# Patient Record
Sex: FEMALE | Race: WHITE | Employment: UNEMPLOYED | URBAN - METROPOLITAN AREA
[De-identification: names, ages, dates, MRNs, and addresses within clinical notes are randomized per-mention and may not be internally consistent; named-entity substitution may affect disease eponyms.]

---

## 2017-07-18 RX ORDER — OMEPRAZOLE 40 MG/1
CAPSULE, DELAYED RELEASE ORAL
Qty: 180 CAP | Refills: 0 | Status: ON HOLD | OUTPATIENT
Start: 2017-07-18 | End: 2017-12-20

## 2017-09-26 ENCOUNTER — HOSPITAL ENCOUNTER (INPATIENT)
Age: 67
LOS: 3 days | Discharge: HOME | DRG: 378 | End: 2017-09-29
Attending: EMERGENCY MEDICINE | Admitting: FAMILY MEDICINE
Payer: MEDICARE

## 2017-09-26 ENCOUNTER — APPOINTMENT (OUTPATIENT)
Dept: GENERAL RADIOLOGY | Age: 67
DRG: 378 | End: 2017-09-26
Attending: EMERGENCY MEDICINE
Payer: MEDICARE

## 2017-09-26 DIAGNOSIS — K92.2 GASTROINTESTINAL HEMORRHAGE, UNSPECIFIED GASTROINTESTINAL HEMORRHAGE TYPE: ICD-10-CM

## 2017-09-26 DIAGNOSIS — R07.9 ACUTE CHEST PAIN: Primary | ICD-10-CM

## 2017-09-26 LAB
ALBUMIN SERPL-MCNC: 3 G/DL (ref 3.5–5)
ALBUMIN/GLOB SERPL: 0.8 {RATIO} (ref 1.1–2.2)
ALP SERPL-CCNC: 49 U/L (ref 45–117)
ALT SERPL-CCNC: 10 U/L (ref 12–78)
ANION GAP SERPL CALC-SCNC: 4 MMOL/L (ref 5–15)
AST SERPL-CCNC: 10 U/L (ref 15–37)
ATRIAL RATE: 76 BPM
ATRIAL RATE: 78 BPM
ATRIAL RATE: 80 BPM
BILIRUB SERPL-MCNC: 0.1 MG/DL (ref 0.2–1)
BUN SERPL-MCNC: 31 MG/DL (ref 6–20)
BUN/CREAT SERPL: 20 (ref 12–20)
CALCIUM SERPL-MCNC: 8.6 MG/DL (ref 8.5–10.1)
CALCULATED P AXIS, ECG09: 67 DEGREES
CALCULATED P AXIS, ECG09: 68 DEGREES
CALCULATED R AXIS, ECG10: -11 DEGREES
CALCULATED R AXIS, ECG10: 38 DEGREES
CALCULATED R AXIS, ECG10: 41 DEGREES
CALCULATED T AXIS, ECG11: 151 DEGREES
CALCULATED T AXIS, ECG11: 88 DEGREES
CALCULATED T AXIS, ECG11: 90 DEGREES
CHLORIDE SERPL-SCNC: 113 MMOL/L (ref 97–108)
CK SERPL-CCNC: 82 U/L (ref 26–192)
CO2 SERPL-SCNC: 24 MMOL/L (ref 21–32)
CREAT SERPL-MCNC: 1.53 MG/DL (ref 0.55–1.02)
DIAGNOSIS, 93000: NORMAL
EST. AVERAGE GLUCOSE BLD GHB EST-MCNC: 120 MG/DL
GLOBULIN SER CALC-MCNC: 3.8 G/DL (ref 2–4)
GLUCOSE BLD STRIP.AUTO-MCNC: 106 MG/DL (ref 65–100)
GLUCOSE BLD STRIP.AUTO-MCNC: 202 MG/DL (ref 65–100)
GLUCOSE BLD STRIP.AUTO-MCNC: 224 MG/DL (ref 65–100)
GLUCOSE SERPL-MCNC: 83 MG/DL (ref 65–100)
HBA1C MFR BLD: 5.8 % (ref 4.2–6.3)
HCT VFR BLD AUTO: 18.3 % (ref 35–47)
HCT VFR BLD AUTO: 21.9 % (ref 35–47)
HEMOCCULT STL QL: POSITIVE
HGB BLD-MCNC: 5.2 G/DL (ref 11.5–16)
HGB BLD-MCNC: 6.2 G/DL (ref 11.5–16)
NRBC # BLD: 0.14 K/UL (ref 0–0.01)
NRBC BLD-RTO: 2.2 PER 100 WBC
P-R INTERVAL, ECG05: 136 MS
P-R INTERVAL, ECG05: 168 MS
P-R INTERVAL, ECG05: 174 MS
POTASSIUM SERPL-SCNC: 4.4 MMOL/L (ref 3.5–5.1)
PROT SERPL-MCNC: 6.8 G/DL (ref 6.4–8.2)
Q-T INTERVAL, ECG07: 398 MS
Q-T INTERVAL, ECG07: 402 MS
Q-T INTERVAL, ECG07: 406 MS
QRS DURATION, ECG06: 78 MS
QTC CALCULATION (BEZET), ECG08: 452 MS
QTC CALCULATION (BEZET), ECG08: 453 MS
QTC CALCULATION (BEZET), ECG08: 468 MS
SERVICE CMNT-IMP: ABNORMAL
SODIUM SERPL-SCNC: 141 MMOL/L (ref 136–145)
TROPONIN I SERPL-MCNC: <0.04 NG/ML
TSH SERPL DL<=0.05 MIU/L-ACNC: 0.86 UIU/ML (ref 0.36–3.74)
VENTRICULAR RATE, ECG03: 76 BPM
VENTRICULAR RATE, ECG03: 78 BPM
VENTRICULAR RATE, ECG03: 80 BPM

## 2017-09-26 PROCEDURE — 82962 GLUCOSE BLOOD TEST: CPT

## 2017-09-26 PROCEDURE — 87040 BLOOD CULTURE FOR BACTERIA: CPT | Performed by: FAMILY MEDICINE

## 2017-09-26 PROCEDURE — P9016 RBC LEUKOCYTES REDUCED: HCPCS | Performed by: EMERGENCY MEDICINE

## 2017-09-26 PROCEDURE — 74011250636 HC RX REV CODE- 250/636: Performed by: FAMILY MEDICINE

## 2017-09-26 PROCEDURE — 85018 HEMOGLOBIN: CPT | Performed by: EMERGENCY MEDICINE

## 2017-09-26 PROCEDURE — 84443 ASSAY THYROID STIM HORMONE: CPT | Performed by: FAMILY MEDICINE

## 2017-09-26 PROCEDURE — 94762 N-INVAS EAR/PLS OXIMTRY CONT: CPT

## 2017-09-26 PROCEDURE — 93005 ELECTROCARDIOGRAM TRACING: CPT

## 2017-09-26 PROCEDURE — 74011000250 HC RX REV CODE- 250: Performed by: FAMILY MEDICINE

## 2017-09-26 PROCEDURE — 71010 XR CHEST PORT: CPT

## 2017-09-26 PROCEDURE — 82550 ASSAY OF CK (CPK): CPT | Performed by: EMERGENCY MEDICINE

## 2017-09-26 PROCEDURE — 99284 EMERGENCY DEPT VISIT MOD MDM: CPT

## 2017-09-26 PROCEDURE — 74011250637 HC RX REV CODE- 250/637: Performed by: EMERGENCY MEDICINE

## 2017-09-26 PROCEDURE — 80053 COMPREHEN METABOLIC PANEL: CPT | Performed by: EMERGENCY MEDICINE

## 2017-09-26 PROCEDURE — 85025 COMPLETE CBC W/AUTO DIFF WBC: CPT | Performed by: EMERGENCY MEDICINE

## 2017-09-26 PROCEDURE — C9113 INJ PANTOPRAZOLE SODIUM, VIA: HCPCS | Performed by: FAMILY MEDICINE

## 2017-09-26 PROCEDURE — 84484 ASSAY OF TROPONIN QUANT: CPT | Performed by: EMERGENCY MEDICINE

## 2017-09-26 PROCEDURE — 82272 OCCULT BLD FECES 1-3 TESTS: CPT | Performed by: EMERGENCY MEDICINE

## 2017-09-26 PROCEDURE — 93306 TTE W/DOPPLER COMPLETE: CPT

## 2017-09-26 PROCEDURE — 74011250637 HC RX REV CODE- 250/637: Performed by: FAMILY MEDICINE

## 2017-09-26 PROCEDURE — 83036 HEMOGLOBIN GLYCOSYLATED A1C: CPT | Performed by: FAMILY MEDICINE

## 2017-09-26 PROCEDURE — 86923 COMPATIBILITY TEST ELECTRIC: CPT | Performed by: EMERGENCY MEDICINE

## 2017-09-26 PROCEDURE — 74011000250 HC RX REV CODE- 250: Performed by: NURSE PRACTITIONER

## 2017-09-26 PROCEDURE — 36430 TRANSFUSION BLD/BLD COMPNT: CPT

## 2017-09-26 PROCEDURE — 30233N1 TRANSFUSION OF NONAUTOLOGOUS RED BLOOD CELLS INTO PERIPHERAL VEIN, PERCUTANEOUS APPROACH: ICD-10-PCS | Performed by: INTERNAL MEDICINE

## 2017-09-26 PROCEDURE — 77030013169 SET IV BLD ICUM -A

## 2017-09-26 PROCEDURE — 65660000000 HC RM CCU STEPDOWN

## 2017-09-26 PROCEDURE — 86900 BLOOD TYPING SEROLOGIC ABO: CPT | Performed by: EMERGENCY MEDICINE

## 2017-09-26 PROCEDURE — 36415 COLL VENOUS BLD VENIPUNCTURE: CPT | Performed by: EMERGENCY MEDICINE

## 2017-09-26 RX ORDER — SODIUM CHLORIDE 9 MG/ML
250 INJECTION, SOLUTION INTRAVENOUS AS NEEDED
Status: DISCONTINUED | OUTPATIENT
Start: 2017-09-26 | End: 2017-09-29 | Stop reason: HOSPADM

## 2017-09-26 RX ORDER — DORZOLAMIDE HYDROCHLORIDE AND TIMOLOL MALEATE 20; 5 MG/ML; MG/ML
2 SOLUTION/ DROPS OPHTHALMIC 2 TIMES DAILY
Status: DISCONTINUED | OUTPATIENT
Start: 2017-09-26 | End: 2017-09-29 | Stop reason: HOSPADM

## 2017-09-26 RX ORDER — SODIUM CHLORIDE 9 MG/ML
75 INJECTION, SOLUTION INTRAVENOUS CONTINUOUS
Status: DISCONTINUED | OUTPATIENT
Start: 2017-09-26 | End: 2017-09-29 | Stop reason: HOSPADM

## 2017-09-26 RX ORDER — MAGNESIUM SULFATE 100 %
4 CRYSTALS MISCELLANEOUS AS NEEDED
Status: DISCONTINUED | OUTPATIENT
Start: 2017-09-26 | End: 2017-09-29 | Stop reason: HOSPADM

## 2017-09-26 RX ORDER — LEVOFLOXACIN 5 MG/ML
750 INJECTION, SOLUTION INTRAVENOUS
Status: DISCONTINUED | OUTPATIENT
Start: 2017-09-26 | End: 2017-09-29 | Stop reason: HOSPADM

## 2017-09-26 RX ORDER — LANOLIN ALCOHOL/MO/W.PET/CERES
325 CREAM (GRAM) TOPICAL 2 TIMES DAILY
Status: DISCONTINUED | OUTPATIENT
Start: 2017-09-26 | End: 2017-09-29 | Stop reason: HOSPADM

## 2017-09-26 RX ORDER — GABAPENTIN 800 MG/1
800 TABLET ORAL 3 TIMES DAILY
COMMUNITY
End: 2021-02-09

## 2017-09-26 RX ORDER — IPRATROPIUM BROMIDE AND ALBUTEROL SULFATE 2.5; .5 MG/3ML; MG/3ML
3 SOLUTION RESPIRATORY (INHALATION)
Status: DISCONTINUED | OUTPATIENT
Start: 2017-09-27 | End: 2017-09-27

## 2017-09-26 RX ORDER — CITALOPRAM 20 MG/1
20 TABLET, FILM COATED ORAL DAILY
Status: ON HOLD | COMMUNITY
End: 2017-12-20

## 2017-09-26 RX ORDER — CITALOPRAM 20 MG/1
20 TABLET, FILM COATED ORAL DAILY
Status: DISCONTINUED | OUTPATIENT
Start: 2017-09-27 | End: 2017-09-29 | Stop reason: HOSPADM

## 2017-09-26 RX ORDER — SODIUM CHLORIDE 0.9 % (FLUSH) 0.9 %
5-10 SYRINGE (ML) INJECTION AS NEEDED
Status: DISCONTINUED | OUTPATIENT
Start: 2017-09-26 | End: 2017-09-29 | Stop reason: HOSPADM

## 2017-09-26 RX ORDER — LEVOTHYROXINE SODIUM 125 UG/1
125 TABLET ORAL
Status: DISCONTINUED | OUTPATIENT
Start: 2017-09-27 | End: 2017-09-29 | Stop reason: HOSPADM

## 2017-09-26 RX ORDER — SODIUM CHLORIDE 0.9 % (FLUSH) 0.9 %
5-10 SYRINGE (ML) INJECTION EVERY 8 HOURS
Status: DISCONTINUED | OUTPATIENT
Start: 2017-09-26 | End: 2017-09-29 | Stop reason: HOSPADM

## 2017-09-26 RX ORDER — INSULIN LISPRO 100 [IU]/ML
INJECTION, SOLUTION INTRAVENOUS; SUBCUTANEOUS EVERY 6 HOURS
Status: DISCONTINUED | OUTPATIENT
Start: 2017-09-26 | End: 2017-09-29 | Stop reason: HOSPADM

## 2017-09-26 RX ORDER — PRAVASTATIN SODIUM 40 MG/1
40 TABLET ORAL
Status: DISCONTINUED | OUTPATIENT
Start: 2017-09-26 | End: 2017-09-29 | Stop reason: HOSPADM

## 2017-09-26 RX ORDER — POLYETHYLENE GLYCOL 3350, SODIUM SULFATE ANHYDROUS, SODIUM BICARBONATE, SODIUM CHLORIDE, POTASSIUM CHLORIDE 236; 22.74; 6.74; 5.86; 2.97 G/4L; G/4L; G/4L; G/4L; G/4L
1000 POWDER, FOR SOLUTION ORAL ONCE
Status: COMPLETED | OUTPATIENT
Start: 2017-09-26 | End: 2017-09-26

## 2017-09-26 RX ORDER — DEXTROSE 50 % IN WATER (D50W) INTRAVENOUS SYRINGE
12.5-25 AS NEEDED
Status: DISCONTINUED | OUTPATIENT
Start: 2017-09-26 | End: 2017-09-29 | Stop reason: HOSPADM

## 2017-09-26 RX ORDER — NITROGLYCERIN 0.4 MG/1
0.4 TABLET SUBLINGUAL
Status: DISCONTINUED | OUTPATIENT
Start: 2017-09-26 | End: 2017-09-29 | Stop reason: HOSPADM

## 2017-09-26 RX ORDER — GABAPENTIN 400 MG/1
800 CAPSULE ORAL 2 TIMES DAILY
Status: DISCONTINUED | OUTPATIENT
Start: 2017-09-26 | End: 2017-09-29 | Stop reason: HOSPADM

## 2017-09-26 RX ORDER — ASPIRIN 325 MG
325 TABLET ORAL
Status: COMPLETED | OUTPATIENT
Start: 2017-09-26 | End: 2017-09-26

## 2017-09-26 RX ADMIN — DORZOLAMIDE HYDROCHLORIDE AND TIMOLOL MALEATE 2 DROP: 20; 5 SOLUTION/ DROPS OPHTHALMIC at 23:17

## 2017-09-26 RX ADMIN — PRAVASTATIN SODIUM 40 MG: 40 TABLET ORAL at 22:18

## 2017-09-26 RX ADMIN — LEVOFLOXACIN 750 MG: 5 INJECTION, SOLUTION INTRAVENOUS at 16:55

## 2017-09-26 RX ADMIN — Medication 10 ML: at 22:01

## 2017-09-26 RX ADMIN — SODIUM CHLORIDE 75 ML/HR: 900 INJECTION, SOLUTION INTRAVENOUS at 18:07

## 2017-09-26 RX ADMIN — GABAPENTIN 800 MG: 400 CAPSULE ORAL at 22:02

## 2017-09-26 RX ADMIN — ASPIRIN 325 MG: 325 TABLET ORAL at 13:21

## 2017-09-26 RX ADMIN — FERROUS SULFATE TAB 325 MG (65 MG ELEMENTAL FE) 325 MG: 325 (65 FE) TAB at 22:18

## 2017-09-26 RX ADMIN — SODIUM CHLORIDE 40 MG: 9 INJECTION INTRAMUSCULAR; INTRAVENOUS; SUBCUTANEOUS at 22:18

## 2017-09-26 RX ADMIN — Medication 10 ML: at 22:03

## 2017-09-26 RX ADMIN — POLYETHYLENE GLYCOL-3350 AND ELECTROLYTES 1000 ML: 236; 6.74; 5.86; 2.97; 22.74 POWDER, FOR SOLUTION ORAL at 18:25

## 2017-09-26 NOTE — PROGRESS NOTES
Admission Medication Reconciliation:    Information obtained from: Patient     Significant PMH/Disease States:   Past Medical History:   Diagnosis Date    CAD (coronary artery disease)     Chest pain 7/2015    [facility]     Diabetic neuropathy [facility]     Diabetic retinopathy ([facility]     GI bleed 7/2015    4 bleeds with 9 clips placed       Chief Complaint for this Admission:    Chief Complaint   Patient presents with    Chest Pain       Allergies:  Augmentin [amoxicillin-pot clavulanate] and Nsaids (non-steroidal anti-inflammatory drug)    Prior to Admission Medications:   Prior to Admission Medications   Prescriptions Last Dose Informant Patient Reported? Taking?    insulin pump (PATIENT SUPPLIED) misc   Yes Yes   Sig: by SubCUTAneous route as needed. Pt unsure of pump settings. cholecalciferol, vitamin D3, (VITAMIN D3) 2,000 unit tab 9/26/2017 at Unknown time  No Yes   Sig: Take 1 Tab by mouth daily. citalopram (CELEXA) 20 mg tablet 9/26/2017 at Unknown time  Yes Yes   Sig: Take 20 mg by mouth daily. dorzolamide-timolol, PF, (COSOPT, PF,) 2-0.5 % dpet 9/25/2017 at Unknown time  Yes Yes   Sig: Administer 2 Drops to both eyes two (2) times a day. Indications: Ocular Hypertension   ferrous sulfate 325 mg (65 mg iron) tablet 9/26/2017 at am  Yes Yes   Sig: Take 325 mg by mouth two (2) times a day.   gabapentin (NEURONTIN) 800 mg tablet 9/26/2017 at am  Yes Yes   Sig: Take 800 mg by mouth two (2) times a day. insulin lispro (HUMALOG) 100 unit/mL injection   No Yes   Sig: Per insulin pump max dose of 100 units per day. Code E10.319   levothyroxine (SYNTHROID) 125 mcg tablet 9/26/2017 at Unknown time  No Yes   Sig: Take 1 Tab by mouth Daily (before breakfast). lisinopril (PRINIVIL, ZESTRIL) 10 mg tablet 9/26/2017 at Unknown time  No Yes   Sig: Take 1 Tab by mouth daily.    omeprazole (PRILOSEC) 40 mg capsule 9/25/2017 at Unknown time  No Yes   Sig: TAKE 1 CAPSULE BY MOUTH TWICE DAILY   pravastatin (PRAVACHOL) 40 mg tablet 9/25/2017 at Unknown time  No Yes   Sig: Take 1 Tab by mouth nightly. senna (SENNA) 8.6 mg tablet 9/25/2017 at Unknown time  Yes Yes   Sig: Take 1 Tab by mouth nightly. umeclidinium-vilanterol (ANORO ELLIPTA) 62.5-25 mcg/actuation dsdv 9/25/2017 at 1200  Yes Yes   Sig: Take 1 Puff by inhalation daily. verapamil ER (CALAN-SR) 120 mg tablet 9/25/2017 at Unknown time  No Yes   Sig: Take 1 Tab by mouth two (2) times a day. For BP      Facility-Administered Medications: None         Comments/Recommendations:     Spoke with patient regarding allergies and PTA medications. 1) Reviewed and confirmed allergies. 2) Updated PTA medication list. Added listing for patient's own insulin pump (pt unsure of pump settings). Modified Cosopt (specified both eyes) and gabapentin (changed 600 mg BID to 800 mg BID). Removed diabetic testing supplies and duplicate levothyroxine. Last dose information listed in table above.       Andrew Dominguez, PharmD

## 2017-09-26 NOTE — PROGRESS NOTES
Day #1 of Levaquin  Indication:  CAP  Current regimen:  Levaquin 750mg q 24 hours  Abx regimen: Levaquin monotherapy  Recent Labs      17   1255   WBC  5.9   CREA  1.53*   BUN  31*     Est CrCl: 40.2 ml/min  Temp (24hrs), Av.3 °F (36.8 °C), Min:98.3 °F (36.8 °C), Max:98.3 °F (36.8 °C)    Cultures: none    Plan: Change to 750mg q 48 h  due to renal function <50

## 2017-09-26 NOTE — H&P
[address] [place] [facility] 12, [address]   HISTORY AND PHYSICAL       Name:  Kenny Bowles   MR#:  [id number]   :  1950   Account #:  [de-identified]        Date of Adm:  2017       CHIEF COMPLAINT: Chest pain. HISTORY OF PRESENT ILLNESS: The patient is a 54-year-old   female with past medical history of coronary artery disease status post   CABG x3, 2 years back, history of gastrointestinal bleed, status post   clipping, history of diabetes mellitus type 1, currently on insulin pump,    history of hypothyroidism, hypertension, hyperlipidemia, who presents   to the hospital with the above mentioned symptoms. The patient   reports that her symptoms started 2 days back. She started   experiencing some intermittent chest pain, which was somewhat   substernal and she felt that it was \"squeezing. \" She also had some   nitroglycerin pills  associated with the same. Today, the chest pain   came back. The patient denies any exertional component to the chest   pain or any shortness of breath associated with the same. Regardless,   she called EMS and she was brought to the hospital. On arrival to the   ER by EMS, when the patient was coming to the hospital, it improved   after 200 mL of IV fluid that was given by EMS. The patient also had   some  significant nausea associated with the same. The patient came   to the hospital and was found to have a hemoglobin of around 6. She had a stool occult blood test done and was found to be positive for   the same and was requested to be admitted under the hospitalist   service. The patient reports that she has observed that her stools have been   darker than usual in the past few days, but she has not had any   vomiting, hematemesis or abdominal pain associated with the same. The patient denies any exertional component to the chest pain. Denies   any radiation to the chest pain, or any other concerns or problem with   the pain.  The patient reports that she had similar symptoms a few   years back and has had \"clips\" put in to stop the bleeding in her   stomach. The patient reports that she is not on aspirin or Plavix or any   NSAIDs because of history of GI bleeding. The patient denies any   other complaints or problems. Reports she lives in [place] and her   cardiologist is over there and does not remember when her last stress   test or cardiac catheterization was. The patient denies any other   complaints or problems. Denies any headache, blurry vision, sore   throat, trouble swallowing, trouble with speech, any shortness of   breath, cough, fever, chills, abdominal pain, constipation, diarrhea,   urinary symptoms, focal or generalized neurological weakness, recent   travel, sick contacts, falls, injuries or any other concerns or problems. PAST MEDICAL HISTORY: See above. HOME MEDICATIONS   Currently, the patient is on:   1. Lisinopril 10 mg daily. 2. Pravastatin 40 mg nightly. 3. Cholecalciferol. 4. Ferrous sulfate 325 mg, b.i.d.    5. Cosopt 2 drops both eyes. 6. Celexa 20 mg daily. 7. Gabapentin 800 mg b.i.d.   8. Insulin pump. 9. Omeprazole 40 mg daily. 10. Verapamil 1 tablet b.i.d. 11. L-thyroxine 125 mg daily. SOCIAL HISTORY: The patient is a former smoker. Used to smoke half   a pack per day and quit in 2015. Denies alcohol use, denies IV drug   abuse. Lives at home. FAMILY HISTORY: Mother had history of COPD, diabetes. Father had   history of COPD, diabetes and pancreatic cancer. ALLERGIES: AUGMENTIN AND NSAIDS. REVIEW OF SYSTEMS: All systems were removed and were found to   be essentially negative except for symptoms mentioned above. PHYSICAL EXAMINATION   VITAL SIGNS: Temperature 98.3, pulse 83, respiratory rate 19, blood   pressure 148/43, pulse oximetry 93% on room air.    GENERAL: Alert and oriented x3, awake, no acute distress, resting in   bed, pleasant female, appears to be stated age.   HEENT: Pupils equal and reactive to light. Dry mucous membranes. Tympanic membranes clear. NECK: Supple. CHEST: Clear to auscultation bilaterally. CORONARY: S1, S2 were heard. ABDOMEN: Soft, nontender, nondistended. Bowel sounds are   physiological.   EXTREMITIES: No clubbing, no cyanosis, no edema. NEUROPSYCHIATRIC: Pleasant mood and affect. Cranial nerves 2   through 12 grossly intact. Sensory grossly within normal limits. DTR   Strength 5/5. SKIN: Warm. LABORATORY: White count 5.9, hemoglobin 5.8, repeat was 6.2,   platelets 920. Sodium 141, potassium 4.4, chloride 113, bicarbonate   24, glucose 83, BUN 31, creatinine 1.53, calcium 8.6. ALT 10, AST 10,   alkaline phosphatase 49. CK 82, troponin less than 0.04. Stool occult   blood test positive. Repeat hemoglobin is 6.2 and hematocrit is 21.9. X-ray chest shows a patch of right infrahilar atelectasis/ air space   disease. EKG shows normal sinus rhythm with nonspecific ST changes. ASSESSMENT AND PLAN:   1. Chest pain, rule out acute coronary syndrome. The patient currently   is having GI bleed, thus cannot be put on antiplatelets. We will cycle   troponins. We will get an echocardiogram. Cardiology has evaluated   the patient and their recommendations have been incorporated. The   patient may need further intervention including a stress test once  GI   bleeding stabilizes. We will monitor the patient on a telemetry bed and   further intervention will be per hospital course. We will provide   nitroglycerin for pain and we will reassess as needed. 3. Gastrointestinal bleeding. The patient will be admitted to a telemetry   bed. We will transfuse 2 units PRBC. Keep the patient n.p.o. Start the   patient on Protonix b.i.d. GI consult has been requested. Closely   monitor hemoglobin and hematocrit and further intervention will be per   hospital course. We will reassess as needed. Continue to monitor. No   antiplatelets or NSAIDS. Further intervention will be per hospital   course. 4. Acute anemia of blood loss secondary to a GI bleed. We will   transfuse 2 units PRBC and monitor. Further intervention will be per   hospital course. The patient on Protonix, please see above. 5. Questionable pneumonia. The patient does have mild hypoxia. Could have underlying pneumonia. Thus we will start the patient on   empirical antibiotics, and obtain blood cultures. Although the patient is   afebrile, at this point of time and does not have leukocytosis, we will   start the patient on gentle IV hydration and continue to closely monitor. Further intervention will be per hospital course. antibiotics in the   morning. 6. History of anxiety. We will continue home insulin pump for basal   coverage. We will put the patient on sliding scale NovoLog insulin, diet   control and continue to monitor. 7. Hypothyroidism. Continue home medications. 8. Hypotension. We will hold antihypertensives at this point in time. We   will provide transfusion and monitor. We will provide gentle IV   hydration. The patient on telemetry. Further intervention will be per   hospital course. 10. Gastrointestinal bleed. 9. Gastrointestinal and deep venous thrombosis prophylaxis. The   patient is on SCDs.          Perla Painting MD MM / [email or web address].[email or web address]   D:  09/26/2017   15:25   T:  09/26/2017   16:13   Job #:  [id number]

## 2017-09-26 NOTE — CONSULTS
[address]  [address] [facility] 140 [facility], [address]  [phone]                     GI CONSULTATION NOTE  Vin Gamboa AGACNP-BC  Work Cell: [phone]      NAME:  Crista Christianson   :   [birth year]   MRN:   [id number]       Referring Provider: Dr. Eliseo Jane Date: 2017     Chief Complaint: Chest pain    History of Present Illness:  Patient is a 79 y.o. who is seen in consultation at the request of Dr. Beck Salgado for GI bleed. Ms. Kojo Castanon has a PMH as detailed below including recurrent GI bleed. She presented to the hospital with chest pain and indigestion. Onset of symptom was a couple of days ago. She has had some nausea without vomiting and alternating loose stools with constipation. She states stools have been black the past two days that she is aware of. She denies any abdominal pain or BRBPR. She is from [place] and had recent EGD with her GI provider there for GERD and upper abdominal pain. Per her reports, prior clips seen with some mild redness and small hiatal hernia. She has colonoscopy scheduled with him in 2017. She denies any ASA, NSAID or anticoagulant use. She was seen for similar symptoms back in 2015 at which time she had EGD/colonoscopy. She was noted to have gastric and small bowel AVMs which were treated with APC and clipping. Colonoscopy was unremarkable. She had repeat EGD 2015 at which time AVMs were again treated with clips and capsule was placed. Hgb 6.2 in ER and stools were found to be heme positive.        PMH:  Past Medical History:   Diagnosis Date    CAD (coronary artery disease)     Chest pain 2015    [facility]     Diabetic neuropathy [facility]     Diabetic retinopathy ([facility]     GI bleed 2015    4 bleeds with 9 clips placed       PSH:  Past Surgical History:   Procedure Laterality Date    CABG, ARTERY-VEIN, THREE  2015    HX ENDOSCOPY  2015    HX GI      HX HEART CATHETERIZATION  2015    HX ORTHOPAEDIC      back and right shoulder       Allergies: Allergies   Allergen Reactions    Augmentin [Amoxicillin-Pot Clavulanate] Diarrhea    Nsaids (Non-Steroidal Anti-Inflammatory Drug) Other (comments)     bleeding       Home Medications:  Prior to Admission Medications   Prescriptions Last Dose Informant Patient Reported? Taking?    insulin pump (PATIENT SUPPLIED) misc   Yes Yes   Sig: by SubCUTAneous route as needed. Pt unsure of pump settings. cholecalciferol, vitamin D3, (VITAMIN D3) 2,000 unit tab 9/26/2017 at Unknown time  No Yes   Sig: Take 1 Tab by mouth daily. citalopram (CELEXA) 20 mg tablet 9/26/2017 at Unknown time  Yes Yes   Sig: Take 20 mg by mouth daily. dorzolamide-timolol, PF, (COSOPT, PF,) 2-0.5 % dpet 9/25/2017 at Unknown time  Yes Yes   Sig: Administer 2 Drops to both eyes two (2) times a day. Indications: Ocular Hypertension   ferrous sulfate 325 mg (65 mg iron) tablet 9/26/2017 at am  Yes Yes   Sig: Take 325 mg by mouth two (2) times a day.   gabapentin (NEURONTIN) 800 mg tablet 9/26/2017 at am  Yes Yes   Sig: Take 800 mg by mouth two (2) times a day. insulin lispro (HUMALOG) 100 unit/mL injection   No Yes   Sig: Per insulin pump max dose of 100 units per day. Code E10.319   levothyroxine (SYNTHROID) 125 mcg tablet 9/26/2017 at Unknown time  No Yes   Sig: Take 1 Tab by mouth Daily (before breakfast). lisinopril (PRINIVIL, ZESTRIL) 10 mg tablet 9/26/2017 at Unknown time  No Yes   Sig: Take 1 Tab by mouth daily. omeprazole (PRILOSEC) 40 mg capsule 9/25/2017 at Unknown time  No Yes   Sig: TAKE 1 CAPSULE BY MOUTH TWICE DAILY   pravastatin (PRAVACHOL) 40 mg tablet 9/25/2017 at Unknown time  No Yes   Sig: Take 1 Tab by mouth nightly. senna (SENNA) 8.6 mg tablet 9/25/2017 at Unknown time  Yes Yes   Sig: Take 1 Tab by mouth nightly. umeclidinium-vilanterol (ANORO ELLIPTA) 62.5-25 mcg/actuation dsdv 9/25/2017 at 1200  Yes Yes   Sig: Take 1 Puff by inhalation daily.    verapamil ER (CALAN-SR) 120 mg tablet 9/25/2017 at Unknown time  No Yes   Sig: Take 1 Tab by mouth two (2) times a day. For BP      Facility-Administered Medications: None       Hospital Medications:  Current Facility-Administered Medications   Medication Dose Route Frequency    0.9% sodium chloride infusion 250 mL  250 mL IntraVENous PRN    levoFLOXacin (LEVAQUIN) 750 mg in D5W IVPB  750 mg IntraVENous Q48H     Current Outpatient Prescriptions   Medication Sig    citalopram (CELEXA) 20 mg tablet Take 20 mg by mouth daily.  gabapentin (NEURONTIN) 800 mg tablet Take 800 mg by mouth two (2) times a day.   insulin pump (PATIENT SUPPLIED) misc by SubCUTAneous route as needed. Pt unsure of pump settings.  omeprazole (PRILOSEC) 40 mg capsule TAKE 1 CAPSULE BY MOUTH TWICE DAILY    verapamil ER (CALAN-SR) 120 mg tablet Take 1 Tab by mouth two (2) times a day. For BP    levothyroxine (SYNTHROID) 125 mcg tablet Take 1 Tab by mouth Daily (before breakfast).  lisinopril (PRINIVIL, ZESTRIL) 10 mg tablet Take 1 Tab by mouth daily.  pravastatin (PRAVACHOL) 40 mg tablet Take 1 Tab by mouth nightly.  cholecalciferol, vitamin D3, (VITAMIN D3) 2,000 unit tab Take 1 Tab by mouth daily.  insulin lispro (HUMALOG) 100 unit/mL injection Per insulin pump max dose of 100 units per day. Code E10.319    ferrous sulfate 325 mg (65 mg iron) tablet Take 325 mg by mouth two (2) times a day.  umeclidinium-vilanterol (ANORO ELLIPTA) 62.5-25 mcg/actuation dsdv Take 1 Puff by inhalation daily.  dorzolamide-timolol, PF, (COSOPT, PF,) 2-0.5 % dpet Administer 2 Drops to both eyes two (2) times a day. Indications: Ocular Hypertension    senna (SENNA) 8.6 mg tablet Take 1 Tab by mouth nightly.        Social History:  Social History   Substance Use Topics    Smoking status: Former Smoker     Packs/day: 0.50     Quit date: 7/5/2015    Smokeless tobacco: Former User      Comment: using Nicoderm patch    Alcohol use No       Family History:  Family History   Problem Relation Age of Onset   [facility] [address] COPD Mother     Diabetes Mother     COPD Father     Diabetes Father     Cancer Father      pancreatic    Diabetes Brother     Heart Disease Brother     Diabetes Brother     Alcohol abuse Brother     Diabetes Sister     Cancer Sister      Adan Brightly    Bleeding Prob Sister      Factor V Leiden       Review of Systems:    Constitutional: negative fever, negative chills, negative weight loss  Eyes:   negative visual changes  ENT:   negative sore throat, tongue or lip swelling  Respiratory:  negative cough, negative dyspnea  Cards:  negative for chest pain, palpitations, lower extremity edema  GI:   See HPI  :  negative for frequency, dysuria  Integument:  negative for rash and pruritus  Heme:  negative for easy bruising and gum/nose bleeding  Musculoskel: negative for myalgias, back pain and muscle weakness  Neuro: negative for headaches, dizziness, vertigo  Psych:  negative for feelings of anxiety, depression      Objective:   Patient Vitals for the past 8 hrs:   BP Temp Pulse Resp SpO2 Height Weight   09/26/17 1500 (!) 120/33 - 78 12 98 % - -   09/26/17 1407 111/43 - 83 19 93 % - -   09/26/17 1345 135/85 - 80 17 96 % - -   09/26/17 1250 159/52 98.3 °F (36.8 °C) - 16 96 % 5' 5\" (1.651 m) 93 kg (205 lb 0.4 oz)               PHYSICAL EXAM:  General: WD, Obese. Alert, pale, cooperative, no acute distress.    HEENT: NC, Atraumatic. PERRLA, EOMI. Anicteric sclerae. Lungs:  CTA Bilaterally. No Wheezing/Rhonchi/Rales. Heart:  Regular rate and rhythm, No murmur, No Rubs, No Gallops  Abdomen: Soft, non-distended, non-tender.  +Bowel sounds, no HSM  Extremities: No c/c/e  Neurologic:  Alert and oriented X 3. No acute neurological distress. Psych:   Good insight. Not anxious nor agitated.     Data Review     Recent Labs      09/26/17   1348  09/26/17   1255   WBC   --   5.9   HGB  6.2*  5.8*   HCT  21.9*  20.2*   PLT   --   222     Recent Labs 09/26/17   1255   NA  141   K  4.4   CL  113*   CO2  24   BUN  31*   CREA  1.53*   GLU  83   CA  8.6     Recent Labs      09/26/17   1255   SGOT  10*   AP  49   TP  6.8   ALB  3.0*   GLOB  3.8     No results for input(s): INR, PTP, APTT in the last 72 hours. No lab exists for component: INREXT     Imaging studies reviewed      Assessment:   1. Acute on chronic anemia - with melena and heme positive stool. Suspect recurrent bleeding likely related to small bowel AVMs; given that recent EGD completed a couple of weeks ago was reportedly unremarkable. 2. CAD - s/p CABG  3. Type 1 DM  4.  Hypertension    Patient Active Problem List   Diagnosis Code    CAD (coronary artery disease) I25.10    Diabetes mellitus ([facility] E11.9    Essential hypertension I10    HLD (hyperlipidemia) E78.5    Obesity, Class I, BMI 30.0-34.9 (see actual BMI) E66.9    Low back pain at multiple sites M54.5    Hypothyroidism due to Hashimoto's thyroiditis E03.8, E06.3    GI bleed K92.2              Plan:   -Clear liquids as tolerated  -IV PPI BID  -Golytely 1L this evening  -NPO after midnight  -Plan for M2A capsule study tomorrow  -Monitor H&H and follow clinical course for any overt signs of bleeding  -Transfuse as necessary   -Discussed with Dr. Satya Wilkerson  -Will follow along with you  -Thank you kindly for allowing us to participate in the care of this patient

## 2017-09-26 NOTE — CONSULTS
Consult    Patient: Prabhu Hoffman MRN: [id number]  SSN: [id number]    YOB: 1950  Age: 79 y.o. Sex: female       Subjective:      Date of  Admission: 9/26/2017     Admission type: Emergency     Reason for consult: chest pain    Prabhu Hoffman is a 79 y.o. female admitted for chest pain  Ms Albino Burrows was a former patient of Dr Niraj Stanton. She has been having some palpitations lately, and then started having chest pain this morning that was quite severe. Started around 6 or 7am. No relief with antacids or SL NTG so EMS was called. She had pain for several hours. Now resolved. She had some associated nausea along with the pain. Initial EKG showed NSR with slight lateral ST depression, resolved on a subsequent tracing. Had an NSTEMI in July 2015 and was found to be anemic at that time. She was seen by GI and Dr Ashley Lopez did an EGD noting AVMs in the stomach and duodenum. These were clipped. She then underwent a cardiac cath that showed significant 3 vessel CAD. She was referred to cardiac surgery and underwent 3V CABG on 7/13/15 by Dr Jazmin Reed. She has been living in [place] lately but is in [place] visiting a relative. She apparently had some anemia again back in [place] and recently underwent another EGD there and was told it was ok there. Her Hgb is critically low on arrival here.     Primary Care Provider: Rhianna Díaz MD  Past Medical History:   Diagnosis Date    CAD (coronary artery disease)     Chest pain 7/2015    Diabetes ([address]     Diabetic neuropathy ([address]     Diabetic retinopathy ([address]     GI bleed 7/2015    4 bleeds with 9 clips placed      Past Surgical History:   Procedure Laterality Date    CABG, ARTERY-VEIN, THREE  7/13/2015    HX ENDOSCOPY  7/2015    HX GI      HX HEART CATHETERIZATION  7/2015    HX ORTHOPAEDIC      back and right shoulder     Family History   Problem Relation Age of Onset    COPD Mother     Diabetes Mother     COPD Father     Diabetes Father     Cancer Father      pancreatic    Diabetes Brother     Heart Disease Brother     Diabetes Brother     Alcohol abuse Brother     Diabetes Sister     Cancer Sister      Courtney     Bleeding Prob Sister      Factor V Leiden      Social History   Substance Use Topics    Smoking status: Former Smoker     Packs/day: 0.50     Quit date: 7/5/2015    Smokeless tobacco: Former User      Comment: using Nicoderm patch    Alcohol use No      No current facility-administered medications for this encounter. Current Outpatient Prescriptions   Medication Sig    omeprazole (PRILOSEC) 40 mg capsule TAKE 1 CAPSULE BY MOUTH TWICE DAILY    CONTOUR NEXT STRIPS strip USE TO TEST BLOOD SUGAR FOUR TIMES DAILY    citalopram (CELEXA) 20 mg tablet TAKE 1 TABLET BY MOUTH EVERY DAY    levothyroxine (SYNTHROID) 125 mcg tablet Take 1 Tab by mouth Daily (before breakfast). Note dose change to 125mcg. Take one pill daily.  verapamil ER (CALAN-SR) 120 mg tablet Take 1 Tab by mouth two (2) times a day. For BP    levothyroxine (SYNTHROID) 125 mcg tablet Take 1 Tab by mouth Daily (before breakfast).  lisinopril (PRINIVIL, ZESTRIL) 10 mg tablet Take 1 Tab by mouth daily.  pravastatin (PRAVACHOL) 40 mg tablet Take 1 Tab by mouth nightly.  cholecalciferol, vitamin D3, (VITAMIN D3) 2,000 unit tab Take 1 Tab by mouth daily.  gabapentin (NEURONTIN) 300 mg capsule Take 2 Caps by mouth two (2) times a day.  insulin lispro (HUMALOG) 100 unit/mL injection Per insulin pump max dose of 100 units per day. Code E10.319    ferrous sulfate 325 mg (65 mg iron) tablet Take  by mouth two (2) times a day.  umeclidinium-vilanterol (ANORO ELLIPTA) 62.5-25 mcg/actuation dsdv Take 1 Puff by inhalation daily.  dorzolamide-timolol, PF, (COSOPT, PF,) 2-0.5 % dpet Apply 2 Drops to eye two (2) times a day. Indications: OCULAR HYPERTENSION    senna (SENNA) 8.6 mg tablet Take 1 Tab by mouth nightly.         Allergies   Allergen Reactions    Augmentin [Amoxicillin-Pot Clavulanate] Diarrhea    Nsaids (Non-Steroidal Anti-Inflammatory Drug) Other (comments)     bleeding        Review of Systems:  A comprehensive review of systems was negative except for that written in the History of Present Illness. Subjective:     Visit Vitals    /52 (BP 1 Location: Left arm, BP Patient Position: At rest)    Temp 98.3 °F (36.8 °C)    Resp 16    Ht 5' 5\" (1.651 m)    Wt 93 kg (205 lb 0.4 oz)    SpO2 96%    BMI 34.12 kg/m2        Physical Exam:  Visit Vitals    /52 (BP 1 Location: Left arm, BP Patient Position: At rest)    Temp 98.3 °F (36.8 °C)    Resp 16    Ht 5' 5\" (1.651 m)    Wt 93 kg (205 lb 0.4 oz)    SpO2 96%    BMI 34.12 kg/m2     General Appearance:  Well developed, well nourished,alert and oriented x 3, and individual in no acute distress. Ears/Nose/Mouth/Throat:   Hearing grossly normal.         Neck: Supple. Chest:   Lungs clear to auscultation bilaterally. Cardiovascular:  Regular rate and rhythm, S1, S2 normal, no murmur. Abdomen:   Soft, non-tender, bowel sounds are active. Extremities: No edema bilaterally. Skin: Warm and dry. Cardiographics:  Telemetry: normal sinus rhythm  ECG: normal sinus rhythm, slight lateral ST depression initially, resolved on subsequent tracing    Echocardiogram:   July 2015  SUMMARY:  Left ventricle: Systolic function was normal. Ejection fraction was  estimated in the range of 55 % to 60 %. There were no regional wall motion  abnormalities. Doppler parameters were consistent with abnormal left  ventricular relaxation (grade 1 diastolic dysfunction).     Data Reviewed:   BMP:   Lab Results   Component Value Date/Time     09/26/2017 12:55 PM    K 4.4 09/26/2017 12:55 PM     (H) 09/26/2017 12:55 PM    CO2 24 09/26/2017 12:55 PM    AGAP 4 (L) 09/26/2017 12:55 PM    GLU 83 09/26/2017 12:55 PM    BUN 31 (H) 09/26/2017 12:55 PM    CREA 1.53 (H) 09/26/2017 12:55 PM    GFRAA 41 (L) 09/26/2017 12:55 PM    GFRNA 34 (L) 09/26/2017 12:55 PM     CBC:   Lab Results   Component Value Date/Time    WBC 5.9 09/26/2017 12:55 PM    HGB 5.8 (LL) 09/26/2017 12:55 PM    HCT 20.2 (L) 09/26/2017 12:55 PM     09/26/2017 12:55 PM     All Cardiac Markers in the last 24 hours: No results found for: CPK, CK, CKMMB, CKMB, RCK3, CKMBT, CKNDX, CKND1, BARTOLOME, TROPT, TROIQ, MALINI, TROPT, TNIPOC, BNP, BNPP     Assessment:      1) Chest pain  Possible component of ischemia. Initial EKG showed some borderline ST depression  Likely largely due to her anemia though    2) Severe anemia  Normocytic  History of GI bleeding  Apparently normal EGD recently    3) CAD s/p CABG    4) Old MI (NSTEMI July 2015)     5) Diabetes with retinopathy    6) Acute vs chronic renal failure - currently Cr up a bit from prior value in 2015. Uncertain timing. Plan:     Admit to hospitalist  Anemia work-up   May need to get GI involved again  Transfuse  Trend troponins  Once Hgb more stable might be worth risk stratification with stress testing ? Tomorrow vs next day  NPO after midnight would seem like a good idea.     Signed By: Shweta Houser MD     September 26, 2017

## 2017-09-26 NOTE — ED PROVIDER NOTES
HPI Comments: This patient presents with intermittent chest pain for the past 2 days. She had very severe pain that started at rest within the past hour or 2. She took one nitroglycerin pills sublingually. It helped a little. EMS was called. He found her with a blood pressure of 80/40. Blood pressure improved while on the way to the hospital. They gave only about 200 cc of IV fluids. She had severe nausea at the time but denied any shortness of breath or sweating. She states that she had a heart attack about 2 years ago and was treated at this hospital. At that time she had jaw pain only. She has never had this chest pain before. Sometimes it is worse with exertion, and sometimes it occurs at rest. Today's pain just prior to coming occurred at rest. No abdominal pain. No recent cough or URI symptoms. No excellent. She has diabetes as well as a recent diagnosis of gastroparesis. Had recent UGI bleed with endo in [place]. Clips placed. Had f/u endo 2 weeks ago. No more bleeding. Looked \"better. \"    No current pain on my evaluation at 1:05 pm    Patient is a 79 y.o. female presenting with chest pain.    Chest Pain (Angina)           Past Medical History:   Diagnosis Date    CAD (coronary artery disease)     Chest pain 7/2015    Diabetes ([address]     Diabetic neuropathy ([address]     Diabetic retinopathy ([address]     GI bleed 7/2015    4 bleeds with 9 clips placed       Past Surgical History:   Procedure Laterality Date    CABG, ARTERY-VEIN, THREE  7/13/2015    HX ENDOSCOPY  7/2015    HX GI      HX HEART CATHETERIZATION  7/2015    HX ORTHOPAEDIC      back and right shoulder         Family History:   Problem Relation Age of Onset    COPD Mother     Diabetes Mother     COPD Father     Diabetes Father     Cancer Father      pancreatic    Diabetes Brother     Heart Disease Brother     Diabetes Brother     Alcohol abuse Brother     Diabetes Sister     Cancer Sister      Fernando Comp    Bleeding Prob Sister Factor V Leiden       Social History     Social History    Marital status: SINGLE     Spouse name: N/A    Number of children: N/A    Years of education: N/A     Occupational History    Not on file. Social History Main Topics    Smoking status: Former Smoker     Packs/day: 0.50     Quit date: 7/5/2015    Smokeless tobacco: Former User      Comment: using Nicoderm patch    Alcohol use No    Drug use: No    Sexual activity: No     Other Topics Concern    Not on file     Social History Narrative         ALLERGIES: Augmentin [amoxicillin-pot clavulanate] and Nsaids (non-steroidal anti-inflammatory drug)    Review of Systems   Cardiovascular: Positive for chest pain. All other systems reviewed and are negative. Vitals:    09/26/17 1250   BP: 159/52   Resp: 16   Temp: 98.3 °F (36.8 °C)   SpO2: 96%   Weight: 93 kg (205 lb 0.4 oz)   Height: 5' 5\" (1.651 m)            Physical Exam   Genitourinary:   Genitourinary Comments: Maroon/dark stool. Constitutional: Pt is awake and alert. Pt appears well-developed and well-nourished. NAD. HENT:   Head: Normocephalic and atraumatic. Nose: Nose normal.   Mouth/Throat: Oropharynx is clear and moist. No oropharyngeal exudate. Eyes: Conjunctivae and extraocular motions are normal. Pupils are equal, round, and reactive to light. Right eye exhibits no discharge. Left eye exhibits no discharge. No scleral icterus. Neck: No tracheal deviation present. Supple neck. Cardiovascular: Normal rate, regular rhythm, normal heart sounds and intact distal pulses. Exam reveals no gallop and no friction rub. No murmur heard. Pulmonary/Chest: Effort normal and breath sounds normal.  Pt  has no wheezes. Pt  has no rales. Abdominal: Soft. Pt  exhibits no distension and no mass. No tenderness. Pt  has no rebound and no guarding. Musculoskeletal:  Pt  exhibits no edema and no tenderness.    Ext: Normal ROM in all four extremities; not tender to palpation; distal pulses are normal, no edema. Neurological:  Pt is alert. nonfocal neuro exam.  Skin: Skin is warm and dry. Pt  is not diaphoretic. Psychiatric:  Pt  has a normal mood and affect. Behavior is normal.             MDM  Number of Diagnoses or Management Options  Critical Care  Total time providing critical care: 30-74 minutes        30 minutes      ED Course       Procedures         ED EKG interpretation:  Rhythm: normal sinus rhythm; and regular . Rate (approx.): 80; Axis: normal; P wave: normal; QRS interval: normal ; ST/T wave: non-specific changes; This EKG was interpreted by Miryam Prajapati DO,ED Provider. ED EKG interpretation: # 2 - R side  Rhythm: normal sinus rhythm; and regular . Rate (approx.): 80; Axis: normal; P wave: normal; QRS interval: normal ; ST/T wave: non-specific changes; no rV4 elevation. This EKG was interpreted by Miryam Prajapati DO,ED Provider. ED EKG interpretation: # 3  Rhythm: normal sinus rhythm; and regular . Rate (approx.): 76; Axis: normal; P wave: normal; QRS interval: normal ; ST/T wave: ischemic changes laterally; This EKG was interpreted by Miryam Prajapati DO,ED Provider. Not a lot of difference of today's EKGs compared to an EKG in our system from 2-25-16 that was scanned in from [facility] office. Consented for blood transfusion  Transfusion to be ordered by Dr Sue De Luna who I spoke to  - he will also admit the pt. Repeat hct pending on admit. HD stable in the ED.

## 2017-09-26 NOTE — ED TRIAGE NOTES
Triage: Pt arrives via EMS from home for midsternal CP 8/10. BP 80/40 upon arrival, NS bolus started in route, 125 SYS. ASA not given d/t \"bleeding problem\".  +Nausea, 4mg Zofran given denies sweating   Hx: hiatal hernia  Pt took own SL Nitro 0.4

## 2017-09-26 NOTE — IP AVS SNAPSHOT
[address] [place] [address] 
[phone] Patient: Avinash Christiansen MRN: [id number] OGK:[id number] Current Discharge Medication List  
  
CONTINUE these medications which have CHANGED Dose & Instructions Dispensing Information Comments Morning Noon Evening Bedtime  
 citalopram 20 mg tablet Commonly known as:  Arelis Virgil What changed:  Another medication with the same name was removed. Continue taking this medication, and follow the directions you see here. Your last dose was: Your next dose is:    
   
   
 Dose:  20 mg Take 20 mg by mouth daily. Refills:  0  
     
   
   
   
  
 gabapentin 800 mg tablet Commonly known as:  NEURONTIN What changed:  Another medication with the same name was removed. Continue taking this medication, and follow the directions you see here. Your last dose was: Your next dose is:    
   
   
 Dose:  800 mg Take 800 mg by mouth two (2) times a day. Refills:  0  
     
   
   
   
  
 levothyroxine 125 mcg tablet Commonly known as:  SYNTHROID What changed:  Another medication with the same name was removed. Continue taking this medication, and follow the directions you see here. Your last dose was: Your next dose is:    
   
   
 Dose:  125 mcg Take 1 Tab by mouth Daily (before breakfast). Quantity:  90 Tab Refills:  3 CONTINUE these medications which have NOT CHANGED Dose & Instructions Dispensing Information Comments Morning Noon Evening Bedtime  
  insulin pump Misc Commonly known as:  PATIENT SUPPLIED Your last dose was: Your next dose is:    
   
   
 by SubCUTAneous route as needed. Pt unsure of pump settings. Refills:  0  
     
   
   
   
  
 ANORO ELLIPTA 62.5-25 mcg/actuation inhaler Generic drug:  umeclidinium-vilanterol Your last dose was: Your next dose is: Dose:  1 Puff Take 1 Puff by inhalation daily. Refills:  0  
     
   
   
   
  
 cholecalciferol (vitamin D3) 2,000 unit Tab Commonly known as:  VITAMIN D3 Your last dose was: Your next dose is:    
   
   
 Dose:  2000 Units Take 1 Tab by mouth daily. Quantity:  90 Tab Refills:  3 COSOPT (PF) 2-0.5 % ophthalmic solution Generic drug:  dorzolamide-timolol (PF) Your last dose was: Your next dose is:    
   
   
 Dose:  2 Drop Administer 2 Drops to both eyes two (2) times a day. Indications: Ocular Hypertension Refills:  0  
     
   
   
   
  
 ferrous sulfate 325 mg (65 mg iron) tablet Your last dose was: Your next dose is:    
   
   
 Dose:  325 mg Take 325 mg by mouth two (2) times a day. Refills:  0  
     
   
   
   
  
 insulin lispro 100 unit/mL injection Commonly known as:  HUMALOG Your last dose was: Your next dose is:    
   
   
 Per insulin pump max dose of 100 units per day. Code E10.319 Quantity:  1 Vial  
Refills:  3  
     
   
   
   
  
 lisinopril 10 mg tablet Commonly known as:  Toy Del Cid Your last dose was: Your next dose is:    
   
   
 Dose:  10 mg Take 1 Tab by mouth daily. Quantity:  90 Tab Refills:  3  
     
   
   
   
  
 metoprolol tartrate 25 mg tablet Commonly known as:  LOPRESSOR Your last dose was: Your next dose is: Take  by mouth two (2) times a day. Refills:  0  
     
   
   
   
  
 omeprazole 40 mg capsule Commonly known as:  PRILOSEC Your last dose was: Your next dose is: TAKE 1 CAPSULE BY MOUTH TWICE DAILY Quantity:  180 Cap Refills:  0  
     
   
   
   
  
 pravastatin 40 mg tablet Commonly known as:  PRAVACHOL Your last dose was: Your next dose is:    
   
   
 Dose:  40 mg Take 1 Tab by mouth nightly. Quantity:  90 Tab Refills:  3 Senna 8.6 mg tablet Generic drug:  senna Your last dose was: Your next dose is:    
   
   
 Dose:  1 Tab Take 1 Tab by mouth nightly. Refills:  0  
     
   
   
   
  
 verapamil  mg tablet Commonly known as:  CALAN-SR Your last dose was: Your next dose is:    
   
   
 Dose:  120 mg Take 1 Tab by mouth two (2) times a day. For BP Quantity:  180 Tab Refills:  3

## 2017-09-26 NOTE — IP AVS SNAPSHOT
[address] [place] [address] Box [facility] 
[phone] Patient: Shanda Riojas MRN: [id number] GOV:[id number] You are allergic to the following Allergen Reactions Augmentin (Amoxicillin-Pot Clavulanate) Diarrhea Nsaids (Non-Steroidal Anti-Inflammatory Drug) Other (comments) bleeding Recent Documentation Height Weight Breastfeeding? BMI OB Status Smoking Status 1.651 m 91.7 kg No 33.64 kg/m2 Postmenopausal Former Smoker Unresulted Labs Order Current Status SAMPLE TO BLOOD BANK In process SAMPLE TO BLOOD BANK In process CULTURE, BLOOD, PAIRED Preliminary result Emergency Contacts Name Discharge Info Relation Home Work Mobile Desi Casper DISCHARGE CAREGIVER [3] Other Relative [6] [phone] Katherin Anderson DISCHARGE CAREGIVER [3] Friend [5] [phone] About your hospitalization You were admitted on:  September 26, 2017 You last received care in the:  [place] [id number] You were discharged on:  September 29, 2017 Unit phone number:  [phone] Why you were hospitalized Your primary diagnosis was:  Erosive Gastritis With Hemorrhage Your diagnoses also included:  Avm (Arteriovenous Malformation) Of Duodenum, Acquired With Hemorrhage Providers Seen During Your Hospitalizations Provider Role Specialty Primary office phone Alexx Pereyra DO Attending Provider Emergency Medicine [phone] Joselito Choi MD Attending Provider Hospitalist [phone] Omega Sierra MD Attending Provider Internal Medicine [phone] Your Primary Care Physician (PCP) Primary Care Physician Office Phone Office Fax Yefri Dose [phone] Follow-up Information Follow up With Details Comments Contact Info Alpa Lopes MD In 1 week  [address] [facility] [address] [facility] 
[phone] Bea Colón MD In 1 week callfor pathology/biopsy results [address] [place] [address] [address] [place] [address] Avenue 
[phone] Current Discharge Medication List  
  
CONTINUE these medications which have CHANGED Dose & Instructions Dispensing Information Comments Morning Noon Evening Bedtime  
 citalopram 20 mg tablet Commonly known as:  Rob Gola What changed:  Another medication with the same name was removed. Continue taking this medication, and follow the directions you see here. Your last dose was: Your next dose is:    
   
   
 Dose:  20 mg Take 20 mg by mouth daily. Refills:  0  
     
   
   
   
  
 gabapentin 800 mg tablet Commonly known as:  NEURONTIN What changed:  Another medication with the same name was removed. Continue taking this medication, and follow the directions you see here. Your last dose was: Your next dose is:    
   
   
 Dose:  800 mg Take 800 mg by mouth two (2) times a day. Refills:  0  
     
   
   
   
  
 levothyroxine 125 mcg tablet Commonly known as:  SYNTHROID What changed:  Another medication with the same name was removed. Continue taking this medication, and follow the directions you see here. Your last dose was: Your next dose is:    
   
   
 Dose:  125 mcg Take 1 Tab by mouth Daily (before breakfast). Quantity:  90 Tab Refills:  3 CONTINUE these medications which have NOT CHANGED Dose & Instructions Dispensing Information Comments Morning Noon Evening Bedtime  
  insulin pump Misc Commonly known as:  PATIENT SUPPLIED Your last dose was: Your next dose is:    
   
   
 by SubCUTAneous route as needed. Pt unsure of pump settings. Refills:  0  
     
   
   
   
  
 ANORO ELLIPTA 62.5-25 mcg/actuation inhaler Generic drug:  umeclidinium-vilanterol Your last dose was: Your next dose is:    
   
   
 Dose:  1 Puff Take 1 Puff by inhalation daily. Refills:  0  
     
   
   
   
  
 cholecalciferol (vitamin D3) 2,000 unit Tab Commonly known as:  VITAMIN D3 Your last dose was: Your next dose is:    
   
   
 Dose:  2000 Units Take 1 Tab by mouth daily. Quantity:  90 Tab Refills:  3 COSOPT (PF) 2-0.5 % ophthalmic solution Generic drug:  dorzolamide-timolol (PF) Your last dose was: Your next dose is:    
   
   
 Dose:  2 Drop Administer 2 Drops to both eyes two (2) times a day. Indications: Ocular Hypertension Refills:  0  
     
   
   
   
  
 ferrous sulfate 325 mg (65 mg iron) tablet Your last dose was: Your next dose is:    
   
   
 Dose:  325 mg Take 325 mg by mouth two (2) times a day. Refills:  0  
     
   
   
   
  
 insulin lispro 100 unit/mL injection Commonly known as:  HUMALOG Your last dose was: Your next dose is:    
   
   
 Per insulin pump max dose of 100 units per day. Code E10.319 Quantity:  1 Vial  
Refills:  3  
     
   
   
   
  
 lisinopril 10 mg tablet Commonly known as:  Ebony Horowitz Your last dose was: Your next dose is:    
   
   
 Dose:  10 mg Take 1 Tab by mouth daily. Quantity:  90 Tab Refills:  3  
     
   
   
   
  
 metoprolol tartrate 25 mg tablet Commonly known as:  LOPRESSOR Your last dose was: Your next dose is: Take  by mouth two (2) times a day. Refills:  0  
     
   
   
   
  
 omeprazole 40 mg capsule Commonly known as:  PRILOSEC Your last dose was: Your next dose is: TAKE 1 CAPSULE BY MOUTH TWICE DAILY Quantity:  180 Cap Refills:  0  
     
   
   
   
  
 pravastatin 40 mg tablet Commonly known as:  PRAVACHOL Your last dose was: Your next dose is:    
   
   
 Dose:  40 mg Take 1 Tab by mouth nightly. Quantity:  90 Tab Refills:  3 Senna 8.6 mg tablet Generic drug:  senna Your last dose was: Your next dose is:    
   
   
 Dose:  1 Tab Take 1 Tab by mouth nightly. Refills:  0  
     
   
   
   
  
 verapamil  mg tablet Commonly known as:  CALAN-SR Your last dose was: Your next dose is:    
   
   
 Dose:  120 mg Take 1 Tab by mouth two (2) times a day. For BP Quantity:  180 Tab Refills:  3 Discharge Instructions Discharge Instructions PATIENT ID: Irma Wlalace MRN: [id number] YOB: 1950 DATE OF ADMISSION: 9/26/2017 12:40 PM   
DATE OF DISCHARGE: 9/29/2017 PRIMARY CARE PROVIDER: Rosaline Laurent MD  
 
ATTENDING PHYSICIAN: Charissa Madison MD 
DISCHARGING PROVIDER: Charissa Madison MD   
To contact this individual call [phone] and ask the  to page. If unavailable ask to be transferred the Adult Hospitalist Department. DISCHARGE DIAGNOSES PRIMARY: 
1. lower gastrointestinal bleed 2. duodenal arteriovenous malformations 3. erosive gastritis 4. acute blood loss anemia 5. atypical chest pain, resolved 6. morbid obesity (BMI 33.64) SECONDARY: 
1. coronary artery disease 2. hypothyroidism 3. 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 anxiety disorder CONSULTATIONS: IP CONSULT TO CARDIOLOGY 
IP CONSULT TO GASTROENTEROLOGY PROCEDURES/SURGERIES: Procedure(s): ESOPHAGOGASTRODUODENOSCOPY (EGD) ENDOSCOPIC ARGON PLASMA COAGULATION 
RESOLUTION CLIP 
ESOPHAGOGASTRODUODENAL (EGD) BIOPSY PENDING TEST RESULTS:  
At the time of discharge the following test results are still pending: pathology. biopsy form EGD FOLLOW UP APPOINTMENTS:  
Follow-up Information Follow up With Details Comments Contact Info Rosaline Laurent MD   [address] [place] [address] [address] [facility]  
[phone] ADDITIONAL CARE RECOMMENDATIONS:  None DIET: Cardiac Diet and Diabetic Diet Oral Nutritional Supplements: No Oral Supplement prescribed none Pt advised to be mindful that certain foods, can worsen gastritis: mints, chocolate, acidic fruits/vegetables, coffee NO smoking ACTIVITY: Activity as tolerated WOUND CARE: none EQUIPMENT needed: none DISCHARGE MEDICATIONS: 
 See Medication Reconciliation Form · It is important that you take the medication exactly as they are prescribed. · Keep your medication in the bottles provided by the pharmacist and keep a list of the medication names, dosages, and times to be taken in your wallet. · Do not take other medications without consulting your doctor. NOTIFY YOUR PHYSICIAN FOR ANY OF THE FOLLOWING:  
Fever over 101 degrees for 24 hours. Chest pain, shortness of breath, fever, chills, nausea, vomiting, diarrhea, change in mentation, falling, weakness, bleeding. Severe pain or pain not relieved by medications. Or, any other signs or symptoms that you may have questions about. DISPOSITION: 
  Home With: 
 OT  PT  [facility]  RN  
  
 SNF/Inpatient Rehab/LTAC Independent/assisted living Hospice Other: CDMP Checked:  
Yes x PROBLEM LIST Updated: 
Yes x Signed: Stephanie Crawford IV, MD 
9/29/2017 
11:29 AM 
 
Discharge Instructions Attachments/References GASTRITIS (ENGLISH) AVM (ARTERIOVENOUS MALFORMATION REPAIR): POST-OP (ENGLISH) Discharge Orders None ACO Transitions of Care Introducing [facility] [address] offers a voluntary care coordination program to provide high quality service and care to [facility] fee-for-service beneficiaries. Ashish Unger was designed to help you enhance your health and well-being through the following services: ? Transitions of Care  support for individuals who are transitioning from one care setting to another (example: Hospital to home). ? Chronic and Complex Care Coordination  support for individuals and caregivers of those with serious or chronic illnesses or with more than one chronic (ongoing) condition and those who take a number of different medications. If you meet specific medical criteria, a [facility] may call you directly to coordinate your care with your primary care physician and your other care providers. For questions about the [facility] programs, please, contact your physicians office. For general questions or additional information about Accountable Care Organizations: 
Please visit www.medicare.gov/acos. html or call 1-800-MEDICARE (6-945.968.3687) TTY users should call 0-330.449.4793. Introducing hospitals & HEALTH SERVICES! Latha Esposito introduces [facility] [employer] patient portal. Now you can access parts of your medical record, email your doctor's office, and request medication refills online. 1. In your internet browser, go to [email or web address] 2. Click on the First Time User? Click Here link in the Sign In box. You will see the New Member Sign Up page. 3. Enter your [facility] Access Code exactly as it appears below. You will not need to use this code after youve completed the sign-up process. If you do not sign up before the expiration date, you must request a new code. · [facility] Access Code: [email or web address] Expires: 12/28/2017  1:00 PM 
 
4. Enter the last four digits of your Social Security Number (xxxx) and Date of Birth (mm/dd/yyyy) as indicated and click Submit. You will be taken to the next sign-up page. 5. Create a [employer] ID. This will be your [facility] login ID and cannot be changed, so think of one that is secure and easy to remember. 6. Create a [facility] password. You can change your password at any time. 7. Enter your Password Reset Question and Answer.  This can be used at a later time if you forget your password. 8. Enter your e-mail address. You will receive e-mail notification when new information is available in [address] 9. Click Sign Up. You can now view and download portions of your medical record. 10. Click the Download Summary menu link to download a portable copy of your medical information. If you have questions, please visit the Frequently Asked Questions section of the [employer] website. Remember, [employer] is NOT to be used for urgent needs. For medical emergencies, dial 911. Now available from your iPhone and Android! General Information Please provide this summary of care documentation to your next provider. Patient Signature:  ____________________________________________________________ Date:  ____________________________________________________________  
  
Serene Arnold Provider Signature:  ____________________________________________________________ Date:  ____________________________________________________________ More Information Gastritis: Care Instructions Your Care Instructions Gastritis is a sore and upset stomach. It happens when something irritates the stomach lining. Many things can cause it. These include an infection such as the flu or something you ate or drank. Medicines or a sore on the lining of the stomach (ulcer) also can cause it. Your belly may bloat and ache. You may belch, vomit, and feel sick to your stomach. You should be able to relieve the problem by taking medicine. And it may help to change your diet. If gastritis lasts, your doctor may prescribe medicine. Follow-up care is a key part of your treatment and safety. Be sure to make and go to all appointments, and call your doctor if you are having problems. It's also a good idea to know your test results and keep a list of the medicines you take. How can you care for yourself at home? · If your doctor prescribed antibiotics, take them as directed. Do not stop taking them just because you feel better. You need to take the full course of antibiotics. · Be safe with medicines. If your doctor prescribed medicine to decrease stomach acid, take it as directed. Call your doctor if you think you are having a problem with your medicine. · Do not take any other medicine, including over-the-counter pain relievers, without talking to your doctor first. 
· If your doctor recommends over-the-counter medicine to reduce stomach acid, such as Pepcid AC, Prilosec, Tagamet HB, or Zantac 75, follow the directions on the label. · Drink plenty of fluids (enough so that your urine is light yellow or clear like water) to prevent dehydration. Choose water and other caffeine-free clear liquids. If you have kidney, heart, or liver disease and have to limit fluids, talk with your doctor before you increase the amount of fluids you drink. · Limit how much alcohol you drink. · Avoid coffee, tea, cola drinks, chocolate, and other foods with caffeine. They increase stomach acid. When should you call for help? Call 911 anytime you think you may need emergency care. For example, call if: 
· You vomit blood or what looks like coffee grounds. · You pass maroon or very bloody stools. Call your doctor now or seek immediate medical care if: 
· You start breathing fast and have not produced urine in the last 8 hours. · You cannot keep fluids down. Watch closely for changes in your health, and be sure to contact your doctor if: 
· You do not get better as expected. Where can you learn more? Go to http://katelynn-james.info/. Enter [id number] in the search box to learn more about \"Gastritis: Care Instructions. \" Current as of: August 9, 2016 Content Version: 11.3 © 8033-4343 [employer].  Care instructions adapted under license by [employer] (which disclaims liability or warranty for this information). If you have questions about a medical condition or this instruction, always ask your healthcare professional. [address] any warranty or liability for your use of this information. Arteriovenous Malformation Repair: What to Expect at Home Your Recovery An arteriovenous malformation repair is surgery to remove a tangled bunch of blood vessels, called an arteriovenous malformation (AVM). The doctor removed the AVM through a cut (incision) in your scalp and the bone surrounding your brain (the skull). The incision in your scalp may be sore for about a week after surgery. You may also have numbness near the incision, or swelling and bruising around your eyes. The incision may itch as it starts to heal. Medicine and ice packs can help with headaches, pain, swelling, and itching. You may feel more tired than usual for several weeks. You may be able to do many of your usual activities after 4 to 6 weeks. But you will probably need 2 to 6 months to fully recover. You may need therapy to help you with speech, walking, and other activities that were affected by the AVM and surgery. Most of your healing will take place in the first year after surgery, but you can continue to improve after that. This care sheet gives you a general idea about how long it will take for you to recover. But each person recovers at a different pace. Follow the steps below to feel better as quickly as possible. How can you care for yourself at home? Activity · Rest when you feel tired. You may feel sleepy more often than you did before the surgery. Plan to take a nap every day. Getting enough sleep will help you recover. · When you sit up after lying down, bring your head up slowly. This can prevent headaches or dizziness. · Try to walk each day. Start by walking a little more than you did the day before. Bit by bit, increase the amount you walk.  Walking boosts blood flow and helps prevent pneumonia and constipation. · Avoid strenuous activities, such as bicycle riding, jogging, weight lifting, or aerobic exercise, for 2 to 4 weeks or until your doctor says it is okay. · For 3 weeks, avoid lifting anything that would make you strain. This may include a child, heavy grocery bags and milk containers, a heavy briefcase or backpack, cat litter or dog food bags, or a vacuum . · Do not play any rough or contact sports until your doctor says it is okay. · You can wash your hair 2 to 3 days after your surgery. But do not soak your head or swim for 2 to 3 weeks. · You will probably need to take at least 6 weeks off from work. It depends on the type of work you do and how you feel. Diet · You can eat your normal diet. If your stomach is upset, try bland, low-fat foods like plain rice, broiled chicken, toast, and yogurt. · Follow your doctor's instructions about drinking fluids. · You may notice that your bowel movements are not regular right after your surgery. This is common. Try to avoid constipation and straining with bowel movements. You may want to take a fiber supplement every day. If you have not had a bowel movement after a couple of days, ask your doctor about taking a mild laxative. Medicines · Your doctor will tell you if and when you can restart your medicines. He or she will also give you instructions about taking any new medicines. · If you take blood thinners, such as warfarin (Coumadin), clopidogrel (Plavix), or aspirin, be sure to talk to your doctor. He or she will tell you if and when to start taking those medicines again. Make sure that you understand exactly what your doctor wants you to do. · You may get medicines to prevent seizures and brain swelling. Take them exactly as directed by your doctor. · Be safe with medicines. Take pain medicines exactly as directed. ¨ If the doctor gave you a prescription medicine for pain, take it as prescribed. ¨ If you are not taking a prescription pain medicine, ask your doctor if you can take an over-the-counter medicine. · If you think your pain medicine is making you sick to your stomach: 
¨ Take your medicine after meals (unless your doctor has told you not to). ¨ Ask your doctor for a different pain medicine. · If your doctor prescribed antibiotics, take them as directed. Do not stop taking them just because you feel better. You need to take the full course of antibiotics. Incision care · If you have strips of tape on the incision, leave the tape on for a week or until it falls off. · Wash the area daily with warm, soapy water, and pat it dry. Don't use hydrogen peroxide or alcohol, which can slow healing. You may cover the area with a gauze bandage. Change the bandage every day. · Keep the area clean and dry. Ice and elevation · For the first 1 to 2 days, you can use ice to reduce pain, swelling, and itching. Put ice or a cold pack on your head for 10 to 20 minutes at a time. Put a thin cloth between the ice and your skin. · Try not to lie flat when you rest or sleep. You can use a wedge pillow, or put a rolled towel or foam padding under your pillow. Rehabilitation · Your doctor may recommend that you work with a speech therapist or occupational therapist if the surgery affected your speech or your ability to do your daily activities. Other instructions · Do not use an enema or laxative unless your doctor says it is okay. Follow-up care is a key part of your treatment and safety. Be sure to make and go to all appointments, and call your doctor if you are having problems. It's also a good idea to know your test results and keep a list of the medicines you take. When should you call for help? Call 911 anytime you think you may need emergency care. For example, call if: 
· You passed out (lost consciousness). · You have sudden chest pain and shortness of breath, or you cough up blood. · You have severe trouble breathing. · You have symptoms of a stroke. These may include: 
¨ Sudden numbness, tingling, weakness, or loss of movement in your face, arm, or leg, especially on only one side of your body. ¨ Sudden vision changes. ¨ Sudden trouble speaking. ¨ Sudden confusion or trouble understanding simple statements. ¨ Sudden problems with walking or balance. ¨ A sudden, severe headache that is different from past headaches. · Your body is jerking or shaking. · You feel very sleepy. Call your doctor now or seek immediate medical care if: 
· You fall and hit your head. · You have a fever with a stiff neck or a severe headache. · You have any sudden vision changes. · Your have new or worse headaches. · You have trouble thinking clearly. · You are sick to your stomach or cannot keep fluids down. · You have pain that does not get better after you take pain medicine. · You have a fever over 100°F. 
· You have loose stitches, or your incision comes open. · Bright red blood has soaked through the bandage over your incision. · You have signs of infection, such as: 
¨ Increased pain, swelling, warmth, or redness. ¨ Red streaks leading from the incision. ¨ Pus draining from the incision. ¨ Swollen lymph nodes in your neck, armpits, or groin. ¨ A fever. · Your incision leaks fluid, or fluid builds up under your scalp near the incision. Watch closely for changes in your health, and be sure to contact your doctor if you have any problems. Where can you learn more? Go to http://katelynn-james.info/. Enter X989 in the search box to learn more about \"Arteriovenous Malformation Repair: What to Expect at Home. \" Current as of: March 20, 2017 Content Version: 11.3 © 8325-4352 [employer].  Care instructions adapted under license by [employer] (which disclaims liability or warranty for this information). If you have questions about a medical condition or this instruction, always ask your healthcare professional. [place] [address] any warranty or liability for your use of this information.

## 2017-09-27 LAB
ABO + RH BLD: NORMAL
ALBUMIN SERPL-MCNC: 2.8 G/DL (ref 3.5–5)
ALBUMIN/GLOB SERPL: 0.7 {RATIO} (ref 1.1–2.2)
ALP SERPL-CCNC: 46 U/L (ref 45–117)
ALT SERPL-CCNC: 10 U/L (ref 12–78)
ANION GAP SERPL CALC-SCNC: 7 MMOL/L (ref 5–15)
AST SERPL-CCNC: 11 U/L (ref 15–37)
ATRIAL RATE: 75 BPM
BASOPHILS # BLD: 0.2 K/UL (ref 0–0.1)
BASOPHILS NFR BLD: 3 % (ref 0–1)
BILIRUB SERPL-MCNC: 0.3 MG/DL (ref 0.2–1)
BLD PROD TYP BPU: NORMAL
BLOOD GROUP ANTIBODIES SERPL: NORMAL
BPU ID: NORMAL
BUN SERPL-MCNC: 27 MG/DL (ref 6–20)
BUN/CREAT SERPL: 20 (ref 12–20)
CALCIUM SERPL-MCNC: 8.4 MG/DL (ref 8.5–10.1)
CALCULATED P AXIS, ECG09: 68 DEGREES
CALCULATED R AXIS, ECG10: 26 DEGREES
CALCULATED T AXIS, ECG11: 93 DEGREES
CHLORIDE SERPL-SCNC: 112 MMOL/L (ref 97–108)
CHOLEST SERPL-MCNC: 124 MG/DL
CO2 SERPL-SCNC: 24 MMOL/L (ref 21–32)
CREAT SERPL-MCNC: 1.35 MG/DL (ref 0.55–1.02)
CROSSMATCH RESULT,%XM: NORMAL
DIAGNOSIS, 93000: NORMAL
DIFFERENTIAL METHOD BLD: ABNORMAL
EOSINOPHIL # BLD: 0.3 K/UL (ref 0–0.4)
EOSINOPHIL NFR BLD: 5 % (ref 0–7)
ERYTHROCYTE [DISTWIDTH] IN BLOOD BY AUTOMATED COUNT: 15.4 % (ref 11.5–14.5)
GLOBULIN SER CALC-MCNC: 3.8 G/DL (ref 2–4)
GLUCOSE BLD STRIP.AUTO-MCNC: 137 MG/DL (ref 65–100)
GLUCOSE BLD STRIP.AUTO-MCNC: 85 MG/DL (ref 65–100)
GLUCOSE BLD STRIP.AUTO-MCNC: 89 MG/DL (ref 65–100)
GLUCOSE SERPL-MCNC: 77 MG/DL (ref 65–100)
HCT VFR BLD AUTO: 20.2 % (ref 35–47)
HDLC SERPL-MCNC: 41 MG/DL
HDLC SERPL: 3 {RATIO} (ref 0–5)
HGB BLD-MCNC: 5.8 G/DL (ref 11.5–16)
HGB BLD-MCNC: 7.1 G/DL (ref 11.5–16)
HGB BLD-MCNC: 7.5 G/DL (ref 11.5–16)
LDLC SERPL CALC-MCNC: 47.2 MG/DL (ref 0–100)
LIPID PROFILE,FLP: ABNORMAL
LYMPHOCYTES # BLD: 1.1 K/UL (ref 0.8–3.5)
LYMPHOCYTES NFR BLD: 18 % (ref 12–49)
MCH RBC QN AUTO: 28.6 PG (ref 26–34)
MCHC RBC AUTO-ENTMCNC: 28.7 G/DL (ref 30–36.5)
MCV RBC AUTO: 99.5 FL (ref 80–99)
METAMYELOCYTES NFR BLD MANUAL: 1 %
MONOCYTES # BLD: 0.4 K/UL (ref 0–1)
MONOCYTES NFR BLD: 6 % (ref 5–13)
MYELOCYTES NFR BLD MANUAL: 1 %
NEUTS BAND NFR BLD MANUAL: 1 % (ref 0–6)
NEUTS SEG # BLD: 3.9 K/UL (ref 1.8–8)
NEUTS SEG NFR BLD: 65 % (ref 32–75)
NRBC BLD-RTO: 1 PER 100 WBC
P-R INTERVAL, ECG05: 162 MS
PATH REV BLD -IMP: ABNORMAL
PLATELET # BLD AUTO: 222 K/UL (ref 150–400)
POTASSIUM SERPL-SCNC: 4.6 MMOL/L (ref 3.5–5.1)
PROT SERPL-MCNC: 6.6 G/DL (ref 6.4–8.2)
Q-T INTERVAL, ECG07: 412 MS
QRS DURATION, ECG06: 80 MS
QTC CALCULATION (BEZET), ECG08: 460 MS
RBC # BLD AUTO: 2.03 M/UL (ref 3.8–5.2)
RBC MORPH BLD: ABNORMAL
SERVICE CMNT-IMP: ABNORMAL
SERVICE CMNT-IMP: NORMAL
SERVICE CMNT-IMP: NORMAL
SODIUM SERPL-SCNC: 143 MMOL/L (ref 136–145)
SPECIMEN EXP DATE BLD: NORMAL
STATUS OF UNIT,%ST: NORMAL
TRIGL SERPL-MCNC: 179 MG/DL (ref ?–150)
TROPONIN I SERPL-MCNC: <0.04 NG/ML
UNIT DIVISION, %UDIV: 0
VENTRICULAR RATE, ECG03: 75 BPM
VLDLC SERPL CALC-MCNC: 35.8 MG/DL
WBC # BLD AUTO: 5.9 K/UL (ref 3.6–11)

## 2017-09-27 PROCEDURE — 65660000000 HC RM CCU STEPDOWN

## 2017-09-27 PROCEDURE — 80053 COMPREHEN METABOLIC PANEL: CPT | Performed by: FAMILY MEDICINE

## 2017-09-27 PROCEDURE — 94640 AIRWAY INHALATION TREATMENT: CPT

## 2017-09-27 PROCEDURE — 82962 GLUCOSE BLOOD TEST: CPT

## 2017-09-27 PROCEDURE — 74011250636 HC RX REV CODE- 250/636: Performed by: FAMILY MEDICINE

## 2017-09-27 PROCEDURE — 36415 COLL VENOUS BLD VENIPUNCTURE: CPT | Performed by: FAMILY MEDICINE

## 2017-09-27 PROCEDURE — C9113 INJ PANTOPRAZOLE SODIUM, VIA: HCPCS | Performed by: FAMILY MEDICINE

## 2017-09-27 PROCEDURE — 74011250637 HC RX REV CODE- 250/637: Performed by: FAMILY MEDICINE

## 2017-09-27 PROCEDURE — 76040000019: Performed by: SPECIALIST

## 2017-09-27 PROCEDURE — 84484 ASSAY OF TROPONIN QUANT: CPT | Performed by: FAMILY MEDICINE

## 2017-09-27 PROCEDURE — 80061 LIPID PANEL: CPT | Performed by: FAMILY MEDICINE

## 2017-09-27 PROCEDURE — 74011250636 HC RX REV CODE- 250/636: Performed by: SPECIALIST

## 2017-09-27 PROCEDURE — 77030018766 HC KT ENDOSC IMAG GVIM -F: Performed by: SPECIALIST

## 2017-09-27 PROCEDURE — 74011000250 HC RX REV CODE- 250: Performed by: FAMILY MEDICINE

## 2017-09-27 PROCEDURE — 85018 HEMOGLOBIN: CPT | Performed by: FAMILY MEDICINE

## 2017-09-27 PROCEDURE — 77030029684 HC NEB SM VOL KT MONA -A

## 2017-09-27 RX ORDER — METOCLOPRAMIDE HYDROCHLORIDE 5 MG/ML
10 INJECTION INTRAMUSCULAR; INTRAVENOUS ONCE
Status: DISCONTINUED | OUTPATIENT
Start: 2017-09-27 | End: 2017-09-27

## 2017-09-27 RX ORDER — METOCLOPRAMIDE HYDROCHLORIDE 5 MG/ML
10 INJECTION INTRAMUSCULAR; INTRAVENOUS ONCE
Status: COMPLETED | OUTPATIENT
Start: 2017-09-27 | End: 2017-09-27

## 2017-09-27 RX ORDER — IPRATROPIUM BROMIDE AND ALBUTEROL SULFATE 2.5; .5 MG/3ML; MG/3ML
3 SOLUTION RESPIRATORY (INHALATION)
Status: DISCONTINUED | OUTPATIENT
Start: 2017-09-27 | End: 2017-09-29 | Stop reason: HOSPADM

## 2017-09-27 RX ADMIN — FERROUS SULFATE TAB 325 MG (65 MG ELEMENTAL FE) 325 MG: 325 (65 FE) TAB at 17:50

## 2017-09-27 RX ADMIN — IPRATROPIUM BROMIDE AND ALBUTEROL SULFATE 3 ML: .5; 3 SOLUTION RESPIRATORY (INHALATION) at 02:40

## 2017-09-27 RX ADMIN — DORZOLAMIDE HYDROCHLORIDE AND TIMOLOL MALEATE 2 DROP: 20; 5 SOLUTION/ DROPS OPHTHALMIC at 20:46

## 2017-09-27 RX ADMIN — Medication 10 ML: at 06:37

## 2017-09-27 RX ADMIN — Medication 10 ML: at 21:49

## 2017-09-27 RX ADMIN — GABAPENTIN 800 MG: 400 CAPSULE ORAL at 09:48

## 2017-09-27 RX ADMIN — METOCLOPRAMIDE 10 MG: 5 INJECTION, SOLUTION INTRAMUSCULAR; INTRAVENOUS at 10:14

## 2017-09-27 RX ADMIN — FERROUS SULFATE TAB 325 MG (65 MG ELEMENTAL FE) 325 MG: 325 (65 FE) TAB at 09:48

## 2017-09-27 RX ADMIN — DORZOLAMIDE HYDROCHLORIDE AND TIMOLOL MALEATE 2 DROP: 20; 5 SOLUTION/ DROPS OPHTHALMIC at 09:46

## 2017-09-27 RX ADMIN — LEVOTHYROXINE SODIUM 125 MCG: 125 TABLET ORAL at 06:45

## 2017-09-27 RX ADMIN — SODIUM CHLORIDE 40 MG: 9 INJECTION INTRAMUSCULAR; INTRAVENOUS; SUBCUTANEOUS at 09:47

## 2017-09-27 RX ADMIN — GABAPENTIN 800 MG: 400 CAPSULE ORAL at 17:49

## 2017-09-27 RX ADMIN — SODIUM CHLORIDE 75 ML/HR: 900 INJECTION, SOLUTION INTRAVENOUS at 06:48

## 2017-09-27 RX ADMIN — SODIUM CHLORIDE 40 MG: 9 INJECTION INTRAMUSCULAR; INTRAVENOUS; SUBCUTANEOUS at 21:48

## 2017-09-27 RX ADMIN — PRAVASTATIN SODIUM 40 MG: 40 TABLET ORAL at 21:48

## 2017-09-27 NOTE — PROGRESS NOTES
[facility] AEROSOL PROTOCAL - ASSESS    Patient: Ame Tyson, 9/27/2017  8:41 AM    Breath Sounds:  RUL: clear  RML: clear  RLL: clear  BRIAN: clear  LLL: clear    Breathing Pattern:  unlabored    Respiratory Rate: 12    Peak Flow: (for asthmatics)  Pre-bronchodilator:  n/a,  Post-bronchodilator:   n/a    FVC/FEV:      Incentive Spirometry or Accapella:  Volume reached:  ,  Number of attempts:      Cough:  none    Suctioned:      Sputum:     Heart Rate:  Pre:  n/a, Post:  n/a    Repiratory Rate:  Pre:  n/a, Post:  N/a    Oxygen:  21     Oxygen changed:      SPO2:  With oxygen:  n/a, Without oxygen:  98      Nebulizer Therapy:    Current:   duoneb Q6    Changed:  Yes, duoned Q6 prn    Smoking history:     Problem List:    Patient Active Problem List   Diagnosis Code    CAD (coronary artery disease) I25.10    Diabetes mellitus ([facility] [address] E11.9    Essential hypertension I10    HLD (hyperlipidemia) E78.5    Obesity, Class I, BMI 30.0-34.9 (see actual BMI) E66.9    Low back pain at multiple sites M54.5    Hypothyroidism due to Hashimoto's thyroiditis E03.8, E06.3    GI bleed K92.2       Respiratory Therapist: Clark Chavez, RT

## 2017-09-27 NOTE — ROUTINE PROCESS
TRANSFER - OUT REPORT:    Verbal report given to [facility] (name) on Aiden Thomas  being transferred to [facility] (unit) for routine progression of care       Report consisted of patients Situation, Background, Assessment and   Recommendations(SBAR). Information from the following report(s) SBAR and ED Summary was reviewed with the receiving nurse. Lines:   Peripheral IV 07/05/15 Right; Lower Arm (Active)       Peripheral IV 11/11/15 Right Forearm (Active)       Peripheral IV 09/26/17 Right Antecubital (Active)   Site Assessment Clean, dry, & intact 9/26/2017  1:09 PM   Phlebitis Assessment 0 9/26/2017  1:09 PM   Infiltration Assessment 0 9/26/2017  1:09 PM   Dressing Status Clean, dry, & intact 9/26/2017  1:09 PM       Peripheral IV 09/26/17 Left Antecubital (Active)   Site Assessment Clean, dry, & intact 9/26/2017  1:10 PM   Phlebitis Assessment 0 9/26/2017  1:10 PM   Infiltration Assessment 0 9/26/2017  1:10 PM   Dressing Status Clean, dry, & intact 9/26/2017  1:10 PM       Subcutaneous Insulin Infusion Device 07/15/15 (Active)        Opportunity for questions and clarification was provided.       Patient transported with:   Monitor  Registered Nurse

## 2017-09-27 NOTE — PROGRESS NOTES
Problem: Upper and Lower GI Bleed: Day 1  Goal: Activity/Safety  Outcome: Progressing Towards Goal  Patient up to chair with meals and to bedside commode  Goal: Nutrition/Diet  Outcome: Progressing Towards Goal  Patient eating now

## 2017-09-27 NOTE — PROGRESS NOTES
Bedside and Verbal shift change report given to Talat Lucas RN by Margo Vaughan RN. Report included the following information SBAR, Kardex, MAR, Recent Results and Cardiac Rhythm NSR.

## 2017-09-27 NOTE — DIABETES MGMT
DTC Pump and Consult Note    Recommendations/ Comments: BG's 85 - 106 mg/dL. Pt is NPO. Concerned about her risk of hypoglycemia. If appropriate, please consider  Set temp basal rate to give her less insulin at 70% for 2.5 hours. I met with the patient and her roommate, Yohannes Lomax. The patient was sleepy but easily aroused and conversational.   The patient is legally blind so her roommate helps her with her pump and is very familiar with it. They use the temp basal rate when she is more active.  :  Consult received for:   [x]          Insulin pump patient       Chart reviewed and initial evaluation complete on [facility] 163. Patient is a 79 y.o. female with known DM on a [employer] pump. Insulin Pump Settings    Date of last site change 9/25/2017 at 5 PM. Her set is due to be changed tomorrow. They brought all of her supplies and her roommate will change the set for her. Basal Rates      12AM 1.7 units/hr    Total basal insulin - 40 units/day    Insulin Carb Ratio  12AM  4    Sensitivity factor 12 AM 40, target 130 - 140, Active insulin 3 hours    BG monitoring at home 4x/day. If no to any of the following the pump should be d/c per hospital policy:  · Patient able to program doctors ordered settings,   yes [x]            No   []                · Patient able to provide pump materials  yes [x]              No   []            · Able to change infusion set and fill a new syringe at least for 3 days ( 2 days if pregnant. yes [x]              No   []            · Patient will change setting if redness, swelling, blood backing up, pump alerts, \"No Delivery\" alarm, or two or more glucose reading         in a row greater than 250mg/dl    yes [x]              No   []           · Patient able to repeat basal rates [x]              No   []            · Patient able to bolus corrections and meals based on physician orders.  yes [x]         No   []              · Assessed and instructed patient on the following: . · interpretation of lab results - A1c 5.8%. They states that her A1c was 13% 10 years ago and since hen they have made a concerted effort to keep it under excellent control. · blood sugar goals,   · hypoglycemia prevention and treatment - she states that she does not have much hypogylcemia  · illness management - effects of illness revewied  · SMBG skills - she has a wokring meter and supplies. SHe tried the sensor but it was frustrating. Gave them information about the Dex Com Sesor  · nutrition  - they carb count  · referred to Diabetes Educator - informed them of availability of outpatient sessions    Encouraged the following:   Use a lower tmep basal rate if unable to eat or eating less    Provided patient with the following: [x]          Survival skills education materials               []          Insulin education materials               []          CHO counting education materials               [x]          Outpatient DTC contact number               []          Glucometer               []          Insulin start kit- vial/syringe               []          Insulin start kit- pen      A1c:   Lab Results   Component Value Date/Time    Hemoglobin A1c 5.8 09/26/2017 12:55 PM    Hemoglobin A1c, External 6.2 06/30/2015       Recent Glucose Results:   Lab Results   Component Value Date/Time    GLU 77 09/27/2017 04:12 AM    GLUCPOC 89 09/27/2017 10:51 AM    GLUCPOC 85 09/27/2017 06:36 AM    GLUCPOC 106 (H) 09/26/2017 11:36 PM        Lab Results   Component Value Date/Time    Creatinine 1.35 09/27/2017 04:12 AM     Estimated Creatinine Clearance: 45.6 mL/min (based on Cr of 1.35). Active Orders   Diet    DIET NPO        PO intake: No data found. Current hospital DM medication: insulin pump    Will continue to follow as needed. Thank you.   Enedina Mercado RN, CDE

## 2017-09-27 NOTE — PROGRESS NOTES
CM reviewed chart. Patient was admitted from the ER wit a complaint of chest pain and a hemoglobin of around 6.0. Also, patient is currently on insulin pump. Patient has a past medical history of coronary artery disease status post CABG x 3, 2 years back, history of gastrointestinal bleed, status post clipping, history of diabetes mellitus type 1, currently on insulin pump,  history of hypothyroidism, hypertension, hyperlipidemia. CM met with patient, explained role and discussed discharge planning. Patient lives with her friend in [place], she lives in [place] for 3 months then goes back to [place] for another 3 months. She goes back and forth throughout the year. She is independent with her ADL's and IADL's and uses a cane for safe ambulation. She has not seen Dr. Juliocesar Echeverria, PCP for about 18 months, she will need an appointment for a follow up visit. Her sister will provide transportation home and she did not voice any discharge barriers. She hopes to return home upon discharge. CM will follow for any discharge needs that may arise. Bennett Perez MSA, RN, CRM. Care Management Interventions  PCP Verified by CM: Yes (Dr. Jennifer Lr)  Mode of Transport at Discharge: Other (see comment) (Private car)  Transition of Care Consult (CM Consult): Discharge Planning  MyChart Signup: No  Discharge Durable Medical Equipment: No  Health Maintenance Reviewed: Yes  Physical Therapy Consult: No  Occupational Therapy Consult: No  Speech Therapy Consult: No  Current Support Network:  Other (Lives with her friend.)  Confirm Follow Up Transport: Family  Plan discussed with Pt/Family/Caregiver: Yes  Discharge Location  Discharge Placement: Home with family assistance

## 2017-09-27 NOTE — PROGRESS NOTES
Primary Nurse Savannah Saavedra and Shalom Muse RN performed a dual skin assessment on this patient Impairment noted-  Patient has old surgical scars to mid chest, posterior neck, and lower back. Sacrum red and slow to sydni. Dry patch to right buttock. Skin otherwise intact. Call bell within reach. Will continue to monitor.

## 2017-09-27 NOTE — PROGRESS NOTES
Problem: Falls - Risk of  Goal: *Absence of Falls  Document Rachael Fall Risk and appropriate interventions in the flowsheet.    Outcome: Progressing Towards Goal  Fall Risk Interventions:  Mobility Interventions: Patient to call before getting OOB patient use call bell appropriately; up with one person assist           Medication Interventions: Bed/chair exit alarm, Patient to call before getting OOB     Elimination Interventions: Call light in reach, Patient to call for help with toileting needs     History of Falls Interventions: Door open when patient unattended           Problem: Discharge Planning  Goal: *Discharge to safe environment  Outcome: Progressing Towards Goal  Plans to be discharge tomorrow is probable    Problem: Pressure Injury - Risk of  Goal: *Prevention of pressure ulcer  Outcome: Progressing Towards Goal  No signs of skin breakdown from pressure

## 2017-09-27 NOTE — PROGRESS NOTES
Problem: Falls - Risk of  Goal: *Absence of Falls  Document Rachael Fall Risk and appropriate interventions in the flowsheet.    Outcome: Progressing Towards Goal  Fall Risk Interventions:  Mobility Interventions: Bed/chair exit alarm, Patient to call before getting OOB           Medication Interventions: Bed/chair exit alarm, Patient to call before getting OOB     Elimination Interventions: Call light in reach, Patient to call for help with toileting needs     History of Falls Interventions: Bed/chair exit alarm, Door open when patient unattended, Evaluate medications/consider consulting pharmacy

## 2017-09-27 NOTE — PROGRESS NOTES
Hospitalist Progress Note  Luzma Greene MD  Office: [phone]        Date of Service:  2017  NAME:  Lili Maier  :    MRN:  [id number]      Admission Summary:    26-year-old female with past medical history of coronary artery disease status post   CABG x3, 2 years back, history of gastrointestinal bleed, status post   clipping, history of diabetes mellitus type 1, currently on insulin pump,    history of hypothyroidism, hypertension, hyperlipidemia, who presents   to the hospital with the above mentioned symptoms. Interval history / Subjective:     Very sleepy, unable to stay awake, patient is able to answer and follow up instruction. Assessment & Plan:     Gi bleed. Hx of GI bleeding Sec To AVM  Anemia Sec to blood loss. CAD: No chest pain or sob  S/p CABG  Hx of GI bleeding. No further BM notice, will continue monitoring the patient clinically. Code status: full  DVT prophylaxis: SCD's    Care Plan discussed with: Patient/Family  Disposition: TBD     Hospital Problems  Date Reviewed: 2017          Codes Class Noted POA    * (Principal)GI bleed ICD-10-CM: K92.2  ICD-9-CM: 578.9  2017 Unknown                Review of Systems:   A comprehensive review of systems was negative except for that written in the HPI. Vital Signs:    Last 24hrs VS reviewed since prior progress note.  Most recent are:  Visit Vitals    /61 (BP 1 Location: Right arm, BP Patient Position: At rest)    Pulse 71    Temp 98.6 °F (37 °C)    Resp 18    Ht 5' 5\" (1.651 m)    Wt 93.1 kg (205 lb 4 oz)    SpO2 97%    Breastfeeding No    BMI 34.16 kg/m2         Intake/Output Summary (Last 24 hours) at 17 1403  Last data filed at 17 1123   Gross per 24 hour   Intake           1980.8 ml   Output                0 ml   Net           1980.8 ml        Physical Examination:             Constitutional:  No acute distress, cooperative, pleasant    ENT:  Oral mucous moist, oropharynx benign. Neck supple,    Resp:  CTA bilaterally. No wheezing/rhonchi/rales. No accessory muscle use   CV:  Regular rhythm, normal rate, no murmurs, gallops, rubs    GI:  Soft, non distended, non tender. normoactive bowel sounds, no hepatosplenomegaly     Musculoskeletal:  No edema, warm, 2+ pulses throughout    Neurologic:  Moves all extremities. AAOx3, CN II-XII reviewed            Data Review:    Review and/or order of clinical lab test      Labs:     Recent Labs      09/27/17 0412 09/26/17 1914 09/26/17   1348  09/26/17   1255   WBC   --    --    --   5.9   HGB  7.5*  5.2*  6.2*  5.8*   HCT   --   18.3*  21.9*  20.2*   PLT   --    --    --   222     Recent Labs      09/27/17 0412 09/26/17   1255   NA  143  141   K  4.6  4.4   CL  112*  113*   CO2  24  24   BUN  27*  31*   CREA  1.35*  1.53*   GLU  77  83   CA  8.4*  8.6     Recent Labs      09/27/17 0412 09/26/17   1255   SGOT  11*  10*   ALT  10*  10*   AP  46  49   TBILI  0.3  0.1*   TP  6.6  6.8   ALB  2.8*  3.0*   GLOB  3.8  3.8     No results for input(s): INR, PTP, APTT in the last 72 hours. No lab exists for component: INREXT   No results for input(s): FE, TIBC, PSAT, FERR in the last 72 hours. No results found for: FOL, RBCF   No results for input(s): PH, PCO2, PO2 in the last 72 hours.   Recent Labs      09/27/17 0412 09/26/17   1946  09/26/17 1914   TROIQ  <0.04  <0.04  <0.04     Lab Results   Component Value Date/Time    Cholesterol, total 124 09/27/2017 04:12 AM    HDL Cholesterol 41 09/27/2017 04:12 AM    LDL, calculated 47.2 09/27/2017 04:12 AM    Triglyceride 179 09/27/2017 04:12 AM    CHOL/HDL Ratio 3.0 09/27/2017 04:12 AM     Lab Results   Component Value Date/Time    Glucose (POC) 89 09/27/2017 10:51 AM    Glucose (POC) 85 09/27/2017 06:36 AM    Glucose (POC) 106 09/26/2017 11:36 PM    Glucose (POC) 202 09/26/2017 05:59 PM    Glucose (POC) 224 09/26/2017 04:14 PM     Lab Results   Component Value Date/Time    Color Yellow 02/04/2016 03:09 PM    Appearance Cloudy 02/04/2016 03:09 PM    Specific gravity <1.005 07/09/2015 02:44 PM    pH (UA) 6.5 02/04/2016 03:09 PM    Protein NEGATIVE  07/09/2015 02:44 PM    Glucose NEGATIVE  07/09/2015 02:44 PM    Ketone Negative 02/04/2016 03:09 PM    Bilirubin Negative 02/04/2016 03:09 PM    Urobilinogen 0.2 07/09/2015 02:44 PM    Nitrites Negative 02/04/2016 03:09 PM    Leukocyte Esterase 1+ 02/04/2016 03:09 PM    Epithelial cells FEW 07/09/2015 02:44 PM    Bacteria Few 02/04/2016 03:09 PM    WBC 6-10 02/04/2016 03:09 PM    RBC 0-2 02/04/2016 03:09 PM         Medications Reviewed:     Current Facility-Administered Medications   Medication Dose Route Frequency    albuterol-ipratropium (DUO-NEB) 2.5 MG-0.5 MG/3 ML  3 mL Nebulization Q6H PRN    0.9% sodium chloride infusion 250 mL  250 mL IntraVENous PRN     insulin pump (PATIENT SUPPLIED)   SubCUTAneous Per Protocol    citalopram (CELEXA) tablet 20 mg  20 mg Oral DAILY    dorzolamide-timolol (COSOPT) 22.3-6.8 mg/mL ophthalmic solution 2 Drop  2 Drop Both Eyes BID    ferrous sulfate tablet 325 mg  325 mg Oral BID    gabapentin (NEURONTIN) capsule 800 mg  800 mg Oral BID    levothyroxine (SYNTHROID) tablet 125 mcg  125 mcg Oral ACB    pravastatin (PRAVACHOL) tablet 40 mg  40 mg Oral QHS    0.9% sodium chloride infusion 250 mL  250 mL IntraVENous PRN    sodium chloride (NS) flush 5-10 mL  5-10 mL IntraVENous Q8H    sodium chloride (NS) flush 5-10 mL  5-10 mL IntraVENous PRN    glucose chewable tablet 16 g  4 Tab Oral PRN    dextrose (D50W) injection syrg 12.5-25 g  12.5-25 g IntraVENous PRN    glucagon (GLUCAGEN) injection 1 mg  1 mg IntraMUSCular PRN    sodium chloride (NS) flush 5-10 mL  5-10 mL IntraVENous Q8H    sodium chloride (NS) flush 5-10 mL  5-10 mL IntraVENous PRN    0.9% sodium chloride infusion  75 mL/hr IntraVENous CONTINUOUS    nitroglycerin (NITROSTAT) tablet 0.4 mg  0.4 mg SubLINGual Q5MIN PRN    insulin lispro (HUMALOG) injection   SubCUTAneous Q6H    pantoprazole (PROTONIX) 40 mg in sodium chloride 0.9 % 10 mL injection  40 mg IntraVENous Q12H    levoFLOXacin (LEVAQUIN) 750 mg in D5W IVPB  750 mg IntraVENous Q48H     ______________________________________________________________________  EXPECTED LENGTH OF STAY: 4d 16h  ACTUAL LENGTH OF STAY:          1                 Anjali Butler MD

## 2017-09-27 NOTE — PROGRESS NOTES
Cardiology Progress Note      9/27/2017 2:23 PM    Admit Date: 9/26/2017    Admit Diagnosis: GI bleed  GI Bleed      Subjective:     Divya Hwang has returned for capsule endoscopy.   Still sleepy but says she feels fine  No further chest pain      Current Facility-Administered Medications   Medication Dose Route Frequency    albuterol-ipratropium (DUO-NEB) 2.5 MG-0.5 MG/3 ML  3 mL Nebulization Q6H PRN    0.9% sodium chloride infusion 250 mL  250 mL IntraVENous PRN     insulin pump (PATIENT SUPPLIED)   SubCUTAneous Per Protocol    citalopram (CELEXA) tablet 20 mg  20 mg Oral DAILY    dorzolamide-timolol (COSOPT) 22.3-6.8 mg/mL ophthalmic solution 2 Drop  2 Drop Both Eyes BID    ferrous sulfate tablet 325 mg  325 mg Oral BID    gabapentin (NEURONTIN) capsule 800 mg  800 mg Oral BID    levothyroxine (SYNTHROID) tablet 125 mcg  125 mcg Oral ACB    pravastatin (PRAVACHOL) tablet 40 mg  40 mg Oral QHS    0.9% sodium chloride infusion 250 mL  250 mL IntraVENous PRN    sodium chloride (NS) flush 5-10 mL  5-10 mL IntraVENous Q8H    sodium chloride (NS) flush 5-10 mL  5-10 mL IntraVENous PRN    glucose chewable tablet 16 g  4 Tab Oral PRN    dextrose (D50W) injection syrg 12.5-25 g  12.5-25 g IntraVENous PRN    glucagon (GLUCAGEN) injection 1 mg  1 mg IntraMUSCular PRN    sodium chloride (NS) flush 5-10 mL  5-10 mL IntraVENous Q8H    sodium chloride (NS) flush 5-10 mL  5-10 mL IntraVENous PRN    0.9% sodium chloride infusion  75 mL/hr IntraVENous CONTINUOUS    nitroglycerin (NITROSTAT) tablet 0.4 mg  0.4 mg SubLINGual Q5MIN PRN    insulin lispro (HUMALOG) injection   SubCUTAneous Q6H    pantoprazole (PROTONIX) 40 mg in sodium chloride 0.9 % 10 mL injection  40 mg IntraVENous Q12H    levoFLOXacin (LEVAQUIN) 750 mg in D5W IVPB  750 mg IntraVENous Q48H         Objective:      Physical Exam:  Visit Vitals    /61 (BP 1 Location: Right arm, BP Patient Position: At rest)    Pulse 71    Temp 98.6 °F (37 °C)    Resp 18    Ht 5' 5\" (1.651 m)    Wt 93.1 kg (205 lb 4 oz)    SpO2 97%    Breastfeeding No    BMI 34.16 kg/m2     General Appearance:  Well developed, well nourished,alert and oriented x 3, and individual in no acute distress. Ears/Nose/Mouth/Throat:   Hearing grossly normal.         Neck: Supple. Chest:   Lungs clear to auscultation bilaterally. Cardiovascular:  Regular rate and rhythm, S1, S2 normal, no murmur. Abdomen:   Soft, non-tender, bowel sounds are active. Extremities: No edema bilaterally. Skin: Warm and dry.                  Data Review:   Labs:  Recent Results (from the past 24 hour(s))   GLUCOSE, POC    Collection Time: 09/26/17  4:14 PM   Result Value Ref Range    Glucose (POC) 224 (H) 65 - 100 mg/dL    Performed by Veronika Whitehead    CULTURE, BLOOD, PAIRED    Collection Time: 09/26/17  4:27 PM   Result Value Ref Range    Special Requests: NO SPECIAL REQUESTS      Culture result: NO GROWTH AFTER 17 HOURS     GLUCOSE, POC    Collection Time: 09/26/17  5:59 PM   Result Value Ref Range    Glucose (POC) 202 (H) 65 - 100 mg/dL    Performed by Sp Little    EKG, 12 LEAD, INITIAL    Collection Time: 09/26/17  7:09 PM   Result Value Ref Range    Ventricular Rate 75 BPM    Atrial Rate 75 BPM    P-R Interval 162 ms    QRS Duration 80 ms    Q-T Interval 412 ms    QTC Calculation (Bezet) 460 ms    Calculated P Axis 68 degrees    Calculated R Axis 26 degrees    Calculated T Axis 93 degrees    Diagnosis       Normal sinus rhythm  When compared with ECG of 26-SEP-2017 13:09,  No significant change was found  Confirmed by Duy Eubanks M.D., [facility] ([id number]) on 9/27/2017 10:52:40 AM     TROPONIN I    Collection Time: 09/26/17  7:14 PM   Result Value Ref Range    Troponin-I, Qt. <0.04 <0.05 ng/mL   HGB & HCT    Collection Time: 09/26/17  7:14 PM   Result Value Ref Range    HGB 5.2 (LL) 11.5 - 16.0 g/dL    HCT 18.3 (L) 35.0 - 47.0 %   TROPONIN I    Collection Time: 09/26/17  7:46 PM   Result Value Ref Range    Troponin-I, Qt. <0.04 <0.05 ng/mL   GLUCOSE, POC    Collection Time: 09/26/17 11:36 PM   Result Value Ref Range    Glucose (POC) 106 (H) 65 - 100 mg/dL    Performed by Pari Hewitt    TROPONIN I    Collection Time: 09/27/17  4:12 AM   Result Value Ref Range    Troponin-I, Qt. <0.04 <0.05 ng/mL   LIPID PANEL    Collection Time: 09/27/17  4:12 AM   Result Value Ref Range    LIPID PROFILE          Cholesterol, total 124 <200 MG/DL    Triglyceride 179 (H) <150 MG/DL    HDL Cholesterol 41 MG/DL    LDL, calculated 47.2 0 - 100 MG/DL    VLDL, calculated 35.8 MG/DL    CHOL/HDL Ratio 3.0 0 - 5.0     METABOLIC PANEL, COMPREHENSIVE    Collection Time: 09/27/17  4:12 AM   Result Value Ref Range    Sodium 143 136 - 145 mmol/L    Potassium 4.6 3.5 - 5.1 mmol/L    Chloride 112 (H) 97 - 108 mmol/L    CO2 24 21 - 32 mmol/L    Anion gap 7 5 - 15 mmol/L    Glucose 77 65 - 100 mg/dL    BUN 27 (H) 6 - 20 MG/DL    Creatinine 1.35 (H) 0.55 - 1.02 MG/DL    BUN/Creatinine ratio 20 12 - 20      GFR est AA 47 (L) >60 ml/min/1.73m2    GFR est non-AA 39 (L) >60 ml/min/1.73m2    Calcium 8.4 (L) 8.5 - 10.1 MG/DL    Bilirubin, total 0.3 0.2 - 1.0 MG/DL    ALT (SGPT) 10 (L) 12 - 78 U/L    AST (SGOT) 11 (L) 15 - 37 U/L    Alk. phosphatase 46 45 - 117 U/L    Protein, total 6.6 6.4 - 8.2 g/dL    Albumin 2.8 (L) 3.5 - 5.0 g/dL    Globulin 3.8 2.0 - 4.0 g/dL    A-G Ratio 0.7 (L) 1.1 - 2.2     HEMOGLOBIN    Collection Time: 09/27/17  4:12 AM   Result Value Ref Range    HGB 7.5 (L) 11.5 - 16.0 g/dL   GLUCOSE, POC    Collection Time: 09/27/17  6:36 AM   Result Value Ref Range    Glucose (POC) 85 65 - 100 mg/dL    Performed by [address], POC    Collection Time: 09/27/17 10:51 AM   Result Value Ref Range    Glucose (POC) 89 65 - 100 mg/dL    Performed by Byron Mode        Assessment:     1) Chest pain  Possible component of ischemia.    Initial EKG showed some borderline ST depression  Rule out for AMI by troponin  Likely was due to the anemia     2) Severe anemia  Normocytic  History of GI bleeding, gastric and small bowel AVMs     3) CAD s/p CABG     4) Old MI (NSTEMI July 2015)      5) Diabetes with retinopathy     6) CKD stage III    Plan:     Awaiting results of GI work-up  Continue supportive cares  May get stress test sometime as outpatient once she is more stable    Will sign off for now  Call with any questions

## 2017-09-27 NOTE — PROGRESS NOTES
TRANSFER - IN REPORT:    Verbal report received from [address] [place] [address],[address], RN on Petra Thayer  being received from [facility] for routine progression of care      Report consisted of patients Situation, Background, Assessment and   Recommendations(SBAR). Information from the following report(s) SBAR, Kardex and Recent Results was reviewed with the receiving nurse. Opportunity for questions and clarification was provided. Assessment completed upon patients arrival to unit and care assumed.

## 2017-09-27 NOTE — PROGRESS NOTES
Bedside and Verbal shift change report given to Mya (oncoming nurse) by Prudencio Greenberg (offgoing nurse). Report included the following information SBAR, ED Summary, Procedure Summary, Intake/Output, MAR, Accordion, Recent Results, Med Rec Status and Cardiac Rhythm normal sinus.

## 2017-09-28 LAB
GLUCOSE BLD STRIP.AUTO-MCNC: 113 MG/DL (ref 65–100)
GLUCOSE BLD STRIP.AUTO-MCNC: 114 MG/DL (ref 65–100)
GLUCOSE BLD STRIP.AUTO-MCNC: 181 MG/DL (ref 65–100)
GLUCOSE BLD STRIP.AUTO-MCNC: 200 MG/DL (ref 65–100)
HCT VFR BLD AUTO: 25.9 % (ref 35–47)
HGB BLD-MCNC: 7.3 G/DL (ref 11.5–16)
HGB BLD-MCNC: 7.9 G/DL (ref 11.5–16)
SERVICE CMNT-IMP: ABNORMAL

## 2017-09-28 PROCEDURE — 74011250636 HC RX REV CODE- 250/636: Performed by: INTERNAL MEDICINE

## 2017-09-28 PROCEDURE — 36415 COLL VENOUS BLD VENIPUNCTURE: CPT | Performed by: FAMILY MEDICINE

## 2017-09-28 PROCEDURE — 74011250637 HC RX REV CODE- 250/637: Performed by: FAMILY MEDICINE

## 2017-09-28 PROCEDURE — C9113 INJ PANTOPRAZOLE SODIUM, VIA: HCPCS | Performed by: FAMILY MEDICINE

## 2017-09-28 PROCEDURE — 74011000250 HC RX REV CODE- 250: Performed by: FAMILY MEDICINE

## 2017-09-28 PROCEDURE — 65660000000 HC RM CCU STEPDOWN

## 2017-09-28 PROCEDURE — 74011250637 HC RX REV CODE- 250/637: Performed by: INTERNAL MEDICINE

## 2017-09-28 PROCEDURE — 82962 GLUCOSE BLOOD TEST: CPT

## 2017-09-28 PROCEDURE — 85018 HEMOGLOBIN: CPT | Performed by: FAMILY MEDICINE

## 2017-09-28 PROCEDURE — 85014 HEMATOCRIT: CPT | Performed by: FAMILY MEDICINE

## 2017-09-28 PROCEDURE — 0DJ07ZZ INSPECTION OF UPPER INTESTINAL TRACT, VIA NATURAL OR ARTIFICIAL OPENING: ICD-10-PCS | Performed by: SPECIALIST

## 2017-09-28 PROCEDURE — 74011250636 HC RX REV CODE- 250/636: Performed by: FAMILY MEDICINE

## 2017-09-28 RX ORDER — CLONIDINE HYDROCHLORIDE 0.1 MG/1
0.1 TABLET ORAL
Status: COMPLETED | OUTPATIENT
Start: 2017-09-28 | End: 2017-09-28

## 2017-09-28 RX ORDER — ONDANSETRON 2 MG/ML
4 INJECTION INTRAMUSCULAR; INTRAVENOUS
Status: DISCONTINUED | OUTPATIENT
Start: 2017-09-28 | End: 2017-09-29 | Stop reason: HOSPADM

## 2017-09-28 RX ORDER — LISINOPRIL 10 MG/1
10 TABLET ORAL DAILY
Status: DISCONTINUED | OUTPATIENT
Start: 2017-09-28 | End: 2017-09-29 | Stop reason: HOSPADM

## 2017-09-28 RX ORDER — METOPROLOL TARTRATE 25 MG/1
TABLET, FILM COATED ORAL 2 TIMES DAILY
Status: ON HOLD | COMMUNITY
End: 2017-12-20

## 2017-09-28 RX ORDER — ACETAMINOPHEN 325 MG/1
650 TABLET ORAL
Status: DISCONTINUED | OUTPATIENT
Start: 2017-09-28 | End: 2017-09-29 | Stop reason: HOSPADM

## 2017-09-28 RX ORDER — VERAPAMIL HYDROCHLORIDE 80 MG/1
40 TABLET ORAL 3 TIMES DAILY
Status: DISCONTINUED | OUTPATIENT
Start: 2017-09-28 | End: 2017-09-28

## 2017-09-28 RX ORDER — VERAPAMIL HYDROCHLORIDE 80 MG/1
40 TABLET ORAL 3 TIMES DAILY
Status: DISCONTINUED | OUTPATIENT
Start: 2017-09-29 | End: 2017-09-29 | Stop reason: HOSPADM

## 2017-09-28 RX ADMIN — FERROUS SULFATE TAB 325 MG (65 MG ELEMENTAL FE) 325 MG: 325 (65 FE) TAB at 09:00

## 2017-09-28 RX ADMIN — Medication 10 ML: at 20:17

## 2017-09-28 RX ADMIN — CLONIDINE HYDROCHLORIDE 0.1 MG: 0.1 TABLET ORAL at 19:41

## 2017-09-28 RX ADMIN — Medication 10 ML: at 06:47

## 2017-09-28 RX ADMIN — GABAPENTIN 800 MG: 400 CAPSULE ORAL at 17:46

## 2017-09-28 RX ADMIN — CITALOPRAM HYDROBROMIDE 20 MG: 20 TABLET ORAL at 09:00

## 2017-09-28 RX ADMIN — SODIUM CHLORIDE 40 MG: 9 INJECTION INTRAMUSCULAR; INTRAVENOUS; SUBCUTANEOUS at 09:00

## 2017-09-28 RX ADMIN — LEVOFLOXACIN 750 MG: 5 INJECTION, SOLUTION INTRAVENOUS at 16:05

## 2017-09-28 RX ADMIN — ACETAMINOPHEN 650 MG: 325 TABLET, FILM COATED ORAL at 17:44

## 2017-09-28 RX ADMIN — GABAPENTIN 800 MG: 400 CAPSULE ORAL at 09:00

## 2017-09-28 RX ADMIN — DORZOLAMIDE HYDROCHLORIDE AND TIMOLOL MALEATE 2 DROP: 20; 5 SOLUTION/ DROPS OPHTHALMIC at 18:58

## 2017-09-28 RX ADMIN — ONDANSETRON 4 MG: 2 INJECTION INTRAMUSCULAR; INTRAVENOUS at 17:45

## 2017-09-28 RX ADMIN — Medication 10 ML: at 13:10

## 2017-09-28 RX ADMIN — DORZOLAMIDE HYDROCHLORIDE AND TIMOLOL MALEATE 2 DROP: 20; 5 SOLUTION/ DROPS OPHTHALMIC at 08:55

## 2017-09-28 RX ADMIN — LISINOPRIL 10 MG: 10 TABLET ORAL at 17:44

## 2017-09-28 RX ADMIN — FERROUS SULFATE TAB 325 MG (65 MG ELEMENTAL FE) 325 MG: 325 (65 FE) TAB at 17:46

## 2017-09-28 RX ADMIN — LEVOTHYROXINE SODIUM 125 MCG: 125 TABLET ORAL at 07:08

## 2017-09-28 NOTE — PROGRESS NOTES
Bedside and Verbal shift change report given to Milad Zee RN by Ivonne Olszewski, RN. Report included the following information SBAR, ED Summary, MAR, Recent Results and Cardiac Rhythm NSR.

## 2017-09-28 NOTE — OP NOTES
[facility] [place] [facility], [address]   OP NOTE       Name:  Matt Cantrell   MR#:  [id number]   :  1950   Account #:  [de-identified]    Surgery Date:  2017   Date of Adm:  2017       PREOPERATIVE DIAGNOSIS:       POSTOPERATIVE DIAGNOSES:   1. Chronic esophagitis, mild, without associated bleeding. 2. Some patchy chronic gastritis. 3. 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 4. No evidence of blood anywhere in the upper gastrointestinal tract,   except for the very distal terminal ileum where small amounts of thin,   old blood were seen mixed with melenic stool. A similar type of old   blood was also seen in the right colon. No source for this bleeding was   demonstrated and it must be remembered that she received 1 liter of   GoLYTELY prep which potentially could complicate the actual location   of the site of bleeding, moving blood more distally. 5. Questionable nonbleeding arteriovenous malformation of the   proximal duodenum. PROCEDURES PERFORMED:  Radial capsule enteroscopy. ESTIMATED BLOOD LOSS: None. SPECIMENS REMOVED: None. ANESTHESIA:  None. INDICATION: Patient with recurrent obscure presumed small bowel GI   bleeding who has had several upper endoscopies and colonoscopies   along with M2A capsule study. She has been found to have AVMs of   the stomach and small bowel. Recently, she was having more   episodes of indigestion and underwent an EGD her new place of   residence in [place]. She was told there was a residual clip from   previous therapy of an AVM which was associated with some   erythema, but no blood was reported. No other definite lesions were   reported by the patient about that recent procedure. The patient is   scheduled in November for a colonoscopy by the same GI physician in   [place].  The patient presented with a hemoglobin less than 6   grams, chest pain that has now resolved. She has had 3 units of blood. Hemoglobin is still slightly greater than 7 grams. The patient has been   having melena. We proceeded with an urgent capsule endoscopy. She   does have some suspected gastroparesis, so she was given Reglan   with the procedure. DESCRIPTION OF PROCEDURE: The patient swallowed the Given radio-   capsule in the usual fashion with prompt clearance through the   esophagus. In the distal esophagus, there was the appearance of   some thick, whitish membranes, not thrush, which in my experience   represented some chronic esophagitis. There was some mild patchy   gastritis manifested by minimal erythema and granularity. Gastric   emptying time was slightly prolonged at 1 hour and 40 minutes. Small   bowel clearance time was slightly prolonged at 5 hours 24 minutes. In the proximal jejunum, there was one small serpiginous type vessel   which was thought possibly representative of an AVM. There was no   blood associated with this. It was seen on one frame at thumbnail   01:41:21. The rest of the duodenum was entirely normal, as was the   jejunum and most of the ileum. An excellent view was obtained. No   blood was noted anywhere throughout this portion of the stomach or   small bowel. In the very distal TI, there was some semi-formed material, greenish-  black in coloration, as well as some thin reddish fluid that was thought   to represent a small amount of blood. No definite site of origin for   this blood could be seen. The capsule was visualized entering the   cecum. The cecum was obscured by semiformed stool, greenish-black   in color and there were areas of old red blood seen. Again, no site was   definitely demonstrated. RECOMMENDATION: So long as the anemia stays stable, would   recommend proceeding with the colonoscopy as scheduled with her GI   physician in [place].  Would recommend definite evaluation of the   ileum at the time of colonoscopy, as well as scrupulous evaluation of   the cecum of the colon, looking for small lesions, looking particularly   for AVMs. If bleeding recurs, colonoscopy is negative, would   recommend antegrade enteroscopy. If there is overt bleeding such   as definite melena or hematochezia, once again recommend urgent   M2A capsule study with Reglan use. JULES Osborne MD      [facility] / Juliet Crow   D:  09/28/2017   12:54   T:  09/28/2017   13:46   Job #:  [id number]

## 2017-09-28 NOTE — PROGRESS NOTES
[address]  [address]  [address] [place] [address]   [phone]                GI PROGRESS NOTE  [facility]  Work Cell: [phone]      NAME:   Crista Christianson   :    1950   MRN:    [id number]     Assessment/Plan   1. Acute on chronic anemia - with melena and heme positive stool. Capsule study showing questionable nonbleeding AVM of the proximal duodenum, mild chronic esophagitis and only small amount of blood was found in very distal terminal ileum and right colon. No definite source identified.  - Continue current regimen  - Recommend colonoscopy as outpatient with exam of the ileum and scrupulous exam of the cecum/ascending colon  - Repeat urgent M2A for overt GI bleeding and deep enteroscopy if bleeding persists  - Monitor H&H and follow clinical course for any evidence of bleeding  - Transfuse as needed  2. CAD - s/p CABG  3. Type 1 DM  4. Hypertension     Patient Active Problem List   Diagnosis Code    CAD (coronary artery disease) I25.10    Diabetes mellitus ([facility] [address] E11.9    Essential hypertension I10    HLD (hyperlipidemia) E78.5    Obesity, Class I, BMI 30.0-34.9 (see actual BMI) E66.9    Low back pain at multiple sites M54.5    Hypothyroidism due to Hashimoto's thyroiditis E03.8, E06.3    GI bleed K92.2       Subjective:     Feeling well. Denies any pain. Has had multiple non-bloody stools. Tracey Womackos to be able to go home.        Review of Systems    Constitutional: negative fever, negative chills, negative weight loss  Eyes:   negative visual changes  ENT:   negative sore throat, tongue or lip swelling  Respiratory:  negative cough, negative dyspnea  Cards:  negative for chest pain, palpitations, lower extremity edema  GI:   See HPI  :  negative for frequency, dysuria  Integument:  negative for rash and pruritus  Heme:  negative for easy bruising and gum/nose bleeding  Musculoskel: negative for myalgias, back pain and muscle weakness  Neuro: negative for headaches, dizziness, vertigo  Psych:  negative for feelings of anxiety, depression     Objective:     VITALS:   Last 24hrs VS reviewed since prior hospitalist progress note. Most recent are:  Visit Vitals    /47 (BP 1 Location: Left arm, BP Patient Position: At rest)    Pulse 78    Temp 98 °F (36.7 °C)    Resp 16    Ht 5' 5\" (1.651 m)    Wt 91.7 kg (202 lb 2.6 oz)    SpO2 96%    Breastfeeding No    BMI 33.64 kg/m2       Intake/Output Summary (Last 24 hours) at 09/28/17 1324  Last data filed at 09/28/17 0703   Gross per 24 hour   Intake             1615 ml   Output             1350 ml   Net              265 ml        PHYSICAL EXAM:  General   well developed, alert, pleasant, in no acute distress  EENT  Normocephalic, Atraumatic, PERRLA, EOMI, sclera clear  Respiratory   Clear To Auscultation bilaterally - no wheezes, rales, rhonchi, or crackles  Cardiology  Regular Rate and Rythmn  - no murmurs, rubs or gallops  Abdominal  Soft, non-tender, non-distended, positive bowel sounds, no hepatosplenomegaly, no palpable mass  Extremities  No clubbing, cyanosis, or edema. Pulses intact. Neurological  No focal neurological deficits noted  Psychological  Oriented x 3. Normal affect.        Lab Data   Recent Results (from the past 12 hour(s))   HEMOGLOBIN    Collection Time: 09/28/17  4:15 AM   Result Value Ref Range    HGB 7.3 (L) 11.5 - 16.0 g/dL   GLUCOSE, POC    Collection Time: 09/28/17  6:46 AM   Result Value Ref Range    Glucose (POC) 113 (H) 65 - 100 mg/dL    Performed by Doris Lyman    GLUCOSE, POC    Collection Time: 09/28/17 11:39 AM   Result Value Ref Range    Glucose (POC) 200 (H) 65 - 100 mg/dL    Performed by Yousif Elizabeth        Medications: Reviewed    PMH/ reviewed - no change compared to H&P  Mid-Level Provider: Amish Nichols NP   Date/Time:  9/28/2017

## 2017-09-28 NOTE — PROGRESS NOTES
13: 46 ENDO phoned advising Dr. Yulisa Barr wants to look in the patient's stomach tomorrow morning requesting patient is NPO at mid-night.   Maranda Jordan

## 2017-09-28 NOTE — ROUTINE PROCESS
TRANSFER - OUT REPORT:    Verbal report given to Serene (Charge nurse)(name) on Niesha Hooper  being transferred to [facility](unit) for routine progression of care       Report consisted of patients Situation, Background, Assessment and   Recommendations(SBAR). Information from the following report(s) SBAR, ED Summary, Procedure Summary, Intake/Output, Recent Results, Med Rec Status and Cardiac Rhythm NSR was reviewed with the receiving nurse. Lines:   Peripheral IV 09/28/17 Right Antecubital (Active)       Subcutaneous Insulin Infusion Device 07/15/15 (Active)        Opportunity for questions and clarification was provided.       Patient transported with:   Registered Nurse

## 2017-09-28 NOTE — DIABETES MGMT
DTC Progress Note    Recommendations/ Comments: Angie Wang states her insulin pump infusion set was changed successfully this morning. Her appetite is very low. BG's 85 - 137; however at midnight last night  mg/dL and pre-lunch today is 200 mg/dl    Will continue to observe BG response and po intake  No changes at this time    [facility] Patient is a 79 y.o. female with known DM on [employer]tr[employer] insulin pump. A1c:   Lab Results   Component Value Date/Time    Hemoglobin A1c 5.8 09/26/2017 12:55 PM    Hemoglobin A1c 6.3 05/12/2016 03:39 PM    Hemoglobin A1c, External 6.2 06/30/2015       Recent Glucose Results:   Lab Results   Component Value Date/Time    GLUCPOC 200 (H) 09/28/2017 11:39 AM    GLUCPOC 113 (H) 09/28/2017 06:46 AM    GLUCPOC 181 (H) 09/28/2017 12:03 AM        Lab Results   Component Value Date/Time    Creatinine 1.35 09/27/2017 04:12 AM     Estimated Creatinine Clearance: 45.3 mL/min (based on Cr of 1.35). Active Orders   Diet    DIET DIABETIC WITH OPTIONS Consistent Carb 1200kcal; Regular        PO intake: No data found. Current hospital DM medication: Insulin pump    Will continue to follow as needed.     Thank you  Arash Landis RN, CDE

## 2017-09-28 NOTE — PROGRESS NOTES
Problem: Falls - Risk of  Goal: *Absence of Falls  Document Rachael Fall Risk and appropriate interventions in the flowsheet.    Outcome: Progressing Towards Goal  Fall Risk Interventions:  Mobility Interventions: Patient to call before getting OOB           Medication Interventions: Evaluate medications/consider consulting pharmacy, Patient to call before getting OOB     Elimination Interventions: Patient to call for help with toileting needs     History of Falls Interventions: Door open when patient unattended, Investigate reason for fall

## 2017-09-28 NOTE — PROGRESS NOTES
Physical Therapy Screening:  Services are not indicated at this time. [facility] based on results obtained during the nursing admission assessment. The patients chart was reviewed and the patient is not appropriate for a skilled therapy evaluation at this time. Please consult physical therapy if any therapy needs arise. Thank you.     Colton Gloria, PT

## 2017-09-28 NOTE — PROGRESS NOTES
M2A study completed. Questionable nonbleeding AVM of the proximal duodenum, mild chronic esophagitis, only small amount of blood was found in very distal terminal ileum and right colon. No definite source identified. Recommend colonoscopy as outpatient with exam of the ileum and scrupulous exam of the cecum/ascendinding colon. Repeat urgent M2A for overt GI bleeding and deep enteroscopy if bleeding persists.

## 2017-09-28 NOTE — PROGRESS NOTES
Patient blood pressure elevated 180/57; MD aware and will continue to monitor; seeing if lisinopril will work soon. Lisonopril improving BP at 174/64.     Clonadine pill also to be given

## 2017-09-28 NOTE — PROGRESS NOTES
Hospitalist Progress Note  Augustina Huerta MD  Answering service: [phone] OR [phone] from in house phone        Date of Service:  2017  NAME:  Avinash Christiansen  :    MRN:  [id number]      Admission Summary:   Pt admitted for chest pain found to have a LGIB with ABLA. Interval history / Subjective:   Pt reports generalized achy abdominal pain with no alleviating/aggrevating factors associated with N c/o vomiting. Hematochezia still present but improved. She denies any chest pain, SOB, F/C. Assessment & Plan:     89SM WF with acute on chronic anemia due to blood loss secondary to a LGIB    PROBLEM LIST:  1. lower gastrointestinal bleed  2. acute blood loss anemia  3. atypical chest pain  4. morbid obesity (BMI 33.64)  5. coronary artery disease  6. hypothyroidism  7. 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 anxiety disorder    PLAN: Continue GI's recommendations. Video/capsule endoscopy done today. Pt will be NPO at midnight for an EGD in the AM. Continue pantoprazole and levofloxacin. Will check CBC in AM. Will transfuse if Hb <7. Continue levothyroxine for her hypothyroidism. Continue pravastatin for her CAD. Will resume PO antihypertensive medications as her BP has been increasing. Will place lisinopril to resume today and verapamil to restart in AM. Continue her insulin pump for her DM and gabapentin for her neuropathy. Pt has been counseled on the importance of dietary and lifestyle modification given her BMI and comorbidities.      Code status: FULL CODE  DVT prophylaxis: SCD hose    Care Plan discussed with: Patient/Family  Disposition: Home w/Family and TBD     Hospital Problems  Date Reviewed: 2017          Codes Class Noted POA    * (Principal)GI bleed ICD-10-CM: K92.2  ICD-9-CM: 578.9  2017 Unknown                Review of Systems:   A comprehensive review of systems was negative except for that written in the HPI. Vital Signs:    Last 24hrs VS reviewed since prior progress note. Most recent are:  Visit Vitals    /47 (BP 1 Location: Left arm, BP Patient Position: At rest)    Pulse 78    Temp 98 °F (36.7 °C)    Resp 16    Ht 5' 5\" (1.651 m)    Wt 91.7 kg (202 lb 2.6 oz)    SpO2 96%    Breastfeeding No    BMI 33.64 kg/m2         Intake/Output Summary (Last 24 hours) at 09/28/17 1533  Last data filed at 09/28/17 0703   Gross per 24 hour   Intake          1331.25 ml   Output             1350 ml   Net           -18.75 ml        Physical Examination:             Constitutional:  No acute distress, cooperative, pleasant    ENT:  Oral mucous moist, oropharynx benign. Neck supple,    Resp:  CTA bilaterally. No wheezing/rhonchi/rales. No accessory muscle use   CV:  Regular rhythm, normal rate, no murmurs, gallops, rubs    GI:  Obese, soft, non distended, minimal generalized tenderness, normoactive bowel sounds, no hepatosplenomegaly     Musculoskeletal:  No edema, warm, 2+ pulses throughout    Neurologic:  Moves all extremities. AAOx3, CN II-XII reviewed     Eyes:  EOMI. Anicteric sclerae, PERRL. Data Review:    Review and/or order of clinical lab test      Labs:     Recent Labs      09/28/17   0415  09/27/17   1515   09/26/17   1914  09/26/17   1348  09/26/17   1255   WBC   --    --    --    --    --   5.9   HGB  7.3*  7.1*   < >  5.2*  6.2*  5.8*   HCT   --    --    --   18.3*  21.9*  20.2*   PLT   --    --    --    --    --   222    < > = values in this interval not displayed.      Recent Labs      09/27/17   0412  09/26/17   1255   NA  143  141   K  4.6  4.4   CL  112*  113*   CO2  24  24   BUN  27*  31*   CREA  1.35*  1.53*   GLU  77  83   CA  8.4*  8.6     Recent Labs      09/27/17   0412  09/26/17   1255   SGOT  11*  10*   ALT  10*  10*   AP  46  49   TBILI  0.3  0.1*   TP  6.6  6.8   ALB  2.8*  3.0*   GLOB  3.8  3.8     No results for input(s): INR, PTP, APTT in the last 72 hours.    No lab exists for component: INREXT   No results for input(s): FE, TIBC, PSAT, FERR in the last 72 hours. No results found for: FOL, RBCF   No results for input(s): PH, PCO2, PO2 in the last 72 hours.   Recent Labs      09/27/17   0412  09/26/17   1946  09/26/17   1914   TROIQ  <0.04  <0.04  <0.04     Lab Results   Component Value Date/Time    Cholesterol, total 124 09/27/2017 04:12 AM    HDL Cholesterol 41 09/27/2017 04:12 AM    LDL, calculated 47.2 09/27/2017 04:12 AM    Triglyceride 179 09/27/2017 04:12 AM    CHOL/HDL Ratio 3.0 09/27/2017 04:12 AM     Lab Results   Component Value Date/Time    Glucose (POC) 200 09/28/2017 11:39 AM    Glucose (POC) 113 09/28/2017 06:46 AM    Glucose (POC) 181 09/28/2017 12:03 AM    Glucose (POC) 137 09/27/2017 04:40 PM    Glucose (POC) 89 09/27/2017 10:51 AM     Lab Results   Component Value Date/Time    Color Yellow 02/04/2016 03:09 PM    Appearance Cloudy 02/04/2016 03:09 PM    Specific gravity <1.005 07/09/2015 02:44 PM    pH (UA) 6.5 02/04/2016 03:09 PM    Protein NEGATIVE  07/09/2015 02:44 PM    Glucose NEGATIVE  07/09/2015 02:44 PM    Ketone Negative 02/04/2016 03:09 PM    Bilirubin Negative 02/04/2016 03:09 PM    Urobilinogen 0.2 07/09/2015 02:44 PM    Nitrites Negative 02/04/2016 03:09 PM    Leukocyte Esterase 1+ 02/04/2016 03:09 PM    Epithelial cells FEW 07/09/2015 02:44 PM    Bacteria Few 02/04/2016 03:09 PM    WBC 6-10 02/04/2016 03:09 PM    RBC 0-2 02/04/2016 03:09 PM         Medications Reviewed:     Current Facility-Administered Medications   Medication Dose Route Frequency    albuterol-ipratropium (DUO-NEB) 2.5 MG-0.5 MG/3 ML  3 mL Nebulization Q6H PRN    0.9% sodium chloride infusion 250 mL  250 mL IntraVENous PRN     insulin pump (PATIENT SUPPLIED)   SubCUTAneous Per Protocol    citalopram (CELEXA) tablet 20 mg  20 mg Oral DAILY    dorzolamide-timolol (COSOPT) 22.3-6.8 mg/mL ophthalmic solution 2 Drop  2 Drop Both Eyes BID    ferrous sulfate tablet 325 mg  325 mg Oral BID    gabapentin (NEURONTIN) capsule 800 mg  800 mg Oral BID    levothyroxine (SYNTHROID) tablet 125 mcg  125 mcg Oral ACB    pravastatin (PRAVACHOL) tablet 40 mg  40 mg Oral QHS    0.9% sodium chloride infusion 250 mL  250 mL IntraVENous PRN    sodium chloride (NS) flush 5-10 mL  5-10 mL IntraVENous Q8H    sodium chloride (NS) flush 5-10 mL  5-10 mL IntraVENous PRN    glucose chewable tablet 16 g  4 Tab Oral PRN    dextrose (D50W) injection syrg 12.5-25 g  12.5-25 g IntraVENous PRN    glucagon (GLUCAGEN) injection 1 mg  1 mg IntraMUSCular PRN    sodium chloride (NS) flush 5-10 mL  5-10 mL IntraVENous Q8H    sodium chloride (NS) flush 5-10 mL  5-10 mL IntraVENous PRN    0.9% sodium chloride infusion  75 mL/hr IntraVENous CONTINUOUS    nitroglycerin (NITROSTAT) tablet 0.4 mg  0.4 mg SubLINGual Q5MIN PRN    insulin lispro (HUMALOG) injection   SubCUTAneous Q6H    pantoprazole (PROTONIX) 40 mg in sodium chloride 0.9 % 10 mL injection  40 mg IntraVENous Q12H    levoFLOXacin (LEVAQUIN) 750 mg in D5W IVPB  750 mg IntraVENous Q48H     ______________________________________________________________________  EXPECTED LENGTH OF STAY: 4d 16h  ACTUAL LENGTH OF STAY:          2                 Anthony Alberts IV, MD

## 2017-09-29 ENCOUNTER — ANESTHESIA (OUTPATIENT)
Dept: ENDOSCOPY | Age: 67
DRG: 378 | End: 2017-09-29
Payer: MEDICARE

## 2017-09-29 ENCOUNTER — ANESTHESIA EVENT (OUTPATIENT)
Dept: ENDOSCOPY | Age: 67
DRG: 378 | End: 2017-09-29
Payer: MEDICARE

## 2017-09-29 VITALS
OXYGEN SATURATION: 96 % | HEIGHT: 65 IN | TEMPERATURE: 97.7 F | RESPIRATION RATE: 18 BRPM | BODY MASS INDEX: 33.68 KG/M2 | SYSTOLIC BLOOD PRESSURE: 158 MMHG | WEIGHT: 202.16 LBS | DIASTOLIC BLOOD PRESSURE: 70 MMHG | HEART RATE: 83 BPM

## 2017-09-29 PROBLEM — K31.811 AVM (ARTERIOVENOUS MALFORMATION) OF DUODENUM, ACQUIRED WITH HEMORRHAGE: Status: ACTIVE | Noted: 2017-09-29

## 2017-09-29 PROBLEM — K29.61 EROSIVE GASTRITIS WITH HEMORRHAGE: Status: ACTIVE | Noted: 2017-09-26

## 2017-09-29 LAB
BASOPHILS # BLD: 0.1 K/UL (ref 0–0.1)
BASOPHILS NFR BLD: 1 % (ref 0–1)
DIFFERENTIAL METHOD BLD: ABNORMAL
EOSINOPHIL # BLD: 0.2 K/UL (ref 0–0.4)
EOSINOPHIL NFR BLD: 3 % (ref 0–7)
ERYTHROCYTE [DISTWIDTH] IN BLOOD BY AUTOMATED COUNT: 17.9 % (ref 11.5–14.5)
GLUCOSE BLD STRIP.AUTO-MCNC: 182 MG/DL (ref 65–100)
GLUCOSE BLD STRIP.AUTO-MCNC: 89 MG/DL (ref 65–100)
HCT VFR BLD AUTO: 24.8 % (ref 35–47)
HGB BLD-MCNC: 7.7 G/DL (ref 11.5–16)
LYMPHOCYTES # BLD: 0.5 K/UL (ref 0.8–3.5)
LYMPHOCYTES NFR BLD: 10 % (ref 12–49)
MCH RBC QN AUTO: 29.3 PG (ref 26–34)
MCHC RBC AUTO-ENTMCNC: 31 G/DL (ref 30–36.5)
MCV RBC AUTO: 94.3 FL (ref 80–99)
METAMYELOCYTES NFR BLD MANUAL: 1 %
MONOCYTES # BLD: 0.2 K/UL (ref 0–1)
MONOCYTES NFR BLD: 3 % (ref 5–13)
NEUTS SEG # BLD: 4.2 K/UL (ref 1.8–8)
NEUTS SEG NFR BLD: 82 % (ref 32–75)
NRBC # BLD: 0.14 K/UL (ref 0–0.01)
NRBC BLD-RTO: 2.7 PER 100 WBC
PLATELET # BLD AUTO: 169 K/UL (ref 150–400)
PLATELET COMMENTS,PCOM: ABNORMAL
RBC # BLD AUTO: 2.63 M/UL (ref 3.8–5.2)
RBC MORPH BLD: ABNORMAL
SERVICE CMNT-IMP: ABNORMAL
SERVICE CMNT-IMP: NORMAL
WBC # BLD AUTO: 5.1 K/UL (ref 3.6–11)
WBC NRBC COR # BLD: ABNORMAL 10*3/UL

## 2017-09-29 PROCEDURE — 85025 COMPLETE CBC W/AUTO DIFF WBC: CPT | Performed by: INTERNAL MEDICINE

## 2017-09-29 PROCEDURE — 74011250636 HC RX REV CODE- 250/636: Performed by: FAMILY MEDICINE

## 2017-09-29 PROCEDURE — 74011000250 HC RX REV CODE- 250: Performed by: FAMILY MEDICINE

## 2017-09-29 PROCEDURE — 74011250637 HC RX REV CODE- 250/637: Performed by: FAMILY MEDICINE

## 2017-09-29 PROCEDURE — 88305 TISSUE EXAM BY PATHOLOGIST: CPT | Performed by: FAMILY MEDICINE

## 2017-09-29 PROCEDURE — 76060000031 HC ANESTHESIA FIRST 0.5 HR: Performed by: SPECIALIST

## 2017-09-29 PROCEDURE — 77030011640 HC PAD GRND REM COVD -A: Performed by: SPECIALIST

## 2017-09-29 PROCEDURE — 88312 SPECIAL STAINS GROUP 1: CPT | Performed by: FAMILY MEDICINE

## 2017-09-29 PROCEDURE — 77030009426 HC FCPS BIOP ENDOSC BSC -B: Performed by: SPECIALIST

## 2017-09-29 PROCEDURE — 74011250637 HC RX REV CODE- 250/637: Performed by: SPECIALIST

## 2017-09-29 PROCEDURE — 74011250637 HC RX REV CODE- 250/637: Performed by: INTERNAL MEDICINE

## 2017-09-29 PROCEDURE — 74011000258 HC RX REV CODE- 258

## 2017-09-29 PROCEDURE — 0DB58ZX EXCISION OF ESOPHAGUS, VIA NATURAL OR ARTIFICIAL OPENING ENDOSCOPIC, DIAGNOSTIC: ICD-10-PCS | Performed by: SPECIALIST

## 2017-09-29 PROCEDURE — 88342 IMHCHEM/IMCYTCHM 1ST ANTB: CPT | Performed by: FAMILY MEDICINE

## 2017-09-29 PROCEDURE — 76040000019: Performed by: SPECIALIST

## 2017-09-29 PROCEDURE — 74011000250 HC RX REV CODE- 250

## 2017-09-29 PROCEDURE — 36415 COLL VENOUS BLD VENIPUNCTURE: CPT | Performed by: INTERNAL MEDICINE

## 2017-09-29 PROCEDURE — 0W3P8ZZ CONTROL BLEEDING IN GASTROINTESTINAL TRACT, VIA NATURAL OR ARTIFICIAL OPENING ENDOSCOPIC: ICD-10-PCS | Performed by: SPECIALIST

## 2017-09-29 PROCEDURE — C9113 INJ PANTOPRAZOLE SODIUM, VIA: HCPCS | Performed by: FAMILY MEDICINE

## 2017-09-29 PROCEDURE — 74011250636 HC RX REV CODE- 250/636: Performed by: SPECIALIST

## 2017-09-29 PROCEDURE — 82962 GLUCOSE BLOOD TEST: CPT

## 2017-09-29 PROCEDURE — 77030035621 HC PRB COAG APC 360DEG ERBE -C: Performed by: SPECIALIST

## 2017-09-29 PROCEDURE — 77030036656: Performed by: SPECIALIST

## 2017-09-29 PROCEDURE — 77030010937 HC CLP LIG BSC -I: Performed by: SPECIALIST

## 2017-09-29 PROCEDURE — 74011250636 HC RX REV CODE- 250/636

## 2017-09-29 RX ORDER — EPINEPHRINE 0.1 MG/ML
1 INJECTION INTRACARDIAC; INTRAVENOUS
Status: DISCONTINUED | OUTPATIENT
Start: 2017-09-29 | End: 2017-09-29 | Stop reason: HOSPADM

## 2017-09-29 RX ORDER — LIDOCAINE HYDROCHLORIDE 20 MG/ML
INJECTION, SOLUTION EPIDURAL; INFILTRATION; INTRACAUDAL; PERINEURAL AS NEEDED
Status: DISCONTINUED | OUTPATIENT
Start: 2017-09-29 | End: 2017-09-29 | Stop reason: HOSPADM

## 2017-09-29 RX ORDER — MIDAZOLAM HYDROCHLORIDE 1 MG/ML
.25-1 INJECTION, SOLUTION INTRAMUSCULAR; INTRAVENOUS
Status: DISCONTINUED | OUTPATIENT
Start: 2017-09-29 | End: 2017-09-29 | Stop reason: HOSPADM

## 2017-09-29 RX ORDER — SODIUM CHLORIDE 0.9 % (FLUSH) 0.9 %
5-10 SYRINGE (ML) INJECTION EVERY 8 HOURS
Status: ACTIVE | OUTPATIENT
Start: 2017-09-29 | End: 2017-09-29

## 2017-09-29 RX ORDER — SODIUM CHLORIDE 0.9 % (FLUSH) 0.9 %
5-10 SYRINGE (ML) INJECTION AS NEEDED
Status: ACTIVE | OUTPATIENT
Start: 2017-09-29 | End: 2017-09-29

## 2017-09-29 RX ORDER — PROPOFOL 10 MG/ML
INJECTION, EMULSION INTRAVENOUS AS NEEDED
Status: DISCONTINUED | OUTPATIENT
Start: 2017-09-29 | End: 2017-09-29 | Stop reason: HOSPADM

## 2017-09-29 RX ORDER — FLUMAZENIL 0.1 MG/ML
0.2 INJECTION INTRAVENOUS
Status: DISCONTINUED | OUTPATIENT
Start: 2017-09-29 | End: 2017-09-29 | Stop reason: HOSPADM

## 2017-09-29 RX ORDER — DEXTROMETHORPHAN/PSEUDOEPHED 2.5-7.5/.8
1.2 DROPS ORAL
Status: DISCONTINUED | OUTPATIENT
Start: 2017-09-29 | End: 2017-09-29 | Stop reason: HOSPADM

## 2017-09-29 RX ORDER — LORAZEPAM 2 MG/ML
2 INJECTION INTRAMUSCULAR AS NEEDED
Status: DISCONTINUED | OUTPATIENT
Start: 2017-09-29 | End: 2017-09-29 | Stop reason: HOSPADM

## 2017-09-29 RX ORDER — SODIUM CHLORIDE 9 MG/ML
50 INJECTION, SOLUTION INTRAVENOUS CONTINUOUS
Status: DISPENSED | OUTPATIENT
Start: 2017-09-29 | End: 2017-09-29

## 2017-09-29 RX ORDER — ATROPINE SULFATE 0.1 MG/ML
0.5 INJECTION INTRAVENOUS
Status: DISCONTINUED | OUTPATIENT
Start: 2017-09-29 | End: 2017-09-29 | Stop reason: HOSPADM

## 2017-09-29 RX ORDER — NALOXONE HYDROCHLORIDE 0.4 MG/ML
0.4 INJECTION, SOLUTION INTRAMUSCULAR; INTRAVENOUS; SUBCUTANEOUS
Status: DISCONTINUED | OUTPATIENT
Start: 2017-09-29 | End: 2017-09-29 | Stop reason: HOSPADM

## 2017-09-29 RX ORDER — FENTANYL CITRATE 50 UG/ML
200 INJECTION, SOLUTION INTRAMUSCULAR; INTRAVENOUS
Status: DISCONTINUED | OUTPATIENT
Start: 2017-09-29 | End: 2017-09-29 | Stop reason: HOSPADM

## 2017-09-29 RX ORDER — SODIUM CHLORIDE 9 MG/ML
INJECTION, SOLUTION INTRAVENOUS
Status: DISCONTINUED | OUTPATIENT
Start: 2017-09-29 | End: 2017-09-29 | Stop reason: HOSPADM

## 2017-09-29 RX ADMIN — CITALOPRAM HYDROBROMIDE 20 MG: 20 TABLET ORAL at 11:13

## 2017-09-29 RX ADMIN — PROPOFOL 50 MG: 10 INJECTION, EMULSION INTRAVENOUS at 07:43

## 2017-09-29 RX ADMIN — PROPOFOL 50 MG: 10 INJECTION, EMULSION INTRAVENOUS at 07:38

## 2017-09-29 RX ADMIN — PROPOFOL 25 MG: 10 INJECTION, EMULSION INTRAVENOUS at 07:55

## 2017-09-29 RX ADMIN — SODIUM CHLORIDE: 9 INJECTION, SOLUTION INTRAVENOUS at 07:32

## 2017-09-29 RX ADMIN — LIDOCAINE HYDROCHLORIDE 40 MG: 20 INJECTION, SOLUTION EPIDURAL; INFILTRATION; INTRACAUDAL; PERINEURAL at 07:35

## 2017-09-29 RX ADMIN — Medication 10 ML: at 06:49

## 2017-09-29 RX ADMIN — LEVOTHYROXINE SODIUM 125 MCG: 125 TABLET ORAL at 11:13

## 2017-09-29 RX ADMIN — SODIUM CHLORIDE 75 ML/HR: 900 INJECTION, SOLUTION INTRAVENOUS at 01:20

## 2017-09-29 RX ADMIN — PROPOFOL 25 MG: 10 INJECTION, EMULSION INTRAVENOUS at 07:51

## 2017-09-29 RX ADMIN — SODIUM CHLORIDE 40 MG: 9 INJECTION INTRAMUSCULAR; INTRAVENOUS; SUBCUTANEOUS at 00:46

## 2017-09-29 RX ADMIN — PROPOFOL 50 MG: 10 INJECTION, EMULSION INTRAVENOUS at 07:48

## 2017-09-29 RX ADMIN — GABAPENTIN 800 MG: 400 CAPSULE ORAL at 11:13

## 2017-09-29 RX ADMIN — PROPOFOL 100 MG: 10 INJECTION, EMULSION INTRAVENOUS at 07:35

## 2017-09-29 RX ADMIN — SODIUM CHLORIDE 40 MG: 9 INJECTION INTRAMUSCULAR; INTRAVENOUS; SUBCUTANEOUS at 11:13

## 2017-09-29 RX ADMIN — VERAPAMIL HYDROCHLORIDE 40 MG: 80 TABLET, FILM COATED ORAL at 11:12

## 2017-09-29 RX ADMIN — LISINOPRIL 10 MG: 10 TABLET ORAL at 11:13

## 2017-09-29 RX ADMIN — PRAVASTATIN SODIUM 40 MG: 40 TABLET ORAL at 00:47

## 2017-09-29 NOTE — PERIOP NOTES
TRANSFER - OUT REPORT:    Verbal report given to Whitney(name) on Petra Thayer  being transferred to [facility](unit) for routine post - op       Report consisted of patients Situation, Background, Assessment and   Recommendations(SBAR). Information from the following report(s) Procedure Summary was reviewed with the receiving nurse. Lines:   Peripheral IV 09/28/17 Right Antecubital (Active)   Site Assessment Clean, dry, & intact 9/29/2017  1:03 AM   Phlebitis Assessment 0 9/29/2017  1:03 AM   Infiltration Assessment 0 9/29/2017  1:03 AM   Dressing Status Clean, dry, & intact 9/29/2017  1:03 AM   Dressing Type Transparent;Tape 9/29/2017  1:03 AM   Hub Color/Line Status Blue; Infusing 9/29/2017  1:03 AM   Action Taken Open ports on tubing capped 9/29/2017  1:03 AM   Alcohol Cap Used Yes 9/29/2017  1:03 AM       Subcutaneous Insulin Infusion Device 07/15/15 (Active)        Opportunity for questions and clarification was provided.

## 2017-09-29 NOTE — DISCHARGE SUMMARY
[address] [address], [address] [place] [address] SUMMARY       Name:  Rodolfo Hoffman   MR#:  [id number]   :  1950   Account #:  [de-identified]        Date of Adm:  2017            DISCHARGE DIAGNOSES   PRIMARY:  1. Lower gastrointestinal bleed. 2. Duodenal arteriovenous malformations. 3. Erosive gastritis. 4. Acute blood loss anemia. 5. Atypical chest pain (resolved). 6. Morbid obesity, BMI 33.64. SECONDARY:  1. Coronary artery disease. 2. Hypothyroidism. 3. 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 4. Anxiety disorder. HISTORY OF PRESENT ILLNESS: This is a 71-year-old white female   who presents to the emergency department with chest pain that began   2 days prior to presentation that started intermittent, retrosternal and   squeezing. She had taken a nitroglycerin; however, the chest pain   returned. She denied any exertional component to the chest pain or   shortness of breath. She called EMS which brought her to the   emergency department. Chest pain improved with a small fluid bolus   that was given by EMS en route. In the emergency department, she   was found to have a hemoglobin of 6 and her stool occult blood test   was found to be positive. Upon further talking to the patient, she   noticed that her stools have been darker, but has noted no vomiting,   hematemesis or abdominal pain. The patient had similar   symptoms some years prior, in which she had to have \"clips\" to stop   the bleeding in her stomach. She does not take aspirin or Plavix or any   other NSAIDs due to her history of GI bleeding. HOSPITAL COURSE: The patient was admitted to rule out acute   coronary syndrome; however, discovered that she was actually having   an acute GI bleed and, therefore, she was not put on any antiplatelet   medications. Cardiology was consulted and suggested her having   anemia workup with a GI consult and to transfuse.  It was felt that once   her hemoglobin was more stable that risk stratification regarding her   chest pain with a stress test may be more feasible. The patient has a   2D echocardiogram (TTE) in which she was found to have an LVEF of   70% to 75% with no obvious wall motion abnormalities in the left   ventricle, most of the valves were not well visualized. She did have a   mild mitral valve regurgitation. The myocardium was normal. GI was   consulted and believed that given her acute on chronic anemia with   melena and heme-positive stools, that most likely bleed etiology was   from an AVM in the small bowel. Recommendation was for her to be   prepped, and the patient had a capsule endoscopy with   recommendation for colonoscopy as outpatient. It was also   recommended that the patient undergo an EGD which was done on   the day of discharge and the patient was found to have duodenal   AVMs and erosive gastritis. GI feels that the erosive gastritis is a likely   pill induced given the localization of what was seen on EGD. Recommendation was for the patient to continue her PPI and iron   supplementation, to avoid NSAIDs and antiplatelets at this time,   and for her to consume a minimum of 8 ounces of liquids when taking   all of her medications. The patient only required a transfusion once of 2   units of packed RBCs. Hemoglobin on admission was 5.2, and after   transfusion was 5.8 to 7.7 on the day of discharge. The patient will   need to followup with her PCP in [place], [place] and to   have repeat CBC. GI recommends the patient to call to get followup   results for her biopsy/pathology that was taken during the EGD. The   patient will be resumed on her home medications. She has been   counseled on importance of dietary and lifestyle modifications given   her BMI and comorbidities. Her glucose remained relatively controlled   during the admission. DISCHARGE MEDICATIONS   1. Insulin pump.    2. Cholecalciferol 2000 units p.o. daily. 3. Citalopram 20 mg p.o. daily. 4. Cosopt 2/0.5% 2 drops OU b.i.d.   5. Ferrous sulfate 325 mg p.o. b.i.d.   6. Gabapentin 800 mg p.o. b.i.d.   7. Levothyroxine 125 mcg p.o. daily. 8. Lisinopril 10 mg p.o. daily. 9. Metoprolol 25 mg p.o. b.i.d.   10. Omeprazole 40 mg p.o. b.i.d.   11. Pravastatin 40 mg p.o. at bedtime. 12. Senna 8.6 mg p.o. at bedtime. 13. Anoro Ellipta 1 puff every day. 14. Verapamil 120 mg p.o. b.i.d. PHYSICAL EXAMINATION   GENERAL: White female sitting at bedside in no acute   cardiopulmonary distress. VITAL SIGNS: Temperature 98.2 degrees Fahrenheit, pulse 72,   respirations 18, blood pressure 160/75, O2 saturations 98% on room   air. HENT: Normocephalic, atraumatic. Oral mucosa pink, moist.   Eyes: Pupils equal, round, react to light. Anicteric sclerae. Conjunctivae pink,   moist.   CVS: Heart is regular rate and rhythm, S1, S2 audible. No murmurs,   gallops, or rubs. No edema. Radial and dorsalis pedis pulses   2+ bilaterally. LUNGS: Clear to auscultation bilaterally. No wheezes, rales or rhonchi. ABDOMEN: Obese, bowel sounds present, soft, nontender,   nondistended. No hepatosplenomegaly. NEUROLOGIC: Alert and oriented to person, place, and time. Cranial   nerves 2-12 grossly intact. LABORATORY DATA: CBC with differential: WBC 5.1, hemoglobin   7.7, hematocrit 24.8, platelets 090, MCV 34.5, neutrophils 18%,   lymphocytes 10%, monocytes 3%, eosinophils 3%, basophils 1%   metamyelocytes 1%. CONDITION: Stable. ACTIVITY: As tolerated. DIET: Cardiac/diabetic. The patient advised that certain foods can   affect or worsen her gastritis which include, but not limited to coffee,   mints, acidic fruits and vegetables. The patient has also been advised   that smoking cigarette can as well. FOLLOWUP: The patient will followup with her PCP in [facility],   Alejandra Laguerre MD, in 1-2 weeks.  The patient will also call Dr. Citlali Flower office for results regarding the biopsy done during the   EGD given her gastritis and  arteriovenous malformations. Total time for discharge 45 minutes.         Ad Heller IV, MD OB / Raphael Romeo   D:  09/29/2017   12:02   T:  09/29/2017   13:08   Job #:  [id number]

## 2017-09-29 NOTE — PERIOP NOTES
2 Resolution clips used prox duodenum, exp dates 07/04/2020, 08/20/2020. Lokesh Cole Endoscope was pre-cleaned at bedside immediately following procedure by MUSA Pabon.

## 2017-09-29 NOTE — PROGRESS NOTES
BS reported to pt nurse. Pt reading after Glucagon was 182.  Pt gave herself 4 units of in Humalog via pum and this was reported to pt nurse

## 2017-09-29 NOTE — PROCEDURES
EGD Procedure Note    Indications:  Chest pain, Gastrointestinal hemorrhage, unspecified, Iron deficiency anemia, AVM and esophagitis seen on M2A study   Referring Physician: Cady Barriga MD   Anesthesia/Sedation:MAC  Endoscopist:  Dr. Trinidad Black  Assistant:  Endoscopy Technician-1: Trevor Pabon  Endoscopy RN-1: Clarisse Connolly RN    Preoperative diagnosis: Anemia    Postoperative diagnosis: Duodenal AVM's, focal erosive esophagitis - suspect the latter due pill esophagitis       Procedure in Detail:  Informed consent was obtained for the procedure, including sedation. Risks of perforation, hemorrhage, adverse drug reaction, and aspiration were discussed. The patient was placed in the left lateral decubitus position. Based on the pre-procedure assessment, including review of the patient's medical history, medications, allergies, and review of systems, she had been deemed to be an appropriate candidate for moderate sedation; she was therefore sedated with the medications listed above. The patient was monitored continuously with ECG tracing, pulse oximetry, blood pressure monitoring, and direct observations. An Olympus video endoscope was inserted into the mouth and advanced under direct vision to into the esophagus, then stomach and duodenum. A careful inspection was made as the gastroscope was withdrawn, including a retroflexed view of the proximal stomach; findings and interventions are described below.       Findings:   Esophagus:focal erosive esophagitis with erythema and superficial ulceration/exudate (31-34 cm from incisors) Bx - suspect the latter due pill esophagitis   Stomach: previously placed clip in the fundus for AVM  Duodenum/jejunum: prominent AVM 2-3 rd part Rx'd with APC and clipped x 2, 2 other smaller and more proximal AVMs Rx'd with APC    Therapies:  See above    Specimens:See above           EBL: None    Complications:   None; patient tolerated the procedure well. Recommendations:  -Acid suppression with a proton pump inhibitor. , -Await pathology. , -No NSAIDS, -Take 8 oz of liquids with all medications, may be discharged from GI standpoint, continue Lissette Carballo MD

## 2017-09-29 NOTE — ANESTHESIA PREPROCEDURE EVALUATION
Anesthetic History   No history of anesthetic complications            Review of Systems / Medical History  Patient summary reviewed, nursing notes reviewed and pertinent labs reviewed    Pulmonary        Sleep apnea: CPAP        Comments: 2L O2 at night and CPAP   Neuro/Psych   Within defined limits           Cardiovascular    Hypertension          CAD    Exercise tolerance: >4 METS     GI/Hepatic/Renal               Comments: anemia Endo/Other    Diabetes: using insulin  Hypothyroidism  Obesity     Other Findings            Physical Exam    Airway  Mallampati: II  TM Distance: 4 - 6 cm  Neck ROM: normal range of motion   Mouth opening: Normal     Cardiovascular    Rhythm: regular           Dental    Dentition: Full lower dentures and Full upper dentures     Pulmonary  Breath sounds clear to auscultation               Abdominal  Abdominal exam normal       Other Findings            Anesthetic Plan    ASA: 3  Anesthesia type: MAC          Induction: Intravenous  Anesthetic plan and risks discussed with: Patient

## 2017-09-29 NOTE — ANESTHESIA POSTPROCEDURE EVALUATION
Post-Anesthesia Evaluation and Assessment    Patient: Hollie Blanco MRN: [id number]  SSN: [id number]    YOB: 1950  Age: 79 y.o. Sex: female       Cardiovascular Function/Vital Signs  Visit Vitals    /75    Pulse 72    Temp 36.8 °C (98.2 °F)    Resp 18    Ht 5' 5\" (1.651 m)    Wt 91.7 kg (202 lb 2.6 oz)    SpO2 98%    Breastfeeding No    BMI 33.64 kg/m2       Patient is status post MAC anesthesia for Procedure(s):  ESOPHAGOGASTRODUODENOSCOPY (EGD)  ENDOSCOPIC ARGON PLASMA COAGULATION  RESOLUTION CLIP  ESOPHAGOGASTRODUODENAL (EGD) BIOPSY. Nausea/Vomiting: None    Postoperative hydration reviewed and adequate. Pain:  Pain Scale 1: Numeric (0 - 10) (09/29/17 0840)  Pain Intensity 1: 0 (09/29/17 0840)   Managed    Neurological Status: At baseline    Mental Status and Level of Consciousness: Arousable    Pulmonary Status:   O2 Device: Room air (09/29/17 0840)   Adequate oxygenation and airway patent    Complications related to anesthesia: None    Post-anesthesia assessment completed.  No concerns    Signed By: Roman Sanchez MD     September 29, 2017

## 2017-09-29 NOTE — PERIOP NOTES
TRANSFER - IN REPORT:    Verbal report received from Fani(name) on Miguelina Archer  being received from [address] [place] [address]) for ordered procedure      Report consisted of patients Situation, Background, Assessment and   Recommendations(SBAR). Information from the following report(s) SBAR was reviewed with the receiving nurse. Opportunity for questions and clarification was provided. Assessment completed upon patients arrival to unit and care assumed.          [facility]

## 2017-09-29 NOTE — PROGRESS NOTES
Note recorded on 9/29/17:    Spiritual Care Partner Volunteer visited patient in [facility] on 9/28/17. Documented by:  Rev. Violet Reddy M.Div, [facility]

## 2017-09-29 NOTE — PROGRESS NOTES
Bedside shift change report given to Fernando Wiley (oncoming nurse) by Nemo Fischer (offgoing nurse). Report included the following information SBAR and Kardex.

## 2017-09-29 NOTE — PERIOP NOTES
Patient has been evaluated by anesthesia pre-procedure. Patient alert and oriented. Vital signs will not be charted by the Endoscopy nurse. All vitals, airway, and loc are monitored by anesthesia staff throughout procedure. With pt's assistance, her insulin pump was placed on 'suspend' mode for the procedure. Pt removed her dentures pre procedure.

## 2017-09-29 NOTE — ROUTINE PROCESS
Lili Maier  1950  [id number]    Situation:  Verbal report received from: Sarah Scanlon RN  Procedure: Procedure(s):  ESOPHAGOGASTRODUODENOSCOPY (EGD)  ENDOSCOPIC ARGON PLASMA COAGULATION  RESOLUTION CLIP  ESOPHAGOGASTRODUODENAL (EGD) BIOPSY    Background:    Preoperative diagnosis: Anemia  Postoperative diagnosis: Duodenal AVM's, erosive esophagitis    :  Dr. Sanchez Sainz  Assistant(s): Endoscopy Technician-1: Tasia Pabon  Endoscopy RN-1: Roxanne Gonzalez RN    Specimens:   ID Type Source Tests Collected by Time Destination   1 : esophageal bx Preservative   Juliette Hagan MD 9/29/2017 9508 Pathology     H. Pylori  no    Assessment:  Intra-procedure medications         Anesthesia gave intra-procedure sedation and medications, see anesthesia flow sheet yes    Intravenous fluids: NS@ KVO     Vital signs stable     Abdominal assessment: round and soft     Recommendation:  Return to floor  Family or Friend No family  Permission to share finding with family or friend yes

## 2017-09-29 NOTE — DISCHARGE INSTRUCTIONS
Discharge Instructions       PATIENT ID: Niesha Hooper  MRN: [id number]   YOB: 1950    DATE OF ADMISSION: 9/26/2017 12:40 PM    DATE OF DISCHARGE: 9/29/2017    PRIMARY CARE PROVIDER: Billy Hein MD     ATTENDING PHYSICIAN: Chelly Crowder MD  DISCHARGING PROVIDER: Chelly Crowder MD    To contact this individual call [phone] and ask the  to page. If unavailable ask to be transferred the Adult Hospitalist Department. DISCHARGE DIAGNOSES   PRIMARY:  1. lower gastrointestinal bleed  2. duodenal arteriovenous malformations  3. erosive gastritis  4. acute blood loss anemia  5. atypical chest pain, resolved  6. morbid obesity (BMI 33.64)    SECONDARY:  1. coronary artery disease  2. hypothyroidism  3. diabetes mellitus type 1 with neuropathy and retinopathy  4. anxiety disorder      CONSULTATIONS: IP CONSULT TO CARDIOLOGY  IP CONSULT TO GASTROENTEROLOGY    PROCEDURES/SURGERIES: Procedure(s):  ESOPHAGOGASTRODUODENOSCOPY (EGD)  ENDOSCOPIC ARGON PLASMA COAGULATION  RESOLUTION CLIP  ESOPHAGOGASTRODUODENAL (EGD) BIOPSY    PENDING TEST RESULTS:   At the time of discharge the following test results are still pending: pathology. biopsy form EGD    FOLLOW UP APPOINTMENTS:   Follow-up Information     Follow up With Details Comments [address] [place] [address] MD Nam   [address] [place] [address]   [facility]  [address] [place] [address]  [phone]             ADDITIONAL CARE RECOMMENDATIONS:  None    DIET: Cardiac Diet and Diabetic Diet  Oral Nutritional Supplements: No Oral Supplement prescribed none     Pt advised to be mindful that certain foods, can worsen gastritis: mints, chocolate, acidic fruits/vegetables, coffee    NO smoking    ACTIVITY: Activity as tolerated    WOUND CARE: none    EQUIPMENT needed: none      DISCHARGE MEDICATIONS:   See Medication Reconciliation Form    · It is important that you take the medication exactly as they are prescribed.    · Keep your medication in the bottles provided by the pharmacist and keep a list of the medication names, dosages, and times to be taken in your wallet. · Do not take other medications without consulting your doctor. NOTIFY YOUR PHYSICIAN FOR ANY OF THE FOLLOWING:   Fever over 101 degrees for 24 hours. Chest pain, shortness of breath, fever, chills, nausea, vomiting, diarrhea, change in mentation, falling, weakness, bleeding. Severe pain or pain not relieved by medications. Or, any other signs or symptoms that you may have questions about. DISPOSITION:    Home With:   OT  PT  HH  RN       SNF/Inpatient Rehab/LTAC    Independent/assisted living    Hospice    Other:     CDMP Checked:   Yes x     PROBLEM LIST Updated:  Yes x       Signed:    Blake Burks IV, MD  9/29/2017  11:29 AM

## 2017-10-01 LAB
BACTERIA SPEC CULT: NORMAL
SERVICE CMNT-IMP: NORMAL

## 2017-10-02 ENCOUNTER — PATIENT OUTREACH (OUTPATIENT)
Dept: INTERNAL MEDICINE CLINIC | Age: 67
End: 2017-10-02

## 2017-10-02 NOTE — PROGRESS NOTES
Transition Of Care Note    NNTOCIP     Date/Time:  10/2/2017 2:03 PM    Patient listed on discharge (MSSP) report on 10/2/17. Patient discharged from [facility] for GIB. RRAT score: 14     Medical History:     Past Medical History:   Diagnosis Date    CAD (coronary artery disease)     Chest pain 7/2015    [facility]     Diabetic neuropathy [facility]     Diabetic retinopathy ([facility]     GI bleed 7/2015    4 bleeds with 9 clips placed     Hospital course:  Prior to admission pt experienced a few days of chest pain unrelieved by nitroglycerin. Found to have Hgb 6 and positive stool occult blood test.  Cardiology was consulted and recommended anemia workup with a GI consult and to transfuse. The patient has a 2D echocardiogram (TTE) in which she was found to have an LVEF of   70% to 75% with no obvious wall motion abnormalities in the left ventricle, most of the valves were not well visualized. She did have a mild mitral valve regurgitation. The myocardium was normal. GI was consulted and believed that given her acute on chronic anemia with melena and heme-positive stools, that most likely bleed etiology was from an AVM in the small bowel. Recommendation for a capsule endoscopy with OP colonoscopy. Pt underwent an EGD on the day of discharge which revealed duodenal AVMs and erosive gastritis. GI feels that the erosive gastritis is likely pill induced given the localization of what was seen on EGD. Recommendation was for the patient to continue her PPI and iron supplementation, to avoid NSAIDs and antiplatelets at this time, and for her to consume a minimum of 8 ounces of liquids when taking all of her medications. The patient only required a transfusion once of 2 units of packed RBCs. Hemoglobin on admission was 5.2, and after transfusion was 5.8 to 7.7 on the day of discharge. The patient will need to followup with her PCP in [place], [place] and to have repeat CBC.    Patient to call for results for her biopsy/pathology that was taken during the EGD. The   patient will be resumed on her home medications. She has been counseled on importance of dietary and lifestyle modifications given her BMI and comorbidities. Her glucose remained relatively controlled during the admission    Discharge note states patient to f/u with PCP in [address]    Nurse Navigator(NN) unsuccessful in contacting the patient by telephone to perform post hospital discharge assessment. Diet:   Patient reports: unknown    Activity:    Patient reports: unknown    Medication:   Performed medication reconciliation with patient, and patient verbalizes understanding of administration of home medications. There were no barriers to obtaining medications identified at this time. Support system:  unknown    Discharge Instructions :  Reviewed discharge instructions with patient. Patient verbalizes understanding of discharge instructions and follow-up care. Red Flags:  New/rut blood in stool, n/v/d, hemopytsis, pallor, palpitations, shortness of breath, chest pain, dizziness    Labs Reviewed:  No results for input(s): WBC, HGB, HCT, PLT, HGBEXT, HCTEXT, PLTEXT in the last 72 hours. No results for input(s): NA, K, CL, CO2, GLU, BUN, CREA, CA, MG, PHOS, ALB, TBIL, SGOT, ALT, INR in the last 72 hours. No lab exists for component: INREXT  No components found for: GLPOC  No results for input(s): PH, PCO2, PO2, HCO3, FIO2 in the last 72 hours. No results for input(s): INR in the last 72 hours. No lab exists for component: INREXT    Imaging results reviewed:      Advance Care Planning:   Patient was offered the opportunity to discuss advance care planning:  no     Does patient have an Advance Directive:  unknown   If no, did you provide information on Caring Connections?  no     PCP/Specialist follow up: Patient scheduled to follow up with Linda Evangelista MD on TBD.   Reviewed red flags with patient, and patient verbalizes understanding. Patient given an opportunity to ask questions. No other clinical/social/functional needs noted. The patient agrees to contact the PCP office for questions related to their healthcare. The patient expressed thanks, offered no additional questions and ended the call. Plan:  CM notes indicate patient alternates living in [place] [address] every three months. Last OV with PCP (Dr. Ira Soria) February 2016. Unclear as to where patient will be residing post hospitalization. If still in [place], pt will need f/u appt with Dr. Ira Soria. Called and LVM with my contact information and instructions to return my call.

## 2017-10-04 ENCOUNTER — PATIENT OUTREACH (OUTPATIENT)
Dept: INTERNAL MEDICINE CLINIC | Age: 67
End: 2017-10-04

## 2017-10-04 NOTE — PROGRESS NOTES
Second attempt to contact patient to complete transitions of care episode. Hospitalization dx: GIB  Notes indicate patient splits time between [place] and [facility]. She was to f/u with PCP in [place].    LVM with my contact information and instructions to return my call.

## 2017-12-19 ENCOUNTER — HOSPITAL ENCOUNTER (INPATIENT)
Age: 67
LOS: 3 days | Discharge: HOME | DRG: 345 | End: 2017-12-22
Attending: EMERGENCY MEDICINE | Admitting: FAMILY MEDICINE
Payer: MEDICARE

## 2017-12-19 DIAGNOSIS — S32.011A CLOSED STABLE BURST FRACTURE OF FIRST LUMBAR VERTEBRA, INITIAL ENCOUNTER (HCC): Primary | ICD-10-CM

## 2017-12-19 PROBLEM — K92.2 GI BLEED: Status: ACTIVE | Noted: 2017-12-19

## 2017-12-19 PROBLEM — A41.9 SEPSIS (HCC): Status: ACTIVE | Noted: 2017-12-19

## 2017-12-19 LAB
ALBUMIN SERPL-MCNC: 2.5 G/DL (ref 3.5–5)
ALBUMIN/GLOB SERPL: 0.7 {RATIO} (ref 1.1–2.2)
ALP SERPL-CCNC: 61 U/L (ref 45–117)
ALT SERPL-CCNC: 14 U/L (ref 12–78)
ANION GAP SERPL CALC-SCNC: 8 MMOL/L (ref 5–15)
APTT PPP: 22.1 SEC (ref 22.1–32.5)
AST SERPL-CCNC: 14 U/L (ref 15–37)
BASOPHILS # BLD: 0 K/UL (ref 0–0.1)
BASOPHILS NFR BLD: 0 % (ref 0–1)
BILIRUB SERPL-MCNC: 0.2 MG/DL (ref 0.2–1)
BUN SERPL-MCNC: 37 MG/DL (ref 6–20)
BUN/CREAT SERPL: 26 (ref 12–20)
CALCIUM SERPL-MCNC: 8.3 MG/DL (ref 8.5–10.1)
CHLORIDE SERPL-SCNC: 112 MMOL/L (ref 97–108)
CO2 SERPL-SCNC: 22 MMOL/L (ref 21–32)
CREAT SERPL-MCNC: 1.42 MG/DL (ref 0.55–1.02)
DIFFERENTIAL METHOD BLD: ABNORMAL
EOSINOPHIL # BLD: 0.2 K/UL (ref 0–0.4)
EOSINOPHIL NFR BLD: 3 % (ref 0–7)
ERYTHROCYTE [DISTWIDTH] IN BLOOD BY AUTOMATED COUNT: 21.8 % (ref 11.5–14.5)
EST. AVERAGE GLUCOSE BLD GHB EST-MCNC: ABNORMAL MG/DL
GLOBULIN SER CALC-MCNC: 3.4 G/DL (ref 2–4)
GLUCOSE BLD STRIP.AUTO-MCNC: 112 MG/DL (ref 65–100)
GLUCOSE BLD STRIP.AUTO-MCNC: 112 MG/DL (ref 65–100)
GLUCOSE BLD STRIP.AUTO-MCNC: 129 MG/DL (ref 65–100)
GLUCOSE BLD STRIP.AUTO-MCNC: 66 MG/DL (ref 65–100)
GLUCOSE SERPL-MCNC: 139 MG/DL (ref 65–100)
HBA1C MFR BLD: <3.5 % (ref 4.2–6.3)
HCT VFR BLD AUTO: 21.3 % (ref 35–47)
HGB BLD-MCNC: 6.4 G/DL (ref 11.5–16)
INR PPP: 1 (ref 0.9–1.1)
LYMPHOCYTES # BLD: 0.9 K/UL (ref 0.8–3.5)
LYMPHOCYTES NFR BLD: 17 % (ref 12–49)
MCH RBC QN AUTO: 32 PG (ref 26–34)
MCHC RBC AUTO-ENTMCNC: 30 G/DL (ref 30–36.5)
MCV RBC AUTO: 106.5 FL (ref 80–99)
MONOCYTES # BLD: 0.3 K/UL (ref 0–1)
MONOCYTES NFR BLD: 5 % (ref 5–13)
MYELOCYTES NFR BLD MANUAL: 2 %
NEUTS SEG # BLD: 3.7 K/UL (ref 1.8–8)
NEUTS SEG NFR BLD: 73 % (ref 32–75)
NRBC # BLD: 0.05 K/UL (ref 0–0.01)
NRBC BLD-RTO: 0.9 PER 100 WBC
PLATELET # BLD AUTO: 218 K/UL (ref 150–400)
POTASSIUM SERPL-SCNC: 4.4 MMOL/L (ref 3.5–5.1)
PROT SERPL-MCNC: 5.9 G/DL (ref 6.4–8.2)
PROTHROMBIN TIME: 10.5 SEC (ref 9–11.1)
RBC # BLD AUTO: 2 M/UL (ref 3.8–5.2)
RBC MORPH BLD: ABNORMAL
RBC MORPH BLD: ABNORMAL
SERVICE CMNT-IMP: ABNORMAL
SERVICE CMNT-IMP: NORMAL
SODIUM SERPL-SCNC: 142 MMOL/L (ref 136–145)
THERAPEUTIC RANGE,PTTT: NORMAL SECS (ref 58–77)
TROPONIN I SERPL-MCNC: <0.04 NG/ML
VIT B12 SERPL-MCNC: 1038 PG/ML (ref 211–911)
WBC # BLD AUTO: 5.1 K/UL (ref 3.6–11)
WBC NRBC COR # BLD: ABNORMAL 10*3/UL

## 2017-12-19 PROCEDURE — 74011250636 HC RX REV CODE- 250/636: Performed by: EMERGENCY MEDICINE

## 2017-12-19 PROCEDURE — 74011250636 HC RX REV CODE- 250/636: Performed by: FAMILY MEDICINE

## 2017-12-19 PROCEDURE — 99285 EMERGENCY DEPT VISIT HI MDM: CPT

## 2017-12-19 PROCEDURE — C9113 INJ PANTOPRAZOLE SODIUM, VIA: HCPCS | Performed by: FAMILY MEDICINE

## 2017-12-19 PROCEDURE — 84484 ASSAY OF TROPONIN QUANT: CPT | Performed by: EMERGENCY MEDICINE

## 2017-12-19 PROCEDURE — 80053 COMPREHEN METABOLIC PANEL: CPT | Performed by: EMERGENCY MEDICINE

## 2017-12-19 PROCEDURE — 30233N1 TRANSFUSION OF NONAUTOLOGOUS RED BLOOD CELLS INTO PERIPHERAL VEIN, PERCUTANEOUS APPROACH: ICD-10-PCS | Performed by: HOSPITALIST

## 2017-12-19 PROCEDURE — 36430 TRANSFUSION BLD/BLD COMPNT: CPT

## 2017-12-19 PROCEDURE — 74011000250 HC RX REV CODE- 250: Performed by: FAMILY MEDICINE

## 2017-12-19 PROCEDURE — 36415 COLL VENOUS BLD VENIPUNCTURE: CPT | Performed by: EMERGENCY MEDICINE

## 2017-12-19 PROCEDURE — 85025 COMPLETE CBC W/AUTO DIFF WBC: CPT | Performed by: EMERGENCY MEDICINE

## 2017-12-19 PROCEDURE — 82962 GLUCOSE BLOOD TEST: CPT

## 2017-12-19 PROCEDURE — 77030013169 SET IV BLD ICUM -A

## 2017-12-19 PROCEDURE — P9016 RBC LEUKOCYTES REDUCED: HCPCS | Performed by: EMERGENCY MEDICINE

## 2017-12-19 PROCEDURE — 83036 HEMOGLOBIN GLYCOSYLATED A1C: CPT | Performed by: FAMILY MEDICINE

## 2017-12-19 PROCEDURE — 82607 VITAMIN B-12: CPT | Performed by: FAMILY MEDICINE

## 2017-12-19 PROCEDURE — 65270000029 HC RM PRIVATE

## 2017-12-19 PROCEDURE — 85610 PROTHROMBIN TIME: CPT | Performed by: EMERGENCY MEDICINE

## 2017-12-19 PROCEDURE — 74011250637 HC RX REV CODE- 250/637: Performed by: FAMILY MEDICINE

## 2017-12-19 PROCEDURE — 85730 THROMBOPLASTIN TIME PARTIAL: CPT | Performed by: EMERGENCY MEDICINE

## 2017-12-19 PROCEDURE — 86900 BLOOD TYPING SEROLOGIC ABO: CPT | Performed by: EMERGENCY MEDICINE

## 2017-12-19 PROCEDURE — 74011000250 HC RX REV CODE- 250: Performed by: EMERGENCY MEDICINE

## 2017-12-19 PROCEDURE — 86923 COMPATIBILITY TEST ELECTRIC: CPT | Performed by: EMERGENCY MEDICINE

## 2017-12-19 PROCEDURE — 77010033678 HC OXYGEN DAILY

## 2017-12-19 RX ORDER — DEXTROSE 50 % IN WATER (D50W) INTRAVENOUS SYRINGE
12.5-25 AS NEEDED
Status: DISCONTINUED | OUTPATIENT
Start: 2017-12-19 | End: 2017-12-22 | Stop reason: HOSPADM

## 2017-12-19 RX ORDER — LEVOTHYROXINE SODIUM 125 UG/1
125 TABLET ORAL
Status: DISCONTINUED | OUTPATIENT
Start: 2017-12-20 | End: 2017-12-22 | Stop reason: HOSPADM

## 2017-12-19 RX ORDER — LISINOPRIL 10 MG/1
10 TABLET ORAL DAILY
Status: DISCONTINUED | OUTPATIENT
Start: 2017-12-20 | End: 2017-12-20

## 2017-12-19 RX ORDER — SODIUM CHLORIDE 0.9 % (FLUSH) 0.9 %
5-10 SYRINGE (ML) INJECTION EVERY 8 HOURS
Status: DISCONTINUED | OUTPATIENT
Start: 2017-12-19 | End: 2017-12-22 | Stop reason: HOSPADM

## 2017-12-19 RX ORDER — SODIUM CHLORIDE 0.9 % (FLUSH) 0.9 %
5-10 SYRINGE (ML) INJECTION AS NEEDED
Status: DISCONTINUED | OUTPATIENT
Start: 2017-12-19 | End: 2017-12-22 | Stop reason: HOSPADM

## 2017-12-19 RX ORDER — CITALOPRAM 20 MG/1
20 TABLET, FILM COATED ORAL DAILY
Status: DISCONTINUED | OUTPATIENT
Start: 2017-12-20 | End: 2017-12-20

## 2017-12-19 RX ORDER — DORZOLAMIDE HYDROCHLORIDE AND TIMOLOL MALEATE 20; 5 MG/ML; MG/ML
2 SOLUTION/ DROPS OPHTHALMIC EVERY 12 HOURS
Status: DISCONTINUED | OUTPATIENT
Start: 2017-12-19 | End: 2017-12-20

## 2017-12-19 RX ORDER — SODIUM CHLORIDE 9 MG/ML
250 INJECTION, SOLUTION INTRAVENOUS AS NEEDED
Status: DISCONTINUED | OUTPATIENT
Start: 2017-12-19 | End: 2017-12-22 | Stop reason: HOSPADM

## 2017-12-19 RX ORDER — INSULIN LISPRO 100 [IU]/ML
INJECTION, SOLUTION INTRAVENOUS; SUBCUTANEOUS EVERY 6 HOURS
Status: DISCONTINUED | OUTPATIENT
Start: 2017-12-19 | End: 2017-12-20

## 2017-12-19 RX ORDER — SODIUM CHLORIDE 9 MG/ML
75 INJECTION, SOLUTION INTRAVENOUS CONTINUOUS
Status: DISCONTINUED | OUTPATIENT
Start: 2017-12-19 | End: 2017-12-22 | Stop reason: HOSPADM

## 2017-12-19 RX ORDER — MAGNESIUM SULFATE 100 %
4 CRYSTALS MISCELLANEOUS AS NEEDED
Status: DISCONTINUED | OUTPATIENT
Start: 2017-12-19 | End: 2017-12-20 | Stop reason: SDUPTHER

## 2017-12-19 RX ORDER — GABAPENTIN 600 MG/1
300 TABLET ORAL 2 TIMES DAILY
Status: DISCONTINUED | OUTPATIENT
Start: 2017-12-19 | End: 2017-12-22 | Stop reason: HOSPADM

## 2017-12-19 RX ORDER — METOPROLOL TARTRATE 25 MG/1
25 TABLET, FILM COATED ORAL 2 TIMES DAILY
Status: DISCONTINUED | OUTPATIENT
Start: 2017-12-19 | End: 2017-12-20

## 2017-12-19 RX ORDER — MAGNESIUM SULFATE 100 %
4 CRYSTALS MISCELLANEOUS AS NEEDED
Status: DISCONTINUED | OUTPATIENT
Start: 2017-12-19 | End: 2017-12-22 | Stop reason: HOSPADM

## 2017-12-19 RX ORDER — DEXTROSE 50 % IN WATER (D50W) INTRAVENOUS SYRINGE
12.5-25 AS NEEDED
Status: DISCONTINUED | OUTPATIENT
Start: 2017-12-19 | End: 2017-12-20 | Stop reason: SDUPTHER

## 2017-12-19 RX ORDER — SODIUM CHLORIDE 9 MG/ML
50 INJECTION, SOLUTION INTRAVENOUS CONTINUOUS
Status: DISCONTINUED | OUTPATIENT
Start: 2017-12-19 | End: 2017-12-22 | Stop reason: HOSPADM

## 2017-12-19 RX ORDER — PRAVASTATIN SODIUM 40 MG/1
40 TABLET ORAL
Status: DISCONTINUED | OUTPATIENT
Start: 2017-12-19 | End: 2017-12-22 | Stop reason: HOSPADM

## 2017-12-19 RX ORDER — LANOLIN ALCOHOL/MO/W.PET/CERES
325 CREAM (GRAM) TOPICAL 2 TIMES DAILY
Status: DISCONTINUED | OUTPATIENT
Start: 2017-12-19 | End: 2017-12-22 | Stop reason: HOSPADM

## 2017-12-19 RX ORDER — VERAPAMIL HYDROCHLORIDE 120 MG/1
120 TABLET, FILM COATED, EXTENDED RELEASE ORAL 2 TIMES DAILY
Status: CANCELLED | OUTPATIENT
Start: 2017-12-19

## 2017-12-19 RX ADMIN — PRAVASTATIN SODIUM 40 MG: 40 TABLET ORAL at 23:24

## 2017-12-19 RX ADMIN — SODIUM CHLORIDE 500 ML: 900 INJECTION, SOLUTION INTRAVENOUS at 18:05

## 2017-12-19 RX ADMIN — Medication 10 ML: at 22:00

## 2017-12-19 RX ADMIN — DEXTROSE MONOHYDRATE 250 ML: 10 INJECTION, SOLUTION INTRAVENOUS at 18:13

## 2017-12-19 RX ADMIN — METOPROLOL TARTRATE 25 MG: 25 TABLET ORAL at 23:23

## 2017-12-19 RX ADMIN — FERROUS SULFATE TAB 325 MG (65 MG ELEMENTAL FE) 325 MG: 325 (65 FE) TAB at 23:23

## 2017-12-19 RX ADMIN — GABAPENTIN 300 MG: 600 TABLET, FILM COATED ORAL at 23:23

## 2017-12-19 RX ADMIN — SODIUM CHLORIDE 40 MG: 9 INJECTION INTRAMUSCULAR; INTRAVENOUS; SUBCUTANEOUS at 23:23

## 2017-12-19 NOTE — ED PROVIDER NOTES
HPI Comments: 79 y.o. female with past medical history significant for DM, GI bleed, CAD, diabetic retinopathy, diabetic neuropathy, and chest pain who presents from GI appt with chief complaint of rectal bleeding. Pt has had black stools for the past 2 months. She had 3 clips put in and then moved to [place] where she had 10 more clips placed in her small bowel. She had a HGB of 7.0 last week and was given 1 unit of blood at that time. She then saw GI this morning who sent her to the ED for possible admisison. Pt c/o upper abd pain at this time. Pt is on iron supplements. She reports hx of bypass. Pt denies CP, nausea, or vomiting. There are no other acute medical concerns at this time. Social hx: former tobacco smoker (quit 2 years ago, 0.5 packs/day); (-) EtOH use  PCP: Rosaline Laurent MD  GI: Bernmiguel Cough. Pedrito Enamorado MD    Note written by Winnie Gomez, as dictated by Hayden Paniagua MD 5:09 PM    The history is provided by the patient. No  was used.         Past Medical History:   Diagnosis Date    CAD (coronary artery disease)     Chest pain 7/2015    Diabetes ([address]     [facility]     Diabetic retinopathy ([facility]     GI bleed 7/2015    4 bleeds with 9 clips placed       Past Surgical History:   Procedure Laterality Date    CABG, ARTERY-VEIN, THREE  7/13/2015    HX ENDOSCOPY  7/2015    HX GI      HX HEART CATHETERIZATION  7/2015    HX ORTHOPAEDIC      back and right shoulder         Family History:   Problem Relation Age of Onset    COPD Mother     Diabetes Mother    [facility] COPD Father     Diabetes Father     Cancer Father      pancreatic    Diabetes Brother     Heart Disease Brother     Diabetes Brother     Alcohol abuse Brother     Diabetes Sister     Cancer Sister      Taj Yuen    Bleeding Prob Sister      Factor V Leiden       Social History     Social History    Marital status: SINGLE     Spouse name: N/A    Number of children: N/A    Years of education: N/A     Occupational History    Not on file. Social History Main Topics    Smoking status: Former Smoker     Packs/day: 0.50     Quit date: 7/5/2015    Smokeless tobacco: Former User      Comment: using Nicoderm patch    Alcohol use No    Drug use: No    Sexual activity: No     Other Topics Concern    Not on file     Social History Narrative         ALLERGIES: Augmentin [amoxicillin-pot clavulanate] and Nsaids (non-steroidal anti-inflammatory drug)    Review of Systems   Constitutional: Negative for chills and fever. Respiratory: Negative for shortness of breath. Cardiovascular: Negative for chest pain. Gastrointestinal: Positive for abdominal pain and blood in stool. Negative for nausea and vomiting. Skin: Negative for rash. All other systems reviewed and are negative. Vitals:    12/19/17 1444 12/19/17 1447 12/19/17 1702   BP: 124/68 (!) 87/56 122/43   Pulse: 96  88   Resp: 16  20   Temp: 98.1 °F (36.7 °C)     SpO2: 98%  97%   Weight: 93.4 kg (206 lb)     Height: 5' 4.5\" (1.638 m)              Physical Exam   Constitutional: She is oriented to person, place, and time. She appears ill. No distress. Chronically ill appearing. Somewhat debilitated. Obese. HENT:   Head: Normocephalic and atraumatic. Eyes: Conjunctivae are normal. No scleral icterus. Neck: Neck supple. No tracheal deviation present. Cardiovascular: Normal rate, regular rhythm, normal heart sounds and intact distal pulses. Exam reveals no gallop and no friction rub. No murmur heard. Pulmonary/Chest: Effort normal and breath sounds normal. She has no wheezes. She has no rales. Sternotomy scar. Abdominal: Soft. She exhibits no distension. There is no tenderness. There is no rebound and no guarding. Abd nontender. Musculoskeletal: She exhibits no edema. No peripheral edema. Neurological: She is alert and oriented to person, place, and time. Skin: Skin is warm and dry.  No rash noted. There is pallor. Very pale. Psychiatric: She has a normal mood and affect. Nursing note and vitals reviewed. Note written by Winnie Wilhelm, as dictated by Margo Mead MD 5:09 PM    [facility]  ED Course       Procedures    CONSULT NOTE:  5:32 PM Margo Mead MD spoke with Dr. Kamron Weiss, Consult for GI. Discussed available diagnostic tests and clinical findings. He is in agreement with care plans as outlined. Dr. Kamron Weiss recommends admitting the pt to hospitalist service. He will transfuse the pt due to a probable AVM bleed.

## 2017-12-19 NOTE — IP AVS SNAPSHOT
[address] [address] 
[phone] Patient: Candie Telles MRN: [id number] FSM:[id number]80 My Medications TAKE these medications as instructed Instructions Each Dose to Equal  
 Morning Noon Evening Bedtime  
  insulin pump Misc Commonly known as:  PATIENT SUPPLIED Your last dose was: Your next dose is:    
   
   
 by SubCUTAneous route as needed. 1.7 units/hr  
     
   
   
   
  
 cholecalciferol (vitamin D3) 2,000 unit Tab Commonly known as:  VITAMIN D3 Your last dose was: Your next dose is: Take 1 Tab by mouth daily. 2000 Units COSOPT (PF) 2-0.5 % ophthalmic solution Generic drug:  dorzolamide-timolol (PF) Your last dose was: Your next dose is:    
   
   
 Administer 1 Drop to both eyes two (2) times a day. Indications: Ocular Hypertension 1 Drop  
    
   
   
   
  
 escitalopram oxalate 20 mg tablet Commonly known as:  Mar Bailey Your last dose was: Your next dose is: Take 20 mg by mouth daily. 20 mg  
    
   
   
   
  
 ferrous sulfate 325 mg (65 mg iron) tablet Your last dose was: Your next dose is: Take 325 mg by mouth two (2) times a day. 325 mg  
    
   
   
   
  
 gabapentin 800 mg tablet Commonly known as:  NEURONTIN Your last dose was: Your next dose is: Take 800 mg by mouth three (3) times daily. 800 mg  
    
   
   
   
  
 insulin aspart 100 unit/mL injection Commonly known as:  Sury Lovell Your last dose was: Your next dose is:    
   
   
 by SubCUTAneous route continuous. For use with insulin pump Pump settings 1.7 units/hr  
     
   
   
   
  
 levothyroxine 125 mcg tablet Commonly known as:  SYNTHROID Your last dose was: Your next dose is: Take 1 Tab by mouth Daily (before breakfast). 125 mcg  
    
   
   
   
  
 lisinopril 10 mg tablet Commonly known as:  Ilia Mancuso Your last dose was: Your next dose is: Take 1 Tab by mouth daily. 10 mg  
    
   
   
   
  
 octreotide 50 mcg/mL injection Commonly known as:  SANDOSTATIN Your last dose was: Your next dose is:    
   
   
 1 mL by IntraVENous route two (2) times a day. 50 mcg  
    
   
   
   
  
 omeprazole 40 mg capsule Commonly known as:  PRILOSEC Your last dose was: Your next dose is: Take 1 Cap by mouth daily. 40 mg  
    
   
   
   
  
 ondansetron hcl 4 mg tablet Commonly known as:  ZOFRAN (AS HYDROCHLORIDE) Your last dose was: Your next dose is: Take 1 Tab by mouth every eight (8) hours as needed for Nausea. 4 mg  
    
   
   
   
  
 pravastatin 40 mg tablet Commonly known as:  PRAVACHOL Your last dose was: Your next dose is: Take 1 Tab by mouth nightly. 40 mg PROTONIX 40 mg tablet Generic drug:  pantoprazole Your last dose was: Your next dose is: Take 40 mg by mouth daily. 40 mg Senna 8.6 mg tablet Generic drug:  senna Your last dose was: Your next dose is: Take 1 Tab by mouth nightly as needed. 1 Tab  
    
   
   
   
  
 verapamil  mg tablet Commonly known as:  CALAN-SR Your last dose was: Your next dose is: Take 1 Tab by mouth two (2) times a day. For BP  
 120 mg Where to Get Your Medications Information on where to get these meds will be given to you by the nurse or doctor. ! Ask your nurse or doctor about these medications  
  octreotide 50 mcg/mL injection  
 omeprazole 40 mg capsule  
 ondansetron hcl 4 mg tablet

## 2017-12-19 NOTE — ED NOTES
Pt. Made NPO by GUILLERMO OCHOA, pt with insulin pump and checked her blood sugar and reports it to be 66. RN checked blood sugar and confirmed reading of 66. GUILLERMO OCHOA informed. Also note BP to be low. GUILLERMO OCHOA informed.

## 2017-12-19 NOTE — ED TRIAGE NOTES
Patient arrives c/o black stools x 2 months. Patient last hgb 7.0 last week. Patient was given 1 unit of blood then. Dr. Mague Taylor is patients GI dr. Patient c/o upper abdominal pain.  Denies n/v.

## 2017-12-19 NOTE — Clinical Note
Status[de-identified] Inpatient [101] Type of Bed: Intermediate Care [34] Inpatient Hospitalization Certified Necessary for the Following Reasons: 9. Other (further clarification in H&P documentation) Admitting [facility]: [facility] [[id number]] Admitting Physician: Marty Banegas [[id number]] Attending Physician: Alem Cam Estimated Length of Stay: 3-4 Midnights Discharge Plan[de-identified] Home with Office Follow-up

## 2017-12-20 ENCOUNTER — ANESTHESIA EVENT (OUTPATIENT)
Dept: ENDOSCOPY | Age: 67
DRG: 345 | End: 2017-12-20
Payer: MEDICARE

## 2017-12-20 ENCOUNTER — ANESTHESIA (OUTPATIENT)
Dept: ENDOSCOPY | Age: 67
DRG: 345 | End: 2017-12-20
Payer: MEDICARE

## 2017-12-20 LAB
ANION GAP SERPL CALC-SCNC: 7 MMOL/L (ref 5–15)
BASOPHILS # BLD: 0.1 K/UL (ref 0–0.1)
BASOPHILS NFR BLD: 1 % (ref 0–1)
BUN SERPL-MCNC: 35 MG/DL (ref 6–20)
BUN/CREAT SERPL: 27 (ref 12–20)
CALCIUM SERPL-MCNC: 8.2 MG/DL (ref 8.5–10.1)
CHLORIDE SERPL-SCNC: 115 MMOL/L (ref 97–108)
CO2 SERPL-SCNC: 21 MMOL/L (ref 21–32)
CREAT SERPL-MCNC: 1.31 MG/DL (ref 0.55–1.02)
DIFFERENTIAL METHOD BLD: ABNORMAL
EOSINOPHIL # BLD: 0.2 K/UL (ref 0–0.4)
EOSINOPHIL NFR BLD: 4 % (ref 0–7)
ERYTHROCYTE [DISTWIDTH] IN BLOOD BY AUTOMATED COUNT: 22.1 % (ref 11.5–14.5)
GLUCOSE BLD STRIP.AUTO-MCNC: 115 MG/DL (ref 65–100)
GLUCOSE BLD STRIP.AUTO-MCNC: 115 MG/DL (ref 65–100)
GLUCOSE BLD STRIP.AUTO-MCNC: 130 MG/DL (ref 65–100)
GLUCOSE BLD STRIP.AUTO-MCNC: 141 MG/DL (ref 65–100)
GLUCOSE BLD STRIP.AUTO-MCNC: 174 MG/DL (ref 65–100)
GLUCOSE BLD STRIP.AUTO-MCNC: 54 MG/DL (ref 65–100)
GLUCOSE BLD STRIP.AUTO-MCNC: 61 MG/DL (ref 65–100)
GLUCOSE BLD STRIP.AUTO-MCNC: 67 MG/DL (ref 65–100)
GLUCOSE BLD STRIP.AUTO-MCNC: 87 MG/DL (ref 65–100)
GLUCOSE SERPL-MCNC: 44 MG/DL (ref 65–100)
HCT VFR BLD AUTO: 27 % (ref 35–47)
HGB BLD-MCNC: 8.3 G/DL (ref 11.5–16)
LYMPHOCYTES # BLD: 0.9 K/UL (ref 0.8–3.5)
LYMPHOCYTES NFR BLD: 16 % (ref 12–49)
MCH RBC QN AUTO: 30.9 PG (ref 26–34)
MCHC RBC AUTO-ENTMCNC: 30.7 G/DL (ref 30–36.5)
MCV RBC AUTO: 100.4 FL (ref 80–99)
MONOCYTES # BLD: 0.5 K/UL (ref 0–1)
MONOCYTES NFR BLD: 9 % (ref 5–13)
NEUTS SEG # BLD: 3.8 K/UL (ref 1.8–8)
NEUTS SEG NFR BLD: 70 % (ref 32–75)
NRBC # BLD: 0.05 K/UL (ref 0–0.01)
NRBC BLD-RTO: 1 PER 100 WBC
PLATELET # BLD AUTO: 204 K/UL (ref 150–400)
POTASSIUM SERPL-SCNC: 4.3 MMOL/L (ref 3.5–5.1)
RBC # BLD AUTO: 2.69 M/UL (ref 3.8–5.2)
RBC MORPH BLD: ABNORMAL
RBC MORPH BLD: ABNORMAL
SERVICE CMNT-IMP: ABNORMAL
SERVICE CMNT-IMP: NORMAL
SERVICE CMNT-IMP: NORMAL
SODIUM SERPL-SCNC: 143 MMOL/L (ref 136–145)
WBC # BLD AUTO: 5.5 K/UL (ref 3.6–11)
WBC NRBC COR # BLD: ABNORMAL 10*3/UL

## 2017-12-20 PROCEDURE — 76060000031 HC ANESTHESIA FIRST 0.5 HR: Performed by: SPECIALIST

## 2017-12-20 PROCEDURE — 74011636637 HC RX REV CODE- 636/637: Performed by: HOSPITALIST

## 2017-12-20 PROCEDURE — 85025 COMPLETE CBC W/AUTO DIFF WBC: CPT | Performed by: FAMILY MEDICINE

## 2017-12-20 PROCEDURE — 82962 GLUCOSE BLOOD TEST: CPT

## 2017-12-20 PROCEDURE — 74011250636 HC RX REV CODE- 250/636: Performed by: SPECIALIST

## 2017-12-20 PROCEDURE — 74011250637 HC RX REV CODE- 250/637: Performed by: FAMILY MEDICINE

## 2017-12-20 PROCEDURE — 74011250636 HC RX REV CODE- 250/636: Performed by: FAMILY MEDICINE

## 2017-12-20 PROCEDURE — 36415 COLL VENOUS BLD VENIPUNCTURE: CPT | Performed by: FAMILY MEDICINE

## 2017-12-20 PROCEDURE — 80048 BASIC METABOLIC PNL TOTAL CA: CPT | Performed by: FAMILY MEDICINE

## 2017-12-20 PROCEDURE — 65270000032 HC RM SEMIPRIVATE

## 2017-12-20 PROCEDURE — 74011250637 HC RX REV CODE- 250/637: Performed by: INTERNAL MEDICINE

## 2017-12-20 PROCEDURE — 77010033678 HC OXYGEN DAILY

## 2017-12-20 PROCEDURE — 0DJ08ZZ INSPECTION OF UPPER INTESTINAL TRACT, VIA NATURAL OR ARTIFICIAL OPENING ENDOSCOPIC: ICD-10-PCS | Performed by: SPECIALIST

## 2017-12-20 PROCEDURE — 76040000019: Performed by: SPECIALIST

## 2017-12-20 PROCEDURE — 74011000250 HC RX REV CODE- 250: Performed by: FAMILY MEDICINE

## 2017-12-20 PROCEDURE — C9113 INJ PANTOPRAZOLE SODIUM, VIA: HCPCS | Performed by: FAMILY MEDICINE

## 2017-12-20 RX ORDER — ATROPINE SULFATE 0.1 MG/ML
0.5 INJECTION INTRAVENOUS
Status: DISCONTINUED | OUTPATIENT
Start: 2017-12-20 | End: 2017-12-20 | Stop reason: HOSPADM

## 2017-12-20 RX ORDER — SODIUM CHLORIDE 0.9 % (FLUSH) 0.9 %
5-10 SYRINGE (ML) INJECTION EVERY 8 HOURS
Status: COMPLETED | OUTPATIENT
Start: 2017-12-20 | End: 2017-12-20

## 2017-12-20 RX ORDER — INSULIN LISPRO 100 [IU]/ML
INJECTION, SOLUTION INTRAVENOUS; SUBCUTANEOUS
Status: DISCONTINUED | OUTPATIENT
Start: 2017-12-20 | End: 2017-12-22 | Stop reason: HOSPADM

## 2017-12-20 RX ORDER — DEXTROMETHORPHAN/PSEUDOEPHED 2.5-7.5/.8
1.2 DROPS ORAL
Status: DISCONTINUED | OUTPATIENT
Start: 2017-12-20 | End: 2017-12-20 | Stop reason: HOSPADM

## 2017-12-20 RX ORDER — LORAZEPAM 2 MG/ML
2 INJECTION INTRAMUSCULAR AS NEEDED
Status: DISCONTINUED | OUTPATIENT
Start: 2017-12-20 | End: 2017-12-20 | Stop reason: HOSPADM

## 2017-12-20 RX ORDER — SODIUM CHLORIDE 0.9 % (FLUSH) 0.9 %
5-10 SYRINGE (ML) INJECTION AS NEEDED
Status: ACTIVE | OUTPATIENT
Start: 2017-12-20 | End: 2017-12-20

## 2017-12-20 RX ORDER — EPINEPHRINE 0.1 MG/ML
1 INJECTION INTRACARDIAC; INTRAVENOUS
Status: DISCONTINUED | OUTPATIENT
Start: 2017-12-20 | End: 2017-12-20 | Stop reason: HOSPADM

## 2017-12-20 RX ORDER — PANTOPRAZOLE SODIUM 40 MG/1
40 TABLET, DELAYED RELEASE ORAL DAILY
Status: ON HOLD | COMMUNITY
End: 2022-04-01

## 2017-12-20 RX ORDER — ESCITALOPRAM OXALATE 20 MG/1
20 TABLET ORAL DAILY
Status: ON HOLD | COMMUNITY
End: 2022-04-01

## 2017-12-20 RX ORDER — INSULIN ASPART 100 [IU]/ML
INJECTION, SOLUTION INTRAVENOUS; SUBCUTANEOUS CONTINUOUS
COMMUNITY
End: 2018-06-01 | Stop reason: SDUPTHER

## 2017-12-20 RX ORDER — MIDAZOLAM HYDROCHLORIDE 1 MG/ML
.25-1 INJECTION, SOLUTION INTRAMUSCULAR; INTRAVENOUS
Status: DISCONTINUED | OUTPATIENT
Start: 2017-12-20 | End: 2017-12-20 | Stop reason: HOSPADM

## 2017-12-20 RX ORDER — DEXTROSE 50 % IN WATER (D50W) INTRAVENOUS SYRINGE
12.5-25 AS NEEDED
Status: DISCONTINUED | OUTPATIENT
Start: 2017-12-20 | End: 2017-12-20 | Stop reason: SDUPTHER

## 2017-12-20 RX ORDER — INSULIN GLARGINE 100 [IU]/ML
20 INJECTION, SOLUTION SUBCUTANEOUS ONCE
Status: COMPLETED | OUTPATIENT
Start: 2017-12-20 | End: 2017-12-20

## 2017-12-20 RX ORDER — MAGNESIUM SULFATE 100 %
4 CRYSTALS MISCELLANEOUS AS NEEDED
Status: DISCONTINUED | OUTPATIENT
Start: 2017-12-20 | End: 2017-12-20 | Stop reason: SDUPTHER

## 2017-12-20 RX ORDER — FLUMAZENIL 0.1 MG/ML
0.2 INJECTION INTRAVENOUS
Status: DISCONTINUED | OUTPATIENT
Start: 2017-12-20 | End: 2017-12-20 | Stop reason: HOSPADM

## 2017-12-20 RX ORDER — DORZOLAMIDE HYDROCHLORIDE AND TIMOLOL MALEATE 20; 5 MG/ML; MG/ML
1 SOLUTION/ DROPS OPHTHALMIC EVERY 12 HOURS
Status: DISCONTINUED | OUTPATIENT
Start: 2017-12-20 | End: 2017-12-22 | Stop reason: HOSPADM

## 2017-12-20 RX ORDER — SODIUM CHLORIDE 9 MG/ML
50 INJECTION, SOLUTION INTRAVENOUS CONTINUOUS
Status: DISPENSED | OUTPATIENT
Start: 2017-12-20 | End: 2017-12-20

## 2017-12-20 RX ORDER — NALOXONE HYDROCHLORIDE 0.4 MG/ML
0.4 INJECTION, SOLUTION INTRAMUSCULAR; INTRAVENOUS; SUBCUTANEOUS
Status: DISCONTINUED | OUTPATIENT
Start: 2017-12-20 | End: 2017-12-20 | Stop reason: HOSPADM

## 2017-12-20 RX ORDER — ESCITALOPRAM OXALATE 10 MG/1
20 TABLET ORAL DAILY
Status: DISCONTINUED | OUTPATIENT
Start: 2017-12-21 | End: 2017-12-22 | Stop reason: HOSPADM

## 2017-12-20 RX ORDER — FENTANYL CITRATE 50 UG/ML
200 INJECTION, SOLUTION INTRAMUSCULAR; INTRAVENOUS
Status: DISCONTINUED | OUTPATIENT
Start: 2017-12-20 | End: 2017-12-20 | Stop reason: HOSPADM

## 2017-12-20 RX ADMIN — CITALOPRAM HYDROBROMIDE 20 MG: 20 TABLET ORAL at 08:43

## 2017-12-20 RX ADMIN — DEXTROSE MONOHYDRATE 25 G: 500 INJECTION PARENTERAL at 05:08

## 2017-12-20 RX ADMIN — Medication 10 ML: at 23:11

## 2017-12-20 RX ADMIN — Medication 10 ML: at 14:02

## 2017-12-20 RX ADMIN — MIDAZOLAM HYDROCHLORIDE 1 MG: 1 INJECTION, SOLUTION INTRAMUSCULAR; INTRAVENOUS at 17:15

## 2017-12-20 RX ADMIN — SODIUM CHLORIDE 40 MG: 9 INJECTION INTRAMUSCULAR; INTRAVENOUS; SUBCUTANEOUS at 23:10

## 2017-12-20 RX ADMIN — MIDAZOLAM HYDROCHLORIDE 1 MG: 1 INJECTION, SOLUTION INTRAMUSCULAR; INTRAVENOUS at 17:17

## 2017-12-20 RX ADMIN — MIDAZOLAM HYDROCHLORIDE 0.5 MG: 1 INJECTION, SOLUTION INTRAMUSCULAR; INTRAVENOUS at 17:18

## 2017-12-20 RX ADMIN — FERROUS SULFATE TAB 325 MG (65 MG ELEMENTAL FE) 325 MG: 325 (65 FE) TAB at 08:43

## 2017-12-20 RX ADMIN — Medication 10 ML: at 05:09

## 2017-12-20 RX ADMIN — Medication 16 G: at 11:32

## 2017-12-20 RX ADMIN — SODIUM CHLORIDE 50 ML/HR: 900 INJECTION, SOLUTION INTRAVENOUS at 01:21

## 2017-12-20 RX ADMIN — FERROUS SULFATE TAB 325 MG (65 MG ELEMENTAL FE) 325 MG: 325 (65 FE) TAB at 18:47

## 2017-12-20 RX ADMIN — INSULIN GLARGINE 20 UNITS: 100 INJECTION, SOLUTION SUBCUTANEOUS at 17:33

## 2017-12-20 RX ADMIN — DORZOLAMIDE HYDROCHLORIDE AND TIMOLOL MALEATE 2 DROP: 20; 5 SOLUTION/ DROPS OPHTHALMIC at 08:49

## 2017-12-20 RX ADMIN — SODIUM CHLORIDE 40 MG: 9 INJECTION INTRAMUSCULAR; INTRAVENOUS; SUBCUTANEOUS at 08:42

## 2017-12-20 RX ADMIN — GABAPENTIN 300 MG: 600 TABLET, FILM COATED ORAL at 18:47

## 2017-12-20 RX ADMIN — PRAVASTATIN SODIUM 40 MG: 40 TABLET ORAL at 23:10

## 2017-12-20 RX ADMIN — FENTANYL CITRATE 50 MCG: 50 INJECTION, SOLUTION INTRAMUSCULAR; INTRAVENOUS at 17:15

## 2017-12-20 RX ADMIN — SODIUM CHLORIDE 50 ML/HR: 900 INJECTION, SOLUTION INTRAVENOUS at 17:15

## 2017-12-20 RX ADMIN — LEVOTHYROXINE SODIUM 125 MCG: 125 TABLET ORAL at 08:44

## 2017-12-20 RX ADMIN — Medication 10 ML: at 18:47

## 2017-12-20 RX ADMIN — GABAPENTIN 300 MG: 600 TABLET, FILM COATED ORAL at 08:43

## 2017-12-20 NOTE — H&P
[address]  ACUTE CARE HISTORY AND PHYSICAL    Name:Vinay THOMAS  MR#: [id number]  : 1950  ACCOUNT #: [de-identified]   DATE OF SERVICE: 2017    CHIEF COMPLAINT:  GI bleeding. 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 Patient reports that she has had black stools for the last 2 months. She was recently admitted to the hospital at [address] and was discharged on 2017. Patient reports at that point in time, she received \"3 clips in her small bowel secondary to AVMs\" The patient reports then she moved to [place] and received \"10 clips in the small bowel because of the bleeding. \" The patient reports that still she has been having some dark stools. She had a hemoglobin of 7.7 last week and was given 1 unit of blood at that point in time. She went to follow up with GI this morning and was sent to the ER for further management and evaluation. The patient reports that she had some \"sensitiveness\" in the abdomen, but denies any pain in there. The patient denies any other complaints or problems. Does report that she feels fatigued and tired. Denies any chest pain. Does report that she has some nausea associated with these symptoms, but denies any hematemesis or vomiting. The patient does report that she takes iron on a regular basis.   Patient denies any other concerns or problems including but not limited to headache, blurry vision, sore throat, trouble swallowing, trouble with speech, any chest pain, lightheadedness, dizziness, shortness of breath, cough, fever, chills, urinary symptoms, focal or generalized neurological weakness, constipation, diarrhea, urinary symptoms, focal neurologic weakness, recent travels, sick contacts, falls, injuries or any other concerns or problems. PAST MEDICAL HISTORY:  See above. HOME MEDICATIONS:  Currently, the patient is on metoprolol 25 mg b.i.d., Celexa 20 mg daily, gabapentin 800 mg b.i.d., insulin pump, omeprazole 40 mg b.i.d., verapamil 1 tablet b.i.d., levothyroxine 125 mcg daily, lisinopril 10 mg daily, pravastatin 1 tablet q. nightly, cholecalciferol, insulin basal through the pump and Cosopt eye drops. SOCIAL HISTORY:  Former smoker, used to smoke half a pack per day, quit in 2015. Occasional alcohol use. No IV drug abuse. Lives at home. ALLERGIES:  AUGMENTIN AND NSAIDs. REVIEW OF SYMPTOMS:  All systems were reviewed and are found to be essentially negative except for the symptoms mentioned above. FAMILY HISTORY:  Mother has history of COPD. Mother has history of diabetes. Father has history of COPD, diabetes and pancreatic cancer. Brother has history of diabetes. PHYSICAL EXAMINATION:  VITAL SIGNS:  Temperature 98, pulse 91, respiratory rate 23, blood pressure 137/54, pulse ox 95% on room air. GENERAL:  Alert x3, awake, no acute distress, resting in bed, pleasant female, appears to be stated age. HEENT:  Pupils equal, reactive to light. Dry mucous membranes. Tympanic membranes clear. Sclerae appears to be pale. NECK:  Supple. CHEST:  Clear to auscultation bilaterally. HEART:  S1, S2 heard. ABDOMEN:  Soft, nontender, nondistended. Bowel sounds are physiologic. EXTREMITIES:  No clubbing, no cyanosis, no edema. PSYCHIATRIC:  Pleasant mood and affect. Cranial nerves II-XII intact. Sensory grossly within normal limits. DTR 2+. Strength 5/5. SKIN:  Warm. LABORATORY DATA:  White count 5.1, hemoglobin 6.4, hematocrit 21.3, platelets 251. INR 1.0, PT 10.5.   Sodium 142, potassium 4.4, chloride 112, bicarbonate 22, anion gap 8, glucose 139, BUN 37, creatinine 1.42, calcium 8.3. Bilirubin total 0.2, ALT 14, AST 14, alkaline phosphatase 61. Troponin less than 0.04.    ASSESSMENT AND PLAN:  1. 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 The patient will be admitted on a telemetry bed. ER has ordered 2 units to be transfused that are now being transfused. We will provide Protonix b.i.d. as patient has history of some erosive gastritis also. We will keep n.p.o.  GI has been consulted and their recommendations will be incorporated. Monitor H and H every 8 hours and further intervention per hospital course. Continue to closely monitor. Reassess as needed. 2.  Acute blood loss anemia, most likely secondary to the above. We will transfuse 2 units PRBC. H and H every 8 hours. Patient appears to be somewhat macrocytic. We will also check a B12 level. Further intervention per hospital course. Reassess as needed and continue to monitor. 3.  Hypertension. The patient was hypotensive in the ER, currently is normotensive. We will continue home medications with holding parameters. We will continue to closely monitor. Reassess as needed. Further intervention per hospital course. 4.  Chronic kidney disease. We will renally dose all medications, continue to monitor and gentle IV hydration. Further intervention per hospital course. Reassess as needed. Repeat creatinine in the morning. 5.  GI and DVT prophylaxis. Patient will be on SCDs.       Brandon Meeks MD       MM / Declan Oreilly  D: 12/19/2017 20:02     T: 12/19/2017 21:05  JOB #: [id number]

## 2017-12-20 NOTE — DIABETES MGMT
DTC Pump and Consult Note    Recommendations/ Comments: Keith Barrios and spoke with patient. She is on a Medtronic pump. She states she is legally blind and has a friend Medardo Mullen) that helps her with her insulin pump. Pt did have a low blood sugar of 54 mg/dl this morning. She is currently NPO. Talked to Dr. Ana Lilia Issa and pt's nurse and we decided to take the patient off of her insulin pump and order correction scale Humalog and give patient 20 units of Lantus. I went back into the patients room and talked to the patient about removing the pump and giving Lantus. She agreed to this. She asked that I call her friend Elijah Du (who helps her manage her pump)and let her know of the plan. Called pt's friend Elijah Du and told her pt's insulin pump was being removed and that pt was being given Lantus and would need a temp basal rate set in the pump so no basal insulin would be delivered until Lantus would be out of patients system. She verbalized understanding. Went to double check orders on patient for Lantus and correction and noted that Lantus was not ordered. Called and spoke with Zorita Crigler, NP and they are not giving Lantus at this time as pt's BG is 61 mg/dl. Explained that pt is a Type 1 and requires basal insulin since her insulin pump has been removed. She states they will monitor the patient closely and will ordered what is necessary. If appropriate, please consider ordering Lantus 20 units. Pt is Type 1 and requires basal insulin. Consult received for:  [x]          Assessment of home management     [x]          Insulin pump patient         Chart reviewed and initial evaluation complete on [facility]. Patient is a 79 y.o. female with known diabetes on an insulin pump at home. Insulin Pump Settings    Pt on a Medtronic Insulin pump. Date of last site change 12-19-17.     Basal Rates     Insulin Carb Ratios  12a to 12a = 1.7 units/hr   12a to 12a = 1 unit per 4g        Insulin Sensitivity Factors  1 unit per 20 mg/dl     Target BG  100 - 130mg/dl      Provided patient with the following:               [x]          Outpatient DTC contact number       A1c:   Lab Results   Component Value Date/Time    Hemoglobin A1c <3.5 12/19/2017 03:02 PM    Hemoglobin A1c, External 6.2 06/30/2015       Recent Glucose Results:   Lab Results   Component Value Date/Time    GLU 44 (LL) 12/20/2017 04:55 AM     (H) 12/19/2017 03:02 PM    GLUCPOC 61 (L) 12/20/2017 11:06 AM    GLUCPOC 130 (H) 12/20/2017 05:53 AM    GLUCPOC 54 (L) 12/20/2017 05:00 AM        Lab Results   Component Value Date/Time    Creatinine 1.31 12/20/2017 04:55 AM     Estimated Creatinine Clearance: 46.8 mL/min (based on Cr of 1.31). Active Orders   Diet    DIET NPO        PO intake: No data found. Current hospital DM medication: correction scale humalog    Will continue to follow as needed. Thank you.   Lamont Montoya, RD, CDE

## 2017-12-20 NOTE — PROGRESS NOTES
TRANSFER - IN REPORT:    Verbal report received from Liberty(name) on Delonte Kenyon  being received from [facility]) for routine progression of care      Report consisted of patients Situation, Background, Assessment and   Recommendations(SBAR). Information from the following report(s) SBAR was reviewed with the receiving nurse. Opportunity for questions and clarification was provided. Assessment completed upon patients arrival to unit and care assumed.

## 2017-12-20 NOTE — ROUTINE PROCESS
TRANSFER - OUT REPORT:    Verbal report given to Yasmani RN(name) on Natalie Herbert  being transferred to [facility](unit) for routine progression of care       Report consisted of patients Situation, Background, Assessment and   Recommendations(SBAR). Information from the following report(s) SBAR was reviewed with the receiving nurse. Lines:   Peripheral IV 12/19/17 Left Antecubital (Active)   Site Assessment Clean, dry, & intact 12/19/2017  3:05 PM   Phlebitis Assessment 0 12/19/2017  3:05 PM   Infiltration Assessment 0 12/19/2017  3:05 PM   Dressing Status Clean, dry, & intact 12/19/2017  3:05 PM   Dressing Type Tape;Transparent 12/19/2017  3:05 PM   Hub Color/Line Status Pink;Flushed;Patent 12/19/2017  3:05 PM   Action Taken Blood drawn 12/19/2017  3:05 PM       Peripheral IV 12/19/17 Right Antecubital (Active)        Opportunity for questions and clarification was provided.       Patient transported with:   Monitor and RN

## 2017-12-20 NOTE — PROCEDURES
[address]    Endoscopic Gastroduodenoscopy Procedure Note    Alfonso Carey  1950  [id number]    Procedure: Endoscopic Gastroduodenoscopy     Indication:  Iron deficiency anemia, Melena/hematochezia, Hx of UGI AVMs and recurrent bleeding     Preoperative diagnosis: Abdominal pain    Postoperative diagnosis: Normal Endoscopy    : Mario Kamara MD    Assistant:  Endoscopy Technician-1: Jeyson Rivas IV  Endoscopy RN-1: Kayla Landa RN    Referring Provider:  James Betts MD      Anesthesia/Sedation: Versed 2.5 mg and Fentanyl 50 mcg      Procedure Details     After infomed consent was obtained for the procedure, with all risks and benefits of procedure explained the patient was taken to the endoscopy suite and placed in the left lateral decubitus position. Following sequential administration of sedation as per above, the endoscope was inserted into the mouth and advanced under direct vision to esophagus. A careful inspection was made as the gastroscope was withdrawn, including a retroflexed view of the proximal stomach; findings and interventions are described below. Findings:no blood of any kind in the upper GI tract   Esophagus:normal  Stomach: mild patchy chronic gastritis, previously placed clip noted in fundus along with 2 additional clips in the second part of duodenum. These were deployed for Rx of AVMs. No AVMs   Duodenum/jejunum: 2 additional clips in the second part of duodenum no AVMs    Therapies:  none    Specimens: none           EBL: None    Complications:   None; patient tolerated the procedure well. Recommendations:  -Acid suppression with a proton pump inhibitor. , -No NSAIDS, -solid for dinner tonight then clear liquids and antegrade enteroscopy tomorrow    Mario Kamara MD

## 2017-12-20 NOTE — PROGRESS NOTES
Primary Nurse Zeenat Mauricio RN and Wilda RN performed a dual skin assessment on this patient No impairment noted  Rehan score is 20

## 2017-12-20 NOTE — PROGRESS NOTES
Hospitalist Progress Note  Burgess Laurel NP  Answering service: [phone] from in house phone  Cell: [phone]      Date of Service:  2017  NAME:  Shanda Riojas  :    MRN:  [id number]      Admission Summary:   79year old with significant GI history with admission in 2017 with work up including capsule study and EGD which revealed erosive esophagitis at that time. Colonoscopy in  was unremarkable. She had enteroscopy with 10+ clips placed for small bowel AVMs. Her physician referred her here due to inadequate equipment to perform needed procedures. She was found to be anemic on admission and required blood transfusions. Other PMH includes CAD, Chest Pain, Diabetes and complications  Interval history / Subjective:     GI is planning EGD today  Ms. Jyotsna Casas really wants to go home today or tomorrow. She is living in [place] with a friend     Assessment & Plan:     Recurrent GI Bleed  GI consult  PPI  IVF    Acute Blood Loss Anemia  Blood Transfusion  Ferrous Sulfate    DM  Patient is legally blind, her roommate helps with her pump management  SSI  Holding insulin pump as patient is blind and cannot manage here  Holding Lantus due to NPO    CAD  ASA and BB held, restart when appropriate    Essential Hypertension;  Hypotension  Holding meds  IVF    HLD  Continue home meds    Obesity   when appropriate    Hypothyroidism  Continue home meds    Code status: Full  DVT prophylaxis: SCDs    Care Plan discussed with: Patient/Family, Nurse and   Disposition: TBD     Hospital Problems  Date Reviewed: 2017          Codes Class Noted POA    * (Principal)GI bleed ICD-10-CM: K92.2  ICD-9-CM: 578.9  2017 Unknown                Review of Systems:   Constitutional: positive for fatigue, malaise and weight loss  Eyes: negative  Respiratory: positive for dyspnea on exertion  Cardiovascular: positive for fatigue  Gastrointestinal: positive for dyspepsia, melena and abdominal pain  Integument/breast: positive for negative  Hematologic/lymphatic: positive for bleeding  Musculoskeletal:negative  Neurological: positive for weakness  Behavioral/Psych: negative       Vital Signs:    Last 24hrs VS reviewed since prior progress note. Most recent are:  Visit Vitals    BP (!) 119/36 (BP 1 Location: Left arm, BP Patient Position: At rest)    Pulse 76    Temp 99 °F (37.2 °C)    Resp 16    Ht 5' 4.5\" (1.638 m)    Wt 93.9 kg (207 lb)    SpO2 97%    BMI 34.98 kg/m2         Intake/Output Summary (Last 24 hours) at 12/20/17 1044  Last data filed at 12/20/17 0123   Gross per 24 hour   Intake             1125 ml   Output                0 ml   Net             1125 ml        Physical Examination:             Constitutional:  No acute distress, cooperative, pleasant    ENT:  Oral mucous moist, oropharynx benign. Neck supple, legally blind   Resp:  CTA bilaterally. No wheezing/rhonchi/rales. No accessory muscle use   CV:  Regular rhythm, normal rate, no murmurs, gallops, rubs    GI:  Obese, Soft, non distended, non tender. normoactive bowel sounds, no hepatosplenomegaly     Musculoskeletal:  No edema, warm, 2+ pulses throughout    Neurologic:  Moves all extremities. AAOx3,      Psych:  Good insight, Not anxious nor agitated.   Skin:  Good turgor, no rashes or ulcers  Hematologic/Lymphatic/Immunlogic:  No jaundice nor lymph node swelling       Data Review:    Review and/or order of clinical lab test  Review and/or order of tests in the radiology section of CPT      Labs:     Recent Labs      12/20/17   0455  12/19/17   1502   WBC  5.5  5.1   HGB  8.3*  6.4*   HCT  27.0*  21.3*   PLT  204  218     Recent Labs      12/20/17   0455  12/19/17   1502   NA  143  142   K  4.3  4.4   CL  115*  112*   CO2  21  22   BUN  35*  37*   CREA  1.31*  1.42*   GLU  44*  139*   CA  8.2*  8.3*     Recent Labs      12/19/17   1502   SGOT  14*   ALT 14   AP  61   TBILI  0.2   TP  5.9*   ALB  2.5*   GLOB  3.4     Recent Labs      12/19/17   1502   INR  1.0   PTP  10.5   APTT  22.1      No results for input(s): FE, TIBC, PSAT, FERR in the last 72 hours. No results found for: FOL, RBCF   No results for input(s): PH, PCO2, PO2 in the last 72 hours.   Recent Labs      12/19/17   1502   TROIQ  <0.04     Lab Results   Component Value Date/Time    Cholesterol, total 124 09/27/2017 04:12 AM    HDL Cholesterol 41 09/27/2017 04:12 AM    LDL, calculated 47.2 09/27/2017 04:12 AM    Triglyceride 179 09/27/2017 04:12 AM    CHOL/HDL Ratio 3.0 09/27/2017 04:12 AM     Lab Results   Component Value Date/Time    Glucose (POC) 130 12/20/2017 05:53 AM    Glucose (POC) 54 12/20/2017 05:00 AM    Glucose (POC) 112 12/19/2017 11:29 PM    Glucose (POC) 129 12/19/2017 07:53 PM    Glucose (POC) 112 12/19/2017 06:48 PM     Lab Results   Component Value Date/Time    Color Yellow 02/04/2016 03:09 PM    Appearance Cloudy 02/04/2016 03:09 PM    Specific gravity <1.005 07/09/2015 02:44 PM    pH (UA) 6.5 02/04/2016 03:09 PM    Protein NEGATIVE  07/09/2015 02:44 PM    Glucose NEGATIVE  07/09/2015 02:44 PM    Ketone Negative 02/04/2016 03:09 PM    Bilirubin Negative 02/04/2016 03:09 PM    Urobilinogen 0.2 07/09/2015 02:44 PM    Nitrites Negative 02/04/2016 03:09 PM    Leukocyte Esterase 1+ 02/04/2016 03:09 PM    Epithelial cells FEW 07/09/2015 02:44 PM    Bacteria Few 02/04/2016 03:09 PM    WBC 6-10 02/04/2016 03:09 PM    RBC 0-2 02/04/2016 03:09 PM         Medications Reviewed:     Current Facility-Administered Medications   Medication Dose Route Frequency     insulin pump (PATIENT SUPPLIED)   SubCUTAneous PRN    0.9% sodium chloride infusion 250 mL  250 mL IntraVENous PRN    0.9% sodium chloride infusion  75 mL/hr IntraVENous CONTINUOUS    sodium chloride (NS) flush 5-10 mL  5-10 mL IntraVENous Q8H    sodium chloride (NS) flush 5-10 mL  5-10 mL IntraVENous PRN    0.9% sodium chloride infusion  50 mL/hr IntraVENous CONTINUOUS    pantoprazole (PROTONIX) 40 mg in sodium chloride 0.9 % 10 mL injection  40 mg IntraVENous Q12H    dextrose (D50W) injection syrg 12.5-25 g  12.5-25 g IntraVENous PRN    citalopram (CELEXA) tablet 20 mg  20 mg Oral DAILY    dorzolamide-timolol (COSOPT) 22.3-6.8 mg/mL ophthalmic solution 2 Drop  2 Drop Both Eyes Q12H    ferrous sulfate tablet 325 mg  325 mg Oral BID    gabapentin (NEURONTIN) tablet 300 mg  300 mg Oral BID    levothyroxine (SYNTHROID) tablet 125 mcg  125 mcg Oral ACB    pravastatin (PRAVACHOL) tablet 40 mg  40 mg Oral QHS    glucose chewable tablet 16 g  4 Tab Oral PRN    glucagon (GLUCAGEN) injection 1 mg  1 mg IntraMUSCular PRN     ______________________________________________________________________  EXPECTED LENGTH OF STAY: 2d 4h  ACTUAL LENGTH OF STAY:          1                 [facility], [facility]     ADDENDUM: I saw and evaluated the patient independently and I agree with the note by Ms. Lynda Carcamo NP. Ms. Fany Fontaine is awaiting her EGD. No further evidence of bleeding. Abdomen was soft, NT, ND, with +BS on exam. Plan for EGD to evaluate source of bleeding, monitor HH.     Wende Dakin, MD

## 2017-12-20 NOTE — PROGRESS NOTES
Alfonso Crease  1950  [id number]          Situation:  Verbal report received from: naz Melendez  Preoperative diagnosis: Abdominal pain  Patient stated .     Background:  Procedure: Procedure(s):  ESOPHAGOGASTRODUODENOSCOPY (EGD)  Physician performing procedure; Dr. Ozzie Hammans    Patient diabetic yes  Patient taking blood thinners no   Patient has a defibrillator: no   Patient needs antibiotics: no    Assessment:  Vital signs stable yes  Alert and oriented yes  Abdominal assessment: round and soft       Recommendation:  Endoscopic Procedure  Family or Friend -no  Permission to share finding with Family or Friend yes

## 2017-12-20 NOTE — ROUTINE PROCESS
Abida Bedolla  1950  [id number]    Situation:  Verbal report received from: EDWINA Arias  Procedure: Procedure(s):  ESOPHAGOGASTRODUODENOSCOPY (EGD)    Background:    Preoperative diagnosis: Abdominal pain  Postoperative diagnosis: Normal Endoscopy    :  Dr. Spicer Job  Assistant(s): Endoscopy Technician-1: Jesus Manuel Pelaez IV  Endoscopy RN-1: Arsen Mera RN    Specimens: * No specimens in log *  H. Pylori  no    Assessment:  Intra-procedure medications   Versed 2.5  mg  Fentanyl   50  mcg      Anesthesia gave intra-procedure sedation and medications, see anesthesia flow sheet yes    Intravenous fluids:    200  NS @ KVO     Vital signs stable yes    Abdominal assessment: round and soft yes    Recommendation:  Discharge patient per MD order NO.   Return to floor YES  Family or Friend  none  Permission to share finding with family or friend n/a

## 2017-12-20 NOTE — ANESTHESIA PREPROCEDURE EVALUATION
Anesthetic History   No history of anesthetic complications            Review of Systems / Medical History  Patient summary reviewed, nursing notes reviewed and pertinent labs reviewed    Pulmonary        Sleep apnea: CPAP        Comments: 2L O2 at night and CPAP   Neuro/Psych   Within defined limits           Cardiovascular    Hypertension          CAD    Exercise tolerance: >4 METS     GI/Hepatic/Renal         Renal disease: CRI      Comments: anemia Endo/Other    Diabetes: using insulin  Hypothyroidism  Obesity and anemia     Other Findings            Physical Exam    Airway  Mallampati: II  TM Distance: > 6 cm  Neck ROM: normal range of motion   Mouth opening: Normal     Cardiovascular  Regular rate and rhythm,  S1 and S2 normal,  no murmur, click, rub, or gallop             Dental  No notable dental hx       Pulmonary  Breath sounds clear to auscultation               Abdominal  GI exam deferred       Other Findings            Anesthetic Plan    ASA: 3  Anesthesia type: MAC          Induction: Intravenous  Anesthetic plan and risks discussed with: Patient

## 2017-12-20 NOTE — ED NOTES
IV site intact and patent with D10 infusing in left IV and blood in the right IV. Pt without complaint, no obvious s/s blood transfusion reaction.

## 2017-12-20 NOTE — PROGRESS NOTES
Problem: Falls - Risk of  Goal: *Absence of Falls  Document Rachael Fall Risk and appropriate interventions in the flowsheet. Outcome: Progressing Towards Goal  Fall Risk Interventions:  Mobility Interventions: Bed/chair exit alarm, Patient to call before getting OOB     Medication Interventions: Evaluate medications/consider consulting pharmacy, Patient to call before getting OOB        Pt has not fallen. 1230  TRANSFER - OUT REPORT:    Verbal report given to [facility], RN (name) on Latoya Whitehead  being transferred to  (unit) for routine progression of care       Report consisted of patients Situation, Background, Assessment and   Recommendations(SBAR). Information from the following report(s) SBAR, ED Summary, Procedure Summary, Intake/Output, MAR, Recent Results and Cardiac Rhythm nsr was reviewed with the receiving nurse. Lines:   Peripheral IV 12/19/17 Left Antecubital (Active)   Site Assessment Clean, dry, & intact 12/20/2017 12:30 AM   Phlebitis Assessment 0 12/20/2017 12:30 AM   Infiltration Assessment 0 12/20/2017 12:30 AM   Dressing Status Clean, dry, & intact 12/20/2017 12:30 AM   Dressing Type Transparent;Tape 12/20/2017 12:30 AM   Hub Color/Line Status Capped 12/20/2017 12:30 AM   Action Taken Open ports on tubing capped 12/20/2017 12:30 AM   Alcohol Cap Used Yes 12/20/2017 12:30 AM       Peripheral IV 12/19/17 Right Antecubital (Active)   Site Assessment Clean, dry, & intact 12/20/2017 12:30 AM   Phlebitis Assessment 0 12/20/2017 12:30 AM   Infiltration Assessment 0 12/20/2017 12:30 AM   Dressing Status Clean, dry, & intact 12/20/2017 12:30 AM   Dressing Type Transparent;Tape 12/20/2017 12:30 AM   Hub Color/Line Status Infusing 12/20/2017 12:30 AM   Action Taken Open ports on tubing capped 12/20/2017 12:30 AM   Alcohol Cap Used Yes 12/20/2017 12:30 AM        Opportunity for questions and clarification was provided.       Patient transported with:   Patients medications from home Hourly rounds performed.

## 2017-12-20 NOTE — PROGRESS NOTES
Transfer Medication Reconciliation:    Information obtained from: Patient; rx query    Significant PMH/Disease States:   Past Medical History:   Diagnosis Date    CAD (coronary artery disease)     Chest pain 7/2015    Diabetes ([facility] [address]     Diabetic neuropathy ([facility]     Diabetic retinopathy ([facility]     GI bleed 7/2015    4 bleeds with 9 clips placed           Allergies:  Augmentin [amoxicillin-pot clavulanate] and Nsaids (non-steroidal anti-inflammatory drug)    Prior to Admission Medications:   Prior to Admission Medications   Prescriptions Last Dose Informant Patient Reported? Taking?    insulin pump (PATIENT SUPPLIED) misc   Yes Yes   Sig: by SubCUTAneous route as needed. 1.7 units/hr   cholecalciferol, vitamin D3, (VITAMIN D3) 2,000 unit tab   No Yes   Sig: Take 1 Tab by mouth daily. dorzolamide-timolol, PF, (COSOPT, PF,) 2-0.5 % dpet   Yes Yes   Sig: Administer 1 Drop to both eyes two (2) times a day. Indications: Ocular Hypertension   escitalopram oxalate (LEXAPRO) 20 mg tablet   Yes Yes   Sig: Take 20 mg by mouth daily. ferrous sulfate 325 mg (65 mg iron) tablet   Yes Yes   Sig: Take 325 mg by mouth two (2) times a day.   gabapentin (NEURONTIN) 800 mg tablet   Yes Yes   Sig: Take 800 mg by mouth three (3) times daily. insulin aspart (NOVOLOG) 100 unit/mL injection   Yes Yes   Sig: by SubCUTAneous route continuous. For use with insulin pump  Pump settings 1.7 units/hr   levothyroxine (SYNTHROID) 125 mcg tablet   No Yes   Sig: Take 1 Tab by mouth Daily (before breakfast). lisinopril (PRINIVIL, ZESTRIL) 10 mg tablet   No Yes   Sig: Take 1 Tab by mouth daily. pantoprazole (PROTONIX) 40 mg tablet   Yes Yes   Sig: Take 40 mg by mouth daily. pravastatin (PRAVACHOL) 40 mg tablet   No Yes   Sig: Take 1 Tab by mouth nightly. senna (SENNA) 8.6 mg tablet   Yes Yes   Sig: Take 1 Tab by mouth nightly as needed. verapamil ER (CALAN-SR) 120 mg tablet   No Yes   Sig: Take 1 Tab by mouth two (2) times a day. For BP      Facility-Administered Medications: None         Comments/Recommendations: Removed metoprolol, anoro ellipta, prilosec, celexa. Added protonix and lexapro. Changed dose of gabapentin and cosopt eye drops. Patient on home insulin pump- she uses novolog insulin. Her pump settings are 1.7 units/hr.

## 2017-12-20 NOTE — PROGRESS NOTES
Primary Nurse Heriberto Redmond and EDWINA Mccrary performed a dual skin assessment on this patient Impairment noted- see wound doc flow sheet  Rehan score is in flow sheet

## 2017-12-20 NOTE — CONSULTS
[address]  217 [facility] 140 [facility], [address]  [phone]                     GI CONSULTATION NOTE  Lashell Cruz, AGACNP-BC  Work Cell: [phone]      NAME:  Abida Bedolla   :   [birth year]   MRN:   [id number]       Referring Provider: Dr. Tierra Fischer Date: 2017     Chief Complaint: Melena    History of Present Illness:  Patient is a 79 y.o. who is seen in consultation at the request of Dr. Nadeem Segura for GI bleed. Ms. Mich Loyola has a PMH as detailed below including recurrent GI bleed. She presented to the hospital with melena. Onset of this was two weeks ago. She reports stools are dark at baseline as she is on iron supplements. Stool consistency and frequency changed. She reports having 4-5 dark stools daily with associated upper abdominal pain and mild nausea. No vomiting. Nausea and abdominal pain have since resolved. She was recently hospitalized in [place] for similar issues at which time she had enteroscopy with 10+ clips placed for SB AVMs. She was referred here as physician did not have long enough scope. Hgb 6.3 upon admission and now 8.3 s/p transfusion. She has been seen multiple times for similar symptoms. She was most recently seen by us 2017 at which time she had capsule study and subsequent EGD that demonstrated erosive esophagitis suspected to be due to pill esophagitis and SB AVMs treated with APC and clipping. Colonoscopy in  was unremarkable. PMH:  Past Medical History:   Diagnosis Date    CAD (coronary artery disease)     Chest pain 2015    Diabetes ([facility] [address] [facility]     Diabetic neuropathy ([facility] [address] [facility]     Diabetic retinopathy ([facility] [address]     GI bleed 2015    4 bleeds with 9 clips placed       PSH:  Past Surgical History:   Procedure Laterality Date    CABG, ARTERY-VEIN, THREE  2015    HX ENDOSCOPY  2015    HX GI      HX HEART CATHETERIZATION  2015    HX ORTHOPAEDIC      back and right shoulder       Allergies:   Allergies   Allergen Reactions    Augmentin [Amoxicillin-Pot Clavulanate] Diarrhea    Nsaids (Non-Steroidal Anti-Inflammatory Drug) Other (comments)     bleeding       Home Medications:  Prior to Admission Medications   Prescriptions Last Dose Informant Patient Reported? Taking?    insulin pump (PATIENT SUPPLIED) misc   Yes No   Sig: by SubCUTAneous route as needed. Pt unsure of pump settings. cholecalciferol, vitamin D3, (VITAMIN D3) 2,000 unit tab   No No   Sig: Take 1 Tab by mouth daily. citalopram (CELEXA) 20 mg tablet   Yes No   Sig: Take 20 mg by mouth daily. dorzolamide-timolol, PF, (COSOPT, PF,) 2-0.5 % dpet   Yes No   Sig: Administer 2 Drops to both eyes two (2) times a day. Indications: Ocular Hypertension   ferrous sulfate 325 mg (65 mg iron) tablet   Yes No   Sig: Take 325 mg by mouth two (2) times a day.   gabapentin (NEURONTIN) 800 mg tablet   Yes No   Sig: Take 800 mg by mouth two (2) times a day. insulin lispro (HUMALOG) 100 unit/mL injection   No No   Sig: Per insulin pump max dose of 100 units per day. Code E10.319   levothyroxine (SYNTHROID) 125 mcg tablet   No No   Sig: Take 1 Tab by mouth Daily (before breakfast). lisinopril (PRINIVIL, ZESTRIL) 10 mg tablet   No No   Sig: Take 1 Tab by mouth daily. metoprolol tartrate (LOPRESSOR) 25 mg tablet   Yes No   Sig: Take  by mouth two (2) times a day. omeprazole (PRILOSEC) 40 mg capsule   No No   Sig: TAKE 1 CAPSULE BY MOUTH TWICE DAILY   pravastatin (PRAVACHOL) 40 mg tablet   No No   Sig: Take 1 Tab by mouth nightly. senna (SENNA) 8.6 mg tablet   Yes No   Sig: Take 1 Tab by mouth nightly. umeclidinium-vilanterol (ANORO ELLIPTA) 62.5-25 mcg/actuation dsdv   Yes No   Sig: Take 1 Puff by inhalation daily. verapamil ER (CALAN-SR) 120 mg tablet   No No   Sig: Take 1 Tab by mouth two (2) times a day.  For BP      Facility-Administered Medications: None       Hospital Medications:  Current Facility-Administered Medications   Medication Dose Route Frequency  0.9% sodium chloride infusion 250 mL  250 mL IntraVENous PRN    0.9% sodium chloride infusion  75 mL/hr IntraVENous CONTINUOUS    sodium chloride (NS) flush 5-10 mL  5-10 mL IntraVENous Q8H    sodium chloride (NS) flush 5-10 mL  5-10 mL IntraVENous PRN    0.9% sodium chloride infusion  50 mL/hr IntraVENous CONTINUOUS    pantoprazole (PROTONIX) 40 mg in sodium chloride 0.9 % 10 mL injection  40 mg IntraVENous Q12H    glucose chewable tablet 16 g  4 Tab Oral PRN    dextrose (D50W) injection syrg 12.5-25 g  12.5-25 g IntraVENous PRN    glucagon (GLUCAGEN) injection 1 mg  1 mg IntraMUSCular PRN    insulin lispro (HUMALOG) injection   SubCUTAneous Q6H    . PHARMACY TO SUBSTITUTE PER PROTOCOL    Per Protocol    citalopram (CELEXA) tablet 20 mg  20 mg Oral DAILY    dorzolamide-timolol (COSOPT) 22.3-6.8 mg/mL ophthalmic solution 2 Drop  2 Drop Both Eyes Q12H    ferrous sulfate tablet 325 mg  325 mg Oral BID    gabapentin (NEURONTIN) tablet 300 mg  300 mg Oral BID    levothyroxine (SYNTHROID) tablet 125 mcg  125 mcg Oral ACB    lisinopril (PRINIVIL, ZESTRIL) tablet 10 mg  10 mg Oral DAILY    metoprolol tartrate (LOPRESSOR) tablet 25 mg  25 mg Oral BID    pravastatin (PRAVACHOL) tablet 40 mg  40 mg Oral QHS    glucose chewable tablet 16 g  4 Tab Oral PRN    dextrose (D50W) injection syrg 12.5-25 g  12.5-25 g IntraVENous PRN    glucagon (GLUCAGEN) injection 1 mg  1 mg IntraMUSCular PRN    insulin lispro (HUMALOG) injection   SubCUTAneous Q6H       Social History:  Social History   Substance Use Topics    Smoking status: Former Smoker     Packs/day: 0.50     Quit date: 7/5/2015    Smokeless tobacco: Former User      Comment: using Nicoderm patch    Alcohol use No       Family History:  Family History   Problem Relation Age of Onset    COPD Mother     Diabetes Mother     COPD Father     Diabetes Father     Cancer Father      pancreatic    Diabetes Brother     Heart Disease Brother     Diabetes Brother     Alcohol abuse Brother     Diabetes Sister     Cancer Sister      Matt Fuad    Bleeding Prob Sister      Factor V Leiden       Review of Systems:    Constitutional: negative fever, negative chills, negative weight loss  Eyes:   negative visual changes  ENT:   negative sore throat, tongue or lip swelling  Respiratory:  negative cough, negative dyspnea  Cards:  negative for chest pain, palpitations, lower extremity edema  GI:   See HPI  :  negative for frequency, dysuria  Integument:  negative for rash and pruritus  Heme:  negative for easy bruising and gum/nose bleeding  Musculoskel: negative for myalgias, back pain and muscle weakness  Neuro: negative for headaches, dizziness, vertigo  Psych:  negative for feelings of anxiety, depression      Objective:   Patient Vitals for the past 8 hrs:   BP Temp Pulse Resp SpO2 Weight   12/20/17 0739 (!) 119/36 99 °F (37.2 °C) 76 16 97 % -   12/20/17 0437 (!) 117/37 99.4 °F (37.4 °C) 76 16 100 % 93.9 kg (207 lb)        12/18 1901 - 12/20 0700  In: 1125 [I.V.:250]  Out: -       PHYSICAL EXAM:  General: WD, Obese. Alert, cooperative, no acute distress.    HEENT: NC, Atraumatic. PERRLA, EOMI. Anicteric sclerae. Lungs:  CTA Bilaterally. No Wheezing/Rhonchi/Rales. Heart:  Regular rate and rhythm, No murmur, No Rubs, No Gallops  Abdomen: Soft, non-distended, non-tender.  +Bowel sounds, no HSM  Extremities: No c/c/e  Neurologic:  Alert and oriented X 3. No acute neurological distress. Psych:   Good insight. Not anxious nor agitated.     Data Review     Recent Labs      12/20/17   0455  12/19/17   1502   WBC  5.5  5.1   HGB  8.3*  6.4*   HCT  27.0*  21.3*   PLT  204  218     Recent Labs      12/20/17   0455  12/19/17   1502   NA  143  142   K  4.3  4.4   CL  115*  112*   CO2  21  22   BUN  35*  37*   CREA  1.31*  1.42*   GLU  44*  139*   CA  8.2*  8.3*     Recent Labs      12/19/17   1502   SGOT  14*   AP  61   TP  5.9*   ALB  2.5*   GLOB  3.4     Recent Labs 12/19/17   1502   INR  1.0   PTP  10.5   APTT  22.1        Imaging studies reviewed      Assessment:   1. Recurrent GI bleed - with melena, suspect recurrent bleeding likely related to SB AVMs which were recently treated a couple of weeks ago  2. Severe anemia - related to the above  3. CAD - s/p CABG  4.  Type 1 DM    Patient Active Problem List   Diagnosis Code    CAD (coronary artery disease) I25.10    Diabetes mellitus ([facility] [address] E11.9    Essential hypertension I10    HLD (hyperlipidemia) E78.5    Obesity, Class I, BMI 30.0-34.9 (see actual BMI) E66.9    Low back pain at multiple sites M54.5    Hypothyroidism due to Hashimoto's thyroiditis E03.8, E06.3    Erosive gastritis with hemorrhage K29.61    AVM (arteriovenous malformation) of duodenum, 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    Sepsis (HCC) A41.9    GI bleed K92.2              Plan:   -Keep NPO  -Continue PPI BID  -Plan for EGD this afternoon  -Consider repeating enteroscopy pending results of the above   -Monitor H&H   -Transfuse as necessary   -Discussed with Dr. Yolie Kirk  -Will follow along with you  -Thank you kindly for allowing us to participate in the care of this patient

## 2017-12-21 ENCOUNTER — ANESTHESIA (OUTPATIENT)
Dept: ENDOSCOPY | Age: 67
DRG: 345 | End: 2017-12-21
Payer: MEDICARE

## 2017-12-21 ENCOUNTER — ANESTHESIA EVENT (OUTPATIENT)
Dept: ENDOSCOPY | Age: 67
DRG: 345 | End: 2017-12-21
Payer: MEDICARE

## 2017-12-21 LAB
ABO + RH BLD: NORMAL
ANION GAP SERPL CALC-SCNC: 6 MMOL/L (ref 5–15)
BLD PROD TYP BPU: NORMAL
BLOOD GROUP ANTIBODIES SERPL: NORMAL
BPU ID: NORMAL
BUN SERPL-MCNC: 23 MG/DL (ref 6–20)
BUN/CREAT SERPL: 21 (ref 12–20)
CALCIUM SERPL-MCNC: 8.3 MG/DL (ref 8.5–10.1)
CHLORIDE SERPL-SCNC: 112 MMOL/L (ref 97–108)
CO2 SERPL-SCNC: 24 MMOL/L (ref 21–32)
CREAT SERPL-MCNC: 1.08 MG/DL (ref 0.55–1.02)
CROSSMATCH RESULT,%XM: NORMAL
ERYTHROCYTE [DISTWIDTH] IN BLOOD BY AUTOMATED COUNT: 21.3 % (ref 11.5–14.5)
EST. AVERAGE GLUCOSE BLD GHB EST-MCNC: ABNORMAL MG/DL
GLUCOSE BLD STRIP.AUTO-MCNC: 129 MG/DL (ref 65–100)
GLUCOSE BLD STRIP.AUTO-MCNC: 181 MG/DL (ref 65–100)
GLUCOSE BLD STRIP.AUTO-MCNC: 188 MG/DL (ref 65–100)
GLUCOSE BLD STRIP.AUTO-MCNC: 241 MG/DL (ref 65–100)
GLUCOSE SERPL-MCNC: 169 MG/DL (ref 65–100)
HBA1C MFR BLD: <3.5 % (ref 4.2–6.3)
HCT VFR BLD AUTO: 26.9 % (ref 35–47)
HGB BLD-MCNC: 8.3 G/DL (ref 11.5–16)
MCH RBC QN AUTO: 31.1 PG (ref 26–34)
MCHC RBC AUTO-ENTMCNC: 30.9 G/DL (ref 30–36.5)
MCV RBC AUTO: 100.7 FL (ref 80–99)
PLATELET # BLD AUTO: 205 K/UL (ref 150–400)
POTASSIUM SERPL-SCNC: 4.6 MMOL/L (ref 3.5–5.1)
RBC # BLD AUTO: 2.67 M/UL (ref 3.8–5.2)
SERVICE CMNT-IMP: ABNORMAL
SODIUM SERPL-SCNC: 142 MMOL/L (ref 136–145)
SPECIMEN EXP DATE BLD: NORMAL
STATUS OF UNIT,%ST: NORMAL
UNIT DIVISION, %UDIV: 0
WBC # BLD AUTO: 4.3 K/UL (ref 3.6–11)

## 2017-12-21 PROCEDURE — 74011000250 HC RX REV CODE- 250: Performed by: FAMILY MEDICINE

## 2017-12-21 PROCEDURE — 74011000250 HC RX REV CODE- 250

## 2017-12-21 PROCEDURE — 77010033678 HC OXYGEN DAILY

## 2017-12-21 PROCEDURE — 74011250636 HC RX REV CODE- 250/636

## 2017-12-21 PROCEDURE — 77030036658 HC PRB COAG AR STR FIRE GENI -C: Performed by: SPECIALIST

## 2017-12-21 PROCEDURE — 82962 GLUCOSE BLOOD TEST: CPT

## 2017-12-21 PROCEDURE — 76060000033 HC ANESTHESIA 1 TO 1.5 HR: Performed by: SPECIALIST

## 2017-12-21 PROCEDURE — 77030011640 HC PAD GRND REM COVD -A: Performed by: SPECIALIST

## 2017-12-21 PROCEDURE — 74011000250 HC RX REV CODE- 250: Performed by: NURSE PRACTITIONER

## 2017-12-21 PROCEDURE — 83036 HEMOGLOBIN GLYCOSYLATED A1C: CPT | Performed by: HOSPITALIST

## 2017-12-21 PROCEDURE — 0D5A8ZZ DESTRUCTION OF JEJUNUM, VIA NATURAL OR ARTIFICIAL OPENING ENDOSCOPIC: ICD-10-PCS | Performed by: SPECIALIST

## 2017-12-21 PROCEDURE — 80048 BASIC METABOLIC PNL TOTAL CA: CPT | Performed by: NURSE PRACTITIONER

## 2017-12-21 PROCEDURE — 74011250637 HC RX REV CODE- 250/637: Performed by: NURSE PRACTITIONER

## 2017-12-21 PROCEDURE — 77030036656: Performed by: SPECIALIST

## 2017-12-21 PROCEDURE — 65270000032 HC RM SEMIPRIVATE

## 2017-12-21 PROCEDURE — 76040000008: Performed by: SPECIALIST

## 2017-12-21 PROCEDURE — 74011250637 HC RX REV CODE- 250/637: Performed by: FAMILY MEDICINE

## 2017-12-21 PROCEDURE — 77030022711 HC TU ENTRPRO BLN DISP OLSI -C: Performed by: SPECIALIST

## 2017-12-21 PROCEDURE — 36415 COLL VENOUS BLD VENIPUNCTURE: CPT | Performed by: HOSPITALIST

## 2017-12-21 PROCEDURE — 85027 COMPLETE CBC AUTOMATED: CPT | Performed by: NURSE PRACTITIONER

## 2017-12-21 PROCEDURE — 77030037006 HC FCPS BIOP ENDOSC DISP OCOA -A: Performed by: SPECIALIST

## 2017-12-21 PROCEDURE — 74011636637 HC RX REV CODE- 636/637: Performed by: NURSE PRACTITIONER

## 2017-12-21 PROCEDURE — 77030026438 HC STYL ET INTUB CARD -A: Performed by: ANESTHESIOLOGY

## 2017-12-21 PROCEDURE — C9113 INJ PANTOPRAZOLE SODIUM, VIA: HCPCS | Performed by: FAMILY MEDICINE

## 2017-12-21 PROCEDURE — 74011250636 HC RX REV CODE- 250/636: Performed by: FAMILY MEDICINE

## 2017-12-21 PROCEDURE — 77030008684 HC TU ET CUF COVD -B: Performed by: ANESTHESIOLOGY

## 2017-12-21 RX ORDER — FENTANYL CITRATE 50 UG/ML
INJECTION, SOLUTION INTRAMUSCULAR; INTRAVENOUS AS NEEDED
Status: DISCONTINUED | OUTPATIENT
Start: 2017-12-21 | End: 2017-12-21 | Stop reason: HOSPADM

## 2017-12-21 RX ORDER — ROCURONIUM BROMIDE 10 MG/ML
INJECTION, SOLUTION INTRAVENOUS AS NEEDED
Status: DISCONTINUED | OUTPATIENT
Start: 2017-12-21 | End: 2017-12-21 | Stop reason: HOSPADM

## 2017-12-21 RX ORDER — ATROPINE SULFATE 0.1 MG/ML
0.5 INJECTION INTRAVENOUS
Status: DISCONTINUED | OUTPATIENT
Start: 2017-12-21 | End: 2017-12-21 | Stop reason: HOSPADM

## 2017-12-21 RX ORDER — NALOXONE HYDROCHLORIDE 0.4 MG/ML
0.4 INJECTION, SOLUTION INTRAMUSCULAR; INTRAVENOUS; SUBCUTANEOUS
Status: DISCONTINUED | OUTPATIENT
Start: 2017-12-21 | End: 2017-12-21 | Stop reason: HOSPADM

## 2017-12-21 RX ORDER — PROPOFOL 10 MG/ML
INJECTION, EMULSION INTRAVENOUS AS NEEDED
Status: DISCONTINUED | OUTPATIENT
Start: 2017-12-21 | End: 2017-12-21 | Stop reason: HOSPADM

## 2017-12-21 RX ORDER — FLUMAZENIL 0.1 MG/ML
0.2 INJECTION INTRAVENOUS
Status: DISCONTINUED | OUTPATIENT
Start: 2017-12-21 | End: 2017-12-21 | Stop reason: HOSPADM

## 2017-12-21 RX ORDER — LORAZEPAM 2 MG/ML
2 INJECTION INTRAMUSCULAR AS NEEDED
Status: DISCONTINUED | OUTPATIENT
Start: 2017-12-21 | End: 2017-12-21 | Stop reason: HOSPADM

## 2017-12-21 RX ORDER — SODIUM CHLORIDE 9 MG/ML
INJECTION, SOLUTION INTRAVENOUS
Status: DISCONTINUED | OUTPATIENT
Start: 2017-12-21 | End: 2017-12-21 | Stop reason: HOSPADM

## 2017-12-21 RX ORDER — INSULIN GLARGINE 100 [IU]/ML
8 INJECTION, SOLUTION SUBCUTANEOUS ONCE
Status: COMPLETED | OUTPATIENT
Start: 2017-12-21 | End: 2017-12-21

## 2017-12-21 RX ORDER — GLYCOPYRROLATE 0.2 MG/ML
INJECTION INTRAMUSCULAR; INTRAVENOUS AS NEEDED
Status: DISCONTINUED | OUTPATIENT
Start: 2017-12-21 | End: 2017-12-21 | Stop reason: HOSPADM

## 2017-12-21 RX ORDER — SUCCINYLCHOLINE CHLORIDE 20 MG/ML
INJECTION INTRAMUSCULAR; INTRAVENOUS AS NEEDED
Status: DISCONTINUED | OUTPATIENT
Start: 2017-12-21 | End: 2017-12-21 | Stop reason: HOSPADM

## 2017-12-21 RX ORDER — MIDAZOLAM HYDROCHLORIDE 1 MG/ML
.25-1 INJECTION, SOLUTION INTRAMUSCULAR; INTRAVENOUS
Status: DISCONTINUED | OUTPATIENT
Start: 2017-12-21 | End: 2017-12-21 | Stop reason: HOSPADM

## 2017-12-21 RX ORDER — OMEPRAZOLE 40 MG/1
40 CAPSULE, DELAYED RELEASE ORAL DAILY
Qty: 30 CAP | Refills: 1 | OUTPATIENT
Start: 2017-12-21 | End: 2018-06-01

## 2017-12-21 RX ORDER — SODIUM CHLORIDE 0.9 % (FLUSH) 0.9 %
5-10 SYRINGE (ML) INJECTION AS NEEDED
Status: ACTIVE | OUTPATIENT
Start: 2017-12-21 | End: 2017-12-21

## 2017-12-21 RX ORDER — FENTANYL CITRATE 50 UG/ML
INJECTION, SOLUTION INTRAMUSCULAR; INTRAVENOUS
Status: COMPLETED
Start: 2017-12-21 | End: 2017-12-21

## 2017-12-21 RX ORDER — NEOSTIGMINE METHYLSULFATE 1 MG/ML
INJECTION INTRAVENOUS AS NEEDED
Status: DISCONTINUED | OUTPATIENT
Start: 2017-12-21 | End: 2017-12-21 | Stop reason: HOSPADM

## 2017-12-21 RX ORDER — SODIUM CHLORIDE 0.9 % (FLUSH) 0.9 %
5-10 SYRINGE (ML) INJECTION EVERY 8 HOURS
Status: ACTIVE | OUTPATIENT
Start: 2017-12-21 | End: 2017-12-21

## 2017-12-21 RX ORDER — PHENYLEPHRINE HCL IN 0.9% NACL 0.4MG/10ML
SYRINGE (ML) INTRAVENOUS AS NEEDED
Status: DISCONTINUED | OUTPATIENT
Start: 2017-12-21 | End: 2017-12-21 | Stop reason: HOSPADM

## 2017-12-21 RX ORDER — DEXTROMETHORPHAN/PSEUDOEPHED 2.5-7.5/.8
1.2 DROPS ORAL
Status: DISCONTINUED | OUTPATIENT
Start: 2017-12-21 | End: 2017-12-21 | Stop reason: HOSPADM

## 2017-12-21 RX ORDER — SODIUM CHLORIDE 9 MG/ML
50 INJECTION, SOLUTION INTRAVENOUS CONTINUOUS
Status: DISPENSED | OUTPATIENT
Start: 2017-12-21 | End: 2017-12-21

## 2017-12-21 RX ORDER — FENTANYL CITRATE 50 UG/ML
200 INJECTION, SOLUTION INTRAMUSCULAR; INTRAVENOUS
Status: DISCONTINUED | OUTPATIENT
Start: 2017-12-21 | End: 2017-12-21 | Stop reason: HOSPADM

## 2017-12-21 RX ORDER — INSULIN GLARGINE 100 [IU]/ML
22 INJECTION, SOLUTION SUBCUTANEOUS
Status: DISCONTINUED | OUTPATIENT
Start: 2017-12-21 | End: 2017-12-22 | Stop reason: HOSPADM

## 2017-12-21 RX ORDER — EPINEPHRINE 0.1 MG/ML
1 INJECTION INTRACARDIAC; INTRAVENOUS
Status: DISCONTINUED | OUTPATIENT
Start: 2017-12-21 | End: 2017-12-21 | Stop reason: HOSPADM

## 2017-12-21 RX ADMIN — Medication 80 MCG: at 17:28

## 2017-12-21 RX ADMIN — Medication 80 MCG: at 17:31

## 2017-12-21 RX ADMIN — FENTANYL CITRATE 50 MCG: 50 INJECTION, SOLUTION INTRAMUSCULAR; INTRAVENOUS at 17:40

## 2017-12-21 RX ADMIN — GABAPENTIN 300 MG: 600 TABLET, FILM COATED ORAL at 10:18

## 2017-12-21 RX ADMIN — ESCITALOPRAM OXALATE 20 MG: 10 TABLET ORAL at 10:18

## 2017-12-21 RX ADMIN — Medication 80 MCG: at 17:50

## 2017-12-21 RX ADMIN — SODIUM CHLORIDE 40 MG: 9 INJECTION INTRAMUSCULAR; INTRAVENOUS; SUBCUTANEOUS at 22:05

## 2017-12-21 RX ADMIN — PROPOFOL 150 MG: 10 INJECTION, EMULSION INTRAVENOUS at 17:25

## 2017-12-21 RX ADMIN — Medication 80 MCG: at 18:10

## 2017-12-21 RX ADMIN — FERROUS SULFATE TAB 325 MG (65 MG ELEMENTAL FE) 325 MG: 325 (65 FE) TAB at 10:18

## 2017-12-21 RX ADMIN — Medication 80 MCG: at 17:34

## 2017-12-21 RX ADMIN — GABAPENTIN 300 MG: 600 TABLET, FILM COATED ORAL at 21:13

## 2017-12-21 RX ADMIN — SODIUM CHLORIDE 40 MG: 9 INJECTION INTRAMUSCULAR; INTRAVENOUS; SUBCUTANEOUS at 10:17

## 2017-12-21 RX ADMIN — SUCCINYLCHOLINE CHLORIDE 25 MG: 20 INJECTION INTRAMUSCULAR; INTRAVENOUS at 17:30

## 2017-12-21 RX ADMIN — INSULIN GLARGINE 8 UNITS: 100 INJECTION, SOLUTION SUBCUTANEOUS at 12:57

## 2017-12-21 RX ADMIN — Medication 80 MCG: at 17:55

## 2017-12-21 RX ADMIN — Medication 120 MCG: at 18:15

## 2017-12-21 RX ADMIN — SUCCINYLCHOLINE CHLORIDE 140 MG: 20 INJECTION INTRAMUSCULAR; INTRAVENOUS at 17:25

## 2017-12-21 RX ADMIN — FERROUS SULFATE TAB 325 MG (65 MG ELEMENTAL FE) 325 MG: 325 (65 FE) TAB at 21:13

## 2017-12-21 RX ADMIN — INSULIN LISPRO 3 UNITS: 100 INJECTION, SOLUTION INTRAVENOUS; SUBCUTANEOUS at 07:07

## 2017-12-21 RX ADMIN — GLYCOPYRROLATE 0.4 MG: 0.2 INJECTION INTRAMUSCULAR; INTRAVENOUS at 18:31

## 2017-12-21 RX ADMIN — Medication 10 ML: at 22:05

## 2017-12-21 RX ADMIN — SODIUM CHLORIDE: 9 INJECTION, SOLUTION INTRAVENOUS at 17:00

## 2017-12-21 RX ADMIN — Medication 80 MCG: at 18:00

## 2017-12-21 RX ADMIN — DORZOLAMIDE HYDROCHLORIDE AND TIMOLOL MALEATE 1 DROP: 20; 5 SOLUTION/ DROPS OPHTHALMIC at 10:19

## 2017-12-21 RX ADMIN — LEVOTHYROXINE SODIUM 125 MCG: 125 TABLET ORAL at 06:48

## 2017-12-21 RX ADMIN — Medication 5 ML: at 14:00

## 2017-12-21 RX ADMIN — NEOSTIGMINE METHYLSULFATE 3 MG: 1 INJECTION INTRAVENOUS at 18:31

## 2017-12-21 RX ADMIN — ROCURONIUM BROMIDE 5 MG: 10 INJECTION, SOLUTION INTRAVENOUS at 17:25

## 2017-12-21 RX ADMIN — Medication 120 MCG: at 18:04

## 2017-12-21 RX ADMIN — Medication 10 ML: at 06:46

## 2017-12-21 RX ADMIN — SODIUM CHLORIDE 50 ML/HR: 900 INJECTION, SOLUTION INTRAVENOUS at 04:47

## 2017-12-21 RX ADMIN — PRAVASTATIN SODIUM 40 MG: 40 TABLET ORAL at 22:05

## 2017-12-21 RX ADMIN — DORZOLAMIDE HYDROCHLORIDE AND TIMOLOL MALEATE 1 DROP: 20; 5 SOLUTION/ DROPS OPHTHALMIC at 21:12

## 2017-12-21 RX ADMIN — SODIUM CHLORIDE: 9 INJECTION, SOLUTION INTRAVENOUS at 17:55

## 2017-12-21 NOTE — PROGRESS NOTES
Lili Maier  1950  [id number]    Situation:  Verbal report received from:Magali  Procedure: Procedure(s):  ENTEROSCOPY  ENDOSCOPIC ARGON PLASMA COAGULATION    Background:    Preoperative diagnosis: Anemia  Postoperative diagnosis: Chronic GI Bleeding  2 AVMs - cauterized    :  Dr. Kristel Chavez  Assistant(s): Endoscopy Technician-1: Alina Pabon  Endoscopy RN-1: Belkis Hutchinson RN    Specimens: * No specimens in log *  H. Pylori  no    Assessment:  Intra-procedure medications   Anesthesia gave intra-procedure sedation and medications, see anesthesia flow sheet yes    Intravenous fluids: NS@ KVO  Infused 300cc NS line capped    Vital signs stable     Abdominal assessment: round and soft voided 300cc estuardo urine    Recommendation:    Return to floor

## 2017-12-21 NOTE — PROGRESS NOTES
Patient lives in [place] and is anxious to return there. She has a scope scheduled for this afternoon. She has an insulin pump that her friend Chepe Barrios [phone] helps her manage. She anticipates discharge back to home and with no CM needs at this time. CM will follow until medically cleared and assist with disposition should the need arise. Care Management Interventions  PCP Verified by CM:  Yes (Dr Mortimer Capes)  Palliative Care Criteria Met (RRAT>21 & CHF Dx)?: No  Mode of Transport at Discharge:  (Friend's car)  Transition of Care Consult (CM Consult): Discharge Planning  Current Support Network:  (Lives with friend in [place])  Confirm Follow Up Transport: Friends  Plan discussed with Pt/Family/Caregiver: Yes  Discharge Location  Discharge Placement:  (Home [place])     Jd Angelo RN CRM

## 2017-12-21 NOTE — DIABETES MGMT
DTC Progress Note    Recommendations/ Comments: Chart reviewed secondary to BG >200 this am.  Patient is NPO and scheduled for testing this morning. Noted she had a one time dose of Lantus 20 units yesterday at 1700 hrs. If patient is not discharged today, please add standing dose of Lantus 30 units if NPO or 40 units if diet is ordered. She will also need lispro for meal coverage. PTA she gave 1 unit for 4 gms of carb. Home Basal is 40 units delivered by pump over 24 hours. Patient with diagnosis of type 1 diabetes since age 16. Chart reviewed on Patricia Grider. Patient is a 79 y.o. female with history Type 1 Diabetes since age 16 on insulin pump at home. A1c:   Lab Results   Component Value Date/Time    Hemoglobin A1c <3.5 12/21/2017 05:05 AM    Hemoglobin A1c <3.5 12/19/2017 03:02 PM    Hemoglobin A1c, External 6.2 06/30/2015       Recent Glucose Results: Lab Results   Component Value Date/Time    GLUCPOC 241 (H) 12/21/2017 06:26 AM    GLUCPOC 174 (H) 12/20/2017 09:11 PM    GLUCPOC 141 (H) 12/20/2017 04:06 PM        Lab Results   Component Value Date/Time    Creatinine 1.31 12/20/2017 04:55 AM     Estimated Creatinine Clearance: 46.8 mL/min (based on Cr of 1.31). Active Orders   Diet    DIET NPO        PO intake: Patient Vitals for the past 72 hrs:   % Diet Eaten   12/20/17 1800 100 %   12/20/17 1143 0 %   12/20/17 0845 0 %       Current hospital DM medication: Lispro Correctional insulin with normal sensitivity    Will continue to follow as needed.     Thank you  Ismael Covarrubias RD,CDE   [address]

## 2017-12-21 NOTE — ANESTHESIA POSTPROCEDURE EVALUATION
Post-Anesthesia Evaluation and Assessment    Patient: Cordelia Headley MRN: [id number]  SSN: [id number]    YOB: 1950  Age: 79 y.o. Sex: female       Cardiovascular Function/Vital Signs  Visit Vitals    /66    Pulse 89    Temp 36.9 °C (98.4 °F)    Resp 11    Ht 5' 4.5\" (1.638 m)    Wt 93.9 kg (207 lb)    SpO2 94%    BMI 34.98 kg/m2       Patient is status post general anesthesia for Procedure(s):  ENTEROSCOPY  ENDOSCOPIC ARGON PLASMA COAGULATION. Nausea/Vomiting: None    Postoperative hydration reviewed and adequate. Pain:  Pain Scale 1: Numeric (0 - 10) (12/21/17 1848)  Pain Intensity 1: 0 (12/21/17 1848)   Managed    Neurological Status:   Neuro  Neurologic State: Alert (12/21/17 0800)  Orientation Level: Oriented X4 (12/21/17 0800)  Cognition: Appropriate decision making; Appropriate for age attention/concentration; Appropriate safety awareness (12/21/17 0800)  Speech: Clear (12/21/17 0800)  LLE Motor Response: Purposeful (12/21/17 0800)  RLE Motor Response: Purposeful (12/21/17 0800)   At baseline    Mental Status and Level of Consciousness: Arousable    Pulmonary Status:   O2 Device: Room air (12/21/17 1848)   Adequate oxygenation and airway patent    Complications related to anesthesia: None    Post-anesthesia assessment completed.  No concerns    Signed By: Massiel Azul MD     December 21, 2017

## 2017-12-21 NOTE — DIABETES MGMT
DTC Progress Note    Recommendations/ Comments: Pt's nurse called and they want to put pt's insulin pump back on but are concerned about the 8 units of Lantus given and pt having hypoglycemia. Spoke to pt's nurse and pt's friend Zoraida Solis (who assists patient in managing her pump) and they will put the pump in a temporary basal rate of 50% until the Lantus is out of her system. Chart reviewed on Irma Wallace. Patient is a 79 y.o. female on a [employer] insulin pump. A1c:   Lab Results   Component Value Date/Time    Hemoglobin A1c <3.5 12/21/2017 05:05 AM    Hemoglobin A1c <3.5 12/19/2017 03:02 PM    Hemoglobin A1c, External 6.2 06/30/2015       Recent Glucose Results: Lab Results   Component Value Date/Time     (H) 12/21/2017 11:27 AM    GLUCPOC 181 (H) 12/21/2017 12:55 PM    GLUCPOC 241 (H) 12/21/2017 06:26 AM    GLUCPOC 174 (H) 12/20/2017 09:11 PM        Lab Results   Component Value Date/Time    Creatinine 1.08 12/21/2017 11:27 AM     Estimated Creatinine Clearance: 56.7 mL/min (based on Cr of 1.08). Active Orders   Diet    DIET NPO        PO intake: Patient Vitals for the past 72 hrs:   % Diet Eaten   12/20/17 1800 100 %   12/20/17 1143 0 %   12/20/17 0845 0 %         Will continue to follow as needed.     Thank you  Kevin Mcdaniel RD, CDE

## 2017-12-21 NOTE — DISCHARGE SUMMARY
Discharge Summary       PATIENT ID: Tia Bedolla  MRN: [id number]   YOB: 1950    DATE OF ADMISSION: 12/19/2017  4:42 PM    DATE OF DISCHARGE: 12/22/2017  PRIMARY CARE PROVIDER: Tamra Lopez MD       DISCHARGING PHYSICIAN: Layo Barriga NP    To contact this individual call [phone] and ask the  to page. If unavailable ask to be transferred the Adult Hospitalist Department. CONSULTATIONS: IP CONSULT TO HOSPITALIST  IP CONSULT TO HOSPITALIST  IP CONSULT TO GASTROENTEROLOGY    PROCEDURES/SURGERIES: Procedure(s):  ENTEROSCOPY  ENDOSCOPIC ARGON PLASMA COAGULATION    ADMITTING DIAGNOSES & HOSPITAL COURSE:   79year old with significant GI history with admission in 9/2017 with work up including capsule study and EGD which revealed erosive esophagitis at that time. Colonoscopy in 2015 was unremarkable. She had enteroscopy with 10+ clips placed for small bowel AVMs. Her physician referred her here due to inadequate equipment to perform needed procedures. She was found to be anemic on admission and required blood transfusions. Other PMH includes CAD, Chest Pain, Diabetes and complications        DISCHARGE DIAGNOSES / PLAN:      Recurrent GI Bleed  GI performed EGD on 12/20 which revealed patchy gastritis and previously clipped AVMS  She is s/p antegrade enteroscopy today  PPI  Tolerating diet   Prescribed 7 days of octreotide 50 mcg subq BID per DR. Easton note, will need to follow up with GI for possible monthly injections of Sandostatin should she tolerate this.  First dose given prior to discharge from hospital, called in script to her pharmacy in [place]     Acute Blood Loss Anemia  Blood Transfusion on admission  Ferrous Sulfate  CBC shows stable hgb 8.0     DM  Patient is legally blind, her roommate helps with her pump management  SSI  Insulin pump restarted at 50%, patient manages well with significant other  Lantus 8 units this AM and 22 units this PM and SSI   DTC following     CAD  ASA and BB held, restart when appropriate     Essential Hypertension; Hypotension  Holding meds  IVF     HLD  Continue home meds     Obesity   when appropriate     Hypothyroidism  Continue home meds     Code status: Full       PENDING TEST RESULTS:   At the time of discharge the following test results are still pending: na    FOLLOW UP APPOINTMENTS:    Follow-up Information     Follow up With Details Comments Contact Info    Brianda Rodriguez NP Schedule an appointment as soon as possible for a visit on 12/29/2017 For discharge follow up at 1:00PM  [address] [place]   [address]      [place] MD  fu tests results [address] [facility]  [address] [facility]           ADDITIONAL CARE RECOMMENDATIONS: Continue Iron and Omeprazole. Follow up with GI and PCP    1. Start taking Octreotide 50 mcg subcutaneously every 12 hours as prescribed X 7 days. If you tolerate this drug well, Dr. Ora Hinojosa recommends taking the medication at the following doses:   Sandostatin LAR 10 mg IM monthly need D x of Chronic GI Hemorrhage  - you should check with your GI doctor for a prescription for this medication . - some SIDE effects you may experience is dyspepsia ( GERD)  , swelling of extremities ( these side effects are rare)     2. Continue all other medications as you were previously taking. You can restart your insulin pump at 100% after noon time today. 3. Please call to schedule followup with your GI doctor within 7 days after discharge. DIET: avoid spicy foods, alcohol, Ibuprofen products    ACTIVITY: resume    WOUND CARE: na    EQUIPMENT needed: na            DISCHARGE MEDICATIONS:  Current Discharge Medication List      START taking these medications    Details   octreotide (SANDOSTATIN) 50 mcg/mL injection 1 mL by IntraVENous route two (2) times a day.   Qty: 14 mL, Refills: 0      ondansetron hcl (ZOFRAN, AS HYDROCHLORIDE,) 4 mg tablet Take 1 Tab by mouth every eight (8) hours as needed for Nausea. Qty: 20 Tab, Refills: 0      omeprazole (PRILOSEC) 40 mg capsule Take 1 Cap by mouth daily. Qty: 30 Cap, Refills: 1         CONTINUE these medications which have NOT CHANGED    Details   escitalopram oxalate (LEXAPRO) 20 mg tablet Take 20 mg by mouth daily. pantoprazole (PROTONIX) 40 mg tablet Take 40 mg by mouth daily. insulin aspart (NOVOLOG) 100 unit/mL injection by SubCUTAneous route continuous. For use with insulin pump  Pump settings 1.7 units/hr      gabapentin (NEURONTIN) 800 mg tablet Take 800 mg by mouth three (3) times daily. insulin pump (PATIENT SUPPLIED) misc by SubCUTAneous route as needed. 1.7 units/hr      verapamil ER (CALAN-SR) 120 mg tablet Take 1 Tab by mouth two (2) times a day. For BP  Qty: 180 Tab, Refills: 3      levothyroxine (SYNTHROID) 125 mcg tablet Take 1 Tab by mouth Daily (before breakfast). Qty: 90 Tab, Refills: 3      lisinopril (PRINIVIL, ZESTRIL) 10 mg tablet Take 1 Tab by mouth daily. Qty: 90 Tab, Refills: 3      pravastatin (PRAVACHOL) 40 mg tablet Take 1 Tab by mouth nightly. Qty: 90 Tab, Refills: 3      cholecalciferol, vitamin D3, (VITAMIN D3) 2,000 unit tab Take 1 Tab by mouth daily. Qty: 90 Tab, Refills: 3      ferrous sulfate 325 mg (65 mg iron) tablet Take 325 mg by mouth two (2) times a day. dorzolamide-timolol, PF, (COSOPT, PF,) 2-0.5 % dpet Administer 1 Drop to both eyes two (2) times a day. Indications: Ocular Hypertension      senna (SENNA) 8.6 mg tablet Take 1 Tab by mouth nightly as needed. NOTIFY YOUR PHYSICIAN FOR ANY OF THE FOLLOWING:   Fever over 101 degrees for 24 hours. Chest pain, shortness of breath, fever, chills, nausea, vomiting, diarrhea, change in mentation, falling, weakness, bleeding. Severe pain or pain not relieved by medications. Or, any other signs or symptoms that you may have questions about.     DISPOSITION:    Home With:   OT  PT HH  RN       Long term SNF/Inpatient Rehab   x Independent/assisted living    Hospice    Other:       PATIENT CONDITION AT DISCHARGE:     Functional status    Poor     Deconditioned    x Independent      Cognition   x  Lucid     Forgetful     Dementia      Catheters/lines (plus indication)    [place]son     PICC     PEG    x None      Code status    x Full code     DNR      PHYSICAL EXAMINATION AT DISCHARGE:    Constitutional:  No acute distress, cooperative, pleasant    ENT:  Oral mucous moist, oropharynx benign. Neck supple, legally blind   Resp:  CTA bilaterally. No wheezing/rhonchi/rales. No accessory muscle use   CV:  Regular rhythm, normal rate, no murmurs, gallops, rubs    GI:  Obese, Soft, non distended, non tender. normoactive bowel sounds, no hepatosplenomegaly     Musculoskeletal:  No edema, warm, 2+ pulses throughout    Neurologic:  Moves all extremities. AAOx3,                                              Psych:  Good insight, Not anxious nor agitated.   Skin:  Good turgor, no rashes or ulcers  Hematologic/Lymphatic/Immunlogic:  No jaundice nor lymph node swelling               CHRONIC MEDICAL DIAGNOSES:  Problem List as of 12/22/2017  Date Reviewed: 12/22/2017          Codes Class Noted - Resolved    Sepsis ([facility] [address] ICD-10-CM: A41.9  ICD-9-CM: 038.9, 995.91  12/19/2017 - Present        * (Principal)GI bleed ICD-10-CM: K92.2  ICD-9-CM: 578.9  12/19/2017 - Present        AVM (arteriovenous malformation) of duodenum, acquired with hemorrhage ICD-10-CM: K31.811  ICD-9-CM: 537.83  9/29/2017 - Present        Erosive gastritis with hemorrhage ICD-10-CM: K29.61  ICD-9-CM: 535.41  9/26/2017 - Present        Hypothyroidism due to Hashimoto's thyroiditis ICD-10-CM: E03.8, E06.3  ICD-9-CM: 244.8, 245.2  5/12/2016 - Present        Obesity, Class I, BMI 30.0-34.9 (see actual BMI) ICD-10-CM: E66.9  ICD-9-CM: 278.00  2/4/2016 - Present        Low back pain at multiple sites ICD-10-CM: M54.5  ICD-9-CM: 724.2  2/4/2016 - Present        Diabetes mellitus ([facility] [address] ICD-10-CM: E11.9  ICD-9-CM: 250.00  10/14/2015 - Present        Essential hypertension ICD-10-CM: I10  ICD-9-CM: 401.9  10/14/2015 - Present        HLD (hyperlipidemia) ICD-10-CM: E78.5  ICD-9-CM: 272.4  10/14/2015 - Present        CAD (coronary artery disease) ICD-10-CM: I25.10  ICD-9-CM: 414.00  7/9/2015 - Present        RESOLVED: S/P CABG x 3 ICD-10-CM: Z95.1  ICD-9-CM: V45.81  7/13/2015 - 2/4/2016    Overview Signed 7/13/2015 11:49 AM by Shoaib Wiseman PA-C     CABG X 3 LIMA to LAD, RSVG to OM, RSVG to RCA             RESOLVED: Anemia associated with acute blood loss ICD-10-CM: D62  ICD-9-CM: 285.1  7/10/2015 - 2/4/2016        RESOLVED: Tobacco abuse (Chronic) ICD-10-CM: Z72.0  ICD-9-CM: 305.1  7/9/2015 - 2/4/2016        RESOLVED: Chest pain ICD-10-CM: R07.9  ICD-9-CM: 786.50  7/5/2015 - 2/4/2016              Greater than 30 minutes were spent with the patient on counseling and coordination of care    Signed:   Froilan Nj NP  12/22/2017  5:23 PM    ADDENDUM: I saw and evaluated the patient independently and I agree with the note by Ms. González NP. Ms. Alvaro Han is anxious to get on the road. No further bleeding. Abdomen is S, NT, D, with +bs on exam. Received octreotide. Will see MD in [place] and get it rewritten.      Jadon Eaton MD

## 2017-12-21 NOTE — ANESTHESIA PREPROCEDURE EVALUATION
Anesthetic History   No history of anesthetic complications            Review of Systems / Medical History  Patient summary reviewed, nursing notes reviewed and pertinent labs reviewed    Pulmonary        Sleep apnea: CPAP           Neuro/Psych   Within defined limits           Cardiovascular    Hypertension          CAD and CABG      Comments: EF 70%   GI/Hepatic/Renal                Endo/Other    Diabetes: type 1  Hypothyroidism  Morbid obesity     Other Findings            Physical Exam    Airway  Mallampati: II  TM Distance: > 6 cm    Mouth opening: Normal     Cardiovascular    Rhythm: regular  Rate: normal         Dental    Dentition: Edentulous     Pulmonary  Breath sounds clear to auscultation               Abdominal         Other Findings            Anesthetic Plan    ASA: 3  Anesthesia type: general          Induction: Intravenous  Anesthetic plan and risks discussed with: Patient

## 2017-12-21 NOTE — PROGRESS NOTES
Bedside shift change report given to Patricio Garcia (oncoming nurse) by Jennelle Cabot (offgoing nurse). Report included the following information SBAR.

## 2017-12-21 NOTE — PROGRESS NOTES
Hospitalist Progress Note  Rashida Barragan NP  Answering service: [phone] from in house phone  Cell: [phone]      Date of Service:  2017  NAME:  Jose Ferro  :    MRN:  [id number]      Admission Summary:   79year old with significant GI history with admission in 2017 with work up including capsule study and EGD which revealed erosive esophagitis at that time. Colonoscopy in  was unremarkable. She had enteroscopy with 10+ clips placed for small bowel AVMs. Her physician referred her here due to inadequate equipment to perform needed procedures. She was found to be anemic on admission and required blood transfusions. Other PMH includes CAD, Chest Pain, Diabetes and complications  Interval history / Subjective:     GI is planning antegrade study today   Ms. Romulo Loco is anxious to get home. She is scheduled for late afternoon for her study  She may go home from hospitalist perspective if GI agrees. Discharge meds and instructions complete  Assessment & Plan:     Recurrent GI Bleed  GI performed EGD on  which revealed patchy gastritis and previously clipped AVMS  She is scheduled for antegrade enteroscopy today  PPI  IVF  Clears    Acute Blood Loss Anemia  Blood Transfusion on admission  Ferrous Sulfate  CBC ordered for today    DM  Patient is legally blind, her roommate helps with her pump management  SSI  Holding insulin pump as patient is blind and cannot manage here  Lantus 20 units last evening  Lantus 8 units this AM and 22 units this PM and SSI   DTC following    CAD  ASA and BB held, restart when appropriate    Essential Hypertension;  Hypotension  Holding meds  IVF    HLD  Continue home meds    Obesity   when appropriate    Hypothyroidism  Continue home meds    Code status: Full  DVT prophylaxis: SCDs    Care Plan discussed with: Patient/Family, Nurse and   Disposition: TBD Hospital Problems  Date Reviewed: 12/19/2017          Codes Class Noted POA    * (Principal)GI bleed ICD-10-CM: K92.2  ICD-9-CM: 578.9  12/19/2017 Unknown                Review of Systems:   Constitutional: positive for fatigue, malaise and weight loss  Eyes: negative  Respiratory: positive for dyspnea on exertion  Cardiovascular: positive for fatigue  Gastrointestinal: positive for dyspepsia, melena and abdominal pain  Integument/breast: positive for negative  Hematologic/lymphatic: positive for bleeding  Musculoskeletal:negative  Neurological: positive for weakness  Behavioral/Psych: negative       Vital Signs:    Last 24hrs VS reviewed since prior progress note. Most recent are:  Visit Vitals    /74    Pulse 85    Temp 97.8 °F (36.6 °C)    Resp 18    Ht 5' 4.5\" (1.638 m)    Wt 93.9 kg (207 lb)    SpO2 98%    BMI 34.98 kg/m2         Intake/Output Summary (Last 24 hours) at 12/21/17 1202  Last data filed at 12/21/17 0030   Gross per 24 hour   Intake          1254.17 ml   Output              500 ml   Net           754.17 ml        Physical Examination:             Constitutional:  No acute distress, cooperative, pleasant    ENT:  Oral mucous moist, oropharynx benign. Neck supple, legally blind   Resp:  CTA bilaterally. No wheezing/rhonchi/rales. No accessory muscle use   CV:  Regular rhythm, normal rate, no murmurs, gallops, rubs    GI:  Obese, Soft, non distended, non tender. normoactive bowel sounds, no hepatosplenomegaly     Musculoskeletal:  No edema, warm, 2+ pulses throughout    Neurologic:  Moves all extremities. AAOx3,      Psych:  Good insight, Not anxious nor agitated.   Skin:  Good turgor, no rashes or ulcers  Hematologic/Lymphatic/Immunlogic:  No jaundice nor lymph node swelling       Data Review:    Review and/or order of clinical lab test  Review and/or order of tests in the radiology section of CPT      Labs:     Recent Labs      12/20/17   0455  12/19/17   1502   WBC  5.5  5.1   HGB 8. 3*  6.4*   HCT  27.0*  21.3*   PLT  204  218     Recent Labs      12/20/17   0455  12/19/17   1502   NA  143  142   K  4.3  4.4   CL  115*  112*   CO2  21  22   BUN  35*  37*   CREA  1.31*  1.42*   GLU  44*  139*   CA  8.2*  8.3*     Recent Labs      12/19/17   1502   SGOT  14*   ALT  14   AP  61   TBILI  0.2   TP  5.9*   ALB  2.5*   GLOB  3.4     Recent Labs      12/19/17   1502   INR  1.0   PTP  10.5   APTT  22.1      No results for input(s): FE, TIBC, PSAT, FERR in the last 72 hours. No results found for: FOL, RBCF   No results for input(s): PH, PCO2, PO2 in the last 72 hours.   Recent Labs      12/19/17   1502   TROIQ  <0.04     Lab Results   Component Value Date/Time    Cholesterol, total 124 09/27/2017 04:12 AM    HDL Cholesterol 41 09/27/2017 04:12 AM    LDL, calculated 47.2 09/27/2017 04:12 AM    Triglyceride 179 09/27/2017 04:12 AM    CHOL/HDL Ratio 3.0 09/27/2017 04:12 AM     Lab Results   Component Value Date/Time    Glucose (POC) 241 12/21/2017 06:26 AM    Glucose (POC) 174 12/20/2017 09:11 PM    Glucose (POC) 141 12/20/2017 04:06 PM    Glucose (POC) 115 12/20/2017 02:34 PM    Glucose (POC) 115 12/20/2017 01:32 PM     Lab Results   Component Value Date/Time    Color Yellow 02/04/2016 03:09 PM    Appearance Cloudy 02/04/2016 03:09 PM    Specific gravity <1.005 07/09/2015 02:44 PM    pH (UA) 6.5 02/04/2016 03:09 PM    Protein NEGATIVE  07/09/2015 02:44 PM    Glucose NEGATIVE  07/09/2015 02:44 PM    Ketone Negative 02/04/2016 03:09 PM    Bilirubin Negative 02/04/2016 03:09 PM    Urobilinogen 0.2 07/09/2015 02:44 PM    Nitrites Negative 02/04/2016 03:09 PM    Leukocyte Esterase 1+ 02/04/2016 03:09 PM    Epithelial cells FEW 07/09/2015 02:44 PM    Bacteria Few 02/04/2016 03:09 PM    WBC 6-10 02/04/2016 03:09 PM    RBC 0-2 02/04/2016 03:09 PM         Medications Reviewed:     Current Facility-Administered Medications   Medication Dose Route Frequency    insulin glargine (LANTUS) injection 8 Units  8 Units SubCUTAneous ONCE    insulin lispro (HUMALOG) injection   SubCUTAneous AC&HS    dorzolamide-timolol (COSOPT) 22.3-6.8 mg/mL ophthalmic solution 1 Drop  1 Drop Both Eyes Q12H    escitalopram oxalate (LEXAPRO) tablet 20 mg  20 mg Oral DAILY    0.9% sodium chloride infusion 250 mL  250 mL IntraVENous PRN    0.9% sodium chloride infusion  75 mL/hr IntraVENous CONTINUOUS    sodium chloride (NS) flush 5-10 mL  5-10 mL IntraVENous Q8H    sodium chloride (NS) flush 5-10 mL  5-10 mL IntraVENous PRN    0.9% sodium chloride infusion  50 mL/hr IntraVENous CONTINUOUS    pantoprazole (PROTONIX) 40 mg in sodium chloride 0.9 % 10 mL injection  40 mg IntraVENous Q12H    dextrose (D50W) injection syrg 12.5-25 g  12.5-25 g IntraVENous PRN    ferrous sulfate tablet 325 mg  325 mg Oral BID    gabapentin (NEURONTIN) tablet 300 mg  300 mg Oral BID    levothyroxine (SYNTHROID) tablet 125 mcg  125 mcg Oral ACB    pravastatin (PRAVACHOL) tablet 40 mg  40 mg Oral QHS    glucose chewable tablet 16 g  4 Tab Oral PRN    glucagon (GLUCAGEN) injection 1 mg  1 mg IntraMUSCular PRN     ______________________________________________________________________  EXPECTED LENGTH OF STAY: 3d 2h  ACTUAL LENGTH OF STAY:          2                 Patrick Perez NP

## 2017-12-21 NOTE — PROCEDURES
[facility], [address]       SINGLE BALLOON ENTEROSCOPY PROCEDURE NOTE      Patricia   1950  [id number]      Procedure: Single Balloon Enteroscopy and control of bleeding with APC cautery of AVMs    Indication:  Iron deficiency anemia    : Dr. Kika Garduno MD    Referring Provider: Dany Herrera MD      Anesthesia/Sedation: General anesthesia . Procedure Details    After infomed consent was obtained for the procedure, with all risks and  benefits of procedure explained the patient was taken to the endoscopy  suite and placed in the left lateral decubitus position. Following  sequential administration of sedation as per above, a standard EGD was performed. Findings are described below. Next, the enteroscope was  inserted into the mouth and advanced under direct vision to mid-jejunum  A careful inspection was completed, findings and interventions  are described below. Findings:    Esophagus:normal     Stomach: no blood clip in fundus previously placed     Duodenum: no blood, several clips in 2-3 rd parts previously placed     Jejunum: no blood, several clips in most proximal jejunum and 4th part of duodenum clip previously placed, 2 small red spots possible AVMs cauterized with APC probe    ileum: not intubated      EBL: none      Therapies: See above      Specimens: See above    Complications: None; patient tolerated the procedure well. Impression:   1. Chronic GI hemorrhage    Recommendations:  1. Patient anxious to leave town for home in [place], recommend follow up there  2. Sandostatin has shown efficacy in cases; suggest octreotide subq x50 BID for a week and if tolerated Sandostatin LAR 10 mg IM monthly need D x of Chronic GI Hemorrhage  3.  If bleeding overt suggest Small Bowel Pillcam study      Kika Garduno MD    12/21/2017 6:34 PM

## 2017-12-22 VITALS
BODY MASS INDEX: 34.49 KG/M2 | OXYGEN SATURATION: 96 % | TEMPERATURE: 99 F | HEIGHT: 65 IN | RESPIRATION RATE: 18 BRPM | DIASTOLIC BLOOD PRESSURE: 61 MMHG | HEART RATE: 80 BPM | SYSTOLIC BLOOD PRESSURE: 126 MMHG | WEIGHT: 207 LBS

## 2017-12-22 LAB
BASOPHILS # BLD: 0 K/UL (ref 0–0.1)
BASOPHILS NFR BLD: 0 % (ref 0–1)
DIFFERENTIAL METHOD BLD: ABNORMAL
EOSINOPHIL # BLD: 0 K/UL (ref 0–0.4)
EOSINOPHIL NFR BLD: 1 % (ref 0–7)
ERYTHROCYTE [DISTWIDTH] IN BLOOD BY AUTOMATED COUNT: 20.7 % (ref 11.5–14.5)
GLUCOSE BLD STRIP.AUTO-MCNC: 126 MG/DL (ref 65–100)
HCT VFR BLD AUTO: 26.1 % (ref 35–47)
HGB BLD-MCNC: 8 G/DL (ref 11.5–16)
LYMPHOCYTES # BLD: 1.1 K/UL (ref 0.8–3.5)
LYMPHOCYTES NFR BLD: 23 % (ref 12–49)
MCH RBC QN AUTO: 31 PG (ref 26–34)
MCHC RBC AUTO-ENTMCNC: 30.7 G/DL (ref 30–36.5)
MCV RBC AUTO: 101.2 FL (ref 80–99)
MONOCYTES # BLD: 0.2 K/UL (ref 0–1)
MONOCYTES NFR BLD: 4 % (ref 5–13)
NEUTS SEG # BLD: 3.4 K/UL (ref 1.8–8)
NEUTS SEG NFR BLD: 72 % (ref 32–75)
PLATELET # BLD AUTO: 195 K/UL (ref 150–400)
RBC # BLD AUTO: 2.58 M/UL (ref 3.8–5.2)
RBC MORPH BLD: ABNORMAL
RBC MORPH BLD: ABNORMAL
SERVICE CMNT-IMP: ABNORMAL
WBC # BLD AUTO: 4.7 K/UL (ref 3.6–11)

## 2017-12-22 PROCEDURE — 74011250637 HC RX REV CODE- 250/637: Performed by: FAMILY MEDICINE

## 2017-12-22 PROCEDURE — 74011250636 HC RX REV CODE- 250/636: Performed by: FAMILY MEDICINE

## 2017-12-22 PROCEDURE — 85025 COMPLETE CBC W/AUTO DIFF WBC: CPT | Performed by: NURSE PRACTITIONER

## 2017-12-22 PROCEDURE — 74011000250 HC RX REV CODE- 250: Performed by: FAMILY MEDICINE

## 2017-12-22 PROCEDURE — 77010033678 HC OXYGEN DAILY

## 2017-12-22 PROCEDURE — C9113 INJ PANTOPRAZOLE SODIUM, VIA: HCPCS | Performed by: FAMILY MEDICINE

## 2017-12-22 PROCEDURE — 74011250636 HC RX REV CODE- 250/636: Performed by: NURSE PRACTITIONER

## 2017-12-22 PROCEDURE — 36415 COLL VENOUS BLD VENIPUNCTURE: CPT | Performed by: NURSE PRACTITIONER

## 2017-12-22 PROCEDURE — 74011250637 HC RX REV CODE- 250/637: Performed by: NURSE PRACTITIONER

## 2017-12-22 PROCEDURE — 82962 GLUCOSE BLOOD TEST: CPT

## 2017-12-22 RX ORDER — OCTREOTIDE ACETATE 50 UG/ML
50 INJECTION, SOLUTION INTRAVENOUS; SUBCUTANEOUS ONCE
Status: COMPLETED | OUTPATIENT
Start: 2017-12-22 | End: 2017-12-22

## 2017-12-22 RX ORDER — OCTREOTIDE ACETATE 50 UG/ML
50 INJECTION, SOLUTION INTRAVENOUS; SUBCUTANEOUS 2 TIMES DAILY
Qty: 14 ML | Refills: 0 | Status: SHIPPED | OUTPATIENT
Start: 2017-12-22 | End: 2018-06-01

## 2017-12-22 RX ORDER — ONDANSETRON 4 MG/1
4 TABLET, FILM COATED ORAL
Qty: 20 TAB | Refills: 0 | Status: SHIPPED | OUTPATIENT
Start: 2017-12-22 | End: 2018-12-12

## 2017-12-22 RX ADMIN — SODIUM CHLORIDE 75 ML/HR: 900 INJECTION, SOLUTION INTRAVENOUS at 03:25

## 2017-12-22 RX ADMIN — FERROUS SULFATE TAB 325 MG (65 MG ELEMENTAL FE) 325 MG: 325 (65 FE) TAB at 09:39

## 2017-12-22 RX ADMIN — GABAPENTIN 300 MG: 600 TABLET, FILM COATED ORAL at 09:38

## 2017-12-22 RX ADMIN — Medication 10 ML: at 07:32

## 2017-12-22 RX ADMIN — OCTREOTIDE ACETATE 50 MCG: 50 INJECTION, SOLUTION INTRAVENOUS; SUBCUTANEOUS at 10:19

## 2017-12-22 RX ADMIN — LEVOTHYROXINE SODIUM 125 MCG: 125 TABLET ORAL at 07:29

## 2017-12-22 RX ADMIN — SODIUM CHLORIDE 40 MG: 9 INJECTION INTRAMUSCULAR; INTRAVENOUS; SUBCUTANEOUS at 09:39

## 2017-12-22 RX ADMIN — ESCITALOPRAM OXALATE 20 MG: 10 TABLET ORAL at 09:38

## 2017-12-22 NOTE — PROGRESS NOTES
Note patient will discharge home today. [address] Follow Up Appointment has been scheduled. Follow-up With  Details  Why  Contact Info   Darron Lantigua  Go on 12/29/2017  For discharge follow up at 1:00PM   [address]   [address] [place] [address] Drive  [phone]     No other discharge needs at this time.    PILO Bacon

## 2017-12-22 NOTE — DISCHARGE INSTRUCTIONS
Discharge Instructions       PATIENT ID: Myriam Carrion  MRN: [id number]   YOB: 1950    DATE OF ADMISSION: 12/19/2017  4:42 PM    DATE OF DISCHARGE: 12/21/2017    PRIMARY CARE PROVIDER: Kaya Perez MD       DISCHARGING PHYSICIAN: Raiza De Luna NP    To contact this individual call [phone] and ask the  to page. If unavailable ask to be transferred the Adult Hospitalist Department. DISCHARGE DIAGNOSES ***    CONSULTATIONS: IP CONSULT TO HOSPITALIST  IP CONSULT TO HOSPITALIST  IP CONSULT TO GASTROENTEROLOGY    PROCEDURES/SURGERIES: Procedure(s):  ENTEROSCOPY    PENDING TEST RESULTS:   At the time of discharge the following test results are still pending: none    FOLLOW UP APPOINTMENTS:   Follow-up Information     Follow up With Details Comments Contact Info    Kaya Perez MD Schedule an appointment as soon as possible for a visit in 1 week hospital fu [facility] [address]      Arabella Snell MD  fu tests results [address] [address]  [phone]             ADDITIONAL CARE RECOMMENDATIONS: Continue Iron and Omeprazole. Follow up with GI and PCP    1. Start taking Octreotide 50 mcg subcutaneously every 12 hours as prescribed X 7 days. If you tolerate this drug well, Dr. Maryam Rivas recommends taking the medication at the following doses:   Sandostatin LAR 10 mg IM monthly need D x of Chronic GI Hemorrhage  - you should check with your GI doctor for a prescription for this medication . - some SIDE effects you may experience is dyspepsia ( GERD)  , swelling of extremities ( these side effects are rare)     2. Continue all other medications as you were previously taking. You can restart your insulin pump at 100% after noon time today. 3. Please call to schedule followup with your GI doctor within 7 days after discharge.      DIET: avoid spicy foods, alcohol, Ibuprofen products    ACTIVITY: resume    WOUND CARE: na    EQUIPMENT needed: na      DISCHARGE MEDICATIONS:   See Medication Reconciliation Form    · It is important that you take the medication exactly as they are prescribed. · Keep your medication in the bottles provided by the pharmacist and keep a list of the medication names, dosages, and times to be taken in your wallet. · Do not take other medications without consulting your doctor. NOTIFY YOUR PHYSICIAN FOR ANY OF THE FOLLOWING:   Fever over 101 degrees for 24 hours. Chest pain, shortness of breath, fever, chills, nausea, vomiting, diarrhea, change in mentation, falling, weakness, bleeding. Severe pain or pain not relieved by medications. Or, any other signs or symptoms that you may have questions about.       DISPOSITION:    Home With:   OT  PT  HH  RN       SNF/Inpatient Rehab/LTAC   x Independent/assisted living    Hospice    Other:     CDMP Checked:   Yes x       Signed:   Patrick Perez NP  12/21/2017  5:18 PM

## 2017-12-22 NOTE — PROGRESS NOTES
.Bedside shift change report given to Keegan Decker RN  (oncoming nurse) by Smita Nice RN  (offgoing nurse). Report included the following information SBAR and MAR.

## 2017-12-22 NOTE — PROGRESS NOTES
Bedside shift change report given to [address] [place] [address] (oncoming nurse) by Elisa Rosenthal RN (offgoing nurse). Report included the following information SBAR, Kardex, ED Summary, Intake/Output, MAR, Accordion and Recent Results.

## 2018-01-05 ENCOUNTER — PATIENT OUTREACH (OUTPATIENT)
Dept: INTERNAL MEDICINE CLINIC | Age: 68
End: 2018-01-05

## 2018-01-05 NOTE — PROGRESS NOTES
Admission to [facility] 12/19/17 - 12/22/17  Hospital course:  Recurrent GI Bleed  GI performed EGD on 12/20 which revealed patchy gastritis and previously clipped AVMS  She is s/p antegrade enteroscopy today  PPI  Prescribed 7 days of octreotide 50 mcg subq BID per DR. Easton note, to f/u with GI for possible monthly injections of Sandostatin should she tolerate this. First dose prior to d/c, called in script to her pharmacy in [place].   Acute Blood Loss Anemia  Blood Transfusion on admission  Ferrous Sulfate  CBC shows stable hgb 8.0  DM  Patient is legally blind, her roommate helps with her pump management  SSI  Insulin pump restarted at 50%, patient manages well with significant other  Lantus 8 units this AM and 22 units this PM and SSI   DTC following  CAD  ASA and BB held, restart when appropriate  Essential Hypertension; Hypotension  HLD  Obesity  Hypothyroidism  Code status: Full    Patient discharged with a new patient appt with Aracely Sheth NP for 12/29/17. Patient was a no show. This writer has had difficulty contacting this patient in the past. Last hospitalization October 2017. Phone calls and letter went unanswered by patient. Notes indicate this patient lives in [place] as well as [facility]. Today, left discreet phone message with this writer's contact information and instructions to return call. Need to determine if patient plans to use this practice for primary care or does she have another PCP and has she followed up with that PCP as well as Dr. Markos Sherman (GI).

## 2018-01-23 ENCOUNTER — PATIENT OUTREACH (OUTPATIENT)
Dept: INTERNAL MEDICINE CLINIC | Age: 68
End: 2018-01-23

## 2018-01-23 NOTE — PROGRESS NOTES
Nurse Navigator noting no ED or inpatient hospitalizations within the last 30 days. No notification of any medical needs and/or barriers to current treatment plan. Patient did not attend follow up appointment with PCP as directed. Noncompliant with scheduling follow up appt with PCP. Nurse Navigator will be available as medical needs arise. The current hospital episode will be closed at this time. Patient never returned phone call and was no show for f/u appt. Note: [place]

## 2018-06-01 ENCOUNTER — DOCUMENTATION ONLY (OUTPATIENT)
Dept: NEUROLOGY | Age: 68
End: 2018-06-01

## 2018-06-01 ENCOUNTER — OFFICE VISIT (OUTPATIENT)
Dept: ENDOCRINOLOGY | Age: 68
End: 2018-06-01

## 2018-06-01 VITALS
SYSTOLIC BLOOD PRESSURE: 117 MMHG | DIASTOLIC BLOOD PRESSURE: 45 MMHG | BODY MASS INDEX: 33.76 KG/M2 | HEART RATE: 70 BPM | WEIGHT: 202.6 LBS | HEIGHT: 65 IN

## 2018-06-01 DIAGNOSIS — I10 ESSENTIAL HYPERTENSION: ICD-10-CM

## 2018-06-01 DIAGNOSIS — E03.8 HYPOTHYROIDISM DUE TO HASHIMOTO'S THYROIDITIS: ICD-10-CM

## 2018-06-01 DIAGNOSIS — E10.311 TYPE 1 DIABETES MELLITUS WITH RETINOPATHY AND MACULAR EDEMA, UNSPECIFIED LATERALITY, UNSPECIFIED RETINOPATHY SEVERITY (HCC): Primary | ICD-10-CM

## 2018-06-01 DIAGNOSIS — E06.3 HYPOTHYROIDISM DUE TO HASHIMOTO'S THYROIDITIS: ICD-10-CM

## 2018-06-01 DIAGNOSIS — E78.2 MIXED HYPERLIPIDEMIA: ICD-10-CM

## 2018-06-01 LAB — HBA1C MFR BLD HPLC: NORMAL %

## 2018-06-01 RX ORDER — INSULIN ASPART 100 [IU]/ML
INJECTION, SOLUTION INTRAVENOUS; SUBCUTANEOUS
Qty: 10 ML | Refills: 0 | Status: SHIPPED | COMMUNITY
Start: 2018-06-01 | End: 2018-12-12

## 2018-06-01 RX ORDER — HYDROCODONE BITARTRATE AND ACETAMINOPHEN 5; 325 MG/1; MG/1
TABLET ORAL
COMMUNITY
End: 2018-09-05

## 2018-06-01 RX ORDER — INSULIN ASPART 100 [IU]/ML
INJECTION, SOLUTION INTRAVENOUS; SUBCUTANEOUS
Qty: 40 ML | Refills: 3 | Status: SHIPPED | OUTPATIENT
Start: 2018-06-01 | End: 2018-08-23 | Stop reason: SDUPTHER

## 2018-06-01 NOTE — PATIENT INSTRUCTIONS
I increased your basal rate from 1.5 units per hour to 1.8 units per hour (20% increase)    Basal  1.8 units per hour  Carb Ratio  1:4  Correction Factor  1:25  Target  140  Active Insulin Time  3

## 2018-06-01 NOTE — MR AVS SNAPSHOT
[address] [facility] [address] [address] [phone] Patient: Yaa Cummings MRN: [id number] [facility]:6/62/9240 Visit Information Date & Time Provider Department Dept. Phone Encounter #  
 6/1/2018 12:10 PM Seb Patterson, [address] [facility] [place] [facility]ology [id number] [phone] Follow-up Instructions Return in about 3 months (around 9/1/2018). Your Appointments 6/4/2018  1:00 PM  
New Patient with Dallas Loya MD  
[facility] Appt Note: n/p, referred by: Dr. Hodge [address] [address]  
[phone]  
  
   
 [address] [place] [id number] Upcoming Health Maintenance Date Due DTaP/Tdap/Td series (1 - Tdap) 4/19/1971 BREAST CANCER SCRN MAMMOGRAM 11/25/2011 Bone Densitometry (Dexa) Screening 4/19/2015 MICROALBUMIN Q1 4/15/2016 EYE EXAM RETINAL OR DILATED Q1 12/17/2016 FOOT EXAM Q1 5/12/2017 GLAUCOMA SCREENING Q2Y 12/17/2017 MEDICARE YEARLY EXAM 3/14/2018 HEMOGLOBIN A1C Q6M 6/21/2018 Influenza Age 5 to Adult 8/1/2018 FOBT Q 1 YEAR AGE 50-75 9/26/2018 LIPID PANEL Q1 9/27/2018 Pneumococcal 65+ Low/Medium Risk (2 of 2 - PPSV23) 9/25/2020 Allergies as of 6/1/2018  Review Complete On: 6/1/2018 By: Seb Patterson MD  
  
 Severity Noted Reaction Type Reactions Augmentin [Amoxicillin-pot Clavulanate]  03/12/2012    Diarrhea Nsaids (Non-steroidal Anti-inflammatory Drug)  08/21/2015    Other (comments) bleeding Current Immunizations  Reviewed on 12/20/2017 Name Date Influenza Vaccine 9/25/2015 Pneumococcal Vaccine (Unspecified Type) 9/25/2015 Not reviewed this visit You Were Diagnosed With   
  
 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 
ICD-9-CM: 401.9 Mixed hyperlipidemia     ICD-10-CM: E78.2 ICD-9-CM: 272.2 Vitals BP Pulse Height(growth percentile) Weight(growth percentile) BMI OB Status 117/45 70 5' 4.5\" (1.638 m) 202 lb 9.6 oz (91.9 kg) 34.24 kg/m2 Postmenopausal  
 Smoking Status Former Smoker BMI and BSA Data Body Mass Index Body Surface Area  
 34.24 kg/m 2 2.05 m 2 Preferred Pharmacy Pharmacy Name Phone [facility] [address] [phone] Your Updated Medication List  
  
   
This list is accurate as of 6/1/18  1:01 PM.  Always use your most recent med list.  
  
  
  
  
  insulin pump Misc Commonly known as:  PATIENT SUPPLIED  
by SubCUTAneous route as needed. 1.7 units/hr  
  
 cholecalciferol (vitamin D3) 2,000 unit Tab Commonly known as:  VITAMIN D3 Take 1 Tab by mouth daily. COSOPT (PF) 2-0.5 % ophthalmic solution Generic drug:  dorzolamide-timolol (PF) Administer 1 Drop to both eyes two (2) times a day. Indications: Ocular Hypertension  
  
 escitalopram oxalate 20 mg tablet Commonly known as:  Johnny Goes Take 20 mg by mouth daily. ferrous sulfate 325 mg (65 mg iron) tablet Take 325 mg by mouth two (2) times a day.  
  
 gabapentin 800 mg tablet Commonly known as:  NEURONTIN Take 800 mg by mouth three (3) times daily. HYDROcodone-acetaminophen 5-325 mg per tablet Commonly known as:  Iris Iron Take 1 tablet every 24 hours by oral route as needed for 30 days. insulin aspart U-100 100 unit/mL injection Commonly known as:  Rena Presley by SubCUTAneous route continuous. For use with insulin pump Pump settings 1.7 units/hr  
  
 levothyroxine 125 mcg tablet Commonly known as:  SYNTHROID Take 1 Tab by mouth Daily (before breakfast). lisinopril 10 mg tablet Commonly known as:  [place] Yael Take 1 Tab by mouth daily. ondansetron hcl 4 mg tablet Commonly known as:  Surekha Kurk Take 1 Tab by mouth every eight (8) hours as needed for Nausea. pravastatin 40 mg tablet Commonly known as:  PRAVACHOL Take 1 Tab by mouth nightly. PROTONIX 40 mg tablet Generic drug:  pantoprazole Take 40 mg by mouth daily. Senna 8.6 mg tablet Generic drug:  senna Take 1 Tab by mouth nightly as needed. tiZANidine & Irritant Cntr 2 4 mg Kit  
tizanidine 4 mg tablet We Performed the Following AMB POC HEMOGLOBIN A1C [32846 CPT(R)] HM DIABETES FOOT EXAM [HM7 Custom] MICROALBUMIN, UR, RAND W/ MICROALB/CREAT RATIO [id number] CPT(R)] Follow-up Instructions Return in about 3 months (around 9/1/2018). To-Do List   
 09/03/2018 Lab:  HEMOGLOBIN A1C WITH EAG   
  
 09/03/2018 Lab:  LIPID PANEL   
  
 09/03/2018 Lab:  METABOLIC PANEL, COMPREHENSIVE   
  
 09/03/2018 Lab:  T4, FREE   
  
 09/03/2018 Lab:  TSH 3RD GENERATION Patient Instructions I increased your basal rate from 1.5 units per hour to 1.8 units per hour (20% increase) Basal 
1.8 units per hour Carb Ratio 1:4 
Correction Factor 1:25 Target 900 [address]  Active Insulin Time 
3 Introducing [facility]! [facility] introduces [employer] patient portal. Now you can access parts of your medical record, email your doctor's office, and request medication refills online. 1. In your internet browser, go to [email or web address] 2. Click on the First Time User? Click Here link in the Sign In box. You will see the New Member Sign Up page. 3. Enter your Identified Access Code exactly as it appears below. You will not need to use this code after youve completed the sign-up process. If you do not sign up before the expiration date, you must request a new code. · Identified Access Code: [email or web address] Expires: 8/30/2018 12:35 PM 
 
4. Enter the last four digits of your Social Security Number (xxxx) and Date of Birth (mm/dd/yyyy) as indicated and click Submit. You will be taken to the next sign-up page. 5. Create a Identified ID. This will be your Identified login ID and cannot be changed, so think of one that is secure and easy to remember. 6. Create a Identified password. You can change your password at any time. 7. Enter your Password Reset Question and Answer. This can be used at a later time if you forget your password. 8. Enter your e-mail address. You will receive e-mail notification when new information is available in [address] 9. Click Sign Up. You can now view and download portions of your medical record. 10. Click the Download Summary menu link to download a portable copy of your medical information. If you have questions, please visit the Frequently Asked Questions section of the Identified website. Remember, Identified is NOT to be used for urgent needs. For medical emergencies, dial 911. Now available from your iPhone and Android! Please provide this summary of care documentation to your next provider. If you have any questions after today's visit, please call [phone].

## 2018-06-01 NOTE — PROGRESS NOTES
Chief Complaint   Patient presents with    Diabetes     pcp and pharmacy. Can't remember name of eye doctor in [place]. Last eye exam 6 mos ago. Records since last visit reviewed  History of Present Illness: Cecile Haq is a 76 y.o. female here for follow up of diabetes and hypothyroidism. Was diagnosed with diabetes at the age of 16. Pt was last seen in May 2016. At that time her A1C was 6.5%. She subsequently moved out of [place] and is now returning to [place] and needs to re-establish care with our office. \"I moved to [place] for two years and I was sick the whole time. I was in an out of the hospital with a GI bleed\". In December 2017 she was admitted for GI bleed and during that stay her insulin pump settings were changed. She came to [place] to see Dr. Radha García who helped with the GI bleed and she is continuing to follow with him. Her A1C today was 9.2%. \"My blood sugars have been in the 250-380's, I have just managed it the best I can. \"   She notes that when she started having the GI bleeding her BGs \"went out of control. \"  She notes she had a BG of 76 last week. She thinks she took too much insulin. Basal  1.5 units per hour  Carb Ratio  1:4  Correction Factor  1:25  Target  140  Active Insulin Time  3    She changes her infusion site every 3 days. Pt notes she is eating 4 small meals per day. She does eat breakfast around 8:30AM every morning. Today she had a grilled cheese sandwich and diet soda. She took 10 units of insulin. She will have a snack of BP crackers around 1130  She did not have lunch yesterday. She has dinner around 4PM, last night she had Broiled pork chop and kale and diet soda. Pt has hx of CAD, s/p CABG she was followed by Dr. Jimmie Beckwith of Cardiology. She is not currently seeing a cardiologist \"my heart is doing good\". She has hx of Retinopathy and was followed by Dr. Cyndi Ramos at [facility]. She has an appointment in the near future.   She has hx of COPD and was followed by Dr. Live Villarreal of Pulmonology. \"I don't need a lung doctor anymore, I quit smoking and my lungs are clear\". She has gastroparesis, and erosive gastritis and was followed by Dr. Syed Eid of GI. Pt has hx of nephropathy. Last eye exam was January 2016, no retinopathy. At our visit in May 2016 I increased her LT4 to 125mcg daily. A significant amount of the history was obtained from her partner who accompanied her today. Current Outpatient Prescriptions   Medication Sig    insulin aspart U-100 (NOVOLOG) 100 unit/mL injection For use with insulin pump. 130 units per day max    glucose blood VI test strips (CONTOUR NEXT TEST STRIPS) strip Check your blood sugars 4 times per day. Dx: U44.198    insulin aspart U-100 (NOVOLOG U-100 INSULIN ASPART) 100 unit/mL injection ad    ondansetron hcl (ZOFRAN, AS HYDROCHLORIDE,) 4 mg tablet Take 1 Tab by mouth every eight (8) hours as needed for Nausea.  escitalopram oxalate (LEXAPRO) 20 mg tablet Take 20 mg by mouth daily.  pantoprazole (PROTONIX) 40 mg tablet Take 40 mg by mouth daily.  gabapentin (NEURONTIN) 800 mg tablet Take 800 mg by mouth three (3) times daily.   insulin pump (PATIENT SUPPLIED) misc by SubCUTAneous route as needed. 1.7 units/hr    levothyroxine (SYNTHROID) 125 mcg tablet Take 1 Tab by mouth Daily (before breakfast).  lisinopril (PRINIVIL, ZESTRIL) 10 mg tablet Take 1 Tab by mouth daily.  pravastatin (PRAVACHOL) 40 mg tablet Take 1 Tab by mouth nightly.  cholecalciferol, vitamin D3, (VITAMIN D3) 2,000 unit tab Take 1 Tab by mouth daily.  ferrous sulfate 325 mg (65 mg iron) tablet Take 325 mg by mouth two (2) times a day.  dorzolamide-timolol, PF, (COSOPT, PF,) 2-0.5 % dpet Administer 1 Drop to both eyes two (2) times a day. Indications: Ocular Hypertension    senna (SENNA) 8.6 mg tablet Take 1 Tab by mouth nightly as needed.     HYDROcodone-acetaminophen (NORCO) 5-325 mg per tablet Take 1 tablet every 24 hours by oral route as needed for 30 days.  tiZANidine & Irritant Cntr 2 4 mg kit tizanidine 4 mg tablet     No current facility-administered medications for this visit. Allergies   Allergen Reactions    Augmentin [Amoxicillin-Pot Clavulanate] Diarrhea    Nsaids (Non-Steroidal Anti-Inflammatory Drug) Other (comments)     bleeding     Review of Systems:  - Eyes: no blurry vision or double vision  - Cardiovascular: no chest pain  - Respiratory: no shortness of breath  - Musculoskeletal: no myalgias  - Neurological: no numbness/tingling in extremities    Physical Examination:  Blood pressure 117/45, pulse 70, height 5' 4.5\" (1.638 m), weight 202 lb 9.6 oz (91.9 kg). - General: pleasant, no distress, good eye contact   - Neck: no carotid bruits  - Cardiovascular: regular, normal rate, nl s1 and s2, no m/r/g, 2+ DP pulses   - Respiratory: clear bilaterally  - Integumentary: no edema, no foot ulcers, sensation to monofilament and vibration intact bilaterally  - Psychiatric: normal mood and affect    Data Reviewed:   - Her A1C today was 9.2%    Assessment/Plan:   1) DM > Her A1C today was 9.2%. Her BGs have been elevated consistently. Will increase her basal rate by 20%    Basal  1.5 units per hour > 1.8 units per hour  Carb Ratio  1:4  Correction Factor  1:25  Target  140  Active Insulin Time  3    Pt to check her BGs 4 times per day and mail her BG logs to me in 2 weeks. 2) HTN > BP at goal on her current regimen. Pt on lisinopril 10mg for renal protection. 3) HLD > Pt's lipid panel at goal on 2/4/16 and she was tolerating her pravastatin 40mg daily, which she is still taking, with no problems. 4) Hypothyroidism > Her TSH was 0.86 in September 2017 on LT4 125mcg daily. Pt voices understanding and agreement with the plan.   Pain noted and pt was recommended to call her PCP for further evaluation and treatment, as needed    We spent 40 minutes of face to face time together and > 50% of the time was spent in counseling on the above issues. Follow-up Disposition:  Return in about 3 months (around 9/1/2018).

## 2018-06-04 ENCOUNTER — OFFICE VISIT (OUTPATIENT)
Dept: NEUROLOGY | Age: 68
End: 2018-06-04

## 2018-06-04 VITALS
BODY MASS INDEX: 34.66 KG/M2 | OXYGEN SATURATION: 95 % | WEIGHT: 208 LBS | DIASTOLIC BLOOD PRESSURE: 88 MMHG | HEART RATE: 80 BPM | RESPIRATION RATE: 18 BRPM | SYSTOLIC BLOOD PRESSURE: 150 MMHG | HEIGHT: 65 IN

## 2018-06-04 DIAGNOSIS — M79.18 LEFT BUTTOCK PAIN: ICD-10-CM

## 2018-06-04 DIAGNOSIS — M79.605 MUSCULOSKELETAL LEG PAIN, LEFT: Primary | ICD-10-CM

## 2018-06-04 NOTE — PROGRESS NOTES
Ms. Michael Hendrix is here as a new patient for evaluation of neuropathy. She reports bilateral leg pain at a level of 10.

## 2018-06-04 NOTE — MR AVS SNAPSHOT
[place] [address] [address] [place] [address] 
[phone] Patient: Fito Medrano MRN: [id number] M:[birth year] Visit Information Date & Time Provider Department Dept. Phone Encounter #  
 6/4/2018  1:00 PM Cassidy Torres MD [facility] at [address] [id number] Follow-up Instructions Return in about 5 months (around 11/4/2018). Your Appointments 9/5/2018  2:30 PM  
Follow Up with MD Flori Gutierrez [facility] Appt Note: 3 month f/u  Diabetes [phone] [id number] [address] Upcoming Health Maintenance Date Due DTaP/Tdap/Td series (1 - Tdap) 4/19/1971 BREAST CANCER SCRN MAMMOGRAM 11/25/2011 Bone Densitometry (Dexa) Screening 4/19/2015 MICROALBUMIN Q1 4/15/2016 EYE EXAM RETINAL OR DILATED Q1 12/17/2016 GLAUCOMA SCREENING Q2Y 12/17/2017 MEDICARE YEARLY EXAM 3/14/2018 Influenza Age 5 to Adult 8/1/2018 FOBT Q 1 YEAR AGE 50-75 9/26/2018 LIPID PANEL Q1 9/27/2018 HEMOGLOBIN A1C Q6M 12/1/2018 FOOT EXAM Q1 6/1/2019 Pneumococcal 65+ Low/Medium Risk (2 of 2 - PPSV23) 9/25/2020 Allergies as of 6/4/2018  Review Complete On: 6/4/2018 By: Cassidy Torres MD  
  
 Severity Noted Reaction Type Reactions Nsaids (Non-steroidal Anti-inflammatory Drug) High 08/21/2015   Intolerance Other (comments) bleeding Augmentin [Amoxicillin-pot Clavulanate]  03/12/2012    Diarrhea Current Immunizations  Reviewed on 12/20/2017 Name Date Influenza Vaccine 9/25/2015 Pneumococcal Vaccine (Unspecified Type) 9/25/2015 Not reviewed this visit You Were Diagnosed With   
  
 Codes Comments Musculoskeletal leg pain, left    -  Primary ICD-10-CM: M79.605 ICD-9-CM: 729.5 Left buttock pain     ICD-10-CM: M79.1 ICD-9-CM: 729.1 Vitals BP Pulse Resp Height(growth percentile) Weight(growth percentile) SpO2  
 150/88 80 18 5' 4.5\" (1.638 m) 208 lb (94.3 kg) 95% BMI OB Status Smoking Status 35.15 kg/m2 Postmenopausal Former Smoker Vitals History BMI and BSA Data Body Mass Index Body Surface Area  
 35.15 kg/m 2 2.07 m 2 Preferred Pharmacy Pharmacy Name Phone [facility] [address] [place] [address], [address] [place] [address] [phone] Your Updated Medication List  
  
   
This list is accurate as of 6/4/18  1:57 PM.  Always use your most recent med list.  
  
  
  
  
  insulin pump Misc Commonly known as:  PATIENT SUPPLIED  
by SubCUTAneous route as needed. 1.7 units/hr  
  
 cholecalciferol (vitamin D3) 2,000 unit Tab Commonly known as:  VITAMIN D3 Take 1 Tab by mouth daily. COSOPT (PF) 2-0.5 % ophthalmic solution Generic drug:  dorzolamide-timolol (PF) Administer 1 Drop to both eyes two (2) times a day. Indications: Ocular Hypertension  
  
 escitalopram oxalate 20 mg tablet Commonly known as:  Raissa Polio Take 20 mg by mouth daily. ferrous sulfate 325 mg (65 mg iron) tablet Take 325 mg by mouth two (2) times a day.  
  
 gabapentin 800 mg tablet Commonly known as:  NEURONTIN Take 800 mg by mouth three (3) times daily. glucose blood VI test strips strip Commonly known as:  CONTOUR NEXT TEST STRIPS Check your blood sugars 4 times per day. Dx: F08.428 HYDROcodone-acetaminophen 5-325 mg per tablet Commonly known as:  Juan Antonio Lynch Take 1 tablet every 24 hours by oral route as needed for 30 days. * insulin aspart U-100 100 unit/mL injection Commonly known as:  Kalin De La Cruz For use with insulin pump. 130 units per day max * insulin aspart U-100 100 unit/mL injection Commonly known as:  NovoLOG U-100 Insulin aspart  
ad  
  
 levothyroxine 125 mcg tablet Commonly known as:  SYNTHROID Take 1 Tab by mouth Daily (before breakfast). lisinopril 10 mg tablet Commonly known as:  Andrae Christine Take 1 Tab by mouth daily. ondansetron hcl 4 mg tablet Commonly known as:  Fredrich Cone Take 1 Tab by mouth every eight (8) hours as needed for Nausea. pravastatin 40 mg tablet Commonly known as:  PRAVACHOL Take 1 Tab by mouth nightly. PROTONIX 40 mg tablet Generic drug:  pantoprazole Take 40 mg by mouth daily. Senna 8.6 mg tablet Generic drug:  senna Take 1 Tab by mouth nightly as needed. tiZANidine & Irritant Cntr 2 4 mg Kit  
tizanidine 4 mg tablet * Notice: This list has 2 medication(s) that are the same as other medications prescribed for you. Read the directions carefully, and ask your doctor or other care provider to review them with you. We Performed the Following REFERRAL TO PHYSICAL THERAPY [IQI23 Custom] Comments:  
 Please evaluate and treat patient for left buttock pain radiating to upper left leg. Suspect musculoskeletal pain with contribution from scoliosis, abnormal body mechanics from knee issues, and prior lumbar spine fusion. [facility]) and [employer] Follow-up Instructions Return in about 5 months (around 11/4/2018). Referral Information Referral ID Referred By Referred To  
  
 [id number] Cesar Polanco Not Available Visits Status Start Date End Date 1 New Request 6/4/18 6/4/19 If your referral has a status of pending review or denied, additional information will be sent to support the outcome of this decision. Introducing \[facility]\"" & HEALTH SERVICES! Chelsea Rao introduces REMOTV patient portal. Now you can access parts of your medical record, email your doctor's office, and request medication refills online. 1. In your internet browser, go to [email or web address]. [email or web address] 2. Click on the First Time User? Click Here link in the Sign In box. You will see the New Member Sign Up page. 3. Enter your Likeeds Access Code exactly as it appears below. You will not need to use this code after youve completed the sign-up process. If you do not sign up before the expiration date, you must request a new code. · Likeeds Access Code: [email or web address] Expires: 8/30/2018 12:35 PM 
 
4. Enter the last four digits of your Social Security Number (xxxx) and Date of Birth (mm/dd/yyyy) as indicated and click Submit. You will be taken to the next sign-up page. 5. Create a Likeeds ID. This will be your Likeeds login ID and cannot be changed, so think of one that is secure and easy to remember. 6. Create a Likeeds password. You can change your password at any time. 7. Enter your Password Reset Question and Answer. This can be used at a later time if you forget your password. 8. Enter your e-mail address. You will receive e-mail notification when new information is available in [address] 9. Click Sign Up. You can now view and download portions of your medical record. 10. Click the Download Summary menu link to download a portable copy of your medical information. If you have questions, please visit the Frequently Asked Questions section of the Likeeds website. Remember, Likeeds is NOT to be used for urgent needs. For medical emergencies, dial 911. Now available from your iPhone and Android! Please provide this summary of care documentation to your next provider. If you have any questions after today's visit, please call [phone].

## 2018-06-04 NOTE — PROGRESS NOTES
Neurology Consult Note      HISTORY PROVIDED BY: patient and significant other    Chief Complaint:   Chief Complaint   Patient presents with    Neuropathy      Subjective:    Claudia Mora is a 76 y.o. right handed female who presents in consultation for left buttock and leg pain. Pt reports diabetic neuropathy and lumbar spine surgery, she is having severe pain in her left buttock to just above left knee. Sxs started about a year ago. Has to sit leaning on her right buttocks and is starting to develop pain there. Numbness from her neuropathy is limited to her toes. Her diabetes has been poorly controlled lately, working with an endocrinologist. She takes gabapentin 800mg tid. She has severe difficulty walking due knee issues and has an upcoming appt with orthopedist for bilateral knee pain and arthritis. She has just moved from [place] and is just establishing care with physicians here. Currently using a walker, has a cane that she typically uses, but fell yesterday due to knee giving out.       Past Medical History:   Diagnosis Date    Anxiety     CAD (coronary artery disease)     s/p CABG x 3v    Chest pain 7/2015    CTS (carpal tunnel syndrome)     S/p bilateral release    Diabetes ([address]     Diabetic gastroparesis ([address]     Diabetic neuropathy ([address]     Diabetic retinopathy ([address]     GI bleed 7/2015    4 bleeds with 9 clips placed    Hypercholesteremia     Hypothyroid     JOHN on CPAP     Osteoporosis     Vitamin D deficiency       Past Surgical History:   Procedure Laterality Date    CABG, ARTERY-VEIN, THREE  7/13/2015    HX CERVICAL FUSION      HX ENDOSCOPY  7/2015    HX GI      HX HEART CATHETERIZATION  7/2015    HX LUMBAR FUSION      HX SHOULDER ARTHROSCOPY Right     HX TONSIL AND ADENOIDECTOMY  1968      Social History     Social History    Marital status: SINGLE     Spouse name: N/A    Number of children: N/A    Years of education: N/A     Occupational History    [employer] Social History Main Topics    Smoking status: Former Smoker     Packs/day: 0.50     Quit date: 7/5/2015    Smokeless tobacco: Former User      Comment: using Nicoderm patch    Alcohol use No    Drug use: No    Sexual activity: No     Other Topics Concern    Not on file     Social History Narrative    Lives in [facility] with partner, moved from [place] to be closer to sister and \"doctors in [place] almost killed me\"     Family History   Problem Relation Age of Onset    COPD Mother     Diabetes Mother     COPD Father     Diabetes Father     Cancer Father      pancreatic    Diabetes Brother     Heart Disease Brother     Diabetes Brother     Alcohol abuse Brother     Diabetes Sister     Cancer Sister      Rowlett Whitaker    Bleeding Prob Sister      Factor V Leiden         Objective:   Review of Systems   Constitutional: Negative. HENT: Negative. Eyes: Negative. Respiratory: Negative. Snoring   Cardiovascular: Negative. Gastrointestinal: Positive for abdominal pain and constipation. Genitourinary: Negative. Musculoskeletal: Positive for falls, joint pain and myalgias. Skin: Negative. Neurological: Positive for dizziness and sensory change. Endo/Heme/Allergies: Negative. Psychiatric/Behavioral: Negative. Allergies   Allergen Reactions    Nsaids (Non-Steroidal Anti-Inflammatory Drug) Other (comments)     bleeding    Augmentin [Amoxicillin-Pot Clavulanate] Diarrhea        Meds:  Outpatient Medications Prior to Visit   Medication Sig Dispense Refill    glucose blood VI test strips (CONTOUR NEXT TEST STRIPS) strip Check your blood sugars 4 times per day. Dx: E10.311 400 Strip 3    insulin aspart U-100 (NOVOLOG U-100 INSULIN ASPART) 100 unit/mL injection ad 10 mL 0    escitalopram oxalate (LEXAPRO) 20 mg tablet Take 20 mg by mouth daily.  pantoprazole (PROTONIX) 40 mg tablet Take 40 mg by mouth daily.       gabapentin (NEURONTIN) 800 mg tablet Take 800 mg by mouth three (3) times daily.  levothyroxine (SYNTHROID) 125 mcg tablet Take 1 Tab by mouth Daily (before breakfast). 90 Tab 3    lisinopril (PRINIVIL, ZESTRIL) 10 mg tablet Take 1 Tab by mouth daily. (Patient taking differently: Take 10 mg by mouth two (2) times a day.) 90 Tab 3    pravastatin (PRAVACHOL) 40 mg tablet Take 1 Tab by mouth nightly. 90 Tab 3    cholecalciferol, vitamin D3, (VITAMIN D3) 2,000 unit tab Take 1 Tab by mouth daily. 90 Tab 3    ferrous sulfate 325 mg (65 mg iron) tablet Take 325 mg by mouth two (2) times a day.  HYDROcodone-acetaminophen (NORCO) 5-325 mg per tablet Take 1 tablet every 24 hours by oral route as needed for 30 days.  tiZANidine & Irritant Cntr 2 4 mg kit tizanidine 4 mg tablet      insulin aspart U-100 (NOVOLOG) 100 unit/mL injection For use with insulin pump. 130 units per day max 40 mL 3    ondansetron hcl (ZOFRAN, AS HYDROCHLORIDE,) 4 mg tablet Take 1 Tab by mouth every eight (8) hours as needed for Nausea. 20 Tab 0     insulin pump (PATIENT SUPPLIED) misc by SubCUTAneous route as needed. 1.7 units/hr      dorzolamide-timolol, PF, (COSOPT, PF,) 2-0.5 % dpet Administer 1 Drop to both eyes two (2) times a day. Indications: Ocular Hypertension      senna (SENNA) 8.6 mg tablet Take 1 Tab by mouth nightly as needed. No facility-administered medications prior to visit. Imaging:  MRI Results (most recent):  No results found for this or any previous visit. CT Results (most recent):    Results from Hospital Encounter encounter on 03/12/12   CT SPINE CERVICAL WITHOUT CONTRAST   Narrative **Final Report**      ICD Codes / Adm. Diagnosis: 484192   / Leg Pain    Examination:  CT C SPINE WO CON  - [id number] - Mar 12 2012  4:32PM  Accession No:  [id number]  Reason:  fall with neck pain      REPORT:  CLINICAL HISTORY: Fall with neck pain    INDICATION:  Fall with neck pain      COMPARISON: None.     TECHNIQUE: Axial noncontrast imaging of the cervical spine was performed   without contrast.  Sagittal and coronal reconstructions were obtained. FINDINGS:    There is chronic anterolisthesis of C5 on C6 approximately 4 mm. Extensive   degenerative changes at the atlanto-dental interval without evidence of   widening of the interval. The prevertebral soft tissues are unremarkable. Craniocervical junction is intact. Extensive multilevel facet and   uncovertebral degenerative change. There is no evidence of acute fracture or   subluxation. There is mild levoscoliosis of the cervical spine. Extensive   disc degenerative changes C2-C3 through C5-C6. IMPRESSION:  No evidence of acute fracture or dislocation. Extensive chronic facet, disc and uncovertebral degenerative change with   mild levoscoliosis of the cervical spine and chronic 4 mm anterolisthesis of   C5 on C6. Signing/Reading Doctor: Manisha Cooper ([id number])    Approved: Manisha Cooper ([id number])  03/12/2012                                       Reviewed records in [employer] and [employer] tab today    Lab Review   Results for orders placed or performed in visit on 06/01/18   AMB POC HEMOGLOBIN A1C   Result Value Ref Range    Hemoglobin A1c (POC)  %        Exam:  Visit Vitals    /88    Pulse 80    Resp 18    Ht 5' 4.5\" (1.638 m)    Wt 94.3 kg (208 lb)    SpO2 95%    BMI 35.15 kg/m2     General:  Alert, cooperative, no distress. Head:  Normocephalic, without obvious abnormality, atraumatic. Respiratory:  Heart:   Non labored breathing  Regular rate and rhythm, no murmurs   Neck:   2+ carotids, no bruits   Extremities: Warm, no cyanosis or edema. Pain on palpation over lateral buttocks, posterior hip. Pulses: 2+ radial pulses. Neurologic:  MS: Alert and oriented x 4, speech intact. Language intact, able to name, repeat, and follow all commands. Attention and fund of knowledge appropriate. Recent and remote memory intact.   Cranial Nerves:  II: visual fields No vision on right, only lower VF on left, unable to see fingers in upper VF    II: pupils Round, reactive to light on left, irregular pupil on right   II: optic disc    III,VII: ptosis none   III,IV,VI: extraocular muscles  EOMI, no nystagmus or diplopia   V: facial light touch sensation  normal   VII: facial muscle function   symmetric   VIII: hearing intact   IX: soft palate elevation  normal   XI: trapezius strength  5/5   XI: sternocleidomastoid strength    XII: tongue  Midline     Motor: normal bulk and tone, no tremor              Strength: 5/5 throughout, no PD  Sensory: intact to LT, PP increased in feet  Coordination: FTN and HTS intact, BRITTANI intact  Gait: limited gait with walker  Reflexes: 2+ symmetric in UE, 1+ knees, absent ankles, toes downgoing           Assessment/Plan   Pt is a 76 y.o. right handed female with h/o diabetic neuropathy, cervical and lumbar stenosis, and scoliosis with one year h/o left buttock pain that radiates to upper leg just above the knee. Numbness is limited to bilateral toes from PN. Has trouble walking due to OA of knees. Exam with pain on palpation over left buttock, no weakness, and findings c/w PN. Suspect musculoskeletal pain due to abnormal body mechanics related to knee issues, scoliosis, prior spine surgeries. Low suspicion for lumbar radiculopathy, but cannot fully exclude this possibility. History and exam are not c/w a diabetic amyotrophy. OA of left hip is also a consideration. Recommend PT. F/u in 5 months to reassess, if no improvement with PT and hopefully with treatment of knees issues, then consider MRI of L-spine. Pt instructed to call in the interim if needed. ICD-10-CM ICD-9-CM    1. Musculoskeletal leg pain, left M79.605 729.5 REFERRAL TO PHYSICAL THERAPY   2. Left buttock pain M79.1 729.1 REFERRAL TO PHYSICAL THERAPY       Signed:   Corazon Cho MD  6/4/2018

## 2018-06-05 ENCOUNTER — TELEPHONE (OUTPATIENT)
Dept: ENDOCRINOLOGY | Age: 68
End: 2018-06-05

## 2018-06-05 NOTE — TELEPHONE ENCOUNTER
----- Message from [employer] sent at 6/5/2018  2:05 PM EDT -----  Regarding: Dr. Rachelle Ramos  Pt request for a call back from the practice in regards to questions that she has about her urine sample.  Best contact number is [phone]

## 2018-06-07 LAB
ALBUMIN/CREAT UR: 3019.6 MG/G CREAT (ref 0–30)
CREAT UR-MCNC: 33.6 MG/DL
MICROALBUMIN UR-MCNC: 1014.6 UG/ML

## 2018-06-08 NOTE — PROGRESS NOTES
Spoke with pt regarding her microalbuminuria. Pt notes she had a steroid injection in her knee yesterday and her BG today was 400's. I instructed her to increase her basal rate from 1.8 units per hour to 2.2 units per hour while her blood sugars were high and to reduce back to the 1.8 units per hour when her sugars improve. Pt to send me BG logs in 2 weeks.

## 2018-06-27 ENCOUNTER — TELEPHONE (OUTPATIENT)
Dept: ENDOCRINOLOGY | Age: 68
End: 2018-06-27

## 2018-06-27 ENCOUNTER — DOCUMENTATION ONLY (OUTPATIENT)
Dept: NEUROLOGY | Age: 68
End: 2018-06-27

## 2018-06-27 NOTE — PROGRESS NOTES
Received notice from [facility] that they have attempted to contact pt on three occasions to make a PT appointment and have been unable to reach her. They have left two messages and reached a full VM box on one occasion.

## 2018-06-27 NOTE — TELEPHONE ENCOUNTER
Received BG logs from pt. Since she increased her basal rate of her insulin pump to 2.2 units per hour her blood sugars have been improving. Pt to continue the 2.2 units per hour and send me some sugar readings in 2 weeks.

## 2018-07-02 RX ORDER — INSULIN ASPART 100 [IU]/ML
INJECTION, SOLUTION INTRAVENOUS; SUBCUTANEOUS
Qty: 10 ML | Refills: 0 | Status: SHIPPED | COMMUNITY
Start: 2018-07-02 | End: 2018-10-08 | Stop reason: SDUPTHER

## 2018-08-01 ENCOUNTER — TELEPHONE (OUTPATIENT)
Dept: ENDOCRINOLOGY | Age: 68
End: 2018-08-01

## 2018-08-01 NOTE — TELEPHONE ENCOUNTER
A target of 100 is a little on the low side. For example, if her glucose is 115 she will receive additional insulin to lower glucose to 100. I think it is reasonable to change target from 140 to 120 if she wishes. So, I would recommend she change target to 120. Glucose trends would help us determine what insulin changes would help to lower glucoses. She may need to have pump downloaded or see Dr Danya Vaughn sooner    If glucoses increase overnight.  Basal could be increased  If glucoses are going up after meals, then carb ratio could be adjusted  If high glucoses are not coming down 4 hours after a corrective dose then the insulin sensitivity factor, or correction factor, should be lowered (slightly) to provide more insulin for highs

## 2018-08-01 NOTE — TELEPHONE ENCOUNTER
Advised patient and her roommate, Cuauhtemoc Sifuentes, of the recommendations from [address]. The instructions were read back for confirmation of understanding. Patient will fax her blood sugars here on Monday, when Miguel Ángel Carrera returns, and wait for 's response. If she needs to come in for a pump adjustment, she can come most anytime.

## 2018-08-07 ENCOUNTER — TELEPHONE (OUTPATIENT)
Dept: ENDOCRINOLOGY | Age: 68
End: 2018-08-07

## 2018-08-07 NOTE — TELEPHONE ENCOUNTER
Patient is calling to let Dr. Amy Gomez know that her blood sugars are much better. She would also like to know if he still would like for her to fax them. She stated that she has been very busy recently and has not had a chance to write them down. Patient can be reached at [phone].

## 2018-08-08 NOTE — TELEPHONE ENCOUNTER
Spoke with patient. She states that she will fax her blood sugar readings her for review. The readings are much better.

## 2018-08-23 ENCOUNTER — TELEPHONE (OUTPATIENT)
Dept: ENDOCRINOLOGY | Age: 68
End: 2018-08-23

## 2018-08-23 RX ORDER — INSULIN ASPART 100 [IU]/ML
INJECTION, SOLUTION INTRAVENOUS; SUBCUTANEOUS
Qty: 20 ML | Refills: 0 | Status: SHIPPED | COMMUNITY
Start: 2018-08-23 | End: 2019-05-14 | Stop reason: SDUPTHER

## 2018-08-23 NOTE — TELEPHONE ENCOUNTER
Patient states is in the \"donut gap\" and needs 2 vials of Novolog to last until she gets some money. OK to give 2 vials of Novolog?

## 2018-09-05 ENCOUNTER — OFFICE VISIT (OUTPATIENT)
Dept: ENDOCRINOLOGY | Age: 68
End: 2018-09-05

## 2018-09-05 VITALS
DIASTOLIC BLOOD PRESSURE: 63 MMHG | WEIGHT: 200.8 LBS | SYSTOLIC BLOOD PRESSURE: 133 MMHG | BODY MASS INDEX: 33.45 KG/M2 | HEIGHT: 65 IN | HEART RATE: 77 BPM

## 2018-09-05 DIAGNOSIS — E10.311 TYPE 1 DIABETES MELLITUS WITH RETINOPATHY AND MACULAR EDEMA, UNSPECIFIED LATERALITY, UNSPECIFIED RETINOPATHY SEVERITY (HCC): Primary | ICD-10-CM

## 2018-09-05 DIAGNOSIS — I10 ESSENTIAL HYPERTENSION: ICD-10-CM

## 2018-09-05 DIAGNOSIS — E78.2 MIXED HYPERLIPIDEMIA: ICD-10-CM

## 2018-09-05 DIAGNOSIS — E03.8 HYPOTHYROIDISM DUE TO HASHIMOTO'S THYROIDITIS: ICD-10-CM

## 2018-09-05 DIAGNOSIS — E06.3 HYPOTHYROIDISM DUE TO HASHIMOTO'S THYROIDITIS: ICD-10-CM

## 2018-09-05 LAB — HBA1C MFR BLD HPLC: 6.7 %

## 2018-09-05 NOTE — PROGRESS NOTES
Chief Complaint Patient presents with  Diabetes  
  pcp and pharmacy verified. Release signed for eye exam  
Records since last visit reviewed History of Present Illness: Zeinab Gonzalez is a 76 y.o. female here for follow up of diabetes and hypothyroidism. Was diagnosed with diabetes at the age of 16. At our last visit in June 2018 her BGs had been elevated in the 200-300's range because of steroid injections she had been receiving. I made some adjustments to help correct and compensate for her steroids. Pt notes she has been struggling with pain in her knees (OA) and she is hoping she will get TKR. Her A1C today was 6.7%. Basal 
2.5 units per hour Carb Ratio 1:4 
Correction Factor 1:25 Target [address] Active Insulin Time 3 She changes her infusion site every 3 days. Pt notes she is eating 3 meals per day. She does eat breakfast around 8:30AM every morning. Today she had a bowl of cereal and milk. She will have a snack of BP crackers around 1130AM 
Yesterday she she had a taco and sliced tomatoes for lunch yesterday and diet soda. She has dinner around 4PM, last night she had corn, tomatoes, cheese and apple sauce and diet soda. Pt has hx of CAD, s/p CABG she was followed by Dr. Claudette Sotelo of Cardiology, but he retired. She is not currently seeing a cardiologist \"my heart is doing good\". She has hx of Retinopathy she is followed by a new Opthalmologist. \"He is giving me shots in my eyes to dry them up\". She has hx of COPD and was followed by Dr. Joan Fields of Pulmonology. \"I don't need a lung doctor anymore, I quit smoking and my lungs are clear\". She has gastroparesis, and erosive gastritis and was followed by Dr. Shayy Pardo of GI. Pt has hx of nephropathy. She is still taking her LT4 125mcg daily. Current Outpatient Prescriptions Medication Sig  
 insulin aspart U-100 (NOVOLOG) 100 unit/mL injection For use with insulin pump. 130 units per day max  insulin aspart U-100 (NOVOLOG U-100 INSULIN ASPART) 100 unit/mL injection For use with pump  tiZANidine & Irritant Cntr 2 4 mg kit tizanidine 4 mg tablet  insulin aspart U-100 (NOVOLOG U-100 INSULIN ASPART) 100 unit/mL injection ad  
 ondansetron hcl (ZOFRAN, AS HYDROCHLORIDE,) 4 mg tablet Take 1 Tab by mouth every eight (8) hours as needed for Nausea.  escitalopram oxalate (LEXAPRO) 20 mg tablet Take 20 mg by mouth daily.  pantoprazole (PROTONIX) 40 mg tablet Take 40 mg by mouth daily.  gabapentin (NEURONTIN) 800 mg tablet Take 800 mg by mouth three (3) times daily.   insulin pump (PATIENT SUPPLIED) misc by SubCUTAneous route as needed. 1.7 units/hr  levothyroxine (SYNTHROID) 125 mcg tablet Take 1 Tab by mouth Daily (before breakfast).  lisinopril (PRINIVIL, ZESTRIL) 10 mg tablet Take 1 Tab by mouth daily. (Patient taking differently: Take 10 mg by mouth two (2) times a day.)  pravastatin (PRAVACHOL) 40 mg tablet Take 1 Tab by mouth nightly.  cholecalciferol, vitamin D3, (VITAMIN D3) 2,000 unit tab Take 1 Tab by mouth daily.  ferrous sulfate 325 mg (65 mg iron) tablet Take 325 mg by mouth two (2) times a day.  dorzolamide-timolol, PF, (COSOPT, PF,) 2-0.5 % dpet Administer 1 Drop to both eyes two (2) times a day. Indications: Ocular Hypertension  senna (SENNA) 8.6 mg tablet Take 1 Tab by mouth nightly as needed.  glucose blood VI test strips (CONTOUR NEXT TEST STRIPS) strip Check your blood sugars 4 times per day. Dx: F47.544 No current facility-administered medications for this visit. Allergies Allergen Reactions  Nsaids (Non-Steroidal Anti-Inflammatory Drug) Other (comments) bleeding  Augmentin [Amoxicillin-Pot Clavulanate] Diarrhea Review of Systems: - Eyes: no blurry vision or double vision - Cardiovascular: no chest pain - Respiratory: no shortness of breath - Musculoskeletal: no myalgias - Neurological: no numbness/tingling in extremities Physical Examination: 
Blood pressure 133/63, pulse 77, height 5' 4.5\" (1.638 m), weight 200 lb 12.8 oz (91.1 kg). - General: pleasant, no distress, good eye contact  
- Neck: no carotid bruits - Cardiovascular: regular, normal rate, nl s1 and s2, no m/r/g, 2+ DP pulses - Respiratory: clear bilaterally - Integumentary: no edema, no foot ulcers, sensation to monofilament and vibration intact bilaterally - Psychiatric: normal mood and affect Diabetic foot exam:  
 
Left Foot: 
 Visual Exam: callous - present Pulse DP: 2+ (normal) Filament test: reduced sensation Vibratory sensation: normal 
   
Right Foot: 
 Visual Exam: callous - present Pulse DP: 2+ (normal) Filament test: reduced sensation Vibratory sensation: normal 
 
 
 
Data Reviewed: - Her A1C today was 6.7% Assessment/Plan:  
1) DM > Her A1C today was 6.7%. Pt encouraged to keep up the excellent work. Will not change her pump settings. With her hx of neuropathy and calluses she would benefit from DM shoes and inserts Pt to check her BGs 4 times per day and mail her BG logs to me in 2 weeks. Pt given information about the [employer] [place] will send in a prescription for the kit and sensors. 2) HTN > BP at goal on her current regimen. Pt on lisinopril 10mg for renal protection. 3) HLD > Pt's lipid panel at goal in September 2017 and she was tolerating her pravastatin 40mg daily, which she is still taking, with no problems. Will check lipid panel and CMP today. 4) Hypothyroidism > Pt is clinically euthyroid on LT4 125mcg daily, will check her TFTs and adjust her dose as needed. Pt voices understanding and agreement with the plan. Pain noted and pt was recommended to call her PCP for further evaluation and treatment, as needed Follow-up Disposition: 
Return in about 3 months (around 12/5/2018).

## 2018-09-06 LAB
ALBUMIN SERPL-MCNC: 4.1 G/DL (ref 3.6–4.8)
ALBUMIN/GLOB SERPL: 1.3 {RATIO} (ref 1.2–2.2)
ALP SERPL-CCNC: 67 IU/L (ref 39–117)
ALT SERPL-CCNC: 13 IU/L (ref 0–32)
AST SERPL-CCNC: 17 IU/L (ref 0–40)
BILIRUB SERPL-MCNC: <0.2 MG/DL (ref 0–1.2)
BUN SERPL-MCNC: 29 MG/DL (ref 8–27)
BUN/CREAT SERPL: 24 (ref 12–28)
CALCIUM SERPL-MCNC: 9.9 MG/DL (ref 8.7–10.3)
CHLORIDE SERPL-SCNC: 105 MMOL/L (ref 96–106)
CHOLEST SERPL-MCNC: 176 MG/DL (ref 100–199)
CO2 SERPL-SCNC: 20 MMOL/L (ref 20–29)
CREAT SERPL-MCNC: 1.23 MG/DL (ref 0.57–1)
GLOBULIN SER CALC-MCNC: 3.2 G/DL (ref 1.5–4.5)
GLUCOSE SERPL-MCNC: 206 MG/DL (ref 65–99)
HDLC SERPL-MCNC: 45 MG/DL
INTERPRETATION, 910389: NORMAL
INTERPRETATION: NORMAL
LDLC SERPL CALC-MCNC: 79 MG/DL (ref 0–99)
PDF IMAGE, 910387: NORMAL
POTASSIUM SERPL-SCNC: 6 MMOL/L (ref 3.5–5.2)
PROT SERPL-MCNC: 7.3 G/DL (ref 6–8.5)
SODIUM SERPL-SCNC: 138 MMOL/L (ref 134–144)
T4 FREE SERPL-MCNC: 1.42 NG/DL (ref 0.82–1.77)
TRIGL SERPL-MCNC: 262 MG/DL (ref 0–149)
TSH SERPL DL<=0.005 MIU/L-ACNC: 0.27 UIU/ML (ref 0.45–4.5)
VLDLC SERPL CALC-MCNC: 52 MG/DL (ref 5–40)

## 2018-09-06 RX ORDER — INSULIN ASPART 100 [IU]/ML
INJECTION, SOLUTION INTRAVENOUS; SUBCUTANEOUS
Qty: 20 ML | Refills: 0 | Status: SHIPPED | COMMUNITY
Start: 2018-09-06 | End: 2018-12-12

## 2018-09-06 NOTE — PROGRESS NOTES
Spoke with pt regarding her labs. Pt to continue the LT4 125mcg daily and the Pravastatin 40mg daily. She looked dehydrated based on her high potassium and Cr. Pt instructed to drink plenty of water and stay hydrated. Pt voices understanding and agreement with the plan.

## 2018-09-24 ENCOUNTER — TELEPHONE (OUTPATIENT)
Dept: ENDOCRINOLOGY | Age: 68
End: 2018-09-24

## 2018-09-28 NOTE — TELEPHONE ENCOUNTER
Addendum: 9/28/2018, 9:14 AM  Spoke with Maria Luisa Smith by phone. The mailed insurance card and income documentation has not been received. Requested that patient either remail the information or fax it. Patient states that she has the fax number.  (the information is needed for PAP for Novolog). Patient states that [facility] does not cover the [employer]. She states that she will continue to check her blood sugars by finger sticks.     Natalie Hernandez LPN

## 2018-10-07 ENCOUNTER — PATIENT MESSAGE (OUTPATIENT)
Dept: ENDOCRINOLOGY | Age: 68
End: 2018-10-07

## 2018-10-08 RX ORDER — INSULIN ASPART 100 [IU]/ML
INJECTION, SOLUTION INTRAVENOUS; SUBCUTANEOUS
Qty: 20 ML | Refills: 0 | Status: SHIPPED | COMMUNITY
Start: 2018-10-08 | End: 2019-05-13 | Stop reason: SDUPTHER

## 2018-10-08 NOTE — TELEPHONE ENCOUNTER
From: Adan Hurt  To: Amado Vivar MD  Sent: 10/7/2018 4:08 PM EDT  Subject: Prescription Question      Attn to [employer]:  I'm sending this to you on Sunday, 10/07/2018. I have 140 units of insulin in my pump and don't have the money to pay for any more. I called [facility] [employer] and they said they couldn't read my proof of income, so I faxed the form to them and am supposed to call them back on Monday. The problem is I will run out of insulin before any decision is made. Can you help me one more time?   Adan Hurt

## 2018-10-15 ENCOUNTER — TELEPHONE (OUTPATIENT)
Dept: ENDOCRINOLOGY | Age: 68
End: 2018-10-15

## 2018-10-15 NOTE — TELEPHONE ENCOUNTER
Patient called to ask if her Novolog from [facility] was delivered to our office? She can be reached at:  [phone].

## 2018-10-16 ENCOUNTER — TELEPHONE (OUTPATIENT)
Dept: ENDOCRINOLOGY | Age: 68
End: 2018-10-16

## 2018-10-16 NOTE — TELEPHONE ENCOUNTER
Patient states that she was approved for PAP. Advised will call her when the shipment arrives here. Patient expressed understanding.

## 2018-12-12 ENCOUNTER — OFFICE VISIT (OUTPATIENT)
Dept: ENDOCRINOLOGY | Age: 68
End: 2018-12-12

## 2018-12-12 VITALS
HEART RATE: 56 BPM | BODY MASS INDEX: 34.92 KG/M2 | WEIGHT: 209.6 LBS | HEIGHT: 65 IN | SYSTOLIC BLOOD PRESSURE: 138 MMHG | DIASTOLIC BLOOD PRESSURE: 41 MMHG

## 2018-12-12 DIAGNOSIS — E10.311 TYPE 1 DIABETES MELLITUS WITH RETINOPATHY AND MACULAR EDEMA, UNSPECIFIED LATERALITY, UNSPECIFIED RETINOPATHY SEVERITY (HCC): Primary | ICD-10-CM

## 2018-12-12 DIAGNOSIS — I10 ESSENTIAL HYPERTENSION: ICD-10-CM

## 2018-12-12 DIAGNOSIS — E03.8 HYPOTHYROIDISM DUE TO HASHIMOTO'S THYROIDITIS: ICD-10-CM

## 2018-12-12 DIAGNOSIS — E78.2 MIXED HYPERLIPIDEMIA: ICD-10-CM

## 2018-12-12 DIAGNOSIS — E06.3 HYPOTHYROIDISM DUE TO HASHIMOTO'S THYROIDITIS: ICD-10-CM

## 2018-12-12 PROBLEM — E66.01 SEVERE OBESITY (HCC): Status: ACTIVE | Noted: 2018-12-12

## 2018-12-12 LAB — HBA1C MFR BLD HPLC: 6.3 %

## 2018-12-12 RX ORDER — AMLODIPINE BESYLATE 5 MG/1
TABLET ORAL
Refills: 1 | Status: ON HOLD | COMMUNITY
Start: 2018-11-27 | End: 2022-04-01

## 2018-12-12 RX ORDER — METOPROLOL TARTRATE 50 MG/1
TABLET ORAL
Refills: 5 | COMMUNITY
Start: 2018-10-22 | End: 2021-04-13 | Stop reason: SDUPTHER

## 2018-12-12 NOTE — PROGRESS NOTES
No chief complaint on file. Records since last visit reviewed  History of Present Illness: Uday Riojas is a 76 y.o. female here for follow up of diabetes and hypothyroidism. Was diagnosed with diabetes at the age of 16. At our last visit in September 2018 her A1C was 6.7%. Pt was encouraged to keep up the excellent work. Pt has been \"fighting bronchitis for the last month\". Pt notes she has been struggling with pain in her knees (OA) and she is having TKR on 1/19/19. Pt reports that she has been having BPs of 170-200/40-80's. Her Cardiologist gave her Norvasc and Toprolol and increased her Lisinopril to 20mg and her BPS have improved. Her A1C today was 6.3%. Basal  2.35 units per hour  Carb Ratio  1:4  Correction Factor  1:25  Target  140  Active Insulin Time  3    She changes her infusion site every 3 days. She checks her BGs 3 times per day. She notes she had some high blood sugars when she was initally fighting the bronchitis \"but they have been improving and are running good now\". Her FBGs have been in the 120-130's range. Pt notes she is eating 2-3 meals per day. She does eat breakfast around 8:30AM every morning. Yesterday she had two slices of toast, cheese and diet soda. She will have lunch 3 days per week, yesterday she had macaroni (low carb) and tomatoes and diet soda. She has dinner around 4PM, last night she had pork chop, potatoes, spinach  corn, tomatoes, cheese and apple sauce and diet soda. Pt has hx of CAD, s/p CABG she was followed by Dr. Janie Croft of Cardiology, but he retired. She is not currently seeing a cardiologist \"my heart is doing good\". She has hx of Retinopathy she is followed by Ophthalmology who \"is giving me shots in my eyes to dry them up\". She has hx of COPD and was followed by Dr. Yazmin Hwang of Pulmonology. \"I don't need a lung doctor anymore, I quit smoking and my lungs are clear\".   She has gastroparesis, and erosive gastritis and was followed by Dr. Cathy Johnson of GI. Pt has hx of nephropathy and neuropathy. She is still taking her LT4 125mcg daily. She was clinically and biochemically euthyroid in September 2018. Current Outpatient Medications   Medication Sig    amLODIPine (NORVASC) 5 mg tablet TK 1 T PO QD    metoprolol tartrate (LOPRESSOR) 50 mg tablet TK 1 T PO BID WC    insulin aspart U-100 (NOVOLOG U-100 INSULIN ASPART) 100 unit/mL injection For use with pump    insulin aspart U-100 (NOVOLOG) 100 unit/mL injection For use with insulin pump. 130 units per day max    tiZANidine & Irritant Cntr 2 4 mg kit tizanidine 4 mg tablet    escitalopram oxalate (LEXAPRO) 20 mg tablet Take 20 mg by mouth daily.  pantoprazole (PROTONIX) 40 mg tablet Take 40 mg by mouth daily.  gabapentin (NEURONTIN) 800 mg tablet Take 800 mg by mouth three (3) times daily.   insulin pump (PATIENT SUPPLIED) misc by SubCUTAneous route as needed. 1.7 units/hr    levothyroxine (SYNTHROID) 125 mcg tablet Take 1 Tab by mouth Daily (before breakfast).  lisinopril (PRINIVIL, ZESTRIL) 10 mg tablet Take 1 Tab by mouth daily. (Patient taking differently: Take 10 mg by mouth two (2) times a day.)    pravastatin (PRAVACHOL) 40 mg tablet Take 1 Tab by mouth nightly.  cholecalciferol, vitamin D3, (VITAMIN D3) 2,000 unit tab Take 1 Tab by mouth daily.  ferrous sulfate 325 mg (65 mg iron) tablet Take 325 mg by mouth two (2) times a day.  dorzolamide-timolol, PF, (COSOPT, PF,) 2-0.5 % dpet Administer 1 Drop to both eyes two (2) times a day. Indications: Ocular Hypertension    senna (SENNA) 8.6 mg tablet Take 1 Tab by mouth nightly as needed. No current facility-administered medications for this visit.       Allergies   Allergen Reactions    Nsaids (Non-Steroidal Anti-Inflammatory Drug) Other (comments)     bleeding    Augmentin [Amoxicillin-Pot Clavulanate] Diarrhea     Review of Systems:  - Eyes: no blurry vision or double vision  - Cardiovascular: no chest pain  - Respiratory: no shortness of breath  - Musculoskeletal: no myalgias  - Neurological: no numbness/tingling in extremities    Physical Examination:  Blood pressure 138/41, pulse (!) 56, height 5' 4.5\" (1.638 m), weight 209 lb 9.6 oz (95.1 kg). - General: pleasant, no distress, good eye contact   - Neck: no carotid bruits  - Cardiovascular: regular, normal rate, nl s1 and s2, no m/r/g, 2+ DP pulses   - Respiratory: clear bilaterally  - Integumentary: no edema, no foot ulcers,   - Psychiatric: normal mood and affect    Diabetic foot exam:     Left Foot:   Visual Exam: callous - present   Pulse DP: 2+ (normal)   Filament test: reduced sensation    Vibratory sensation: normal      Right Foot:   Visual Exam: callous - present   Pulse DP: 2+ (normal)   Filament test: reduced sensation    Vibratory sensation: normal        Data Reviewed:   - Her A1C today was 6.3%    Assessment/Plan:   1) DM > Her A1C today was 6.3%. Pt encouraged to keep up the excellent work. Will not change her pump settings. With her hx of neuropathy and calluses she would benefit from DM shoes and inserts  Pt to check her BGs 4 times per day and mail her BG logs to me in 2 weeks. 2) HTN > BP at goal on her current regimen. Pt on lisinopril 10mg for renal protection. 3) HLD > Pt's lipid panel at goal in September 2018 and she was tolerating her pravastatin 40mg daily, which she is still taking, with no problems. 4) Hypothyroidism > Pt is clinically euthyroid on LT4 125mcg daily, her TFTs were at goal in September 2018. Pt voices understanding and agreement with the plan. Pain noted and pt was recommended to call her PCP for further evaluation and treatment, as needed      Follow-up Disposition:  Return in about 3 months (around 3/12/2019).

## 2019-01-07 ENCOUNTER — HOSPITAL ENCOUNTER (OUTPATIENT)
Dept: PREADMISSION TESTING | Age: 69
Discharge: HOME | End: 2019-01-07
Payer: MEDICARE

## 2019-01-07 VITALS
DIASTOLIC BLOOD PRESSURE: 71 MMHG | RESPIRATION RATE: 20 BRPM | HEIGHT: 64 IN | WEIGHT: 204.25 LBS | HEART RATE: 58 BPM | TEMPERATURE: 98 F | BODY MASS INDEX: 34.87 KG/M2 | SYSTOLIC BLOOD PRESSURE: 111 MMHG

## 2019-01-07 LAB
ABO + RH BLD: NORMAL
ANION GAP SERPL CALC-SCNC: 6 MMOL/L (ref 5–15)
APPEARANCE UR: CLEAR
ATRIAL RATE: 51 BPM
BACTERIA URNS QL MICRO: NEGATIVE /HPF
BILIRUB UR QL: NEGATIVE
BLOOD GROUP ANTIBODIES SERPL: NORMAL
BUN SERPL-MCNC: 30 MG/DL (ref 6–20)
BUN/CREAT SERPL: 19 (ref 12–20)
CALCIUM SERPL-MCNC: 9.2 MG/DL (ref 8.5–10.1)
CALCULATED P AXIS, ECG09: 74 DEGREES
CALCULATED R AXIS, ECG10: 61 DEGREES
CALCULATED T AXIS, ECG11: 66 DEGREES
CHLORIDE SERPL-SCNC: 105 MMOL/L (ref 97–108)
CO2 SERPL-SCNC: 29 MMOL/L (ref 21–32)
COLOR UR: ABNORMAL
CREAT SERPL-MCNC: 1.55 MG/DL (ref 0.55–1.02)
DIAGNOSIS, 93000: NORMAL
EPITH CASTS URNS QL MICRO: ABNORMAL /LPF
ERYTHROCYTE [DISTWIDTH] IN BLOOD BY AUTOMATED COUNT: 12.4 % (ref 11.5–14.5)
EST. AVERAGE GLUCOSE BLD GHB EST-MCNC: 146 MG/DL
GLUCOSE SERPL-MCNC: 78 MG/DL (ref 65–100)
GLUCOSE UR STRIP.AUTO-MCNC: NEGATIVE MG/DL
HBA1C MFR BLD: 6.7 % (ref 4.2–6.3)
HCT VFR BLD AUTO: 37.1 % (ref 35–47)
HGB BLD-MCNC: 11.8 G/DL (ref 11.5–16)
HGB UR QL STRIP: NEGATIVE
INR PPP: 1 (ref 0.9–1.1)
KETONES UR QL STRIP.AUTO: NEGATIVE MG/DL
LEUKOCYTE ESTERASE UR QL STRIP.AUTO: ABNORMAL
MCH RBC QN AUTO: 32.8 PG (ref 26–34)
MCHC RBC AUTO-ENTMCNC: 31.8 G/DL (ref 30–36.5)
MCV RBC AUTO: 103.1 FL (ref 80–99)
NITRITE UR QL STRIP.AUTO: NEGATIVE
NRBC # BLD: 0 K/UL (ref 0–0.01)
NRBC BLD-RTO: 0 PER 100 WBC
P-R INTERVAL, ECG05: 194 MS
PH UR STRIP: 6 [PH] (ref 5–8)
PLATELET # BLD AUTO: 187 K/UL (ref 150–400)
PMV BLD AUTO: 11.5 FL (ref 8.9–12.9)
POTASSIUM SERPL-SCNC: 6 MMOL/L (ref 3.5–5.1)
PROT UR STRIP-MCNC: 100 MG/DL
PROTHROMBIN TIME: 10.5 SEC (ref 9–11.1)
Q-T INTERVAL, ECG07: 500 MS
QRS DURATION, ECG06: 88 MS
QTC CALCULATION (BEZET), ECG08: 460 MS
RBC # BLD AUTO: 3.6 M/UL (ref 3.8–5.2)
RBC #/AREA URNS HPF: ABNORMAL /HPF (ref 0–5)
SODIUM SERPL-SCNC: 140 MMOL/L (ref 136–145)
SP GR UR REFRACTOMETRY: 1.01 (ref 1–1.03)
SPECIMEN EXP DATE BLD: NORMAL
UA: UC IF INDICATED,UAUC: ABNORMAL
UROBILINOGEN UR QL STRIP.AUTO: 0.2 EU/DL (ref 0.2–1)
VENTRICULAR RATE, ECG03: 51 BPM
WBC # BLD AUTO: 5.6 K/UL (ref 3.6–11)
WBC URNS QL MICRO: ABNORMAL /HPF (ref 0–4)

## 2019-01-07 PROCEDURE — 80048 BASIC METABOLIC PNL TOTAL CA: CPT

## 2019-01-07 PROCEDURE — 81001 URINALYSIS AUTO W/SCOPE: CPT

## 2019-01-07 PROCEDURE — 86900 BLOOD TYPING SEROLOGIC ABO: CPT

## 2019-01-07 PROCEDURE — 36415 COLL VENOUS BLD VENIPUNCTURE: CPT

## 2019-01-07 PROCEDURE — 85610 PROTHROMBIN TIME: CPT

## 2019-01-07 PROCEDURE — 93005 ELECTROCARDIOGRAM TRACING: CPT

## 2019-01-07 PROCEDURE — 83036 HEMOGLOBIN GLYCOSYLATED A1C: CPT

## 2019-01-07 PROCEDURE — 85027 COMPLETE CBC AUTOMATED: CPT

## 2019-01-07 RX ORDER — MULTIVIT WITH MINERALS/HERBS
1 TABLET ORAL DAILY
COMMUNITY
End: 2021-04-13

## 2019-01-08 PROBLEM — E87.5 HYPERKALEMIA: Status: ACTIVE | Noted: 2019-01-08

## 2019-01-08 LAB
BACTERIA SPEC CULT: NORMAL
BACTERIA SPEC CULT: NORMAL
SERVICE CMNT-IMP: NORMAL

## 2019-01-08 NOTE — PERIOP NOTES
Faxed preadmission testing reports (and fax confirmation received) to Dr. Anthony Pelletier. Called on 1-8-19 at 11:55am ( left message with Susan Martinez) RE: abnormal BMP.

## 2019-01-08 NOTE — ADVANCED PRACTICE NURSE
Patient contacted and advised to follow up with PCP regarding K 6.0. Patient states she will call PCP today and is very appreciative. PAT labs forwarded to PCP, Dr Duy Cisneros. Appointment scheduled for 1/10/19 1115.  1/14- Noted requested from PCP, Dr. Cindy Rodriguez regarding f/u hyperkalemia. K+ 4.9 as of 1/10/19. No further eval necessary preop, patient will f/u with PCP post surgery.

## 2019-01-16 NOTE — H&P
PCP: Linda Cid MD      Problem List      None          Subjective    Subjective: There is no problem list on file for this patient.        Chief Complaint: Follow-up of the Left Knee and Follow-up of the Right Knee        HPI: Danny Oscar is a 76 y.o. female who comes in today with progressive increasing bilateral knee pain with right worse than left. We have seen her in the past and injected her knees with only temporary relief. She is no longer able to walk for exercise. She is having difficulty getting up and down stairs. She has difficulty getting up from a low chair. She complains of night pain. She has stiffness and swelling.           Objective:          Vitals:     10/03/18 1013   BP: (!) 153/77      Height: 5' 4.5\"       Wt Readings from Last 3 Encounters:   10/03/18 208 lb   06/07/18 208 lb      Body mass index is 35.15 kg/m².     Musculoskeletal :  She has a full range of motion of bilateral hips and knees. The right knee has dramatic patellofemoral crepitus with lateral patellar subluxation. She has varus deformity with medial crepitus. There is a small effusion. Her ligaments are stable. Her skin is healthy and intact. She does have a palpable pedal pulse. She has  no significant pedal edema. She has no apparent atrophy.        Radiographs:     Order: XR KNEE 3 VW RIGHT - Indication: Primary localized osteoarthritis   of knees, bilateral        X-ray Knee Right 3 Views     Result Date: 10/4/2018  Views: Standing AP, Lat, [place].      Impression: Three view x-rays of the right knee show varus deformity with medial joint space narrowing and secondary arthritic changes. There is severe end-stage arthritis of the patellofemoral joint with complete loss of joint space and lateral subluxation.        Assessment:      1. Primary localized osteoarthritis of knees, bilateral    2. Obesity, Class II, BMI 35-39. 9          Plan:   I reviewed her x-ray findings with her.   We discussed treatment options. She does have significant cardiac history. She tells me that her diabetes is under good control. I went over knee replacement surgery at length including expected outcomes and postop recovery as well as risks and complications specifically DVT, PE, infection. After much discussion she would like to proceed. We will get her in with the cardiologist for cardiac clearance. We will set this up for her at her convenience.         Orders Placed This Encounter    X-ray knee right 3 views    BMI >=25 PATIENT INSTRUCTIONS & EDUCATION      Return for scheduled surgery.      Carrol Giles MD   10/4/2018      Date of Surgery Update:  Sanjay Robins was seen and examined. Past Medical History:   Diagnosis Date    Adverse effect of anesthesia     SLOW WAKING PAST ANESTHESIA    Anxiety     Arthritis     CAD (coronary artery disease)     s/p CABG x 3v    Chest pain 7/2015    Chronic obstructive pulmonary disease (HCC)     CTS (carpal tunnel syndrome)     S/p bilateral release    Diabetes ([address]     Diabetic gastroparesis ([address]     Diabetic neuropathy ([address]     Diabetic retinopathy ([address]     GERD (gastroesophageal reflux disease)     GI bleed 7/2015    4 bleeds with 9 clips placed    Hypercholesteremia     Hypertension     Hypothyroid     Ill-defined condition     NEUROPATHY HANDS AND FEET    Ill-defined condition 2017    GI BLEED    Nicotine vapor product user     JOHN on CPAP     Osteoporosis     Vitamin D deficiency      Prior to Admission Medications   Prescriptions Last Dose Informant Patient Reported? Taking?    insulin pump (PATIENT SUPPLIED) misc   Yes No   Sig: by SubCUTAneous route as needed. 1.7 units/hr   amLODIPine (NORVASC) 5 mg tablet 1/22/2019 at Unknown time  Yes Yes   Sig: TK 1 T PO QD. PATINET TAKE MED DAILY   b complex vitamins tablet 1/15/2019 at Unknown time  Yes Yes   Sig: Take 1 Tab by mouth daily.    cholecalciferol, vitamin D3, (VITAMIN D3) 2,000 unit tab 1/15/2019 at Unknown time  No Yes   Sig: Take 1 Tab by mouth daily. dorzolamide-timolol, PF, (COSOPT, PF,) 2-0.5 % dpet 1/22/2019 at Unknown time  Yes Yes   Sig: Administer 1 Drop to both eyes two (2) times a day. Indications: Ocular Hypertension   escitalopram oxalate (LEXAPRO) 20 mg tablet 1/22/2019 at Unknown time  Yes Yes   Sig: Take 20 mg by mouth daily. ferrous sulfate 325 mg (65 mg iron) tablet 1/15/2019 at Unknown time  Yes Yes   Sig: Take 325 mg by mouth two (2) times a day.   gabapentin (NEURONTIN) 800 mg tablet 1/22/2019 at Unknown time  Yes Yes   Sig: Take 800 mg by mouth three (3) times daily. insulin aspart U-100 (NOVOLOG U-100 INSULIN ASPART) 100 unit/mL injection 1/22/2019 at Unknown time  No Yes   Sig: For use with pump   insulin aspart U-100 (NOVOLOG) 100 unit/mL injection   No No   Sig: For use with insulin pump. 130 units per day max   Patient taking differently: For use with insulin pump. 130 units per day max   levothyroxine (SYNTHROID) 125 mcg tablet 1/15/2019 at Unknown time  No Yes   Sig: Take 1 Tab by mouth Daily (before breakfast). lisinopril (PRINIVIL, ZESTRIL) 10 mg tablet 1/21/2019 at Unknown time  No Yes   Sig: Take 1 Tab by mouth daily. Patient taking differently: Take 20 mg by mouth two (2) times a day. metoprolol tartrate (LOPRESSOR) 50 mg tablet 1/22/2019 at 0400  Yes Yes   Sig: TK 1 T PO BID WC. PATIENT TAKES MED TWICE DAILY   pantoprazole (PROTONIX) 40 mg tablet 1/22/2019 at Unknown time  Yes Yes   Sig: Take 40 mg by mouth daily. pravastatin (PRAVACHOL) 40 mg tablet 1/21/2019 at Unknown time  No Yes   Sig: Take 1 Tab by mouth nightly. senna (SENNA) 8.6 mg tablet Not Taking at Unknown time  Yes No   Sig: Take 1 Tab by mouth nightly as needed.    tiZANidine & Irritant Cntr 2 4 mg kit 1/20/2019  Yes No   Sig: tizanidine 4 mg tablet AT BEDTIME   umeclidinium-vilanterol (ANORO ELLIPTA) 62.5-25 mcg/actuation inhaler 12/22/2018 at Unknown time  Yes Yes   Sig: Take 1 Puff by inhalation daily. Facility-Administered Medications: None      Allergy to:Nsaids (non-steroidal anti-inflammatory drug); Augmentin [amoxicillin-pot clavulanate]; and Vesicare [solifenacin]  Physical Examination: General appearance - alert, well appearing, and in no distress  Chest - clear to auscultation, no wheezes, rales or rhonchi, symmetric air entry  Heart - normal rate and regular rhythm  Abdomen - soft, nontender, nondistended, no masses or organomegaly  History and physical has been reviewed. The patient has been examined.  There have been no significant clinical changes since the completion of the originally dated History and Physical.    Signed By: Saul oHlm PA-C     January 22, 2019 7:18 AM

## 2019-01-21 ENCOUNTER — ANESTHESIA EVENT (OUTPATIENT)
Dept: SURGERY | Age: 69
DRG: 470 | End: 2019-01-21
Payer: MEDICARE

## 2019-01-22 ENCOUNTER — HOSPITAL ENCOUNTER (INPATIENT)
Age: 69
LOS: 1 days | Discharge: HOME HEALTH | DRG: 470 | End: 2019-01-23
Attending: ORTHOPAEDIC SURGERY | Admitting: ORTHOPAEDIC SURGERY
Payer: MEDICARE

## 2019-01-22 ENCOUNTER — ANESTHESIA (OUTPATIENT)
Dept: SURGERY | Age: 69
DRG: 470 | End: 2019-01-22
Payer: MEDICARE

## 2019-01-22 DIAGNOSIS — M17.11 PRIMARY OSTEOARTHRITIS OF RIGHT KNEE: Primary | ICD-10-CM

## 2019-01-22 LAB
ABO + RH BLD: NORMAL
ANION GAP BLD CALC-SCNC: 17 MMOL/L (ref 10–20)
BLOOD GROUP ANTIBODIES SERPL: NORMAL
BUN BLD-MCNC: 21 MG/DL (ref 9–20)
CA-I BLD-MCNC: 1.2 MMOL/L (ref 1.12–1.32)
CHLORIDE BLD-SCNC: 105 MMOL/L (ref 98–107)
CO2 BLD-SCNC: 23 MMOL/L (ref 21–32)
CREAT BLD-MCNC: 1.2 MG/DL (ref 0.6–1.3)
GLUCOSE BLD STRIP.AUTO-MCNC: 165 MG/DL (ref 65–100)
GLUCOSE BLD STRIP.AUTO-MCNC: 203 MG/DL (ref 65–100)
GLUCOSE BLD STRIP.AUTO-MCNC: 262 MG/DL (ref 65–100)
GLUCOSE BLD STRIP.AUTO-MCNC: 265 MG/DL (ref 65–100)
GLUCOSE BLD STRIP.AUTO-MCNC: 331 MG/DL (ref 65–100)
GLUCOSE BLD-MCNC: 139 MG/DL (ref 65–100)
HCT VFR BLD CALC: 38 % (ref 35–47)
POTASSIUM BLD-SCNC: 6 MMOL/L (ref 3.5–5.1)
POTASSIUM SERPL-SCNC: 6 MMOL/L (ref 3.5–5.1)
SERVICE CMNT-IMP: ABNORMAL
SODIUM BLD-SCNC: 139 MMOL/L (ref 136–145)
SPECIMEN EXP DATE BLD: NORMAL

## 2019-01-22 PROCEDURE — 74011250636 HC RX REV CODE- 250/636

## 2019-01-22 PROCEDURE — 77030031139 HC SUT VCRL2 J&J -A: Performed by: ORTHOPAEDIC SURGERY

## 2019-01-22 PROCEDURE — 77030027138 HC INCENT SPIROMETER -A

## 2019-01-22 PROCEDURE — 76010000171 HC OR TIME 2 TO 2.5 HR INTENSV-TIER 1: Performed by: ORTHOPAEDIC SURGERY

## 2019-01-22 PROCEDURE — C1776 JOINT DEVICE (IMPLANTABLE): HCPCS | Performed by: ORTHOPAEDIC SURGERY

## 2019-01-22 PROCEDURE — 97161 PT EVAL LOW COMPLEX 20 MIN: CPT

## 2019-01-22 PROCEDURE — 74011250636 HC RX REV CODE- 250/636: Performed by: PHYSICIAN ASSISTANT

## 2019-01-22 PROCEDURE — 77030020782 HC GWN BAIR PAWS FLX 3M -B

## 2019-01-22 PROCEDURE — 77030014077 HC TOWER MX CEM J&J -C: Performed by: ORTHOPAEDIC SURGERY

## 2019-01-22 PROCEDURE — 77030003666 HC NDL SPINAL BD -A: Performed by: ANESTHESIOLOGY

## 2019-01-22 PROCEDURE — 0SRC0J9 REPLACEMENT OF RIGHT KNEE JOINT WITH SYNTHETIC SUBSTITUTE, CEMENTED, OPEN APPROACH: ICD-10-PCS | Performed by: ORTHOPAEDIC SURGERY

## 2019-01-22 PROCEDURE — 77030007866 HC KT SPN ANES BBMI -B: Performed by: ANESTHESIOLOGY

## 2019-01-22 PROCEDURE — 77030003601 HC NDL NRV BLK BBMI -A

## 2019-01-22 PROCEDURE — 82962 GLUCOSE BLOOD TEST: CPT

## 2019-01-22 PROCEDURE — 84132 ASSAY OF SERUM POTASSIUM: CPT

## 2019-01-22 PROCEDURE — 80047 BASIC METABLC PNL IONIZED CA: CPT

## 2019-01-22 PROCEDURE — 74011000258 HC RX REV CODE- 258

## 2019-01-22 PROCEDURE — 74011000250 HC RX REV CODE- 250: Performed by: ORTHOPAEDIC SURGERY

## 2019-01-22 PROCEDURE — 77030006822 HC BLD SAW SAG BRSM -B: Performed by: ORTHOPAEDIC SURGERY

## 2019-01-22 PROCEDURE — 77030011640 HC PAD GRND REM COVD -A: Performed by: ORTHOPAEDIC SURGERY

## 2019-01-22 PROCEDURE — 76060000035 HC ANESTHESIA 2 TO 2.5 HR: Performed by: ORTHOPAEDIC SURGERY

## 2019-01-22 PROCEDURE — 76210000016 HC OR PH I REC 1 TO 1.5 HR: Performed by: ORTHOPAEDIC SURGERY

## 2019-01-22 PROCEDURE — 74011250637 HC RX REV CODE- 250/637: Performed by: PHYSICIAN ASSISTANT

## 2019-01-22 PROCEDURE — 77030034850: Performed by: ORTHOPAEDIC SURGERY

## 2019-01-22 PROCEDURE — 74011250636 HC RX REV CODE- 250/636: Performed by: ANESTHESIOLOGY

## 2019-01-22 PROCEDURE — 77030008467 HC STPLR SKN COVD -B: Performed by: ORTHOPAEDIC SURGERY

## 2019-01-22 PROCEDURE — 77030018836 HC SOL IRR NACL ICUM -A: Performed by: ORTHOPAEDIC SURGERY

## 2019-01-22 PROCEDURE — 36415 COLL VENOUS BLD VENIPUNCTURE: CPT

## 2019-01-22 PROCEDURE — 74011636637 HC RX REV CODE- 636/637: Performed by: PHYSICIAN ASSISTANT

## 2019-01-22 PROCEDURE — 86900 BLOOD TYPING SEROLOGIC ABO: CPT

## 2019-01-22 PROCEDURE — 77030018822 HC SLV COMPR FT COVD -B

## 2019-01-22 PROCEDURE — 77030035236 HC SUT PDS STRATFX BARB J&J -B: Performed by: ORTHOPAEDIC SURGERY

## 2019-01-22 PROCEDURE — 97110 THERAPEUTIC EXERCISES: CPT

## 2019-01-22 PROCEDURE — 77030019908 HC STETH ESOPH SIMS -A: Performed by: ANESTHESIOLOGY

## 2019-01-22 PROCEDURE — 77030028907 HC WRP KNEE WO BGS SOLM -B

## 2019-01-22 PROCEDURE — 74011000250 HC RX REV CODE- 250

## 2019-01-22 PROCEDURE — C1713 ANCHOR/SCREW BN/BN,TIS/BN: HCPCS | Performed by: ORTHOPAEDIC SURGERY

## 2019-01-22 PROCEDURE — 77030018846 HC SOL IRR STRL H20 ICUM -A: Performed by: ORTHOPAEDIC SURGERY

## 2019-01-22 PROCEDURE — 65270000029 HC RM PRIVATE

## 2019-01-22 PROCEDURE — 97530 THERAPEUTIC ACTIVITIES: CPT

## 2019-01-22 DEVICE — IMPLANTABLE DEVICE: Type: IMPLANTABLE DEVICE | Site: KNEE | Status: FUNCTIONAL

## 2019-01-22 DEVICE — COMPONENT FEM SZ E R KNEE ZMLY PC CEM PRI PRESSFIT POST: Type: IMPLANTABLE DEVICE | Site: KNEE | Status: FUNCTIONAL

## 2019-01-22 DEVICE — PAT NXGN 35X9.0 POLYETH --: Type: IMPLANTABLE DEVICE | Site: KNEE | Status: FUNCTIONAL

## 2019-01-22 DEVICE — SMARTSET GHV GENTAMICIN HIGH VISCOSITY BONE CEMENT 40G
Type: IMPLANTABLE DEVICE | Site: KNEE | Status: FUNCTIONAL
Brand: SMARTSET

## 2019-01-22 DEVICE — KIT TKR CEM FLX: Type: IMPLANTABLE DEVICE | Status: FUNCTIONAL

## 2019-01-22 DEVICE — PLUG STEM TIV FLX TAPR FOR MG II ST BNE SCR NXGN: Type: IMPLANTABLE DEVICE | Site: KNEE | Status: FUNCTIONAL

## 2019-01-22 RX ORDER — ROPIVACAINE HYDROCHLORIDE 5 MG/ML
30 INJECTION, SOLUTION EPIDURAL; INFILTRATION; PERINEURAL AS NEEDED
Status: DISCONTINUED | OUTPATIENT
Start: 2019-01-22 | End: 2019-01-22 | Stop reason: HOSPADM

## 2019-01-22 RX ORDER — AMOXICILLIN 250 MG
1 CAPSULE ORAL 2 TIMES DAILY
Status: DISCONTINUED | OUTPATIENT
Start: 2019-01-22 | End: 2019-01-23 | Stop reason: HOSPADM

## 2019-01-22 RX ORDER — FACIAL-BODY WIPES
10 EACH TOPICAL DAILY PRN
Status: DISCONTINUED | OUTPATIENT
Start: 2019-01-24 | End: 2019-01-23 | Stop reason: HOSPADM

## 2019-01-22 RX ORDER — EPHEDRINE SULFATE 50 MG/ML
INJECTION, SOLUTION INTRAVENOUS AS NEEDED
Status: DISCONTINUED | OUTPATIENT
Start: 2019-01-22 | End: 2019-01-22 | Stop reason: HOSPADM

## 2019-01-22 RX ORDER — SODIUM CHLORIDE, SODIUM LACTATE, POTASSIUM CHLORIDE, CALCIUM CHLORIDE 600; 310; 30; 20 MG/100ML; MG/100ML; MG/100ML; MG/100ML
1000 INJECTION, SOLUTION INTRAVENOUS CONTINUOUS
Status: DISCONTINUED | OUTPATIENT
Start: 2019-01-22 | End: 2019-01-22 | Stop reason: HOSPADM

## 2019-01-22 RX ORDER — SODIUM CHLORIDE 0.9 % (FLUSH) 0.9 %
5-40 SYRINGE (ML) INJECTION AS NEEDED
Status: DISCONTINUED | OUTPATIENT
Start: 2019-01-22 | End: 2019-01-23 | Stop reason: HOSPADM

## 2019-01-22 RX ORDER — FENTANYL CITRATE 50 UG/ML
25 INJECTION, SOLUTION INTRAMUSCULAR; INTRAVENOUS
Status: DISCONTINUED | OUTPATIENT
Start: 2019-01-22 | End: 2019-01-22 | Stop reason: HOSPADM

## 2019-01-22 RX ORDER — MORPHINE SULFATE 10 MG/ML
2 INJECTION, SOLUTION INTRAMUSCULAR; INTRAVENOUS
Status: DISCONTINUED | OUTPATIENT
Start: 2019-01-22 | End: 2019-01-22 | Stop reason: HOSPADM

## 2019-01-22 RX ORDER — PROPOFOL 10 MG/ML
INJECTION, EMULSION INTRAVENOUS AS NEEDED
Status: DISCONTINUED | OUTPATIENT
Start: 2019-01-22 | End: 2019-01-22 | Stop reason: HOSPADM

## 2019-01-22 RX ORDER — LANOLIN ALCOHOL/MO/W.PET/CERES
325 CREAM (GRAM) TOPICAL 2 TIMES DAILY
Status: DISCONTINUED | OUTPATIENT
Start: 2019-01-22 | End: 2019-01-23 | Stop reason: HOSPADM

## 2019-01-22 RX ORDER — HYDROCODONE BITARTRATE AND ACETAMINOPHEN 5; 325 MG/1; MG/1
1 TABLET ORAL AS NEEDED
Status: DISCONTINUED | OUTPATIENT
Start: 2019-01-22 | End: 2019-01-22 | Stop reason: HOSPADM

## 2019-01-22 RX ORDER — DORZOLAMIDE HYDROCHLORIDE AND TIMOLOL MALEATE 20; 5 MG/ML; MG/ML
1 SOLUTION/ DROPS OPHTHALMIC EVERY 12 HOURS
Status: DISCONTINUED | OUTPATIENT
Start: 2019-01-22 | End: 2019-01-23 | Stop reason: HOSPADM

## 2019-01-22 RX ORDER — SODIUM CHLORIDE 9 MG/ML
25 INJECTION, SOLUTION INTRAVENOUS CONTINUOUS
Status: DISCONTINUED | OUTPATIENT
Start: 2019-01-22 | End: 2019-01-22 | Stop reason: HOSPADM

## 2019-01-22 RX ORDER — ROPIVACAINE HYDROCHLORIDE 5 MG/ML
INJECTION, SOLUTION EPIDURAL; INFILTRATION; PERINEURAL
Status: COMPLETED | OUTPATIENT
Start: 2019-01-22 | End: 2019-01-22

## 2019-01-22 RX ORDER — NALOXONE HYDROCHLORIDE 0.4 MG/ML
0.4 INJECTION, SOLUTION INTRAMUSCULAR; INTRAVENOUS; SUBCUTANEOUS AS NEEDED
Status: DISCONTINUED | OUTPATIENT
Start: 2019-01-22 | End: 2019-01-23 | Stop reason: HOSPADM

## 2019-01-22 RX ORDER — MIDAZOLAM HYDROCHLORIDE 1 MG/ML
1 INJECTION, SOLUTION INTRAMUSCULAR; INTRAVENOUS AS NEEDED
Status: DISCONTINUED | OUTPATIENT
Start: 2019-01-22 | End: 2019-01-22 | Stop reason: HOSPADM

## 2019-01-22 RX ORDER — OXYCODONE HYDROCHLORIDE 5 MG/1
10 TABLET ORAL
Status: DISCONTINUED | OUTPATIENT
Start: 2019-01-22 | End: 2019-01-23 | Stop reason: HOSPADM

## 2019-01-22 RX ORDER — INSULIN LISPRO 100 [IU]/ML
INJECTION, SOLUTION INTRAVENOUS; SUBCUTANEOUS
Status: DISCONTINUED | OUTPATIENT
Start: 2019-01-22 | End: 2019-01-22

## 2019-01-22 RX ORDER — OXYCODONE HYDROCHLORIDE 5 MG/1
5 TABLET ORAL
Status: DISCONTINUED | OUTPATIENT
Start: 2019-01-22 | End: 2019-01-23 | Stop reason: HOSPADM

## 2019-01-22 RX ORDER — FENTANYL CITRATE 50 UG/ML
25 INJECTION, SOLUTION INTRAMUSCULAR; INTRAVENOUS
Status: DISCONTINUED | OUTPATIENT
Start: 2019-01-22 | End: 2019-01-23 | Stop reason: HOSPADM

## 2019-01-22 RX ORDER — DEXTROSE 50 % IN WATER (D50W) INTRAVENOUS SYRINGE
12.5-25 AS NEEDED
Status: DISCONTINUED | OUTPATIENT
Start: 2019-01-22 | End: 2019-01-23 | Stop reason: HOSPADM

## 2019-01-22 RX ORDER — CEFAZOLIN SODIUM/WATER 2 G/20 ML
2 SYRINGE (ML) INTRAVENOUS EVERY 8 HOURS
Status: COMPLETED | OUTPATIENT
Start: 2019-01-22 | End: 2019-01-23

## 2019-01-22 RX ORDER — FAMOTIDINE 20 MG/1
20 TABLET, FILM COATED ORAL EVERY 24 HOURS
Status: DISCONTINUED | OUTPATIENT
Start: 2019-01-22 | End: 2019-01-23 | Stop reason: HOSPADM

## 2019-01-22 RX ORDER — CEFAZOLIN SODIUM/WATER 2 G/20 ML
2 SYRINGE (ML) INTRAVENOUS ONCE
Status: COMPLETED | OUTPATIENT
Start: 2019-01-22 | End: 2019-01-22

## 2019-01-22 RX ORDER — POLYETHYLENE GLYCOL 3350 17 G/17G
17 POWDER, FOR SOLUTION ORAL DAILY
Status: DISCONTINUED | OUTPATIENT
Start: 2019-01-23 | End: 2019-01-23 | Stop reason: HOSPADM

## 2019-01-22 RX ORDER — SODIUM CHLORIDE 9 MG/ML
125 INJECTION, SOLUTION INTRAVENOUS CONTINUOUS
Status: DISPENSED | OUTPATIENT
Start: 2019-01-22 | End: 2019-01-23

## 2019-01-22 RX ORDER — FENTANYL CITRATE 50 UG/ML
50 INJECTION, SOLUTION INTRAMUSCULAR; INTRAVENOUS AS NEEDED
Status: DISCONTINUED | OUTPATIENT
Start: 2019-01-22 | End: 2019-01-22 | Stop reason: HOSPADM

## 2019-01-22 RX ORDER — HYDROXYZINE HYDROCHLORIDE 10 MG/1
10 TABLET, FILM COATED ORAL
Status: DISCONTINUED | OUTPATIENT
Start: 2019-01-22 | End: 2019-01-23 | Stop reason: HOSPADM

## 2019-01-22 RX ORDER — ACETAMINOPHEN 500 MG
1000 TABLET ORAL EVERY 6 HOURS
Status: DISCONTINUED | OUTPATIENT
Start: 2019-01-22 | End: 2019-01-23 | Stop reason: HOSPADM

## 2019-01-22 RX ORDER — ACETAMINOPHEN 500 MG
1000 TABLET ORAL ONCE
Status: COMPLETED | OUTPATIENT
Start: 2019-01-22 | End: 2019-01-22

## 2019-01-22 RX ORDER — ONDANSETRON 2 MG/ML
4 INJECTION INTRAMUSCULAR; INTRAVENOUS AS NEEDED
Status: DISCONTINUED | OUTPATIENT
Start: 2019-01-22 | End: 2019-01-22 | Stop reason: HOSPADM

## 2019-01-22 RX ORDER — DEXTROSE 50 % IN WATER (D50W) INTRAVENOUS SYRINGE
25-50 AS NEEDED
Status: DISCONTINUED | OUTPATIENT
Start: 2019-01-22 | End: 2019-01-22 | Stop reason: SDUPTHER

## 2019-01-22 RX ORDER — GABAPENTIN 400 MG/1
800 CAPSULE ORAL 3 TIMES DAILY
Status: DISCONTINUED | OUTPATIENT
Start: 2019-01-22 | End: 2019-01-23 | Stop reason: HOSPADM

## 2019-01-22 RX ORDER — PROPOFOL 10 MG/ML
INJECTION, EMULSION INTRAVENOUS
Status: DISCONTINUED | OUTPATIENT
Start: 2019-01-22 | End: 2019-01-22 | Stop reason: HOSPADM

## 2019-01-22 RX ORDER — ESCITALOPRAM OXALATE 10 MG/1
20 TABLET ORAL DAILY
Status: DISCONTINUED | OUTPATIENT
Start: 2019-01-23 | End: 2019-01-23 | Stop reason: HOSPADM

## 2019-01-22 RX ORDER — EPHEDRINE SULFATE 50 MG/ML
5 INJECTION, SOLUTION INTRAVENOUS AS NEEDED
Status: DISCONTINUED | OUTPATIENT
Start: 2019-01-22 | End: 2019-01-22 | Stop reason: HOSPADM

## 2019-01-22 RX ORDER — PREGABALIN 75 MG/1
75 CAPSULE ORAL ONCE
Status: COMPLETED | OUTPATIENT
Start: 2019-01-22 | End: 2019-01-22

## 2019-01-22 RX ORDER — BUPIVACAINE HYDROCHLORIDE 5 MG/ML
INJECTION, SOLUTION EPIDURAL; INTRACAUDAL AS NEEDED
Status: DISCONTINUED | OUTPATIENT
Start: 2019-01-22 | End: 2019-01-22 | Stop reason: HOSPADM

## 2019-01-22 RX ORDER — ALBUTEROL SULFATE 0.83 MG/ML
2.5 SOLUTION RESPIRATORY (INHALATION) AS NEEDED
Status: DISCONTINUED | OUTPATIENT
Start: 2019-01-22 | End: 2019-01-22 | Stop reason: HOSPADM

## 2019-01-22 RX ORDER — AMLODIPINE BESYLATE 5 MG/1
5 TABLET ORAL DAILY
Status: DISCONTINUED | OUTPATIENT
Start: 2019-01-23 | End: 2019-01-23 | Stop reason: HOSPADM

## 2019-01-22 RX ORDER — SODIUM CHLORIDE 0.9 % (FLUSH) 0.9 %
5-40 SYRINGE (ML) INJECTION EVERY 8 HOURS
Status: DISCONTINUED | OUTPATIENT
Start: 2019-01-22 | End: 2019-01-23 | Stop reason: HOSPADM

## 2019-01-22 RX ORDER — ONDANSETRON 2 MG/ML
INJECTION INTRAMUSCULAR; INTRAVENOUS AS NEEDED
Status: DISCONTINUED | OUTPATIENT
Start: 2019-01-22 | End: 2019-01-22 | Stop reason: HOSPADM

## 2019-01-22 RX ORDER — LIDOCAINE HYDROCHLORIDE 10 MG/ML
0.1 INJECTION, SOLUTION EPIDURAL; INFILTRATION; INTRACAUDAL; PERINEURAL AS NEEDED
Status: DISCONTINUED | OUTPATIENT
Start: 2019-01-22 | End: 2019-01-22 | Stop reason: HOSPADM

## 2019-01-22 RX ORDER — GLYCOPYRROLATE 0.2 MG/ML
0.2 INJECTION INTRAMUSCULAR; INTRAVENOUS
Status: DISCONTINUED | OUTPATIENT
Start: 2019-01-22 | End: 2019-01-22 | Stop reason: HOSPADM

## 2019-01-22 RX ORDER — ONDANSETRON 4 MG/1
4 TABLET, ORALLY DISINTEGRATING ORAL
Status: DISCONTINUED | OUTPATIENT
Start: 2019-01-22 | End: 2019-01-23 | Stop reason: HOSPADM

## 2019-01-22 RX ORDER — MIDAZOLAM HYDROCHLORIDE 1 MG/ML
0.5 INJECTION, SOLUTION INTRAMUSCULAR; INTRAVENOUS
Status: DISCONTINUED | OUTPATIENT
Start: 2019-01-22 | End: 2019-01-22 | Stop reason: HOSPADM

## 2019-01-22 RX ORDER — ONDANSETRON 2 MG/ML
4 INJECTION INTRAMUSCULAR; INTRAVENOUS
Status: ACTIVE | OUTPATIENT
Start: 2019-01-22 | End: 2019-01-23

## 2019-01-22 RX ORDER — MAGNESIUM SULFATE 100 %
4 CRYSTALS MISCELLANEOUS AS NEEDED
Status: DISCONTINUED | OUTPATIENT
Start: 2019-01-22 | End: 2019-01-23 | Stop reason: HOSPADM

## 2019-01-22 RX ADMIN — FENTANYL CITRATE 25 MCG: 50 INJECTION INTRAMUSCULAR; INTRAVENOUS at 09:48

## 2019-01-22 RX ADMIN — PROPOFOL 25 MG: 10 INJECTION, EMULSION INTRAVENOUS at 08:57

## 2019-01-22 RX ADMIN — ACETAMINOPHEN 1000 MG: 500 TABLET ORAL at 12:34

## 2019-01-22 RX ADMIN — GABAPENTIN 800 MG: 400 CAPSULE ORAL at 18:38

## 2019-01-22 RX ADMIN — SODIUM CHLORIDE 125 ML/HR: 900 INJECTION, SOLUTION INTRAVENOUS at 12:37

## 2019-01-22 RX ADMIN — FERROUS SULFATE TAB 325 MG (65 MG ELEMENTAL FE) 325 MG: 325 (65 FE) TAB at 18:24

## 2019-01-22 RX ADMIN — EPHEDRINE SULFATE 10 MG: 50 INJECTION, SOLUTION INTRAVENOUS at 07:51

## 2019-01-22 RX ADMIN — PROPOFOL 25 MG: 10 INJECTION, EMULSION INTRAVENOUS at 07:34

## 2019-01-22 RX ADMIN — DORZOLAMIDE HYDROCHLORIDE AND TIMOLOL MALEATE 1 DROP: 20; 5 SOLUTION/ DROPS OPHTHALMIC at 21:49

## 2019-01-22 RX ADMIN — BUPIVACAINE HYDROCHLORIDE 7.5 MG: 5 INJECTION, SOLUTION EPIDURAL; INTRACAUDAL at 07:55

## 2019-01-22 RX ADMIN — PREGABALIN 75 MG: 75 CAPSULE ORAL at 07:08

## 2019-01-22 RX ADMIN — SODIUM CHLORIDE 25 ML/HR: 900 INJECTION, SOLUTION INTRAVENOUS at 07:27

## 2019-01-22 RX ADMIN — RIVAROXABAN 10 MG: 10 TABLET, FILM COATED ORAL at 18:24

## 2019-01-22 RX ADMIN — ACETAMINOPHEN 1000 MG: 500 TABLET ORAL at 18:24

## 2019-01-22 RX ADMIN — INSULIN LISPRO 2 UNITS: 100 INJECTION, SOLUTION INTRAVENOUS; SUBCUTANEOUS at 11:34

## 2019-01-22 RX ADMIN — PROPOFOL 25 MG: 10 INJECTION, EMULSION INTRAVENOUS at 08:23

## 2019-01-22 RX ADMIN — EPHEDRINE SULFATE 10 MG: 50 INJECTION, SOLUTION INTRAVENOUS at 07:43

## 2019-01-22 RX ADMIN — SODIUM CHLORIDE 125 ML/HR: 900 INJECTION, SOLUTION INTRAVENOUS at 18:22

## 2019-01-22 RX ADMIN — FENTANYL CITRATE 25 MCG: 50 INJECTION INTRAMUSCULAR; INTRAVENOUS at 13:05

## 2019-01-22 RX ADMIN — PROPOFOL 50 MCG/KG/MIN: 10 INJECTION, EMULSION INTRAVENOUS at 07:32

## 2019-01-22 RX ADMIN — OXYCODONE HYDROCHLORIDE 10 MG: 5 TABLET ORAL at 10:17

## 2019-01-22 RX ADMIN — ROPIVACAINE HYDROCHLORIDE 10 ML: 5 INJECTION, SOLUTION EPIDURAL; INFILTRATION; PERINEURAL at 07:22

## 2019-01-22 RX ADMIN — FENTANYL CITRATE 25 MCG: 50 INJECTION INTRAMUSCULAR; INTRAVENOUS at 09:43

## 2019-01-22 RX ADMIN — Medication 2 G: at 08:00

## 2019-01-22 RX ADMIN — FAMOTIDINE 20 MG: 20 TABLET ORAL at 18:24

## 2019-01-22 RX ADMIN — EPHEDRINE SULFATE 10 MG: 50 INJECTION, SOLUTION INTRAVENOUS at 07:45

## 2019-01-22 RX ADMIN — FENTANYL CITRATE 50 MCG: 50 INJECTION, SOLUTION INTRAMUSCULAR; INTRAVENOUS at 07:18

## 2019-01-22 RX ADMIN — MIDAZOLAM HYDROCHLORIDE 2 MG: 1 INJECTION, SOLUTION INTRAMUSCULAR; INTRAVENOUS at 07:18

## 2019-01-22 RX ADMIN — SODIUM CHLORIDE: 900 INJECTION, SOLUTION INTRAVENOUS at 08:00

## 2019-01-22 RX ADMIN — ONDANSETRON 4 MG: 2 INJECTION INTRAMUSCULAR; INTRAVENOUS at 09:29

## 2019-01-22 RX ADMIN — SENNOSIDES AND DOCUSATE SODIUM 1 TABLET: 8.6; 5 TABLET ORAL at 18:24

## 2019-01-22 RX ADMIN — Medication 2 G: at 16:20

## 2019-01-22 RX ADMIN — GABAPENTIN 800 MG: 400 CAPSULE ORAL at 21:52

## 2019-01-22 RX ADMIN — ACETAMINOPHEN 1000 MG: 500 TABLET ORAL at 07:08

## 2019-01-22 NOTE — PROGRESS NOTES
Renal Dosing/Monitoring  Medication: famotidine   Current regimen:  20 every 12 hr  No results for input(s): CREA, BUN in the last 72 hours.   Estimated CrCl:  38 ml/min  Plan: CHange to famotidine 20 mg Q24H from Crcl < 50 ml/hr    **close after initial review

## 2019-01-22 NOTE — PROGRESS NOTES
Problem: Mobility Impaired (Adult and Pediatric)  Goal: *Acute Goals and Plan of Care (Insert Text)  Physical Therapy Goals  Initiated 2019    1. Patient will move from supine to sit and sit to supine  in bed with independence within 4 days. 2. Patient will perform sit to stand with independence within 4 days. 3. Patient will ambulate with modified independence for 200 feet with the least restrictive device within 4 days. 4. Patient will ascend/descend 8 stairs with 1 handrail(s) with modified independence within 4 days. 5. Patient will perform home exercise program per protocol with independence within 4 days. 6. Patient will demonstrate AROM 0-90 degrees in operative joint within 4 days. physical Therapy EVALUATION  Patient: Dre Headley (57 y.o. female)  Date: 2019  Primary Diagnosis: OSTEOARTHRITIS OF RIGHT KNEE  Primary osteoarthritis of right knee  Procedure(s) (LRB):  RIGHT TOTAL KNEE REPLACEMENT  (SPINAL PLUS GEN) (Right) Day of Surgery   Precautions:   Fall, WBAT    ASSESSMENT :   Based on the objective data described below, the patient presents with Contact guard assistance functional transfers, Minimum assistance gait, 10 feet ambulated, and with rolling walker device. Performed knee HEP with good quad control able to correctly perform ankle pumps, SLR and quad sets. Orthostatic though asymptomatic. The following are barriers to independence while in acute care:     -Medical condition: ROM, strength, standing balance, orthostatic hypotension asymptomatic and pain tolerance    -Other:   Pt is legally blind  BP supine: 113/72  Sittin/52 --- 85/72(after 5 minutes)  Standin/44  Return to supine: 98/56    Patient will benefit from skilled acute intervention to address the above impairments.   Patients rehabilitation potential is considered to be Excellent    Discharge recommendations: Home health  and 24 supervision       Equipment recommendations for successful discharge (if) home: rolling walker if unable to safely use rollator. Pt does not want to use a RW because of back pain but is willing to for a short period of time if rollator is unsafe. PLAN :  Recommendations and Planned Interventions: bed mobility training, transfer training, gait training, therapeutic exercises, therapeutic activities and neuromuscular re-education      Frequency/Duration: Patient will be followed by physical therapy  twice daily to address goals. SUBJECTIVE:   Patient stated I threw my RW away.     OBJECTIVE DATA SUMMARY:   HISTORY:    Past Medical History:   Diagnosis Date    Adverse effect of anesthesia     SLOW WAKING PAST ANESTHESIA    Anxiety     Arthritis     CAD (coronary artery disease)     s/p CABG x 3v    Chest pain 7/2015    Chronic obstructive pulmonary disease (HCC)     CTS (carpal tunnel syndrome)     S/p bilateral release    Diabetes ([address]     Diabetic gastroparesis ([address]     Diabetic neuropathy ([address]     Diabetic retinopathy ([address]     GERD (gastroesophageal reflux disease)     GI bleed 7/2015    4 bleeds with 9 clips placed    Hypercholesteremia     Hypertension     Hypothyroid     Ill-defined condition     NEUROPATHY HANDS AND FEET    Ill-defined condition 2017    GI BLEED    Nicotine vapor product user     JOHN on CPAP     Osteoporosis     Vitamin D deficiency      Past Surgical History:   Procedure Laterality Date    CABG, ARTERY-VEIN, THREE  7/13/2015    HX CERVICAL FUSION  2011    HX ENDOSCOPY  7/2015    HX GI      HX HEART CATHETERIZATION  7/2015    HX LUMBAR FUSION  2017    HX LUMBAR LAMINECTOMY  2011    HX ORTHOPAEDIC Right 1970    CARPAL TUNNEL    HX ORTHOPAEDIC Left 2003    CARPAL TUNNEL    HX ROTATOR CUFF REPAIR Right 2003    HX SHOULDER ARTHROSCOPY Right     HX TONSIL AND ADENOIDECTOMY  1968    HX TONSILLECTOMY       Prior Level of Function/Home Situation: INDEP  Personal factors and/or comorbidities impacting plan of care: neuropathy, diabetes, back pain, L knee pain    Home Situation  Home Environment: Private residence  # Steps to Enter: 8  One/Two Story Residence: One story  Living Alone: No  Support Systems: Friends \ neighbors  Patient Expects to be Discharged to[de-identified] Private residence  Current DME Used/Available at Home: michelle Velazquez Mosetta Pence, rollator    EXAMINATION/PRESENTATION/DECISION MAKING:   Critical Behavior:  Neurologic State: Alert           Hearing:     Skin:    Edema:   Range Of Motion:  AROM: Generally decreased, functional                       Strength:    Strength: Generally decreased, functional                    Tone & Sensation:   Tone: Normal              Sensation: Intact               Coordination:  Coordination: Within functional limits  Vision:      Functional Mobility:  Bed Mobility:  Rolling: Contact guard assistance  Supine to Sit: Contact guard assistance  Sit to Supine: Minimum assistance     Transfers:  Sit to Stand: Minimum assistance  Stand to Sit: Contact guard assistance                       Balance:   Sitting: Intact  Standing: Intact; With support  Ambulation/Gait Training:  Distance (ft): 10 Feet (ft)  Assistive Device: Gait belt;Walker, rolling  Ambulation - Level of Assistance: Minimal assistance        Gait Abnormalities: Decreased step clearance  Right Side Weight Bearing: As tolerated  Left Side Weight Bearing: Full  Base of Support: Widened     Speed/Renu: Pace decreased (<100 feet/min); Slow  Step Length: Right shortened;Left shortened                     Stairs:               Therapeutic Exercises:   Knee HEP    Functional Measure:  Tinetti test:    Sitting Balance: 1  Arises: 1  Attempts to Rise: 1  Immediate Standing Balance: 1  Standing Balance: 1  Nudged: 1  Eyes Closed: 1  Turn 360 Degrees - Continuous/Discontinuous: 1  Turn 360 Degrees - Steady/Unsteady: 1  Sitting Down: 1  Balance Score: 10  Indication of Gait: 1  R Step Length/Height: 0  L Step Length/Height: 0  R Foot Clearance: 1  L Foot Clearance: 1  Step Symmetry: 0  Step Continuity: 0  Path: 0  Trunk: 0  Walking Time: 0  Gait Score: 3  Total Score: 13         Tinetti Tool Score Risk of Falls  <19 = High Fall Risk  19-24 = Moderate Fall Risk  25-28 = Low Fall Risk  Tinetti ME. Performance-Oriented Assessment of Mobility Problems in Elderly Patients. [facility] 66; [id number]. (Scoring Description: PT Bulletin Feb. 10, 1993)    Older adults: Tim Leiva et al, 2009; n = 1000 [facility] elderly evaluated with ABC, LAYO, ADL, and IADL)  · Mean LAYO score for males aged 69-68 years = 26.21(3.40)  · Mean LAYO score for females age 69-68 years = 25.16(4.30)  · Mean LAYO score for males over 80 years = 23.29(6.02)  · Mean LAYO score for females over 80 years = 17.20(8.32)            Physical Therapy Evaluation Charge Determination   History Examination Presentation Decision-Making   MEDIUM  Complexity : 1-2 comorbidities / personal factors will impact the outcome/ POC  MEDIUM Complexity : 3 Standardized tests and measures addressing body structure, function, activity limitation and / or participation in recreation  LOW Complexity : Stable, uncomplicated  Other outcome measures Tinetti  LOW       Based on the above components, the patient evaluation is determined to be of the following complexity level: LOW     Pain:  Pre treatment: 4 /10   Post treatment:  5/10   Location: knees    Description:throbbing   Aggravating factors: Activity Tolerance:   good  Please refer to the flowsheet for vital signs taken during this treatment. After treatment patient left:   Supine in bed  Bed in low position  RN notified  Family at bedside     COMMUNICATION/EDUCATION:   The patients plan of care was discussed with: Registered Nurse. Fall prevention education was provided and the patient/caregiver indicated understanding., Patient/family have participated as able in goal setting and plan of care.  and Patient/family agree to work toward stated goals and plan of care.    Thank you for this referral.  Natty Moura, PT   Time Calculation: 43 mins

## 2019-01-22 NOTE — ANESTHESIA PROCEDURE NOTES
Peripheral Block Start time: 1/22/2019 7:16 AM 
End time: 1/22/2019 7:23 AM 
Performed by: Glen Bennett MD 
Authorized by: Glen Bennett MD  
 
 
Pre-procedure: Indications: at surgeon's request, post-op pain management and procedure for pain Preanesthetic Checklist: patient identified, risks and benefits discussed, site marked, timeout performed, anesthesia consent given and patient being monitored Block Type:  
Block Type: Adductor canal 
Laterality:  Right Monitoring:  Standard ASA monitoring, continuous pulse ox, frequent vital sign checks, heart rate, responsive to questions and oxygen Injection Technique:  Single shot Procedures: ultrasound guided Patient Position: supine Prep: chlorhexidine Location:  Mid thigh Needle Type:  Stimuplex Needle Gauge:  22 G Needle Localization:  Ultrasound guidance Assessment: 
Number of attempts:  1 Injection Assessment:  Incremental injection every 5 mL, negative aspiration for CSF, no paresthesia, local visualized surrounding nerve on ultrasound, negative aspiration for blood and no intravascular symptoms Patient tolerance:  Patient tolerated the procedure well with no immediate complications NO IMAGE STORED

## 2019-01-22 NOTE — PROGRESS NOTES
TRANSFER - IN REPORT:    Verbal report received from [employer]) on Sanjay Robins  being received from [employer]) for routine post - op      Report consisted of patients Situation, Background, Assessment and   Recommendations(SBAR). Information from the following report(s) SBAR, Kardex, Intake/Output and MAR was reviewed with the receiving nurse. Opportunity for questions and clarification was provided. Assessment completed upon patients arrival to unit and care assumed.

## 2019-01-22 NOTE — PERIOP NOTES
Patient requesting to be placed back on her insulin pump and not use the sliding scale for coverage. Spoke to [email or web address], [address] and consult placed for DIABETES Management via hospitalist.  Dr. Darling Agee paged via [employer], .

## 2019-01-22 NOTE — PROGRESS NOTES
Occupational Therapy   Orders received, chart review completed. Note patient POD #0. OT will follow up tomorrow for evaluation, unless patient is a fast track. If so let us know so we can follow up this afternoon. Recommend OOB to chair three times a day for meals and self-completion of ADLs as able and medically stable. Thank you.

## 2019-01-22 NOTE — BRIEF OP NOTE
BRIEF OPERATIVE NOTE    Date of Procedure: 1/22/2019   Preoperative Diagnosis: OSTEOARTHRITIS OF RIGHT KNEE  Postoperative Diagnosis: OSTEOARTHRITIS OF RIGHT KNEE    Procedure(s):  RIGHT TOTAL KNEE REPLACEMENT  (SPINAL PLUS GEN)  Surgeon(s) and Role:     * Diane Mclaughlin MD - Primary         Surgical Assistant: Assistant: Jessica Maher, [facility]    Surgical Staff:  Rom Bradshaw: Cielo Hazel RN  Physician Assistant: Lindsey Alaniz PA-C  Scrub RN-1: Maureen Burgos RN  Surg Asst-1: Colt Lofton  Surg Asst-2: Akash Gracia  Event Time In Time Out   Incision Start 8775    Incision Close 7461      Anesthesia: Spinal   Estimated Blood Loss: 100cc  Specimens: * No specimens in log *   Findings: DJD   Complications: none  Implants:   Implant Name Type Inv.  Item Serial No.  Lot No. LRB No. Used Action   CEMENT BNE GENTAMC GHV 40GM -- SMARTSET - SNA Cement CEMENT BNE GENTAMC GHV 40GM -- SMARTSET NA [facility] [id number] Right 1 Implanted   COMPNT FEM E R --  - [id number] Joint Component COMPNT FEM E R --  [id number] [employer] [id number] Right 1 Implanted   BASEPLT TIB STEM PC NEXGN SZ 4 --  - [id number] Joint Component BASEPLT TIB STEM PC NEXGN SZ 4 --  [id number] [employer] [id number] Right 1 Implanted   PLUG STEM TAPR NEXGEN --  - [id number] Joint Component PLUG STEM TAPR NEXGEN --  [id number] [place] [id number] Right 1 Implanted   PAT NXGN 35X9.0 POLYETH --  - [id number] Joint Component PAT NXGN 35X9.0 POLYETH --  [id number] [employer] [id number] Right 1 Implanted   INSERT TIB LPS-FLX EF 3-4 10MM -- Gayleen Berliner SURF - [id number] Joint Component INSERT TIB LPS-FLX EF 3-4 10MM -- Gayleen Berlin SURF [id number] [place] [id number] Right 1 Implanted

## 2019-01-22 NOTE — ROUTINE PROCESS
Patient: Cecile Haq MRN: [id number]  SSN: [id number]   YOB: 1950  Age: 76 y.o. Sex: female     Patient is status post Procedure(s):  RIGHT TOTAL KNEE REPLACEMENT  (SPINAL PLUS GEN). Surgeon(s) and Role:     * Jaylin Lacey MD - Primary    Local/Dose/Irrigation:  100ml surgical pain solution injected right knee                  Peripheral IV 01/22/19 Left Arm (Active)                           Dressing/Packing:  Wound Knee Right-DRESSING TYPE: 4 x 4;ABD pad;Elastic bandage;Non-adherent; Staples (01/22/19 0900)  Cast padding.

## 2019-01-22 NOTE — ANESTHESIA PREPROCEDURE EVALUATION
Anesthetic History No history of anesthetic complications Review of Systems / Medical History Patient summary reviewed, nursing notes reviewed and pertinent labs reviewed Pulmonary Sleep apnea: CPAP Neuro/Psych Within defined limits Cardiovascular Hypertension CAD and CABG Comments: EF 70% GI/Hepatic/Renal 
  
 
 
Renal disease: CRI Endo/Other Diabetes: type 1 Hypothyroidism Morbid obesity Other Findings Physical Exam 
 
Airway Mallampati: II 
TM Distance: > 6 cm Mouth opening: Normal 
 
 Cardiovascular Rhythm: regular Rate: normal 
 
 
 
 Dental 
 
Dentition: Edentulous Pulmonary Breath sounds clear to auscultation Abdominal 
 
 
 
 Other Findings Anesthetic Plan ASA: 3 Anesthesia type: spinal 
 
 
Post-op pain plan if not by surgeon: peripheral nerve block single Induction: Intravenous Anesthetic plan and risks discussed with: Patient

## 2019-01-22 NOTE — DIABETES MGMT
DTC Pump and Consult Note    Recommendations/ Comments:  Reviewed pump settings and patient stated she is able to manage her pump independently. Pump was suspended in OR, but her roommate turned it back on. The patient is legally blind so her roommate helps her with her pump, but patient states she uses it on her own when her roommate is not there. Consult received for:   [x]          Insulin pump patient       Chart reviewed and initial evaluation complete on [place] [id number]. Patient is a 76 y.o. female with known DM on a Medtronic pump. Insulin Pump Settings    Date of last site change is 1/21/19. Thpplies and her roommate will change the set for her. Basal Rates      12AM 2.35 units/hr all day      Insulin Carb Ratio  12AM  4    Sensitivity factor: 25, target 120 mg/dL, Active insulin 2 hours    BG monitoring at home 3-4x/day. If no to any of the following the pump should be d/c per hospital policy:  · Patient able to program doctors ordered settings,   yes [x]            No   []                · Patient able to provide pump materials  yes [x]              No   []            · Able to change infusion set and fill a new syringe at least for 3 days ( 2 days if pregnant. yes [x]              No   []            · Patient will change setting if redness, swelling, blood backing up, pump alerts, \"No Delivery\" alarm, or two or more glucose reading         in a row greater than 250mg/dl    yes [x]              No   []           · Patient able to repeat basal rates [x]              No   []            · Patient able to bolus corrections and meals based on physician orders. yes [x]         No   []              · Assessed and instructed patient on the following: .      · blood sugar goals,   · nutrition  - they carb count    Provided patient with the following: [x]          Survival skills education materials               [x]          Outpatient DTC contact number    A1c:   Lab Results   Component Value Date/Time    Hemoglobin A1c 6.7 (H) 01/07/2019 08:31 AM    Hemoglobin A1c, External 6.2 06/30/2015       Recent Glucose Results:   Lab Results   Component Value Date/Time    GLUCPOC 165 (H) 01/22/2019 10:05 AM    GLUCPOC 139 (H) 01/22/2019 06:59 AM        Lab Results   Component Value Date/Time    Creatinine 1.55 (H) 01/07/2019 08:31 AM     Estimated Creatinine Clearance: 38.3 mL/min (A) (based on SCr of 1.55 mg/dL (H)). Active Orders   There are no active orders of the following type(s): Diet. PO intake: No data found. Will continue to follow as needed. Thank you.   Elder Smiley MS, RN, CDE    Time Spent: 35 minutes

## 2019-01-22 NOTE — ANESTHESIA POSTPROCEDURE EVALUATION
Post-Anesthesia Evaluation and Assessment Patient: Carina Sofia MRN: [id number]  SSN: [id number] YOB: 1950  Age: 76 y.o. Sex: female I have evaluated the patient and they are stable and ready for discharge from the PACU. Cardiovascular Function/Vital Signs Visit Vitals /58 Pulse 65 Temp 36.6 °C (97.9 °F) Resp 11 Ht 5' 4\" (1.626 m) Wt 92.5 kg (204 lb) SpO2 97% BMI 35.02 kg/m² Patient is status post Spinal anesthesia for Procedure(s): RIGHT TOTAL KNEE REPLACEMENT  (SPINAL PLUS GEN). Nausea/Vomiting: None Postoperative hydration reviewed and adequate. Pain: 
Pain Scale 1: Numeric (0 - 10) (01/22/19 1018) Pain Intensity 1: 5 (01/22/19 1018) Managed Neurological Status:  
Neuro (WDL): Exceptions to WDL (01/22/19 0931) Neuro Neurologic State: Alert (01/22/19 0931) LUE Motor Response: Purposeful (01/22/19 0931) LLE Motor Response: Pharmacologically paralyzed (01/22/19 0931) RUE Motor Response: Purposeful (01/22/19 0931) RLE Motor Response: Pharmacologically paralyzed (01/22/19 0931) At baseline Mental Status, Level of Consciousness: Alert and  oriented to person, place, and time Pulmonary Status:  
O2 Device: Nasal cannula (01/22/19 0936) Adequate oxygenation and airway patent Complications related to anesthesia: None Post-anesthesia assessment completed. No concerns Signed By: Fang Haines MD   
 January 22, 2019 Procedure(s): RIGHT TOTAL KNEE REPLACEMENT  (SPINAL PLUS GEN). <BSHSIANPOST> Visit Vitals /58 Pulse 65 Temp 36.6 °C (97.9 °F) Resp 11 Ht 5' 4\" (1.626 m) Wt 92.5 kg (204 lb) SpO2 97% BMI 35.02 kg/m²

## 2019-01-22 NOTE — OP NOTES
1/22/2019  OPERATIVE REPORT      PREOPERATIVE DIAGNOSIS: Osteoarthritis, right knee. POSTOPERATIVE DIAGNOSIS: Osteoarthritis, right knee. OPERATIVE PROCEDURE: right total knee replacement. SURGEON: Irlanda Montero MD    ASSISTANT SURGEON: Janette Babcock, [facility]    ANESTHESIA: Spinal.    INDICATIONS: : A 76 y.o.  female  with end stage osteoarthritis to the right knee, not responsive to conservative management. COMPLICATIONS:None    PROCEDURE: The patient was taken to the operating room, underwent  placement of spinal anesthesia. The knee and leg were prepped and  draped in the usual fashion. The leg was exsanguinated with the Esmarch  bandage and tourniquet inflated to 350 mmHg. A longitudinal midline  incision made down through skin and subcutaneous tissue. A medial  parapatellar arthrotomy was performed, and extended proximally along the  medial border of the quadriceps tendon, distally along the medial border of  the insertion of the patella tendon. Medial release was performed. Retropatellar fat pad was sharply excised. The patella was subluxated  laterally, the knee was flexed to 90 degrees. Drill was used to enter the  intramedullary canal of the distal femur. The 5 degree intramedullary guide  was applied and the distal femoral cut was performed. The femur was sized  to a E. A E cutting block was applied, and the anterior, posterior,  chamfer cuts were performed. The posterior stabilized cut was performed. The extramedullary tibial guide was applied, and the tibia was cut in  neutral alignment. The tibia was sized to a 4. Knee was balanced to varus  and valgus stress. Flexion and extension gaps were equal. Trial reduction  was performed, using 10 mm insert. Excellent range of motion and stability  were noted. The patella was everted, and the patella was cut using freehand  technique. Patella was sized to a 35. Drill holes were placed, and patella  button applied.  The patella tracked normally. All trial components were  removed, and surfaces were prepared using drill and punch. All residual  meniscal tissue was sharply excised. Hemostasis was obtained using Bovie  apparatus. All components were cemented in place, taking care to remove all  excess cement. The knee was maintained in full extension while the cement  hardened. The tourniquet was let down after a total tourniquet time of 39  minutes. Hemostasis was obtained using the Bovie apparatus. The joint was  extensively irrigated with antibiotic solution. The arthrotomy was repaired  using #2 Vicryl in interrupted figure-of-eight fashion. Subcutaneous tissue  was approximated using 2-0 Vicryl in interrupted fashion. Skin edges were  approximated using stapling apparatus. Joint was infiltrated with 30 mL of  0.5% Marcaine with epinephrine. Bulky sterile dressing was applied, and the  patient was transferred to recovery room in stable condition. There were no  Complications. The Physician assistant was involved in retraction and wound closure. ESTIMATED BLOOD LOSS: 100 cc.     SPECIMEN: Enrico Vera M.D.  1/22/2019  9:37 AM

## 2019-01-23 VITALS
BODY MASS INDEX: 34.83 KG/M2 | HEART RATE: 75 BPM | WEIGHT: 204 LBS | SYSTOLIC BLOOD PRESSURE: 111 MMHG | OXYGEN SATURATION: 92 % | DIASTOLIC BLOOD PRESSURE: 54 MMHG | RESPIRATION RATE: 16 BRPM | TEMPERATURE: 98.5 F | HEIGHT: 64 IN

## 2019-01-23 LAB
ANION GAP SERPL CALC-SCNC: 5 MMOL/L (ref 5–15)
BUN SERPL-MCNC: 24 MG/DL (ref 6–20)
BUN/CREAT SERPL: 15 (ref 12–20)
CALCIUM SERPL-MCNC: 8.6 MG/DL (ref 8.5–10.1)
CHLORIDE SERPL-SCNC: 112 MMOL/L (ref 97–108)
CO2 SERPL-SCNC: 24 MMOL/L (ref 21–32)
CREAT SERPL-MCNC: 1.6 MG/DL (ref 0.55–1.02)
GLUCOSE BLD STRIP.AUTO-MCNC: 104 MG/DL (ref 65–100)
GLUCOSE BLD STRIP.AUTO-MCNC: 137 MG/DL (ref 65–100)
GLUCOSE BLD STRIP.AUTO-MCNC: 164 MG/DL (ref 65–100)
GLUCOSE BLD STRIP.AUTO-MCNC: 55 MG/DL (ref 65–100)
GLUCOSE BLD STRIP.AUTO-MCNC: 57 MG/DL (ref 65–100)
GLUCOSE BLD STRIP.AUTO-MCNC: 59 MG/DL (ref 65–100)
GLUCOSE BLD STRIP.AUTO-MCNC: 69 MG/DL (ref 65–100)
GLUCOSE BLD STRIP.AUTO-MCNC: 70 MG/DL (ref 65–100)
GLUCOSE BLD STRIP.AUTO-MCNC: 71 MG/DL (ref 65–100)
GLUCOSE SERPL-MCNC: 75 MG/DL (ref 65–100)
HGB BLD-MCNC: 12.1 G/DL (ref 11.5–16)
POTASSIUM SERPL-SCNC: 5.4 MMOL/L (ref 3.5–5.1)
SERVICE CMNT-IMP: ABNORMAL
SERVICE CMNT-IMP: NORMAL
SODIUM SERPL-SCNC: 141 MMOL/L (ref 136–145)

## 2019-01-23 PROCEDURE — 74011250636 HC RX REV CODE- 250/636: Performed by: PHYSICIAN ASSISTANT

## 2019-01-23 PROCEDURE — 85018 HEMOGLOBIN: CPT

## 2019-01-23 PROCEDURE — 36415 COLL VENOUS BLD VENIPUNCTURE: CPT

## 2019-01-23 PROCEDURE — 97530 THERAPEUTIC ACTIVITIES: CPT | Performed by: PHYSICAL THERAPIST

## 2019-01-23 PROCEDURE — 82962 GLUCOSE BLOOD TEST: CPT

## 2019-01-23 PROCEDURE — 97535 SELF CARE MNGMENT TRAINING: CPT

## 2019-01-23 PROCEDURE — 97165 OT EVAL LOW COMPLEX 30 MIN: CPT

## 2019-01-23 PROCEDURE — 74011250637 HC RX REV CODE- 250/637: Performed by: PHYSICIAN ASSISTANT

## 2019-01-23 PROCEDURE — 97110 THERAPEUTIC EXERCISES: CPT | Performed by: PHYSICAL THERAPIST

## 2019-01-23 PROCEDURE — 97116 GAIT TRAINING THERAPY: CPT | Performed by: PHYSICAL THERAPIST

## 2019-01-23 PROCEDURE — 80048 BASIC METABOLIC PNL TOTAL CA: CPT

## 2019-01-23 PROCEDURE — 74011000250 HC RX REV CODE- 250: Performed by: ORTHOPAEDIC SURGERY

## 2019-01-23 RX ORDER — OXYCODONE HYDROCHLORIDE 5 MG/1
5-10 TABLET ORAL
Qty: 60 TAB | Refills: 0 | Status: SHIPPED | OUTPATIENT
Start: 2019-01-23 | End: 2019-08-01

## 2019-01-23 RX ORDER — ACETAMINOPHEN 500 MG
1000 TABLET ORAL EVERY 6 HOURS
Qty: 120 TAB | Refills: 0 | Status: ON HOLD | OUTPATIENT
Start: 2019-01-23 | End: 2022-04-01

## 2019-01-23 RX ADMIN — AMLODIPINE BESYLATE 5 MG: 5 TABLET ORAL at 10:34

## 2019-01-23 RX ADMIN — ACETAMINOPHEN 1000 MG: 500 TABLET ORAL at 00:02

## 2019-01-23 RX ADMIN — DORZOLAMIDE HYDROCHLORIDE AND TIMOLOL MALEATE 1 DROP: 20; 5 SOLUTION/ DROPS OPHTHALMIC at 10:37

## 2019-01-23 RX ADMIN — RIVAROXABAN 10 MG: 10 TABLET, FILM COATED ORAL at 12:33

## 2019-01-23 RX ADMIN — GABAPENTIN 800 MG: 400 CAPSULE ORAL at 10:34

## 2019-01-23 RX ADMIN — ACETAMINOPHEN 1000 MG: 500 TABLET ORAL at 12:32

## 2019-01-23 RX ADMIN — SODIUM CHLORIDE 125 ML/HR: 900 INJECTION, SOLUTION INTRAVENOUS at 03:28

## 2019-01-23 RX ADMIN — FERROUS SULFATE TAB 325 MG (65 MG ELEMENTAL FE) 325 MG: 325 (65 FE) TAB at 10:34

## 2019-01-23 RX ADMIN — Medication 2 G: at 00:01

## 2019-01-23 RX ADMIN — ESCITALOPRAM OXALATE 20 MG: 10 TABLET ORAL at 10:34

## 2019-01-23 RX ADMIN — OXYCODONE HYDROCHLORIDE 5 MG: 5 TABLET ORAL at 03:26

## 2019-01-23 RX ADMIN — DEXTROSE MONOHYDRATE 25 G: 500 INJECTION PARENTERAL at 06:56

## 2019-01-23 RX ADMIN — GABAPENTIN 800 MG: 400 CAPSULE ORAL at 16:42

## 2019-01-23 RX ADMIN — SENNOSIDES AND DOCUSATE SODIUM 1 TABLET: 8.6; 5 TABLET ORAL at 10:34

## 2019-01-23 RX ADMIN — DEXTROSE MONOHYDRATE 25 G: 500 INJECTION PARENTERAL at 12:27

## 2019-01-23 RX ADMIN — OXYCODONE HYDROCHLORIDE 10 MG: 5 TABLET ORAL at 07:16

## 2019-01-23 RX ADMIN — OXYCODONE HYDROCHLORIDE 5 MG: 5 TABLET ORAL at 16:44

## 2019-01-23 RX ADMIN — POLYETHYLENE GLYCOL 3350 17 G: 17 POWDER, FOR SOLUTION ORAL at 10:34

## 2019-01-23 RX ADMIN — FAMOTIDINE 20 MG: 20 TABLET ORAL at 12:32

## 2019-01-23 NOTE — DIABETES MGMT
DTC Pump Note    Recommendations/ Comments: Chart reviewed on Sesar Garcia due to hypoglycemia while on insulin pump (55 mg/dL this am). Spoke with patient and she feels she did not eat enough carbohydrates last night. Also, reviewed pump boluses and found that patient had done multiple boluses in a fairly short amount of time. Her friend who helps her manage her pump stated that her endocrinologist said they can made changes as needed and then let him know. She plans to change active insulin to 3 hours which will block patient from delivering too much insulin in a short amount of time. She may also make a slight change to basal rate, but clinician suggested she speak with MD.     Patient is a 76 y.o. female with known DM on a Medtronic pump. Insulin Pump Settings    Date of last site change is 1/21/19. The supplies and her roommate will change the set for her. Basal Rates      12AM 2.35 units/hr all day      Insulin Carb Ratio  12AM  4    Sensitivity factor: 25, target 120 mg/dL, Active insulin 2 hours      A1c:   Lab Results   Component Value Date/Time    Hemoglobin A1c 6.7 (H) 01/07/2019 08:31 AM    Hemoglobin A1c, External 6.2 06/30/2015       Recent Glucose Results:   Lab Results   Component Value Date/Time    GLU 75 01/23/2019 03:46 AM    GLUCPOC 104 (H) 01/23/2019 12:38 PM    GLUCPOC 137 (H) 01/23/2019 07:19 AM    GLUCPOC 55 (L) 01/23/2019 06:51 AM        Lab Results   Component Value Date/Time    Creatinine 1.60 (H) 01/23/2019 03:46 AM     Estimated Creatinine Clearance: 37.1 mL/min (A) (based on SCr of 1.6 mg/dL (H)). Active Orders   Diet    DIET DIABETIC WITH OPTIONS Consistent Carb 1800kcal; Regular        PO intake: No data found. Will continue to follow as needed. Thank you.   Jessica Maxwell MS, RN, CDE    Time Spent: 25 minutes

## 2019-01-23 NOTE — PROGRESS NOTES
Problem: Self Care Deficits Care Plan (Adult)  Goal: *Acute Goals and Plan of Care (Insert Text)  Occupational Therapy Goals  Initiated: 1/23/2019   1. Patient will perform grooming with supervision/set-up standing at sink within 3 day(s). 2.  Patient will perform bathing with supervision/set-up from chair within 3 day(s). 3.  Patient will perform lower body dressing with min A within 3 day(s). 4.  Patient will perform toilet transfers with supervision/set-up within 3 day(s). 5.  Patient will perform all aspects of toileting with supervision/set-up within 3 day(s). Occupational Therapy EVALUATION  Patient: Vin Oliveros (50 y.o. female)  Date: 1/23/2019  Primary Diagnosis: OSTEOARTHRITIS OF RIGHT KNEE  Primary osteoarthritis of right knee  Procedure(s) (LRB):  RIGHT TOTAL KNEE REPLACEMENT  (SPINAL PLUS GEN) (Right) 1 Day Post-Op   Precautions:   Fall, WBAT    ASSESSMENT :  Based on the objective data described below, the patient presents with decreased independence with self care and functional mobility following admission for R TKR. Pt was able to progress with toileting, dressing and bathing but does continue to perform below her baseline. She has support from her  for home who provides overall min A for ADl activities for her. Pt was able to progress to bathroom but anticipate safety issues with getting on/off standard commode. Pt with LOB standing from low chair and given cues and education this is simulating toileting for home. Pt unable to afford BSC and BSC will not be covered by insurance. Discussed [employer] and  is completing application at this time. Pt also unable to afford walker co-payment. Will fax application when completed to [employer]. Hours of operation at M/W/Th from 9-2 pm.  Equipment will not be available until tomorrow as office was closed at 5. Will follow for continued ADl training tomorrow as appropriate.     Patient will benefit from skilled intervention to address the above impairments. Patients rehabilitation potential is considered to be Fair  Factors which may influence rehabilitation potential include:   [x]             None noted  []             Mental ability/status  []             Medical condition  []             Home/family situation and support systems  []             Safety awareness  []             Pain tolerance/management  []             Other:      PLAN :  Recommendations and Planned Interventions:  [x]               Self Care Training                  [x]        Therapeutic Activities  [x]               Functional Mobility Training    []        Cognitive Retraining  [x]               Therapeutic Exercises           [x]        Endurance Activities  [x]               Balance Training                   []        Neuromuscular Re-Education  []               Visual/Perceptual Training     [x]   Home Safety Training  [x]               Patient Education                 [x]        Family Training/Education  []               Other (comment):    Frequency/Duration: Patient will be followed by occupational therapy 5 times a week to address goals. Discharge Recommendations: None  Further Equipment Recommendations for Discharge: BSC and RW     SUBJECTIVE:   Patient stated I am feeling alright.     OBJECTIVE DATA SUMMARY:   HISTORY:   Past Medical History:   Diagnosis Date    Adverse effect of anesthesia     SLOW WAKING PAST ANESTHESIA    Anxiety     Arthritis     CAD (coronary artery disease)     s/p CABG x 3v    Chest pain 7/2015    Chronic obstructive pulmonary disease (HCC)     CTS (carpal tunnel syndrome)     S/p bilateral release    Diabetes ([address]     Diabetic gastroparesis ([address]     Diabetic neuropathy ([address]     Diabetic retinopathy ([address]     GERD (gastroesophageal reflux disease)     GI bleed 7/2015    4 bleeds with 9 clips placed    Hypercholesteremia     Hypertension     Hypothyroid     Ill-defined condition NEUROPATHY HANDS AND FEET    Ill-defined condition 2017    GI BLEED    Nicotine vapor product user     JOHN on CPAP     Osteoporosis     Vitamin D deficiency      Past Surgical History:   Procedure Laterality Date    CABG, ARTERY-VEIN, THREE  7/13/2015    HX CERVICAL FUSION  2011    HX ENDOSCOPY  7/2015    HX GI      HX HEART CATHETERIZATION  7/2015    HX LUMBAR FUSION  2017    HX LUMBAR LAMINECTOMY  2011    HX ORTHOPAEDIC Right 1970    CARPAL TUNNEL    HX ORTHOPAEDIC Left 2003    CARPAL TUNNEL    HX ROTATOR CUFF REPAIR Right 2003    HX SHOULDER ARTHROSCOPY Right     HX TONSIL AND ADENOIDECTOMY  1968    HX TONSILLECTOMY         Prior Level of Function/Environment/Context: Pt was independent at home prior to surgery. Expanded or extensive additional review of patient history:     Home Situation  Home Environment: Private residence  # Steps to Enter: 8  One/Two Story Residence: One story  Living Alone: No  Support Systems: Friends \ neighbors  Patient Expects to be Discharged to[de-identified] Private residence  Current DME Used/Available at Home: Cane, straight  Tub or Shower Type: Tub/Shower combination    Hand dominance: Right    EXAMINATION OF PERFORMANCE DEFICITS:  Cognitive/Behavioral Status:  Neurologic State: Alert  Orientation Level: Oriented X4  Cognition: Appropriate for age attention/concentration  Perception: Appears intact  Perseveration: No perseveration noted  Safety/Judgement: Good awareness of safety precautions    Skin: see nursing notes    Edema: none noted    Hearing: Auditory  Auditory Impairment: None  Hearing Aids/Status: Does not own    Vision/Perceptual:                           Acuity: Within Defined Limits         Range of Motion:    AROM: Within functional limits  PROM: Within functional limits                      Strength:    Strength: Within functional limits                Coordination:  Coordination: Within functional limits  Fine Motor Skills-Upper: Right Intact; Left Intact Gross Motor Skills-Upper: Right Intact; Left Intact    Tone & Sensation:    Tone: Normal  Sensation: Intact                      Balance:  Sitting: Intact  Standing: Intact; With support    Functional Mobility and Transfers for ADLs:  Bed Mobility:  Rolling: Contact guard assistance  Supine to Sit: (recieved in chair)  Sit to Supine: (remained in chair )    Transfers:  Sit to Stand: Supervision  Stand to Sit: Supervision  Bed to Chair: Supervision  Bathroom Mobility: Supervision/set up  Toilet Transfer : Supervision(needing cues for moblity due to visual impairements)    ADL Assessment:  Feeding: Supervision    Oral Facial Hygiene/Grooming: Supervision    Bathing: Minimum assistance    Upper Body Dressing: Minimum assistance    Lower Body Dressing: Moderate assistance(min A at baseline)    Toileting: Moderate assistance(due to bladder accident)                ADL Intervention and task modifications:     Pt completed toileting, bathing, and grooming during intervention. She does have low vision which will make her at increased fall risk but in her environment, she has adapted very well. She will have a very supportive  to assist her as needed at home. Pt did well with training but is functioning below her baseline. She currently needs mod A for lower body dressing and noted with LOB           Cognitive Retraining  Safety/Judgement: Good awareness of safety precautions    Functional Measure:  Barthel Index:    Bathin  Bladder: 5  Bowels: 10  Groomin  Dressin  Feeding: 10  Mobility: 10  Stairs: 5  Toilet Use: 5  Transfer (Bed to Chair and Back): 10  Total: 65        The Barthel ADL Index: Guidelines  1. The index should be used as a record of what a patient does, not as a record of what a patient could do. 2. The main aim is to establish degree of independence from any help, physical or verbal, however minor and for whatever reason.   3. The need for supervision renders the patient not independent. 4. A patient's performance should be established using the best available evidence. Asking the patient, friends/relatives and nurses are the usual sources, but direct observation and common sense are also important. However direct testing is not needed. 5. Usually the patient's performance over the preceding 24-48 hours is important, but occasionally longer periods will be relevant. 6. Middle categories imply that the patient supplies over 50 per cent of the effort. 7. Use of aids to be independent is allowed. Tariq Musa., Barthel, DFATMATA. (1667). Functional evaluation: the Barthel Index. [address] [facility] (14)2. HAKAN Ray, [place]., Rusty Carvalho., Vasquez [facility], [address] [facility] (1999). Measuring the change indisability after inpatient rehabilitation; comparison of the responsiveness of the Barthel Index and Functional [place] Measure. Journal of Neurology, Neurosurgery, and Psychiatry, 66(4), 173-472. Vivien Tracey, N.J.A, ALICIA Bagley, & Jael Johnston, M.A. (2004.) Assessment of post-stroke quality of life in cost-effectiveness studies: The usefulness of the Barthel Index and the EuroQoL-5D. Quality of Life Research, 15, 400-56       Occupational Therapy Evaluation Charge Determination   History Examination Decision-Making   LOW Complexity : Brief history review  LOW Complexity : 1-3 performance deficits relating to physical, cognitive , or psychosocial skils that result in activity limitations and / or participation restrictions  LOW Complexity : No comorbidities that affect functional and no verbal or physical assistance needed to complete eval tasks       Based on the above components, the patient evaluation is determined to be of the following complexity level: LOW   Pain:  Pain Scale 1: Numeric (0 - 10)  Pain Intensity 1: 5  Pain Location 1: Knee  Pain Orientation 1: Right  Pain Description 1: Aching; Sore  Pain Intervention(s) 1: Ice;Rest;Distraction  Activity Tolerance:   VSS throughout session. After treatment:   [x] Patient left in no apparent distress sitting up in chair  [] Patient left in no apparent distress in bed  [x] Call bell left within reach  [x] Nursing notified  [] Caregiver present  [] Bed alarm activated    COMMUNICATION/EDUCATION:   The patients plan of care was discussed with: Physical Therapist and Registered Nurse. [x] Home safety education was provided and the patient/caregiver indicated understanding. [] Patient/family have participated as able in goal setting and plan of care. [x] Patient/family agree to work toward stated goals and plan of care. [] Patient understands intent and goals of therapy, but is neutral about his/her participation. [] Patient is unable to participate in goal setting and plan of care. This patients plan of care is appropriate for delegation to [facility].     Thank you for this referral.  Lauren Mena OT  Time Calculation: 37 mins

## 2019-01-23 NOTE — PROGRESS NOTES
Problem: Mobility Impaired (Adult and Pediatric)  Goal: *Acute Goals and Plan of Care (Insert Text)  Physical Therapy Goals  Initiated 1/22/2019    1. Patient will move from supine to sit and sit to supine  in bed with independence within 4 days. 2. Patient will perform sit to stand with independence within 4 days. 3. Patient will ambulate with modified independence for 200 feet with the least restrictive device within 4 days. 4. Patient will ascend/descend 8 stairs with 1 handrail(s) with modified independence within 4 days. 5. Patient will perform home exercise program per protocol with independence within 4 days. 6. Patient will demonstrate AROM 0-90 degrees in operative joint within 4 days. physical Therapy TREATMENT  Patient: Camron Salinas (64 y.o. female)  Date: 1/23/2019  Diagnosis: OSTEOARTHRITIS OF RIGHT KNEE  Primary osteoarthritis of right knee Primary osteoarthritis of right knee  Procedure(s) (LRB):  RIGHT TOTAL KNEE REPLACEMENT  (SPINAL PLUS GEN) (Right) 1 Day Post-Op  Precautions: Fall, WBAT  Chart, physical therapy assessment, plan of care and goals were reviewed. ASSESSMENT:  Steady progress toward goals. Patient currently needing supervision for sit to stand transfers but needs some cues for technique. Amb approx 125 feet with RW and CGA with slow phong and decreased step clearance. Up/down 4 stairs with B rails and CGA with cues for technique but no overt LOB. Patient unable to afford co-pay for RW and info provided for [employer]. Also spoke with PT from [facility] and she states that her coworker may be able to provide patient with RW as they collect and donate them as needed. Amb patient using rollator walker with CGA and patient would be safest with standard RW but would be safe with rollator walker for short time at home.   Will have 24/7 supervision of partner and educated partner that she needs to be with patient at all times when ambulating and they indicated their understanding. Partner to get RW from [facility] unable to provide RW. Communicated above to RN as well as JONATHAN Castro [facility]. Patient is cleared to DC home at this time. Progression toward goals:  [x]      Improving appropriately and progressing toward goals  []      Improving slowly and progressing toward goals  []      Not making progress toward goals and plan of care will be adjusted     PLAN:  Patient continues to benefit from skilled intervention to address the above impairments. Continue treatment per established plan of care. Discharge Recommendations:  Home Health  Further Equipment Recommendations for Discharge:  rolling walker-see above     SUBJECTIVE:   Patient stated I really want to go home today. Don't make me stay here another night.     OBJECTIVE DATA SUMMARY:   Critical Behavior:  Neurologic State: Alert  Orientation Level: Oriented X4  Cognition: Appropriate for age attention/concentration  Safety/Judgement: Good awareness of safety precautions  Range of Motion:  AROM: Within functional limits  PROM: Within functional limits                    Functional Mobility Training:  Bed Mobility:  Rolling: Contact guard assistance  Supine to Sit: (recieved in chair)  Sit to Supine: (remained in chair )           Transfers:  Sit to Stand: Supervision  Stand to Sit: Supervision        Bed to Chair: Supervision                    Balance:  Sitting: Intact  Standing: Intact; With support  Ambulation/Gait Training:  Distance (ft): 125 Feet (ft)  Assistive Device: Gait belt;Walker, rolling(amb additional 100 feet with rollator)  Ambulation - Level of Assistance: Contact guard assistance        Gait Abnormalities: Antalgic;Decreased step clearance; Step to gait  Right Side Weight Bearing: As tolerated     Base of Support: Widened     Speed/Renu: Pace decreased (<100 feet/min); Slow  Step Length: Left shortened;Right shortened       Stairs:  Number of Stairs Trained: 4  Stairs - Level of Assistance: Contact guard assistance  Rail Use: Both  Therapeutic Exercises:     EXERCISE   Sets   Reps   Active Active Assist   Passive Self ROM   Comments   Ankle Pumps 1 10 []                                        []                                        []                                        []                                           Quad Sets 1 5 []                                        []                                        []                                        []                                           Hamstring Sets   []                                        []                                        []                                        []                                           Short Arc Quads   []                                        []                                        []                                        []                                           Knee Extension Stretch     []                                          []                                          []                                          []                                           Heel Slides 1 5 []                                        []                                        []                                        []                                           Long Arc Quads   []                                        []                                        []                                        []                                           Knee Flexion Stretch   []                                        []                                        []                                        []                                           Straight Leg Raises   []                                        []                                        []                                        []                                             Pain:  Pain Scale 1: Numeric (0 - 10)  Pain Intensity 1: 5  Pain Location 1: Knee  Pain Orientation 1: Right  Pain Description 1: Aching; Sore  Pain Intervention(s) 1: Ice;Rest;Distraction  Activity Tolerance:   VSS  Please refer to the flowsheet for vital signs taken during this treatment.   After treatment:   [x] Patient left in no apparent distress sitting up in chair  [] Patient left in no apparent distress in bed  [x] Call bell left within reach  [x] Nursing notified  [x] Caregiver present  [] Bed alarm activated    COMMUNICATION/COLLABORATION:   The patients plan of care was discussed with: Physical Therapist, Occupational Therapist and Registered Nurse    Rebecca Araiza, PT, DPT   Time Calculation: 45 mins

## 2019-01-23 NOTE — PROGRESS NOTES
Care Management Interventions  PCP Verified by CM: Yes  Mode of Transport at Discharge: Other (see comment)(family)  Transition of Care Consult (CM Consult): Home Health, Discharge [address] [id number]: No  Reason Outside [place]: Patient already serviced by other home care/hospice agency(patient prefers [facility] [address])  Physical Therapy Consult: Yes  Occupational Therapy Consult: Yes  Speech Therapy Consult: No  Current Support Network: Lives with Spouse, Own Home  Confirm Follow Up Transport: Family  Plan discussed with Pt/Family/Caregiver: Yes  Freedom of Choice Offered: Yes  [place] Resource Information Provided?: No  Discharge Location  Discharge Placement: Home with home health     Reason for Admission:   Total knee replacement                  RRAT Score: 19                 Do you (patient/family) have any concerns for transition/discharge? None              Plan for utilizing home health:  [facility]       Likelihood of readmission? no            Transition of Care Plan:   Home with home health.     Crow [address], BSW/CRM

## 2019-01-23 NOTE — DISCHARGE SUMMARY
Orthopedic Service Discharge Summary    Patient ID:  Fito Medrano  [id number]  female  76 y.o.  1950    Admit date: 1/22/2019    Discharge date and time: 1/23/2019  5:32 PM     Admitting Physician: Liberty Torres MD     Discharge Physician: Liberty Torres MD    Consulting Physician(s): Treatment Team: Care Manager: Aileen Bliss; Care Manager: Netta Omalley BSW    Date of Surgery: 1/22/2019     Preoperative Diagnosis:  OSTEOARTHRITIS OF RIGHT KNEE    Postoperative Diagnosis: OSTEOARTHRITIS OF RIGHT KNEE    Procedure(s): Procedure(s):  RIGHT TOTAL KNEE REPLACEMENT  (SPINAL PLUS GEN)    Surgeon: Surgeon(s) and Role:     * Lila Ludwig MD - Primary      Anesthesia:  Spinal    Preoperative Medical Clearance:                           HPI:  Pt is a 76 y.o. female who has a history of OSTEOARTHRITIS OF RIGHT KNEE  Primary osteoarthritis of right knee  with pain and limitations of activities of daily living who presents at this time for a right TKR following the failure of conservative management. PMH:   Past Medical History:   Diagnosis Date    Adverse effect of anesthesia     SLOW WAKING PAST ANESTHESIA    Anxiety     Arthritis     CAD (coronary artery disease)     s/p CABG x 3v    Chest pain 7/2015    Chronic obstructive pulmonary disease (HCC)     CTS (carpal tunnel syndrome)     S/p bilateral release    Diabetes ([address]     Diabetic gastroparesis ([address]     Diabetic neuropathy ([address]     Diabetic retinopathy ([address]     GERD (gastroesophageal reflux disease)     GI bleed 7/2015    4 bleeds with 9 clips placed    Hypercholesteremia     Hypertension     Hypothyroid     Ill-defined condition     NEUROPATHY HANDS AND FEET    Ill-defined condition 2017    GI BLEED    Nicotine vapor product user     JOHN on CPAP     Osteoporosis     Vitamin D deficiency        Medications upon admission :   Prior to Admission Medications   Prescriptions Last Dose Informant Patient Reported? Taking?    insulin pump (PATIENT SUPPLIED) misc   Yes No   Sig: by SubCUTAneous route as needed. 1.7 units/hr   amLODIPine (NORVASC) 5 mg tablet 1/22/2019 at Unknown time  Yes Yes   Sig: TK 1 T PO QD. PATINET TAKE MED DAILY   b complex vitamins tablet 1/15/2019 at Unknown time  Yes Yes   Sig: Take 1 Tab by mouth daily. cholecalciferol, vitamin D3, (VITAMIN D3) 2,000 unit tab 1/15/2019 at Unknown time  No Yes   Sig: Take 1 Tab by mouth daily. dorzolamide-timolol, PF, (COSOPT, PF,) 2-0.5 % dpet 1/22/2019 at Unknown time  Yes Yes   Sig: Administer 1 Drop to both eyes two (2) times a day. Indications: Ocular Hypertension   escitalopram oxalate (LEXAPRO) 20 mg tablet 1/22/2019 at Unknown time  Yes Yes   Sig: Take 20 mg by mouth daily. ferrous sulfate 325 mg (65 mg iron) tablet 1/15/2019 at Unknown time  Yes Yes   Sig: Take 325 mg by mouth two (2) times a day.   gabapentin (NEURONTIN) 800 mg tablet 1/22/2019 at Unknown time  Yes Yes   Sig: Take 800 mg by mouth three (3) times daily. insulin aspart U-100 (NOVOLOG U-100 INSULIN ASPART) 100 unit/mL injection 1/22/2019 at Unknown time  No Yes   Sig: For use with pump   insulin aspart U-100 (NOVOLOG) 100 unit/mL injection   No No   Sig: For use with insulin pump. 130 units per day max   Patient taking differently: For use with insulin pump. 130 units per day max   levothyroxine (SYNTHROID) 125 mcg tablet 1/15/2019 at Unknown time  No Yes   Sig: Take 1 Tab by mouth Daily (before breakfast). lisinopril (PRINIVIL, ZESTRIL) 10 mg tablet 1/21/2019 at Unknown time  No Yes   Sig: Take 1 Tab by mouth daily. Patient taking differently: Take 20 mg by mouth two (2) times a day. metoprolol tartrate (LOPRESSOR) 50 mg tablet 1/22/2019 at 0400  Yes Yes   Sig: TK 1 T PO BID WC. PATIENT TAKES MED TWICE DAILY   pantoprazole (PROTONIX) 40 mg tablet 1/22/2019 at Unknown time  Yes Yes   Sig: Take 40 mg by mouth daily.    pravastatin (PRAVACHOL) 40 mg tablet 1/21/2019 at Unknown time No Yes   Sig: Take 1 Tab by mouth nightly. senna (SENNA) 8.6 mg tablet Not Taking at Unknown time  Yes No   Sig: Take 1 Tab by mouth nightly as needed. tiZANidine & Irritant Cntr 2 4 mg kit 1/20/2019  Yes No   Sig: tizanidine 4 mg tablet AT BEDTIME   umeclidinium-vilanterol (ANORO ELLIPTA) 62.5-25 mcg/actuation inhaler 12/22/2018 at Unknown time  Yes Yes   Sig: Take 1 Puff by inhalation daily. Facility-Administered Medications: None        Allergies: Allergies   Allergen Reactions    Nsaids (Non-Steroidal Anti-Inflammatory Drug) Other (comments)     bleeding    Augmentin [Amoxicillin-Pot Clavulanate] Diarrhea    Vesicare [Solifenacin] Rash     [facility] Course: The patient underwent surgery. Complications:  None; patient tolerated the procedure well. Was taken to the PACU in stable condition and then transferred to the Orthopedics floor. Condition on discharge: good    Perioperative Antibiotics:  Ancef     Postoperative Pain Management:  Acetaminophen, mobic, oxycodone    DVT Prophylaxis: Xarelto 10, SCDs    Postoperative transfusions:     none Banked PRBCs     Post Op complications: none    Hemoglobin at discharge:    Lab Results   Component Value Date/Time    HGB 12.1 01/23/2019 03:46 AM    INR 1.0 01/07/2019 08:31 AM       Dressing was changed on POD # 2. Incision - clean, dry and intact. No significant erythema or swelling. Neurovascular exam within normal limits. Wound appears to be healing without any evidence of infection. Physical Therapy started on the day following surgery and progressed to ambulation with the aid of a rolling walker for distances of 125 feet with supervision. Range of motion  limited by pain. At the time of discharge, able to go up and down stairs and had understanding of precautions needed following surgery. Discharged to: Home with Home Health.     Discharge instructions:  -See Full Summary of discharge instructions attached  -Anticoagulate with Xarelto  -Resume pre hospital diet            -Resume home medications   Discharge Medication List as of 1/23/2019  3:29 PM      START taking these medications    Details   acetaminophen (TYLENOL) 500 mg tablet Take 2 Tabs by mouth every six (6) hours. , Print, Disp-120 Tab, R-0      oxyCODONE IR (ROXICODONE) 5 mg immediate release tablet Take 1-2 Tabs by mouth every four (4) hours as needed. Max Daily Amount: 60 mg., Print, Disp-60 Tab, R-0      rivaroxaban (XARELTO) 10 mg tablet Take 1 Tab by mouth daily (with lunch). , Print, Disp-13 Tab, R-0         CONTINUE these medications which have NOT CHANGED    Details   umeclidinium-vilanterol (ANORO ELLIPTA) 62.5-25 mcg/actuation inhaler Take 1 Puff by inhalation daily. , Historical Med      b complex vitamins tablet Take 1 Tab by mouth daily. , Historical Med      amLODIPine (NORVASC) 5 mg tablet TK 1 T PO QD. PATINET TAKE MED DAILY, Historical Med, R-1      metoprolol tartrate (LOPRESSOR) 50 mg tablet TK 1 T PO BID WC. PATIENT TAKES MED TWICE DAILY, Historical Med, R-5      !! insulin aspart U-100 (NOVOLOG U-100 INSULIN ASPART) 100 unit/mL injection For use with pump, Sample, Disp-20 mL, R-0      escitalopram oxalate (LEXAPRO) 20 mg tablet Take 20 mg by mouth daily. , Historical Med      pantoprazole (PROTONIX) 40 mg tablet Take 40 mg by mouth daily. , Historical Med      gabapentin (NEURONTIN) 800 mg tablet Take 800 mg by mouth three (3) times daily. , Historical Med      levothyroxine (SYNTHROID) 125 mcg tablet Take 1 Tab by mouth Daily (before breakfast). , Normal, Disp-90 Tab, R-3      lisinopril (PRINIVIL, ZESTRIL) 10 mg tablet Take 1 Tab by mouth daily. , Normal, Disp-90 Tab, R-3      pravastatin (PRAVACHOL) 40 mg tablet Take 1 Tab by mouth nightly., Normal, Disp-90 Tab, R-3      cholecalciferol, vitamin D3, (VITAMIN D3) 2,000 unit tab Take 1 Tab by mouth daily. , Normal, Disp-90 Tab, R-3      ferrous sulfate 325 mg (65 mg iron) tablet Take 325 mg by mouth two (2) times a day., Historical Med      dorzolamide-timolol, PF, (COSOPT, PF,) 2-0.5 % dpet Administer 1 Drop to both eyes two (2) times a day. Indications: Ocular Hypertension, Historical Med      !! insulin aspart U-100 (NOVOLOG) 100 unit/mL injection For use with insulin pump. 130 units per day max, Sample, Disp-20 mL, R-0      tiZANidine & Irritant Cntr 2 4 mg kit tizanidine 4 mg tablet AT BEDTIME, Historical Med       insulin pump (PATIENT SUPPLIED) misc by SubCUTAneous route as needed. 1.7 units/hr, Historical Med      senna (SENNA) 8.6 mg tablet Take 1 Tab by mouth nightly as needed., Historical Med       !! - Potential duplicate medications found. Please discuss with provider.        per medical continuation form  -Discharge activity: Activity as tolerated  -Ambulate with Walkers, Type: Rolling Walker, appropriate total joint protocol  -Wound Care Keep wound clean and dry, Reinforce dressing PRN, Ice to area for comfort and As directed  -Follow up in office in 2 weeks      Signed:  Juliano Cohn PA-C  1/28/2019  12:33 PM        No att. providers found

## 2019-01-23 NOTE — PROGRESS NOTES
Patient BG 57 and then 59 on recheck. 4 ounces of orange juice given to patient. BG rechecked 15 minutes later, BG remained at 55. 50mL dextrose given IV at 0656. Will recheck BG in 15 minutes. 0715: BG recheck improved to 137. Will continue to monitor.

## 2019-01-23 NOTE — PROGRESS NOTES
Problem: Mobility Impaired (Adult and Pediatric)  Goal: *Acute Goals and Plan of Care (Insert Text)  Physical Therapy Goals  Initiated 1/22/2019    1. Patient will move from supine to sit and sit to supine  in bed with independence within 4 days. 2. Patient will perform sit to stand with independence within 4 days. 3. Patient will ambulate with modified independence for 200 feet with the least restrictive device within 4 days. 4. Patient will ascend/descend 8 stairs with 1 handrail(s) with modified independence within 4 days. 5. Patient will perform home exercise program per protocol with independence within 4 days. 6. Patient will demonstrate AROM 0-90 degrees in operative joint within 4 days. physical Therapy TREATMENT  Patient: Munir Conde (89 y.o. female)  Date: 1/23/2019  Diagnosis: OSTEOARTHRITIS OF RIGHT KNEE  Primary osteoarthritis of right knee Primary osteoarthritis of right knee  Procedure(s) (LRB):  RIGHT TOTAL KNEE REPLACEMENT  (SPINAL PLUS GEN) (Right) 1 Day Post-Op  Precautions: Fall, WBAT  Chart, physical therapy assessment, plan of care and goals were reviewed. ASSESSMENT:  Patient making good progress toward goals. Currently needing CGA for supine to sit transfer. Instructed in HEP per protocol. CGA for sit to stand transfers. Amb approx 100 feet with RW and CGA with slow phong and decreased step clearance with no overt LOB but antalgic pattern overall. Ordered standard RW for patient as unsafe with rollator walker. Patient limited by pain, vision, and fair functional mobility. Recommend  PT follow up. Will try stairs this PM in prep to [place] home today. Progression toward goals:  [x]      Improving appropriately and progressing toward goals  []      Improving slowly and progressing toward goals  []      Not making progress toward goals and plan of care will be adjusted     PLAN:  Patient continues to benefit from skilled intervention to address the above impairments. Continue treatment per established plan of care. Discharge Recommendations:  Home Health  Further Equipment Recommendations for Discharge:  RW ordered      SUBJECTIVE:   Patient stated I'm going to go home today.     OBJECTIVE DATA SUMMARY:   Critical Behavior:  Neurologic State: Alert           Functional Mobility Training:  Bed Mobility:  Rolling: Contact guard assistance  Supine to Sit: Contact guard assistance              Transfers:  Sit to Stand: Contact guard assistance; Additional time;Assist x1  Stand to Sit: Contact guard assistance                             Balance:  Sitting: Intact  Standing: Intact; With support  Ambulation/Gait Training:  Distance (ft): 100 Feet (ft)  Assistive Device: Gait belt;Walker, rolling  Ambulation - Level of Assistance: Contact guard assistance; Additional time;Assist x1        Gait Abnormalities: Antalgic;Decreased step clearance  Right Side Weight Bearing: As tolerated     Base of Support: Widened     Speed/Renu: Pace decreased (<100 feet/min); Slow  Step Length: Left shortened;Right shortened       Therapeutic Exercises:   SUPINE  EXERCISES   Sets   Reps   Active Active Assist   Passive Self ROM   Comments   Ankle Pumps 1 10 []                                        []                                        []                                        []                                           Quad Sets 1 5 []                                        []                                        []                                        []                                           Heel Slides 1 5 []                                        []                                        []                                        []                                                                                       Pain Scale 1: Numeric (0 - 10)  Pain Intensity 1: 5  Pain Location 1: Knee  Pain Orientation 1: Right  Pain Description 1: Aching; Sore  Pain Intervention(s) 1: Ice;Rest;Distraction  Activity Tolerance:   VSS  Please refer to the flowsheet for vital signs taken during this treatment.   After treatment:   [x] Patient left in no apparent distress sitting up in chair  [] Patient left in no apparent distress in bed  [x] Call bell left within reach  [x] Nursing notified  [] Caregiver present  [] Bed alarm activated    COMMUNICATION/COLLABORATION:   The patients plan of care was discussed with: Physical Therapist, Occupational Therapist and Registered Nurse    Reginald Hayes PT, DPT   Time Calculation: 36 mins

## 2019-01-23 NOTE — PROGRESS NOTES
Bedside and Verbal shift change report given to Gee Jaeger RN (oncoming nurse) by Luzma Gilliam RN (offgoing nurse). Report included the following information SBAR, Kardex, Intake/Output and MAR.

## 2019-01-23 NOTE — PROGRESS NOTES
HYPOGLYCEMIC EPISODE DOCUMENTATION    Patient with hypoglycemic episode(s) at 1135(time) on 01/23/19(date). BG value(s) pre-treatment 71    Was patient symptomatic?  [] yes, [x] no  Patient was treated with the following rescue medications/treatments: [] D50                [] Glucose tablets                [] Glucagon                [x] 4oz juice                [] 6oz reg soda                [] 8oz low fat milk  BG value post-treatment: 70   Patient then given 25 mL of Dextrose 50% resulting in a blood glucose level of 104 at 1238  Once BG treated and value greater than 80mg/dl, pt was provided with the following:  [x] snack  [] meal  Name of MD notified:Alton CASTILLO)  The following orders were received: treat per protocol; consult with DTC; continue to monitor

## 2019-01-23 NOTE — DISCHARGE INSTRUCTIONS
After Hospital Care Plan:    Discharge Instructions Knee Replacement-Dr. Kesha Romeo    Patient Name  Cristina Elkins  Date of procedure  1/22/2019    Procedure  Procedure(s):  RIGHT TOTAL KNEE REPLACEMENT  (SPINAL PLUS GEN)  Surgeon  Surgeon(s) and Role:     * Fartun Miranda MD - Primary  Date of discharge: No discharge date for patient encounter. PCP: Chintan Hameed MD    Follow up appointments  -follow up with Dr. Kesha Romeo in 2 weeks. Call [phone] to make an appointment. Home Health Agency: _________________________   phone: ___________________  The agency will contact you to arrange dates/times for visits. Please call them if you do not hear from them within 24 hours after you are discharged. When to call your Orthopaedic Surgeon:  -unrelieved pain  -Signs of infection-if your incision is red; continues to have drainage; drainage has a foul odor or if you have a persistent fever over 101 degrees  -Signs of a blood clot in your leg-calf pain, tenderness, redness, swelling of lower leg  When to call your Primary Care Physician:  -Concerns about medical conditions such as diabetes, high blood pressure, asthma, congestive heart failure  -Call if blood sugars are elevated, persistent headache or dizziness, coughing or congestion, constipation or diarrhea, burning with urination, abnormal heart rate  When to call 911and go to the nearest emergency room  -acute onset of chest pain, shortness of breath, difficulty breathing    Activity  -weight bearing as tolerated with your walker or crutches. Refer to pages 23-31 of your handbook for instructions and pictures.   -20 repetitions of each exercise as instructed by therapist 3 times each day.   Refer to pages 32-40 of your handbook for exercise instructions and pictures  -get up every one hour and walk (except at night when sleeping)  -do not drive or operate heavy machinery    Incision Care  -you will have staples in your knee incision; they will be removed at the surgeons office visit in 2 weeks  -Keep a dressing (ABD and paper tape) on your incision and change daily. Wash your hands thoroughly before changing the dressing. Once you incision is not draining, you may leave it open to air  -You may shower and get your incision wet but do not submerge your incision under water in a bath tub, hot tub or swimming pool for 6 weeks after surgery. Preventing blood clots  -take Xarelto at 12 noon daily as prescribed by Dr. Ocasio Perfect    Pain management  -take pain medication as prescribed; decrease the amount you use as your pain lessens  -avoid alcoholic beverages while taking pain medication  -Please be aware that many medications contain Tylenol. We do not want you to over medicate so please read the information below as a guide. Do not take more than 4 Grams of Tylenol in a 24 hour period. (There are 1000 milligrams in one Gram)    -You may place an ice bag on your knee for 15-20 minutes after exercising    Diet  -resume usual diet; drink plenty of fluids; eat foods high in fiber  -you may want to take a stool softener (such as Senokot-S or Colace) to prevent constipation while you are taking pain medication. If constipation occurs, take a laxative (such as Dulcolax tablets, Milk of Magnesia, or a suppository)    [facility] Protocol ([facility]  Nursing-per physicians order  -complete head to toe assessment, vital signs  -staples will be removed in the surgeons office at 2 week visit  -medication reconciliation  -review pain management  -manage chronic medical conditions    Physical Therapy-per physicians order  Weight bearing status:  Precautions at Admission: Fall, WBAT  Left Side Weight Bearing: Full  Right Side Weight Bearing: As tolerated  ?   Mobility Status:  Supine to Sit: Contact guard assistance  Sit to Stand: Contact guard assistance, Additional time, Assist x1  Sit to Supine: Minimum assistance     Gait:  Distance (ft): 100 Feet (ft)  Ambulation - Level of Assistance: Contact guard assistance, Additional time, Assist x1  Assistive Device: Gait belt, Walker, rolling  Gait Abnormalities: Antalgic, Decreased step clearance  ADL status overall composite:                -Home Therapy  -assessment and evaluation-bed mobility; functional transfers (bed, chair, bathroom, stairs); ambulation with equipment, car transfers, shower transfers, safety and ability to get out of house in the event of an emergency  -review weight bearing as tolerated, wean from walker or crutches as tolerated  -discuss pain management  -review how to do ADLs    -Home Exercise Program-refer to [id number] of the patient handbook for exercises  -supine exercises-ankle pumps, hamstring sets, quad sets, heel slides, short arc quad sets, long arc quads-prop heel on pillow for stretch, straight leg raise-sitting exercises-active knee flexion, passive knee flexion, active knee extension, ankle pumps, bicep curls (use weights as appropriate), triceps pushups, shoulder flexion (use weights as appropriate)  -standing exercises holding onto supportive counter-toe raises, heel raises, mini-squats, heel/toe touches with knee bend, marching in place, hamstring curls

## 2019-01-23 NOTE — PROGRESS NOTES
Ortho Progress Note  1 Day Post-Op  Procedure(s):  RIGHT TOTAL KNEE REPLACEMENT  (SPINAL PLUS GEN)    Subjective: Doing well    Physical Exam:   Visit Vitals  /76 (BP 1 Location: Right arm, BP Patient Position: At rest)   Pulse 66   Temp 98 °F (36.7 °C)   Resp 16   Ht 5' 4\" (1.626 m)   Wt 92.5 kg (204 lb)   SpO2 97%   Breastfeeding? No   BMI 35.02 kg/m²     General appearance: alert, cooperative, no distress, appears stated age  Abdomen: soft, non-tender.  Bowel sounds normal. No masses,  no organomegaly  Extremities: normal post op swelling,  Pulses: 2+ and symmetric  Wound: Dressing intact  Neurologic: Grossly normal    Recent Results (from the past 24 hour(s))   GLUCOSE, POC    Collection Time: 01/22/19 10:05 AM   Result Value Ref Range    Glucose (POC) 165 (H) 65 - 100 mg/dL    Performed by [address] [place], [place] [id number], POC    Collection Time: 01/22/19  4:45 PM   Result Value Ref Range    Glucose (POC) 331 (H) 65 - 100 mg/dL    Performed by [employer] [employer] POC    Collection Time: 01/22/19  6:50 PM   Result Value Ref Range    Glucose (POC) 265 (H) 65 - 100 mg/dL    Performed by [facility] [facility] POC    Collection Time: 01/22/19  8:40 PM   Result Value Ref Range    Glucose (POC) 262 (H) 65 - 100 mg/dL    Performed by [address] [place] [id number], POC    Collection Time: 01/22/19 10:13 PM   Result Value Ref Range    Glucose (POC) 203 (H) 65 - 100 mg/dL    Performed by Abilio Beech    METABOLIC PANEL, BASIC    Collection Time: 01/23/19  3:46 AM   Result Value Ref Range    Sodium 141 136 - 145 mmol/L    Potassium 5.4 (H) 3.5 - 5.1 mmol/L    Chloride 112 (H) 97 - 108 mmol/L    CO2 24 21 - 32 mmol/L    Anion gap 5 5 - 15 mmol/L    Glucose 75 65 - 100 mg/dL    BUN 24 (H) 6 - 20 MG/DL    Creatinine 1.60 (H) 0.55 - 1.02 MG/DL    BUN/Creatinine ratio 15 12 - 20      GFR est AA 39 (L) >60 ml/min/1.73m2    GFR est non-AA 32 (L) >60 ml/min/1.73m2    Calcium 8.6 8.5 - 10.1 MG/DL   HEMOGLOBIN    Collection Time: 01/23/19  3:46 AM   Result Value Ref Range    HGB 12.1 11.5 - 16.0 g/dL   GLUCOSE, POC    Collection Time: 01/23/19  6:28 AM   Result Value Ref Range    Glucose (POC) 57 (L) 65 - 100 mg/dL    Performed by Samuel Cabezas, POC    Collection Time: 01/23/19  6:30 AM   Result Value Ref Range    Glucose (POC) 59 (L) 65 - 100 mg/dL    Performed by Merry Bis    GLUCOSE, POC    Collection Time: 01/23/19  6:51 AM   Result Value Ref Range    Glucose (POC) 55 (L) 65 - 100 mg/dL    Performed by Merry Bis    GLUCOSE, POC    Collection Time: 01/23/19  7:19 AM   Result Value Ref Range    Glucose (POC) 137 (H) 65 - 100 mg/dL    Performed by Gerda White        Assessment:  Stable Post Op    Plan:  DVT Prophylaxis:Xarelto  Physical Therapy: WBAT  Discharge Planning  Pain control: Good

## 2019-01-24 NOTE — PROGRESS NOTES
I have reviewed discharge instructions with the patient and spouse (partner). The patient and spouse (partner) verbalized understanding. Patient was unable to receive a free rolling walker before discharge. PT gave clearance for patient to discharge home once it was confirmed a rolling walker would be given to the patient tomorrow at home. At [facility] spoke with patient and PT Kushal Warren and stated they would donate a free walker to the patient tomorrow (02/24/19). Simran CASTILLO also informed of patient being discharged with her rollator and the situation of receiving a free walker and raised toilet seat at home the day following discharge. As patient was cleared to be discharged and was to only remain hospitalized d/t financial reasons hindering purchase of medical equipment. The patient and her partner ensured nursing and PT that they felt comfortable with going home today.

## 2019-01-25 NOTE — PROGRESS NOTES
CRM received a call from Holden Singh with Dr. Stephanie Marquez office. This patient is not doing well at home per home care ( [facility] [address].) The patient needs Rehab/ SNF placement. CRM will send referral to [facility] to see if they can help. They will need to get the insurance authorization with [facility] for admission. They will also need to obtain the therapy notes from [facility] [address] and Dr. Pepe Starks may need to do a peer to peer with the insurance company. CRM sent referral via [address] Holden Singh will contact the patient. TERESE EASTON spoke with Thee Calles with [facility] regarding the details above. She will work on getting the patient admitted.   TERESE

## 2019-02-13 ENCOUNTER — HOSPITAL ENCOUNTER (OUTPATIENT)
Dept: PHYSICAL THERAPY | Age: 69
Discharge: HOME | End: 2019-02-13
Payer: MEDICARE

## 2019-02-13 PROCEDURE — 97161 PT EVAL LOW COMPLEX 20 MIN: CPT

## 2019-02-13 PROCEDURE — 97110 THERAPEUTIC EXERCISES: CPT

## 2019-02-13 NOTE — PROGRESS NOTES
PT INITIAL EVALUATION NOTE - [facility] 2-15 Patient Name: Elaine Ward Date:2019 : 1950 [x]  Patient  Verified Payor: Alyse Maldonado / Plan: [address] [place] [address] HMO / Product Type: Managed Care Medicare / In time:1200  Out time:100 Total Treatment Time (min): 60 Total Timed Codes (min): 15 
1:1 Treatment Time ( only): 60 Visit #: 1 Treatment Area: Right knee pain [M25.561] SUBJECTIVE Pain Level (0-10 scale): 7 Any medication changes, allergies to medications, adverse drug reactions, diagnosis change, or new procedure performed?: [] No    [x] Yes (see summary sheet for update) Subjective:  Her friend and roommate accompanied her throughout today's evaluation. Patient is legally blind. Long history of b/l knee pain 2/2 to DJD. Right TKR on 19 by Dr. Tala Yanez. She had home health 2X/week up until last Friday. Patient's HEP was reviewed and included - walking in her apartment, sitting and standing marches, heel slides (with strap), quad sets, mini squats, HR's, supine hip abduction and flexion. She lives in a single story apartment with her friend Becca Zeng. She has to go down 7 steps to get to her apartment. She has been ascending and descending steps unilaterally for at least the last 8 months. She has used a rollator as needed for long walks for the better part of the last 2 years, a wide base cane the other times. PMH includes cervical fustion C2-6, lumbar fusion L4-5, shoulder RTC repair, CABG. Her cc today relative to her right knee is of diffuse pain and soreness rated 7/10 on a 0-10 scale. She is using pain meds every 6 hours and is icing sporadically. OBJECTIVE/EXAMINATION Ambulating with a walker. Good step too mechanics. AROM:  Left knee (-) 5 - 118     Right (-) 10 extension to 92 flexion in supine          In sitting EOB flexion to ~ 100 Scar is well healed. Limited scar mobility. Patella femoral mobility limited in all planes Sensitive to light touch throughout her anterior knee. Gross right LE strength 4-/5 Modality rationale: decrease edema to improve the patients ability to improve function Type Additional Details  
[] Estim: []Att   []Unatt        []TENS instruct []IFC  []Premod   []NMES []Other:  []w/US   []w/ice   []w/heat Position: Location:  
[]  Traction: [] Cervical       []Lumbar 
                     [] Prone          []Supine []Intermittent   []Continuous Lbs: 
[] before manual 
[] after manual 
[]w/heat  
[]  Ultrasound: []Continuous   [] Pulsed at: 
                         []1MHz   []3MHz Location: 
W/cm2:  
[] Paraffin Location:  
[]w/heat  
[]  Ice     []  Heat 
[]  Ice massage Position: Location:  
[]  Laser 
[]  Other: Position: Location:  
[x]  Vasopneumatic Device Pressure:       [] lo [x] med [] hi  
Temperature: 34 She only lasted 6 minutes on the vaso 2/2 to discomfort. [x] Skin assessment post-treatment:  [x]intact []redness- no adverse reaction 
  []redness  adverse reaction:  
 
15 min Therapeutic Exercise:  [x] See flow sheet :  
Rationale: increase ROM and increase strength to improve the patients ability to improve function 5 Min Manual Therapy:  Patella mobs, scar mobs, general light touch Rationale: increase tissue extensibility to improve the patients ability to improve function With 
 [x] TE 
 [] TA 
 [] neuro 
 [] other: Patient Education: [x] Review HEP [] Progressed/Changed HEP based on:  
[] positioning   [] body mechanics   [] transfers   [] heat/ice application   
[] other:  Instructed on the importance of improving her active extension and several ways to accomplish this, benefits of icing, taught her friend how to perform scar message, extension/hamstring stretch. Other Objective/Functional Measures:  FOTO score 33 Pain Level (0-10 scale) post treatment: 6 
 
ASSESSMENT/Changes in Function: S/s consistent with dx of right TKR. Her primary physical therapy findings include limited motion, weakness, dependency on AD for ambulation, pain, pain with ADL's. [x]  See Plan of Care Nayeli Wiley, PT 2/13/2019

## 2019-02-13 NOTE — PROGRESS NOTES
[facility] [address] [place], [address] Phone: [phone]  Fax: [phone] Plan of Care/Statement of Necessity for Physical Therapy Services  2-15 Patient name: Camron Salinas  : 1950  Provider#: [id number] Referral source: Rubén Askew MD     
Medical/Treatment Diagnosis: Right knee pain [M25.561] Prior Hospitalization: see medical history Comorbidities: see emr Prior Level of Function: restricted Medications: Verified on Patient Summary List 
 
Start of Care: 19      Onset Date:  DOS 19 The Plan of Care and following information is based on the information from the initial evaluation. Assessment/ key information: S/s consistent with dx of right TKR. Her primary physical therapy findings include limited motion, weakness, dependency on AD for ambulation, pain, pain with ADL's. Evaluation Complexity History HIGH Complexity :3+ comorbidities / personal factors will impact the outcome/ POC ; Examination LOW Complexity : 1-2 Standardized tests and measures addressing body structure, function, activity limitation and / or participation in recreation  ;Presentation LOW Complexity : Stable, uncomplicated  ;Clinical Decision Making MEDIUM Complexity : FOTO score of 26-74 Overall Complexity Rating: LOW Problem List: pain affecting function, decrease ROM, decrease strength, impaired gait/ balance, decrease ADL/ functional abilitiies, decrease activity tolerance and decrease flexibility/ joint mobility Treatment Plan may include any combination of the following: Therapeutic exercise, Therapeutic activities, Neuromuscular re-education, Physical agent/modality, Manual therapy and Patient education Patient / Family readiness to learn indicated by: asking questions and trying to perform skills Persons(s) to be included in education: patient (P) and family support person (FSP);list Delio Washington (friend) Barriers to Learning/Limitations: yes;  other legally blind Patient Goal (s): walk and climb steps without pain Patient Self Reported Health Status: fair Rehabilitation Potential: good Short Term Goals: To be accomplished in 4 weeks: 
 Patient will be independent with a progressive home exercise program addressing all objective deficits. Long Term Goals: To be accomplished in 8 weeks: At least a 13  Point improvement in the FOTO score indicating improved overall quality of life. Able to perform all community ambulation with either SPC or no AD Able to go ascend/descend 1 flight of steps with no pain and step over mechanics Frequency / Duration: Patient to be seen 2 times per week for 8 weeks. Patient/ Caregiver education and instruction: self care and exercises 
 
[x]  Plan of care has been reviewed with PTA Certification period:  2/13/19  - 5/13/19 Herve Mercedes, PT 2/13/2019  
________________________________________________________________________ I certify that the above Therapy Services are being furnished while the patient is under my care. I agree with the treatment plan and certify that this therapy is necessary. [de-identified] Signature:____________________  Date:____________Time: _________

## 2019-02-15 ENCOUNTER — HOSPITAL ENCOUNTER (OUTPATIENT)
Dept: PHYSICAL THERAPY | Age: 69
Discharge: HOME | End: 2019-02-15
Payer: MEDICARE

## 2019-02-15 PROCEDURE — 97110 THERAPEUTIC EXERCISES: CPT

## 2019-02-15 PROCEDURE — 97140 MANUAL THERAPY 1/> REGIONS: CPT

## 2019-02-15 NOTE — PROGRESS NOTES
PT DAILY TREATMENT NOTE - [facility] 2-15 Patient Name: Fito Medrano Date:2/15/2019 : 1950 [x]  Patient  Verified Payor: Glory Abbasi / Plan: [address] [place] [address] HMO / Product Type: Managed Care Medicare / In time: 105 PM Out time: 200 PM 
Total Treatment Time (min): 55 Total Timed Codes (min): 55 
1:1 Treatment Time ( only): 55 Visit #: 2 Treatment Area: Right knee pain [M25.561] SUBJECTIVE Pain Level (0-10 scale): 9/10 Any medication changes, allergies to medications, adverse drug reactions, diagnosis change, or new procedure performed?: [x] No    [] Yes (see summary sheet for update) Subjective functional status/changes:   [] No changes reported Pt reports high levels of pain currently, sx typically improve w/ movement/exercise OBJECTIVE Modality rationale: decrease inflammation and decrease pain to improve the patients ability to improve function Min Type Additional Details  
 [] Estim: []Att   []Unatt        []TENS instruct []IFC  []Premod   []NMES []Other:  []w/US   []w/ice   []w/heat Position: Location:  
 []  Traction: [] Cervical       []Lumbar 
                     [] Prone          []Supine []Intermittent   []Continuous Lbs: 
[] before manual 
[] after manual 
[]w/heat  
 []  Ultrasound: []Continuous   [] Pulsed at:  
                         []1MHz   []3MHz Location: 
W/cm2:  
 []  Paraffin Location: 
[]w/heat Declined, to ice at home  [x]  Ice     []  Heat 
[]  Ice massage Position: sup Location: R knee  
 []  Laser 
[]  Other: Position: Location:  
 []  Vasopneumatic Device Pressure:       [] lo [] med [] hi  
Temperature:   
[x] Skin assessment post-treatment:  [x]intact []redness- no adverse reaction 
  []redness  adverse reaction:  
 
40 min Therapeutic Exercise:  [x] See flow sheet : review HEP, add seated elliptical, TKE, heel prop, knee flex @ EOB Rationale: increase ROM, increase strength, improve coordination, improve balance and increase proprioception to improve the patients ability to improve function 15 min Manual Therapy: STM/desensitization R knee, patellar mobs sup/inf & med lat, PROM/stretch knee flex @ EOB & w/ heel slide as per chart, knee ext w/ heel prop Rationale: decrease pain, increase ROM, increase tissue extensibility, correct positional vertigo and decrease trigger points to improve the patients patients ability to improve function With 
 [x] TE 
 [] TA 
 [] neuro 
 [] other: Patient Education: [x] Review HEP [] Progressed/Changed HEP based on:  
[] positioning   [] body mechanics   [] transfers   [x] heat/ice application   
[x] other: educated pt re: avoiding R LE ER while sitting/lying down; discussed use of pillow or towel roll on outside of R foot/ankle to prop LE in neutral  
 
Other Objective/Functional Measures:  
signif tender w/ palpation/STM R knee, >sup/lat patellar reg, signif tender & limited w/ patellar mobs all motions Pain Level (0-10 scale) post treatment:   6/10 ASSESSMENT/Changes in Function:  
 
R knee PROM w/ empty end feel, limited by pain & pt request to stop; cont to have sigif pain & swelling R knee Patient will continue to benefit from skilled PT services to modify and progress therapeutic interventions, address functional mobility deficits, address ROM deficits, address strength deficits, analyze and address soft tissue restrictions, analyze and cue movement patterns, analyze and modify body mechanics/ergonomics and assess and modify postural abnormalities to attain remaining goals. [x]  See Plan of Care 
[]  See progress note/recertification 
[]  See Discharge Summary Progress towards goals / Updated goals: 
Not assessed PLAN 
[]  Upgrade activities as tolerated     [x]  Continue plan of care 
[]  Update interventions per flow sheet      
[]  Discharge due to:_ 
 []  Other:Brad Holbrook, PT 2/15/2019

## 2019-02-18 ENCOUNTER — HOSPITAL ENCOUNTER (OUTPATIENT)
Dept: PHYSICAL THERAPY | Age: 69
End: 2019-02-18
Payer: MEDICARE

## 2019-02-22 ENCOUNTER — APPOINTMENT (OUTPATIENT)
Dept: PHYSICAL THERAPY | Age: 69
End: 2019-02-22
Payer: MEDICARE

## 2019-02-27 ENCOUNTER — HOSPITAL ENCOUNTER (OUTPATIENT)
Dept: PHYSICAL THERAPY | Age: 69
Discharge: HOME | End: 2019-02-27
Payer: MEDICARE

## 2019-02-27 PROCEDURE — 97110 THERAPEUTIC EXERCISES: CPT

## 2019-02-27 PROCEDURE — 97140 MANUAL THERAPY 1/> REGIONS: CPT

## 2019-02-27 NOTE — PROGRESS NOTES
PT DAILY TREATMENT NOTE - [facility] 2-15 Patient Name: Elaine Ward Date:2019 : 1950 [x]  Patient  Verified Payor: Alyse Maldonado / Plan: [address] [place] [address] HMO / Product Type: Managed Care Medicare / In time: 105 PM Out time: 205 PM 
Total Treatment Time (min): 60 Total Timed Codes (min): 60 
1:1 Treatment Time ( only): 55 Visit #: 3 Treatment Area: Right knee pain [M25.561] SUBJECTIVE Pain Level (0-10 scale): 9/10 Any medication changes, allergies to medications, adverse drug reactions, diagnosis change, or new procedure performed?: [x] No    [] Yes (see summary sheet for update) Subjective functional status/changes:   [] No changes reported Still with strong \"ache\" in her knee jennifer as the 6 hours in between medication moves toward the end of the 6 hours. Compliant with HEP OBJECTIVE 
19  AROM onset of treatment:  (-) 10 extension to 98 flexion       (-) 10 to 92 on initial eval  
Did get flexion up to 106 after bike and manual therapy Modality rationale: decrease inflammation and decrease pain to improve the patients ability to improve function Min Type Additional Details  
 [] Estim: []Att   []Unatt        []TENS instruct []IFC  []Premod   []NMES []Other:  []w/US   []w/ice   []w/heat Position: Location:  
 []  Traction: [] Cervical       []Lumbar 
                     [] Prone          []Supine []Intermittent   []Continuous Lbs: 
[] before manual 
[] after manual 
[]w/heat  
 []  Ultrasound: []Continuous   [] Pulsed at:  
                         []1MHz   []3MHz Location: 
W/cm2:  
 []  Paraffin Location: 
[]w/heat Declined, to ice at home  [x]  Ice     []  Heat 
[]  Ice massage Position: sup Location: R knee  
 []  Laser 
[]  Other: Position: Location:  
 []  Vasopneumatic Device Pressure:       [] lo [] med [] hi  
Temperature: [x] Skin assessment post-treatment:  [x]intact []redness- no adverse reaction 
  []redness  adverse reaction:  
 
50 min Therapeutic Exercise:  [x] See flow sheet : review HEP, add seated elliptical, TKE, heel prop, knee flex @ EOB Rationale: increase ROM, increase strength, improve coordination, improve balance and increase proprioception to improve the patients ability to improve function 15 min Manual Therapy: STM/desensitization R knee, patellar mobs sup/inf & med lat, PROM/stretch knee flex @ EOB & w/ heel slide as per chart, knee ext w/ heel prop Rationale: decrease pain, increase ROM, increase tissue extensibility, correct positional vertigo and decrease trigger points to improve the patients patients ability to improve function With 
 [x] TE 
 [] TA 
 [] neuro 
 [] other: Patient Education: [x] Review HEP [] Progressed/Changed HEP based on:  
[] positioning   [] body mechanics   [] transfers   [x] heat/ice application   
[x] other: educated pt re: avoiding R LE ER while sitting/lying down; discussed use of pillow or towel roll on outside of R foot/ankle to prop LE in neutral  
 
Other Objective/Functional Measures:  
   
 
Pain Level (0-10 scale) post treatment:   6/10 ASSESSMENT/Changes in Function:  
 
 motion improved with treatment Patient will continue to benefit from skilled PT services to modify and progress therapeutic interventions, address functional mobility deficits, address ROM deficits, address strength deficits, analyze and address soft tissue restrictions, analyze and cue movement patterns, analyze and modify body mechanics/ergonomics and assess and modify postural abnormalities to attain remaining goals. [x]  See Plan of Care 
[]  See progress note/recertification 
[]  See Discharge Summary Progress towards goals / Updated goals: 
Not assessed PLAN 
[]  Upgrade activities as tolerated     [x]  Continue plan of care []  Update interventions per flow sheet      
[]  Discharge due to:_ 
[]  Other:_ Margie Gallardo, PT 2/27/2019

## 2019-03-01 ENCOUNTER — HOSPITAL ENCOUNTER (OUTPATIENT)
Dept: PHYSICAL THERAPY | Age: 69
Discharge: HOME | End: 2019-03-01
Payer: MEDICARE

## 2019-03-01 PROCEDURE — 97110 THERAPEUTIC EXERCISES: CPT

## 2019-03-01 PROCEDURE — 97140 MANUAL THERAPY 1/> REGIONS: CPT

## 2019-03-01 NOTE — PROGRESS NOTES
MD PROGRESS NOTE - [facility] 2-15 Patient Name: Vin Oliveros : 1950 
 
 
4 VISITS TO DATE 
AROM:  (-) 10 extension to 92 flexion on initial visit. (-) 5 extension to 106 flexion after last treatment Treatment focus on improved motion and function through manual therapy techniques, home program, etc. 
Plan to continue therapy 2X/week for 1 more month and then DC to HEP. Please advise. Maksim Thompson, PT 3/1/2019

## 2019-03-01 NOTE — PROGRESS NOTES
PT DAILY TREATMENT NOTE - [facility] 2-15 Patient Name: Laura Vazquez Date:3/1/2019 : 1950 [x]  Patient  Verified Payor: Dede Huerta / Plan: [address] [place] [address] HMO / Product Type: Managed Care Medicare / In time: 1130 aM Out time: 1230 PM 
Total Treatment Time (min): 60 Total Timed Codes (min): 60 
1:1 Treatment Time ( only): 55 Visit #: 4 Treatment Area: Right knee pain [M25.561] SUBJECTIVE Pain Level (0-10 scale):5 Any medication changes, allergies to medications, adverse drug reactions, diagnosis change, or new procedure performed?: [x] No    [] Yes (see summary sheet for update) Subjective functional status/changes:   [] No changes reported Some soreness after last visit, general.   Compliant with HEP OBJECTIVE 
19  AROM onset of treatment:  (-) 10 extension to 98 flexion       (-) 10 to 92 on initial eval  
Did get flexion up to 106 after bike and manual therapy Modality rationale: decrease inflammation and decrease pain to improve the patients ability to improve function Min Type Additional Details  
 [] Estim: []Att   []Unatt        []TENS instruct []IFC  []Premod   []NMES []Other:  []w/US   []w/ice   []w/heat Position: Location:  
 []  Traction: [] Cervical       []Lumbar 
                     [] Prone          []Supine []Intermittent   []Continuous Lbs: 
[] before manual 
[] after manual 
[]w/heat  
 []  Ultrasound: []Continuous   [] Pulsed at:  
                         []1MHz   []3MHz Location: 
W/cm2:  
 []  Paraffin Location: 
[]w/heat Declined, to ice at home  [x]  Ice     []  Heat 
[]  Ice massage Position: sup Location: R knee  
 []  Laser 
[]  Other: Position: Location:  
 []  Vasopneumatic Device Pressure:       [] lo [] med [] hi  
Temperature:   
[x] Skin assessment post-treatment:  [x]intact []redness- no adverse reaction 
  []redness  adverse reaction: 45 min Therapeutic Exercise:  [x] See flow sheet : review HEP, add seated elliptical, TKE, heel prop, knee flex @ EOB Rationale: increase ROM, increase strength, improve coordination, improve balance and increase proprioception to improve the patients ability to improve function 15 min Manual Therapy: STM/desensitization R knee, patellar mobs sup/inf & med lat, PROM/stretch knee flex @ EOB & w/ heel slide as per chart, knee ext w/ heel prop Rationale: decrease pain, increase ROM, increase tissue extensibility, correct positional vertigo and decrease trigger points to improve the patients patients ability to improve function With 
 [x] TE 
 [] TA 
 [] neuro 
 [] other: Patient Education: [x] Review HEP [] Progressed/Changed HEP based on:  
[] positioning   [] body mechanics   [] transfers   [x] heat/ice application   
[x] other: educated pt re: avoiding R LE ER while sitting/lying down; discussed use of pillow or towel roll on outside of R foot/ankle to prop LE in neutral  
 
Other Objective/Functional Measures:  
   
 
Pain Level (0-10 scale) post treatment:   6/10 ASSESSMENT/Changes in Function:  
 
 motion improved with treatment Patient will continue to benefit from skilled PT services to modify and progress therapeutic interventions, address functional mobility deficits, address ROM deficits, address strength deficits, analyze and address soft tissue restrictions, analyze and cue movement patterns, analyze and modify body mechanics/ergonomics and assess and modify postural abnormalities to attain remaining goals. [x]  See Plan of Care 
[]  See progress note/recertification 
[]  See Discharge Summary Progress towards goals / Updated goals: 
Not assessed PLAN 
[]  Upgrade activities as tolerated     [x]  Continue plan of care  MD next week, see note 
[]  Update interventions per flow sheet      
[]  Discharge due to:_ 
[]  Other:_ Alex Bland, PT 3/1/2019

## 2019-03-06 ENCOUNTER — APPOINTMENT (OUTPATIENT)
Dept: PHYSICAL THERAPY | Age: 69
End: 2019-03-06
Payer: MEDICARE

## 2019-03-08 ENCOUNTER — HOSPITAL ENCOUNTER (OUTPATIENT)
Dept: PHYSICAL THERAPY | Age: 69
Discharge: HOME | End: 2019-03-08
Payer: MEDICARE

## 2019-03-08 PROCEDURE — 97110 THERAPEUTIC EXERCISES: CPT

## 2019-03-08 PROCEDURE — 97140 MANUAL THERAPY 1/> REGIONS: CPT

## 2019-03-08 NOTE — PROGRESS NOTES
PT DAILY TREATMENT NOTE - [facility] 2-15    Patient Name: Jennifer Villarreal  Date:3/8/2019  : 1950  [x]  Patient  Verified  Payor: Petrona Waldron / Plan: [address] [place] [address] HMO / Product Type: Managed Care Medicare /    In time: 6618 aM Out time: 0596 PM  Total Treatment Time (min): 60  Total Timed Codes (min): 60  1:1 Treatment Time ( only): 55  Visit #: 5    Treatment Area: Right knee pain [M25.561]    SUBJECTIVE  Pain Level (0-10 scale):5  Any medication changes, allergies to medications, adverse drug reactions, diagnosis change, or new procedure performed?: [x] No    [] Yes (see summary sheet for update)  Subjective functional status/changes:   [] No changes reported  Knee feels good. No real functional deficits reported at this time. Saw MD, pleased with progress. He said it was up to us as to how long we continued with therapy. Patient trying to bring things to an end  to other health issues. Blood sugar crisis this am, management with diet.        OBJECTIVE  19  AROM onset of treatment:  (-) 10 extension to 98 flexion       (-) 10 to 92 on initial eval   Did get flexion up to 106 after bike and manual therapy    Modality rationale: decrease inflammation and decrease pain to improve the patients ability to improve function       Min Type Additional Details    [] Estim: []Att   []Unatt        []TENS instruct                  []IFC  []Premod   []NMES                     []Other:  []w/US   []w/ice   []w/heat  Position:  Location:    []  Traction: [] Cervical       []Lumbar                       [] Prone          []Supine                       []Intermittent   []Continuous Lbs:  [] before manual  [] after manual  []w/heat    []  Ultrasound: []Continuous   [] Pulsed at:                            []1MHz   []3MHz Location:  W/cm2:    []  Paraffin         Location:  []w/heat   Declined, to ice at home  [x]  Ice     []  Heat  []  Ice massage Position: sup   Location: R knee    []  Laser  [] Other: Position:  Location:    []  Vasopneumatic Device Pressure:       [] lo [] med [] hi   Temperature:    [x] Skin assessment post-treatment:  [x]intact []redness- no adverse reaction    []redness  adverse reaction:     45 min Therapeutic Exercise:  [x] See flow sheet : review HEP, add seated elliptical, TKE, heel prop, knee flex @ EOB    Rationale: increase ROM, increase strength, improve coordination, improve balance and increase proprioception to improve the patients ability to improve function    15 min Manual Therapy: STM/desensitization R knee, patellar mobs sup/inf & med lat, PROM/stretch knee flex @ EOB & w/ heel slide as per chart, knee ext w/ heel prop     Rationale: decrease pain, increase ROM, increase tissue extensibility, correct positional vertigo and decrease trigger points to improve the patients patients ability to improve function    With   [x] TE   [] TA   [] neuro   [] other: Patient Education: [x] Review HEP    [] Progressed/Changed HEP based on:   [] positioning   [] body mechanics   [] transfers   [x] heat/ice application    [x] other: educated pt re: avoiding R LE ER while sitting/lying down; discussed use of pillow or towel roll on outside of R foot/ankle to prop LE in neutral     Other Objective/Functional Measures:         Pain Level (0-10 scale) post treatment:   4/10    ASSESSMENT/Changes in Function:      Reviewed step mechanics - able to go up steps with proper step over mechanics, still needs to go down one at a time 2/2 to depth perception/visual deficits. Patient will continue to benefit from skilled PT services to modify and progress therapeutic interventions, address functional mobility deficits, address ROM deficits, address strength deficits, analyze and address soft tissue restrictions, analyze and cue movement patterns, analyze and modify body mechanics/ergonomics and assess and modify postural abnormalities to attain remaining goals.      [x]  See Plan of Care  [] See progress note/recertification  []  See Discharge Summary         Progress towards goals / Updated goals:  Not assessed     PLAN  []  Upgrade activities as tolerated     [x]  Continue plan of care  Agreed with patient that we would continue to see her every 2 weeks so that we can continue to monitor progress etc.   Next MD visit not until April    []  Update interventions per flow sheet       []  Discharge due to:_  []  Other:_      Maksim March, PT 3/8/2019

## 2019-03-22 ENCOUNTER — HOSPITAL ENCOUNTER (OUTPATIENT)
Dept: PHYSICAL THERAPY | Age: 69
Discharge: HOME | End: 2019-03-22
Payer: MEDICARE

## 2019-03-22 PROCEDURE — 97110 THERAPEUTIC EXERCISES: CPT

## 2019-03-22 PROCEDURE — 97140 MANUAL THERAPY 1/> REGIONS: CPT

## 2019-04-08 ENCOUNTER — HOSPITAL ENCOUNTER (OUTPATIENT)
Dept: PHYSICAL THERAPY | Age: 69
Discharge: HOME | End: 2019-04-08
Payer: MEDICARE

## 2019-04-08 PROCEDURE — 97140 MANUAL THERAPY 1/> REGIONS: CPT

## 2019-04-08 PROCEDURE — 97110 THERAPEUTIC EXERCISES: CPT

## 2019-04-08 NOTE — PROGRESS NOTES
PT DAILY TREATMENT NOTE - [facility] 2-15 Patient Name: Mars Claudio Date:2019 : 1950 [x]  Patient  Verified Payor: Andrestiffanie Zapata / Plan: [address] [place] [address] HMO / Product Type: Managed Care Medicare / In time: 1230 aM Out time: 130 PM 
Total Treatment Time (min): 60 Total Timed Codes (min): 60 
1:1 Treatment Time ( [address] only): 60 Visit #: 6 Treatment Area: Right knee pain [M25.561] SUBJECTIVE Pain Level (0-10 scale):5 Any medication changes, allergies to medications, adverse drug reactions, diagnosis change, or new procedure performed?: [x] No    [] Yes (see summary sheet for update) Subjective functional status/changes:   [] No changes reported Had one episode of increased swelling in her right lower leg after prolonged walking/standing. Doing well relative to ADL's. Not as compliant with HEP of late. OBJECTIVE 
4/8/10  AROM:  (-) 10 extension to 112 flexion   PROM:  Extension between 0 and (-) 5  Flexion 112 
3/22/19 Walked in using a large base cane today for the first time. She was observed by a co-worker in the parking lot walking into the clinic, he noted that she looked very unsteady on her feet. Using it in the clinic she looked OK for short distances on a flat surface. AROM:  Still (-) 10 extension to 100 flexion 19  AROM onset of treatment:  (-) 10 extension to 98 flexion       (-) 10 to 92 on initial eval  
Did get flexion up to 106 after bike and manual therapy Modality rationale: decrease inflammation and decrease pain to improve the patients ability to improve function 45 min Therapeutic Exercise:  [x] See flow sheet : review HEP, add seated elliptical, TKE, heel prop, knee flex @ EOB Rationale: increase ROM, increase strength, improve coordination, improve balance and increase proprioception to improve the patients ability to improve function 15 min Manual Therapy: STM/desensitization R knee, patellar mobs sup/inf & med lat, PROM/stretch knee flex @ EOB & w/ heel slide as per chart, knee ext w/ heel prop Rationale: decrease pain, increase ROM, increase tissue extensibility, correct positional vertigo and decrease trigger points to improve the patients patients ability to improve function With 
 [x] TE 
 [] TA 
 [] neuro 
 [] other: Patient Education: [x] Review HEP [] Progressed/Changed HEP based on:  
[] positioning   [] body mechanics   [] transfers   [x] heat/ice application   
[x] other: educated pt re: avoiding R LE ER while sitting/lying down; discussed use of pillow or towel roll on outside of R foot/ankle to prop LE in neutral  
 
Other Objective/Functional Measures:  
   
 
Pain Level (0-10 scale) post treatment:   4/10 ASSESSMENT/Changes in Function: Active flexion better today than it ever has been. Encouraged her to work on extension more at home as this comes in at (-) 10 but with manual therapy can get to at least (-) 5. Patient will continue to benefit from skilled PT services to modify and progress therapeutic interventions, address functional mobility deficits, address ROM deficits, address strength deficits, analyze and address soft tissue restrictions, analyze and cue movement patterns, analyze and modify body mechanics/ergonomics and assess and modify postural abnormalities to attain remaining goals. [x]  See Plan of Care 
[]  See progress note/recertification 
[]  See Discharge Summary Progress towards goals / Updated goals: 
Not assessed PLAN 
[]  Upgrade activities as tolerated     [x]  Continue plan of care  Agreed with patient that we would continue to see her every 2 weeks so that we can continue to monitor progress etc.   Next MD visit not until April   
[]  Update interventions per flow sheet      
[]  Discharge due to:_ 
[]  Other:_ Bessy March, PT 4/8/2019

## 2019-05-13 RX ORDER — INSULIN ASPART 100 [IU]/ML
INJECTION, SOLUTION INTRAVENOUS; SUBCUTANEOUS
Qty: 20 ML | Refills: 0 | Status: SHIPPED | COMMUNITY
Start: 2019-05-13 | End: 2020-04-06

## 2019-05-14 RX ORDER — INSULIN ASPART 100 [IU]/ML
INJECTION, SOLUTION INTRAVENOUS; SUBCUTANEOUS
Qty: 40 ML | Refills: 5 | Status: SHIPPED | OUTPATIENT
Start: 2019-05-14 | End: 2019-06-13 | Stop reason: SDUPTHER

## 2019-06-03 NOTE — ANCILLARY DISCHARGE INSTRUCTIONS
Phone: [phone]  Fax: [phone]    Discharge Summary  2-15    Patient name: Natalie Herbert  :   Provider#:[id number]  Referral source: Erwin Levine MD      Medical/Treatment Diagnosis: Right knee pain [M25.561]     Prior Hospitalization: see medical history     Comorbidities: see EMR  Prior Level of Function:see EMR  Medications: Verified on Patient Summary List     Short Term Goals: To be accomplished in 4 weeks:               Patient will be independent with a progressive home exercise program addressing all objective deficits. MET  Long Term Goals: To be accomplished in 8 weeks: At least a 13  Point improvement in the FOTO score indicating improved overall quality of life.                Able to perform all community ambulation with either SPC or no AD MET               Able to go ascend/descend 1 flight of steps with no pain and step over mechanics    MET    7 total visits to PT    RECOMMENDATIONS:  [x]Discontinue therapy: [x]Patient has reached or is progressing toward set goals      []Patient is non-compliant or has abdicated      []Due to lack of appreciable progress towards set goals

## 2019-06-13 RX ORDER — INSULIN ASPART 100 [IU]/ML
INJECTION, SOLUTION INTRAVENOUS; SUBCUTANEOUS
Qty: 40 ML | Refills: 5 | Status: SHIPPED | OUTPATIENT
Start: 2019-06-13 | End: 2020-03-09

## 2019-07-08 NOTE — IP AVS SNAPSHOT
[address] [facility] [address] [place] [address] 
[phone] Patient: Natalie Herbert MRN: [id number] JQW:[id number] About your hospitalization You were admitted on:  December 19, 2017 You last received care in the:  [phone] You were discharged on:  December 22, 2017 Why you were hospitalized Your primary diagnosis was:  Gi Bleed Things You Need To Do (next 8 weeks) Follow up with Debbie Pereira MD  
fu tests results Phone:  [phone] Where:  [address] [facility], [address] [address] Friday Dec 29, 2017 TRANSITIONAL CARE MANAGEMENT with Shahbaz Freeman NP at  1:00 PM  
Where: [facility] ([facility]) Discharge Orders None A check gerber indicates which time of day the medication should be taken. My Medications TAKE these medications as instructed Instructions Each Dose to Equal  
 Morning Noon Evening Bedtime  
  insulin pump Misc Commonly known as:  PATIENT SUPPLIED Your last dose was: Your next dose is:    
   
   
 by SubCUTAneous route as needed. 1.7 units/hr  
     
   
   
   
  
 cholecalciferol (vitamin D3) 2,000 unit Tab Commonly known as:  VITAMIN D3 Your last dose was: Your next dose is: Take 1 Tab by mouth daily. 2000 Units COSOPT (PF) 2-0.5 % ophthalmic solution Generic drug:  dorzolamide-timolol (PF) Your last dose was: Your next dose is:    
   
   
 Administer 1 Drop to both eyes two (2) times a day. Indications: Ocular Hypertension 1 Drop  
    
   
   
   
  
 escitalopram oxalate 20 mg tablet Commonly known as:  Jessie Rosenda Your last dose was: Your next dose is: Take 20 mg by mouth daily. 20 mg  
    
   
   
   
  
 ferrous sulfate 325 mg (65 mg iron) tablet Your last dose was: Your next dose is: Take 325 mg by mouth two (2) times a day. 325 mg  
    
   
   
   
  
 gabapentin 800 mg tablet Commonly known as:  NEURONTIN Your last dose was: Your next dose is: Take 800 mg by mouth three (3) times daily. 800 mg  
    
   
   
   
  
 insulin aspart 100 unit/mL injection Commonly known as:  Kodi Sheffield Your last dose was: Your next dose is:    
   
   
 by SubCUTAneous route continuous. For use with insulin pump Pump settings 1.7 units/hr  
     
   
   
   
  
 levothyroxine 125 mcg tablet Commonly known as:  SYNTHROID Your last dose was: Your next dose is: Take 1 Tab by mouth Daily (before breakfast). 125 mcg  
    
   
   
   
  
 lisinopril 10 mg tablet Commonly known as:  Fong Ulisses Your last dose was: Your next dose is: Take 1 Tab by mouth daily. 10 mg  
    
   
   
   
  
 octreotide 50 mcg/mL injection Commonly known as:  SANDOSTATIN Your last dose was: Your next dose is:    
   
   
 1 mL by IntraVENous route two (2) times a day. 50 mcg  
    
   
   
   
  
 omeprazole 40 mg capsule Commonly known as:  PRILOSEC Your last dose was: Your next dose is: Take 1 Cap by mouth daily. 40 mg  
    
   
   
   
  
 ondansetron hcl 4 mg tablet Commonly known as:  ZOFRAN (AS HYDROCHLORIDE) Your last dose was: Your next dose is: Take 1 Tab by mouth every eight (8) hours as needed for Nausea. 4 mg  
    
   
   
   
  
 pravastatin 40 mg tablet Commonly known as:  PRAVACHOL Your last dose was: Your next dose is: Take 1 Tab by mouth nightly. 40 mg PROTONIX 40 mg tablet Generic drug:  pantoprazole Your last dose was: Your next dose is: Take 40 mg by mouth daily.   
 40 mg  
    
   
   
   
  
 Senna 8.6 mg tablet Generic drug:  senna Your last dose was: Your next dose is: Take 1 Tab by mouth nightly as needed. 1 Tab  
    
   
   
   
  
 verapamil  mg tablet Commonly known as:  CALAN-SR Your last dose was: Your next dose is: Take 1 Tab by mouth two (2) times a day. For BP  
 120 mg Where to Get Your Medications Information on where to get these meds will be given to you by the nurse or doctor. ! Ask your nurse or doctor about these medications  
  octreotide 50 mcg/mL injection  
 omeprazole 40 mg capsule  
 ondansetron hcl 4 mg tablet Discharge Instructions Discharge Instructions PATIENT ID: Daisy Mcarthur MRN: [id number] YOB: 1950 DATE OF ADMISSION: 12/19/2017  4:42 PM   
DATE OF DISCHARGE: 12/21/2017 PRIMARY CARE PROVIDER: Jocy Barrera MD  
 
 
DISCHARGING PHYSICIAN: Michael Betancourt NP To contact this individual call [phone] and ask the  to page. If unavailable ask to be transferred the Adult Hospitalist Department. DISCHARGE DIAGNOSES *** 
 
CONSULTATIONS: IP CONSULT TO HOSPITALIST 
IP CONSULT TO HOSPITALIST 
IP CONSULT TO GASTROENTEROLOGY PROCEDURES/SURGERIES: Procedure(s): ENTEROSCOPY PENDING TEST RESULTS:  
At the time of discharge the following test results are still pending: none FOLLOW UP APPOINTMENTS:  
Follow-up Information Follow up With Details Comments Contact Info Jocy Barrera MD Schedule an appointment as soon as possible for a visit in 1 week hospital fu [address] [facility] [address] [facility]  
[phone] Charley Tapia MD  fu tests results [address] [place] [address] [address] [place] [address] 
[phone] ADDITIONAL CARE RECOMMENDATIONS: Continue Iron and Omeprazole.  
Follow up with GI and PCP 
 
 1. Start taking Octreotide 50 mcg subcutaneously every 12 hours as prescribed X 7 days. If you tolerate this drug well, Dr. Parker Padilla recommends taking the medication at the following doses:  
Sandostatin LAR 10 mg IM monthly need D x of Chronic GI Hemorrhage 
- you should check with your GI doctor for a prescription for this medication . - some SIDE effects you may experience is dyspepsia ( GERD)  , swelling of extremities ( these side effects are rare) 2. Continue all other medications as you were previously taking. You can restart your insulin pump at 100% after noon time today. 3. Please call to schedule followup with your GI doctor within 7 days after discharge. DIET: avoid spicy foods, alcohol, Ibuprofen products ACTIVITY: resume WOUND CARE: na 
 
EQUIPMENT needed: na 
 
 
  
 SNF/Inpatient Rehab/LTAC  
x Independent/assisted living Hospice Other: CDMP Checked:  
Yes x Signed:  
Thony Quinonez NP 
12/21/2017 5:18 PM 
 
ACO Transitions of Care Introducing [facility] [address] offers a voluntary care coordination program to provide high quality service and care to [facility] fee-for-service beneficiaries. Eva Veronica was designed to help you enhance your health and well-being through the following services: ? Transitions of Care  support for individuals who are transitioning from one care setting to another (example: Hospital to home). ? Chronic and Complex Care Coordination  support for individuals and caregivers of those with serious or chronic illnesses or with more than one chronic (ongoing) condition and those who take a number of different medications. If you meet specific medical criteria, a [address] [place] [address] Rd may call you directly to coordinate your care with your primary care physician and your other care providers. For questions about the [facility] programs, please, contact your physicians office. For general questions or additional information about Accountable Care Organizations: 
Please visit www.medicare.gov/acos. html or call 1-800-MEDICARE (1-816.316.2370) TTY users should call 4-960.228.9567. [employer] Announcement We are excited to announce that we are making your provider's discharge notes available to you in [employer]. You will see these notes when they are completed and signed by the physician that discharged you from your recent hospital stay. If you have any questions or concerns about any information you see in [employer], please call the Health Information Department where you were seen or reach out to your Primary Care Provider for more information about your plan of care. Introducing [facility] [place] [facility]! Salbador Hartley introduces [employer] patient portal. Now you can access parts of your medical record, email your doctor's office, and request medication refills online. 1. In your internet browser, go to [email or web address] 2. Click on the First Time User? Click Here link in the Sign In box.  You will see the New Member Sign Up page. 3. Enter your [employer] Access Code exactly as it appears below. You will not need to use this code after youve completed the sign-up process. If you do not sign up before the expiration date, you must request a new code. · [employer] Access Code: [email or web address] Expires: 12/28/2017 12:00 PM 
 
4. Enter the last four digits of your Social Security Number (xxxx) and Date of Birth (mm/dd/yyyy) as indicated and click Submit. You will be taken to the next sign-up page. 5. Create a Heidi Shaulist ID. This will be your [employer] login ID and cannot be changed, so think of one that is secure and easy to remember. 6. Create a Heidi [employer]ulist password. You can change your password at any time. 7. Enter your Password Reset Question and Answer. This can be used at a later time if you forget your password. 8. Enter your e-mail address. You will receive e-mail notification when new information is available in [address] 9. Click Sign Up. You can now view and download portions of your medical record. 10. Click the Download Summary menu link to download a portable copy of your medical information. If you have questions, please visit the Frequently Asked Questions section of the [employer] website. Remember, [employer] is NOT to be used for urgent needs. For medical emergencies, dial 911. Now available from your iPhone and Android! Providers Seen During Your Hospitalization Provider Specialty Primary office phone Verena Rivera MD Emergency Medicine [phone] Phu Dorman MD Hospitalist [phone] Wende Dakin, MD Internal Medicine [phone] Your Primary Care Physician (PCP) Primary Care Physician Office Phone Office Fax Yancy Abbott [phone] You are allergic to the following Allergen Reactions Augmentin (Amoxicillin-Pot Clavulanate) Diarrhea Nsaids (Non-Steroidal Anti-Inflammatory Drug) Other (comments) bleeding Recent Documentation Height Weight BMI OB Status Smoking Status 1.638 m 93.9 kg 34.98 kg/m2 Postmenopausal Former Smoker Emergency Contacts Name Discharge Info Relation Home Work Mobile Desi Casper DISCHARGE CAREGIVER [3] Other Relative [6] [phone] Katherin Anderson DISCHARGE CAREGIVER [3] Friend [5] [phone] Patient Belongings The following personal items are in your possession at time of discharge: 
  Dental Appliances: Lowers, Uppers  Visual Aid: None Please provide this summary of care documentation to your next provider. Signatures-by signing, you are acknowledging that this After Visit Summary has been reviewed with you and you have received a copy. Patient Signature:  ____________________________________________________________ Date:  ____________________________________________________________  
  
Joel Jiménez Provider Signature:  ____________________________________________________________ Date:  ____________________________________________________________ Is This A New Presentation, Or A Follow-Up?: Skin Lesions How Severe Is Your Skin Lesion?: mild Have Your Skin Lesions Been Treated?: not been treated

## 2019-08-01 ENCOUNTER — OFFICE VISIT (OUTPATIENT)
Dept: ENDOCRINOLOGY | Age: 69
End: 2019-08-01

## 2019-08-01 VITALS
DIASTOLIC BLOOD PRESSURE: 55 MMHG | SYSTOLIC BLOOD PRESSURE: 140 MMHG | BODY MASS INDEX: 36.02 KG/M2 | WEIGHT: 211 LBS | HEIGHT: 64 IN | HEART RATE: 58 BPM

## 2019-08-01 DIAGNOSIS — E03.8 HYPOTHYROIDISM DUE TO HASHIMOTO'S THYROIDITIS: ICD-10-CM

## 2019-08-01 DIAGNOSIS — E06.3 HYPOTHYROIDISM DUE TO HASHIMOTO'S THYROIDITIS: ICD-10-CM

## 2019-08-01 DIAGNOSIS — M79.671 RIGHT FOOT PAIN: ICD-10-CM

## 2019-08-01 DIAGNOSIS — E10.311 TYPE 1 DIABETES MELLITUS WITH RETINOPATHY AND MACULAR EDEMA, UNSPECIFIED LATERALITY, UNSPECIFIED RETINOPATHY SEVERITY (HCC): Primary | ICD-10-CM

## 2019-08-01 DIAGNOSIS — I10 ESSENTIAL HYPERTENSION: ICD-10-CM

## 2019-08-01 DIAGNOSIS — E78.2 MIXED HYPERLIPIDEMIA: ICD-10-CM

## 2019-08-01 LAB — HBA1C MFR BLD HPLC: 7 %

## 2019-08-01 RX ORDER — INSULIN LISPRO 100 [IU]/ML
INJECTION, SOLUTION INTRAVENOUS; SUBCUTANEOUS
Qty: 2 VIAL | Refills: 0 | Status: SHIPPED | COMMUNITY
Start: 2019-08-01 | End: 2019-08-20 | Stop reason: SDUPTHER

## 2019-08-01 RX ORDER — CEPHALEXIN 500 MG/1
500 CAPSULE ORAL 4 TIMES DAILY
Qty: 28 CAP | Refills: 0 | Status: SHIPPED | OUTPATIENT
Start: 2019-08-01 | End: 2019-11-05

## 2019-08-01 NOTE — PATIENT INSTRUCTIONS
Keflex 500mg, one pill every 6 hours (4 times per day) for 7 days. If the pain and redness do not improve, I recommend you call Dr. Jass Martinez.

## 2019-08-01 NOTE — PROGRESS NOTES
Chief Complaint   Patient presents with    Diabetes     pcp and pharmacy verified. eye exam UTD   Records since last visit reviewed  History of Present Illness: Ritchie Connell is a 71 y.o. female here for follow up of diabetes and hypothyroidism. Was diagnosed with diabetes at the age of 16. At our last visit in December 2018 her A1C was 6.3%. Pt was encouraged to keep up the excellent work. Pt had Right TKR in January 2019. Pt notes she continues to have knee pain and feels she will need to have another surgery. Pt notes that for the last 3 days she has had pain in her right foot. It started in the arch and has progressed to her toes. She notes the foot is painful to the touch and to walk on. The pain is across the top of her foot. She denies prior hx of gout, she denies F/C. She notes that she has been \"icing my foot till it is numb\" and taking ES Tylenol. Her A1C today was 7.0%. Basal  MN-8AM: 2.15  8AM-4PM: 2.15  5PM-MN: 2.15  2.35 units per hour  Carb Ratio  1:4  Correction Factor  1:25  Target  120  Active Insulin Time  3    She changes her infusion site every 3 days. She checks her BGs 3 times per day. Her FBGs have been running in the 130's range  Her pre-lunch BG have been running in the 120-130's range  Her pre-dinner BG are in the 180's range. Pt has hx of CAD, s/p CABG she was followed by Dr. Xiomara Kessler of Cardiology, but he retired. She is now following a new cardiologist.    She has hx of Retinopathy she is followed by Ophthalmology. \"I refused to take any new injections. He was using a cancer drug and it scared me\". She has hx of COPD and was followed by Dr. Miller Falcon of Pulmonology. \"I don't need a lung doctor anymore, I quit smoking and my lungs are clear\". She has gastroparesis, and erosive gastritis and was followed by Dr. Mason Diop of GI. Pt has hx of nephropathy and neuropathy. She is still taking her LT4 125mcg daily.  She was clinically and biochemically euthyroid in September 2018. Current Outpatient Medications   Medication Sig    cephALEXin (KEFLEX) 500 mg capsule Take 1 Cap by mouth four (4) times daily.  insulin aspart U-100 (NOVOLOG) 100 unit/mL injection For use with insulin pump. 130 units per day max    insulin aspart U-100 (NOVOLOG U-100 INSULIN ASPART) 100 unit/mL injection For use with pump    acetaminophen (TYLENOL) 500 mg tablet Take 2 Tabs by mouth every six (6) hours.  umeclidinium-vilanterol (ANORO ELLIPTA) 62.5-25 mcg/actuation inhaler Take 1 Puff by inhalation daily.  b complex vitamins tablet Take 1 Tab by mouth daily.  metoprolol tartrate (LOPRESSOR) 50 mg tablet TK 1 T PO BID WC. PATIENT TAKES MED TWICE DAILY    tiZANidine & Irritant Cntr 2 4 mg kit tizanidine 4 mg tablet AT BEDTIME    escitalopram oxalate (LEXAPRO) 20 mg tablet Take 20 mg by mouth daily.  pantoprazole (PROTONIX) 40 mg tablet Take 40 mg by mouth daily.  gabapentin (NEURONTIN) 800 mg tablet Take 800 mg by mouth three (3) times daily.   insulin pump (PATIENT SUPPLIED) misc by SubCUTAneous route as needed. 1.7 units/hr    levothyroxine (SYNTHROID) 125 mcg tablet Take 1 Tab by mouth Daily (before breakfast).  lisinopril (PRINIVIL, ZESTRIL) 10 mg tablet Take 1 Tab by mouth daily. (Patient taking differently: Take 20 mg by mouth two (2) times a day.)    pravastatin (PRAVACHOL) 40 mg tablet Take 1 Tab by mouth nightly.  cholecalciferol, vitamin D3, (VITAMIN D3) 2,000 unit tab Take 1 Tab by mouth daily.  ferrous sulfate 325 mg (65 mg iron) tablet Take 325 mg by mouth two (2) times a day.  dorzolamide-timolol, PF, (COSOPT, PF,) 2-0.5 % dpet Administer 1 Drop to both eyes two (2) times a day. Indications: Ocular Hypertension    senna (SENNA) 8.6 mg tablet Take 1 Tab by mouth nightly as needed.  amLODIPine (NORVASC) 5 mg tablet TK 1 T PO QD. PATINET TAKE MED DAILY     No current facility-administered medications for this visit.       Allergies   Allergen Reactions    Nsaids (Non-Steroidal Anti-Inflammatory Drug) Other (comments)     bleeding    Augmentin [Amoxicillin-Pot Clavulanate] Diarrhea    Vesicare [Solifenacin] Rash     BURNING WITH URINATION     Review of Systems:  - Eyes: no blurry vision or double vision  - Cardiovascular: no chest pain  - Respiratory: no shortness of breath  - Musculoskeletal: no myalgias  - Neurological: no numbness/tingling in extremities    Physical Examination:  Blood pressure 140/55, pulse (!) 58, height 5' 4\" (1.626 m), weight 211 lb (95.7 kg). - General: pleasant, no distress, good eye contact   - Neck: no carotid bruits  - Cardiovascular: regular, normal rate, nl s1 and s2, no m/r/g, 2+ DP pulses   - Respiratory: clear bilaterally  - Integumentary: no edema, no foot ulcers,   - Psychiatric: normal mood and affect    Diabetic foot exam:     Left Foot:   Visual Exam: callous - present   Pulse DP: 2+ (normal)   Filament test: reduced sensation    Vibratory sensation: normal      Right Foot:   Visual Exam: callous - present and her toes are all red and erythematous. +TPP   Pulse DP: 2+ (normal)   Filament test: reduced sensation    Vibratory sensation: normal        Data Reviewed:   - Her A1C today was 7.0%    Assessment/Plan:   1) DM > Her A1C today was 7.0%. Her pre dinner BGs have been her highest, will increase her 4PM basal rate to 2.3 units per hour. Basal  MN-8AM: 2.15  8AM-4PM: 2.15  5PM-MN: 2.30 units per hour  Carb Ratio  1:4  Correction Factor  1:25  Target  120  Active Insulin Time  3    With her hx of neuropathy and calluses she would benefit from DM shoes and inserts  Pt to check her BGs 4 times per day and mail her BG logs to me in 2 weeks. 2) HTN >Manual /55 on her current regimen. Pt on lisinopril 20mg BID. for renal protection. Will check a Urine MA today. 3) HLD > Pt is still taking her Pravastatin 40mg daily, which she is tolerating well. Will check Lipid panel and CMP today.      4) Hypothyroidism > Pt is clinically euthyroid on LT4 125mcg daily. Will check TFTs today and adjust her dose as needed. 5) Foot Pain > Her foot pain and the erythema does not fit with gout, but will check a uric acid and CBC today. To treat a likely cellulitis will start pt on Kephlex 500mg every 6 hours for 7 days. Pt voices understanding and agreement with the plan. Pain noted and pt was recommended to call her PCP for further evaluation and treatment, as needed    We spent 40 minutes of face to face time together and > 50% of the time was spent in counseling on diabetes management and determining what changes to make to her insulin regimen. RTC 3 months    CC:  Dr. Fabiana Mi    Follow-up and Dispositions    · Return in about 3 months (around 11/1/2019).

## 2019-08-02 LAB
ALBUMIN SERPL-MCNC: 3.8 G/DL (ref 3.6–4.8)
ALBUMIN/CREAT UR: 2978.4 MG/G CREAT (ref 0–30)
ALBUMIN/GLOB SERPL: 1.2 {RATIO} (ref 1.2–2.2)
ALP SERPL-CCNC: 73 IU/L (ref 39–117)
ALT SERPL-CCNC: 9 IU/L (ref 0–32)
AST SERPL-CCNC: 9 IU/L (ref 0–40)
BILIRUB SERPL-MCNC: 0.2 MG/DL (ref 0–1.2)
BUN SERPL-MCNC: 25 MG/DL (ref 8–27)
BUN/CREAT SERPL: 20 (ref 12–28)
CALCIUM SERPL-MCNC: 9.6 MG/DL (ref 8.7–10.3)
CHLORIDE SERPL-SCNC: 101 MMOL/L (ref 96–106)
CHOLEST SERPL-MCNC: 189 MG/DL (ref 100–199)
CO2 SERPL-SCNC: 25 MMOL/L (ref 20–29)
CREAT SERPL-MCNC: 1.25 MG/DL (ref 0.57–1)
CREAT UR-MCNC: 80.6 MG/DL
ERYTHROCYTE [DISTWIDTH] IN BLOOD BY AUTOMATED COUNT: 13.1 % (ref 12.3–15.4)
GLOBULIN SER CALC-MCNC: 3.3 G/DL (ref 1.5–4.5)
GLUCOSE SERPL-MCNC: 137 MG/DL (ref 65–99)
HCT VFR BLD AUTO: 37.6 % (ref 34–46.6)
HDLC SERPL-MCNC: 53 MG/DL
HGB BLD-MCNC: 12.2 G/DL (ref 11.1–15.9)
INTERPRETATION, 910389: NORMAL
INTERPRETATION: NORMAL
LDLC SERPL CALC-MCNC: 87 MG/DL (ref 0–99)
MCH RBC QN AUTO: 31.9 PG (ref 26.6–33)
MCHC RBC AUTO-ENTMCNC: 32.4 G/DL (ref 31.5–35.7)
MCV RBC AUTO: 98 FL (ref 79–97)
MICROALBUMIN UR-MCNC: 2400.6 UG/ML
PDF IMAGE, 910387: NORMAL
PLATELET # BLD AUTO: 168 X10E3/UL (ref 150–450)
POTASSIUM SERPL-SCNC: 4.7 MMOL/L (ref 3.5–5.2)
PROT SERPL-MCNC: 7.1 G/DL (ref 6–8.5)
RBC # BLD AUTO: 3.82 X10E6/UL (ref 3.77–5.28)
SODIUM SERPL-SCNC: 139 MMOL/L (ref 134–144)
T4 FREE SERPL-MCNC: 1.03 NG/DL (ref 0.82–1.77)
TRIGL SERPL-MCNC: 247 MG/DL (ref 0–149)
TSH SERPL DL<=0.005 MIU/L-ACNC: 4.57 UIU/ML (ref 0.45–4.5)
URATE SERPL-MCNC: 8.7 MG/DL (ref 2.5–7.1)
VLDLC SERPL CALC-MCNC: 49 MG/DL (ref 5–40)
WBC # BLD AUTO: 7.2 X10E3/UL (ref 3.4–10.8)

## 2019-08-02 RX ORDER — LEVOTHYROXINE SODIUM 125 UG/1
125 TABLET ORAL
Qty: 97 TAB | Refills: 3 | Status: SHIPPED | OUTPATIENT
Start: 2019-08-02 | End: 2020-03-23 | Stop reason: SDUPTHER

## 2019-08-02 RX ORDER — COLCHICINE 0.6 MG/1
CAPSULE ORAL
Qty: 12 CAP | Refills: 2 | Status: SHIPPED | OUTPATIENT
Start: 2019-08-02 | End: 2019-10-27 | Stop reason: SDUPTHER

## 2019-08-20 RX ORDER — INSULIN LISPRO 100 [IU]/ML
INJECTION, SOLUTION INTRAVENOUS; SUBCUTANEOUS
Qty: 2 VIAL | Refills: 0 | Status: SHIPPED | COMMUNITY
Start: 2019-08-20 | End: 2020-04-06

## 2019-10-28 RX ORDER — COLCHICINE 0.6 MG/1
CAPSULE ORAL
Qty: 12 CAP | Refills: 2 | Status: SHIPPED | OUTPATIENT
Start: 2019-10-28 | End: 2020-09-03 | Stop reason: SDUPTHER

## 2019-10-28 RX ORDER — PRAVASTATIN SODIUM 40 MG/1
40 TABLET ORAL
Qty: 90 TAB | Refills: 3 | Status: SHIPPED | OUTPATIENT
Start: 2019-10-28 | End: 2020-03-23 | Stop reason: SDUPTHER

## 2019-10-28 NOTE — TELEPHONE ENCOUNTER
08/01/19 Uric acid was elevated. Rx for Colchicine was sent to pharmacy. Rx requested to be sent to [facility] order.

## 2019-11-05 ENCOUNTER — OFFICE VISIT (OUTPATIENT)
Dept: ENDOCRINOLOGY | Age: 69
End: 2019-11-05

## 2019-11-05 VITALS
HEIGHT: 64 IN | DIASTOLIC BLOOD PRESSURE: 60 MMHG | BODY MASS INDEX: 36.81 KG/M2 | OXYGEN SATURATION: 96 % | SYSTOLIC BLOOD PRESSURE: 140 MMHG | RESPIRATION RATE: 16 BRPM | HEART RATE: 58 BPM | TEMPERATURE: 98.3 F | WEIGHT: 215.6 LBS

## 2019-11-05 DIAGNOSIS — E03.8 HYPOTHYROIDISM DUE TO HASHIMOTO'S THYROIDITIS: ICD-10-CM

## 2019-11-05 DIAGNOSIS — E10.311 TYPE 1 DIABETES MELLITUS WITH RETINOPATHY AND MACULAR EDEMA, UNSPECIFIED LATERALITY, UNSPECIFIED RETINOPATHY SEVERITY (HCC): Primary | ICD-10-CM

## 2019-11-05 DIAGNOSIS — E06.3 HYPOTHYROIDISM DUE TO HASHIMOTO'S THYROIDITIS: ICD-10-CM

## 2019-11-05 DIAGNOSIS — I10 ESSENTIAL HYPERTENSION: ICD-10-CM

## 2019-11-05 DIAGNOSIS — E78.2 MIXED HYPERLIPIDEMIA: ICD-10-CM

## 2019-11-05 LAB — HBA1C MFR BLD HPLC: 6.6 %

## 2019-11-05 NOTE — PROGRESS NOTES
Chief Complaint   Patient presents with    Pituitary Problem     3 month follow up   Records since last visit reviewed  History of Present Illness: Estrellita Hall is a 71 y.o. female here for follow up of diabetes and hypothyroidism. Was diagnosed with diabetes at the age of 16. At our last visit in August 2019 her A1C was 7.0%. Her pre dinner BGs were running higher so I  increased her 4PM basal rate to 2.3 units per hour. Pt had Right TKR in January 2019. Pt notes she continues to have knee pain and feels she will need to have another surgery. Pt saw her ortho for her knees and she was told that her left knee would need surgery, but since it is not causing her significant pain, they are going to hold off for surgery for now. Her A1C today was 6.6%. Basal  MN-8AM: 2.15  8AM-4PM: 2.15  4PM-MN: 2.30  2.35 units per hour  Carb Ratio  1:4  Correction Factor  1:25  Target  120  Active Insulin Time  3    She changes her infusion site every 3 days. She checks her BGs 4 times per day. Her FBGs have been running in the 130's range  Her pre-lunch BG have been running in the 120-130's range      Pt has hx of CAD, s/p CABG she was followed by Dr. Phil Khoury of Cardiology, but he retired. She is now following a new cardiologist she don't recall her name. She has hx of Retinopathy she is followed by Ophthalmology. \"I refused to take any new injections. He was using a cancer drug and it scared me\". She has hx of COPD and was followed by Dr. Richi Jarvis of Pulmonology. \"I don't need a lung doctor anymore, I quit smoking and my lungs are clear\". She has gastroparesis, and erosive gastritis and was followed by Dr. Brea Rg of GI. Pt has hx of nephropathy and neuropathy. Pt is now following with a nephrologist, she does not recall the nephrologist's name. She is still taking her LT4 125mcg daily. She was clinically and biochemically euthyroid in July 2019.      Current Outpatient Medications Medication Sig    pravastatin (PRAVACHOL) 40 mg tablet Take 1 Tab by mouth nightly.  colchicine (MITIGARE) 0.6 mg capsule Take two pills when you  the prescription, a third pill and hour later and then two pills per day for the next 3 days (Patient taking differently: as needed. Take two pills when you  the prescription, a third pill and hour later and then two pills per day for the next 3 days)    insulin lispro (HUMALOG) 100 unit/mL injection Via pump    levothyroxine (SYNTHROID) 125 mcg tablet Take 1 Tab by mouth Daily (before breakfast). 1-1/2 pills on Monday, 1 pill the other 6 days for a total of 7-1/2 pills per week.  insulin aspart U-100 (NOVOLOG) 100 unit/mL injection For use with insulin pump. 130 units per day max    insulin aspart U-100 (NOVOLOG U-100 INSULIN ASPART) 100 unit/mL injection For use with pump    acetaminophen (TYLENOL) 500 mg tablet Take 2 Tabs by mouth every six (6) hours.  umeclidinium-vilanterol (ANORO ELLIPTA) 62.5-25 mcg/actuation inhaler Take 1 Puff by inhalation daily.  b complex vitamins tablet Take 1 Tab by mouth daily.  amLODIPine (NORVASC) 5 mg tablet TK 1 T PO QD. PATINET TAKE MED DAILY    metoprolol tartrate (LOPRESSOR) 50 mg tablet TK 1 T PO BID WC. PATIENT TAKES MED TWICE DAILY    tiZANidine & Irritant Cntr 2 4 mg kit tizanidine 4 mg tablet AT BEDTIME    escitalopram oxalate (LEXAPRO) 20 mg tablet Take 20 mg by mouth daily.  pantoprazole (PROTONIX) 40 mg tablet Take 40 mg by mouth daily.  gabapentin (NEURONTIN) 800 mg tablet Take 800 mg by mouth three (3) times daily.   insulin pump (PATIENT SUPPLIED) misc by SubCUTAneous route as needed. 1.7 units/hr    lisinopril (PRINIVIL, ZESTRIL) 10 mg tablet Take 1 Tab by mouth daily. (Patient taking differently: Take 20 mg by mouth two (2) times a day.)    cholecalciferol, vitamin D3, (VITAMIN D3) 2,000 unit tab Take 1 Tab by mouth daily.     ferrous sulfate 325 mg (65 mg iron) tablet Take 325 mg by mouth two (2) times a day.  dorzolamide-timolol, PF, (COSOPT, PF,) 2-0.5 % dpet Administer 1 Drop to both eyes two (2) times a day. Indications: Ocular Hypertension    senna (SENNA) 8.6 mg tablet Take 1 Tab by mouth nightly as needed. No current facility-administered medications for this visit. Allergies   Allergen Reactions    Nsaids (Non-Steroidal Anti-Inflammatory Drug) Other (comments)     bleeding    Augmentin [Amoxicillin-Pot Clavulanate] Diarrhea    Vesicare [Solifenacin] Rash     BURNING WITH URINATION     Review of Systems:  - Eyes: no blurry vision or double vision  - Cardiovascular: no chest pain  - Respiratory: no shortness of breath  - Musculoskeletal: no myalgias  - Neurological: no numbness/tingling in extremities    Physical Examination:  Blood pressure 165/58, pulse (!) 58, temperature 98.3 °F (36.8 °C), temperature source Oral, resp. rate 16, height 5' 4\" (1.626 m), weight 215 lb 9.6 oz (97.8 kg), SpO2 96 %. - General: pleasant, no distress, good eye contact   - Neck: no carotid bruits  - Cardiovascular: regular, normal rate, nl s1 and s2, no m/r/g, 2+ DP pulses   - Respiratory: clear bilaterally  - Integumentary: no edema, no foot ulcers,   - Psychiatric: normal mood and affect    Diabetic foot exam:     Left Foot:   Visual Exam: callous - present   Pulse DP: 2+ (normal)   Filament test: reduced sensation    Vibratory sensation: absent      Right Foot:   Visual Exam: callous - present   Pulse DP: 2+ (normal)   Filament test: reduced sensation    Vibratory sensation: normal        Data Reviewed:   - Her A1C today was 6.6%    Assessment/Plan:   1) DM > Her A1C today was 6.6%. Will not make any changes to her insulin pump settings. Pt encouraged to keep up the good work. With her hx of neuropathy and calluses she would benefit from DM shoes and inserts  Pt to check her BGs 4 times per day and mail her BG logs to me in 2 weeks.      2) HTN > Manual /60 on her current regimen. Pt on lisinopril 20mg BID. for renal protection. 3) HLD > Pt is still taking her Pravastatin 40mg daily, which she is tolerating well. Her Lipid panel was at goal in August 2019.    4) Hypothyroidism > Pt is clinically euthyroid on LT4 125mcg daily. Pt voices understanding and agreement with the plan. Pain noted and pt was recommended to call her PCP for further evaluation and treatment, as needed    RTC 3 months    CC:  Dr. Asencio     Follow-up and Dispositions    · Return in about 3 months (around 2/5/2020).

## 2019-11-05 NOTE — PROGRESS NOTES
Identified pt with two pt identifiers(name and ). Reviewed record in preparation for visit and have obtained necessary documentation. Chief Complaint   Patient presents with    Pituitary Problem     3 month follow up        Visit Vitals  /58 (BP 1 Location: Left arm, BP Patient Position: Sitting)   Pulse (!) 58   Temp 98.3 °F (36.8 °C) (Oral)   Resp 16   Ht 5' 4\" (1.626 m)   Wt 215 lb 9.6 oz (97.8 kg)   SpO2 96%   BMI 37.01 kg/m²   Please send notes in to [address]. Pain Scale: 0 - No pain/10  Pain Location:     1) Have you been to an emergency room, urgent care, or hospitalized since your last visit? If yes, where when, and reason for visit? no       2. Have seen or consulted any other health care provider since your last visit? If yes, where when, and reason for visit?   NO

## 2020-02-03 ENCOUNTER — HOSPITAL ENCOUNTER (OUTPATIENT)
Dept: ULTRASOUND IMAGING | Age: 70
Discharge: HOME | End: 2020-02-03
Attending: INTERNAL MEDICINE
Payer: MEDICARE

## 2020-02-03 DIAGNOSIS — E03.9 MYXEDEMA HEART DISEASE: ICD-10-CM

## 2020-02-03 DIAGNOSIS — I51.9 MYXEDEMA HEART DISEASE: ICD-10-CM

## 2020-02-03 DIAGNOSIS — E11.9 DIABETES MELLITUS (HCC): ICD-10-CM

## 2020-02-03 DIAGNOSIS — H54.8 LEGAL BLINDNESS, AS DEFINED IN USA: ICD-10-CM

## 2020-02-03 DIAGNOSIS — E78.5 HYPERLIPEMIA: ICD-10-CM

## 2020-02-03 DIAGNOSIS — N18.30 CHRONIC KIDNEY DISEASE, STAGE III (MODERATE) (HCC): ICD-10-CM

## 2020-02-03 PROCEDURE — 76770 US EXAM ABDO BACK WALL COMP: CPT

## 2020-02-05 DIAGNOSIS — E78.2 MIXED HYPERLIPIDEMIA: ICD-10-CM

## 2020-02-05 DIAGNOSIS — E06.3 HYPOTHYROIDISM DUE TO HASHIMOTO'S THYROIDITIS: ICD-10-CM

## 2020-02-05 DIAGNOSIS — E03.8 HYPOTHYROIDISM DUE TO HASHIMOTO'S THYROIDITIS: ICD-10-CM

## 2020-02-05 DIAGNOSIS — E10.311 TYPE 1 DIABETES MELLITUS WITH RETINOPATHY AND MACULAR EDEMA, UNSPECIFIED LATERALITY, UNSPECIFIED RETINOPATHY SEVERITY (HCC): ICD-10-CM

## 2020-02-05 DIAGNOSIS — I10 ESSENTIAL HYPERTENSION: ICD-10-CM

## 2020-03-25 RX ORDER — PRAVASTATIN SODIUM 40 MG/1
40 TABLET ORAL
Qty: 90 TAB | Refills: 3 | Status: SHIPPED | OUTPATIENT
Start: 2020-03-25 | End: 2021-05-10

## 2020-05-05 ENCOUNTER — VIRTUAL VISIT (OUTPATIENT)
Dept: ENDOCRINOLOGY | Age: 70
End: 2020-05-05

## 2020-05-05 DIAGNOSIS — E10.311 TYPE 1 DIABETES MELLITUS WITH RETINOPATHY AND MACULAR EDEMA, UNSPECIFIED LATERALITY, UNSPECIFIED RETINOPATHY SEVERITY (HCC): Primary | ICD-10-CM

## 2020-05-05 DIAGNOSIS — E03.8 HYPOTHYROIDISM DUE TO HASHIMOTO'S THYROIDITIS: ICD-10-CM

## 2020-05-05 DIAGNOSIS — I10 ESSENTIAL HYPERTENSION: ICD-10-CM

## 2020-05-05 DIAGNOSIS — E78.2 MIXED HYPERLIPIDEMIA: ICD-10-CM

## 2020-05-05 DIAGNOSIS — E06.3 HYPOTHYROIDISM DUE TO HASHIMOTO'S THYROIDITIS: ICD-10-CM

## 2020-05-05 NOTE — PROGRESS NOTES
Chief Complaint   Patient presents with    Diabetes     pcp and pharmacy verified. Eye exam UTD    Thyroid Problem   Records since last visit reviewed          **THIS IS A VIRTUAL VISIT VIA A VIDEO ENCOUNTER. PATIENT AGREED TO HAVE THEIR CARE DELIVERED OVER VIDEO IN PLACE OF THEIR REGULARLY SCHEDULED OFFICE VISIT**      History of Present Illness: Jermaine Anglin is a 79 y.o. female here for follow up of diabetes and hypothyroidism. Was diagnosed with diabetes at the age of 16. At our last visit in November 2019 her A1C was 6.6% and she was not having issues of hypoglycemia, so we did not make any changes to her pump settings. Pt has been staying inside and she notes that she is doing well. \"My dogs have been keeping me busy. \"  She notes she has had some \"ups and downs\" on her blood sugars. She recently saw her cardiologist, who adjusted her BP regimen. Pt had Right TKR in January 2019. Pt notes she continues to have knee pain and feels she will need to have another surgery. Pt saw her ortho for her knees and she was told that her left knee would need surgery, but since it is not causing her significant pain, they are going to hold off for surgery for now. Basal  MN-8AM: 2.15  8AM-4PM: 2.15  4PM-MN: 2.30  Carb Ratio  1:4  Correction Factor  1:25  Target  120  Active Insulin Time  3     She changes her infusion site every 3 days. She checks her BGs 4 times per day. Her FBGs have been running in the 140-160's range  Her pre-lunch BG have been running in the 120-130's range  She notes her BGs have been high in the morning and better in the afternoon/evening. She notes her BGs are the lowest in the \"late afternoon\". She denies issues of hypoglycemia. She wakes up around 9AM  She has breakfast around 10AM, this AM she had an english muffin with cheese and diet soda. She has lunch around 2PM, yesterday she had 1/2 a cheese burger, 3 potato chips and diet soda.   She has dinner around 6-9PM, last night she had mac and cheese and diet soda. Pt has hx of CAD, s/p CABG she was followed by Dr. Aldo Leija of Cardiology, but he retired. She is now following with Dr. Aleks Barahona. She has hx of Retinopathy she is followed by Ophthalmology. \"I refused to take any new injections. He was using a cancer drug and it scared me\". She last saw her eye doctor in November 2019. 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 She has hx of COPD and was followed by Dr. Deni Do of Pulmonology. \"I don't need a lung doctor anymore, I quit smoking and my lungs are clear\". She has gastroparesis, and erosive gastritis and was followed by Dr. Addie Borja of GI. Pt has hx of nephropathy and neuropathy. Pt is now following with a nephrologist, she does not recall the nephrologist's name. She is still taking her LT4 125mcg daily. She was clinically and biochemically euthyroid in July 2019. She denies issues of SOB, CP, she has had some ARZOLA. She denies palpitations, tremors, heat or cold intolerance, diarrhea or constipation. Current Outpatient Medications   Medication Sig    insulin aspart U-100 (NovoLOG U-100 Insulin aspart) 100 unit/mL injection USE A MAX  UNITS DAILY WITH INSULIN PUMP    pravastatin (PRAVACHOL) 40 mg tablet Take 1 Tab by mouth nightly. SCHEDULE OFFICE VISIT    levothyroxine (SYNTHROID) 125 mcg tablet Take 1 Tab by mouth Daily (before breakfast). 1-1/2 pills on Monday, 1 pill the other 6 days for a total of 7-1/2 pills per week. SHEDULE OFFICE VISIT    glucose blood VI test strips (CONTOUR NEXT TEST STRIPS) strip USE AS DIRECTED FOUR TIMES DAILY    colchicine (MITIGARE) 0.6 mg capsule Take two pills when you  the prescription, a third pill and hour later and then two pills per day for the next 3 days (Patient taking differently: as needed.  Take two pills when you  the prescription, a third pill and hour later and then two pills per day for the next 3 days)    acetaminophen (TYLENOL) 500 mg tablet Take 2 Tabs by mouth every six (6) hours.  umeclidinium-vilanterol (ANORO ELLIPTA) 62.5-25 mcg/actuation inhaler Take 1 Puff by inhalation daily.  b complex vitamins tablet Take 1 Tab by mouth daily.  amLODIPine (NORVASC) 5 mg tablet TK 1 T PO QD. PATINET TAKE MED DAILY    metoprolol tartrate (LOPRESSOR) 50 mg tablet TK 1 T PO BID WC. PATIENT TAKES MED TWICE DAILY    tiZANidine & Irritant Cntr 2 4 mg kit tizanidine 4 mg tablet AT BEDTIME    escitalopram oxalate (LEXAPRO) 20 mg tablet Take 20 mg by mouth daily.  pantoprazole (PROTONIX) 40 mg tablet Take 40 mg by mouth daily.  gabapentin (NEURONTIN) 800 mg tablet Take 800 mg by mouth three (3) times daily.   insulin pump (PATIENT SUPPLIED) misc by SubCUTAneous route as needed. 1.7 units/hr    lisinopril (PRINIVIL, ZESTRIL) 10 mg tablet Take 1 Tab by mouth daily. (Patient taking differently: Take 10 mg by mouth two (2) times a day.)    cholecalciferol, vitamin D3, (VITAMIN D3) 2,000 unit tab Take 1 Tab by mouth daily. (Patient taking differently: Take 2,000 Units by mouth daily. TAKES 1000 UNITS PER DAY)    ferrous sulfate 325 mg (65 mg iron) tablet Take 325 mg by mouth daily.  dorzolamide-timolol, PF, (COSOPT, PF,) 2-0.5 % dpet Administer 1 Drop to both eyes two (2) times a day. Indications: Ocular Hypertension    senna (SENNA) 8.6 mg tablet Take 1 Tab by mouth nightly as needed. No current facility-administered medications for this visit.       Allergies   Allergen Reactions    Nsaids (Non-Steroidal Anti-Inflammatory Drug) Other (comments)     bleeding    Augmentin [Amoxicillin-Pot Clavulanate] Diarrhea    Vesicare [Solifenacin] Rash     BURNING WITH URINATION     Review of Systems:  - Eyes: no blurry vision or double vision  - Cardiovascular: no chest pain  - Respiratory: no shortness of breath  - Musculoskeletal: no myalgias  - Neurological: no numbness/tingling in extremities    Physical Examination:  There were no vitals taken for this visit. - GENERAL: NCAT, Appears well nourished   - EYES: EOMI, non-icteric, no proptosis   - Ear/Nose/Throat: NCAT, no visible inflammation or masses   - CARDIOVASCULAR: no cyanosis, no visible JVD   - RESPIRATORY: respiratory effort normal without any distress or labored breathing   - MUSCULOSKELETAL: Normal ROM of neck and upper extremities observed   - SKIN: No rash on face   - NEUROLOGIC:  No facial asymmetry (Cranial nerve 7 motor function), No gaze palsy   - PSYCHIATRIC: Normal affect, Normal insight and judgement     Data Reviewed:   None    Assessment/Plan:   1) DM > Pt reports that her BGs are the highest in the morning and lower during the day, but is not having issues of low BGs. Will increase her MN-8AM from 2.15 to 2.25  @ith her hx of neuropathy and calluses she would benefit from DM shoes and inserts  Pt to check her BGs 4 times per day and mail her BG logs to me in 2 weeks. Will order an A1C today. 2) HTN > Pt on lisinopril 100mg BID. for renal protection. Will order a urine MA/Cr     3) HLD > Pt is still taking her Pravastatin 40mg daily, which she is tolerating well. Her Lipid panel was at goal in August 2019. Will order a lipid panel and CMP     4) Hypothyroidism > Pt is clinically euthyroid on LT4 125mcg 7.5 pills weekly. Will order TFTs and adjust her dose as needed. Pt voices understanding and agreement with the plan. Pain noted and pt was recommended to call her PCP for further evaluation and treatment, as needed     RTC 4 months     CC:  Dr. Michelle Crawley      Follow-up and Dispositions    · Return in about 4 months (around 9/5/2020).

## 2020-05-16 LAB
ALBUMIN SERPL-MCNC: 3.7 G/DL (ref 3.8–4.8)
ALBUMIN/CREAT UR: 2422 MG/G CREAT (ref 0–29)
ALBUMIN/GLOB SERPL: 1.3 {RATIO} (ref 1.2–2.2)
ALP SERPL-CCNC: 77 IU/L (ref 39–117)
ALT SERPL-CCNC: 8 IU/L (ref 0–32)
AST SERPL-CCNC: 11 IU/L (ref 0–40)
BILIRUB SERPL-MCNC: <0.2 MG/DL (ref 0–1.2)
BUN SERPL-MCNC: 31 MG/DL (ref 8–27)
BUN/CREAT SERPL: 16 (ref 12–28)
CALCIUM SERPL-MCNC: 9.5 MG/DL (ref 8.7–10.3)
CHLORIDE SERPL-SCNC: 101 MMOL/L (ref 96–106)
CHOLEST SERPL-MCNC: 189 MG/DL (ref 100–199)
CO2 SERPL-SCNC: 23 MMOL/L (ref 20–29)
CREAT SERPL-MCNC: 1.96 MG/DL (ref 0.57–1)
CREAT UR-MCNC: 108.5 MG/DL
EST. AVERAGE GLUCOSE BLD GHB EST-MCNC: 137 MG/DL
GLOBULIN SER CALC-MCNC: 2.9 G/DL (ref 1.5–4.5)
GLUCOSE SERPL-MCNC: 151 MG/DL (ref 65–99)
HBA1C MFR BLD: 6.4 % (ref 4.8–5.6)
HDLC SERPL-MCNC: 44 MG/DL
INTERPRETATION, 910389: NORMAL
INTERPRETATION: NORMAL
LDLC SERPL CALC-MCNC: 74 MG/DL (ref 0–99)
MICROALBUMIN UR-MCNC: 2627.4 UG/ML
PDF IMAGE, 910387: NORMAL
POTASSIUM SERPL-SCNC: 5 MMOL/L (ref 3.5–5.2)
PROT SERPL-MCNC: 6.6 G/DL (ref 6–8.5)
SODIUM SERPL-SCNC: 138 MMOL/L (ref 134–144)
T4 FREE SERPL-MCNC: 1.11 NG/DL (ref 0.82–1.77)
TRIGL SERPL-MCNC: 356 MG/DL (ref 0–149)
TSH SERPL DL<=0.005 MIU/L-ACNC: 6.74 UIU/ML (ref 0.45–4.5)
VLDLC SERPL CALC-MCNC: 71 MG/DL (ref 5–40)

## 2020-05-18 RX ORDER — LEVOTHYROXINE SODIUM 150 UG/1
150 TABLET ORAL
Qty: 90 TAB | Refills: 2 | Status: SHIPPED | OUTPATIENT
Start: 2020-05-18 | End: 2020-09-28 | Stop reason: SDUPTHER

## 2020-07-29 ENCOUNTER — TELEPHONE (OUTPATIENT)
Dept: ENDOCRINOLOGY | Age: 70
End: 2020-07-29

## 2020-07-29 NOTE — TELEPHONE ENCOUNTER
Spoke pt over the phone and she stated that it has been over a month since applying for patient system and she has not yet heard whether not she has been approved. Pt stated that she she tried to [employer], requesting for a return call, but have not yet received a return call. I then tried to call [employer] as well, but there was a 2 hr hold time, therefore, I submitted the pt's name, social,  and phone number and requested a return phone to the pt. Pt was made aware and was told to expect a return phone call later today.

## 2020-08-14 RX ORDER — INSULIN ASPART 100 [IU]/ML
INJECTION, SOLUTION INTRAVENOUS; SUBCUTANEOUS
Qty: 120 ML | Refills: 3 | Status: SHIPPED | OUTPATIENT
Start: 2020-08-14 | End: 2021-02-09

## 2020-09-11 ENCOUNTER — TELEPHONE (OUTPATIENT)
Dept: ENDOCRINOLOGY | Age: 70
End: 2020-09-11

## 2020-09-11 NOTE — TELEPHONE ENCOUNTER
----- Message from Michael Carroll sent at 9/11/2020  4:32 PM EDT -----  Regarding: Dr Milagros Phalen  General Message/Vendor Calls    Caller's first and last name:      Reason for call: pt is reporting that her appt scheduled for 09/03/20 at 2:30 had been cancelled by the practice and is requesting to be rescheduled      Callback required yes/no and why: yes      Best contact number(s): [phone]      Details to clarify the request:      Michael Carroll

## 2020-09-14 ENCOUNTER — VIRTUAL VISIT (OUTPATIENT)
Dept: ENDOCRINOLOGY | Age: 70
End: 2020-09-14
Payer: MEDICARE

## 2020-09-14 DIAGNOSIS — E10.311 TYPE 1 DIABETES MELLITUS WITH RETINOPATHY AND MACULAR EDEMA, UNSPECIFIED LATERALITY, UNSPECIFIED RETINOPATHY SEVERITY (HCC): Primary | ICD-10-CM

## 2020-09-14 DIAGNOSIS — E78.2 MIXED HYPERLIPIDEMIA: ICD-10-CM

## 2020-09-14 DIAGNOSIS — I10 ESSENTIAL HYPERTENSION: ICD-10-CM

## 2020-09-14 DIAGNOSIS — E06.3 HYPOTHYROIDISM DUE TO HASHIMOTO'S THYROIDITIS: ICD-10-CM

## 2020-09-14 DIAGNOSIS — E03.8 HYPOTHYROIDISM DUE TO HASHIMOTO'S THYROIDITIS: ICD-10-CM

## 2020-09-14 PROCEDURE — 1090F PRES/ABSN URINE INCON ASSESS: CPT | Performed by: INTERNAL MEDICINE

## 2020-09-14 PROCEDURE — 2022F DILAT RTA XM EVC RTNOPTHY: CPT | Performed by: INTERNAL MEDICINE

## 2020-09-14 PROCEDURE — 99214 OFFICE O/P EST MOD 30 MIN: CPT | Performed by: INTERNAL MEDICINE

## 2020-09-14 PROCEDURE — 3044F HG A1C LEVEL LT 7.0%: CPT | Performed by: INTERNAL MEDICINE

## 2020-09-14 PROCEDURE — G8400 PT W/DXA NO RESULTS DOC: HCPCS | Performed by: INTERNAL MEDICINE

## 2020-09-14 PROCEDURE — G8756 NO BP MEASURE DOC: HCPCS | Performed by: INTERNAL MEDICINE

## 2020-09-14 PROCEDURE — G8432 DEP SCR NOT DOC, RNG: HCPCS | Performed by: INTERNAL MEDICINE

## 2020-09-14 PROCEDURE — G8427 DOCREV CUR MEDS BY ELIG CLIN: HCPCS | Performed by: INTERNAL MEDICINE

## 2020-09-14 PROCEDURE — 3288F FALL RISK ASSESSMENT DOCD: CPT | Performed by: INTERNAL MEDICINE

## 2020-09-14 PROCEDURE — 1100F PTFALLS ASSESS-DOCD GE2>/YR: CPT | Performed by: INTERNAL MEDICINE

## 2020-09-14 PROCEDURE — 3017F COLORECTAL CA SCREEN DOC REV: CPT | Performed by: INTERNAL MEDICINE

## 2020-09-14 RX ORDER — CYCLOPENTOLATE HYDROCHLORIDE 10 MG/ML
1 SOLUTION/ DROPS OPHTHALMIC EVERY 4 HOURS
COMMUNITY
End: 2022-02-04

## 2020-09-14 RX ORDER — PREDNISOLONE ACETATE 10 MG/ML
1 SUSPENSION/ DROPS OPHTHALMIC 2 TIMES DAILY
COMMUNITY
Start: 2020-09-09

## 2020-09-14 NOTE — PROGRESS NOTES
Chief Complaint   Patient presents with    Thyroid Problem     Pcp and pharmacy verified. Doxy. me    Diabetes   Records since last visit reviewed          **THIS IS A VIRTUAL VISIT VIA A VIDEO ENCOUNTER. 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 Was diagnosed with diabetes at the age of 16. At our last visit in June 2020 her A1C was 6.4%. Pt notes that for the last 3 weeks she has been dealing with gout. She has been taking the Colchicine which helps some. She notes that she has been less active, due to the Gout and her knee/OA pain, so her BGs have been running higher. Her FBGs have been in the 180-200. She notes she is eating her dinner around 830-9PM.  She denies issues of low BG. She last saw her cardiologist in early 2020. Pt had Right TKR in January 2019. Pt notes she continues to have knee pain and feels she will need to have another surgery. Pt saw her ortho for her knees and she was told that her left knee would need surgery, but since it is not causing her significant pain, they are going to hold off for surgery for now. \"I am afraid of the COVID\". Basal  MN-8AM: 2.40  8AM-4PM: 2.35  4PM-MN: 2.35  Carb Ratio  1:4  Correction Factor  1:25  Target  120  Active Insulin Time  3     She changes her infusion site every 3 days. She checks her BGs 4 times per day. Her FBGs have been running in the 180-200's range  Her pre-lunch BG have been running in the 120-130's range  She notes her BGs have been high in the morning and better in the afternoon/evening. She notes her BGs are the lowest in the \"late afternoon\". She denies issues of hypoglycemia. She wakes up around 7-9AM  She has breakfast around 10AM, this AM she had two pieces of toast with cheese and diet soda.    She has lunch around 1-2PM, \"most days I don't eat lunch\". She will have 1/2 a banana in the afternoon. She has dinner around 7-9PM, last night she had home-made nachos, (20 grams of carbs) and diet soda. She goes to bed around 830-9PM     Pt has hx of CAD, s/p CABG she is now following with Dr. Miguel Rob. She has hx of Retinopathy she is followed by Ophthalmology. \"I refused to take any new injections. He was using a cancer drug and it scared me\". She last saw her eye doctor in November 2019. 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 She has hx of COPD and was followed by Dr. Shaunna Roberts of Pulmonology. \"I don't need a lung doctor anymore, I quit smoking and my lungs are clear\". She has gastroparesis, and erosive gastritis and was followed by Dr. Josef Caraballo of GI. Pt has hx of nephropathy and neuropathy. Pt is now following with a nephrologist, she does not recall the nephrologist's name. She is still taking her LT4 125mcg daily. She was clinically and biochemically euthyroid in June 2020. She denies issues of SOB, CP, she has had some ARZOLA. She denies palpitations, tremors, heat or cold intolerance, diarrhea or constipation. Current Outpatient Medications   Medication Sig    cyclopentolate (CYCLOGYL) 1 % ophthalmic solution Administer 1 Drop to right eye every four (4) hours. HCL    colchicine 0.6 mg tablet Take two pills when you  the prescription, a third pill and hour later and then two pills per day for the next 3 days    insulin aspart U-100 (NovoLOG U-100 Insulin aspart) 100 unit/mL injection USE A MAX  UNITS DAILY WITH INSULIN PUMP    levothyroxine (SYNTHROID) 150 mcg tablet Take 1 Tab by mouth Daily (before breakfast). One pill every day. Note dose change    pravastatin (PRAVACHOL) 40 mg tablet Take 1 Tab by mouth nightly.  SCHEDULE OFFICE VISIT    glucose blood VI test strips (CONTOUR NEXT TEST STRIPS) strip USE AS DIRECTED FOUR TIMES DAILY    acetaminophen (TYLENOL) 500 mg tablet Take 2 Tabs by mouth every six (6) hours.  umeclidinium-vilanterol (ANORO ELLIPTA) 62.5-25 mcg/actuation inhaler Take 1 Puff by inhalation daily.  b complex vitamins tablet Take 1 Tab by mouth daily.  amLODIPine (NORVASC) 5 mg tablet TK 1 T PO QD. PATINET TAKE MED DAILY    metoprolol tartrate (LOPRESSOR) 50 mg tablet TK 1 T PO BID WC. PATIENT TAKES MED TWICE DAILY    tiZANidine & Irritant Cntr 2 4 mg kit tizanidine 4 mg tablet AT BEDTIME    escitalopram oxalate (LEXAPRO) 20 mg tablet Take 20 mg by mouth daily.  pantoprazole (PROTONIX) 40 mg tablet Take 40 mg by mouth daily.  gabapentin (NEURONTIN) 800 mg tablet Take 800 mg by mouth three (3) times daily.   insulin pump (PATIENT SUPPLIED) misc by SubCUTAneous route as needed. 1.7 units/hr    lisinopril (PRINIVIL, ZESTRIL) 10 mg tablet Take 1 Tab by mouth daily. (Patient taking differently: Take 10 mg by mouth two (2) times a day.)    cholecalciferol, vitamin D3, (VITAMIN D3) 2,000 unit tab Take 1 Tab by mouth daily. (Patient taking differently: Take 2,000 Units by mouth daily. TAKES 1000 UNITS PER DAY)    ferrous sulfate 325 mg (65 mg iron) tablet Take 325 mg by mouth daily.  dorzolamide-timolol, PF, (COSOPT, PF,) 2-0.5 % dpet Administer 1 Drop to both eyes two (2) times a day. Indications: Ocular Hypertension    senna (SENNA) 8.6 mg tablet Take 1 Tab by mouth nightly as needed.  prednisoLONE acetate (PRED FORTE) 1 % ophthalmic suspension      No current facility-administered medications for this visit.       Allergies   Allergen Reactions    Nsaids (Non-Steroidal Anti-Inflammatory Drug) Other (comments)     bleeding    Augmentin [Amoxicillin-Pot Clavulanate] Diarrhea    Vesicare [Solifenacin] Rash     BURNING WITH URINATION     Review of Systems:  - Eyes: no blurry vision or double vision  - Cardiovascular: no chest pain  - Respiratory: no shortness of breath  - Musculoskeletal: no myalgias  - Neurological: no numbness/tingling in extremities    Physical Examination:  There were no vitals taken for this visit. - GENERAL: NCAT, Appears well nourished   - EYES: EOMI, non-icteric, no proptosis   - Ear/Nose/Throat: NCAT, no visible inflammation or masses   - CARDIOVASCULAR: no cyanosis, no visible JVD   - RESPIRATORY: respiratory effort normal without any distress or labored breathing   - MUSCULOSKELETAL: Normal ROM of neck and upper extremities observed   - SKIN: No rash on face   - NEUROLOGIC:  No facial asymmetry (Cranial nerve 7 motor function), No gaze palsy   - PSYCHIATRIC: Normal affect, Normal insight and judgement     Data Reviewed:   None    Assessment/Plan:   1) DM > Pt reports that her BGs are the highest in the morning and lower during the day, but is not having issues of low BGs. Will increase her MN-8AM from 2.40 to 2.50  With her hx of neuropathy and calluses she would benefit from DM shoes and inserts  Pt to check her BGs 4 times per day and mail her BG logs to me in 2 weeks. Will order an A1C today. 2) HTN > Pt on lisinopril 100mg BID. for renal protection. Will not make any changes at this time. 3) HLD > Pt is still taking her Pravastatin 40mg daily, which she is tolerating well. Her Lipid panel was at goal in May 2020. 4) Hypothyroidism > Pt is clinically euthyroid on LT4 125mcg 7.5 pills weekly. Will order TFTs and adjust her dose as needed. Pt voices understanding and agreement with the plan.   Pain noted and pt was recommended to call her PCP for further evaluation and treatment, as needed     RTC 2-3 months     CC:  Dr. Ceasar Lange

## 2020-09-26 LAB
EST. AVERAGE GLUCOSE BLD GHB EST-MCNC: 163 MG/DL
HBA1C MFR BLD: 7.3 % (ref 4.8–5.6)
T4 FREE SERPL-MCNC: 1.01 NG/DL (ref 0.82–1.77)
TSH SERPL DL<=0.005 MIU/L-ACNC: 5.36 UIU/ML (ref 0.45–4.5)

## 2020-09-28 RX ORDER — LEVOTHYROXINE SODIUM 150 UG/1
150 TABLET ORAL
Qty: 90 TAB | Refills: 1 | Status: SHIPPED | OUTPATIENT
Start: 2020-09-28 | End: 2021-07-20 | Stop reason: SDUPTHER

## 2020-10-05 ENCOUNTER — TELEPHONE (OUTPATIENT)
Dept: ENDOCRINOLOGY | Age: 70
End: 2020-10-05

## 2020-10-05 RX ORDER — BLOOD SUGAR DIAGNOSTIC
STRIP MISCELLANEOUS
Qty: 400 STRIP | Refills: 0 | Status: ON HOLD | OUTPATIENT
Start: 2020-10-05 | End: 2022-04-01

## 2020-10-05 NOTE — TELEPHONE ENCOUNTER
----- Message from [employer] sent at 10/5/2020  9:55 AM EDT -----  Regarding: Dr Vira Cordova Message/Vendor Calls    Caller's first and last name:      Reason for call:pt would like to know when  she can come in and  her insulin,she was told that it was in      [facility] required yes/no and why:  Yes, for reason given above     Best contact number(s):[phone]      Details to clarify the request:      Truett Peoples

## 2020-11-02 ENCOUNTER — APPOINTMENT (OUTPATIENT)
Dept: GENERAL RADIOLOGY | Age: 70
End: 2020-11-02
Attending: EMERGENCY MEDICINE
Payer: MEDICARE

## 2020-11-02 ENCOUNTER — APPOINTMENT (OUTPATIENT)
Dept: CT IMAGING | Age: 70
End: 2020-11-02
Attending: EMERGENCY MEDICINE
Payer: MEDICARE

## 2020-11-02 ENCOUNTER — HOSPITAL ENCOUNTER (EMERGENCY)
Age: 70
Discharge: HOME | End: 2020-11-02
Attending: STUDENT IN AN ORGANIZED HEALTH CARE EDUCATION/TRAINING PROGRAM
Payer: MEDICARE

## 2020-11-02 VITALS
DIASTOLIC BLOOD PRESSURE: 94 MMHG | OXYGEN SATURATION: 96 % | HEART RATE: 68 BPM | TEMPERATURE: 97.9 F | SYSTOLIC BLOOD PRESSURE: 192 MMHG | RESPIRATION RATE: 16 BRPM

## 2020-11-02 DIAGNOSIS — M25.551 RIGHT HIP PAIN: ICD-10-CM

## 2020-11-02 DIAGNOSIS — M16.0 OSTEOARTHRITIS OF BOTH HIPS, UNSPECIFIED OSTEOARTHRITIS TYPE: ICD-10-CM
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
## 2020-11-02 PROCEDURE — 99284 EMERGENCY DEPT VISIT MOD MDM: CPT

## 2020-11-02 PROCEDURE — 74011250637 HC RX REV CODE- 250/637: Performed by: EMERGENCY MEDICINE

## 2020-11-02 PROCEDURE — 73700 CT LOWER EXTREMITY W/O DYE: CPT

## 2020-11-02 PROCEDURE — 73502 X-RAY EXAM HIP UNI 2-3 VIEWS: CPT

## 2020-11-02 RX ORDER — ACETAMINOPHEN 325 MG/1
325 TABLET ORAL
Status: COMPLETED | OUTPATIENT
Start: 2020-11-02 | End: 2020-11-02

## 2020-11-02 RX ORDER — ONDANSETRON 4 MG/1
4 TABLET, ORALLY DISINTEGRATING ORAL
Status: COMPLETED | OUTPATIENT
Start: 2020-11-02 | End: 2020-11-02

## 2020-11-02 RX ORDER — HYDROCODONE BITARTRATE AND ACETAMINOPHEN 7.5; 325 MG/1; MG/1
1 TABLET ORAL
Status: COMPLETED | OUTPATIENT
Start: 2020-11-02 | End: 2020-11-02

## 2020-11-02 RX ADMIN — HYDROCODONE BITARTRATE AND ACETAMINOPHEN 1 TABLET: 7.5; 325 TABLET ORAL at 11:12

## 2020-11-02 RX ADMIN — ONDANSETRON 4 MG: 4 TABLET, ORALLY DISINTEGRATING ORAL at 11:12

## 2020-11-02 RX ADMIN — ACETAMINOPHEN 325 MG: 325 TABLET ORAL at 11:12

## 2020-11-02 NOTE — SENIOR SERVICES NOTE
Met with patient and her roommate. Patient drowsy after receiving pain medications but easily aroused. She reports that she was barefoot in the bathroom this morning and lost her footing. She reports that she uses a cane for long distances but holds onto roommate's arm for short distances. States that pain medicine has been effective and that she has walked to [facility] without difficulty. Roommate will drive her home when discharged.

## 2020-11-02 NOTE — DISCHARGE INSTRUCTIONS
Patient Education        Preventing Falls: Care Instructions  Your Care Instructions    Getting around your home safely can be a challenge if you have injuries or health problems that make it easy for you to fall. Loose rugs and furniture in walkways are among the dangers for many older people who have problems walking or who have poor eyesight. People who have conditions such as arthritis, osteoporosis, or dementia also have to be careful not to fall. You can make your home safer with a few simple measures. Follow-up care is a key part of your treatment and safety. Be sure to make and go to all appointments, and call your doctor if you are having problems. It's also a good idea to know your test results and keep a list of the medicines you take. How can you care for yourself at home? Taking care of yourself  · You may get dizzy if you do not drink enough water. To prevent dehydration, drink plenty of fluids, enough so that your urine is light yellow or clear like water. Choose water and other caffeine-free clear liquids. If you have kidney, heart, or liver disease and have to limit fluids, talk with your doctor before you increase the amount of fluids you drink. · Exercise regularly to improve your strength, muscle tone, and balance. Walk if you can. Swimming may be a good choice if you cannot walk easily. · Have your vision and hearing checked each year or any time you notice a change. If you have trouble seeing and hearing, you might not be able to avoid objects and could lose your balance. · Know the side effects of the medicines you take. Ask your doctor or pharmacist whether the medicines you take can affect your balance. Sleeping pills or sedatives can affect your balance. · Limit the amount of alcohol you drink. Alcohol can impair your balance and other senses. · Ask your doctor whether calluses or corns on your feet need to be removed.  If you wear loose-fitting shoes because of calluses or corns, you can lose your balance and fall. · Talk to your doctor if you have numbness in your feet. Preventing falls at home  · Remove raised doorway thresholds, throw rugs, and clutter. Repair loose carpet or raised areas in the floor. · Move furniture and electrical cords to keep them out of walking paths. · Use nonskid floor wax, and wipe up spills right away, especially on ceramic tile floors. · If you use a walker or cane, put rubber tips on it. If you use crutches, clean the bottoms of them regularly with an abrasive pad, such as steel wool. · Keep your house well lit, especially [place], and outside walkways. Use night-lights in areas such as hallways and bathrooms. Add extra light switches or use remote switches (such as switches that go on or off when you clap your hands) to make it easier to turn lights on if you have to get up during the night. · Install sturdy handrails on stairways. · Move items in your cabinets so that the things you use a lot are on the lower shelves (about waist level). · Keep a cordless phone and a flashlight with new batteries by your bed. If possible, put a phone in each of the main rooms of your house, or carry a cell phone in case you fall and cannot reach a phone. Or, you can wear a device around your neck or wrist. You push a button that sends a signal for help. · Wear low-heeled shoes that fit well and give your feet good support. Use footwear with nonskid soles. Check the heels and soles of your shoes for wear. Repair or replace worn heels or soles. · Do not wear socks without shoes on wood floors. · Walk on the grass when the sidewalks are slippery. If you live in an area that gets snow and ice in the winter, sprinkle salt on slippery steps and sidewalks. Preventing falls in the bath  · Install grab bars and nonskid mats inside and outside your shower or tub and near the toilet and sinks. · Use shower chairs and bath benches.   · Use a hand-held shower head that will allow you to sit while showering. · Get into a tub or shower by putting the weaker leg in first. Get out of a tub or shower with your strong side first.  · Repair loose toilet seats and consider installing a raised toilet seat to make getting on and off the toilet easier. · Keep your bathroom door unlocked while you are in the shower. Where can you learn more? Go to http://www.eng.com/. Enter [id number] [id number] in the search box to learn more about \"Preventing Falls: Care Instructions. \"  Current as of: March 16, 2018  Content Version: 11.8  © 3822-5637 [employer]. Care instructions adapted under license by [employer] (which disclaims liability or warranty for this information). If you have questions about a medical condition or this instruction, always ask your healthcare professional. [place] [address] any warranty or liability for your use of this information. Patient Education        Hip Arthritis: Care Instructions  Your Care Instructions     Arthritis, also called osteoarthritis, is a breakdown of the tissue (cartilage) that cushions your joints. Many people have some arthritis as they age. When the cartilage in your hip joints wears down, your hip bone rubs against the hip socket. This causes pain and stiffness. Work with your doctor to find the right mix of treatments for your arthritis. There are things you can do at home to protect your hip joints, ease your pain, and help you stay active. But if your arthritis becomes so bad that you cannot walk, you may need surgery to replace the hip joint. Follow-up care is a key part of your treatment and safety. Be sure to make and go to all appointments, and call your doctor if you are having problems. It's also a good idea to know your test results and keep a list of the medicines you take. How can you care for yourself at home? · Stay at a healthy weight.  Being overweight puts extra strain on your hip joints. · Talk to your doctor or physical therapist about exercises that will help ease hip pain. These tips may help. ? Stretch to help prevent stiffness and to prevent injury before you exercise. You may enjoy gentle forms of yoga to help keep your joints and muscles flexible. ? Walk instead of jog. Other types of exercise that are less stressful on the joints include riding a bike, swimming, and doing water exercise. ? Lift weights. Strong muscles help reduce stress on your joints. Stronger thigh muscles, for example, take some of the stress off of the knees and hips. Learn the right way to lift weights so you do not make joint pain worse. · Take pain medicines exactly as directed. ? If the doctor gave you a prescription medicine for pain, take it as prescribed. ? If you are not taking a prescription pain medicine, ask your doctor if you can take an over-the-counter medicine. · Use a cane, crutch, walker, or another device if you need help to get around. These can help rest your hips. You also can use other things to make life easier, such as a higher toilet seat. · Do not sit in low chairs, which can make it painful to get up. · Put heat or cold on your sore hips as needed. Use whichever helps you most. You also can go back and forth between hot and cold packs. ? Apply heat 2 or 3 times a day for 20 to 30 minutes--using a heating pad, hot shower, or hot pack--to relieve pain and stiffness. ? Put ice or a cold pack on your sore hips for 10 to 20 minutes at a time to numb the area. Put a thin cloth between the ice and your skin. · Think about talking to your doctor about using capsaicin, a cream you apply to the skin for pain relief. When should you call for help?    Call your doctor now or seek immediate medical care if:    · You have sudden swelling, warmth, or pain in any joint.     · You have joint pain and a fever or rash.     · You have such bad pain that you cannot use the joint. Watch closely for changes in your health, and be sure to contact your doctor if:    · You have mild joint symptoms that continue even with more than 6 weeks of care at home.     · You do not get better as expected.     · You have stomach pain or other problems with your medicine. Where can you learn more? Go to http://www.gray.com/  Enter T640 in the search box to learn more about \"Hip Arthritis: Care Instructions. \"  Current as of: December 9, 2019               Content Version: 12.6  © 6650-2683 [employer]. Care instructions adapted under license by [employer] (which disclaims liability or warranty for this information). If you have questions about a medical condition or this instruction, always ask your healthcare professional. [address] any warranty or liability for your use of this information. Patient Education        Hip Pain: Care Instructions  Your Care Instructions     Hip pain may be caused by many things, including overuse, a fall, or a twisting movement. Another cause of hip pain is arthritis. Your pain may increase when you stand up, walk, or squat. The pain may come and go or may be constant. Home treatment can help relieve hip pain, swelling, and stiffness. If your pain is ongoing, you may need more tests and treatment. Follow-up care is a key part of your treatment and safety. Be sure to make and go to all appointments, and call your doctor if you are having problems. It's also a good idea to know your test results and keep a list of the medicines you take. How can you care for yourself at home? · Take pain medicines exactly as directed. ? If the doctor gave you a prescription medicine for pain, take it as prescribed. ? If you are not taking a prescription pain medicine, ask your doctor if you can take an over-the-counter medicine. · Rest and protect your hip.  Take a break from any activity, including standing or walking, that may cause pain. · Put ice or a cold pack against your hip for 10 to 20 minutes at a time. Try to do this every 1 to 2 hours for the next 3 days (when you are awake) or until the swelling goes down. Put a thin cloth between the ice and your skin. · Sleep on your healthy side with a pillow between your knees, or sleep on your back with pillows under your knees. · If there is no swelling, you can put moist heat, a heating pad, or a warm cloth on your hip. Do gentle stretching exercises to help keep your hip flexible. · Learn how to prevent falls. Have your vision and hearing checked regularly. Wear slippers or shoes with a nonskid sole. · Stay at a healthy weight. · Wear comfortable shoes. When should you call for help? Call 911 anytime you think you may need emergency care. For example, call if:    · You have sudden chest pain and shortness of breath, or you cough up blood.     · You are not able to stand or walk or bear weight.     · Your buttocks, legs, or feet feel numb or tingly.     · Your leg or foot is cool or pale or changes color.     · You have severe pain. Call your doctor now or seek immediate medical care if:    · You have signs of infection, such as:  ? Increased pain, swelling, warmth, or redness in the hip area. ? Red streaks leading from the hip area. ? Pus draining from the hip area. ? A fever.     · You have signs of a blood clot, such as:  ? Pain in your calf, back of the knee, thigh, or groin. ? Redness and swelling in your leg or groin.     · You are not able to bend, straighten, or move your leg normally.     · You have trouble urinating or having bowel movements. Watch closely for changes in your health, and be sure to contact your doctor if:    · You do not get better as expected. Where can you learn more?   Go to http://www.gray.com/  Enter Z720 in the search box to learn more about \"Hip Pain: Care Instructions. \"  Current as of: June 26, 2019               Content Version: 12.6  © 0054-6369 [employer]. Care instructions adapted under license by [employer] (which disclaims liability or warranty for this information). If you have questions about a medical condition or this instruction, always ask your healthcare professional. [address] any warranty or liability for your use of this information.

## 2020-11-02 NOTE — ED TRIAGE NOTES
Patient presents from home with complaints of ground level trip and fall. Patient is reporting right hip pain.  Patient is blind and also has hypertension, but has not taken medications today

## 2020-11-02 NOTE — ED PROVIDER NOTES
HPI patient is a morbidly obese 68-year-old legally blind elderly female with past medical history significant for coronary artery disease, COPD, carpal tunnel syndrome, type 2 diabetes insulin-dependent on a pump; hypercholesterolemia hypothyroidism, hypertension, neuropathy, legally blind, and osteoporosis who presents to the ED after having a fall in her house this monring. She states that she lost her footing and landed on her right side. She denies any dizziness, loss of consciousness, head injury or neck injury. Her roommate called EMS and she was transported to the ED for evaluation. She is able to weight-bear with assistance but with much discomfort.   She has not taken any of her medications today prior to arrival.    Past Medical History:   Diagnosis Date    Adverse effect of anesthesia     SLOW WAKING PAST ANESTHESIA    Anxiety     Arthritis     CAD (coronary artery disease)     s/p CABG x 3v    Chest pain 7/2015    Chronic obstructive pulmonary disease (HCC)     CTS (carpal tunnel syndrome)     S/p bilateral release    Diabetes ([address]     Diabetic gastroparesis ([address]     Diabetic neuropathy ([address]     Diabetic retinopathy ([address]     GERD (gastroesophageal reflux disease)     GI bleed 7/2015    4 bleeds with 9 clips placed    Hypercholesteremia     Hypertension     Hypothyroid     Ill-defined condition     NEUROPATHY HANDS AND FEET    Ill-defined condition 2017    GI BLEED    Nicotine vapor product user     JOHN on CPAP     Osteoporosis     Vitamin D deficiency        Past Surgical History:   Procedure Laterality Date    CABG, ARTERY-VEIN, THREE  7/13/2015    HX CERVICAL FUSION  2011    HX ENDOSCOPY  7/2015    HX GI      HX HEART CATHETERIZATION  7/2015    HX LUMBAR FUSION  2017    HX LUMBAR LAMINECTOMY  2011    HX ORTHOPAEDIC Right 1970    CARPAL TUNNEL    HX ORTHOPAEDIC Left 2003    CARPAL TUNNEL    HX ROTATOR CUFF REPAIR Right 2003    HX SHOULDER ARTHROSCOPY Right     HX TONSIL AND ADENOIDECTOMY  1968    HX TONSILLECTOMY           Family History:   Problem Relation Age of Onset   [facility] COPD Mother     Diabetes Mother    [facility] COPD Father     Diabetes Father     Cancer Father         pancreatic    Diabetes Brother     Heart Disease Brother     Diabetes Brother     Alcohol abuse Brother     Diabetes Sister     Cancer Sister         Brit     Bleeding Prob Sister         Factor V Leiden    Anesth Problems Neg Hx        Social History     Socioeconomic History    Marital status: SINGLE     Spouse name: Not on file    Number of children: Not on file    Years of education: Not on file    Highest education level: Not on file   Occupational History    Occupation: [employer]   Social Needs    Financial resource strain: Not on file    Food insecurity     Worry: Not on file     Inability: Not on file    Transportation needs     Medical: Not on file     Non-medical: Not on file   Tobacco Use    Smoking status: Former Smoker     Packs/day: 0.50     Years: 40.00     Pack years: 20.00     Last attempt to quit: 2015     Years since quittin.3    Smokeless tobacco: Never Used   Substance and Sexual Activity    Alcohol use: No     Alcohol/week: 0.0 standard drinks    Drug use: No    Sexual activity: Never   Lifestyle    Physical activity     Days per week: Not on file     Minutes per session: Not on file    Stress: Not on file   Relationships    Social connections     Talks on phone: Not on file     Gets together: Not on file     Attends Jehovah's witness service: Not on file     Active member of club or organization: Not on file     Attends meetings of clubs or organizations: Not on file     Relationship status: Not on file    Intimate partner violence     Fear of current or ex partner: Not on file     Emotionally abused: Not on file     Physically abused: Not on file     Forced sexual activity: Not on file   Other Topics Concern    Not on file   Social History Narrative Lives in [place] with partner, moved from [place] to be closer to sister and \"doctors in [place] almost killed me\"         ALLERGIES: Nsaids (non-steroidal anti-inflammatory drug); Augmentin [amoxicillin-pot clavulanate]; and Vesicare [solifenacin]    Review of Systems   Constitutional: Positive for activity change. Negative for appetite change, fever and unexpected weight change. HENT: Negative for congestion, rhinorrhea, sore throat and trouble swallowing. Eyes: Negative for visual disturbance. Respiratory: Negative for cough and shortness of breath. Cardiovascular: Negative for chest pain, palpitations and leg swelling. Gastrointestinal: Negative for abdominal pain, diarrhea, nausea and vomiting. Genitourinary: Negative for difficulty urinating and dysuria. Musculoskeletal: Negative for arthralgias and myalgias. Skin: Negative for rash. Neurological: Negative for dizziness, light-headedness and headaches. All other systems reviewed and are negative. Vitals:    11/02/20 1020   BP: (!) 192/94   Pulse: 68   Resp: 16   Temp: 97.9 °F (36.6 °C)   SpO2: 96%            Physical Exam  Vitals signs and nursing note reviewed. Constitutional:       General: She is not in acute distress. Appearance: Normal appearance. She is not ill-appearing, toxic-appearing or diaphoretic. Comments: Elderly obese white female; former smoker; legally blind   HENT:      Head: Normocephalic. Cardiovascular:      Rate and Rhythm: Normal rate and regular rhythm. Pulmonary:      Effort: Pulmonary effort is normal.      Breath sounds: Normal breath sounds. Abdominal:      General: Bowel sounds are normal.      Palpations: Abdomen is soft. Tenderness: There is no abdominal tenderness. There is no guarding. Musculoskeletal: Normal range of motion. General: Tenderness and signs of injury present. No swelling or deformity. Right lower leg: No edema. Left lower leg: No edema.       Comments: Reports right posterior lateral hip pain with weightbearing; Skin integrity is intact. There is no obvious deformity, bruising or erythema. Good neurovascular sensation. No apparent tendon or nerve injury. Skin:     General: Skin is warm and dry. Neurological:      Mental Status: She is alert and oriented to person, place, and time. MDM       Procedures      Xr Hip Rt W Or Wo Pelv 2-3 Vws    Result Date: 11/2/2020  IMPRESSION: 1. No fracture within the limitation of osteopenia. 2. Mild bilateral hip osteoarthritis. Ct Low Ext Rt Wo Cont    Result Date: 11/2/2020  IMPRESSION: 1. Osteopenia. No evidence of acute fracture     12:19 PM  Patient's results and plan of care have been reviewed with the pt and her roommate. Patient and/or family have verbally conveyed their understanding and agreement of the patient's signs, symptoms, diagnosis, treatment and prognosis and additionally agree to follow up as recommended or return to the Emergency Room should her condition change prior to follow-up. Discharge instructions have also been provided to the patient with some educational information regarding her diagnosis as well a list of reasons why she would want to return to the ER prior to her  follow-up appointment should her condition change. [employer], NP

## 2021-01-30 ENCOUNTER — HOSPITAL ENCOUNTER (INPATIENT)
Age: 71
LOS: 9 days | Discharge: NURSING, REHAB OR LONG-TERM CARE | DRG: 492 | End: 2021-02-09
Attending: EMERGENCY MEDICINE | Admitting: FAMILY MEDICINE
Payer: MEDICARE

## 2021-01-30 ENCOUNTER — HOSPITAL ENCOUNTER (EMERGENCY)
Dept: CT IMAGING | Age: 71
Discharge: HOME | DRG: 492 | End: 2021-01-30
Attending: EMERGENCY MEDICINE
Payer: MEDICARE

## 2021-01-30 ENCOUNTER — APPOINTMENT (OUTPATIENT)
Dept: GENERAL RADIOLOGY | Age: 71
DRG: 492 | End: 2021-01-30
Attending: EMERGENCY MEDICINE
Payer: MEDICARE

## 2021-01-30 DIAGNOSIS — S82.851A TRIMALLEOLAR FRACTURE OF ANKLE, CLOSED, RIGHT, INITIAL ENCOUNTER: Primary | ICD-10-CM

## 2021-01-30 DIAGNOSIS — R73.9 HYPERGLYCEMIA: ICD-10-CM

## 2021-01-30 DIAGNOSIS — N18.9 CHRONIC KIDNEY DISEASE, UNSPECIFIED CKD STAGE: ICD-10-CM

## 2021-01-30 DIAGNOSIS — E10.311 TYPE 1 DIABETES MELLITUS WITH RETINOPATHY AND MACULAR EDEMA, UNSPECIFIED LATERALITY, UNSPECIFIED RETINOPATHY SEVERITY (HCC): ICD-10-CM

## 2021-01-30 LAB
COMMENT, HOLDF: NORMAL
GLUCOSE BLD STRIP.AUTO-MCNC: 217 MG/DL (ref 65–100)
SAMPLES BEING HELD,HOLD: NORMAL
SERVICE CMNT-IMP: ABNORMAL

## 2021-01-30 PROCEDURE — 99285 EMERGENCY DEPT VISIT HI MDM: CPT

## 2021-01-30 PROCEDURE — 70450 CT HEAD/BRAIN W/O DYE: CPT

## 2021-01-30 PROCEDURE — 82962 GLUCOSE BLOOD TEST: CPT

## 2021-01-30 PROCEDURE — 73552 X-RAY EXAM OF FEMUR 2/>: CPT

## 2021-01-30 PROCEDURE — 36415 COLL VENOUS BLD VENIPUNCTURE: CPT

## 2021-01-30 PROCEDURE — 71045 X-RAY EXAM CHEST 1 VIEW: CPT

## 2021-01-30 PROCEDURE — 73590 X-RAY EXAM OF LOWER LEG: CPT

## 2021-01-30 PROCEDURE — 72125 CT NECK SPINE W/O DYE: CPT

## 2021-01-30 PROCEDURE — 85025 COMPLETE CBC W/AUTO DIFF WBC: CPT

## 2021-01-30 PROCEDURE — 73620 X-RAY EXAM OF FOOT: CPT

## 2021-01-30 PROCEDURE — 80048 BASIC METABOLIC PNL TOTAL CA: CPT

## 2021-01-31 PROBLEM — S82.899A ANKLE FRACTURE: Status: ACTIVE | Noted: 2021-01-31

## 2021-01-31 LAB
ANION GAP SERPL CALC-SCNC: 3 MMOL/L (ref 5–15)
ANION GAP SERPL CALC-SCNC: 5 MMOL/L (ref 5–15)
APTT PPP: 24.2 SEC (ref 22.1–31)
ARTERIAL PATENCY WRIST A: YES
BASE DEFICIT BLD-SCNC: 1 MMOL/L
BASOPHILS # BLD: 0.1 K/UL (ref 0–0.1)
BASOPHILS NFR BLD: 2 % (ref 0–1)
BDY SITE: ABNORMAL
BUN SERPL-MCNC: 22 MG/DL (ref 6–20)
BUN SERPL-MCNC: 23 MG/DL (ref 6–20)
BUN/CREAT SERPL: 11 (ref 12–20)
BUN/CREAT SERPL: 12 (ref 12–20)
CA-I BLD-SCNC: 1.27 MMOL/L (ref 1.12–1.32)
CALCIUM SERPL-MCNC: 8.9 MG/DL (ref 8.5–10.1)
CALCIUM SERPL-MCNC: 8.9 MG/DL (ref 8.5–10.1)
CHLORIDE SERPL-SCNC: 107 MMOL/L (ref 97–108)
CHLORIDE SERPL-SCNC: 111 MMOL/L (ref 97–108)
CO2 SERPL-SCNC: 24 MMOL/L (ref 21–32)
CO2 SERPL-SCNC: 27 MMOL/L (ref 21–32)
COVID-19 RAPID TEST, COVR: NOT DETECTED
CREAT SERPL-MCNC: 1.89 MG/DL (ref 0.55–1.02)
CREAT SERPL-MCNC: 1.98 MG/DL (ref 0.55–1.02)
DIFFERENTIAL METHOD BLD: ABNORMAL
EOSINOPHIL # BLD: 0.1 K/UL (ref 0–0.4)
EOSINOPHIL NFR BLD: 2 % (ref 0–7)
ERYTHROCYTE [DISTWIDTH] IN BLOOD BY AUTOMATED COUNT: 12.8 % (ref 11.5–14.5)
GAS FLOW.O2 O2 DELIVERY SYS: ABNORMAL L/MIN
GAS FLOW.O2 SETTING OXYMISER: 2 L/M
GLUCOSE BLD STRIP.AUTO-MCNC: 194 MG/DL (ref 65–100)
GLUCOSE BLD STRIP.AUTO-MCNC: 227 MG/DL (ref 65–100)
GLUCOSE BLD STRIP.AUTO-MCNC: 235 MG/DL (ref 65–100)
GLUCOSE BLD STRIP.AUTO-MCNC: 258 MG/DL (ref 65–100)
GLUCOSE SERPL-MCNC: 177 MG/DL (ref 65–100)
GLUCOSE SERPL-MCNC: 250 MG/DL (ref 65–100)
HCO3 BLD-SCNC: 26.6 MMOL/L (ref 22–26)
HCT VFR BLD AUTO: 40.1 % (ref 35–47)
HGB BLD-MCNC: 12.8 G/DL (ref 11.5–16)
IMM GRANULOCYTES # BLD AUTO: 0 K/UL
IMM GRANULOCYTES NFR BLD AUTO: 0 %
INR PPP: 1.1 (ref 0.9–1.1)
LYMPHOCYTES # BLD: 0.8 K/UL (ref 0.8–3.5)
LYMPHOCYTES NFR BLD: 13 % (ref 12–49)
MAGNESIUM SERPL-MCNC: 1.7 MG/DL (ref 1.6–2.4)
MCH RBC QN AUTO: 32.8 PG (ref 26–34)
MCHC RBC AUTO-ENTMCNC: 31.9 G/DL (ref 30–36.5)
MCV RBC AUTO: 102.8 FL (ref 80–99)
MONOCYTES # BLD: 0.4 K/UL (ref 0–1)
MONOCYTES NFR BLD: 7 % (ref 5–13)
MYELOCYTES NFR BLD MANUAL: 1 %
NEUTS BAND NFR BLD MANUAL: 2 % (ref 0–6)
NEUTS SEG # BLD: 4.4 K/UL (ref 1.8–8)
NEUTS SEG NFR BLD: 73 % (ref 32–75)
NRBC # BLD: 0 K/UL (ref 0–0.01)
NRBC BLD-RTO: 0 PER 100 WBC
PCO2 BLD: 60.8 MMHG (ref 35–45)
PH BLD: 7.25 [PH] (ref 7.35–7.45)
PLATELET # BLD AUTO: 162 K/UL (ref 150–400)
PMV BLD AUTO: 11.4 FL (ref 8.9–12.9)
PO2 BLD: 72 MMHG (ref 80–100)
POTASSIUM SERPL-SCNC: 5.2 MMOL/L (ref 3.5–5.1)
POTASSIUM SERPL-SCNC: 5.2 MMOL/L (ref 3.5–5.1)
PROTHROMBIN TIME: 11.1 SEC (ref 9–11.1)
RBC # BLD AUTO: 3.9 M/UL (ref 3.8–5.2)
RBC MORPH BLD: ABNORMAL
SAO2 % BLD: 91 % (ref 92–97)
SARS-COV-2, COV2: NORMAL
SERVICE CMNT-IMP: ABNORMAL
SODIUM SERPL-SCNC: 138 MMOL/L (ref 136–145)
SODIUM SERPL-SCNC: 139 MMOL/L (ref 136–145)
SOURCE, COVRS: NORMAL
SPECIMEN TYPE: ABNORMAL
THERAPEUTIC RANGE,PTTT: NORMAL SECS (ref 58–77)
WBC # BLD AUTO: 5.8 K/UL (ref 3.6–11)

## 2021-01-31 PROCEDURE — 80048 BASIC METABOLIC PNL TOTAL CA: CPT

## 2021-01-31 PROCEDURE — 65660000000 HC RM CCU STEPDOWN

## 2021-01-31 PROCEDURE — 36415 COLL VENOUS BLD VENIPUNCTURE: CPT

## 2021-01-31 PROCEDURE — 93005 ELECTROCARDIOGRAM TRACING: CPT

## 2021-01-31 PROCEDURE — 85610 PROTHROMBIN TIME: CPT

## 2021-01-31 PROCEDURE — 74011636637 HC RX REV CODE- 636/637: Performed by: FAMILY MEDICINE

## 2021-01-31 PROCEDURE — 85730 THROMBOPLASTIN TIME PARTIAL: CPT

## 2021-01-31 PROCEDURE — 94760 N-INVAS EAR/PLS OXIMETRY 1: CPT

## 2021-01-31 PROCEDURE — 75810000301 HC ER LEVEL 1 CLOSED TREATMNT FRACTURE/DISLOCATION

## 2021-01-31 PROCEDURE — 82962 GLUCOSE BLOOD TEST: CPT

## 2021-01-31 PROCEDURE — 82803 BLOOD GASES ANY COMBINATION: CPT

## 2021-01-31 PROCEDURE — 74011250637 HC RX REV CODE- 250/637: Performed by: FAMILY MEDICINE

## 2021-01-31 PROCEDURE — 87635 SARS-COV-2 COVID-19 AMP PRB: CPT

## 2021-01-31 PROCEDURE — 36600 WITHDRAWAL OF ARTERIAL BLOOD: CPT

## 2021-01-31 PROCEDURE — 83735 ASSAY OF MAGNESIUM: CPT

## 2021-01-31 RX ORDER — PRAVASTATIN SODIUM 40 MG/1
40 TABLET ORAL
Status: DISCONTINUED | OUTPATIENT
Start: 2021-01-31 | End: 2021-02-09 | Stop reason: HOSPADM

## 2021-01-31 RX ORDER — INSULIN GLARGINE 100 [IU]/ML
10 INJECTION, SOLUTION SUBCUTANEOUS
Status: DISCONTINUED | OUTPATIENT
Start: 2021-01-31 | End: 2021-02-01

## 2021-01-31 RX ORDER — POLYETHYLENE GLYCOL 3350 17 G/17G
17 POWDER, FOR SOLUTION ORAL DAILY PRN
Status: DISCONTINUED | OUTPATIENT
Start: 2021-01-31 | End: 2021-02-09 | Stop reason: HOSPADM

## 2021-01-31 RX ORDER — SODIUM CHLORIDE 0.9 % (FLUSH) 0.9 %
5-40 SYRINGE (ML) INJECTION EVERY 8 HOURS
Status: DISCONTINUED | OUTPATIENT
Start: 2021-01-31 | End: 2021-02-09 | Stop reason: HOSPADM

## 2021-01-31 RX ORDER — DEXTROSE 50 % IN WATER (D50W) INTRAVENOUS SYRINGE
25-50 AS NEEDED
Status: DISCONTINUED | OUTPATIENT
Start: 2021-01-31 | End: 2021-02-09 | Stop reason: HOSPADM

## 2021-01-31 RX ORDER — SODIUM CHLORIDE 0.9 % (FLUSH) 0.9 %
5-40 SYRINGE (ML) INJECTION AS NEEDED
Status: DISCONTINUED | OUTPATIENT
Start: 2021-01-31 | End: 2021-02-09 | Stop reason: HOSPADM

## 2021-01-31 RX ORDER — PANTOPRAZOLE SODIUM 40 MG/1
40 TABLET, DELAYED RELEASE ORAL
Status: DISCONTINUED | OUTPATIENT
Start: 2021-01-31 | End: 2021-02-09 | Stop reason: HOSPADM

## 2021-01-31 RX ORDER — PROMETHAZINE HYDROCHLORIDE 25 MG/1
12.5 TABLET ORAL
Status: DISCONTINUED | OUTPATIENT
Start: 2021-01-31 | End: 2021-02-09 | Stop reason: HOSPADM

## 2021-01-31 RX ORDER — ESCITALOPRAM OXALATE 10 MG/1
20 TABLET ORAL DAILY
Status: DISCONTINUED | OUTPATIENT
Start: 2021-01-31 | End: 2021-02-09 | Stop reason: HOSPADM

## 2021-01-31 RX ORDER — AMLODIPINE BESYLATE 5 MG/1
5 TABLET ORAL DAILY
Status: DISCONTINUED | OUTPATIENT
Start: 2021-01-31 | End: 2021-02-09 | Stop reason: HOSPADM

## 2021-01-31 RX ORDER — GABAPENTIN 400 MG/1
800 CAPSULE ORAL 3 TIMES DAILY
Status: DISCONTINUED | OUTPATIENT
Start: 2021-01-31 | End: 2021-02-04

## 2021-01-31 RX ORDER — ACETAMINOPHEN 650 MG/1
650 SUPPOSITORY RECTAL
Status: DISCONTINUED | OUTPATIENT
Start: 2021-01-31 | End: 2021-02-09 | Stop reason: HOSPADM

## 2021-01-31 RX ORDER — LISINOPRIL 10 MG/1
10 TABLET ORAL 2 TIMES DAILY
Status: DISCONTINUED | OUTPATIENT
Start: 2021-01-31 | End: 2021-01-31

## 2021-01-31 RX ORDER — LEVOTHYROXINE SODIUM 75 UG/1
150 TABLET ORAL
Status: DISCONTINUED | OUTPATIENT
Start: 2021-01-31 | End: 2021-02-09 | Stop reason: HOSPADM

## 2021-01-31 RX ORDER — INSULIN LISPRO 100 [IU]/ML
INJECTION, SOLUTION INTRAVENOUS; SUBCUTANEOUS
Status: DISCONTINUED | OUTPATIENT
Start: 2021-01-31 | End: 2021-02-09 | Stop reason: HOSPADM

## 2021-01-31 RX ORDER — METOPROLOL TARTRATE 50 MG/1
50 TABLET ORAL 2 TIMES DAILY
Status: DISCONTINUED | OUTPATIENT
Start: 2021-01-31 | End: 2021-02-09 | Stop reason: HOSPADM

## 2021-01-31 RX ORDER — ONDANSETRON 2 MG/ML
4 INJECTION INTRAMUSCULAR; INTRAVENOUS
Status: DISCONTINUED | OUTPATIENT
Start: 2021-01-31 | End: 2021-02-09 | Stop reason: HOSPADM

## 2021-01-31 RX ORDER — OXYCODONE HYDROCHLORIDE 5 MG/1
5 TABLET ORAL
Status: DISCONTINUED | OUTPATIENT
Start: 2021-01-31 | End: 2021-02-01

## 2021-01-31 RX ORDER — ACETAMINOPHEN 325 MG/1
650 TABLET ORAL
Status: DISCONTINUED | OUTPATIENT
Start: 2021-01-31 | End: 2021-02-09 | Stop reason: HOSPADM

## 2021-01-31 RX ORDER — LANOLIN ALCOHOL/MO/W.PET/CERES
325 CREAM (GRAM) TOPICAL DAILY
Status: DISCONTINUED | OUTPATIENT
Start: 2021-01-31 | End: 2021-02-09 | Stop reason: HOSPADM

## 2021-01-31 RX ORDER — MAGNESIUM SULFATE 100 %
4 CRYSTALS MISCELLANEOUS AS NEEDED
Status: DISCONTINUED | OUTPATIENT
Start: 2021-01-31 | End: 2021-02-09 | Stop reason: HOSPADM

## 2021-01-31 RX ADMIN — INSULIN LISPRO 2 UNITS: 100 INJECTION, SOLUTION INTRAVENOUS; SUBCUTANEOUS at 07:11

## 2021-01-31 RX ADMIN — METOPROLOL TARTRATE 50 MG: 50 TABLET, FILM COATED ORAL at 20:14

## 2021-01-31 RX ADMIN — OXYCODONE 5 MG: 5 TABLET ORAL at 04:42

## 2021-01-31 RX ADMIN — METOPROLOL TARTRATE 50 MG: 50 TABLET, FILM COATED ORAL at 10:19

## 2021-01-31 RX ADMIN — INSULIN LISPRO 3 UNITS: 100 INJECTION, SOLUTION INTRAVENOUS; SUBCUTANEOUS at 13:46

## 2021-01-31 RX ADMIN — PRAVASTATIN SODIUM 40 MG: 40 TABLET ORAL at 21:43

## 2021-01-31 RX ADMIN — INSULIN GLARGINE 10 UNITS: 100 INJECTION, SOLUTION SUBCUTANEOUS at 21:44

## 2021-01-31 RX ADMIN — OXYCODONE 5 MG: 5 TABLET ORAL at 10:37

## 2021-01-31 RX ADMIN — ESCITALOPRAM OXALATE 20 MG: 10 TABLET ORAL at 10:19

## 2021-01-31 RX ADMIN — Medication 10 ML: at 21:44

## 2021-01-31 RX ADMIN — ACETAMINOPHEN 650 MG: 325 TABLET ORAL at 10:19

## 2021-01-31 RX ADMIN — GABAPENTIN 800 MG: 400 CAPSULE ORAL at 21:43

## 2021-01-31 RX ADMIN — Medication 10 ML: at 14:04

## 2021-01-31 RX ADMIN — AMLODIPINE BESYLATE 5 MG: 5 TABLET ORAL at 10:19

## 2021-01-31 RX ADMIN — GABAPENTIN 800 MG: 400 CAPSULE ORAL at 10:19

## 2021-01-31 RX ADMIN — FERROUS SULFATE TAB 325 MG (65 MG ELEMENTAL FE) 325 MG: 325 (65 FE) TAB at 10:18

## 2021-01-31 RX ADMIN — INSULIN LISPRO 3 UNITS: 100 INJECTION, SOLUTION INTRAVENOUS; SUBCUTANEOUS at 21:44

## 2021-01-31 RX ADMIN — Medication 10 ML: at 06:00

## 2021-01-31 RX ADMIN — LEVOTHYROXINE SODIUM 150 MCG: 0.15 TABLET ORAL at 10:20

## 2021-01-31 NOTE — ED PROVIDER NOTES
59-year-old female with a history of anxiety, coronary artery disease status post CABG, COPD, diabetes, diabetic neuropathy, hypercholesterolemia, hypertension, hypothyroidism, and legal blindness tripped going up a step into her apartment and fell, injuring her right ankle. EMS noted an obvious deformity and gave her 100 mcg of fentanyl. They said she was much more alert and conversive prior to the fentanyl, but after that she stopped speaking. She was difficult to arouse when she came in, but EMS said she did not hit her head when they had asked her earlier.            Past Medical History:   Diagnosis Date    Adverse effect of anesthesia     SLOW WAKING PAST ANESTHESIA    Anxiety     Arthritis     CAD (coronary artery disease)     s/p CABG x 3v    Chest pain 7/2015    Chronic obstructive pulmonary disease (HCC)     CTS (carpal tunnel syndrome)     S/p bilateral release    Diabetes ([address]     Diabetic gastroparesis ([address]     Diabetic neuropathy ([address]     Diabetic retinopathy ([address]     GERD (gastroesophageal reflux disease)     GI bleed 7/2015    4 bleeds with 9 clips placed    Hypercholesteremia     Hypertension     Hypothyroid     Ill-defined condition     NEUROPATHY HANDS AND FEET    Ill-defined condition 2017    GI BLEED    Nicotine vapor product user     JOHN on CPAP     Osteoporosis     Vitamin D deficiency        Past Surgical History:   Procedure Laterality Date    CABG, ARTERY-VEIN, THREE  7/13/2015    HX CERVICAL FUSION  2011    HX ENDOSCOPY  7/2015    HX GI      HX HEART CATHETERIZATION  7/2015    HX LUMBAR FUSION  2017    HX LUMBAR LAMINECTOMY  2011    HX ORTHOPAEDIC Right 1970    CARPAL TUNNEL    HX ORTHOPAEDIC Left 2003    CARPAL TUNNEL    HX ROTATOR CUFF REPAIR Right 2003    HX SHOULDER ARTHROSCOPY Right     HX TONSIL AND ADENOIDECTOMY  1968    HX TONSILLECTOMY           Family History:   Problem Relation Age of Onset   [facility] COPD Mother     Diabetes Mother     COPD Father     Diabetes Father     Cancer Father         pancreatic    Diabetes Brother     Heart Disease Brother     Diabetes Brother     Alcohol abuse Brother     Diabetes Sister     Cancer Sister         Ardyce Proud    Bleeding Prob Sister         Factor V Leiden    Anesth Problems Neg Hx        Social History     Socioeconomic History    Marital status: SINGLE     Spouse name: Not on file    Number of children: Not on file    Years of education: Not on file    Highest education level: Not on file   Occupational History    Occupation: [employer]   Social Needs    Financial resource strain: Not on file    Food insecurity     Worry: Not on file     Inability: Not on file   [employer] needs     Medical: Not on file     Non-medical: Not on file   Tobacco Use    Smoking status: Former Smoker     Packs/day: 0.50     Years: 40.00     Pack years: 20.00     Quit date: 2015     Years since quittin.5    Smokeless tobacco: Never Used   Substance and Sexual Activity    Alcohol use: No     Alcohol/week: 0.0 standard drinks    Drug use: No    Sexual activity: Never   Lifestyle    Physical activity     Days per week: Not on file     Minutes per session: Not on file    Stress: Not on file   Relationships    Social connections     Talks on phone: Not on file     Gets together: Not on file     Attends Congregation service: Not on file     Active member of club or organization: Not on file     Attends meetings of clubs or organizations: Not on file     Relationship status: Not on file    Intimate partner violence     Fear of current or ex partner: Not on file     Emotionally abused: Not on file     Physically abused: Not on file     Forced sexual activity: Not on file   Other Topics Concern    Not on file   Social History Narrative    Lives in [place] with partner, moved from [place] to be closer to sister and \"doctors in [place] almost killed me\"         ALLERGIES: Nsaids (non-steroidal anti-inflammatory drug), Augmentin [amoxicillin-pot clavulanate], and Vesicare [solifenacin]    Review of Systems   Constitutional: Negative for fever.   HENT: Negative for trouble swallowing.    Eyes: Negative for visual disturbance.   Respiratory: Negative for cough.    Cardiovascular: Negative for chest pain.   Gastrointestinal: Negative for abdominal pain.   Genitourinary: Negative for difficulty urinating.   Musculoskeletal: Positive for gait problem.   Skin: Negative for rash.   Neurological: Negative for headaches.   Hematological: Does not bruise/bleed easily.   Psychiatric/Behavioral: Negative for sleep disturbance.       Vitals:    01/30/21 2332   BP: (!) 175/62   Pulse: 60   Resp: 15   Temp: 98.6 °F (37 °C)   SpO2: 96%            Physical Exam  Constitutional:       Comments: Ankle was splinted and patient was quite somnolent, but would awaken to give 1 word answers before falling back asleep   HENT:      Head: Normocephalic.      Nose: Nose normal.      Mouth/Throat:      Mouth: Mucous membranes are moist.   Eyes:      Comments: Mostly kept her eyes closed   Neck:      Musculoskeletal: No muscular tenderness.   Cardiovascular:      Rate and Rhythm: Normal rate.   Pulmonary:      Effort: Pulmonary effort is normal. No respiratory distress.   Abdominal:      General: Abdomen is flat.      Palpations: Abdomen is soft.      Tenderness: There is no abdominal tenderness.   Musculoskeletal:      Comments: No obvious tenderness or deformities to anything besides the right ankle  Faint dorsalis pedis palpated  Tenting of the skin near the medial malleolus with what looked like fracture/dislocation  Patient seemed so well sedated from the 100 mcg of fentanyl that I was able to gently reduce the ankle.  Palpable dorsalis pedis pulse after the reduction and able to wiggle toes as they were splinting her   Skin:     Findings: No rash.   Neurological:      General: No focal deficit present.      Comments: Moving all extremities and has no  facial asymmetry  After giving some time for the fentanyl to clear, the patient was much more awake and alert   Psychiatric:         Behavior: Behavior normal.          MDM  Number of Diagnoses or Management Options  Chronic kidney disease, unspecified CKD stage  Hyperglycemia  Trimalleolar fracture of ankle, closed, right, initial encounter  Diagnosis management comments: Perfect Serve Consult for Admission  1:33 AM    ED Room Number: ER06/06  Patient Name and age:  Jasmyne Randolph 79 y.o.  female  Working Diagnosis: Trimalleolar fracture of ankle, closed, right, initial encounter  (primary encounter diagnosis)  Chronic kidney disease, unspecified CKD stage  Hyperglycemia    COVID-19 Suspicion:  no  Sepsis present:  no  Reassessment needed: no  Code Status:  Full Code  Readmission: no  Isolation Requirements:  no  Recommended Level of Care:  med/surg  Department:[facility] Adult ED - 21   Other: Patient was in her normal state of health when she tripped going up to her apartment. She is legally blind and her normally bad leg, the left one, gave out. She received 100 mcg of fentanyl by EMS and was so well sedated from that I was able to reduce it as soon as she got over from the EMS stretcher. I have placed her in a posterior splint, but with her poor ambulatory status to begin with, legal blindness, and now unstable right ankle fracture, I think she will need admission and ultimately rehab. I will place a consult to orthopedics. If you need me to place a Covid order let me know, but I think she is fairly low risk and has no symptoms.   She is having more pain, so I am going to give her hydrocodone, which she has tolerated well in the past.           Reduction of Joint    Date/Time: 1/31/2021 1:32 AM  Performed by: Harley Lares DO  Authorized by: Harley Lares DO     Consent:     Consent obtained:  Emergent situation and verbal    Consent given by:  Patient  Injury:     Injury location:  Ankle Ankle injury location:  R ankle    Ankle fracture type: trimalleolar    Pre-procedure assessment:     Neurological function: diminished      Distal perfusion: diminished      Range of motion: reduced    Procedure details:     Manipulation performed: yes      Skin traction used: no      Skeletal traction used: no      Pin inserted: no      Reduction successful: yes      X-ray confirmed reduction: yes      Immobilization:  Splint    Splint type:  Short leg    Supplies used:  Ortho-Glass and cotton padding  Post-procedure assessment:     Neurological function: diminished      Distal perfusion: diminished      Range of motion: improved      Patient tolerance of procedure:   Tolerated well, no immediate complications

## 2021-01-31 NOTE — H&P
[facility] Adult  Hospitalist Group  History and Physical    Primary Care Provider: J Carlos Nowak MD  Date of Service:  1/31/2021    Subjective:     Brodie Zavala is a 79 y.o. female with hx CAD s/p CABG, COPD, DM II with gastroparesis, neuropathy, and legally blind, GERD, HTN, dyslipidemia, JOHN on Cpap, and hypothyroidism who presents with trimalleolar fracture s/p mechanical fall sustained while trying to climb stairs. Her roommate noted gross deformity, and called EMS who transported her to ED giving her fentanyl 50mcg en route. She was markedly sedated upon arrival, and fracture was successfully reduced and splinted in the ED. She denies fever, chills, upper respiratory symptoms    In the ED, she was bradycardic to 54, and intially requried 4Lnc 2/2 sedation from fentanyl given by EMS. XR tib/fib showed fracture of medial & posterior malleolus, and distal fibula. CT head + for new bilateral chronic cerebellar infarcts. CT C-spine with moderate C1/C2 stenosis, but no acute fracture or dislocation. Review of Systems:    A comprehensive review of systems was negative except for that written in the History of Present Illness.      Past Medical History:   Diagnosis Date    Adverse effect of anesthesia     SLOW WAKING PAST ANESTHESIA    Anxiety     Arthritis     CAD (coronary artery disease)     s/p CABG x 3v    Chest pain 7/2015    Chronic obstructive pulmonary disease (HCC)     CTS (carpal tunnel syndrome)     S/p bilateral release    Diabetes ([address]     Diabetic gastroparesis ([address]     Diabetic neuropathy ([address]     Diabetic retinopathy ([address]     GERD (gastroesophageal reflux disease)     GI bleed 7/2015    4 bleeds with 9 clips placed    Hypercholesteremia     Hypertension     Hypothyroid     Ill-defined condition     NEUROPATHY HANDS AND FEET    Ill-defined condition 2017    GI BLEED    Nicotine vapor product user     JOHN on CPAP     Osteoporosis     Vitamin D deficiency       Past Surgical History:   Procedure Laterality Date   • CABG, ARTERY-VEIN, THREE  7/13/2015   • HX CERVICAL FUSION  2011   • HX ENDOSCOPY  7/2015   • HX GI     • HX HEART CATHETERIZATION  7/2015   • HX LUMBAR FUSION  2017   • HX LUMBAR LAMINECTOMY  2011   • HX ORTHOPAEDIC Right 1970    CARPAL TUNNEL   • HX ORTHOPAEDIC Left 2003    CARPAL TUNNEL   • HX ROTATOR CUFF REPAIR Right 2003   • HX SHOULDER ARTHROSCOPY Right    • HX TONSIL AND ADENOIDECTOMY  1968   • HX TONSILLECTOMY       Prior to Admission medications    Medication Sig Start Date End Date Taking? Authorizing Provider   glucose blood VI test strips (Contour Next Test Strips) strip TEST FOUR TIMES DAILY 10/5/20   Isaias Garcia MD   levothyroxine (SYNTHROID) 150 mcg tablet Take 1 Tab by mouth Daily (before breakfast). One pill every day. Note dose change 9/28/20   Douglas Avendano MD   prednisoLONE acetate (PRED FORTE) 1 % ophthalmic suspension  9/9/20   Provider, Historical   cyclopentolate (CYCLOGYL) 1 % ophthalmic solution Administer 1 Drop to right eye every four (4) hours. HCL    Provider, Historical   colchicine 0.6 mg tablet Take two pills when you  the prescription, a third pill and hour later and then two pills per day for the next 3 days 9/4/20   Douglas Avendano MD   insulin aspart U-100 (NovoLOG U-100 Insulin aspart) 100 unit/mL injection USE A MAX  UNITS DAILY WITH INSULIN PUMP 8/14/20   Douglas Avendano MD   pravastatin (PRAVACHOL) 40 mg tablet Take 1 Tab by mouth nightly. SCHEDULE OFFICE VISIT 3/25/20   Douglas Avendano MD   acetaminophen (TYLENOL) 500 mg tablet Take 2 Tabs by mouth every six (6) hours. 1/23/19   Simran Conn PA-C   umeclidinium-vilanterol (ANORO ELLIPTA) 62.5-25 mcg/actuation inhaler Take 1 Puff by inhalation daily.    Provider, Historical   b complex vitamins tablet Take 1 Tab by mouth daily.    Provider, Historical   amLODIPine (NORVASC) 5 mg tablet TK 1 T PO QD. PATINET TAKE MED  DAILY 11/27/18   Provider, Historical   metoprolol tartrate (LOPRESSOR) 50 mg tablet TK 1 T PO BID WC. PATIENT TAKES MED TWICE DAILY 10/22/18   Provider, Historical   tiZANidine & Irritant Cntr 2 4 mg kit tizanidine 4 mg tablet AT BEDTIME    Provider, Historical   escitalopram oxalate (LEXAPRO) 20 mg tablet Take 20 mg by mouth daily. Provider, Historical   pantoprazole (PROTONIX) 40 mg tablet Take 40 mg by mouth daily. Provider, Historical   gabapentin (NEURONTIN) 800 mg tablet Take 800 mg by mouth three (3) times daily. Provider, Historical    insulin pump (PATIENT SUPPLIED) misc by SubCUTAneous route as needed. 1.7 units/hr    Provider, Historical   lisinopril (PRINIVIL, ZESTRIL) 10 mg tablet Take 1 Tab by mouth daily. Patient taking differently: Take 10 mg by mouth two (2) times a day. 5/12/16   Donald Persaud MD   cholecalciferol, vitamin D3, (VITAMIN D3) 2,000 unit tab Take 1 Tab by mouth daily. Patient taking differently: Take 2,000 Units by mouth daily. TAKES 1000 UNITS PER DAY 5/12/16   Donald Persaud MD   ferrous sulfate 325 mg (65 mg iron) tablet Take 325 mg by mouth daily. Provider, Historical   dorzolamide-timolol, PF, (COSOPT, PF,) 2-0.5 % dpet Administer 1 Drop to both eyes two (2) times a day. Indications: Ocular Hypertension 7/6/15   Provider, Historical   senna (SENNA) 8.6 mg tablet Take 1 Tab by mouth nightly as needed.     Provider, Historical     Allergies   Allergen Reactions    Nsaids (Non-Steroidal Anti-Inflammatory Drug) Other (comments)     bleeding    Augmentin [Amoxicillin-Pot Clavulanate] Diarrhea    Vesicare [Solifenacin] Rash     BURNING WITH URINATION      Family History   Problem Relation Age of Onset   [facility] COPD Mother     Diabetes Mother    [facility] COPD Father     Diabetes Father     Cancer Father         pancreatic    Diabetes Brother     Heart Disease Brother     Diabetes Brother     Alcohol abuse Brother     Diabetes Sister     Cancer Sister Melenoma    Bleeding Prob Sister         Factor V Leiden    Anesth Problems Neg Hx         SOCIAL HISTORY:  Patient resides at Home. Patient ambulates with walker at times  Smoking history:former cigarette smoker, quit 5 years ago and vapes nicotine now  Alcohol history: none    Objective:       Physical Exam:   Visit Vitals  BP (!) 149/54   Pulse (!) 54   Temp 98.6 °F (37 °C)   Resp 12   SpO2 96%     General:  Alert, cooperative, no distress, appears stated age. Head:  Normocephalic, without obvious abnormality, atraumatic. Eyes:  Legally blind    Ears:  Normal TMs and external ear canals both ears. Nose: Nares normal. Septum midline. Throat: Lips, mucosa, and tongue normal.    Neck: Supple, symmetrical, trachea midline   Back:   Symmetric, no curvature. ROM normal.    Lungs:   Clear to auscultation bilaterally. Chest wall:  No tenderness or deformity. Heart:  Regular rate and rhythm, S1, S2 normal, no murmur, click, rub or gallop. Abdomen:   Soft, non-tender. Bowel sounds normal. No masses,  No organomegaly. Insulin pump LLQ           Extremities: RLE with splint in place. Sensation intact RLE, can move toes. Pulses: 2+ radial pulses   Skin: Skin color, texture, turgor normal. No rashes or lesions. ECG: sinus bradycardia @ 57bpm    Data Review: All diagnostic labs and studies have been reviewed.     Chest x-ray: no acute cardiopulmonary process    Assessment:     Active Problems:    Ankle fracture (1/31/2021)        Plan:     #Trimalleolar Fracture s/p mechanical fall  -s/p reduction in the ED, neurovascularly intact  -orthopedic surgery consulted  -NPO, bedrest/NWB  -maintain splint  -tylenol, and roxicodone for mild>mod/severe pain  -check coags, and UA    #CAD s/p CABG  -revised cardiac risk index 4, 15% 30-day mortality from cardiac cause  -currently without sx or signs of angina, HF, or arrhythmia  -hx slow to wake from anesthesia  -continue metoprolol, lisinopril, and statin    #HTN  -continue metop and lisinopril    #COPD  -spo2 94-96% on RA  -former smoker, currently vapes  -continue inhaled LABA/anticholinergic inhaler     #Stage IV CKD  -creatinine 1.98 (stable from 5/2020), K+ 5.2, CO2 27  -avoid neprotoxic drugs    #DM II  -HgbA1c 7.3% 9/2020  -has insulin pump, I turned this off in the ED, and she will use subQ insulin while IP  -glucose 250 on admission  -lantus + ISS    #hypothyroidism  -continue home levothyroxine    #JOHN on CPAP  -c-pap while in hospital    DVT ppx: SCD  MPOA: Betsey Lawrence (friend) [phone]  Code: full    FUNCTIONAL STATUS PRIOR TO HOSPITALIZATION Ambulatory with Use of Assistive Devices (including history of recent falls): fell prior to current admission    Signed By: Avila Tobar MD     January 31, 2021

## 2021-01-31 NOTE — PROGRESS NOTES
Problem: Pressure Injury - Risk of  Goal: *Prevention of pressure injury  Description: Document Rehan Scale and appropriate interventions in the flowsheet.   Outcome: Progressing Towards Goal  Note: Pressure Injury Interventions:  Sensory Interventions: Assess changes in LOC    Moisture Interventions: Absorbent underpads    Activity Interventions: Assess need for specialty bed    Mobility Interventions: PT/OT evaluation    Nutrition Interventions: Document food/fluid/supplement intake    Friction and Shear Interventions: Apply protective barrier, creams and emollients                Problem: Patient Education: Go to Patient Education Activity  Goal: Patient/Family Education  Outcome: Progressing Towards Goal

## 2021-01-31 NOTE — ROUTINE PROCESS
TRANSFER - OUT REPORT: 
 
Verbal report given to [employer] (name) on Cy Gens  being transferred to  (unit) for routine progression of care Report consisted of patients Situation, Background, Assessment and  
Recommendations(SBAR). Information from the following report(s) SBAR, Kardex, ED Summary, [facility] and Recent Results was reviewed with the receiving nurse. Lines:    
 
Opportunity for questions and clarification was provided. Patient transported with: 
 Registered Nurse

## 2021-01-31 NOTE — PROGRESS NOTES
Hospitalist Progress Note  Candice Finney MD  Answering service: [phone] from in house phone      Date of Service:  2021  NAME:  Rusty Dominguez  :    MRN:  [id number]      Admission Summary:   Rusty Dominguez is a 79 y.o. female with hx CAD s/p CABG, COPD, DM II with gastroparesis, neuropathy, and legally blind, GERD, HTN, dyslipidemia, JOHN on Cpap, and hypothyroidism who presents with trimalleolar fracture s/p mechanical fall sustained while trying to climb stairs. Her roommate noted gross deformity, and called EMS who transported her to ED giving her fentanyl 50mcg en route. She was markedly sedated upon arrival, and fracture was successfully reduced and splinted in the ED. She denies fever, chills, upper respiratory symptoms     In the ED, she was bradycardic to 54, and intially requried 4Lnc 2/2 sedation from fentanyl given by EMS. XR tib/fib showed fracture of medial & posterior malleolus, and distal fibula. CT head + for new bilateral chronic cerebellar infarcts. CT C-spine with moderate C1/C2 stenosis, but no acute fracture or dislocation. Interval history / Subjective:      Patient seen and examined this morning. Patient continued to drift back to sleep with each sentence. Per staff report later in the day, patient was noted to have oxygen saturation in the 80's during sleep. Assessment & Plan:     # Trimalleolar Fracture s/p mechanical fall  - s/p reduction in the ED, neurovascularly intact  - orthopedic surgery following   - maintain splint  - tylenol, and roxicodone for mild>mod/severe pain       # CAD s/p CABG  - revised cardiac risk index 4, 15% 30-day mortality from cardiac cause  - currently without sx or signs of angina, HF, or arrhythmia  - hx slow to wake from anesthesia  - continue metoprolol, lisinopril, and statin  - Pre-surgery cardiopulmonary risk assessment moderate.  Last Echo EF 70-75% in 2017, will repeat echo, check ABG ( JOHN on CiPAP at home)     # HTN  - continue metoprolol and lisinopril     # COPD  - spo2 94-96% on RA  - former smoker, currently vapes  - continue inhaled LABA/anticholinergic inhaler      # Stage IV CKD  - creatinine 1.98 (stable from 5/2020)  - avoid neprotoxic drugs     # Diabetes type 2 with hyperglycemia   - HgbA1c 7.3% 9/2020  - has insulin pump, turned this off in the ED, and she will use subQ insulin while IP  - Lantus + ISS     # Hypothyroidism  - continue home levothyroxine     # JOHN on CPAP  - c-pap while in hospital     DVT ppx: SCD  MPOA: Gema Medina (friend) [phone]  Code: Full     FUNCTIONAL STATUS PRIOR TO HOSPITALIZATION Ambulatory with Use of Assistive Devices (including history of recent falls): [employer] prior to current admission     Hospital Problems  Date Reviewed: 9/14/2020          Codes Class Noted POA    Ankle fracture ICD-10-CM: S82.899A  ICD-9-CM: 824.8  1/31/2021 Unknown                Review of Systems:   Pertinent positive mentioned in interval hx/HPI. Other systems reviewed and negative     Vital Signs:    Last 24hrs VS reviewed since prior progress note. Most recent are:  Visit Vitals  BP (!) 117/57 (BP 1 Location: Left upper arm)   Pulse (!) 57   Temp 98.5 °F (36.9 °C)   Resp 14   SpO2 93%       No intake or output data in the 24 hours ending 01/31/21 1629     Physical Examination:             Constitutional:  No acute distress, sedated    ENT:  Oral mucous moist,    Resp:  CTA bilaterally. No wheezing/rhonchi/rales. No accessory muscle use   CV:  Regular rhythm, normal rate, no murmurs, gallops, rubs    GI:  Soft, non distended, non tender. normoactive bowel sounds, no hepatosplenomegaly     Musculoskeletal:  Right leg rapped in a bandage, good pulse and sensation      Neurologic:  Moves all extremities.              Data Review:      I personally reviewed labs and imaging     Labs:     Recent Labs     01/30/21  2334   WBC 5.8 HGB 12.8   HCT 40.1        Recent Labs     01/31/21  0604 01/30/21  2334    139   K 5.2* 5.2*   * 107   CO2 24 27   BUN 23* 22*   CREA 1.89* 1.98*   * 250*   CA 8.9 8.9   MG 1.7  --      No results for input(s): ALT, AP, TBIL, TBILI, TP, ALB, GLOB, GGT, AML, LPSE in the last 72 hours. No lab exists for component: SGOT, GPT, AMYP, HLPSE  Recent Labs     01/31/21  0604   INR 1.1   PTP 11.1   APTT 24.2      No results for input(s): FE, TIBC, PSAT, FERR in the last 72 hours. No results found for: FOL, RBCF   No results for input(s): PH, PCO2, PO2 in the last 72 hours. No results for input(s): CPK, CKNDX, TROIQ in the last 72 hours.     No lab exists for component: CPKMB  Lab Results   Component Value Date/Time    Cholesterol, total 189 05/15/2020 01:46 PM    HDL Cholesterol 44 05/15/2020 01:46 PM    LDL, calculated 74 05/15/2020 01:46 PM    Triglyceride 356 (H) 05/15/2020 01:46 PM    CHOL/HDL Ratio 3.0 09/27/2017 04:12 AM     Lab Results   Component Value Date/Time    Glucose (POC) 235 (H) 01/31/2021 11:17 AM    Glucose (POC) 194 (H) 01/31/2021 06:56 AM    Glucose (POC) 217 (H) 01/30/2021 11:46 PM    Glucose (POC) 164 (H) 01/23/2019 04:01 PM    Glucose (POC) 104 (H) 01/23/2019 12:38 PM     Lab Results   Component Value Date/Time    Color YELLOW/STRAW 01/07/2019 08:31 AM    Appearance CLEAR 01/07/2019 08:31 AM    Specific gravity 1.010 01/07/2019 08:31 AM    pH (UA) 6.0 01/07/2019 08:31 AM    Protein 100 (A) 01/07/2019 08:31 AM    Glucose NEGATIVE  01/07/2019 08:31 AM    Ketone NEGATIVE  01/07/2019 08:31 AM    Bilirubin NEGATIVE  01/07/2019 08:31 AM    Urobilinogen 0.2 01/07/2019 08:31 AM    Nitrites NEGATIVE  01/07/2019 08:31 AM    Leukocyte Esterase SMALL (A) 01/07/2019 08:31 AM    Epithelial cells FEW 01/07/2019 08:31 AM    Bacteria NEGATIVE  01/07/2019 08:31 AM    WBC 0-4 01/07/2019 08:31 AM    RBC 0-5 01/07/2019 08:31 AM         Medications Reviewed:     Current Facility-Administered Medications   Medication Dose Route Frequency    sodium chloride (NS) flush 5-40 mL  5-40 mL IntraVENous Q8H    sodium chloride (NS) flush 5-40 mL  5-40 mL IntraVENous PRN    acetaminophen (TYLENOL) tablet 650 mg  650 mg Oral Q6H PRN    Or    acetaminophen (TYLENOL) suppository 650 mg  650 mg Rectal Q6H PRN    polyethylene glycol (MIRALAX) packet 17 g  17 g Oral DAILY PRN    promethazine (PHENERGAN) tablet 12.5 mg  12.5 mg Oral Q6H PRN    Or    ondansetron (ZOFRAN) injection 4 mg  4 mg IntraVENous Q6H PRN    oxyCODONE IR (ROXICODONE) tablet 5 mg  5 mg Oral Q6H PRN    levothyroxine (SYNTHROID) tablet 150 mcg  150 mcg Oral ACB    pravastatin (PRAVACHOL) tablet 40 mg  40 mg Oral QHS    amLODIPine (NORVASC) tablet 5 mg  5 mg Oral DAILY    . PHARMACY TO SUBSTITUTE PER PROTOCOL (Reordered from: dorzolamide-timolol, PF, (COSOPT, PF,) 2-0.5 % dpet)    Per Protocol    gabapentin (NEURONTIN) capsule 800 mg  800 mg Oral TID    metoprolol tartrate (LOPRESSOR) tablet 50 mg  50 mg Oral BID    pantoprazole (PROTONIX) tablet 40 mg  40 mg Oral ACB    glycopyrrolate-formoterol (BEVESPI AEROSPHERE) 9 mcg-4.8 mcg inhaler  2 Puff Inhalation BID    ferrous sulfate tablet 325 mg  325 mg Oral DAILY    escitalopram oxalate (LEXAPRO) tablet 20 mg  20 mg Oral DAILY    glucose chewable tablet 16 g  4 Tab Oral PRN    dextrose (D50W) injection syrg 12.5-25 g  25-50 mL IntraVENous PRN    glucagon (GLUCAGEN) injection 1 mg  1 mg IntraMUSCular PRN    insulin glargine (LANTUS) injection 10 Units  10 Units SubCUTAneous QHS    insulin lispro (HUMALOG) injection   SubCUTAneous AC&HS     ______________________________________________________________________  EXPECTED LENGTH OF STAY: - - -  ACTUAL LENGTH OF STAY:          0                 Serena Callahan MD   Patient has given Verbal permission to discuss medical care with   persons present in the room and and also with contact as listed on face sheet.      Please note that this dictation was completed with [employer], the [employer] voice recognition software. Quite often unanticipated grammatical, syntax, homophones, and other interpretive errors are inadvertently transcribed by the computer software. Please disregard these errors. Please excuse any errors that have escaped final proofreading. Thank you.

## 2021-01-31 NOTE — PROGRESS NOTES
Bedside shift change report given to Severiano Dike RN (oncoming nurse) by Norita Ahumada (offgoing nurse). Report included the following information SBAR, MAR and Recent Results.

## 2021-01-31 NOTE — CONSULTS
ORTHO CONSULT NOTE    Date of Consultation:  January 31, 2021  Referring Physician:  Maximo Malcolm DO  CC: R ankle pain    HPI:  Daniel Mejia is a 79 y.o. female  Found to have R ankle fracture, reduced in ED but now unable to ambulate and requires surgery prior to discharge; Pt fell while climbing stairs, no other injuries reported; pts pain well controlled now, local to R ankle, dull, achy, 4/10; pt with significant PMH, but does not report any recent medical complications; Denies numbness, tingling in toes, other extremity pain, cp, sob, lightheadedness, dizziness.      Past Medical History:   Diagnosis Date    Adverse effect of anesthesia     SLOW WAKING PAST ANESTHESIA    Anxiety     Arthritis     CAD (coronary artery disease)     s/p CABG x 3v    Chest pain 7/2015    Chronic obstructive pulmonary disease ([address]     CTS (carpal tunnel syndrome)     S/p bilateral release    Diabetes ([address]     Diabetic gastroparesis ([address]     Diabetic neuropathy ([address]     Diabetic retinopathy ([address]     GERD (gastroesophageal reflux disease)     GI bleed 7/2015    4 bleeds with 9 clips placed    Hypercholesteremia     Hypertension     Hypothyroid     Ill-defined condition     NEUROPATHY HANDS AND FEET    Ill-defined condition 2017    GI BLEED    Nicotine vapor product user     JOHN on CPAP     Osteoporosis     Vitamin D deficiency       Past Surgical History:   Procedure Laterality Date    CABG, ARTERY-VEIN, THREE  7/13/2015    HX CERVICAL FUSION  2011    HX ENDOSCOPY  7/2015    HX GI      HX HEART CATHETERIZATION  7/2015    HX LUMBAR FUSION  2017    HX LUMBAR LAMINECTOMY  2011    HX ORTHOPAEDIC Right 1970    CARPAL TUNNEL    HX ORTHOPAEDIC Left 2003    CARPAL TUNNEL    HX ROTATOR CUFF REPAIR Right 2003    HX SHOULDER ARTHROSCOPY Right     HX TONSIL AND ADENOIDECTOMY  1968    HX TONSILLECTOMY        Family History   Problem Relation Age of Onset    COPD Mother     Diabetes Mother     COPD Father    [facility] Diabetes Father     Cancer Father         pancreatic    Diabetes Brother     Heart Disease Brother     Diabetes Brother     Alcohol abuse Brother     Diabetes Sister     Cancer Sister         Ora Yanes    Bleeding Prob Sister         Factor V Leiden    Anesth Problems Neg Hx       Social History     Tobacco Use    Smoking status: Former Smoker     Packs/day: 0.50     Years: 40.00     Pack years: 20.00     Quit date: 2015     Years since quittin.5    Smokeless tobacco: Never Used   Substance Use Topics    Alcohol use: No     Alcohol/week: 0.0 standard drinks     Allergies   Allergen Reactions    Nsaids (Non-Steroidal Anti-Inflammatory Drug) Other (comments)     bleeding    Augmentin [Amoxicillin-Pot Clavulanate] Diarrhea    Vesicare [Solifenacin] Rash     BURNING WITH URINATION        Review of Systems:  Per HPI. Objective:     Patient Vitals for the past 8 hrs:   BP Temp Pulse Resp   21 0928 (!) 176/72 98.7 °F (37.1 °C) 65 19     Temp (24hrs), Av.6 °F (37 °C), Min:98.5 °F (36.9 °C), Max:98.7 °F (37.1 °C)      EXAM:   RLE in well fit splint, dry intact; Toe with brisk cap refill, warm, wiggles all, SILT; calf soft, nonttp; no other extremity ttp; resting in bed comfortably, non-labored respirations, no edema. Imaging Review:   Results from Hospital Encounter encounter on 21   XR FOOT RT AP/LAT    Narrative EXAM: XR FOOT RT AP/LAT    INDICATION: fall, w/ right ankle deformity. Also, hypoxia and somnolent, likely  from pain meds given by EMS. .    COMPARISON: None. FINDINGS: Two views of the right foot demonstrate acute fractures of the medial  malleolus, distal fibula, and posterior malleolus, which are minimally to mildly  displaced. A splint is in place. Impression 1. Acute fractures of the medial malleolus, posterior malleolus, and distal  fibula.      Results from [place] encounter on 21   CT SPINE CERV WO CONT    Narrative EXAM:  CT SPINE CERV WO CONT    INDICATION:  fall, w/ right ankle deformity. Also, hypoxia and somnolent,  likely from pain meds given by EMS. COMPARISON: CT cervical spine 3/12/2012. TECHNIQUE:   Unenhanced multislice helical CT of the cervical spine was  performed in the axial plane. Coronal and sagittal reconstructions were  obtained. CT dose reduction was achieved through use of a standardized protocol  tailored for this examination and automatic exposure control for dose  modulation. FINDINGS:    Postsurgical changes of C3-C7 posterior spinal fusion with C4 and C5  laminectomies. There is no evidence of hardware fracture or screw lucency. Vertebral body heights are preserved without evidence of an acute fracture. Severe degenerative changes the craniocervical junction. There is suspected  moderate canal stenosis at the level of C1-C2. Advanced multilevel degenerative  disc disease throughout the cervical spine. Visualized soft tissues of the neck  are unremarkable. Visualized lung apices are clear. Impression 1. No acute abnormality. 2. Severe degenerative changes at the craniocervical junction with suspected  moderate canal stenosis at the level of C1-C2. Labs:   Recent Results (from the past 24 hour(s))   CBC WITH AUTOMATED DIFF    Collection Time: 01/30/21 11:34 PM   Result Value Ref Range    WBC 5.8 3.6 - 11.0 K/uL    RBC 3.90 3.80 - 5.20 M/uL    HGB 12.8 11.5 - 16.0 g/dL    HCT 40.1 35.0 - 47.0 %    .8 (H) 80.0 - 99.0 FL    MCH 32.8 26.0 - 34.0 PG    MCHC 31.9 30.0 - 36.5 g/dL    RDW 12.8 11.5 - 14.5 %    PLATELET 774 363 - 867 K/uL    MPV 11.4 8.9 - 12.9 FL    NRBC 0.0 0  WBC    ABSOLUTE NRBC 0.00 0.00 - 0.01 K/uL    NEUTROPHILS 73 32 - 75 %    BAND NEUTROPHILS 2 0 - 6 %    LYMPHOCYTES 13 12 - 49 %    MONOCYTES 7 5 - 13 %    EOSINOPHILS 2 0 - 7 %    BASOPHILS 2 (H) 0 - 1 %    MYELOCYTES 1 (H) 0 %    IMMATURE GRANULOCYTES 0 %    ABS. NEUTROPHILS 4.4 1.8 - 8.0 K/UL    ABS.  LYMPHOCYTES 0.8 0.8 - 3.5 K/UL    ABS. MONOCYTES 0.4 0.0 - 1.0 K/UL    ABS. EOSINOPHILS 0.1 0.0 - 0.4 K/UL    ABS. BASOPHILS 0.1 0.0 - 0.1 K/UL    ABS. IMM. GRANS. 0.0 K/UL    DF SMEAR SCANNED      RBC COMMENTS MACROCYTOSIS  1+       METABOLIC PANEL, BASIC    Collection Time: 01/30/21 11:34 PM   Result Value Ref Range    Sodium 139 136 - 145 mmol/L    Potassium 5.2 (H) 3.5 - 5.1 mmol/L    Chloride 107 97 - 108 mmol/L    CO2 27 21 - 32 mmol/L    Anion gap 5 5 - 15 mmol/L    Glucose 250 (H) 65 - 100 mg/dL    BUN 22 (H) 6 - 20 MG/DL    Creatinine 1.98 (H) 0.55 - 1.02 MG/DL    BUN/Creatinine ratio 11 (L) 12 - 20      GFR est AA 30 (L) >60 ml/min/1.73m2    GFR est non-AA 25 (L) >60 ml/min/1.73m2    Calcium 8.9 8.5 - 10.1 MG/DL   SAMPLES BEING HELD    Collection Time: 01/30/21 11:34 PM   Result Value Ref Range    SAMPLES BEING HELD 1red,1blue     COMMENT        Add-on orders for these samples will be processed based on acceptable specimen integrity and analyte stability, which may vary by analyte.    GLUCOSE, POC    Collection Time: 01/30/21 11:46 PM   Result Value Ref Range    Glucose (POC) 217 (H) 65 - 100 mg/dL    Performed by Kelly Danny    EKG, 12 LEAD, INITIAL    Collection Time: 01/31/21 12:17 AM   Result Value Ref Range    Ventricular Rate 57 BPM    Atrial Rate 57 BPM    P-R Interval 184 ms    QRS Duration 84 ms    Q-T Interval 480 ms    QTC Calculation (Bezet) 467 ms    Calculated P Axis 70 degrees    Calculated R Axis 45 degrees    Calculated T Axis 84 degrees    Diagnosis       Sinus bradycardia  Nonspecific T wave abnormality  When compared with ECG of 07-JAN-2019 09:06,  Nonspecific T wave abnormality now evident in Inferior leads     SARS-COV-2    Collection Time: 01/31/21  2:51 AM   Result Value Ref Range    SARS-CoV-2 Please find results under separate order     COVID-19 RAPID TEST    Collection Time: 01/31/21  2:51 AM   Result Value Ref Range    Specimen source Nasopharyngeal      COVID-19 rapid test Not detected PHOEBE     METABOLIC PANEL, BASIC    Collection Time: 01/31/21  6:04 AM   Result Value Ref Range    Sodium 138 136 - 145 mmol/L    Potassium 5.2 (H) 3.5 - 5.1 mmol/L    Chloride 111 (H) 97 - 108 mmol/L    CO2 24 21 - 32 mmol/L    Anion gap 3 (L) 5 - 15 mmol/L    Glucose 177 (H) 65 - 100 mg/dL    BUN 23 (H) 6 - 20 MG/DL    Creatinine 1.89 (H) 0.55 - 1.02 MG/DL    BUN/Creatinine ratio 12 12 - 20      GFR est AA 32 (L) >60 ml/min/1.73m2    GFR est non-AA 26 (L) >60 ml/min/1.73m2    Calcium 8.9 8.5 - 10.1 MG/DL   MAGNESIUM    Collection Time: 01/31/21  6:04 AM   Result Value Ref Range    Magnesium 1.7 1.6 - 2.4 mg/dL   PTT    Collection Time: 01/31/21  6:04 AM   Result Value Ref Range    aPTT 24.2 22.1 - 31.0 sec    aPTT, therapeutic range     58.0 - 77.0 SECS   PROTHROMBIN TIME + INR    Collection Time: 01/31/21  6:04 AM   Result Value Ref Range    INR 1.1 0.9 - 1.1      Prothrombin time 11.1 9.0 - 11.1 sec   GLUCOSE, POC    Collection Time: 01/31/21  6:56 AM   Result Value Ref Range    Glucose (POC) 194 (H) 65 - 100 mg/dL    Performed by Anahi Long  PCT    GLUCOSE, POC    Collection Time: 01/31/21 11:17 AM   Result Value Ref Range    Glucose (POC) 235 (H) 65 - 100 mg/dL    Performed by Valarie Zapata        Impression:     Patient Active Problem List    Diagnosis Date Noted    Ankle fracture 01/31/2021    Primary osteoarthritis of right knee 01/22/2019    Hyperkalemia 01/08/2019    Severe obesity ([address] 12/12/2018    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 ([address] 06/01/2018    Mixed hyperlipidemia 06/01/2018    Sepsis ([address] 12/19/2017    GI bleed 12/19/2017    AVM (arteriovenous malformation) of duodenum, acquired with hemorrhage 09/29/2017    Erosive gastritis with hemorrhage 09/26/2017    Hypothyroidism due to Hashimoto's thyroiditis 05/12/2016    Obesity, Class I, BMI 30.0-34.9 (see actual BMI) 02/04/2016    Low back pain at multiple sites 02/04/2016    Essential hypertension 10/14/2015    CAD (coronary artery disease) 07/09/2015     Active Problems:    Ankle fracture (1/31/2021)        Plan:   I explained the nature of the injury and discussed the recommended surgery. Discussed potential risks/benefits/alternatives of surgery and blood transfusions. Patient consents to both. Plan for Right Ankle ORIF with Dr. Claude Neve 2/1/2021. Consent signed and placed on chart. Medical clearance pending. RCRI - 3; EKG bradycardic with nonspecific T wave abnormality; no chest pain/sob/unexplained extremity swelling now or recently. Bedrest. Remain in splint; NV checks q 4 hours. NPO p MN. Ice. Acetaminophen, Oxycodone prn, Keep leg elevated. Lovenox or heparin okay today, hold after midnight; SCDs OK. Dr. Claude Neve is aware and agrees with above plan.       JONATHAN Guerrier  Orthopedic Trauma Service  [facility]

## 2021-01-31 NOTE — PROGRESS NOTES
TRANSFER - IN REPORT:    Verbal report received from Cinthya(name) on Jarek Pearlta  being received from ED(unit) for routine progression of care      Report consisted of patients Situation, Background, Assessment and   Recommendations(SBAR). Information from the following report(s) ED Summary was reviewed with the receiving nurse. Opportunity for questions and clarification was provided. Assessment completed upon patients arrival to unit and care assumed.

## 2021-01-31 NOTE — ROUTINE PROCESS
TRANSFER - IN REPORT: 
 
Verbal report received from [facility] RN(name) on [facility]  being received from  ER(unit) for routine progression of care Report consisted of patients Situation, Background, Assessment and  
Recommendations(SBAR). Information from the following report(s) ED Summary was reviewed with the receiving nurse. Opportunity for questions and clarification was provided. Assessment completed upon patients arrival to unit and care assumed.

## 2021-01-31 NOTE — PROGRESS NOTES
Patient refusing insulin Lantus sub q. She prefers to use her own insulin pump. I have communicated with pharmacy who will send the supply. Addendum, patient is to sleepy to manage her own pump.  Will continue with sub q lantus

## 2021-02-01 ENCOUNTER — APPOINTMENT (OUTPATIENT)
Dept: NON INVASIVE DIAGNOSTICS | Age: 71
DRG: 492 | End: 2021-02-01
Attending: INTERNAL MEDICINE
Payer: MEDICARE

## 2021-02-01 ENCOUNTER — ANESTHESIA (OUTPATIENT)
Dept: SURGERY | Age: 71
DRG: 492 | End: 2021-02-01
Payer: MEDICARE

## 2021-02-01 ENCOUNTER — APPOINTMENT (OUTPATIENT)
Dept: GENERAL RADIOLOGY | Age: 71
DRG: 492 | End: 2021-02-01
Attending: ORTHOPAEDIC SURGERY
Payer: MEDICARE

## 2021-02-01 ENCOUNTER — ANESTHESIA EVENT (OUTPATIENT)
Dept: SURGERY | Age: 71
DRG: 492 | End: 2021-02-01
Payer: MEDICARE

## 2021-02-01 LAB
ANION GAP SERPL CALC-SCNC: 3 MMOL/L (ref 5–15)
ARTERIAL PATENCY WRIST A: YES
ATRIAL RATE: 57 BPM
BASE EXCESS BLD CALC-SCNC: 1 MMOL/L
BASOPHILS # BLD: 0.1 K/UL (ref 0–0.1)
BASOPHILS NFR BLD: 1 % (ref 0–1)
BDY SITE: ABNORMAL
BUN SERPL-MCNC: 31 MG/DL (ref 6–20)
BUN/CREAT SERPL: 14 (ref 12–20)
CA-I BLD-SCNC: 1.35 MMOL/L (ref 1.12–1.32)
CALCIUM SERPL-MCNC: 8.9 MG/DL (ref 8.5–10.1)
CALCULATED P AXIS, ECG09: 70 DEGREES
CALCULATED R AXIS, ECG10: 45 DEGREES
CALCULATED T AXIS, ECG11: 84 DEGREES
CHLORIDE SERPL-SCNC: 107 MMOL/L (ref 97–108)
CO2 SERPL-SCNC: 26 MMOL/L (ref 21–32)
CREAT SERPL-MCNC: 2.19 MG/DL (ref 0.55–1.02)
DIAGNOSIS, 93000: NORMAL
DIFFERENTIAL METHOD BLD: ABNORMAL
ECHO AO ROOT DIAM: 2.77 CM
ECHO AV PEAK GRADIENT: 8.27 MMHG
ECHO AV PEAK VELOCITY: 143.77 CM/S
ECHO LA MAJOR AXIS: 3.75 CM
ECHO LA MINOR AXIS: 1.86 CM
ECHO LV EDV A2C: 54.96 ML
ECHO LV EDV A4C: 63.4 ML
ECHO LV EDV BP: 60.86 ML (ref 56–104)
ECHO LV EDV INDEX A4C: 31.5 ML/M2
ECHO LV EDV INDEX BP: 30.2 ML/M2
ECHO LV EDV NDEX A2C: 27.3 ML/M2
ECHO LV EJECTION FRACTION A2C: 58 PERCENT
ECHO LV EJECTION FRACTION A4C: 79 PERCENT
ECHO LV EJECTION FRACTION BIPLANE: 70.6 PERCENT (ref 55–100)
ECHO LV ESV A2C: 22.9 ML
ECHO LV ESV A4C: 13.38 ML
ECHO LV ESV BP: 17.92 ML (ref 19–49)
ECHO LV ESV INDEX A2C: 11.4 ML/M2
ECHO LV ESV INDEX A4C: 6.6 ML/M2
ECHO LV ESV INDEX BP: 8.9 ML/M2
ECHO LV INTERNAL DIMENSION DIASTOLIC: 4.7 CM (ref 3.9–5.3)
ECHO LV INTERNAL DIMENSION SYSTOLIC: 3.05 CM
ECHO LV IVSD: 1.05 CM (ref 0.6–0.9)
ECHO LV MASS 2D: 165.6 G (ref 67–162)
ECHO LV MASS INDEX 2D: 82.3 G/M2 (ref 43–95)
ECHO LV POSTERIOR WALL DIASTOLIC: 0.96 CM (ref 0.6–0.9)
ECHO LVOT PEAK GRADIENT: 3.33 MMHG
ECHO LVOT PEAK VELOCITY: 91.21 CM/S
ECHO MV A VELOCITY: 111.6 CM/S
ECHO MV AREA PHT: 3.72 CM2
ECHO MV E DECELERATION TIME (DT): 203.81 MS
ECHO MV E VELOCITY: 90.3 CM/S
ECHO MV E/A RATIO: 0.81
ECHO MV PRESSURE HALF TIME (PHT): 59.11 MS
ECHO PV PEAK INSTANTANEOUS GRADIENT SYSTOLIC: 4.6 MMHG
ECHO RV TAPSE: 1.73 CM (ref 1.5–2)
EOSINOPHIL # BLD: 0.2 K/UL (ref 0–0.4)
EOSINOPHIL NFR BLD: 3 % (ref 0–7)
ERYTHROCYTE [DISTWIDTH] IN BLOOD BY AUTOMATED COUNT: 13.1 % (ref 11.5–14.5)
GAS FLOW.O2 O2 DELIVERY SYS: ABNORMAL L/MIN
GAS FLOW.O2 SETTING OXYMISER: 2 L/M
GLUCOSE BLD STRIP.AUTO-MCNC: 163 MG/DL (ref 65–100)
GLUCOSE BLD STRIP.AUTO-MCNC: 173 MG/DL (ref 65–100)
GLUCOSE BLD STRIP.AUTO-MCNC: 183 MG/DL (ref 65–100)
GLUCOSE BLD STRIP.AUTO-MCNC: 253 MG/DL (ref 65–100)
GLUCOSE SERPL-MCNC: 235 MG/DL (ref 65–100)
HCO3 BLD-SCNC: 27.5 MMOL/L (ref 22–26)
HCT VFR BLD AUTO: 38.4 % (ref 35–47)
HGB BLD-MCNC: 12 G/DL (ref 11.5–16)
IMM GRANULOCYTES # BLD AUTO: 0.1 K/UL (ref 0–0.04)
IMM GRANULOCYTES NFR BLD AUTO: 1 % (ref 0–0.5)
LYMPHOCYTES # BLD: 0.8 K/UL (ref 0.8–3.5)
LYMPHOCYTES NFR BLD: 13 % (ref 12–49)
MAGNESIUM SERPL-MCNC: 1.4 MG/DL (ref 1.6–2.4)
MCH RBC QN AUTO: 32.2 PG (ref 26–34)
MCHC RBC AUTO-ENTMCNC: 31.3 G/DL (ref 30–36.5)
MCV RBC AUTO: 102.9 FL (ref 80–99)
MONOCYTES # BLD: 0.6 K/UL (ref 0–1)
MONOCYTES NFR BLD: 10 % (ref 5–13)
NEUTS SEG # BLD: 4.6 K/UL (ref 1.8–8)
NEUTS SEG NFR BLD: 72 % (ref 32–75)
NRBC # BLD: 0 K/UL (ref 0–0.01)
NRBC BLD-RTO: 0 PER 100 WBC
P-R INTERVAL, ECG05: 184 MS
PCO2 BLD: 57.9 MMHG (ref 35–45)
PH BLD: 7.28 [PH] (ref 7.35–7.45)
PLATELET # BLD AUTO: 154 K/UL (ref 150–400)
PMV BLD AUTO: 11.1 FL (ref 8.9–12.9)
PO2 BLD: 64 MMHG (ref 80–100)
POTASSIUM SERPL-SCNC: 5.7 MMOL/L (ref 3.5–5.1)
Q-T INTERVAL, ECG07: 480 MS
QRS DURATION, ECG06: 84 MS
QTC CALCULATION (BEZET), ECG08: 467 MS
RBC # BLD AUTO: 3.73 M/UL (ref 3.8–5.2)
SAO2 % BLD: 89 % (ref 92–97)
SERVICE CMNT-IMP: ABNORMAL
SODIUM SERPL-SCNC: 136 MMOL/L (ref 136–145)
SPECIMEN TYPE: ABNORMAL
VENTRICULAR RATE, ECG03: 57 BPM
WBC # BLD AUTO: 6.3 K/UL (ref 3.6–11)

## 2021-02-01 PROCEDURE — 77030000032 HC CUF TRNQT ZIMM -B: Performed by: ORTHOPAEDIC SURGERY

## 2021-02-01 PROCEDURE — 74011250637 HC RX REV CODE- 250/637: Performed by: INTERNAL MEDICINE

## 2021-02-01 PROCEDURE — 76060000033 HC ANESTHESIA 1 TO 1.5 HR: Performed by: ORTHOPAEDIC SURGERY

## 2021-02-01 PROCEDURE — 77030039497 HC CST PAD STERILE CHCS -A: Performed by: ORTHOPAEDIC SURGERY

## 2021-02-01 PROCEDURE — 74011000250 HC RX REV CODE- 250: Performed by: ORTHOPAEDIC SURGERY

## 2021-02-01 PROCEDURE — 77030040922 HC BLNKT HYPOTHRM STRY -A

## 2021-02-01 PROCEDURE — C1713 ANCHOR/SCREW BN/BN,TIS/BN: HCPCS | Performed by: ORTHOPAEDIC SURGERY

## 2021-02-01 PROCEDURE — 77030002933 HC SUT MCRYL J&J -A: Performed by: ORTHOPAEDIC SURGERY

## 2021-02-01 PROCEDURE — 36600 WITHDRAWAL OF ARTERIAL BLOOD: CPT

## 2021-02-01 PROCEDURE — 36415 COLL VENOUS BLD VENIPUNCTURE: CPT

## 2021-02-01 PROCEDURE — 74011000250 HC RX REV CODE- 250: Performed by: NURSE ANESTHETIST, CERTIFIED REGISTERED

## 2021-02-01 PROCEDURE — 80048 BASIC METABOLIC PNL TOTAL CA: CPT

## 2021-02-01 PROCEDURE — 83735 ASSAY OF MAGNESIUM: CPT

## 2021-02-01 PROCEDURE — 82803 BLOOD GASES ANY COMBINATION: CPT

## 2021-02-01 PROCEDURE — 82962 GLUCOSE BLOOD TEST: CPT

## 2021-02-01 PROCEDURE — 76210000016 HC OR PH I REC 1 TO 1.5 HR: Performed by: ORTHOPAEDIC SURGERY

## 2021-02-01 PROCEDURE — 74011250636 HC RX REV CODE- 250/636: Performed by: INTERNAL MEDICINE

## 2021-02-01 PROCEDURE — 74011000258 HC RX REV CODE- 258: Performed by: NURSE PRACTITIONER

## 2021-02-01 PROCEDURE — 0QSJ04Z REPOSITION RIGHT FIBULA WITH INTERNAL FIXATION DEVICE, OPEN APPROACH: ICD-10-PCS | Performed by: ORTHOPAEDIC SURGERY

## 2021-02-01 PROCEDURE — 74011636637 HC RX REV CODE- 636/637: Performed by: FAMILY MEDICINE

## 2021-02-01 PROCEDURE — 74011250636 HC RX REV CODE- 250/636: Performed by: NURSE ANESTHETIST, CERTIFIED REGISTERED

## 2021-02-01 PROCEDURE — 77010033678 HC OXYGEN DAILY

## 2021-02-01 PROCEDURE — 77030013079 HC BLNKT BAIR HGGR 3M -A: Performed by: ANESTHESIOLOGY

## 2021-02-01 PROCEDURE — 74011636637 HC RX REV CODE- 636/637: Performed by: NURSE PRACTITIONER

## 2021-02-01 PROCEDURE — 94760 N-INVAS EAR/PLS OXIMETRY 1: CPT

## 2021-02-01 PROCEDURE — 74011636637 HC RX REV CODE- 636/637: Performed by: INTERNAL MEDICINE

## 2021-02-01 PROCEDURE — 73610 X-RAY EXAM OF ANKLE: CPT

## 2021-02-01 PROCEDURE — 77030016472 HC BIT DRL QC2 SYNT -C: Performed by: ORTHOPAEDIC SURGERY

## 2021-02-01 PROCEDURE — 0QSG04Z REPOSITION RIGHT TIBIA WITH INTERNAL FIXATION DEVICE, OPEN APPROACH: ICD-10-PCS | Performed by: ORTHOPAEDIC SURGERY

## 2021-02-01 PROCEDURE — 77030002916 HC SUT ETHLN J&J -A: Performed by: ORTHOPAEDIC SURGERY

## 2021-02-01 PROCEDURE — 85025 COMPLETE CBC W/AUTO DIFF WBC: CPT

## 2021-02-01 PROCEDURE — 74011000250 HC RX REV CODE- 250: Performed by: FAMILY MEDICINE

## 2021-02-01 PROCEDURE — 2709999900 HC NON-CHARGEABLE SUPPLY: Performed by: ORTHOPAEDIC SURGERY

## 2021-02-01 PROCEDURE — 77030031139 HC SUT VCRL2 J&J -A: Performed by: ORTHOPAEDIC SURGERY

## 2021-02-01 PROCEDURE — 93306 TTE W/DOPPLER COMPLETE: CPT

## 2021-02-01 PROCEDURE — 77030003862 HC BIT DRL SYNT -B: Performed by: ORTHOPAEDIC SURGERY

## 2021-02-01 PROCEDURE — 76010000149 HC OR TIME 1 TO 1.5 HR: Performed by: ORTHOPAEDIC SURGERY

## 2021-02-01 PROCEDURE — 74011250636 HC RX REV CODE- 250/636: Performed by: NURSE PRACTITIONER

## 2021-02-01 PROCEDURE — 74011250636 HC RX REV CODE- 250/636: Performed by: ORTHOPAEDIC SURGERY

## 2021-02-01 PROCEDURE — 65660000000 HC RM CCU STEPDOWN

## 2021-02-01 PROCEDURE — 74011250637 HC RX REV CODE- 250/637: Performed by: ORTHOPAEDIC SURGERY

## 2021-02-01 PROCEDURE — 77030026438 HC STYL ET INTUB CARD -A: Performed by: ANESTHESIOLOGY

## 2021-02-01 PROCEDURE — 74011250637 HC RX REV CODE- 250/637: Performed by: FAMILY MEDICINE

## 2021-02-01 PROCEDURE — 77030008684 HC TU ET CUF COVD -B: Performed by: ANESTHESIOLOGY

## 2021-02-01 PROCEDURE — 74011250636 HC RX REV CODE- 250/636: Performed by: ANESTHESIOLOGY

## 2021-02-01 DEVICE — 3.5MM LOCKING SCREW SLF-TPNG W/STARDRIVE(TM) RECESS 16MM: Type: IMPLANTABLE DEVICE | Site: ANKLE | Status: FUNCTIONAL

## 2021-02-01 DEVICE — SCREW BNE L14MM DIA3.5MM CORT S STL ST LOK FULL THRD: Type: IMPLANTABLE DEVICE | Site: ANKLE | Status: FUNCTIONAL

## 2021-02-01 DEVICE — PLATE BNE L81MM 7 H S STL 1/3 TBLR LOK COMPR W/ CLLR FOR: Type: IMPLANTABLE DEVICE | Site: ANKLE | Status: FUNCTIONAL

## 2021-02-01 DEVICE — SCREW BNE L50MM DIA3.5MM CORT S STL ST NONCANNULATED LOK: Type: IMPLANTABLE DEVICE | Site: ANKLE | Status: FUNCTIONAL

## 2021-02-01 DEVICE — 3.5MM CORTEX SCREW SELF-TAPPING 12MM: Type: IMPLANTABLE DEVICE | Site: ANKLE | Status: FUNCTIONAL

## 2021-02-01 DEVICE — SCREW BNE L16MM DIA3.5MM CORT S STL ST NONCANNULATED LOK: Type: IMPLANTABLE DEVICE | Site: ANKLE | Status: FUNCTIONAL

## 2021-02-01 DEVICE — SCREW BNE L45MM DIA4MM CANC S STL PARTIALLY THRD SM HEX: Type: IMPLANTABLE DEVICE | Site: ANKLE | Status: FUNCTIONAL

## 2021-02-01 RX ORDER — SODIUM CHLORIDE 9 MG/ML
50 INJECTION, SOLUTION INTRAVENOUS CONTINUOUS
Status: DISCONTINUED | OUTPATIENT
Start: 2021-02-01 | End: 2021-02-03

## 2021-02-01 RX ORDER — DEXTROSE 50 % IN WATER (D50W) INTRAVENOUS SYRINGE
25 ONCE
Status: ACTIVE | OUTPATIENT
Start: 2021-02-01 | End: 2021-02-01

## 2021-02-01 RX ORDER — ENOXAPARIN SODIUM 100 MG/ML
30 INJECTION SUBCUTANEOUS EVERY 24 HOURS
Status: DISCONTINUED | OUTPATIENT
Start: 2021-02-02 | End: 2021-02-04

## 2021-02-01 RX ORDER — SODIUM CHLORIDE 0.9 % (FLUSH) 0.9 %
5-40 SYRINGE (ML) INJECTION EVERY 8 HOURS
Status: DISCONTINUED | OUTPATIENT
Start: 2021-02-02 | End: 2021-02-09 | Stop reason: HOSPADM

## 2021-02-01 RX ORDER — ROCURONIUM BROMIDE 10 MG/ML
INJECTION, SOLUTION INTRAVENOUS AS NEEDED
Status: DISCONTINUED | OUTPATIENT
Start: 2021-02-01 | End: 2021-02-01 | Stop reason: HOSPADM

## 2021-02-01 RX ORDER — LIDOCAINE HYDROCHLORIDE 20 MG/ML
INJECTION, SOLUTION EPIDURAL; INFILTRATION; INTRACAUDAL; PERINEURAL AS NEEDED
Status: DISCONTINUED | OUTPATIENT
Start: 2021-02-01 | End: 2021-02-01 | Stop reason: HOSPADM

## 2021-02-01 RX ORDER — ONDANSETRON 2 MG/ML
4 INJECTION INTRAMUSCULAR; INTRAVENOUS
Status: DISCONTINUED | OUTPATIENT
Start: 2021-02-01 | End: 2021-02-09 | Stop reason: HOSPADM

## 2021-02-01 RX ORDER — SODIUM CHLORIDE 9 MG/ML
INJECTION, SOLUTION INTRAVENOUS
Status: DISCONTINUED | OUTPATIENT
Start: 2021-02-01 | End: 2021-02-01 | Stop reason: HOSPADM

## 2021-02-01 RX ORDER — NALOXONE HYDROCHLORIDE 0.4 MG/ML
0.4 INJECTION, SOLUTION INTRAMUSCULAR; INTRAVENOUS; SUBCUTANEOUS AS NEEDED
Status: DISCONTINUED | OUTPATIENT
Start: 2021-02-01 | End: 2021-02-09 | Stop reason: HOSPADM

## 2021-02-01 RX ORDER — SODIUM CHLORIDE 0.9 % (FLUSH) 0.9 %
5-40 SYRINGE (ML) INJECTION AS NEEDED
Status: DISCONTINUED | OUTPATIENT
Start: 2021-02-01 | End: 2021-02-09 | Stop reason: HOSPADM

## 2021-02-01 RX ORDER — HYDROCODONE BITARTRATE AND ACETAMINOPHEN 5; 325 MG/1; MG/1
1 TABLET ORAL
Status: DISCONTINUED | OUTPATIENT
Start: 2021-02-01 | End: 2021-02-09 | Stop reason: HOSPADM

## 2021-02-01 RX ORDER — PROPOFOL 10 MG/ML
INJECTION, EMULSION INTRAVENOUS AS NEEDED
Status: DISCONTINUED | OUTPATIENT
Start: 2021-02-01 | End: 2021-02-01 | Stop reason: HOSPADM

## 2021-02-01 RX ORDER — INSULIN GLARGINE 100 [IU]/ML
14 INJECTION, SOLUTION SUBCUTANEOUS
Status: DISCONTINUED | OUTPATIENT
Start: 2021-02-01 | End: 2021-02-02

## 2021-02-01 RX ORDER — HYDROCODONE BITARTRATE AND ACETAMINOPHEN 7.5; 325 MG/1; MG/1
1 TABLET ORAL
Status: DISCONTINUED | OUTPATIENT
Start: 2021-02-01 | End: 2021-02-09 | Stop reason: HOSPADM

## 2021-02-01 RX ORDER — ONDANSETRON 2 MG/ML
INJECTION INTRAMUSCULAR; INTRAVENOUS AS NEEDED
Status: DISCONTINUED | OUTPATIENT
Start: 2021-02-01 | End: 2021-02-01 | Stop reason: HOSPADM

## 2021-02-01 RX ORDER — BUPIVACAINE HYDROCHLORIDE 5 MG/ML
INJECTION, SOLUTION EPIDURAL; INTRACAUDAL AS NEEDED
Status: DISCONTINUED | OUTPATIENT
Start: 2021-02-01 | End: 2021-02-01 | Stop reason: HOSPADM

## 2021-02-01 RX ORDER — TIMOLOL MALEATE 5 MG/ML
1 SOLUTION/ DROPS OPHTHALMIC 2 TIMES DAILY
Status: DISCONTINUED | OUTPATIENT
Start: 2021-02-01 | End: 2021-02-09 | Stop reason: HOSPADM

## 2021-02-01 RX ORDER — DEXTROSE 50 % IN WATER (D50W) INTRAVENOUS SYRINGE
12.5-25 AS NEEDED
Status: DISCONTINUED | OUTPATIENT
Start: 2021-02-01 | End: 2021-02-09 | Stop reason: HOSPADM

## 2021-02-01 RX ORDER — FENTANYL CITRATE 50 UG/ML
25 INJECTION, SOLUTION INTRAMUSCULAR; INTRAVENOUS
Status: DISCONTINUED | OUTPATIENT
Start: 2021-02-01 | End: 2021-02-01 | Stop reason: HOSPADM

## 2021-02-01 RX ORDER — DORZOLAMIDE HCL 20 MG/ML
1 SOLUTION/ DROPS OPHTHALMIC 3 TIMES DAILY
Status: DISCONTINUED | OUTPATIENT
Start: 2021-02-01 | End: 2021-02-09 | Stop reason: HOSPADM

## 2021-02-01 RX ORDER — PHENYLEPHRINE HCL IN 0.9% NACL 0.4MG/10ML
SYRINGE (ML) INTRAVENOUS AS NEEDED
Status: DISCONTINUED | OUTPATIENT
Start: 2021-02-01 | End: 2021-02-01 | Stop reason: HOSPADM

## 2021-02-01 RX ORDER — MAGNESIUM SULFATE 1 G/100ML
1 INJECTION INTRAVENOUS ONCE
Status: COMPLETED | OUTPATIENT
Start: 2021-02-01 | End: 2021-02-01

## 2021-02-01 RX ADMIN — ONDANSETRON HYDROCHLORIDE 4 MG: 2 INJECTION, SOLUTION INTRAMUSCULAR; INTRAVENOUS at 16:56

## 2021-02-01 RX ADMIN — CALCIUM GLUCONATE 1 G: 94 INJECTION, SOLUTION INTRAVENOUS at 07:25

## 2021-02-01 RX ADMIN — INSULIN LISPRO 2 UNITS: 100 INJECTION, SOLUTION INTRAVENOUS; SUBCUTANEOUS at 12:35

## 2021-02-01 RX ADMIN — LEVOTHYROXINE SODIUM 150 MCG: 0.15 TABLET ORAL at 08:09

## 2021-02-01 RX ADMIN — METOPROLOL TARTRATE 50 MG: 50 TABLET, FILM COATED ORAL at 08:09

## 2021-02-01 RX ADMIN — FERROUS SULFATE TAB 325 MG (65 MG ELEMENTAL FE) 325 MG: 325 (65 FE) TAB at 08:09

## 2021-02-01 RX ADMIN — Medication 80 MCG: at 16:47

## 2021-02-01 RX ADMIN — PANTOPRAZOLE SODIUM 40 MG: 40 TABLET, DELAYED RELEASE ORAL at 08:09

## 2021-02-01 RX ADMIN — PHENYLEPHRINE HYDROCHLORIDE 40 MCG/MIN: 10 INJECTION INTRAVENOUS at 16:47

## 2021-02-01 RX ADMIN — LIDOCAINE HYDROCHLORIDE 60 MG: 20 INJECTION, SOLUTION EPIDURAL; INFILTRATION; INTRACAUDAL; PERINEURAL at 16:47

## 2021-02-01 RX ADMIN — SUGAMMADEX 200 MG: 100 INJECTION, SOLUTION INTRAVENOUS at 17:46

## 2021-02-01 RX ADMIN — MAGNESIUM SULFATE HEPTAHYDRATE 1 G: 1 INJECTION, SOLUTION INTRAVENOUS at 12:34

## 2021-02-01 RX ADMIN — Medication 10 ML: at 07:33

## 2021-02-01 RX ADMIN — INSULIN LISPRO 2 UNITS: 100 INJECTION, SOLUTION INTRAVENOUS; SUBCUTANEOUS at 18:30

## 2021-02-01 RX ADMIN — Medication 10 ML: at 23:37

## 2021-02-01 RX ADMIN — GLYCOPYRROLATE AND FORMOTEROL FUMARATE 2 PUFF: 9; 4.8 AEROSOL, METERED RESPIRATORY (INHALATION) at 23:40

## 2021-02-01 RX ADMIN — INSULIN LISPRO 5 UNITS: 100 INJECTION, SOLUTION INTRAVENOUS; SUBCUTANEOUS at 07:24

## 2021-02-01 RX ADMIN — AMLODIPINE BESYLATE 5 MG: 5 TABLET ORAL at 08:09

## 2021-02-01 RX ADMIN — ACETAMINOPHEN 650 MG: 325 TABLET ORAL at 12:27

## 2021-02-01 RX ADMIN — HUMAN INSULIN 10 UNITS: 100 INJECTION, SOLUTION SUBCUTANEOUS at 07:25

## 2021-02-01 RX ADMIN — SODIUM CHLORIDE: 900 INJECTION, SOLUTION INTRAVENOUS at 16:38

## 2021-02-01 RX ADMIN — WATER 2 G: 1 INJECTION INTRAMUSCULAR; INTRAVENOUS; SUBCUTANEOUS at 17:00

## 2021-02-01 RX ADMIN — DORZOLAMIDE HYDROCHLORIDE 1 DROP: 20 SOLUTION/ DROPS OPHTHALMIC at 23:37

## 2021-02-01 RX ADMIN — ACETAMINOPHEN 650 MG: 325 TABLET ORAL at 00:32

## 2021-02-01 RX ADMIN — INSULIN GLARGINE 14 UNITS: 100 INJECTION, SOLUTION SUBCUTANEOUS at 22:50

## 2021-02-01 RX ADMIN — GLYCOPYRROLATE AND FORMOTEROL FUMARATE 2 PUFF: 9; 4.8 AEROSOL, METERED RESPIRATORY (INHALATION) at 08:10

## 2021-02-01 RX ADMIN — INSULIN LISPRO 2 UNITS: 100 INJECTION, SOLUTION INTRAVENOUS; SUBCUTANEOUS at 22:49

## 2021-02-01 RX ADMIN — TIMOLOL MALEATE 1 DROP: 5 SOLUTION/ DROPS OPHTHALMIC at 12:27

## 2021-02-01 RX ADMIN — Medication 10 ML: at 22:53

## 2021-02-01 RX ADMIN — ESCITALOPRAM OXALATE 20 MG: 10 TABLET ORAL at 08:09

## 2021-02-01 RX ADMIN — PROPOFOL 80 MG: 10 INJECTION, EMULSION INTRAVENOUS at 16:47

## 2021-02-01 RX ADMIN — ROCURONIUM BROMIDE 30 MG: 10 SOLUTION INTRAVENOUS at 16:47

## 2021-02-01 RX ADMIN — DORZOLAMIDE HYDROCHLORIDE 1 DROP: 20 SOLUTION/ DROPS OPHTHALMIC at 12:27

## 2021-02-01 RX ADMIN — FENTANYL CITRATE 12.5 MCG: 50 INJECTION, SOLUTION INTRAMUSCULAR; INTRAVENOUS at 15:58

## 2021-02-01 NOTE — BRIEF OP NOTE
Brief Postoperative Note    Patient: Brodie Zavala  YOB: 1950  MRN: [id number]    Date of Procedure: 2/1/2021     Pre-Op Diagnosis: BROKEN RIGHT ANKLE    Post-Op Diagnosis: Same as preoperative diagnosis. Procedure(s):  RIGHT ANKLE OPEN REDUCTION INTERNAL FIXATION    Surgeon(s):  Portia Basurto MD    Surgical Assistant: Surg Asst-1: Freddy Schafer    Anesthesia: General     Estimated Blood Loss (mL): Minimal    Complications: None    Specimens: * No specimens in log *     Implants:   Implant Name Type Inv.  Item Serial No.  Lot No. LRB No. Used Action   SCR BNE LCK ST T25 3.5X14MM SS --  - SNA  SCR BNE LCK ST T25 3.5X14MM SS --  NA SYNTHES [place] NA Right 2 Implanted   SCR BNE LCK ST T25 3.5X16MM SS --  - SNA  SCR BNE LCK ST T25 3.5X16MM SS --  NA SYNTHES [place] NA Right 1 Implanted   PLATE 1/3 TUBLR CLLR 7H 81MM --  - SNA  PLATE 1/3 TUBLR CLLR 7H 81MM --  NA SYNTHES [place] NA Right 1 Implanted   SCREW CORTEX SLFTAP 3.5 X 12MM - SNA  SCREW CORTEX SLFTAP 3.5 X 12MM NA SYNTHES [place] NA Right 1 Implanted   SCREW CORTEX SLFTAP 3.5 X 16MM - SNA  SCREW CORTEX SLFTAP 3.5 X 16MM NA SYNTHES [place] NA Right 2 Implanted   SCR BNE CRTX ST HEX 3.5X50MM -- SS - SNA  SCR BNE CRTX ST HEX 3.5X50MM -- SS NA SYNTHES [place] NA Right 1 Implanted   SCR BNE CANC HEX PT 4X45MM SS --  - SNA  SCR BNE CANC HEX PT 4X45MM SS --  NA SYNTHES [place] NA Right 1 Implanted       Drains: * No LDAs found *    Findings: as above    Electronically Signed by Jodie Staley MD on 2/1/2021 at 6:04 PM

## 2021-02-01 NOTE — PROGRESS NOTES
Hospitalist Progress Note  Nicolas Espinoza MD  Answering service: [phone] from in house phone      Date of Service:  2021  NAME:  Lino Miranda  :    MRN:  [id number]      Admission Summary:   Lino Miranda is a 79 y.o. female with hx CAD s/p CABG, COPD, DM II with gastroparesis, neuropathy, and legally blind, GERD, HTN, dyslipidemia, JOHN on Cpap, and hypothyroidism who presents with trimalleolar fracture s/p mechanical fall sustained while trying to climb stairs. Her roommate noted gross deformity, and called EMS who transported her to ED giving her fentanyl 50mcg en route. She was markedly sedated upon arrival, and fracture was successfully reduced and splinted in the ED. She denies fever, chills, upper respiratory symptoms     In the ED, she was bradycardic to 54, and intially requried 4Lnc 2/2 sedation from fentanyl given by EMS. XR tib/fib showed fracture of medial & posterior malleolus, and distal fibula. CT head + for new bilateral chronic cerebellar infarcts. CT C-spine with moderate C1/C2 stenosis, but no acute fracture or dislocation. Interval history / Subjective:      Patient seen and examined this morning. Patient AAA X 2 but then again able to converse with her relative who was at the bedside. She didn't have her BiPAP or CiPAP last night. Assessment & Plan:     # Trimalleolar Fracture s/p mechanical fall  - s/p reduction in the ED, neurovascularly intact  - orthopedic surgery following   - maintain splint  - tylenol, and roxicodone for mild>mod/severe pain  - plan for surgery today  - Pre-surgery cardiopulmonary risk assessment moderate.  Echo with EF 55-60%      # CAD s/p CABG  - revised cardiac risk index 4, 15% 30-day mortality from cardiac cause  - currently without sx or signs of angina, HF, or arrhythmia  - hx slow to wake from anesthesia  - continue metoprolol, lisinopril, and statin    # Acute hypoxia with respiratory acidosis   - likely from medication induced( narcotic) and untreated sleep apnea      # HTN  - continue metoprolol and lisinopril     # COPD  - spo2 94-96% on RA  - former smoker, currently vapes  - continue inhaled LABA/anticholinergic inhaler      # Stage IV CKD  - creatinine 1.98 (stable from 5/2020)  - avoid neprotoxic drugs     # Diabetes type 2 with hyperglycemia   - HgbA1c 7.3% 9/2020  - has insulin pump, turned this off in the ED, and she will use subQ insulin while IP  - Lantus + ISS     # Hypothyroidism  - continue home levothyroxine     # JOHN on CPAP  - c-pap while in hospital     DVT ppx: SCD  MPOA: Yaa Tilley (friend) [phone]  Code: Full     FUNCTIONAL STATUS PRIOR TO HOSPITALIZATION Ambulatory with Use of Assistive Devices (including history of recent falls): Carli Chelsie prior to current admission     Hospital Problems  Date Reviewed: 9/14/2020          Codes Class Noted POA    Ankle fracture ICD-10-CM: S82.899A  ICD-9-CM: 824.8  1/31/2021 Unknown                Review of Systems:   Pertinent positive mentioned in interval hx/HPI. Other systems reviewed and negative     Vital Signs:    Last 24hrs VS reviewed since prior progress note. Most recent are:  Visit Vitals  BP (!) 166/50 (BP 1 Location: Right upper arm, BP Patient Position: At rest)   Pulse 65   Temp 98.2 °F (36.8 °C)   Resp 20   Ht 5' 4\" (1.626 m)   Wt 97 kg (213 lb 13.5 oz)   SpO2 94%   BMI 36.71 kg/m²         Intake/Output Summary (Last 24 hours) at 2/1/2021 1610  Last data filed at 2/1/2021 0749  Gross per 24 hour   Intake 110 ml   Output    Net 110 ml        Physical Examination:             Constitutional:  No acute distress, sedated    ENT:  Oral mucous moist,    Resp:  CTA bilaterally. No wheezing/rhonchi/rales. No accessory muscle use   CV:  Regular rhythm, normal rate, no murmurs, gallops, rubs    GI:  Soft, non distended, non tender.  normoactive bowel sounds, no hepatosplenomegaly Musculoskeletal:  Right leg rapped in a bandage, good pulse and sensation      Neurologic:  Moves all extremities. Data Review:      I personally reviewed labs and imaging     Labs:     Recent Labs     02/01/21  0252 01/30/21  2334   WBC 6.3 5.8   HGB 12.0 12.8   HCT 38.4 40.1    162     Recent Labs     02/01/21  0252 01/31/21  0604 01/30/21  2334    138 139   K 5.7* 5.2* 5.2*    111* 107   CO2 26 24 27   BUN 31* 23* 22*   CREA 2.19* 1.89* 1.98*   * 177* 250*   CA 8.9 8.9 8.9   MG 1.4* 1.7  --      No results for input(s): ALT, AP, TBIL, TBILI, TP, ALB, GLOB, GGT, AML, LPSE in the last 72 hours. No lab exists for component: SGOT, GPT, AMYP, HLPSE  Recent Labs     01/31/21  0604   INR 1.1   PTP 11.1   APTT 24.2      No results for input(s): FE, TIBC, PSAT, FERR in the last 72 hours. No results found for: FOL, RBCF   No results for input(s): PH, PCO2, PO2 in the last 72 hours. No results for input(s): CPK, CKNDX, TROIQ in the last 72 hours.     No lab exists for component: CPKMB  Lab Results   Component Value Date/Time    Cholesterol, total 189 05/15/2020 01:46 PM    HDL Cholesterol 44 05/15/2020 01:46 PM    LDL, calculated 74 05/15/2020 01:46 PM    Triglyceride 356 (H) 05/15/2020 01:46 PM    CHOL/HDL Ratio 3.0 09/27/2017 04:12 AM     Lab Results   Component Value Date/Time    Glucose (POC) 163 (H) 02/01/2021 03:45 PM    Glucose (POC) 173 (H) 02/01/2021 11:28 AM    Glucose (POC) 253 (H) 02/01/2021 07:09 AM    Glucose (POC) 258 (H) 01/31/2021 09:23 PM    Glucose (POC) 227 (H) 01/31/2021 03:56 PM     Lab Results   Component Value Date/Time    Color YELLOW/STRAW 01/07/2019 08:31 AM    Appearance CLEAR 01/07/2019 08:31 AM    Specific gravity 1.010 01/07/2019 08:31 AM    pH (UA) 6.0 01/07/2019 08:31 AM    Protein 100 (A) 01/07/2019 08:31 AM    Glucose NEGATIVE  01/07/2019 08:31 AM    Ketone NEGATIVE  01/07/2019 08:31 AM    Bilirubin NEGATIVE  01/07/2019 08:31 AM    Urobilinogen 0.2 01/07/2019 08:31 AM    Nitrites NEGATIVE  01/07/2019 08:31 AM    Leukocyte Esterase SMALL (A) 01/07/2019 08:31 AM    Epithelial cells FEW 01/07/2019 08:31 AM    Bacteria NEGATIVE  01/07/2019 08:31 AM    WBC 0-4 01/07/2019 08:31 AM    RBC 0-5 01/07/2019 08:31 AM         Medications Reviewed:     Current Facility-Administered Medications   Medication Dose Route Frequency    dextrose (D50W) injection syrg 25 g  25 g IntraVENous ONCE    dextrose (D50W) injection syrg 12.5-25 g  12.5-25 g IntraVENous PRN    dorzolamide (TRUSOPT) 2 % ophthalmic solution 1 Drop  1 Drop Both Eyes TID    And    timolol (TIMOPTIC) 0.5 % ophthalmic solution 1 Drop  1 Drop Both Eyes BID    insulin glargine (LANTUS) injection 14 Units  14 Units SubCUTAneous QHS    HYDROcodone-acetaminophen (NORCO) 7.5-325 mg per tablet 1 Tab  1 Tab Oral Q6H PRN    fentaNYL citrate (PF) injection 25 mcg  25 mcg IntraVENous Multiple    sodium chloride (NS) flush 5-40 mL  5-40 mL IntraVENous Q8H    sodium chloride (NS) flush 5-40 mL  5-40 mL IntraVENous PRN    acetaminophen (TYLENOL) tablet 650 mg  650 mg Oral Q6H PRN    Or    acetaminophen (TYLENOL) suppository 650 mg  650 mg Rectal Q6H PRN    polyethylene glycol (MIRALAX) packet 17 g  17 g Oral DAILY PRN    promethazine (PHENERGAN) tablet 12.5 mg  12.5 mg Oral Q6H PRN    Or    ondansetron (ZOFRAN) injection 4 mg  4 mg IntraVENous Q6H PRN    levothyroxine (SYNTHROID) tablet 150 mcg  150 mcg Oral ACB    pravastatin (PRAVACHOL) tablet 40 mg  40 mg Oral QHS    amLODIPine (NORVASC) tablet 5 mg  5 mg Oral DAILY    gabapentin (NEURONTIN) capsule 800 mg  800 mg Oral TID    metoprolol tartrate (LOPRESSOR) tablet 50 mg  50 mg Oral BID    pantoprazole (PROTONIX) tablet 40 mg  40 mg Oral ACB    ferrous sulfate tablet 325 mg  325 mg Oral DAILY    escitalopram oxalate (LEXAPRO) tablet 20 mg  20 mg Oral DAILY    glucose chewable tablet 16 g  4 Tab Oral PRN    dextrose (D50W) injection syrg 12.5-25 g 25-50 mL IntraVENous PRN    glucagon (GLUCAGEN) injection 1 mg  1 mg IntraMUSCular PRN    insulin lispro (HUMALOG) injection   SubCUTAneous AC&HS    glycopyrrolate-formoterol (BEVESPI AEROSPHERE) 9 mcg-4.8 mcg inhaler  2 Puff Inhalation BID RT     ______________________________________________________________________  EXPECTED LENGTH OF STAY: 3d 0h  ACTUAL LENGTH OF STAY:          1                 Serena Callahan MD   Patient has given Verbal permission to discuss medical care with   persons present in the room and and also with contact as listed on face sheet. Please note that this dictation was completed with [employer], the computer voice recognition software. Quite often unanticipated grammatical, syntax, homophones, and other interpretive errors are inadvertently transcribed by the computer software. Please disregard these errors. Please excuse any errors that have escaped final proofreading. Thank you.

## 2021-02-01 NOTE — PROGRESS NOTES
Physician Progress Note      Siria Hunt  CSN #:                  [id number]  :                       1950  ADMIT DATE:       2021 11:12 PM  [address] [place] DATE:  RESPONDING  PROVIDER #:        AYLEEN Klein MD          QUERY TEXT:    Dear Attending,    Pt admitted with trimalleolar fracture s/p fall, noted to have PMH with osteoporosis. If possible, please document in progress notes and discharge summary if you are evaluating and/or treating any of the following: The medical record reflects the following:  Risk Factors: advanced age, PMH with osteoporosis  Clinical Indicators: to ED for eval after fall on stairs  - XR- Acute fractures of the medial malleolus, posterior malleolus, and distal fibula.  - H&P- Trimalleolar Fracture s/p mechanical fall  Treatment: splint, Ortho consult, Vitamin D3 PTA med, monitoring      Thank you,    Joan Kelly RN  [facility]  [phone]  Options provided:  -- Pathological trimalleolar fracture  -- Osteoporotic trimalleolar fracture  -- Osteoporotic trimalleolar fracture following fall which would not usually break a normal, healthy bone  -- Traumatic trimalleolar fracture  -- Other - I will add my own diagnosis  -- Disagree - Not applicable / Not valid  -- Disagree - Clinically unable to determine / Unknown  -- Refer to Clinical Documentation Reviewer    PROVIDER RESPONSE TEXT:    This patient has a traumatic trimalleolar fracture.     Query created by: Alfredito Briggs on 2021 12:36 PM      Electronically signed by:  Sandra Cortes MD 2021 4:10 PM

## 2021-02-01 NOTE — PROGRESS NOTES
2030: TRANSFER - IN REPORT:    Verbal report received from Alejandra Sears (name) on Marylene Folks  being received from Med Surg (unit) for routine progression of care      Report consisted of patients Situation, Background, Assessment and   Recommendations(SBAR). Information from the following report(s) SBAR, Kardex, Procedure Summary, Intake/Output, MAR, Recent Results, Med Rec Status and Cardiac Rhythm   was reviewed with the receiving nurse. Opportunity for questions and clarification was provided. Assessment completed upon patients arrival to unit and care assumed. 0034: Patient grimacing stating she has pain of 3/10. Orientation decreased from x4 to x1 to self. Tylenol administered. 1459Starlette Marrow, NP of Potassium results trending up at 5.7, previously 5.2. Orders in chart. 0730: Bedside shift change report given to [address] (oncoming nurse) by Venice Ramsay (offgoing nurse). Report included the following information SBAR, Kardex, Intake/Output, MAR, Recent Results, Med Rec Status and Cardiac Rhythm     .

## 2021-02-01 NOTE — ROUTINE PROCESS
TRANSFER - IN REPORT: 
 
Verbal report received from [facility] RN(name) on Jarek Peralta  being received from [facility](unit) for ordered procedure Report consisted of patients Situation, Background, Assessment and  
Recommendations(SBAR). Information from the following report(s) SBAR, Kardex, ED Summary, Procedure Summary, Intake/Output, MAR, Recent Results and Cardiac Rhythm SB/SR was reviewed with the receiving nurse. Opportunity for questions and clarification was provided. Assessment completed upon patients arrival to unit and care assumed.

## 2021-02-01 NOTE — ROUTINE PROCESS
Patient: José Miguel Teixeira MRN: [id number]  SSN: [id number] YOB: 1950  Age: 79 y.o. Sex: female Patient is status post Procedure(s): RIGHT ANKLE OPEN REDUCTION INTERNAL FIXATION. Surgeon(s) and Role: Glennis Krabbe, MD - Primary Local/Dose/Irrigation: see mar Peripheral IV 01/30/21 Anterior;Left;Proximal Antecubital (Active) Site Assessment Clean, dry, & intact 01/31/21 2229 Phlebitis Assessment 0 01/31/21 2229 Infiltration Assessment 0 01/31/21 2229 Dressing Status Clean, dry, & intact 01/31/21 2229 Dressing Type Transparent;Tape 01/31/21 2229 Hub Color/Line Status Pink;Flushed;Patent 01/31/21 2229 Action Taken Open ports on tubing capped 01/31/21 2229 Alcohol Cap Used Yes 01/31/21 2229 Peripheral IV 02/01/21 Left Hand (Active) Airway - Endotracheal Tube 02/01/21 Oral (Active) Dressing/Packing:  Incision 02/01/21 Foot Right-Dressing/Treatment: Ace wrap;Cast;Gauze dressing/dressing sponge;Splint (02/01/21 1752) Splint/Cast:  ] Other:

## 2021-02-01 NOTE — PROGRESS NOTES
0745 Bedside and Verbal shift change report given to [address] [facility] [address], RN (oncoming nurse) by Natali Stauffer RN (offgoing nurse). Report included the following information SBAR, Kardex, Intake/Output, MAR, Recent Results and Med Rec Status. 1325 TRANSFER - OUT REPORT:    Verbal report given to Harriet Estrada RN(name) on Virginia Max  being transferred to [employer] (unit) for ordered procedure       Report consisted of patients Situation, Background, Assessment and   Recommendations(SBAR). Information from the following report(s) SBAR, Kardex, ED Summary, Intake/Output, MAR, Recent Results and Med Rec Status was reviewed with the receiving nurse. Lines:   Peripheral IV 01/30/21 Anterior;Left;Proximal Antecubital (Active)   Site Assessment Clean, dry, & intact 01/31/21 2229   Phlebitis Assessment 0 01/31/21 2229   Infiltration Assessment 0 01/31/21 2229   Dressing Status Clean, dry, & intact 01/31/21 2229   Dressing Type Transparent;Tape 01/31/21 2229   Hub Color/Line Status Pink;Flushed;Patent 01/31/21 2229   Action Taken Open ports on tubing capped 01/31/21 2229   Alcohol Cap Used Yes 01/31/21 2229       Peripheral IV 01/30/21 Posterior;Right Wrist (Active)   Site Assessment Clean, dry, & intact 01/31/21 2229   Phlebitis Assessment 0 01/31/21 2229   Infiltration Assessment 0 01/31/21 2229   Dressing Status Clean, dry, & intact 01/31/21 2229   Dressing Type Transparent;Tape 01/31/21 2229   Hub Color/Line Status Pink;Flushed;Capped 01/31/21 2229   Action Taken Open ports on tubing capped 01/31/21 2229   Alcohol Cap Used Yes 01/31/21 2229        Opportunity for questions and clarification was provided. 1915 TRANSFER - IN REPORT:    Verbal report received from Domenica Morrison RN(name) on Virginia Max  being received from [employer]) for routine post - op      Report consisted of patients Situation, Background, Assessment and   Recommendations(SBAR).      Information from the following report(s) SBAR, Kardex, Intake/Output, MAR, Recent Results and Med Rec Status was reviewed with the receiving nurse. Opportunity for questions and clarification was provided. Assessment completed upon patients arrival to unit and care assumed. 1935 Pt arrived on unit. Telemetry placed and confirmed with monitor tech. VS obtained. 2025 Bedside and Verbal shift change report given to Collins Robles RN (oncoming nurse) by Agnieszka Leyva RN (offgoing nurse). Report included the following information SBAR, Kardex, Intake/Output, MAR, Recent Results and Med Rec Status. Problem: Pressure Injury - Risk of  Goal: *Prevention of pressure injury  Description: Document Rehan Scale and appropriate interventions in the flowsheet. Outcome: Progressing Towards Goal  Note: Pressure Injury Interventions:  Sensory Interventions: Assess changes in LOC    Moisture Interventions: Absorbent underpads    Activity Interventions: Assess need for specialty bed    Mobility Interventions: PT/OT evaluation    Nutrition Interventions: Document food/fluid/supplement intake    Friction and Shear Interventions: Apply protective barrier, creams and emollients                Problem: Patient Education: Go to Patient Education Activity  Goal: Patient/Family Education  Outcome: Progressing Towards Goal     Problem: Falls - Risk of  Goal: *Absence of Falls  Description: Document Rachael Fall Risk and appropriate interventions in the flowsheet. Outcome: Progressing Towards Goal  Note: Fall Risk Interventions:  Mobility Interventions: PT Consult for mobility concerns, PT Consult for assist device competence              Elimination Interventions: Call light in reach    History of Falls Interventions:  Investigate reason for fall         Problem: Patient Education: Go to Patient Education Activity  Goal: Patient/Family Education  Outcome: Progressing Towards Goal

## 2021-02-01 NOTE — ANESTHESIA PREPROCEDURE EVALUATION
Relevant Problems   No relevant active problems       Anesthetic History   No history of anesthetic complications            Review of Systems / Medical History  Patient summary reviewed, nursing notes reviewed and pertinent labs reviewed    Pulmonary  Within defined limits  COPD    Sleep apnea  Smoker         Neuro/Psych   Within defined limits           Cardiovascular  Within defined limits  Hypertension          CAD         GI/Hepatic/Renal  Within defined limits   GERD           Endo/Other  Within defined limits  Diabetes  Hypothyroidism  Obesity and arthritis     Other Findings              Physical Exam    Airway  Mallampati: II  TM Distance: > 6 cm  Neck ROM: normal range of motion   Mouth opening: Normal     Cardiovascular  Regular rate and rhythm,  S1 and S2 normal,  no murmur, click, rub, or gallop             Dental  No notable dental hx       Pulmonary  Breath sounds clear to auscultation               Abdominal  GI exam deferred       Other Findings            Anesthetic Plan    ASA: 4  Anesthesia type: general          Induction: Intravenous  Anesthetic plan and risks discussed with: Patient

## 2021-02-01 NOTE — PROGRESS NOTES
1515- pt lethargic, sats 81% on room air. Pt answers person, place, why here, but falls asleep , while talking. Placed pt on o2/3l and had her take deep breaths. She came up to 92-93%. ,but she would fall asleep again. checked her vitals, blood sugar and notified Dr. Priyanka Rivas. who had just left the floor, after making rounds on pt. Pt had received oxycodone at 1037am, and  commented, her present lethargary was probably from the oxycodone. . I kept and checking on the pt, and her sats, would periodically drop to 89-90%, but still respond appropriallly to my questions. Pt would fall asleep eating her  Tilly, and after updating Dr. Priyanka Rivas, she ordered ABG's.(,respiratory acidoisis) and an order was placed for pt to be placed on. CPAP. Transfer was cancelled to [facility] and a new bed was sought. Pt was covid negative and awaiting ankle surgery on Monday.  [facility] bed [facility]

## 2021-02-01 NOTE — PROGRESS NOTES
TRANSFER - OUT REPORT:    Verbal report given to Tino Shanks RN(name) on Lopezt Sox  being transferred to [facility](unit) for        Report consisted of patients Situation, Background, Assessment and   Recommendations(SBAR). Information from the following report(s) SBAR, MAR and Recent Results was reviewed with the receiving nurse. Lines:   Peripheral IV 01/30/21 Anterior;Left;Proximal Antecubital (Active)   Site Assessment Dry; Intact 01/31/21 1537   Phlebitis Assessment 0 01/31/21 1537   Infiltration Assessment 0 01/31/21 1537   Dressing Status Dry; Intact; Old drainage 01/31/21 1537   Dressing Type Tape;Transparent 01/31/21 1537   Hub Color/Line Status Pink;Flushed;Patent 01/31/21 1537   Alcohol Cap Used Yes 01/31/21 1537       Peripheral IV 01/30/21 Posterior;Right Wrist (Active)   Site Assessment Clean, dry, & intact 01/31/21 1537   Phlebitis Assessment 0 01/31/21 1537   Infiltration Assessment 0 01/31/21 1537   Dressing Status Clean, dry, & intact 01/31/21 1537   Dressing Type Transparent;Tape 01/31/21 1537   Hub Color/Line Status Pink;Flushed;Capped; Patent 01/31/21 1537   Alcohol Cap Used Yes 01/31/21 1537        Opportunity for questions and clarification was provided.       Patient transported with: o2/3l and nurse pt here for denal pain.  no fever and tylenol given

## 2021-02-02 ENCOUNTER — APPOINTMENT (OUTPATIENT)
Dept: CT IMAGING | Age: 71
DRG: 492 | End: 2021-02-02
Attending: INTERNAL MEDICINE
Payer: MEDICARE

## 2021-02-02 ENCOUNTER — TRANSCRIBE ORDER (OUTPATIENT)
Dept: SCHEDULING | Age: 71
End: 2021-02-02

## 2021-02-02 DIAGNOSIS — N18.30 CHRONIC KIDNEY DISEASE, STAGE III (MODERATE) (HCC): ICD-10-CM

## 2021-02-02 DIAGNOSIS — E03.9 ADULT MYXEDEMA: Primary | ICD-10-CM

## 2021-02-02 DIAGNOSIS — E11.9 DIABETES MELLITUS (HCC): ICD-10-CM

## 2021-02-02 DIAGNOSIS — E78.5 HYPERLIPEMIA: ICD-10-CM

## 2021-02-02 LAB
ALBUMIN SERPL-MCNC: 2.7 G/DL (ref 3.5–5)
ALBUMIN/GLOB SERPL: 0.6 {RATIO} (ref 1.1–2.2)
ALP SERPL-CCNC: 79 U/L (ref 45–117)
ALT SERPL-CCNC: 9 U/L (ref 12–78)
ANION GAP SERPL CALC-SCNC: 5 MMOL/L (ref 5–15)
AST SERPL-CCNC: 11 U/L (ref 15–37)
BILIRUB SERPL-MCNC: 0.3 MG/DL (ref 0.2–1)
BUN SERPL-MCNC: 31 MG/DL (ref 6–20)
BUN/CREAT SERPL: 16 (ref 12–20)
CALCIUM SERPL-MCNC: 9.3 MG/DL (ref 8.5–10.1)
CHLORIDE SERPL-SCNC: 105 MMOL/L (ref 97–108)
CO2 SERPL-SCNC: 27 MMOL/L (ref 21–32)
CREAT SERPL-MCNC: 1.89 MG/DL (ref 0.55–1.02)
ERYTHROCYTE [DISTWIDTH] IN BLOOD BY AUTOMATED COUNT: 12.4 % (ref 11.5–14.5)
GLOBULIN SER CALC-MCNC: 4.3 G/DL (ref 2–4)
GLUCOSE BLD STRIP.AUTO-MCNC: 216 MG/DL (ref 65–100)
GLUCOSE BLD STRIP.AUTO-MCNC: 232 MG/DL (ref 65–100)
GLUCOSE BLD STRIP.AUTO-MCNC: 236 MG/DL (ref 65–100)
GLUCOSE BLD STRIP.AUTO-MCNC: 283 MG/DL (ref 65–100)
GLUCOSE SERPL-MCNC: 250 MG/DL (ref 65–100)
HCT VFR BLD AUTO: 37.5 % (ref 35–47)
HGB BLD-MCNC: 12 G/DL (ref 11.5–16)
MCH RBC QN AUTO: 32.5 PG (ref 26–34)
MCHC RBC AUTO-ENTMCNC: 32 G/DL (ref 30–36.5)
MCV RBC AUTO: 101.6 FL (ref 80–99)
NRBC # BLD: 0 K/UL (ref 0–0.01)
NRBC BLD-RTO: 0 PER 100 WBC
PLATELET # BLD AUTO: 140 K/UL (ref 150–400)
PMV BLD AUTO: 11.5 FL (ref 8.9–12.9)
POTASSIUM SERPL-SCNC: 5.6 MMOL/L (ref 3.5–5.1)
PROT SERPL-MCNC: 7 G/DL (ref 6.4–8.2)
RBC # BLD AUTO: 3.69 M/UL (ref 3.8–5.2)
SERVICE CMNT-IMP: ABNORMAL
SODIUM SERPL-SCNC: 137 MMOL/L (ref 136–145)
WBC # BLD AUTO: 6 K/UL (ref 3.6–11)

## 2021-02-02 PROCEDURE — 80053 COMPREHEN METABOLIC PANEL: CPT

## 2021-02-02 PROCEDURE — 36415 COLL VENOUS BLD VENIPUNCTURE: CPT

## 2021-02-02 PROCEDURE — 97161 PT EVAL LOW COMPLEX 20 MIN: CPT

## 2021-02-02 PROCEDURE — 70450 CT HEAD/BRAIN W/O DYE: CPT

## 2021-02-02 PROCEDURE — 97530 THERAPEUTIC ACTIVITIES: CPT

## 2021-02-02 PROCEDURE — 74011000250 HC RX REV CODE- 250: Performed by: ORTHOPAEDIC SURGERY

## 2021-02-02 PROCEDURE — 82962 GLUCOSE BLOOD TEST: CPT

## 2021-02-02 PROCEDURE — 74011636637 HC RX REV CODE- 636/637: Performed by: ORTHOPAEDIC SURGERY

## 2021-02-02 PROCEDURE — 74011250637 HC RX REV CODE- 250/637: Performed by: ORTHOPAEDIC SURGERY

## 2021-02-02 PROCEDURE — 85027 COMPLETE CBC AUTOMATED: CPT

## 2021-02-02 PROCEDURE — 74011250636 HC RX REV CODE- 250/636: Performed by: ORTHOPAEDIC SURGERY

## 2021-02-02 PROCEDURE — 65660000000 HC RM CCU STEPDOWN

## 2021-02-02 PROCEDURE — 74011636637 HC RX REV CODE- 636/637: Performed by: INTERNAL MEDICINE

## 2021-02-02 RX ORDER — INSULIN GLARGINE 100 [IU]/ML
18 INJECTION, SOLUTION SUBCUTANEOUS
Status: DISCONTINUED | OUTPATIENT
Start: 2021-02-02 | End: 2021-02-09 | Stop reason: HOSPADM

## 2021-02-02 RX ADMIN — INSULIN GLARGINE 18 UNITS: 100 INJECTION, SOLUTION SUBCUTANEOUS at 22:44

## 2021-02-02 RX ADMIN — AMLODIPINE BESYLATE 5 MG: 5 TABLET ORAL at 09:41

## 2021-02-02 RX ADMIN — CEFAZOLIN SODIUM 2 G: 1 INJECTION, POWDER, FOR SOLUTION INTRAMUSCULAR; INTRAVENOUS at 09:41

## 2021-02-02 RX ADMIN — LEVOTHYROXINE SODIUM 150 MCG: 0.15 TABLET ORAL at 06:35

## 2021-02-02 RX ADMIN — INSULIN LISPRO 5 UNITS: 100 INJECTION, SOLUTION INTRAVENOUS; SUBCUTANEOUS at 07:07

## 2021-02-02 RX ADMIN — GLYCOPYRROLATE AND FORMOTEROL FUMARATE 2 PUFF: 9; 4.8 AEROSOL, METERED RESPIRATORY (INHALATION) at 09:54

## 2021-02-02 RX ADMIN — SODIUM CHLORIDE 50 ML/HR: 9 INJECTION, SOLUTION INTRAVENOUS at 20:50

## 2021-02-02 RX ADMIN — PANTOPRAZOLE SODIUM 40 MG: 40 TABLET, DELAYED RELEASE ORAL at 06:35

## 2021-02-02 RX ADMIN — DORZOLAMIDE HYDROCHLORIDE 1 DROP: 20 SOLUTION/ DROPS OPHTHALMIC at 09:54

## 2021-02-02 RX ADMIN — GLYCOPYRROLATE AND FORMOTEROL FUMARATE 2 PUFF: 9; 4.8 AEROSOL, METERED RESPIRATORY (INHALATION) at 22:38

## 2021-02-02 RX ADMIN — TIMOLOL MALEATE 1 DROP: 5 SOLUTION/ DROPS OPHTHALMIC at 09:54

## 2021-02-02 RX ADMIN — Medication 10 ML: at 22:45

## 2021-02-02 RX ADMIN — DORZOLAMIDE HYDROCHLORIDE 1 DROP: 20 SOLUTION/ DROPS OPHTHALMIC at 17:45

## 2021-02-02 RX ADMIN — PRAVASTATIN SODIUM 40 MG: 40 TABLET ORAL at 22:44

## 2021-02-02 RX ADMIN — METOPROLOL TARTRATE 50 MG: 50 TABLET, FILM COATED ORAL at 09:41

## 2021-02-02 RX ADMIN — Medication 10 ML: at 13:35

## 2021-02-02 RX ADMIN — INSULIN LISPRO 3 UNITS: 100 INJECTION, SOLUTION INTRAVENOUS; SUBCUTANEOUS at 13:34

## 2021-02-02 RX ADMIN — HYDROCODONE BITARTRATE AND ACETAMINOPHEN 1 TABLET: 5; 325 TABLET ORAL at 06:35

## 2021-02-02 RX ADMIN — Medication 10 ML: at 06:36

## 2021-02-02 RX ADMIN — PRAVASTATIN SODIUM 40 MG: 40 TABLET ORAL at 01:10

## 2021-02-02 RX ADMIN — TIMOLOL MALEATE 1 DROP: 5 SOLUTION/ DROPS OPHTHALMIC at 17:45

## 2021-02-02 RX ADMIN — FERROUS SULFATE TAB 325 MG (65 MG ELEMENTAL FE) 325 MG: 325 (65 FE) TAB at 09:41

## 2021-02-02 RX ADMIN — GABAPENTIN 800 MG: 400 CAPSULE ORAL at 22:44

## 2021-02-02 RX ADMIN — INSULIN LISPRO 3 UNITS: 100 INJECTION, SOLUTION INTRAVENOUS; SUBCUTANEOUS at 17:37

## 2021-02-02 RX ADMIN — METOPROLOL TARTRATE 50 MG: 50 TABLET, FILM COATED ORAL at 17:41

## 2021-02-02 RX ADMIN — CEFAZOLIN SODIUM 2 G: 1 INJECTION, POWDER, FOR SOLUTION INTRAMUSCULAR; INTRAVENOUS at 01:10

## 2021-02-02 RX ADMIN — INSULIN LISPRO 2 UNITS: 100 INJECTION, SOLUTION INTRAVENOUS; SUBCUTANEOUS at 22:44

## 2021-02-02 RX ADMIN — ESCITALOPRAM OXALATE 20 MG: 10 TABLET ORAL at 09:40

## 2021-02-02 RX ADMIN — ENOXAPARIN SODIUM 30 MG: 30 INJECTION SUBCUTANEOUS at 01:09

## 2021-02-02 NOTE — PROGRESS NOTES
Physical Therapy - defer  Orders received, chart reviewed, RN cleared for activity though notes pt recently medicated for pain. Arrived to patient long sitting in bed, eyes closed. Pt lethargic and falling asleep during interview. Session aborted. Will check back later today for evaluation.

## 2021-02-02 NOTE — PERIOP NOTES
TRANSFER - OUT REPORT:    Verbal report given to Lisa(name) on Brodie Zavala  being transferred to [facility](unit) for routine post - op       Report consisted of patients Situation, Background, Assessment and   Recommendations(SBAR). Time Pre op antibiotic given:1700  Anesthesia Stop time: 1802    Information from the following report(s) SBAR, OR Summary, Intake/Output, MAR, Recent Results and Cardiac Rhythm NSR. was reviewed with the receiving nurse. Opportunity for questions and clarification was provided. Is the patient on 02? YES       L/Min 2    Is the patient on a monitor? YES    Is the nurse transporting with the patient? YES    Surgical Waiting Area notified of patient's transfer from PACU? NO      The following personal items collected during your admission accompanied patient upon transfer:   Dental Appliance: Dental Appliances: (sent to pacu)  Vision:    Hearing Aid:    Jewelry: Jewelry: None  Clothing: Clothing: None  Other Valuables:  Other Valuables: None  Valuables sent to safe:

## 2021-02-02 NOTE — PROGRESS NOTES
0745 Bedside and Verbal shift change report given to Ana Gonzalez RN (oncoming nurse) by Anne Porter RN (offgoing nurse). Report included the following information SBAR, Kardex, Intake/Output, MAR, Recent Results and Med Rec Status. 1545 Pt continues to be lethargic. MD notified. 65 Dr. Ayde Houser for bipap r/t ABGs. RT called and informed. 1800 Pt transported to CT with RN and tele. 1955 Bedside and Verbal shift change report given to John Emerson RN (oncoming nurse) by Ana Gonzalez RN (offgoing nurse). Report included the following information SBAR, Kardex, Intake/Output, MAR, Recent Results and Med Rec Status. Problem: Pressure Injury - Risk of  Goal: *Prevention of pressure injury  Description: Document Rehan Scale and appropriate interventions in the flowsheet. Outcome: Progressing Towards Goal  Note: Pressure Injury Interventions:  Sensory Interventions: Assess changes in LOC, Assess need for specialty bed, Chair cushion, Check visual cues for pain, Float heels, Keep linens dry and wrinkle-free, Maintain/enhance activity level, Minimize linen layers, Pad between skin to skin, Pressure redistribution bed/mattress (bed type), Turn and reposition approx. every two hours (pillows and wedges if needed)    Moisture Interventions: Absorbent underpads, Apply protective barrier, creams and emollients, Check for incontinence Q2 hours and as needed, Minimize layers    Activity Interventions: Chair cushion, Increase time out of bed, Pressure redistribution bed/mattress(bed type), PT/OT evaluation    Mobility Interventions: Chair cushion, Float heels, HOB 30 degrees or less, Pressure redistribution bed/mattress (bed type), PT/OT evaluation, Turn and reposition approx.  every two hours(pillow and wedges)    Nutrition Interventions: Document food/fluid/supplement intake    Friction and Shear Interventions: Apply protective barrier, creams and emollients, Lift sheet, Minimize layers                Problem: Patient Education: Go to Patient Education Activity  Goal: Patient/Family Education  Outcome: Progressing Towards Goal     Problem: Falls - Risk of  Goal: *Absence of Falls  Description: Document Charles Reason Fall Risk and appropriate interventions in the flowsheet.   Outcome: Progressing Towards Goal  Note: Fall Risk Interventions:  Mobility Interventions: Communicate number of staff needed for ambulation/transfer, OT consult for ADLs, Patient to call before getting OOB, PT Consult for mobility concerns, Strengthening exercises (ROM-active/passive), Utilize gait belt for transfers/ambulation    Mentation Interventions: Adequate sleep, hydration, pain control, Door open when patient unattended, Increase mobility, More frequent rounding, Reorient patient, Room close to nurse's station, Toileting rounds, Update white board    Medication Interventions: Evaluate medications/consider consulting pharmacy, Patient to call before getting OOB, Teach patient to arise slowly, Utilize gait belt for transfers/ambulation    Elimination Interventions: Call light in reach, Patient to call for help with toileting needs, Stay With Me (per policy), Toilet paper/wipes in reach, Toileting schedule/hourly rounds    History of Falls Interventions: Consult care management for discharge planning, Evaluate medications/consider consulting pharmacy, Investigate reason for fall, Room close to nurse's station, Utilize gait belt for transfer/ambulation         Problem: Patient Education: Go to Patient Education Activity  Goal: Patient/Family Education  Outcome: Progressing Towards Goal

## 2021-02-02 NOTE — PERIOP NOTES
VS stable. Awake. Resting comfortably. Patient denies nausea. No signs of excessive bleeding. Ready to transfer from PACU.

## 2021-02-02 NOTE — PROGRESS NOTES
Transitions of Care Plan:   RUR: 20%   S/P ORIF RT Ankle Surgery day 1; PT consulted for eval & recs   Baseline - independent with cane for ambulation; resides with roommate   Disposition - anticipate home vs home health; roommate to transport    Reason for Admission:   Ankle Fracture                  RUR Score:     20%             PCP: First and Last name:  Allie Santiago MD   Name of Practice: [facility]   Are you a current patient: Yes/No:    Approximate date of last visit:    Can you participate in a virtual visit if needed: No    Do you (patient/family) have any concerns for transition/discharge? Ambulating up 12 steps to patient's apartment s/p rt ankle surgery and NWB status. Plan for utilizing home health:   Pending therapy evaluation and recommendation    Current Advanced Directive/Advance Care Plan:  No            Transition of Care Plan:          CM spoke with patient's sister re initial baseline assessment and discharge plan. Patient currently resting in bed s/p surgery and requests CM to speak with family. Baseline assessment:  1)  ADLs, self-care, orientation - independent with cane for ambulation; independent with self-care; AOx4  2) Social - resides at address on file with roommate, Saratha Or B.; 12 steps to get into patient's apartment; concerns about patient's ability to navigate steps after surgery  3) PCP and insurance - confirmed  4) [address]  5) Home Health - patient has previous experience; agency is unknown    Disposition:  Home vs [address]    CM will continue to follow. Medicare pt has received and reviewed 1st IM letter informing them of their right to appeal the discharge. Signed copied has been placed on pt bedside chart. Mahesh Correia, MPH    Care Management Interventions  PCP Verified by CM: Yes  Palliative Care Criteria Met (RRAT>21 & CHF Dx)?: No  Mode of Transport at Discharge:  Other (see comment)(Roommate, private car)  Transition of Care Consult (CM Consult): Discharge Planning  MyChart Signup: Yes  Discharge Durable Medical Equipment: No  Health Maintenance Reviewed: Yes  Physical Therapy Consult: Yes  Occupational Therapy Consult: No  Speech Therapy Consult: No  Current Support Network: Own Home(Resides with roommate)  Confirm Follow Up Transport: Friends  Discharge Location  Discharge Placement: Home with family assistance

## 2021-02-02 NOTE — OP NOTES
[facility]  OPERATIVE REPORT    Name:  Juan Kathleen  MR#:  [id number]  :  1950  ACCOUNT #:  [de-identified]  DATE OF SERVICE:  2021      PREOPERATIVE DIAGNOSES:  1. Right trimalleolar ankle fracture. 2.  Right ankle pain. POSTOPERATIVE DIAGNOSES:  1. Right trimalleolar ankle fracture. 2.  Right ankle pain. 3.  Right ankle syndesmotic disruption. PROCEDURE PERFORMED:  1. Open reduction internal fixation Right trimal ankle fracture  2. Open reduction internal fixation Right ankle syndesmotic disruption. SURGEON:  Richard Cortes MD    ANESTHESIA:  General.    COMPLICATIONS:  None. SPECIMENS REMOVED:  none. IMPLANTS:  Synthes plates and screws. ESTIMATED BLOOD LOSS:  Minimal.    TOURNIQUET TIME:  None. DISPOSITION:  The patient was taken to the recovery room in stable condition. OPERATIVE INDICATIONS:  The patient is a 60-year-old female with multiple medical problems who presented to [facility] after a fall and was found to have a trimalleolar type ankle fracture on the right side. She was admitted to the hospitalist service and ultimately cleared for surgery. I met with the patient and recommended operative fixation for her injury. Informed consent was obtained including all risks and benefits and she wished to proceed. OPERATIVE PROCEDURE:  The patient was identified in the preoperative holding area. The right lower extremity was marked to the patient. All preoperative questions were answered. She was seen by Anesthesia Department, taken to the operating room, transferred to the operating table in supine position. Once appropriate anesthesia was obtained, a tourniquet was placed on the right thigh but was never inflated. The right leg was prepped and draped in usual sterile fashion. Time-out was conducted indicating correct operative site. The patient received IV Ancef and cephalosporin antibiotics prior to incision being made. Next, fluoroscopy was brought in. Images were taken throughout the case. An image was taken when stressed showed significant instability and displacement of her ankle mortise. I made an incision on the medial side, dissected down to the medial fracture, but this was quite comminuted and fragmented and really had very brittle bone throughout. I was able to curette this area, irrigate it, and then place a pointed reduction clamp to reduce it and place a K-wire across it. My attention was turned lateral where an incision was made over the fibula. She had a fairly high fibular fracture which again was really more just quite brittle bone fracture. I dissected down to the fibula, selected an 8-hole one-third tubular plate, placed across this to more span the fracture site. Placed several nonlocking screws proximal and several locking screws distal.  Once there was good fixation of the fibula, images were taken in all planes showing good restoration of the ankle mortise including the lateral plane showing a small posterior malleolar fracture which did not require extra fixation. My attention was turned back medial where I tried to place two 0.035 partially threaded cancellous screws across this, but again it was quite comminuted and really quite brittle bone. The K-wire and clamp were removed. I then stressed the ankle under Cotton test live fluoroscopy external rotation and there was still significant shifting of the ankle mortise. We then held the foot in a neutral position in terms of dorsiflexion, took a large pointed reduction clamp and clamped together the mortise for a good alignment and I decided to place a syndesmotic screw through the plate. A 4-hole syndesmotic screw was placed across to the medial side of the tibia. A 50 mm screw was placed which showed good fixation and purchase. The clamp was removed. The ankle was again stressed and was quite stable at this point.   We felt quite good with the fixation that we had and the alignment of the ankle mortise at this point.  Once final images were taken, all incisions were irrigated with saline, closed with Vicryl suture deep and subcuticular layer, nylon sutures in the skin.  Sterile dressings were placed.  Bulky Merida splint was placed.  The patient was awoken and taken to recovery room in stable condition.      MD ANTONIO [id number]  D:  02/01/2021 17:54  T:  02/01/2021 19:22  JOB #:  [id number]

## 2021-02-02 NOTE — PROGRESS NOTES
0730: Bedside shift change report given to [address] [place] [address] Line Alvaro LOCO (oncoming nurse) by Chacorta Brown (offgoing nurse). Report included the following information SBAR, Kardex, Procedure Summary, Intake/Output, MAR, Recent Results, Med Rec Status and Cardiac Rhythm   .

## 2021-02-02 NOTE — PROGRESS NOTES
Hospitalist Progress Note  Anna Olivo MD  Answering service: [phone] from in house phone      Date of Service:  2021  NAME:  Fabiana Segal  :    MRN:  [id number]      Admission Summary:   Fabiana Segal is a 79 y.o. female with hx CAD s/p CABG, COPD, DM II with gastroparesis, neuropathy, and legally blind, GERD, HTN, dyslipidemia, JOHN on Cpap, and hypothyroidism who presents with trimalleolar fracture s/p mechanical fall sustained while trying to climb stairs. Her roommate noted gross deformity, and called EMS who transported her to ED giving her fentanyl 50mcg en route. She was markedly sedated upon arrival, and fracture was successfully reduced and splinted in the ED. She denies fever, chills, upper respiratory symptoms     In the ED, she was bradycardic to 54, and intially requried 4Lnc 2/2 sedation from fentanyl given by EMS. XR tib/fib showed fracture of medial & posterior malleolus, and distal fibula. CT head + for new bilateral chronic cerebellar infarcts. CT C-spine with moderate C1/C2 stenosis, but no acute fracture or dislocation. Interval history / Subjective:      Patient seen and examined this afternoon. Patient still very sleepy/drowsy. She says some random words and follow simple command like opening her eye, squeeze my hands but then falls back to sleep.       Assessment & Plan:     # Acute metabolic encephalopathy  - likely from medication induced( anesthesia vs norco) vs hypercarbia   - repeat head CT r/o bleed  - keep on biPAP    # Right Trimalleolar ankle Fracture s/p mechanical fall  - s/p reduction in the ED, neurovascularly intact  - s/p ORIF 1/2  - orthopedic surgery following   - tylenol, and Norco for mild>mod/severe pain    # CAD s/p CABG  - revised cardiac risk index 4, 15% 30-day mortality from cardiac cause  - currently without sx or signs of angina, HF, or arrhythmia  - hx slow to wake from anesthesia  - continue metoprolol, lisinopril, and statin  - Pre-surgery cardiopulmonary risk assessment moderate. Echo with EF 55-60%     # Acute hypoxia with respiratory acidosis   - likely from medication induced( narcotic) and untreated sleep apnea      # HTN  - continue metoprolol and lisinopril     # COPD  - spo2 94-96% on RA  - former smoker, currently vapes  - continue inhaled LABA/anticholinergic inhaler      # Stage IV CKD  - creatinine 1.98 (stable from 5/2020)  - avoid neprotoxic drugs     # Diabetes type 2 with hyperglycemia   - HgbA1c 7.3% 9/2020  - has insulin pump, turned this off in the ED, and she will use subQ insulin while IP  - Lantus + ISS     # Hypothyroidism  - continue home levothyroxine     # JOHN on CPAP  - c-pap while in hospital     DVT ppx: SCD  MPOA: Leigh Rios (friend) [phone]  Code: Full     FUNCTIONAL STATUS PRIOR TO HOSPITALIZATION Ambulatory with Use of Assistive Devices (including history of recent falls): Jose Martin Wyatt prior to current admission     Hospital Problems  Date Reviewed: 9/14/2020          Codes Class Noted POA    Ankle fracture ICD-10-CM: S82.899A  ICD-9-CM: 824.8  1/31/2021 Unknown                Review of Systems:   Pertinent positive mentioned in interval hx/HPI. Other systems reviewed and negative     Vital Signs:    Last 24hrs VS reviewed since prior progress note. Most recent are:  Visit Vitals  BP (!) 131/48 (BP 1 Location: Right upper arm, BP Patient Position: At rest)   Pulse 64   Temp 98.7 °F (37.1 °C)   Resp 18   Ht 5' 4\" (1.626 m)   Wt 111.7 kg (246 lb 4.1 oz)   SpO2 98%   BMI 42.27 kg/m²         Intake/Output Summary (Last 24 hours) at 2/2/2021 1712  Last data filed at 2/2/2021 1115  Gross per 24 hour   Intake 1338.33 ml   Output 10 ml   Net 1328.33 ml        Physical Examination:             Constitutional:  No acute distress, sedated    ENT:  Oral mucous moist,    Resp:  CTA bilaterally. No wheezing/rhonchi/rales.  No accessory muscle use   CV:  Regular rhythm, normal rate, no murmurs, gallops, rubs    GI:  Soft, non distended, non tender. normoactive bowel sounds, no hepatosplenomegaly     Musculoskeletal:  Right leg rapped in a bandage, good pulse and sensation      Neurologic:  Moves all extremities. Data Review:      I personally reviewed labs and imaging     Labs:     Recent Labs     02/02/21  0340 02/01/21  0252   WBC 6.0 6.3   HGB 12.0 12.0   HCT 37.5 38.4   * 154     Recent Labs     02/02/21  0340 02/01/21  0252 01/31/21  0604    136 138   K 5.6* 5.7* 5.2*    107 111*   CO2 27 26 24   BUN 31* 31* 23*   CREA 1.89* 2.19* 1.89*   * 235* 177*   CA 9.3 8.9 8.9   MG  --  1.4* 1.7     Recent Labs     02/02/21  0340   ALT 9*   AP 79   TBILI 0.3   TP 7.0   ALB 2.7*   GLOB 4.3*     Recent Labs     01/31/21  0604   INR 1.1   PTP 11.1   APTT 24.2      No results for input(s): FE, TIBC, PSAT, FERR in the last 72 hours. No results found for: FOL, RBCF   No results for input(s): PH, PCO2, PO2 in the last 72 hours. No results for input(s): CPK, CKNDX, TROIQ in the last 72 hours.     No lab exists for component: CPKMB  Lab Results   Component Value Date/Time    Cholesterol, total 189 05/15/2020 01:46 PM    HDL Cholesterol 44 05/15/2020 01:46 PM    LDL, calculated 74 05/15/2020 01:46 PM    Triglyceride 356 (H) 05/15/2020 01:46 PM    CHOL/HDL Ratio 3.0 09/27/2017 04:12 AM     Lab Results   Component Value Date/Time    Glucose (POC) 236 (H) 02/02/2021 04:56 PM    Glucose (POC) 232 (H) 02/02/2021 11:30 AM    Glucose (POC) 283 (H) 02/02/2021 06:58 AM    Glucose (POC) 183 (H) 02/01/2021 06:21 PM    Glucose (POC) 163 (H) 02/01/2021 03:45 PM     Lab Results   Component Value Date/Time    Color YELLOW/STRAW 01/07/2019 08:31 AM    Appearance CLEAR 01/07/2019 08:31 AM    Specific gravity 1.010 01/07/2019 08:31 AM    pH (UA) 6.0 01/07/2019 08:31 AM    Protein 100 (A) 01/07/2019 08:31 AM    Glucose NEGATIVE  01/07/2019 08:31 AM Ketone NEGATIVE  01/07/2019 08:31 AM    Bilirubin NEGATIVE  01/07/2019 08:31 AM    Urobilinogen 0.2 01/07/2019 08:31 AM    Nitrites NEGATIVE  01/07/2019 08:31 AM    Leukocyte Esterase SMALL (A) 01/07/2019 08:31 AM    Epithelial cells FEW 01/07/2019 08:31 AM    Bacteria NEGATIVE  01/07/2019 08:31 AM    WBC 0-4 01/07/2019 08:31 AM    RBC 0-5 01/07/2019 08:31 AM         Medications Reviewed:     Current Facility-Administered Medications   Medication Dose Route Frequency    dextrose (D50W) injection syrg 12.5-25 g  12.5-25 g IntraVENous PRN    dorzolamide (TRUSOPT) 2 % ophthalmic solution 1 Drop  1 Drop Both Eyes TID    And    timolol (TIMOPTIC) 0.5 % ophthalmic solution 1 Drop  1 Drop Both Eyes BID    insulin glargine (LANTUS) injection 14 Units  14 Units SubCUTAneous QHS    HYDROcodone-acetaminophen (NORCO) 7.5-325 mg per tablet 1 Tab  1 Tab Oral Q6H PRN    sodium chloride (NS) flush 5-40 mL  5-40 mL IntraVENous Q8H    sodium chloride (NS) flush 5-40 mL  5-40 mL IntraVENous PRN    0.9% sodium chloride infusion  50 mL/hr IntraVENous CONTINUOUS    naloxone (NARCAN) injection 0.4 mg  0.4 mg IntraVENous PRN    HYDROcodone-acetaminophen (NORCO) 5-325 mg per tablet 1 Tab  1 Tab Oral Q4H PRN    ondansetron (ZOFRAN) injection 4 mg  4 mg IntraVENous Q4H PRN    enoxaparin (LOVENOX) injection 30 mg  30 mg SubCUTAneous Q24H    sodium chloride (NS) flush 5-40 mL  5-40 mL IntraVENous Q8H    sodium chloride (NS) flush 5-40 mL  5-40 mL IntraVENous PRN    acetaminophen (TYLENOL) tablet 650 mg  650 mg Oral Q6H PRN    Or    acetaminophen (TYLENOL) suppository 650 mg  650 mg Rectal Q6H PRN    polyethylene glycol (MIRALAX) packet 17 g  17 g Oral DAILY PRN    promethazine (PHENERGAN) tablet 12.5 mg  12.5 mg Oral Q6H PRN    Or    ondansetron (ZOFRAN) injection 4 mg  4 mg IntraVENous Q6H PRN    levothyroxine (SYNTHROID) tablet 150 mcg  150 mcg Oral ACB    pravastatin (PRAVACHOL) tablet 40 mg  40 mg Oral QHS    amLODIPine (NORVASC) tablet 5 mg  5 mg Oral DAILY    gabapentin (NEURONTIN) capsule 800 mg  800 mg Oral TID    metoprolol tartrate (LOPRESSOR) tablet 50 mg  50 mg Oral BID    pantoprazole (PROTONIX) tablet 40 mg  40 mg Oral ACB    ferrous sulfate tablet 325 mg  325 mg Oral DAILY    escitalopram oxalate (LEXAPRO) tablet 20 mg  20 mg Oral DAILY    glucose chewable tablet 16 g  4 Tab Oral PRN    dextrose (D50W) injection syrg 12.5-25 g  25-50 mL IntraVENous PRN    glucagon (GLUCAGEN) injection 1 mg  1 mg IntraMUSCular PRN    insulin lispro (HUMALOG) injection   SubCUTAneous AC&HS    glycopyrrolate-formoterol (BEVESPI AEROSPHERE) 9 mcg-4.8 mcg inhaler  2 Puff Inhalation BID RT     ______________________________________________________________________  EXPECTED LENGTH OF STAY: 4d 0h  ACTUAL LENGTH OF STAY:          2                 Serena Callahan MD   Patient has given Verbal permission to discuss medical care with   persons present in the room and and also with contact as listed on face sheet. Please note that this dictation was completed with [employer], the computer voice recognition software. Quite often unanticipated grammatical, syntax, homophones, and other interpretive errors are inadvertently transcribed by the computer software. Please disregard these errors. Please excuse any errors that have escaped final proofreading. Thank you.

## 2021-02-02 NOTE — PROGRESS NOTES
Problem: Mobility Impaired (Adult and Pediatric)  Goal: *Acute Goals and Plan of Care (Insert Text)  Description: FUNCTIONAL STATUS PRIOR TO ADMISSION:   Patient is a poor historian today d/t lethargy. Per chart review, patient was modified independent with self care and ambulating with a cane. She fell walking up the steps to enter her home. Patient did voice that she is legally blind. She was not able to clarify if able to see shapes. HOME SUPPORT PRIOR TO ADMISSION: The patient lives with a roommate Mata Colon and required no assistance. Physical Therapy Goals  Initiated 2/2/2021  1. Patient will move from supine to sit and sit to supine , scoot up and down, and roll side to side in bed with minimum assistance within 7 day(s). 2.  Patient will transfer from bed to chair and chair to bed with minimal assistance/contact guard assist using the least restrictive device within 7 day(s). 3.  Patient will perform sit to stand with minimal assistance/contact guard assist within 7 day(s). 4.  Patient will ambulate with minimal assistance/contact guard assist for 25 feet with the least restrictive device within 7 day(s). Outcome: Not Met   PHYSICAL THERAPY EVALUATION  Patient: Cuong Flower (16 y.o. female)  Date: 2/2/2021  Primary Diagnosis: Ankle fracture [S82.899A]  Procedure(s) (LRB):  RIGHT ANKLE OPEN REDUCTION INTERNAL FIXATION (Right) 1 Day Post-Op   Precautions:  Fall, RLE NWB    ASSESSMENT  Based on the objective data described below, the patient presents today minimally participating in therapy evaluation due to lethargy, sleepiness & RLE pain. Patient would at times, respond to commands and questions asked though unable to remain awake to complete tasks or fully answer questions. Patient endorses having a visual impairment (legally blind) and walking with a cane (? SPC vs white cane for the visually impaired).   Chart review indicates she was independent with self care and ambulation with her cane. She fell walking up the steps to enter her apartment, s/p right ankle ORIF and now RLE NWB. Given the level of assist required today, pain with passive movement of her RLE, apparent weakness, RLE NWB status and steps to enter the home, patient will likely require inpatient rehab (IPR vs SNF pending participation and progress w/ acute therapy) before returning home. Recommend OT consult as patient was reported to be independent with self care prior to admission. Current Level of Function Impacting Discharge (mobility/balance): Total assist    Functional Outcome Measure: The patient scored 0/100 on the Barthel Index outcome measure which is indicative of 100% functional impairment. Other factors to consider for discharge: RLE NWB, steps to enter the home, PLOF reported to be independent     Patient will benefit from skilled therapy intervention to address the above noted impairments. PLAN :  Recommendations and Planned Interventions: bed mobility training, transfer training, gait training, therapeutic exercises, edema management/control, patient and family training/education and therapeutic activities      Frequency/Duration: Patient will be followed by physical therapy:  daily to address goals. Recommendation for discharge: (in order for the patient to meet his/her long term goals)  To be determined: Anticipate patient will require rehab at an inpatient facility. (IPR vs SNF pending participation and progress with acute therapy)    This discharge recommendation:  Has not yet been discussed the attending provider and/or case management    IF patient discharges home will need the following DME: to be determined (TBD) pending progress and d/c disp         SUBJECTIVE:   Patient stated I can't stay awake.     OBJECTIVE DATA SUMMARY:   HISTORY:    Past Medical History:   Diagnosis Date    Adverse effect of anesthesia     SLOW WAKING PAST ANESTHESIA    Anxiety     Arthritis     CAD (coronary artery disease)     s/p CABG x 3v    Chest pain 7/2015    Chronic obstructive pulmonary disease (HCC)     CTS (carpal tunnel syndrome)     S/p bilateral release    Diabetes ([address]     Diabetic gastroparesis ([address]     Diabetic neuropathy ([address]     Diabetic retinopathy ([address]     GERD (gastroesophageal reflux disease)     GI bleed 7/2015    4 bleeds with 9 clips placed    Hypercholesteremia     Hypertension     Hypothyroid     Ill-defined condition     NEUROPATHY HANDS AND FEET    Ill-defined condition 2017    GI BLEED    Nicotine vapor product user     JOHN on CPAP     Osteoporosis     Vitamin D deficiency      Past Surgical History:   Procedure Laterality Date    HX CERVICAL FUSION  2011    HX ENDOSCOPY  7/2015    HX GI      HX HEART CATHETERIZATION  7/2015    HX LUMBAR FUSION  2017    HX LUMBAR LAMINECTOMY  2011    HX ORTHOPAEDIC Right 1970    CARPAL TUNNEL    HX ORTHOPAEDIC Left 2003    CARPAL TUNNEL    HX ROTATOR CUFF REPAIR Right 2003    HX SHOULDER ARTHROSCOPY Right     HX TONSIL AND ADENOIDECTOMY  1968    HX TONSILLECTOMY      AR CABG, ARTERY-VEIN, THREE  7/13/2015       Personal factors and/or comorbidities impacting plan of care: PMH, visually impaired    Home Situation  Home Environment: Apartment w/ 12 steps to enter. Lives with roommate Theodora Matta.     DME: cane       EXAMINATION/PRESENTATION/DECISION MAKING:   Critical Behavior:  Neurologic State: Lethargic, Unable to remain awake, Eyes open spontaneously  Orientation Level: Oriented to person, Disoriented to time, Disoriented to situation, Disoriented to place  Cognition: Decreased attention/concentration, Decreased command following, Memory loss, Impaired decision making, poor historian     Hearing:  Appears intact  Skin:  RLE splint/ dressing intact, clean, dry    Range Of Motion:  AROM:   LE's: Grossly decreased, non-functional though question effort based on lethargy and decreased ability to participate today. UE's:  Decreased, functional.  SANIYA appeared somewhat ataxic when attempting to touch her face on request and limited to 90 degs shoulder flexion. Informed RN. Strength:    Strength:   LE's: Grossly decreased, non-functional  - unable to lift RLE or LLE off bed. UE's: Decreased, functional (demonstrates antigravity strength though unable to hold position due to falling asleep)                        Tone & Sensation:   Sensation: Impaired - RLE decreased to light touch  (toes and thigh) though pt falling asleep with assessment. Able to detect light touch in LLE and inconsistently in UE's (falling asleep)      Coordination:  Coordination: Generally decreased, functional  Vision:   Visually impaired. Pt reports she is legally blind. Functional Mobility:  Bed Mobility:  Supine to Sit: Total assistance(utilizing bed features for modified chair position)  Sit to Supine: Total assistance(using bed features )     Transfers:  Not appropriate to attempt due to lethargy/ decreased pt participation  Balance:   Sitting: Intact; With support(modified chair position in bed)  Ambulation/Gait Training:  Unable to attempt                                    Functional Measure:  Barthel Index:    Bathin  Bladder: 0  Bowels: 0  Groomin  Dressin  Feedin  Mobility: 0  Stairs: 0  Toilet Use: 0  Transfer (Bed to Chair and Back): 0  Total: 0/100       The Barthel ADL Index: Guidelines  1. The index should be used as a record of what a patient does, not as a record of what a patient could do. 2. The main aim is to establish degree of independence from any help, physical or verbal, however minor and for whatever reason. 3. The need for supervision renders the patient not independent. 4. A patient's performance should be established using the best available evidence.  Asking the patient, friends/relatives and nurses are the usual sources, but direct observation and common sense are also important. However direct testing is not needed. 5. Usually the patient's performance over the preceding 24-48 hours is important, but occasionally longer periods will be relevant. 6. Middle categories imply that the patient supplies over 50 per cent of the effort. 7. Use of aids to be independent is allowed. María Wesley., Barthel, D.W. (8911). Functional evaluation: the Barthel Index. [facility] (14)2. HAKAN Dunn, Marco Monae., Taylor Cuevas, [place], [address] (1999). Measuring the change indisability after inpatient rehabilitation; comparison of the responsiveness of the Barthel Index and Functional [facility] Measure. Journal of Neurology, Neurosurgery, and Psychiatry, 66(4), 120-835. TAMRA Kraus, ALICIA Bagley, & Kee Snow M.A. (2004.) Assessment of post-stroke quality of life in cost-effectiveness studies: The usefulness of the Barthel Index and the EuroQoL-5D. Quality of Life Research, 15, 068-08            Physical Therapy Evaluation Charge Determination   History Examination Presentation Decision-Making   MEDIUM  Complexity : 1-2 comorbidities / personal factors will impact the outcome/ POC  MEDIUM Complexity : 3 Standardized tests and measures addressing body structure, function, activity limitation and / or participation in recreation  MEDIUM Complexity : Evolving with changing characteristics  Other outcome measures Barthel Index 0/100  HIGH       Based on the above components, the patient evaluation is determined to be of the following complexity level: MEDIUM    Pain Rating:  RLE pain with movement, did not quantify    Activity Tolerance:   Poor; lethargic and sleepy; unable to keep eyes open    After treatment patient left in no apparent distress:   Supine in bed, Call bell within reach and Side rails x 3, cold pack rt ankle, LLE heel floated using pillows, pt unable to tolerate RLE movement for repositioning.       COMMUNICATION/EDUCATION:   The patients plan of care was discussed with: Registered nurse. Fall prevention education was provided and the patient/caregiver indicated understanding., Patient/family have participated as able in goal setting and plan of care. and Patient/family agree to work toward stated goals and plan of care.     Thank you for this referral.  Carlos Jean, PT   Time Calculation: 18 mins

## 2021-02-03 LAB
ALBUMIN SERPL-MCNC: 2.3 G/DL (ref 3.5–5)
ALBUMIN/GLOB SERPL: 0.5 {RATIO} (ref 1.1–2.2)
ALP SERPL-CCNC: 73 U/L (ref 45–117)
ALT SERPL-CCNC: 8 U/L (ref 12–78)
ANION GAP SERPL CALC-SCNC: 6 MMOL/L (ref 5–15)
AST SERPL-CCNC: 11 U/L (ref 15–37)
BILIRUB SERPL-MCNC: 0.3 MG/DL (ref 0.2–1)
BUN SERPL-MCNC: 30 MG/DL (ref 6–20)
BUN/CREAT SERPL: 17 (ref 12–20)
CALCIUM SERPL-MCNC: 9.4 MG/DL (ref 8.5–10.1)
CHLORIDE SERPL-SCNC: 107 MMOL/L (ref 97–108)
CO2 SERPL-SCNC: 25 MMOL/L (ref 21–32)
CREAT SERPL-MCNC: 1.75 MG/DL (ref 0.55–1.02)
ERYTHROCYTE [DISTWIDTH] IN BLOOD BY AUTOMATED COUNT: 12.6 % (ref 11.5–14.5)
GLOBULIN SER CALC-MCNC: 4.6 G/DL (ref 2–4)
GLUCOSE BLD STRIP.AUTO-MCNC: 207 MG/DL (ref 65–100)
GLUCOSE BLD STRIP.AUTO-MCNC: 227 MG/DL (ref 65–100)
GLUCOSE BLD STRIP.AUTO-MCNC: 237 MG/DL (ref 65–100)
GLUCOSE BLD STRIP.AUTO-MCNC: 261 MG/DL (ref 65–100)
GLUCOSE SERPL-MCNC: 220 MG/DL (ref 65–100)
HCT VFR BLD AUTO: 35.2 % (ref 35–47)
HGB BLD-MCNC: 11.1 G/DL (ref 11.5–16)
MAGNESIUM SERPL-MCNC: 1.5 MG/DL (ref 1.6–2.4)
MCH RBC QN AUTO: 32.6 PG (ref 26–34)
MCHC RBC AUTO-ENTMCNC: 31.5 G/DL (ref 30–36.5)
MCV RBC AUTO: 103.5 FL (ref 80–99)
NRBC # BLD: 0 K/UL (ref 0–0.01)
NRBC BLD-RTO: 0 PER 100 WBC
PLATELET # BLD AUTO: 133 K/UL (ref 150–400)
PMV BLD AUTO: 11.8 FL (ref 8.9–12.9)
POTASSIUM SERPL-SCNC: 5.4 MMOL/L (ref 3.5–5.1)
PROT SERPL-MCNC: 6.9 G/DL (ref 6.4–8.2)
RBC # BLD AUTO: 3.4 M/UL (ref 3.8–5.2)
SERVICE CMNT-IMP: ABNORMAL
SODIUM SERPL-SCNC: 138 MMOL/L (ref 136–145)
WBC # BLD AUTO: 6.3 K/UL (ref 3.6–11)

## 2021-02-03 PROCEDURE — 97530 THERAPEUTIC ACTIVITIES: CPT

## 2021-02-03 PROCEDURE — 74011000250 HC RX REV CODE- 250: Performed by: ORTHOPAEDIC SURGERY

## 2021-02-03 PROCEDURE — 94762 N-INVAS EAR/PLS OXIMTRY CONT: CPT

## 2021-02-03 PROCEDURE — 85027 COMPLETE CBC AUTOMATED: CPT

## 2021-02-03 PROCEDURE — 74011250637 HC RX REV CODE- 250/637: Performed by: ORTHOPAEDIC SURGERY

## 2021-02-03 PROCEDURE — 74011250636 HC RX REV CODE- 250/636: Performed by: INTERNAL MEDICINE

## 2021-02-03 PROCEDURE — 74011636637 HC RX REV CODE- 636/637: Performed by: INTERNAL MEDICINE

## 2021-02-03 PROCEDURE — 82962 GLUCOSE BLOOD TEST: CPT

## 2021-02-03 PROCEDURE — 83735 ASSAY OF MAGNESIUM: CPT

## 2021-02-03 PROCEDURE — 80053 COMPREHEN METABOLIC PANEL: CPT

## 2021-02-03 PROCEDURE — 77030038269 HC DRN EXT URIN PURWCK BARD -A

## 2021-02-03 PROCEDURE — 94660 CPAP INITIATION&MGMT: CPT

## 2021-02-03 PROCEDURE — 74011250636 HC RX REV CODE- 250/636: Performed by: ORTHOPAEDIC SURGERY

## 2021-02-03 PROCEDURE — 36415 COLL VENOUS BLD VENIPUNCTURE: CPT

## 2021-02-03 PROCEDURE — 97165 OT EVAL LOW COMPLEX 30 MIN: CPT

## 2021-02-03 PROCEDURE — 74011636637 HC RX REV CODE- 636/637: Performed by: ORTHOPAEDIC SURGERY

## 2021-02-03 PROCEDURE — 94664 DEMO&/EVAL PT USE INHALER: CPT

## 2021-02-03 PROCEDURE — 94640 AIRWAY INHALATION TREATMENT: CPT

## 2021-02-03 PROCEDURE — 65270000029 HC RM PRIVATE

## 2021-02-03 PROCEDURE — 94760 N-INVAS EAR/PLS OXIMETRY 1: CPT

## 2021-02-03 PROCEDURE — 77010033678 HC OXYGEN DAILY

## 2021-02-03 RX ORDER — SODIUM CHLORIDE 9 MG/ML
50 INJECTION, SOLUTION INTRAVENOUS CONTINUOUS
Status: DISPENSED | OUTPATIENT
Start: 2021-02-03 | End: 2021-02-05

## 2021-02-03 RX ADMIN — LEVOTHYROXINE SODIUM 150 MCG: 0.15 TABLET ORAL at 08:08

## 2021-02-03 RX ADMIN — GLYCOPYRROLATE AND FORMOTEROL FUMARATE 2 PUFF: 9; 4.8 AEROSOL, METERED RESPIRATORY (INHALATION) at 08:13

## 2021-02-03 RX ADMIN — TIMOLOL MALEATE 1 DROP: 5 SOLUTION/ DROPS OPHTHALMIC at 09:23

## 2021-02-03 RX ADMIN — TIMOLOL MALEATE 1 DROP: 5 SOLUTION/ DROPS OPHTHALMIC at 17:15

## 2021-02-03 RX ADMIN — Medication 10 ML: at 14:49

## 2021-02-03 RX ADMIN — DORZOLAMIDE HYDROCHLORIDE 1 DROP: 20 SOLUTION/ DROPS OPHTHALMIC at 22:21

## 2021-02-03 RX ADMIN — INSULIN LISPRO 5 UNITS: 100 INJECTION, SOLUTION INTRAVENOUS; SUBCUTANEOUS at 17:10

## 2021-02-03 RX ADMIN — DORZOLAMIDE HYDROCHLORIDE 1 DROP: 20 SOLUTION/ DROPS OPHTHALMIC at 09:23

## 2021-02-03 RX ADMIN — INSULIN GLARGINE 18 UNITS: 100 INJECTION, SOLUTION SUBCUTANEOUS at 23:00

## 2021-02-03 RX ADMIN — DORZOLAMIDE HYDROCHLORIDE 1 DROP: 20 SOLUTION/ DROPS OPHTHALMIC at 00:03

## 2021-02-03 RX ADMIN — GABAPENTIN 800 MG: 400 CAPSULE ORAL at 23:00

## 2021-02-03 RX ADMIN — INSULIN LISPRO 2 UNITS: 100 INJECTION, SOLUTION INTRAVENOUS; SUBCUTANEOUS at 23:19

## 2021-02-03 RX ADMIN — PRAVASTATIN SODIUM 40 MG: 40 TABLET ORAL at 23:00

## 2021-02-03 RX ADMIN — ENOXAPARIN SODIUM 30 MG: 30 INJECTION SUBCUTANEOUS at 23:20

## 2021-02-03 RX ADMIN — FERROUS SULFATE TAB 325 MG (65 MG ELEMENTAL FE) 325 MG: 325 (65 FE) TAB at 09:22

## 2021-02-03 RX ADMIN — INSULIN LISPRO 3 UNITS: 100 INJECTION, SOLUTION INTRAVENOUS; SUBCUTANEOUS at 11:19

## 2021-02-03 RX ADMIN — GABAPENTIN 800 MG: 400 CAPSULE ORAL at 15:50

## 2021-02-03 RX ADMIN — AMLODIPINE BESYLATE 5 MG: 5 TABLET ORAL at 09:22

## 2021-02-03 RX ADMIN — PANTOPRAZOLE SODIUM 40 MG: 40 TABLET, DELAYED RELEASE ORAL at 08:08

## 2021-02-03 RX ADMIN — SODIUM CHLORIDE 50 ML/HR: 900 INJECTION, SOLUTION INTRAVENOUS at 15:50

## 2021-02-03 RX ADMIN — GABAPENTIN 800 MG: 400 CAPSULE ORAL at 09:22

## 2021-02-03 RX ADMIN — INSULIN LISPRO 3 UNITS: 100 INJECTION, SOLUTION INTRAVENOUS; SUBCUTANEOUS at 08:08

## 2021-02-03 RX ADMIN — Medication 10 ML: at 06:31

## 2021-02-03 RX ADMIN — METOPROLOL TARTRATE 50 MG: 50 TABLET, FILM COATED ORAL at 09:22

## 2021-02-03 RX ADMIN — METOPROLOL TARTRATE 50 MG: 50 TABLET, FILM COATED ORAL at 17:43

## 2021-02-03 RX ADMIN — DORZOLAMIDE HYDROCHLORIDE 1 DROP: 20 SOLUTION/ DROPS OPHTHALMIC at 17:12

## 2021-02-03 RX ADMIN — ESCITALOPRAM OXALATE 20 MG: 10 TABLET ORAL at 09:22

## 2021-02-03 RX ADMIN — ENOXAPARIN SODIUM 30 MG: 30 INJECTION SUBCUTANEOUS at 00:03

## 2021-02-03 NOTE — PROGRESS NOTES
Problem: Mobility Impaired (Adult and Pediatric)  Goal: *Acute Goals and Plan of Care (Insert Text)  Description: FUNCTIONAL STATUS PRIOR TO ADMISSION: unknown    HOME SUPPORT PRIOR TO ADMISSION: The patient lives at [employer]. Support system unknown    Physical Therapy Goals  Initiated 2/2/2021  1. Patient will move from supine to sit and sit to supine , scoot up and down, and roll side to side in bed with minimum assistance within 7 day(s). 2.  Patient will transfer from bed to chair and chair to bed with minimal assistance/contact guard assist using the least restrictive device within 7 day(s). 3.  Patient will perform sit to stand with minimal assistance/contact guard assist within 7 day(s). 4.  Patient will ambulate with minimal assistance/contact guard assist for 50 feet with the least restrictive device within 7 day(s). Outcome: Progressing Towards Goal   PHYSICAL THERAPY TREATMENT  Patient: Cuong Flower (12 y.o. female)  Date: 2/3/2021  Diagnosis: Ankle fracture [S82.899A] <principal problem not specified>  Procedure(s) (LRB):  RIGHT ANKLE OPEN REDUCTION INTERNAL FIXATION (Right) 2 Days Post-Op  Precautions: NWB(RLE)  Chart, physical therapy assessment, plan of care and goals were reviewed. ASSESSMENT  Patient continues with skilled PT services and is slowly progressing towards goals. Patient remains very lethargic and drowsy during therapy session consistent with presentation when seen yesterday. Discussed with RN, concern for overmedication. Patient does exhibit confusion intermittently but was able to follow commands well when more alert, but arousal only lasted 20-30 seconds at a time. Poor sitting balance likely largely attributed to this lethargy and somnolence versus true trunk weakness as when patient more alert she was able to sit without support and and move across planes.   Further attempt at transfer or standing deferred due to level of arousal.  Cont to recommend rehab as patient well below baseline, lives in a 2nd flr apartment and is unable to safely d/c to home setting at this time. Current Level of Function Impacting Discharge (mobility/balance): MAX A x 2 for bed mob, up to MOD A to maintain sitting at baseline    Other factors to consider for discharge: lives in 2nd floor apt, indep at baseline         PLAN :  Patient continues to benefit from skilled intervention to address the above impairments. Continue treatment per established plan of care. to address goals. 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     OBJECTIVE DATA SUMMARY:   Critical Behavior:  Neurologic State: Drowsy  Orientation Level: Oriented to person, Oriented to place  Cognition: Follows commands, Decreased attention/concentration  Safety/Judgement: Fall prevention, Decreased insight into deficits  Functional Mobility Training:  Bed Mobility:     Supine to Sit: Maximum assistance;Assist x2  Sit to Supine: Maximum assistance;Assist x2           Transfers:                     Deferred              Balance:  Sitting: Impaired  Sitting - Static: Fair (occasional); Poor (constant support)  Sitting - Dynamic: Poor (constant support); Fair (occasional)            Pain Rating:  None reported but cried out occasionally with moving RLE    Activity Tolerance:   Poor    After treatment patient left in no apparent distress:   Call bell within reach and bed in modified chair position    COMMUNICATION/COLLABORATION:   The patients plan of care was discussed with: Occupational therapist and Registered nurse.      Satnam Rodriguez, PT   Time Calculation: 30 mins

## 2021-02-03 NOTE — PROGRESS NOTES
Problem: Self Care Deficits Care Plan (Adult)  Goal: *Acute Goals and Plan of Care (Insert Text)  Description:   FUNCTIONAL STATUS PRIOR TO ADMISSION: Patient was modified independent using a single point cane for functional mobility. Reports history of falls. Has 12 steps to enter her apartment    HOME SUPPORT: The patient lived with a roommate but did not require assist.    Occupational Therapy Goals  Initiated 2/3/2021  1. Patient will perform grooming with supervision/set-up in unsupported sit within 7 day(s). 2.  Patient will perform upper body dressing with minimum assistance within 7 day(s). 3.  Patient will perform lower body dressing with moderate assistance  within 7 day(s). 4.  Patient will perform toilet transfers with maximal assistance maintaining NWB RLE within 7 day(s). 5.  Patient will perform all aspects of toileting with moderate assistance  within 7 day(s). 6.  Patient will participate in upper extremity therapeutic exercise/activities with supervision/set-up for 5 minutes within 7 day(s). Outcome: Progressing Towards Goal  OCCUPATIONAL THERAPY EVALUATION  Patient: Lalo Colón (25 y.o. female)  Date: 2/3/2021  Primary Diagnosis: Ankle fracture [S82.899A]  Procedure(s) (LRB):  RIGHT ANKLE OPEN REDUCTION INTERNAL FIXATION (Right) 2 Days Post-Op   Precautions:   Fall, NWB(RLE)    ASSESSMENT  Based on the objective data described below, the patient presents with lethargy, decreased balance, endurance and strength, RLE pain, NWB RLE, impaired vision (legally blind) and impaired functional mobility following admission for ankle fracture, now POD 2 R ankle ORIF. At baseline pt lives with a roommate and is mod I for ADLs and mobility with SPC. She was received semisupine in bed, agreeable to participate. Pt lethargic during majority of session and required constant stimulation to stay alert. With max A x2 pt transferred supine>sit, fair-poor sitting balance.  She c/o pain in RLE with movement and also in L foot, reports she has arthritis. She fatigued quickly EOB with static seated activity and was returned to bed with max A x2. Total A for LB ADLs this session. VSS throughout. At this time pt is well below her mod I baseline. Recommend rehab at discharge to increase independence and safety. Current Level of Function Impacting Discharge (ADLs/self-care): Min-total A ADLs, max A x2 bed mobility    Functional Outcome Measure: The patient scored 5/100 on the Barthel Index outcome measure. Other factors to consider for discharge: fall risk, NWB RLE     Patient will benefit from skilled therapy intervention to address the above noted impairments. PLAN :  Recommendations and Planned Interventions: self care training, functional mobility training, therapeutic exercise, balance training, therapeutic activities, endurance activities, patient education, home safety training, and family training/education    Frequency/Duration: Patient will be followed by occupational therapy 5 times a week to address goals. Recommendation for discharge: (in order for the patient to meet his/her long term goals)  Therapy 3 hours per day 5-7 days per week    This discharge recommendation:  Has not yet been discussed the attending provider and/or case management    IF patient discharges home will need the following DME: TBD       SUBJECTIVE:   Patient stated I live with [facility] (roommate).     OBJECTIVE DATA SUMMARY:   HISTORY:   Past Medical History:   Diagnosis Date    Adverse effect of anesthesia     SLOW WAKING PAST ANESTHESIA    Anxiety     Arthritis     CAD (coronary artery disease)     s/p CABG x 3v    Chest pain 7/2015    Chronic obstructive pulmonary disease (HCC)     CTS (carpal tunnel syndrome)     S/p bilateral release    Diabetes ([address]     Diabetic gastroparesis ([address]     Diabetic neuropathy ([address]     Diabetic retinopathy ([address]     GERD (gastroesophageal reflux disease)     GI bleed 7/2015    4 bleeds with 9 clips placed    Hypercholesteremia     Hypertension     Hypothyroid     Ill-defined condition     NEUROPATHY HANDS AND FEET    Ill-defined condition 2017    GI BLEED    Nicotine vapor product user     JOHN on CPAP     Osteoporosis     Vitamin D deficiency      Past Surgical History:   Procedure Laterality Date    HX CERVICAL FUSION  2011    HX ENDOSCOPY  7/2015    HX GI      HX HEART CATHETERIZATION  7/2015    HX LUMBAR FUSION  2017    HX LUMBAR LAMINECTOMY  2011    HX ORTHOPAEDIC Right 1970    CARPAL TUNNEL    HX ORTHOPAEDIC Left 2003    CARPAL TUNNEL    HX ROTATOR CUFF REPAIR Right 2003    HX SHOULDER ARTHROSCOPY Right     HX TONSIL AND ADENOIDECTOMY  1968    HX TONSILLECTOMY      PA CABG, ARTERY-VEIN, THREE  7/13/2015       Expanded or extensive additional review of patient history:     Home Situation  Home Environment: Apartment  # Steps to Enter: 12  One/Two Story Residence: One story  Living Alone: No(Roommate Katherin)  Support Systems: Friends \ neighbors, Family member(s)  Current DME Used/Available at Home: Cane, straight    Hand dominance: Right    EXAMINATION OF PERFORMANCE DEFICITS:  Cognitive/Behavioral Status:  Neurologic State: Drowsy  Orientation Level: Oriented to person;Oriented to place  Cognition: Follows commands;Decreased attention/concentration  Perception: Cues to maintain midline in sitting  Perseveration: No perseveration noted  Safety/Judgement: Fall prevention;Decreased insight into deficits         Vision/Perceptual:         Acuity: (pt is legally blind, able to see functionall for eval)         Range of Motion:    AROM: Grossly decreased, non-functional(LEs)  PROM: Generally decreased, functional             Strength:  Strength: Grossly decreased, non-functional(LEs)        Coordination:  Coordination: Generally decreased, functional  Fine Motor Skills-Upper: Left Intact; Right Intact    Gross Motor Skills-Upper: Left Intact; Right Intact    Tone & Sensation:     Sensation: Impaired          Balance:  Sitting: Impaired  Sitting - Static: Fair (occasional); Poor (constant support)  Sitting - Dynamic: Poor (constant support); Fair (occasional)    Functional Mobility and Transfers for ADLs:  Bed Mobility:  Supine to Sit: Maximum assistance;Assist x2  Sit to Supine: Maximum assistance;Assist x2    ADL Assessment:  Feeding: Moderate assistance    Oral Facial Hygiene/Grooming: Maximum assistance    Bathing: Total assistance    Upper Body Dressing: Total assistance    Lower Body Dressing: Total assistance    Toileting: Total assistance                ADL Intervention and task modifications:       Lower Body Dressing Assistance  Socks: Total assistance (dependent)    Toileting  Bladder Hygiene: Total assistance (dependent)    Cognitive Retraining  Safety/Judgement: Fall prevention;Decreased insight into deficits    Functional Measure:  Barthel Index:    Bathin  Bladder: 0  Bowels: 0  Groomin  Dressin  Feedin  Mobility: 0  Stairs: 0  Toilet Use: 0  Transfer (Bed to Chair and Back): 5  Total: 5/100        The Barthel ADL Index: Guidelines  1. The index should be used as a record of what a patient does, not as a record of what a patient could do. 2. The main aim is to establish degree of independence from any help, physical or verbal, however minor and for whatever reason. 3. The need for supervision renders the patient not independent. 4. A patient's performance should be established using the best available evidence. Asking the patient, friends/relatives and nurses are the usual sources, but direct observation and common sense are also important. However direct testing is not needed. 5. Usually the patient's performance over the preceding 24-48 hours is important, but occasionally longer periods will be relevant. 6. Middle categories imply that the patient supplies over 50 per cent of the effort.   7. Use of aids to be independent is allowed. Ethan Crews., Barthel, D.W. (4962). Functional evaluation: the Barthel Index. [address] (14)2. HAKAN Calles, Dianelys Gilman., [place] Rascon., [place], [address] (1999). Measuring the change indisability after inpatient rehabilitation; comparison of the responsiveness of the Barthel Index and Functional [place] Measure. Journal of Neurology, Neurosurgery, and Psychiatry, 66(4), 924-459. Crow Burgos, [place].CRUZ, ALICIA Bagley, & Meredith Morales MMARGE. (2004.) Assessment of post-stroke quality of life in cost-effectiveness studies: The usefulness of the Barthel Index and the EuroQoL-5D. Quality of Life Research, 15, 822-48        Occupational Therapy Evaluation Charge Determination   History Examination Decision-Making   LOW Complexity : Brief history review  MEDIUM Complexity : 3-5 performance deficits relating to physical, cognitive , or psychosocial skils that result in activity limitations and / or participation restrictions MEDIUM Complexity : Patient may present with comorbidities that affect occupational performnce. Miniml to moderate modification of tasks or assistance (eg, physical or verbal ) with assesment(s) is necessary to enable patient to complete evaluation       Based on the above components, the patient evaluation is determined to be of the following complexity level: LOW   Pain Rating:  Pt reported pain in RLE and L foot    Activity Tolerance:   Fair and requires frequent rest breaks    After treatment patient left in no apparent distress:    Supine in bed, Call bell within reach, and Side rails x 3    COMMUNICATION/EDUCATION:   The patients plan of care was discussed with: Physical therapist and Registered nurse. Home safety education was provided and the patient/caregiver indicated understanding., Patient/family have participated as able in goal setting and plan of care. , and Patient/family agree to work toward stated goals and plan of care.     This patients plan of care is appropriate for delegation to [facility].     Thank you for this referral.  Amy Sidhu, OT  Time Calculation: 33 mins

## 2021-02-03 NOTE — PROGRESS NOTES
Ortho Daily Progress Note      Patient: Bonita Mccormick                   MRN: [id number]  Sex: female  YOB: 1950           Age: 79 y.o.    2 Days Post-Op    Procedure(s): RIGHT ANKLE OPEN REDUCTION INTERNAL FIXATION     Visit Vitals  BP (!) 116/55 (BP 1 Location: Right upper arm, BP Patient Position: At rest)   Pulse 70   Temp 98.5 °F (36.9 °C)   Resp 17   Ht 5' 4\" (1.626 m)   Wt 107.6 kg (237 lb 3.4 oz)   SpO2 98%   BMI 40.72 kg/m²        Lab Results:  HGB   Date/Time Value Ref Range Status   02/03/2021 04:56 AM 11.1 (L) 11.5 - 16.0 g/dL Final     INR   Date/Time Value Ref Range Status   01/31/2021 06:04 AM 1.1 0.9 - 1.1   Final     Comment:     A single therapeutic range for Vit K antagonists may not be optimal for all indications - see June, 2008 issue of Chest, American College of Chest Physicians Evidence-Based Clinical Practice Guidelines, 8th Edition.        Physical Exam:    67yo female, NAD, fatigued, splint in good repair, moving toes, sensation intact, brisk cap refill    Plan:    DVT prophylaxisLovenox  Weight bearing restrictionNWB  Pain Control:stable, mild-to-moderate joint symptoms intermittently, reasonably well controlled by current meds      JONATHAN King  2/3/2021   1:04 PM      .

## 2021-02-03 NOTE — PROGRESS NOTES
Hospitalist Progress Note  Nicolas Espinoza MD  Answering service: [phone] from in house phone      Date of Service:  2/3/2021  NAME:  Lino Miranda  :    MRN:  [id number]      Admission Summary:   Lino Miranda is a 79 y.o. female with hx CAD s/p CABG, COPD, DM II with gastroparesis, neuropathy, and legally blind, GERD, HTN, dyslipidemia, JOHN on Cpap, and hypothyroidism who presents with trimalleolar fracture s/p mechanical fall sustained while trying to climb stairs. Her roommate noted gross deformity, and called EMS who transported her to ED giving her fentanyl 50mcg en route. She was markedly sedated upon arrival, and fracture was successfully reduced and splinted in the ED. She denies fever, chills, upper respiratory symptoms     In the ED, she was bradycardic to 54, and intially requried 4Lnc 2/2 sedation from fentanyl given by EMS. XR tib/fib showed fracture of medial & posterior malleolus, and distal fibula. CT head + for new bilateral chronic cerebellar infarcts. CT C-spine with moderate C1/C2 stenosis, but no acute fracture or dislocation. Interval history / Subjective:      Patient seen and examined this afternoon. Patient seems to be more awake, talking and feeding herself. \"I'm doing better\".  She worked with physical therapy      Assessment & Plan:     # Acute metabolic encephalopathy: Resolved   - likely from medication induced( anesthesia vs norco) vs hypercarbia   - repeat head CT unremarkable   - keep on biPAP qhs     # Right Trimalleolar ankle Fracture s/p mechanical fall  - s/p reduction in the ED, neurovascularly intact  - s/p ORIF 1/2  - orthopedic surgery following   - tylenol, and Norco for mild>mod/severe pain  - PT/OT- NWB    # Hx of CAD s/p CABG  - revised cardiac risk index 4, 15% 30-day mortality from cardiac cause  - currently without sx or signs of angina, HF, or arrhythmia  - hx slow to wake from anesthesia  - continue metoprolol, lisinopril, and statin  - Echo with EF 55-60%     # Acute hypoxia with respiratory acidosis   - likely from medication induced( narcotic) and untreated sleep apnea      # HTN  - continue metoprolol and lisinopril     # COPD  - spo2 94-96% on RA  - former smoker, currently vapes  - continue inhaled LABA/anticholinergic inhaler      # Stage IV CKD  - creatinine 1.98 (stable from 5/2020)  - avoid neprotoxic drugs     # Diabetes type 2 with hyperglycemia   - HgbA1c 7.3% 9/2020  - has insulin pump, turned this off in the ED, and she will use subQ insulin while IP  - Lantus + ISS     # Hypothyroidism  - continue home levothyroxine     # JOHN on CPAP  - c-pap while in hospital     DVT ppx: SCD  MPOA: Shu Oviedo (friend) [phone]  Code: Full     FUNCTIONAL STATUS PRIOR TO HOSPITALIZATION Ambulatory with Use of Assistive Devices (including history of recent falls): Jude Mccarthy prior to current admission     Hospital Problems  Date Reviewed: 9/14/2020          Codes Class Noted POA    Ankle fracture ICD-10-CM: S82.899A  ICD-9-CM: 824.8  1/31/2021 Unknown                Review of Systems:   Pertinent positive mentioned in interval hx/HPI. Other systems reviewed and negative     Vital Signs:    Last 24hrs VS reviewed since prior progress note. Most recent are:  Visit Vitals  /61 (BP 1 Location: Right upper arm)   Pulse 77   Temp 99 °F (37.2 °C)   Resp 17   Ht 5' 4\" (1.626 m)   Wt 107.6 kg (237 lb 3.4 oz)   SpO2 98%   BMI 40.72 kg/m²         Intake/Output Summary (Last 24 hours) at 2/3/2021 1655  Last data filed at 2/3/2021 7001  Gross per 24 hour   Intake 200 ml   Output 700 ml   Net -500 ml        Physical Examination:             Constitutional:  No acute distress, sedated    ENT:  Oral mucous moist,    Resp:  CTA bilaterally. No wheezing/rhonchi/rales.  No accessory muscle use   CV:  Regular rhythm, normal rate, no murmurs, gallops, rubs    GI:  Soft, non distended, non tender. normoactive bowel sounds, no hepatosplenomegaly     Musculoskeletal:  Right leg rapped in a bandage, swollen, good pulse and sensation      Neurologic:  Moves all extremities. Data Review:      I personally reviewed labs and imaging     Labs:     Recent Labs     02/03/21 0456 02/02/21 0340   WBC 6.3 6.0   HGB 11.1* 12.0   HCT 35.2 37.5   * 140*     Recent Labs     02/03/21 0456 02/02/21 0340 02/01/21  0252    137 136   K 5.4* 5.6* 5.7*    105 107   CO2 25 27 26   BUN 30* 31* 31*   CREA 1.75* 1.89* 2.19*   * 250* 235*   CA 9.4 9.3 8.9   MG 1.5*  --  1.4*     Recent Labs     02/03/21 0456 02/02/21 0340   ALT 8* 9*   AP 73 79   TBILI 0.3 0.3   TP 6.9 7.0   ALB 2.3* 2.7*   GLOB 4.6* 4.3*     No results for input(s): INR, PTP, APTT, INREXT, INREXT in the last 72 hours. No results for input(s): FE, TIBC, PSAT, FERR in the last 72 hours. No results found for: FOL, RBCF   No results for input(s): PH, PCO2, PO2 in the last 72 hours. No results for input(s): CPK, CKNDX, TROIQ in the last 72 hours.     No lab exists for component: CPKMB  Lab Results   Component Value Date/Time    Cholesterol, total 189 05/15/2020 01:46 PM    HDL Cholesterol 44 05/15/2020 01:46 PM    LDL, calculated 74 05/15/2020 01:46 PM    Triglyceride 356 (H) 05/15/2020 01:46 PM    CHOL/HDL Ratio 3.0 09/27/2017 04:12 AM     Lab Results   Component Value Date/Time    Glucose (POC) 261 (H) 02/03/2021 04:43 PM    Glucose (POC) 237 (H) 02/03/2021 11:02 AM    Glucose (POC) 207 (H) 02/03/2021 06:32 AM    Glucose (POC) 216 (H) 02/02/2021 10:37 PM    Glucose (POC) 236 (H) 02/02/2021 04:56 PM     Lab Results   Component Value Date/Time    Color YELLOW/STRAW 01/07/2019 08:31 AM    Appearance CLEAR 01/07/2019 08:31 AM    Specific gravity 1.010 01/07/2019 08:31 AM    pH (UA) 6.0 01/07/2019 08:31 AM    Protein 100 (A) 01/07/2019 08:31 AM    Glucose NEGATIVE  01/07/2019 08:31 AM    Ketone NEGATIVE  01/07/2019 08:31 AM    Bilirubin NEGATIVE  01/07/2019 08:31 AM    Urobilinogen 0.2 01/07/2019 08:31 AM    Nitrites NEGATIVE  01/07/2019 08:31 AM    Leukocyte Esterase SMALL (A) 01/07/2019 08:31 AM    Epithelial cells FEW 01/07/2019 08:31 AM    Bacteria NEGATIVE  01/07/2019 08:31 AM    WBC 0-4 01/07/2019 08:31 AM    RBC 0-5 01/07/2019 08:31 AM         Medications Reviewed:     Current Facility-Administered Medications   Medication Dose Route Frequency    0.9% sodium chloride infusion  50 mL/hr IntraVENous CONTINUOUS    insulin glargine (LANTUS) injection 18 Units  18 Units SubCUTAneous QHS    dextrose (D50W) injection syrg 12.5-25 g  12.5-25 g IntraVENous PRN    dorzolamide (TRUSOPT) 2 % ophthalmic solution 1 Drop  1 Drop Both Eyes TID    And    timolol (TIMOPTIC) 0.5 % ophthalmic solution 1 Drop  1 Drop Both Eyes BID    HYDROcodone-acetaminophen (NORCO) 7.5-325 mg per tablet 1 Tab  1 Tab Oral Q6H PRN    sodium chloride (NS) flush 5-40 mL  5-40 mL IntraVENous Q8H    sodium chloride (NS) flush 5-40 mL  5-40 mL IntraVENous PRN    naloxone (NARCAN) injection 0.4 mg  0.4 mg IntraVENous PRN    HYDROcodone-acetaminophen (NORCO) 5-325 mg per tablet 1 Tab  1 Tab Oral Q4H PRN    ondansetron (ZOFRAN) injection 4 mg  4 mg IntraVENous Q4H PRN    enoxaparin (LOVENOX) injection 30 mg  30 mg SubCUTAneous Q24H    sodium chloride (NS) flush 5-40 mL  5-40 mL IntraVENous Q8H    sodium chloride (NS) flush 5-40 mL  5-40 mL IntraVENous PRN    acetaminophen (TYLENOL) tablet 650 mg  650 mg Oral Q6H PRN    Or    acetaminophen (TYLENOL) suppository 650 mg  650 mg Rectal Q6H PRN    polyethylene glycol (MIRALAX) packet 17 g  17 g Oral DAILY PRN    promethazine (PHENERGAN) tablet 12.5 mg  12.5 mg Oral Q6H PRN    Or    ondansetron (ZOFRAN) injection 4 mg  4 mg IntraVENous Q6H PRN    levothyroxine (SYNTHROID) tablet 150 mcg  150 mcg Oral ACB    pravastatin (PRAVACHOL) tablet 40 mg  40 mg Oral QHS    amLODIPine (NORVASC) tablet 5 mg  5 mg Oral DAILY    gabapentin (NEURONTIN) capsule 800 mg  800 mg Oral TID    metoprolol tartrate (LOPRESSOR) tablet 50 mg  50 mg Oral BID    pantoprazole (PROTONIX) tablet 40 mg  40 mg Oral ACB    ferrous sulfate tablet 325 mg  325 mg Oral DAILY    escitalopram oxalate (LEXAPRO) tablet 20 mg  20 mg Oral DAILY    glucose chewable tablet 16 g  4 Tab Oral PRN    dextrose (D50W) injection syrg 12.5-25 g  25-50 mL IntraVENous PRN    glucagon (GLUCAGEN) injection 1 mg  1 mg IntraMUSCular PRN    insulin lispro (HUMALOG) injection   SubCUTAneous AC&HS    glycopyrrolate-formoterol (BEVESPI AEROSPHERE) 9 mcg-4.8 mcg inhaler  2 Puff Inhalation BID RT     ______________________________________________________________________  EXPECTED LENGTH OF STAY: 4d 0h  ACTUAL LENGTH OF STAY:          3                 Serena Callahan MD   Patient has given Verbal permission to discuss medical care with   persons present in the room and and also with contact as listed on face sheet. Please note that this dictation was completed with [employer], the computer voice recognition software. Quite often unanticipated grammatical, syntax, homophones, and other interpretive errors are inadvertently transcribed by the computer software. Please disregard these errors. Please excuse any errors that have escaped final proofreading. Thank you.

## 2021-02-03 NOTE — PROGRESS NOTES
11:26 TRANSFER - OUT REPORT:    Verbal report given by [employer] nurse, with Latoya Carr RN supervising to RN(name) on Derl Sheets  being transferred to [address] [place] [address](unit) for routine progression of care       Report consisted of patients Situation, Background, Assessment and   Recommendations(SBAR). Information from the following report(s) SBAR was reviewed with the receiving nurse. Lines:   Peripheral IV 01/30/21 Anterior;Left;Proximal Antecubital (Active)   Site Assessment Clean, dry, & intact 02/02/21 0742   Phlebitis Assessment 0 02/02/21 0742   Infiltration Assessment 0 02/02/21 0742   Dressing Status Clean, dry, & intact 02/02/21 0742   Dressing Type Transparent;Tape 02/02/21 0742   Hub Color/Line Status Pink;Capped 02/02/21 0742   Action Taken Open ports on tubing capped 02/02/21 0742   Alcohol Cap Used Yes 02/02/21 0742       Peripheral IV 02/01/21 Left Hand (Active)   Site Assessment Clean, dry, & intact 02/02/21 0742   Phlebitis Assessment 0 02/02/21 0742   Infiltration Assessment 0 02/02/21 0742   Dressing Status Clean, dry, & intact 02/02/21 0742   Dressing Type Transparent;Tape 02/02/21 0742   Hub Color/Line Status Blue; Infusing 02/02/21 4258   Action Taken Open ports on tubing capped 02/02/21 0742   Alcohol Cap Used Yes 02/02/21 8463        Opportunity for questions and clarification was provided.       Patient transported with:   Chantal Fitzgerald MT requesting pt transport now    11:55 Pt taken by pt transporter to move to [facility]

## 2021-02-04 ENCOUNTER — APPOINTMENT (OUTPATIENT)
Dept: CT IMAGING | Age: 71
DRG: 492 | End: 2021-02-04
Attending: NURSE PRACTITIONER
Payer: MEDICARE

## 2021-02-04 LAB
ALBUMIN SERPL-MCNC: 2 G/DL (ref 3.5–5)
ALBUMIN/GLOB SERPL: 0.5 {RATIO} (ref 1.1–2.2)
ALP SERPL-CCNC: 65 U/L (ref 45–117)
ALT SERPL-CCNC: <6 U/L (ref 12–78)
ANION GAP SERPL CALC-SCNC: 5 MMOL/L (ref 5–15)
APPEARANCE UR: CLEAR
ARTERIAL PATENCY WRIST A: YES
AST SERPL-CCNC: 7 U/L (ref 15–37)
BACTERIA URNS QL MICRO: ABNORMAL /HPF
BASE DEFICIT BLDA-SCNC: 1 MMOL/L
BDY SITE: ABNORMAL
BILIRUB SERPL-MCNC: 0.3 MG/DL (ref 0.2–1)
BILIRUB UR QL: NEGATIVE
BUN SERPL-MCNC: 29 MG/DL (ref 6–20)
BUN/CREAT SERPL: 18 (ref 12–20)
CALCIUM SERPL-MCNC: 9.4 MG/DL (ref 8.5–10.1)
CHLORIDE SERPL-SCNC: 107 MMOL/L (ref 97–108)
CO2 SERPL-SCNC: 25 MMOL/L (ref 21–32)
COLOR UR: ABNORMAL
CREAT SERPL-MCNC: 1.62 MG/DL (ref 0.55–1.02)
EPITH CASTS URNS QL MICRO: ABNORMAL /LPF
ERYTHROCYTE [DISTWIDTH] IN BLOOD BY AUTOMATED COUNT: 12.4 % (ref 11.5–14.5)
GLOBULIN SER CALC-MCNC: 4.4 G/DL (ref 2–4)
GLUCOSE BLD STRIP.AUTO-MCNC: 203 MG/DL (ref 65–100)
GLUCOSE BLD STRIP.AUTO-MCNC: 235 MG/DL (ref 65–100)
GLUCOSE BLD STRIP.AUTO-MCNC: 244 MG/DL (ref 65–100)
GLUCOSE BLD STRIP.AUTO-MCNC: 248 MG/DL (ref 65–100)
GLUCOSE BLD STRIP.AUTO-MCNC: 255 MG/DL (ref 65–100)
GLUCOSE SERPL-MCNC: 230 MG/DL (ref 65–100)
GLUCOSE UR STRIP.AUTO-MCNC: 500 MG/DL
HCO3 BLDA-SCNC: 27 MMOL/L (ref 22–26)
HCT VFR BLD AUTO: 32.3 % (ref 35–47)
HGB BLD-MCNC: 10.3 G/DL (ref 11.5–16)
HGB UR QL STRIP: ABNORMAL
KETONES UR QL STRIP.AUTO: NEGATIVE MG/DL
LEUKOCYTE ESTERASE UR QL STRIP.AUTO: NEGATIVE
MAGNESIUM SERPL-MCNC: 1.6 MG/DL (ref 1.6–2.4)
MCH RBC QN AUTO: 32.4 PG (ref 26–34)
MCHC RBC AUTO-ENTMCNC: 31.9 G/DL (ref 30–36.5)
MCV RBC AUTO: 101.6 FL (ref 80–99)
NITRITE UR QL STRIP.AUTO: NEGATIVE
NRBC # BLD: 0 K/UL (ref 0–0.01)
NRBC BLD-RTO: 0 PER 100 WBC
PCO2 BLDA: 58 MMHG (ref 35–45)
PH BLDA: 7.28 [PH] (ref 7.35–7.45)
PH UR STRIP: 6 [PH] (ref 5–8)
PLATELET # BLD AUTO: 135 K/UL (ref 150–400)
PMV BLD AUTO: 11.5 FL (ref 8.9–12.9)
PO2 BLDA: 90 MMHG (ref 80–100)
POTASSIUM SERPL-SCNC: 5.1 MMOL/L (ref 3.5–5.1)
PROT SERPL-MCNC: 6.4 G/DL (ref 6.4–8.2)
PROT UR STRIP-MCNC: 300 MG/DL
RBC # BLD AUTO: 3.18 M/UL (ref 3.8–5.2)
RBC #/AREA URNS HPF: ABNORMAL /HPF (ref 0–5)
SAO2 % BLD: 96 % (ref 92–97)
SAO2% DEVICE SAO2% SENSOR NAME: ABNORMAL
SERVICE CMNT-IMP: ABNORMAL
SODIUM SERPL-SCNC: 137 MMOL/L (ref 136–145)
SP GR UR REFRACTOMETRY: 1.02 (ref 1–1.03)
SPECIMEN SITE: ABNORMAL
UA: UC IF INDICATED,UAUC: ABNORMAL
UROBILINOGEN UR QL STRIP.AUTO: 0.2 EU/DL (ref 0.2–1)
WBC # BLD AUTO: 5.9 K/UL (ref 3.6–11)
WBC URNS QL MICRO: ABNORMAL /HPF (ref 0–4)

## 2021-02-04 PROCEDURE — 74011250637 HC RX REV CODE- 250/637: Performed by: ORTHOPAEDIC SURGERY

## 2021-02-04 PROCEDURE — 74011636637 HC RX REV CODE- 636/637: Performed by: INTERNAL MEDICINE

## 2021-02-04 PROCEDURE — 94760 N-INVAS EAR/PLS OXIMETRY 1: CPT

## 2021-02-04 PROCEDURE — 85027 COMPLETE CBC AUTOMATED: CPT

## 2021-02-04 PROCEDURE — 77030005513 HC CATH URETH FOL11 MDII -B

## 2021-02-04 PROCEDURE — 65270000029 HC RM PRIVATE

## 2021-02-04 PROCEDURE — 82962 GLUCOSE BLOOD TEST: CPT

## 2021-02-04 PROCEDURE — 83735 ASSAY OF MAGNESIUM: CPT

## 2021-02-04 PROCEDURE — 81001 URINALYSIS AUTO W/SCOPE: CPT

## 2021-02-04 PROCEDURE — 77030019905 HC CATH URETH INTMIT MDII -A

## 2021-02-04 PROCEDURE — 74011636637 HC RX REV CODE- 636/637: Performed by: ORTHOPAEDIC SURGERY

## 2021-02-04 PROCEDURE — 51798 US URINE CAPACITY MEASURE: CPT

## 2021-02-04 PROCEDURE — 74011250636 HC RX REV CODE- 250/636: Performed by: ORTHOPAEDIC SURGERY

## 2021-02-04 PROCEDURE — 36415 COLL VENOUS BLD VENIPUNCTURE: CPT

## 2021-02-04 PROCEDURE — 70450 CT HEAD/BRAIN W/O DYE: CPT

## 2021-02-04 PROCEDURE — 36600 WITHDRAWAL OF ARTERIAL BLOOD: CPT

## 2021-02-04 PROCEDURE — 82803 BLOOD GASES ANY COMBINATION: CPT

## 2021-02-04 PROCEDURE — 94762 N-INVAS EAR/PLS OXIMTRY CONT: CPT

## 2021-02-04 PROCEDURE — 80053 COMPREHEN METABOLIC PANEL: CPT

## 2021-02-04 PROCEDURE — 97530 THERAPEUTIC ACTIVITIES: CPT

## 2021-02-04 PROCEDURE — 97535 SELF CARE MNGMENT TRAINING: CPT

## 2021-02-04 PROCEDURE — 77010033678 HC OXYGEN DAILY

## 2021-02-04 RX ORDER — GABAPENTIN 300 MG/1
300 CAPSULE ORAL 3 TIMES DAILY
Status: DISCONTINUED | OUTPATIENT
Start: 2021-02-05 | End: 2021-02-09 | Stop reason: HOSPADM

## 2021-02-04 RX ORDER — ENOXAPARIN SODIUM 100 MG/ML
40 INJECTION SUBCUTANEOUS EVERY 24 HOURS
Status: DISCONTINUED | OUTPATIENT
Start: 2021-02-04 | End: 2021-02-09 | Stop reason: HOSPADM

## 2021-02-04 RX ADMIN — PANTOPRAZOLE SODIUM 40 MG: 40 TABLET, DELAYED RELEASE ORAL at 08:06

## 2021-02-04 RX ADMIN — Medication 10 ML: at 13:27

## 2021-02-04 RX ADMIN — DORZOLAMIDE HYDROCHLORIDE 1 DROP: 20 SOLUTION/ DROPS OPHTHALMIC at 15:50

## 2021-02-04 RX ADMIN — INSULIN LISPRO 3 UNITS: 100 INJECTION, SOLUTION INTRAVENOUS; SUBCUTANEOUS at 16:43

## 2021-02-04 RX ADMIN — INSULIN LISPRO 3 UNITS: 100 INJECTION, SOLUTION INTRAVENOUS; SUBCUTANEOUS at 07:48

## 2021-02-04 RX ADMIN — LEVOTHYROXINE SODIUM 150 MCG: 0.15 TABLET ORAL at 08:07

## 2021-02-04 RX ADMIN — Medication 10 ML: at 00:00

## 2021-02-04 RX ADMIN — FERROUS SULFATE TAB 325 MG (65 MG ELEMENTAL FE) 325 MG: 325 (65 FE) TAB at 09:14

## 2021-02-04 RX ADMIN — AMLODIPINE BESYLATE 5 MG: 5 TABLET ORAL at 09:14

## 2021-02-04 RX ADMIN — ENOXAPARIN SODIUM 40 MG: 40 INJECTION SUBCUTANEOUS at 22:32

## 2021-02-04 RX ADMIN — METOPROLOL TARTRATE 50 MG: 50 TABLET, FILM COATED ORAL at 09:14

## 2021-02-04 RX ADMIN — TIMOLOL MALEATE 1 DROP: 5 SOLUTION/ DROPS OPHTHALMIC at 09:20

## 2021-02-04 RX ADMIN — INSULIN LISPRO 3 UNITS: 100 INJECTION, SOLUTION INTRAVENOUS; SUBCUTANEOUS at 12:16

## 2021-02-04 RX ADMIN — TIMOLOL MALEATE 1 DROP: 5 SOLUTION/ DROPS OPHTHALMIC at 17:57

## 2021-02-04 RX ADMIN — GABAPENTIN 800 MG: 400 CAPSULE ORAL at 09:15

## 2021-02-04 RX ADMIN — INSULIN LISPRO 2 UNITS: 100 INJECTION, SOLUTION INTRAVENOUS; SUBCUTANEOUS at 22:32

## 2021-02-04 RX ADMIN — DORZOLAMIDE HYDROCHLORIDE 1 DROP: 20 SOLUTION/ DROPS OPHTHALMIC at 22:00

## 2021-02-04 RX ADMIN — ESCITALOPRAM OXALATE 20 MG: 10 TABLET ORAL at 09:14

## 2021-02-04 RX ADMIN — DORZOLAMIDE HYDROCHLORIDE 1 DROP: 20 SOLUTION/ DROPS OPHTHALMIC at 09:19

## 2021-02-04 RX ADMIN — GABAPENTIN 800 MG: 400 CAPSULE ORAL at 15:44

## 2021-02-04 RX ADMIN — INSULIN GLARGINE 18 UNITS: 100 INJECTION, SOLUTION SUBCUTANEOUS at 22:32

## 2021-02-04 NOTE — PROGRESS NOTES
TRANSFER - IN REPORT:    Verbal report received from [facility] and preceptor (name) on [facility]  being received from [facility] (unit) for routine progression of care      Report consisted of patients Situation, Background, Assessment and   Recommendations(SBAR). Information from the following report(s) SBAR, Kardex, Procedure Summary, Intake/Output and MAR was reviewed with the receiving nurse. Opportunity for questions and clarification was provided. Assessment completed upon patients arrival to unit and care assumed.            Primary Nurse Dinesh Urbano and Vishal Fried RN performed a dual skin assessment on this patient No impairment noted  Rehan score is 14

## 2021-02-04 NOTE — PROGRESS NOTES
[facility] Adult  Hospitalist Group                                                                                          Hospitalist Progress Note  Lul Lima NP  Answering service: [phone] OR [phone] from in house phone        Date of Service:  2021  NAME:  Caitlin Terrazas  :  4320  MRN:  [id number]      Admission Summary:   Caitlin Terrazas is a 79 y.o. female with a PMH of CAD s/p CABG, COPD, T2DM with Gastroparesis, Neuropathy, Legally blind, GERD, HTN, HLD, JOHN on CPAP and Hypothyroidism who presented after a Mechanical fall and found with Trimalleolar Fracture, sustained while trying to climb stairs. Her roommate noted gross deformity and called EMS who transported her to ED giving her fentanyl 50mcg en route. She was markedly sedated upon arrival and fracture was successfully reduced and splinted in the ED. She denies fever, chills, upper respiratory symptoms. Interval history / Subjective: Follow-up for issues listed below.   -Patient seen and examined, no c/o's. Drowsy easily aroused in am.     RR called 1800: patient lethargic, difficult to arouse, awakens to deep touch. Patient on Gabapentin 800 mg TID, will decrease dose to allow for more wakefulness, Gabapentin level in am, CT head w/o, monitor. Also with urine retention, >500mL via bladder scan, insert farah, U/A C&S. Assessment & Plan:     Acute Metabolic Encephalopathy: Resolved, 2/2 to anesthesia vs Norco vs hypercarbia. -CT head : No evidence of acute intracranial abnormality. -repeat CT head : No acute findings or significant change from prior. Severe chronic microvascular ischemic disease, which has progressed since . Multiple chronic bilateral cerebellar infarcts are new since . -CT head w/o :     Right Trimalleolar Ankle Fracture: s/p mechanical fall. s/p reduction in the ED, neurovascularly intact.   -s/p ORIF 1/2  -orthopedic surgery following   -pain control  -PT/OT     Hx of CAD s/p CABG: revised cardiac risk index 4, 15% 30-day mortality from cardiac causes, currently without sx or signs of angina, HF, or arrhythmia  -continue metoprolol, lisinopril, and statin  -ECHO 02/01: EF 55-60%      Acute Hypoxia with Respiratory Acidosis:2/2 meds and untreated sleep apnea. HTN: continue metoprolol and lisinopril. COPD: former smoker, currently vapes, SpO2 94-96% on RA, continue inhaled LABA/anticholinergic inhaler.   Stage IV CKD:  -creatinine 1.98 (stable from 5/2020), avoid neprotoxic drugs.     T2DM with Hyperglycemia:   -HgbA1c 7.3% 9/2020  -insulin pump: turned this off in the ED  -Basal insulin, ISS     Hypothyroidism: continue home levothyroxine. JOHN: CPAP at hs.      DVTppx: Lovenox  GIppx: Protonix  Code Status: Full Code  Diet: Cardiac  Activity: OOB to chair TID and PRN  Discharge: TBD  Ambulates: assistive devices  Chad Hollis (friend) [phone]     Hospital Problems  Date Reviewed: 9/14/2020          Codes Class Noted POA    * (Principal) Ankle fracture ICD-10-CM: R33.560Z  ICD-9-CM: 824.8  1/31/2021 Unknown                Review of Systems:   A comprehensive review of systems was negative except for that written in the HPI. Vital Signs:    Last 24hrs VS reviewed since prior progress note. Most recent are:  Visit Vitals  /68 (BP 1 Location: Right upper arm, BP Patient Position: At rest)   Pulse 74   Temp 97.6 °F (36.4 °C)   Resp 16   Ht 5' 4\" (1.626 m)   Wt 107.6 kg (237 lb 3.4 oz)   SpO2 97%   BMI 40.72 kg/m²         Intake/Output Summary (Last 24 hours) at 2/4/2021 1327  Last data filed at 2/4/2021 0600  Gross per 24 hour   Intake    Output 1000 ml   Net -1000 ml        Physical Examination:     I had a face to face encounter with this patient and independently examined them on 2/4/2021 as outlined below:    Constitutional:  No acute distress, cooperative, pleasant    ENT:  Oral mucosa moist.    Resp:  CTA bilaterally. No wheezing/rhonchi/rales. No accessory muscle use.   CV:  Regular rhythm, normal rate, S1,S2.    GI/:  Soft, non distended, non tender, no guarding, BS present. Voids Freely.    Musculoskeletal:  No edema, warm, 2+ pulses throughout.    Neurologic:  Moves all extremities, generalized weakness, RLE dressing CDI. Drowsy, easily aroused and then AAOx3, CN II-XII reviewed.  Skin:  Good turgor, no rashes or ulcers  Psych:  Good insight, Not anxious nor agitated.               Data Review:    Review and/or order of clinical lab test      Labs:     Recent Labs     02/04/21 0310 02/03/21 0456   WBC 5.9 6.3   HGB 10.3* 11.1*   HCT 32.3* 35.2   * 133*     Recent Labs     02/04/21 0310 02/03/21 0456 02/02/21 0340    138 137   K 5.1 5.4* 5.6*    107 105   CO2 25 25 27   BUN 29* 30* 31*   CREA 1.62* 1.75* 1.89*   * 220* 250*   CA 9.4 9.4 9.3   MG 1.6 1.5*  --      Recent Labs     02/04/21 0310 02/03/21 0456 02/02/21 0340   ALT <6* 8* 9*   AP 65 73 79   TBILI 0.3 0.3 0.3   TP 6.4 6.9 7.0   ALB 2.0* 2.3* 2.7*   GLOB 4.4* 4.6* 4.3*     No results for input(s): INR, PTP, APTT, INREXT in the last 72 hours.   No results for input(s): FE, TIBC, PSAT, FERR in the last 72 hours.   No results found for: FOL, RBCF   No results for input(s): PH, PCO2, PO2 in the last 72 hours.  No results for input(s): CPK, CKNDX, TROIQ in the last 72 hours.    No lab exists for component: CPKMB  Lab Results   Component Value Date/Time    Cholesterol, total 189 05/15/2020 01:46 PM    HDL Cholesterol 44 05/15/2020 01:46 PM    LDL, calculated 74 05/15/2020 01:46 PM    Triglyceride 356 (H) 05/15/2020 01:46 PM    CHOL/HDL Ratio 3.0 09/27/2017 04:12 AM     Lab Results   Component Value Date/Time    Glucose (POC) 248 (H) 02/04/2021 12:03 PM    Glucose (POC) 203 (H) 02/04/2021 06:59 AM    Glucose (POC) 227 (H) 02/03/2021 09:18 PM    Glucose (POC) 261 (H) 02/03/2021 04:43 PM    Glucose (POC) 237 (H) 02/03/2021 11:02 AM     Lab Results   Component Value  Date/Time    Color YELLOW/STRAW 01/07/2019 08:31 AM    Appearance CLEAR 01/07/2019 08:31 AM    Specific gravity 1.010 01/07/2019 08:31 AM    pH (UA) 6.0 01/07/2019 08:31 AM    Protein 100 (A) 01/07/2019 08:31 AM    Glucose NEGATIVE  01/07/2019 08:31 AM    Ketone NEGATIVE  01/07/2019 08:31 AM    Bilirubin NEGATIVE  01/07/2019 08:31 AM    Urobilinogen 0.2 01/07/2019 08:31 AM    Nitrites NEGATIVE  01/07/2019 08:31 AM    Leukocyte Esterase SMALL (A) 01/07/2019 08:31 AM    Epithelial cells FEW 01/07/2019 08:31 AM    Bacteria NEGATIVE  01/07/2019 08:31 AM    WBC 0-4 01/07/2019 08:31 AM    RBC 0-5 01/07/2019 08:31 AM         Medications Reviewed:     Current Facility-Administered Medications   Medication Dose Route Frequency    0.9% sodium chloride infusion  50 mL/hr IntraVENous CONTINUOUS    insulin glargine (LANTUS) injection 18 Units  18 Units SubCUTAneous QHS    dextrose (D50W) injection syrg 12.5-25 g  12.5-25 g IntraVENous PRN    dorzolamide (TRUSOPT) 2 % ophthalmic solution 1 Drop  1 Drop Both Eyes TID    And    timolol (TIMOPTIC) 0.5 % ophthalmic solution 1 Drop  1 Drop Both Eyes BID    HYDROcodone-acetaminophen (NORCO) 7.5-325 mg per tablet 1 Tab  1 Tab Oral Q6H PRN    sodium chloride (NS) flush 5-40 mL  5-40 mL IntraVENous Q8H    sodium chloride (NS) flush 5-40 mL  5-40 mL IntraVENous PRN    naloxone (NARCAN) injection 0.4 mg  0.4 mg IntraVENous PRN    HYDROcodone-acetaminophen (NORCO) 5-325 mg per tablet 1 Tab  1 Tab Oral Q4H PRN    ondansetron (ZOFRAN) injection 4 mg  4 mg IntraVENous Q4H PRN    enoxaparin (LOVENOX) injection 30 mg  30 mg SubCUTAneous Q24H    sodium chloride (NS) flush 5-40 mL  5-40 mL IntraVENous Q8H    sodium chloride (NS) flush 5-40 mL  5-40 mL IntraVENous PRN    acetaminophen (TYLENOL) tablet 650 mg  650 mg Oral Q6H PRN    Or    acetaminophen (TYLENOL) suppository 650 mg  650 mg Rectal Q6H PRN    polyethylene glycol (MIRALAX) packet 17 g  17 g Oral DAILY PRN    promethazine (PHENERGAN) tablet 12.5 mg  12.5 mg Oral Q6H PRN    Or    ondansetron (ZOFRAN) injection 4 mg  4 mg IntraVENous Q6H PRN    levothyroxine (SYNTHROID) tablet 150 mcg  150 mcg Oral ACB    pravastatin (PRAVACHOL) tablet 40 mg  40 mg Oral QHS    amLODIPine (NORVASC) tablet 5 mg  5 mg Oral DAILY    gabapentin (NEURONTIN) capsule 800 mg  800 mg Oral TID    metoprolol tartrate (LOPRESSOR) tablet 50 mg  50 mg Oral BID    pantoprazole (PROTONIX) tablet 40 mg  40 mg Oral ACB    ferrous sulfate tablet 325 mg  325 mg Oral DAILY    escitalopram oxalate (LEXAPRO) tablet 20 mg  20 mg Oral DAILY    glucose chewable tablet 16 g  4 Tab Oral PRN    dextrose (D50W) injection syrg 12.5-25 g  25-50 mL IntraVENous PRN    glucagon (GLUCAGEN) injection 1 mg  1 mg IntraMUSCular PRN    insulin lispro (HUMALOG) injection   SubCUTAneous AC&HS    glycopyrrolate-formoterol (BEVESPI AEROSPHERE) 9 mcg-4.8 mcg inhaler  2 Puff Inhalation BID RT     ______________________________________________________________________  EXPECTED LENGTH OF STAY: 4d 0h  ACTUAL LENGTH OF STAY:          4                 Raina Rudolph NP

## 2021-02-04 NOTE — PROGRESS NOTES
Lovenox Monitoring  Indication: DVT Prophylaxis  Recent Labs     02/04/21  0310 02/03/21  0456 02/02/21  0340   HGB 10.3* 11.1* 12.0   * 133* 140*   CREA 1.62* 1.75* 1.89*   Current Weight:107.6 kg (was started when documented weight was 97 kg)  Est. CrCl = ~27 ml/min  Current Dose: 30 mg subcutaneously every 24 hours.     Plan: Change to 40 mg subcutaneously every 24 hours for pt with CrCl > 30 ml/min    Derek Mera, PharmD  Clinical Pharmacist, Orthopedics and Med/Surg () [address] [facility] ()

## 2021-02-04 NOTE — PROGRESS NOTES
KRIS: Disposition TBD. Patient's roommate Gopi Nelson #[phone] at bedside stated that patient may be open to  IPR. CM will discuss with hospitalist team in the AM. Patient has [facility] which typically does not cover IPR. The roommate stated the patient would not be open to SNF placement. Therapy currently recommending IPR. Patient is on home 02 at night.      TEGAN Ureña/JEMMA

## 2021-02-04 NOTE — PROGRESS NOTES
Problem: Mobility Impaired (Adult and Pediatric)  Goal: *Acute Goals and Plan of Care (Insert Text)  Description: FUNCTIONAL STATUS PRIOR TO ADMISSION: unknown    HOME SUPPORT PRIOR TO ADMISSION: The patient lives at [employer]. Support system unknown    Physical Therapy Goals  Initiated 2/2/2021  1. Patient will move from supine to sit and sit to supine , scoot up and down, and roll side to side in bed with minimum assistance within 7 day(s). 2.  Patient will transfer from bed to chair and chair to bed with minimal assistance/contact guard assist using the least restrictive device within 7 day(s). 3.  Patient will perform sit to stand with minimal assistance/contact guard assist within 7 day(s). 4.  Patient will ambulate with minimal assistance/contact guard assist for 50 feet with the least restrictive device within 7 day(s). Outcome: Progressing Towards Goal    PHYSICAL THERAPY TREATMENT  Patient: Michelle Prather (71 y.o. female)  Date: 2/4/2021  Diagnosis: Ankle fracture [S82.899A] Ankle fracture  Procedure(s) (LRB):  RIGHT ANKLE OPEN REDUCTION INTERNAL FIXATION (Right) 3 Days Post-Op  Precautions: NWB(RLE) Blind  Chart, physical therapy assessment, plan of care and goals were reviewed. ASSESSMENT  Patient continues with skilled PT services and is not progressing towards goals at this time. Patient needs multiple prompts, tactile and verbal cueing to initiate any movement of limbs. Patient calling out with PROM of the LLE. Able to wiggle toes on the right. 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 Question hearing deficit? She answers questions appropriately, however continues to be too lethargic to initiate movement. Perked up a bit when a family member called. Patient tends to lean to the left in bed. Repositioned to modified bed/chair position with pillow supporting left side. Not safe to sit EOB at this time secondary to somnolence.      Current Level of Function Impacting Discharge (mobility/balance): total assist today    Other factors to consider for discharge: Previously ambulating and performing stairs, now NWB on the R         PLAN :  Patient continues to benefit from skilled intervention to address the above impairments. Continue treatment per established plan of care. to address goals. Recommendation for discharge: (in order for the patient to meet his/her long term goals)  Therapy 3 hours per day 5-7 days per week    This discharge recommendation:  Has been made in collaboration with the attending provider and/or case management    IF patient discharges home will need the following DME: to be determined (TBD)       SUBJECTIVE:   Patient stated [facility] about a diet coke.     OBJECTIVE DATA SUMMARY:   Critical Behavior:  Neurologic State: Drowsy  Orientation Level: Oriented to person, Disoriented to place, Disoriented to situation, Disoriented to time  Cognition: Follows commands, Appropriate for age attention/concentration, Impaired decision making, Poor safety awareness  Safety/Judgement: Fall prevention, Decreased insight into deficits  Functional Mobility Training:  Bed Mobility:  Rolling: Total assistance  Scooting: Total assistance    Therapeutic Exercises:   No able to initiate bed exercises at this time    Pain Rating:  Pain to LLE tender to palpation    Activity Tolerance:   Fair and on 2.5L, Patient is lethargic, fall asleep intermittently    After treatment patient left in no apparent distress:   Propped in bed/chair position    COMMUNICATION/COLLABORATION:   The patients plan of care was discussed with: Occupational therapist and Registered nurse.      Lina Post, PT, DPT  Geriatric Clinical Specialist     Time Calculation: 27 mins

## 2021-02-04 NOTE — PROGRESS NOTES
Orthopedic Progress Note    S: Pain well controlled, no new complaints;Denies numbness, tingling, focal weakness, cp, sob, fever, chills    O: NAD, respirations unlabored; Dressings CDI;no posterior calf ttp;wiggles toes, SILT; Cap refill brisk, foot warm    Patient Vitals for the past 4 hrs:   Temp Pulse BP   02/04/21 0740 97.6 °F (36.4 °C) 74 132/68     Recent Labs     02/04/21  0310 02/03/21  0456   HGB 10.3* 11.1*   HCT 32.3* 35.2   * 133*   BUN 29* 30*   CREA 1.62* 1.75*   K 5.1 5.4*    138         A/P:  Procedure: Procedure(s):  RIGHT ANKLE OPEN REDUCTION INTERNAL FIXATION  Post Op day: 3 Days Post-Op    - DVT ppx - Lovenox 40mg daily; SCDs  - PT/OT - NWB RLE  - Pain - continue current meds; Ice, Elevation, Ace wrap as needed  - Dressing - Leave in place if it remains dry and intact; change as needed for saturation with dry sterile island dressing  - Dispo - Pending PT/OT recs; needs f/u in 2-3 weeks with Dr. Maria M Gallardo; refer to DC instructions for further details.   - Orthopedically stable, will sign off, call with questions    JONATHAN Diaz  Orthopedic Trauma Service  [address]

## 2021-02-04 NOTE — PROGRESS NOTES
Bedside and Verbal shift change report given to Danielle (oncoming nurse) by Cristiana Santiago (offgoing nurse). Report included the following information SBAR, Kardex, Procedure Summary, Intake/Output and MAR.

## 2021-02-04 NOTE — PROGRESS NOTES
Problem: Self Care Deficits Care Plan (Adult)  Goal: *Acute Goals and Plan of Care (Insert Text)  Description:   FUNCTIONAL STATUS PRIOR TO ADMISSION: Patient was modified independent using a single point cane for functional mobility. Reports history of falls. Has 12 steps to enter her apartment    HOME SUPPORT: The patient lived with a roommate but did not require assist.    Occupational Therapy Goals  Initiated 2/3/2021  1. Patient will perform grooming with supervision/set-up in unsupported sit within 7 day(s). 2.  Patient will perform upper body dressing with minimum assistance within 7 day(s). 3.  Patient will perform lower body dressing with moderate assistance  within 7 day(s). 4.  Patient will perform toilet transfers with maximal assistance maintaining NWB RLE within 7 day(s). 5.  Patient will perform all aspects of toileting with moderate assistance  within 7 day(s). 6.  Patient will participate in upper extremity therapeutic exercise/activities with supervision/set-up for 5 minutes within 7 day(s). Outcome: Progressing Towards Goal     OCCUPATIONAL THERAPY TREATMENT  Patient: Unique Eden (89 y.o. female)  Date: 2/4/2021  Diagnosis: Ankle fracture [S82.899A] Ankle fracture  Procedure(s) (LRB):  RIGHT ANKLE OPEN REDUCTION INTERNAL FIXATION (Right) 3 Days Post-Op  Precautions: NWB(RLE)  Chart, occupational therapy assessment, plan of care, and goals were reviewed. ASSESSMENT  Patient continues with skilled OT services and is progressing towards goals. AAO to person, conversational at times and able to answer questions but limited eye contact, open eyes and ability to physically follow commands today. The patient is max A to wash face in bed, decreased  to no movement in RUE, breaking against gravity, but able to complete some with LUE when prompted. Increased pain reported today in her LLE and RUE, however, unable to localize.  Worked to help therapeutically positing in bed in chair position to increased alertness. Discussed safety with pt and access of call bell. The patient reported she was independent at baseline, thus being far from baseline today and recommendation of IPR as she is more alert and able to participate. Current Level of Function Impacting Discharge (ADLs): total to max A    Other factors to consider for discharge: prior level of function, level of assistance? PLAN :  Patient continues to benefit from skilled intervention to address the above impairments. Continue treatment per established plan of care. to address goals. Recommend with staff: chair positing in bed, encourage natural sleep wake period, involvement in feeding and grooming at bed level    Recommend next OT session: involvement in feeding and grooming at bed level, EOB    Recommendation for discharge: (in order for the patient to meet his/her long term goals)  Therapy 3 hours per day 5-7 days per week    This discharge recommendation:  Has been made in collaboration with the attending provider and/or case management    IF patient discharges home will need the following DME: TBD       SUBJECTIVE:   Patient stated this arm hurts, I just noticed yesterday.     OBJECTIVE DATA SUMMARY:   Cognitive/Behavioral Status:  Neurologic State: Drowsy  Orientation Level: Oriented to person;Disoriented to time;Disoriented to situation;Disoriented to place  Cognition: Follows commands;Decreased attention/concentration  Perception: Appears intact          Functional Mobility and Transfers for ADLs:  Bed Mobility:  Rolling: Total assistance  Scooting: Total assistance    Transfers: Total A for bed mobility    Balance:   Total A for sitting balance with back supported    ADL Intervention:       Grooming  Grooming Assistance: Maximum assistance  Position Performed: Long sitting on bed  Washing Face: Maximum assistance      Therapeutic Exercises:   Therapeutic positioning in bed, pillows placed on left side which the patient tends to lean towards. Supported BUEs on pillows for comfort. Pain:  None rated, grimace with occasional movement and touching of LLE    Activity Tolerance:   Poor    After treatment patient left in no apparent distress:   Heels elevated for pressure relief, Call bell within reach, Side rails x 3, and Chair position in bed    COMMUNICATION/COLLABORATION:   The patients plan of care was discussed with: Physical therapist and Registered nurse.      Arlene Hansen  Time Calculation: 23 mins

## 2021-02-04 NOTE — PROGRESS NOTES
RT unable to administer Bevespi MDI. RT unable to locate MDI. Pt unable to communicate. AM dose was given but MDI not found in room or pyxis drawer.

## 2021-02-04 NOTE — PROGRESS NOTES
NP informed that patient's BP 91/58. Order received to hold PM dose of metoprolol 50 mg. Ros also aware of patient's poor PO intake.

## 2021-02-05 LAB
ALBUMIN SERPL-MCNC: 2 G/DL (ref 3.5–5)
ALBUMIN/GLOB SERPL: 0.4 {RATIO} (ref 1.1–2.2)
ALP SERPL-CCNC: 68 U/L (ref 45–117)
ALT SERPL-CCNC: 6 U/L (ref 12–78)
ANION GAP SERPL CALC-SCNC: 6 MMOL/L (ref 5–15)
AST SERPL-CCNC: 11 U/L (ref 15–37)
BASOPHILS # BLD: 0.1 K/UL (ref 0–0.1)
BASOPHILS NFR BLD: 1 % (ref 0–1)
BILIRUB SERPL-MCNC: 0.4 MG/DL (ref 0.2–1)
BUN SERPL-MCNC: 35 MG/DL (ref 6–20)
BUN/CREAT SERPL: 20 (ref 12–20)
CALCIUM SERPL-MCNC: 9.3 MG/DL (ref 8.5–10.1)
CHLORIDE SERPL-SCNC: 107 MMOL/L (ref 97–108)
CO2 SERPL-SCNC: 25 MMOL/L (ref 21–32)
CREAT SERPL-MCNC: 1.77 MG/DL (ref 0.55–1.02)
DIFFERENTIAL METHOD BLD: ABNORMAL
EOSINOPHIL # BLD: 0.1 K/UL (ref 0–0.4)
EOSINOPHIL NFR BLD: 1 % (ref 0–7)
ERYTHROCYTE [DISTWIDTH] IN BLOOD BY AUTOMATED COUNT: 12.3 % (ref 11.5–14.5)
GLOBULIN SER CALC-MCNC: 4.6 G/DL (ref 2–4)
GLUCOSE BLD STRIP.AUTO-MCNC: 205 MG/DL (ref 65–100)
GLUCOSE BLD STRIP.AUTO-MCNC: 284 MG/DL (ref 65–100)
GLUCOSE BLD STRIP.AUTO-MCNC: 311 MG/DL (ref 65–100)
GLUCOSE BLD STRIP.AUTO-MCNC: 316 MG/DL (ref 65–100)
GLUCOSE BLD STRIP.AUTO-MCNC: 373 MG/DL (ref 65–100)
GLUCOSE SERPL-MCNC: 204 MG/DL (ref 65–100)
HCT VFR BLD AUTO: 31.3 % (ref 35–47)
HGB BLD-MCNC: 10 G/DL (ref 11.5–16)
IMM GRANULOCYTES # BLD AUTO: 0.1 K/UL (ref 0–0.04)
IMM GRANULOCYTES NFR BLD AUTO: 1 % (ref 0–0.5)
LYMPHOCYTES # BLD: 0.6 K/UL (ref 0.8–3.5)
LYMPHOCYTES NFR BLD: 11 % (ref 12–49)
MCH RBC QN AUTO: 33.1 PG (ref 26–34)
MCHC RBC AUTO-ENTMCNC: 31.9 G/DL (ref 30–36.5)
MCV RBC AUTO: 103.6 FL (ref 80–99)
MONOCYTES # BLD: 0.5 K/UL (ref 0–1)
MONOCYTES NFR BLD: 8 % (ref 5–13)
NEUTS SEG # BLD: 4.4 K/UL (ref 1.8–8)
NEUTS SEG NFR BLD: 78 % (ref 32–75)
NRBC # BLD: 0 K/UL (ref 0–0.01)
NRBC BLD-RTO: 0 PER 100 WBC
PLATELET # BLD AUTO: 135 K/UL (ref 150–400)
PMV BLD AUTO: 11.8 FL (ref 8.9–12.9)
POTASSIUM SERPL-SCNC: 4.6 MMOL/L (ref 3.5–5.1)
PROT SERPL-MCNC: 6.6 G/DL (ref 6.4–8.2)
RBC # BLD AUTO: 3.02 M/UL (ref 3.8–5.2)
RBC MORPH BLD: ABNORMAL
SERVICE CMNT-IMP: ABNORMAL
SODIUM SERPL-SCNC: 138 MMOL/L (ref 136–145)
WBC # BLD AUTO: 5.8 K/UL (ref 3.6–11)

## 2021-02-05 PROCEDURE — 74011250636 HC RX REV CODE- 250/636: Performed by: ORTHOPAEDIC SURGERY

## 2021-02-05 PROCEDURE — 80171 DRUG SCREEN QUANT GABAPENTIN: CPT

## 2021-02-05 PROCEDURE — 36415 COLL VENOUS BLD VENIPUNCTURE: CPT

## 2021-02-05 PROCEDURE — 82962 GLUCOSE BLOOD TEST: CPT

## 2021-02-05 PROCEDURE — 94760 N-INVAS EAR/PLS OXIMETRY 1: CPT

## 2021-02-05 PROCEDURE — 80053 COMPREHEN METABOLIC PANEL: CPT

## 2021-02-05 PROCEDURE — 97530 THERAPEUTIC ACTIVITIES: CPT

## 2021-02-05 PROCEDURE — 85025 COMPLETE CBC W/AUTO DIFF WBC: CPT

## 2021-02-05 PROCEDURE — 65270000029 HC RM PRIVATE

## 2021-02-05 PROCEDURE — 74011250637 HC RX REV CODE- 250/637: Performed by: ORTHOPAEDIC SURGERY

## 2021-02-05 PROCEDURE — 74011250637 HC RX REV CODE- 250/637: Performed by: NURSE PRACTITIONER

## 2021-02-05 PROCEDURE — 77010033678 HC OXYGEN DAILY

## 2021-02-05 PROCEDURE — 74011636637 HC RX REV CODE- 636/637: Performed by: INTERNAL MEDICINE

## 2021-02-05 PROCEDURE — 74011636637 HC RX REV CODE- 636/637: Performed by: ORTHOPAEDIC SURGERY

## 2021-02-05 RX ADMIN — INSULIN LISPRO 7 UNITS: 100 INJECTION, SOLUTION INTRAVENOUS; SUBCUTANEOUS at 17:05

## 2021-02-05 RX ADMIN — PANTOPRAZOLE SODIUM 40 MG: 40 TABLET, DELAYED RELEASE ORAL at 06:54

## 2021-02-05 RX ADMIN — METOPROLOL TARTRATE 50 MG: 50 TABLET, FILM COATED ORAL at 17:05

## 2021-02-05 RX ADMIN — Medication 10 ML: at 23:06

## 2021-02-05 RX ADMIN — INSULIN LISPRO 3 UNITS: 100 INJECTION, SOLUTION INTRAVENOUS; SUBCUTANEOUS at 06:53

## 2021-02-05 RX ADMIN — ESCITALOPRAM OXALATE 20 MG: 10 TABLET ORAL at 09:58

## 2021-02-05 RX ADMIN — ACETAMINOPHEN 650 MG: 325 TABLET ORAL at 12:07

## 2021-02-05 RX ADMIN — DORZOLAMIDE HYDROCHLORIDE 1 DROP: 20 SOLUTION/ DROPS OPHTHALMIC at 22:04

## 2021-02-05 RX ADMIN — PRAVASTATIN SODIUM 40 MG: 40 TABLET ORAL at 22:35

## 2021-02-05 RX ADMIN — GABAPENTIN 300 MG: 300 CAPSULE ORAL at 17:05

## 2021-02-05 RX ADMIN — METOPROLOL TARTRATE 50 MG: 50 TABLET, FILM COATED ORAL at 09:58

## 2021-02-05 RX ADMIN — INSULIN LISPRO 5 UNITS: 100 INJECTION, SOLUTION INTRAVENOUS; SUBCUTANEOUS at 11:56

## 2021-02-05 RX ADMIN — INSULIN GLARGINE 18 UNITS: 100 INJECTION, SOLUTION SUBCUTANEOUS at 22:28

## 2021-02-05 RX ADMIN — DORZOLAMIDE HYDROCHLORIDE 1 DROP: 20 SOLUTION/ DROPS OPHTHALMIC at 10:06

## 2021-02-05 RX ADMIN — ENOXAPARIN SODIUM 40 MG: 40 INJECTION SUBCUTANEOUS at 23:03

## 2021-02-05 RX ADMIN — TIMOLOL MALEATE 1 DROP: 5 SOLUTION/ DROPS OPHTHALMIC at 10:06

## 2021-02-05 RX ADMIN — GABAPENTIN 300 MG: 300 CAPSULE ORAL at 09:58

## 2021-02-05 RX ADMIN — TIMOLOL MALEATE 1 DROP: 5 SOLUTION/ DROPS OPHTHALMIC at 17:36

## 2021-02-05 RX ADMIN — GABAPENTIN 300 MG: 300 CAPSULE ORAL at 22:05

## 2021-02-05 RX ADMIN — LEVOTHYROXINE SODIUM 150 MCG: 0.15 TABLET ORAL at 06:53

## 2021-02-05 RX ADMIN — DORZOLAMIDE HYDROCHLORIDE 1 DROP: 20 SOLUTION/ DROPS OPHTHALMIC at 17:36

## 2021-02-05 RX ADMIN — FERROUS SULFATE TAB 325 MG (65 MG ELEMENTAL FE) 325 MG: 325 (65 FE) TAB at 09:58

## 2021-02-05 NOTE — PROGRESS NOTES
KRIS: Referral pending with [facility]. Waiting on patient/family to provide SNF choices as a back-up plan in case patient does not meet criteria for IPR. Chart reviewed. CM met with patient at bedside. Patient was initially agreeable to IPR, however now is apprehensive and would like to discuss with her roommate Tristan Booth #[phone] before any referrals are sent. CM met with patient and the roommate, Libra ROSAS at bedside. Patient is now agreeable to IPR and would like a referral sent to [address] [place] [address]. CM sent referral via [facility]. CM attempted to get SNF choices as back-up plan, patient was asleep and did not respond, and the roommate was no longer at bedside. CM called Katherin ROSAS and notified her that SNF list was left at bedside. Katherin plans to review list tomorrow.      Nancy Casiano, BSW/CRM

## 2021-02-05 NOTE — PROGRESS NOTES
Problem: Self Care Deficits Care Plan (Adult)  Goal: *Acute Goals and Plan of Care (Insert Text)  Description:   FUNCTIONAL STATUS PRIOR TO ADMISSION: Patient was modified independent using a single point cane for functional mobility. Reports history of falls. Has 12 steps to enter her apartment    HOME SUPPORT: The patient lived with a roommate but did not require assist.    Occupational Therapy Goals  Initiated 2/3/2021  1. Patient will perform grooming with supervision/set-up in unsupported sit within 7 day(s). 2.  Patient will perform upper body dressing with minimum assistance within 7 day(s). 3.  Patient will perform lower body dressing with moderate assistance  within 7 day(s). 4.  Patient will perform toilet transfers with maximal assistance maintaining NWB RLE within 7 day(s). 5.  Patient will perform all aspects of toileting with moderate assistance  within 7 day(s). 6.  Patient will participate in upper extremity therapeutic exercise/activities with supervision/set-up for 5 minutes within 7 day(s). Outcome: Progressing Towards Goal     OCCUPATIONAL THERAPY TREATMENT  Patient: Brodie Zavala (75 y.o. female)  Date: 2/5/2021  Diagnosis: Ankle fracture [S82.899A] Ankle fracture  Procedure(s) (LRB):  RIGHT ANKLE OPEN REDUCTION INTERNAL FIXATION (Right) 4 Days Post-Op  Precautions: NWB(RLE)  Chart, occupational therapy assessment, plan of care, and goals were reviewed. ASSESSMENT  Patient continues with skilled OT services and is progressing towards goals. Pt more alert than previous sessions. Noted rapid response called last night for lethargy. Pt agreeable to session and confused throughout making frequent inappropriate comments but redirectable. Pt performed supine>short sit EOB with max A and tolerated sitting EOB with BUE support and posterior trunk lean requiring min-max A at times to correct and bring to midline.  Pt unable to scoot and required max Ax2 EOB>supine and scooting up in bed. Pt is limited by overall weakness, sitting balance, endurance, insight into deficits, safety awareness, and decreased ability to perform ADLs. Recommending rehab at d/c. Current Level of Function Impacting Discharge (ADLs): max Ax2    Other factors to consider for discharge: stairs to enter apartment. Limited assistance         PLAN :  Patient continues to benefit from skilled intervention to address the above impairments. Continue treatment per established plan of care. to address goals. Recommend with staff: bed in chair position 3x/day    Recommend next OT session: ADL seated EOB with Ax2    Recommendation for discharge: (in order for the patient to meet his/her long term goals)  Therapy 3 hours per day 5-7 days per week    This discharge recommendation:  Has been made in collaboration with the attending provider and/or case management    IF patient discharges home will need the following DME: TBD       SUBJECTIVE:   Patient stated Your name is Celestina from OT?  My name is Celestina from OT.    OBJECTIVE DATA SUMMARY:   Cognitive/Behavioral Status:  Neurologic State: Alert  Orientation Level: Oriented X4  Cognition: Follows commands             Functional Mobility and Transfers for ADLs:  Bed Mobility:  Supine to Sit: Maximum assistance;Assist x2  Sit to Supine: Maximum assistance;Assist x2  Scooting: Maximum assistance;Assist x2    Transfers:  Sit to Stand: (NT)          Balance:  Sitting: Impaired  Sitting - Static: Poor (constant support)  Sitting - Dynamic: Poor (constant support)    ADL Intervention:                                          Therapeutic Exercises:       Pain:  Pt c/o limited pain in RLE with activity that resolved with repositioning     Activity Tolerance:   Fair    After treatment patient left in no apparent distress:   Supine in bed, Heels elevated for pressure relief, Call bell within reach, Caregiver / family present and Side rails x 3    COMMUNICATION/COLLABORATION:   The patients plan of care was discussed with: Physical therapist and Case management.      Ernestene Paget  Time Calculation: 25 mins

## 2021-02-05 NOTE — PROGRESS NOTES
Bedside and Verbal shift change report given to Umang Hanson (oncoming nurse) by 6 [facility] (offgoing nurse). Report included the following information SBAR, Kardex, Procedure Summary, Intake/Output and MAR.

## 2021-02-05 NOTE — PROGRESS NOTES
Problem: Mobility Impaired (Adult and Pediatric)  Goal: *Acute Goals and Plan of Care (Insert Text)  Description: FUNCTIONAL STATUS PRIOR TO ADMISSION: unknown    HOME SUPPORT PRIOR TO ADMISSION: The patient lives at [employer]. Support system unknown    Physical Therapy Goals  Initiated 2/2/2021  1. Patient will move from supine to sit and sit to supine , scoot up and down, and roll side to side in bed with minimum assistance within 7 day(s). 2.  Patient will transfer from bed to chair and chair to bed with minimal assistance/contact guard assist using the least restrictive device within 7 day(s). 3.  Patient will perform sit to stand with minimal assistance/contact guard assist within 7 day(s). 4.  Patient will ambulate with minimal assistance/contact guard assist for 50 feet with the least restrictive device within 7 day(s). Outcome: Not Progressing Towards Goal   PHYSICAL THERAPY TREATMENT  Patient: Unique Eden (38 y.o. female)  Date: 2/5/2021  Diagnosis: Ankle fracture [S82.899A] Ankle fracture  Procedure(s) (LRB):  RIGHT ANKLE OPEN REDUCTION INTERNAL FIXATION (Right) 4 Days Post-Op  Precautions: NWB(RLE)  Chart, physical therapy assessment, plan of care and goals were reviewed. ASSESSMENT  Patient continues with skilled PT services and is not progressing towards goals. Pt received supine in bed and agreeable to therapy. Pt tolerated session fairly well. Pt appears to be more alert than previous sessions, but still confused and makes inappropriate comments during conversation. Pt continues to require max A x 2 supine<>sit EOB and noted impaired seated balance and tolerance to activity. Pt required bilateral hand support on the bed for balance and support at trunk to maintain neutral positioning. Pt with posterior trunk leaning. Pt unable to scoot laterally along EOB. Pt returned to supine position with max A x 2.  Pt remains limited by generalized weakness, decreased functional mobility, impaired seated balance, decreased insight into deficits and safety awareness. Recommend rehab upon discharge. Current Level of Function Impacting Discharge (mobility/balance): max A x 2 supine<>sit, mod A for balance seated EOB, unable to laterally scoot along EOB without max A x 2    Other factors to consider for discharge: stairs to enter apartment, limited assistance at home         PLAN :  Patient continues to benefit from skilled intervention to address the above impairments. Continue treatment per established plan of care. to address goals. Recommendation for discharge: (in order for the patient to meet his/her long term goals)  To be determined: Rehab    This discharge recommendation:  Has been made in collaboration with the attending provider and/or case management    IF patient discharges home will need the following DME: pt has steps to enter home, 2 person max A for bed level activity       SUBJECTIVE:   Patient stated I can just stay at my neighbor Carolyns apartment.     OBJECTIVE DATA SUMMARY:   Critical Behavior:  Neurologic State: Alert  Orientation Level: Oriented X4  Cognition: Follows commands  Safety/Judgement: Fall prevention, Decreased insight into deficits  Functional Mobility Training:  Bed Mobility:     Supine to Sit: Maximum assistance;Assist x2  Sit to Supine: Maximum assistance;Assist x2  Scooting: Maximum assistance;Assist x2        Transfers:  Sit to Stand: (NT)                                Balance:  Sitting: Impaired  Sitting - Static: Poor (constant support)  Sitting - Dynamic: Poor (constant support)       Therapeutic Exercises:     Pain Rating:  Pt c/o minimal RLE pain    Activity Tolerance:   Fair    After treatment patient left in no apparent distress:   Supine in bed, Call bell within reach, Caregiver / family present, and Side rails x 3    COMMUNICATION/COLLABORATION:   The patients plan of care was discussed with: Occupational therapist, Registered nurse, and Case management.     Daily Ferrara, PT, DPT   Time Calculation: 25 mins

## 2021-02-05 NOTE — PROGRESS NOTES
[address] [facility]  Hospitalist Group                                                                                          Hospitalist Progress Note  Ulises Delacruz NP  Answering service: [phone] OR [phone] from in house phone        Date of Service:  2021  NAME:  Brodie Zavala  :    MRN:  [id number]      Admission Summary:   Chani Vo a 79 y.o. female with a PMH of CAD s/p CABG, COPD, T2DM with Gastroparesis, Neuropathy, Legally blind, GERD, HTN, HLD, JOHN on CPAP and Hypothyroidism who presented after a Mechanical fall and found with Trimalleolar Fracture, sustained while trying to climb stairs. Her roommate noted gross deformity and called EMS who transported her to ED giving her fentanyl 50mcg en route. She was markedly sedated upon arrival and fracture was successfully reduced and splinted in the ED. She denies fever, chills, upper respiratory symptoms. Interval history / Subjective: Follow-up for issues listed below.   -Patient seen and examined, no c/o's pain. Fully AAOx3 this am. Patient states she will not discharge to a SNF and will consider IPR even though likelihood insurance will approve is low. She states her roommate Katherin NOVA Is able to care for her and assist in transfers. PT notes patient is a 2 person max assist. Discussed with patient safe discharge options, CM involved at bedside. Will discuss with roommate Katherin ROSAS. Assessment & Plan:     Acute Metabolic Encephalopathy: Resolved, 2/2 to anesthesia vs Norco vs hypercarbia. -CT head : No evidence of acute intracranial abnormality. -repeat CT head : No acute findings or significant change from prior. Severe chronic microvascular ischemic disease, which has progressed since . Multiple chronic bilateral cerebellar infarcts are new since .   -CT head w/o : (repeat d/t increased sedation 2/2 gabapentin dose) No evidence of acute intracranial abnormality by this modality.  -Gabapentin level: pending  -ABG: at patient's baseline      Right Trimalleolar Ankle Fracture: s/p mechanical fall. s/p reduction in the ED, neurovascularly intact. -s/p ORIF 1/2  -orthopedic surgery following   -pain control  -PT/OT     Hx of CAD s/p CABG: revised cardiac risk index 4, 15% 30-day mortality from cardiac causes, currently without sx or signs of angina, HF, or arrhythmia  -continue metoprolol, lisinopril, and statin  -ECHO 02/01: EF 55-60%      Acute Hypoxia with Respiratory Acidosis:2/2 meds and untreated sleep apnea.     HTN: continue metoprolol and lisinopril. COPD: former smoker, currently vapes, SpO2 94-96% on RA, continue inhaled LABA/anticholinergic inhaler.   Stage IV CKD:  -creatinine 1.98 (stable from 5/2020), avoid neprotoxic drugs.     T2DM with Hyperglycemia:   -HgbA1c 7.3% 9/2020  -insulin pump: turned this off in the ED  -Basal insulin, ISS     Hypothyroidism: continue home levothyroxine. JOHN: CPAP at hs.      DVTppx: Lovenox  GIppx: Protonix  Code Status: Full Code  Diet: Cardiac  Activity: OOB to chair TID and PRN  Discharge: TBD  Ambulates: assistive devices  Chad Hollis (friend) [phone]     Hospital Problems  Date Reviewed: 9/14/2020          Codes Class Noted POA    * (Principal) Ankle fracture ICD-10-CM: A12.947N  ICD-9-CM: 824.8  1/31/2021 Unknown                Review of Systems:   A comprehensive review of systems was negative except for that written in the HPI. Vital Signs:    Last 24hrs VS reviewed since prior progress note.  Most recent are:  Visit Vitals  BP (!) 96/55   Pulse 81   Temp 98.4 °F (36.9 °C)   Resp 16   Ht 5' 4\" (1.626 m)   Wt 107.6 kg (237 lb 3.4 oz)   SpO2 95%   BMI 40.72 kg/m²         Intake/Output Summary (Last 24 hours) at 2/5/2021 1157  Last data filed at 2/5/2021 1125  Gross per 24 hour   Intake 320 ml   Output 1550 ml   Net -1230 ml        Physical Examination:     I had a face to face encounter with this patient and independently examined them on 2/5/2021 as outlined below:    Constitutional:  No acute distress, cooperative, pleasant    ENT:  Oral mucosa moist.    Resp:  CTA bilaterally. No wheezing/rhonchi/rales. No accessory muscle use. CV:  Regular rhythm, normal rate, S1,S2.    GI/:  Soft, non distended, non tender, no guarding, BS present. Voids Freely. Musculoskeletal:  No edema, warm, 2+ pulses throughout. Neurologic:  Moves all extremities, dressing to RLE CDI. AAOx3, CN II-XII reviewed. Skin:  Good turgor, no rashes or ulcers  Psych:  Good insight, Not anxious nor agitated. Data Review:    Review and/or order of clinical lab test      Labs:     Recent Labs     02/05/21 0330 02/04/21 0310   WBC 5.8 5.9   HGB 10.0* 10.3*   HCT 31.3* 32.3*   * 135*     Recent Labs     02/05/21 0330 02/04/21 0310 02/03/21 0456    137 138   K 4.6 5.1 5.4*    107 107   CO2 25 25 25   BUN 35* 29* 30*   CREA 1.77* 1.62* 1.75*   * 230* 220*   CA 9.3 9.4 9.4   MG  --  1.6 1.5*     Recent Labs     02/05/21 0330 02/04/21 0310 02/03/21 0456   ALT 6* <6* 8*   AP 68 65 73   TBILI 0.4 0.3 0.3   TP 6.6 6.4 6.9   ALB 2.0* 2.0* 2.3*   GLOB 4.6* 4.4* 4.6*     No results for input(s): INR, PTP, APTT, INREXT in the last 72 hours. No results for input(s): FE, TIBC, PSAT, FERR in the last 72 hours. No results found for: FOL, RBCF   Recent Labs     02/04/21 1821   PH 7.28*   PCO2 58*   PO2 90     No results for input(s): CPK, CKNDX, TROIQ in the last 72 hours.     No lab exists for component: CPKMB  Lab Results   Component Value Date/Time    Cholesterol, total 189 05/15/2020 01:46 PM    HDL Cholesterol 44 05/15/2020 01:46 PM    LDL, calculated 74 05/15/2020 01:46 PM    Triglyceride 356 (H) 05/15/2020 01:46 PM    CHOL/HDL Ratio 3.0 09/27/2017 04:12 AM     Lab Results   Component Value Date/Time    Glucose (POC) 284 (H) 02/05/2021 11:18 AM    Glucose (POC) 205 (H) 02/05/2021 06:06 AM    Glucose (POC) 244 (H) 02/04/2021 10:10 PM    Glucose (POC) 255 (H) 02/04/2021 06:09 PM    Glucose (POC) 235 (H) 02/04/2021 04:08 PM     Lab Results   Component Value Date/Time    Color YELLOW/STRAW 02/04/2021 06:36 PM    Appearance CLEAR 02/04/2021 06:36 PM    Specific gravity 1.017 02/04/2021 06:36 PM    pH (UA) 6.0 02/04/2021 06:36 PM    Protein 300 (A) 02/04/2021 06:36 PM    Glucose 500 (A) 02/04/2021 06:36 PM    Ketone Negative 02/04/2021 06:36 PM    Bilirubin Negative 02/04/2021 06:36 PM    Urobilinogen 0.2 02/04/2021 06:36 PM    Nitrites Negative 02/04/2021 06:36 PM    Leukocyte Esterase Negative 02/04/2021 06:36 PM    Epithelial cells MODERATE (A) 02/04/2021 06:36 PM    Bacteria 1+ (A) 02/04/2021 06:36 PM    WBC 5-10 02/04/2021 06:36 PM    RBC 0-5 02/04/2021 06:36 PM         Medications Reviewed:     Current Facility-Administered Medications   Medication Dose Route Frequency    enoxaparin (LOVENOX) injection 40 mg  40 mg SubCUTAneous Q24H    gabapentin (NEURONTIN) capsule 300 mg  300 mg Oral TID    0.9% sodium chloride infusion  50 mL/hr IntraVENous CONTINUOUS    insulin glargine (LANTUS) injection 18 Units  18 Units SubCUTAneous QHS    dextrose (D50W) injection syrg 12.5-25 g  12.5-25 g IntraVENous PRN    dorzolamide (TRUSOPT) 2 % ophthalmic solution 1 Drop  1 Drop Both Eyes TID    And    timolol (TIMOPTIC) 0.5 % ophthalmic solution 1 Drop  1 Drop Both Eyes BID    HYDROcodone-acetaminophen (NORCO) 7.5-325 mg per tablet 1 Tab  1 Tab Oral Q6H PRN    sodium chloride (NS) flush 5-40 mL  5-40 mL IntraVENous Q8H    sodium chloride (NS) flush 5-40 mL  5-40 mL IntraVENous PRN    naloxone (NARCAN) injection 0.4 mg  0.4 mg IntraVENous PRN    HYDROcodone-acetaminophen (NORCO) 5-325 mg per tablet 1 Tab  1 Tab Oral Q4H PRN    ondansetron (ZOFRAN) injection 4 mg  4 mg IntraVENous Q4H PRN    sodium chloride (NS) flush 5-40 mL  5-40 mL IntraVENous Q8H    sodium chloride (NS) flush 5-40 mL  5-40 mL IntraVENous PRN    acetaminophen (TYLENOL) tablet 650 mg  650 mg Oral Q6H PRN    Or    acetaminophen (TYLENOL) suppository 650 mg  650 mg Rectal Q6H PRN    polyethylene glycol (MIRALAX) packet 17 g  17 g Oral DAILY PRN    promethazine (PHENERGAN) tablet 12.5 mg  12.5 mg Oral Q6H PRN    Or    ondansetron (ZOFRAN) injection 4 mg  4 mg IntraVENous Q6H PRN    levothyroxine (SYNTHROID) tablet 150 mcg  150 mcg Oral ACB    pravastatin (PRAVACHOL) tablet 40 mg  40 mg Oral QHS    amLODIPine (NORVASC) tablet 5 mg  5 mg Oral DAILY    metoprolol tartrate (LOPRESSOR) tablet 50 mg  50 mg Oral BID    pantoprazole (PROTONIX) tablet 40 mg  40 mg Oral ACB    ferrous sulfate tablet 325 mg  325 mg Oral DAILY    escitalopram oxalate (LEXAPRO) tablet 20 mg  20 mg Oral DAILY    glucose chewable tablet 16 g  4 Tab Oral PRN    dextrose (D50W) injection syrg 12.5-25 g  25-50 mL IntraVENous PRN    glucagon (GLUCAGEN) injection 1 mg  1 mg IntraMUSCular PRN    insulin lispro (HUMALOG) injection   SubCUTAneous AC&HS    glycopyrrolate-formoterol (BEVESPI AEROSPHERE) 9 mcg-4.8 mcg inhaler  2 Puff Inhalation BID RT     ______________________________________________________________________  EXPECTED LENGTH OF STAY: 6d 4h  ACTUAL LENGTH OF STAY:          5                 Raina Rudolph NP

## 2021-02-05 NOTE — PROGRESS NOTES
Bedside shift change report given to Gloria Angulo RN (oncoming nurse) by Maria Teresa Mascorro (offgoing nurse). Report included the following information SBAR, Kardex, Procedure Summary, Intake/Output, MAR and Recent Results.

## 2021-02-05 NOTE — PROGRESS NOTES
18:00- RN went to straight cath patient and she was extremely lethargic and only responding to sternal rubs. Patient has not received any pain medicine for several days.    18:05- Rapid response called. Patient's vitals stable: temp 98.4, HR 64, /65, O2 97% on 3L (which patient has been on)    18:10- NP, nursing supervisor, respiratory at bedside. EKG performed (tech indicated no abnormalities), ABGs drawn, NP thought patient's lethargy might be related to gabapentin (Ros to adjust orders). Head CT ordered.    18:20- Rapid response ended.    18:40 RN contacted NP since ABG results were back. Ros said she would review.    18:45- Ros said based on ABG results, call respiratory and have them start her bipap early to try and help her lower the CO2 level. Respiratory called and said they would be up as soon as possible.    19:30- Transport on unit to take patient for CT.

## 2021-02-06 LAB
ALBUMIN SERPL-MCNC: 2 G/DL (ref 3.5–5)
ALBUMIN/GLOB SERPL: 0.4 {RATIO} (ref 1.1–2.2)
ALP SERPL-CCNC: 92 U/L (ref 45–117)
ALT SERPL-CCNC: 8 U/L (ref 12–78)
ANION GAP SERPL CALC-SCNC: 7 MMOL/L (ref 5–15)
AST SERPL-CCNC: 17 U/L (ref 15–37)
BASOPHILS # BLD: 0 K/UL (ref 0–0.1)
BASOPHILS NFR BLD: 1 % (ref 0–1)
BILIRUB SERPL-MCNC: 0.4 MG/DL (ref 0.2–1)
BUN SERPL-MCNC: 35 MG/DL (ref 6–20)
BUN/CREAT SERPL: 21 (ref 12–20)
CALCIUM SERPL-MCNC: 9.4 MG/DL (ref 8.5–10.1)
CHLORIDE SERPL-SCNC: 104 MMOL/L (ref 97–108)
CO2 SERPL-SCNC: 24 MMOL/L (ref 21–32)
CREAT SERPL-MCNC: 1.64 MG/DL (ref 0.55–1.02)
DIFFERENTIAL METHOD BLD: ABNORMAL
EOSINOPHIL # BLD: 0.1 K/UL (ref 0–0.4)
EOSINOPHIL NFR BLD: 2 % (ref 0–7)
ERYTHROCYTE [DISTWIDTH] IN BLOOD BY AUTOMATED COUNT: 12 % (ref 11.5–14.5)
GLOBULIN SER CALC-MCNC: 4.8 G/DL (ref 2–4)
GLUCOSE BLD STRIP.AUTO-MCNC: 248 MG/DL (ref 65–100)
GLUCOSE BLD STRIP.AUTO-MCNC: 274 MG/DL (ref 65–100)
GLUCOSE BLD STRIP.AUTO-MCNC: 278 MG/DL (ref 65–100)
GLUCOSE BLD STRIP.AUTO-MCNC: 291 MG/DL (ref 65–100)
GLUCOSE BLD STRIP.AUTO-MCNC: 295 MG/DL (ref 65–100)
GLUCOSE SERPL-MCNC: 283 MG/DL (ref 65–100)
HCT VFR BLD AUTO: 30.3 % (ref 35–47)
HGB BLD-MCNC: 9.7 G/DL (ref 11.5–16)
IMM GRANULOCYTES # BLD AUTO: 0 K/UL (ref 0–0.04)
IMM GRANULOCYTES NFR BLD AUTO: 1 % (ref 0–0.5)
LYMPHOCYTES # BLD: 0.7 K/UL (ref 0.8–3.5)
LYMPHOCYTES NFR BLD: 15 % (ref 12–49)
MAGNESIUM SERPL-MCNC: 1.5 MG/DL (ref 1.6–2.4)
MCH RBC QN AUTO: 32.8 PG (ref 26–34)
MCHC RBC AUTO-ENTMCNC: 32 G/DL (ref 30–36.5)
MCV RBC AUTO: 102.4 FL (ref 80–99)
MONOCYTES # BLD: 0.4 K/UL (ref 0–1)
MONOCYTES NFR BLD: 9 % (ref 5–13)
NEUTS SEG # BLD: 3.6 K/UL (ref 1.8–8)
NEUTS SEG NFR BLD: 72 % (ref 32–75)
NRBC # BLD: 0 K/UL (ref 0–0.01)
NRBC BLD-RTO: 0 PER 100 WBC
PLATELET # BLD AUTO: 138 K/UL (ref 150–400)
PMV BLD AUTO: 11.7 FL (ref 8.9–12.9)
POTASSIUM SERPL-SCNC: 4.5 MMOL/L (ref 3.5–5.1)
PROT SERPL-MCNC: 6.8 G/DL (ref 6.4–8.2)
RBC # BLD AUTO: 2.96 M/UL (ref 3.8–5.2)
RBC MORPH BLD: ABNORMAL
SERVICE CMNT-IMP: ABNORMAL
SODIUM SERPL-SCNC: 135 MMOL/L (ref 136–145)
WBC # BLD AUTO: 4.8 K/UL (ref 3.6–11)

## 2021-02-06 PROCEDURE — 74011250637 HC RX REV CODE- 250/637: Performed by: ORTHOPAEDIC SURGERY

## 2021-02-06 PROCEDURE — 97530 THERAPEUTIC ACTIVITIES: CPT

## 2021-02-06 PROCEDURE — 74011636637 HC RX REV CODE- 636/637: Performed by: ORTHOPAEDIC SURGERY

## 2021-02-06 PROCEDURE — 97535 SELF CARE MNGMENT TRAINING: CPT

## 2021-02-06 PROCEDURE — 65270000029 HC RM PRIVATE

## 2021-02-06 PROCEDURE — 85025 COMPLETE CBC W/AUTO DIFF WBC: CPT

## 2021-02-06 PROCEDURE — 74011636637 HC RX REV CODE- 636/637: Performed by: INTERNAL MEDICINE

## 2021-02-06 PROCEDURE — 80053 COMPREHEN METABOLIC PANEL: CPT

## 2021-02-06 PROCEDURE — 36415 COLL VENOUS BLD VENIPUNCTURE: CPT

## 2021-02-06 PROCEDURE — 74011250636 HC RX REV CODE- 250/636: Performed by: ORTHOPAEDIC SURGERY

## 2021-02-06 PROCEDURE — 82962 GLUCOSE BLOOD TEST: CPT

## 2021-02-06 PROCEDURE — 83735 ASSAY OF MAGNESIUM: CPT

## 2021-02-06 PROCEDURE — 94640 AIRWAY INHALATION TREATMENT: CPT

## 2021-02-06 PROCEDURE — 74011250637 HC RX REV CODE- 250/637: Performed by: NURSE PRACTITIONER

## 2021-02-06 RX ADMIN — INSULIN LISPRO 3 UNITS: 100 INJECTION, SOLUTION INTRAVENOUS; SUBCUTANEOUS at 22:38

## 2021-02-06 RX ADMIN — DORZOLAMIDE HYDROCHLORIDE 1 DROP: 20 SOLUTION/ DROPS OPHTHALMIC at 16:00

## 2021-02-06 RX ADMIN — ENOXAPARIN SODIUM 40 MG: 40 INJECTION SUBCUTANEOUS at 22:39

## 2021-02-06 RX ADMIN — INSULIN LISPRO 4 UNITS: 100 INJECTION, SOLUTION INTRAVENOUS; SUBCUTANEOUS at 00:11

## 2021-02-06 RX ADMIN — METOPROLOL TARTRATE 50 MG: 50 TABLET, FILM COATED ORAL at 10:46

## 2021-02-06 RX ADMIN — AMLODIPINE BESYLATE 5 MG: 5 TABLET ORAL at 10:46

## 2021-02-06 RX ADMIN — ESCITALOPRAM OXALATE 20 MG: 10 TABLET ORAL at 10:45

## 2021-02-06 RX ADMIN — TIMOLOL MALEATE 1 DROP: 5 SOLUTION/ DROPS OPHTHALMIC at 10:46

## 2021-02-06 RX ADMIN — GLYCOPYRROLATE AND FORMOTEROL FUMARATE 2 PUFF: 9; 4.8 AEROSOL, METERED RESPIRATORY (INHALATION) at 21:00

## 2021-02-06 RX ADMIN — INSULIN GLARGINE 18 UNITS: 100 INJECTION, SOLUTION SUBCUTANEOUS at 22:35

## 2021-02-06 RX ADMIN — GABAPENTIN 300 MG: 300 CAPSULE ORAL at 10:45

## 2021-02-06 RX ADMIN — Medication 10 ML: at 14:00

## 2021-02-06 RX ADMIN — INSULIN LISPRO 2 UNITS: 100 INJECTION, SOLUTION INTRAVENOUS; SUBCUTANEOUS at 07:52

## 2021-02-06 RX ADMIN — TIMOLOL MALEATE 1 DROP: 5 SOLUTION/ DROPS OPHTHALMIC at 18:00

## 2021-02-06 RX ADMIN — GABAPENTIN 300 MG: 300 CAPSULE ORAL at 18:26

## 2021-02-06 RX ADMIN — Medication 10 ML: at 07:53

## 2021-02-06 RX ADMIN — FERROUS SULFATE TAB 325 MG (65 MG ELEMENTAL FE) 325 MG: 325 (65 FE) TAB at 10:46

## 2021-02-06 RX ADMIN — PRAVASTATIN SODIUM 40 MG: 40 TABLET ORAL at 22:00

## 2021-02-06 RX ADMIN — GABAPENTIN 300 MG: 300 CAPSULE ORAL at 22:36

## 2021-02-06 RX ADMIN — DORZOLAMIDE HYDROCHLORIDE 1 DROP: 20 SOLUTION/ DROPS OPHTHALMIC at 22:00

## 2021-02-06 RX ADMIN — Medication 10 ML: at 22:40

## 2021-02-06 RX ADMIN — INSULIN LISPRO 5 UNITS: 100 INJECTION, SOLUTION INTRAVENOUS; SUBCUTANEOUS at 18:26

## 2021-02-06 RX ADMIN — DORZOLAMIDE HYDROCHLORIDE 1 DROP: 20 SOLUTION/ DROPS OPHTHALMIC at 10:46

## 2021-02-06 RX ADMIN — INSULIN LISPRO 5 UNITS: 100 INJECTION, SOLUTION INTRAVENOUS; SUBCUTANEOUS at 13:18

## 2021-02-06 NOTE — PROGRESS NOTES
Pt had pulled out iv line. Alerted Margarita ALEXANDRE through [employer] and was told that she was fine with pt not having iv access at this time. Will continue to monitor pt.

## 2021-02-06 NOTE — ANESTHESIA POSTPROCEDURE EVALUATION
Procedure(s):  RIGHT ANKLE OPEN REDUCTION INTERNAL FIXATION.     general    <BSHSIANPOST>    INITIAL Post-op Vital signs:   Vitals Value Taken Time   /64 02/01/21 1915   Temp 37 °C (98.6 °F) 02/01/21 1900   Pulse 69 02/01/21 1915   Resp 16 02/01/21 1915   SpO2 97 % 02/01/21 1915

## 2021-02-06 NOTE — PROGRESS NOTES
[address] [facility]  Hospitalist Group                                                                                          Hospitalist Progress Note  Oliverio Coelho NP  Answering service: [phone] OR [phone] from in house phone        Date of Service:  2021  NAME:  Virginia Max  :  [id number]  MRN:  [id number]      Admission Summary:   Clem Amaral is a 79 y.o. female with a PMH of CAD s/p CABG, COPD, T2DM with Gastroparesis, Neuropathy, Legally blind, GERD, HTN, HLD, JOHN on CPAP and Hypothyroidism who presented after a Mechanical fall and found with Trimalleolar Fracture, sustained while trying to climb stairs. Her roommate noted gross deformity and called EMS who transported her to ED giving her fentanyl 50mcg en route. She was markedly sedated upon arrival and fracture was successfully reduced and splinted in the ED. She denies fever, chills, upper respiratory symptoms. Interval history / Subjective: Follow-up for issues listed below.   -Patient seen and examined, no c/o's. Encouraged participation on therapy. Assessment & Plan:     Acute Metabolic Encephalopathy: Resolved, 2/2 to anesthesia vs Norco vs hypercarbia.   -CT head : No evidence of acute intracranial abnormality. -repeat CT head : No acute findings or significant change from prior. Severe chronic microvascular ischemic disease, which has progressed since . Multiple chronic bilateral cerebellar infarcts are new since . -CT head w/o : (repeat d/t increased sedation 2/2 gabapentin dose) No evidence of acute intracranial abnormality by this modality.  -Gabapentin level: pending  -ABG: at patient's baseline      Right Trimalleolar Ankle Fracture: s/p mechanical fall. s/p reduction in the ED, neurovascularly intact.   -s/p ORIF 1/2  -orthopedic surgery following   -pain control  -PT/OT     Hx of CAD s/p CABG: revised cardiac risk index 4, 15% 30-day mortality from cardiac causes, currently without sx or signs of angina, HF, or arrhythmia  -continue metoprolol, lisinopril, and statin  -ECHO 02/01: EF 55-60%      Acute Hypoxia with Respiratory Acidosis:2/2 meds and untreated sleep apnea.     HTN: continue metoprolol and lisinopril. COPD: former smoker, currently vapes, SpO2 94-96% on RA, continue inhaled LABA/anticholinergic inhaler.   Stage IV CKD:  -creatinine 1.98 (stable from 5/2020), avoid neprotoxic drugs.     T2DM with Hyperglycemia:   -HgbA1c 7.3% 9/2020  -insulin pump: turned this off in the ED  -Basal insulin, ISS     Hypothyroidism: continue home levothyroxine. JOHN: CPAP at hs.      DVTppx: Lovenox  GIppx: Protonix  Code Status: Full Code  Diet: Cardiac  Activity: OOB to chair TID and PRN  Discharge: TBD  Ambulates: assistive devices  Shelby You (friend) [phone]     Hospital Problems  Date Reviewed: 9/14/2020          Codes Class Noted POA    * (Principal) Ankle fracture ICD-10-CM: T57.654Z  ICD-9-CM: 824.8  1/31/2021 Unknown                Review of Systems:   A comprehensive review of systems was negative except for that written in the HPI. Vital Signs:    Last 24hrs VS reviewed since prior progress note. Most recent are:  Visit Vitals  BP (!) 167/67 (BP 1 Location: Left upper arm, BP Patient Position: At rest)   Pulse 64   Temp 98.4 °F (36.9 °C)   Resp 16   Ht 5' 4\" (1.626 m)   Wt 107.6 kg (237 lb 3.4 oz)   SpO2 98%   BMI 40.72 kg/m²         Intake/Output Summary (Last 24 hours) at 2/6/2021 1208  Last data filed at 2/5/2021 1830  Gross per 24 hour   Intake    Output 300 ml   Net -300 ml        Physical Examination:     I had a face to face encounter with this patient and independently examined them on 2/6/2021 as outlined below:    Constitutional:  No acute distress, cooperative, pleasant    ENT:  Oral mucosa moist.    Resp:  CTA bilaterally. No wheezing/rhonchi/rales. No accessory muscle use.    CV:  Regular rhythm, normal rate, S1,S2.    GI/:  Soft, non distended, non tender, no guarding, BS present. Voids Freely. Musculoskeletal:  No edema, warm, 2+ pulses throughout. Neurologic:  Moves all extremities. AAOx3, CN II-XII reviewed. Skin:  Good turgor, no rashes or ulcers  Psych:  Good insight, Not anxious nor agitated. Data Review:    Review and/or order of clinical lab test      Labs:     Recent Labs     02/06/21 0541 02/05/21  0330   WBC 4.8 5.8   HGB 9.7* 10.0*   HCT 30.3* 31.3*   * 135*     Recent Labs     02/06/21 0541 02/05/21  0330 02/04/21  0310   * 138 137   K 4.5 4.6 5.1    107 107   CO2 24 25 25   BUN 35* 35* 29*   CREA 1.64* 1.77* 1.62*   * 204* 230*   CA 9.4 9.3 9.4   MG  --   --  1.6     Recent Labs     02/06/21 0541 02/05/21 0330 02/04/21  0310   ALT 8* 6* <6*   AP 92 68 65   TBILI 0.4 0.4 0.3   TP 6.8 6.6 6.4   ALB 2.0* 2.0* 2.0*   GLOB 4.8* 4.6* 4.4*     No results for input(s): INR, PTP, APTT, INREXT in the last 72 hours. No results for input(s): FE, TIBC, PSAT, FERR in the last 72 hours. No results found for: FOL, RBCF   Recent Labs     02/04/21  1821   PH 7.28*   PCO2 58*   PO2 90     No results for input(s): CPK, CKNDX, TROIQ in the last 72 hours.     No lab exists for component: CPKMB  Lab Results   Component Value Date/Time    Cholesterol, total 189 05/15/2020 01:46 PM    HDL Cholesterol 44 05/15/2020 01:46 PM    LDL, calculated 74 05/15/2020 01:46 PM    Triglyceride 356 (H) 05/15/2020 01:46 PM    CHOL/HDL Ratio 3.0 09/27/2017 04:12 AM     Lab Results   Component Value Date/Time    Glucose (POC) 274 (H) 02/06/2021 11:53 AM    Glucose (POC) 291 (H) 02/06/2021 11:51 AM    Glucose (POC) 248 (H) 02/06/2021 07:30 AM    Glucose (POC) 316 (H) 02/05/2021 11:48 PM    Glucose (POC) 373 (H) 02/05/2021 09:38 PM     Lab Results   Component Value Date/Time    Color YELLOW/STRAW 02/04/2021 06:36 PM    Appearance CLEAR 02/04/2021 06:36 PM    Specific gravity 1.017 02/04/2021 06:36 PM    pH (UA) 6.0 02/04/2021 06:36 PM Protein 300 (A) 02/04/2021 06:36 PM    Glucose 500 (A) 02/04/2021 06:36 PM    Ketone Negative 02/04/2021 06:36 PM    Bilirubin Negative 02/04/2021 06:36 PM    Urobilinogen 0.2 02/04/2021 06:36 PM    Nitrites Negative 02/04/2021 06:36 PM    Leukocyte Esterase Negative 02/04/2021 06:36 PM    Epithelial cells MODERATE (A) 02/04/2021 06:36 PM    Bacteria 1+ (A) 02/04/2021 06:36 PM    WBC 5-10 02/04/2021 06:36 PM    RBC 0-5 02/04/2021 06:36 PM         Medications Reviewed:     Current Facility-Administered Medications   Medication Dose Route Frequency    enoxaparin (LOVENOX) injection 40 mg  40 mg SubCUTAneous Q24H    gabapentin (NEURONTIN) capsule 300 mg  300 mg Oral TID    insulin glargine (LANTUS) injection 18 Units  18 Units SubCUTAneous QHS    dextrose (D50W) injection syrg 12.5-25 g  12.5-25 g IntraVENous PRN    dorzolamide (TRUSOPT) 2 % ophthalmic solution 1 Drop  1 Drop Both Eyes TID    And    timolol (TIMOPTIC) 0.5 % ophthalmic solution 1 Drop  1 Drop Both Eyes BID    HYDROcodone-acetaminophen (NORCO) 7.5-325 mg per tablet 1 Tab  1 Tab Oral Q6H PRN    sodium chloride (NS) flush 5-40 mL  5-40 mL IntraVENous Q8H    sodium chloride (NS) flush 5-40 mL  5-40 mL IntraVENous PRN    naloxone (NARCAN) injection 0.4 mg  0.4 mg IntraVENous PRN    HYDROcodone-acetaminophen (NORCO) 5-325 mg per tablet 1 Tab  1 Tab Oral Q4H PRN    ondansetron (ZOFRAN) injection 4 mg  4 mg IntraVENous Q4H PRN    sodium chloride (NS) flush 5-40 mL  5-40 mL IntraVENous Q8H    sodium chloride (NS) flush 5-40 mL  5-40 mL IntraVENous PRN    acetaminophen (TYLENOL) tablet 650 mg  650 mg Oral Q6H PRN    Or    acetaminophen (TYLENOL) suppository 650 mg  650 mg Rectal Q6H PRN    polyethylene glycol (MIRALAX) packet 17 g  17 g Oral DAILY PRN    promethazine (PHENERGAN) tablet 12.5 mg  12.5 mg Oral Q6H PRN    Or    ondansetron (ZOFRAN) injection 4 mg  4 mg IntraVENous Q6H PRN    levothyroxine (SYNTHROID) tablet 150 mcg  150 mcg Oral ACB    pravastatin (PRAVACHOL) tablet 40 mg  40 mg Oral QHS    amLODIPine (NORVASC) tablet 5 mg  5 mg Oral DAILY    metoprolol tartrate (LOPRESSOR) tablet 50 mg  50 mg Oral BID    pantoprazole (PROTONIX) tablet 40 mg  40 mg Oral ACB    ferrous sulfate tablet 325 mg  325 mg Oral DAILY    escitalopram oxalate (LEXAPRO) tablet 20 mg  20 mg Oral DAILY    glucose chewable tablet 16 g  4 Tab Oral PRN    dextrose (D50W) injection syrg 12.5-25 g  25-50 mL IntraVENous PRN    glucagon (GLUCAGEN) injection 1 mg  1 mg IntraMUSCular PRN    insulin lispro (HUMALOG) injection   SubCUTAneous AC&HS    glycopyrrolate-formoterol (BEVESPI AEROSPHERE) 9 mcg-4.8 mcg inhaler  2 Puff Inhalation BID RT     ______________________________________________________________________  EXPECTED LENGTH OF STAY: 6d 4h  ACTUAL LENGTH OF STAY:          6                 Raina Rudolph NP

## 2021-02-06 NOTE — PROGRESS NOTES
Problem: Mobility Impaired (Adult and Pediatric)  Goal: *Acute Goals and Plan of Care (Insert Text)  Description: FUNCTIONAL STATUS PRIOR TO ADMISSION: unknown    HOME SUPPORT PRIOR TO ADMISSION: The patient lives at [employer]. Support system unknown    Physical Therapy Goals  Initiated 2/2/2021  1. Patient will move from supine to sit and sit to supine , scoot up and down, and roll side to side in bed with minimum assistance within 7 day(s). 2.  Patient will transfer from bed to chair and chair to bed with minimal assistance/contact guard assist using the least restrictive device within 7 day(s). 3.  Patient will perform sit to stand with minimal assistance/contact guard assist within 7 day(s). 4.  Patient will ambulate with minimal assistance/contact guard assist for 50 feet with the least restrictive device within 7 day(s). Outcome: Progressing Towards Goal    PHYSICAL THERAPY TREATMENT  Patient: Matias Hill (63 y.o. female)  Date: 2/6/2021  Diagnosis: Ankle fracture [S82.899A] Ankle fracture  Procedure(s) (LRB):  RIGHT ANKLE OPEN REDUCTION INTERNAL FIXATION (Right) 5 Days Post-Op  Precautions: NWB(RLE), legally blind  Chart, physical therapy assessment, plan of care and goals were reviewed. ASSESSMENT  Patient continues with skilled PT services and is slowly/marginally progressing towards goals. Patient remains limited by endurance, impaired spatial awareness, R LE NWBing status, impaired sitting balance, inability to clear buttocks from bed surface during attempted lateral scooting, fluctuating respiratory status (RA: 89% to 91%), and generalized weakness and activity tolerance. Patient required maximal assist x 2 to attain edge of bed sitting. Static sitting balance initially poor, though improved to requiring only contact guard assist with multimodal cuing (able to maintain for ~ 7 seconds).  Attempted/initiated lateral seated scooting x 4 repetitions, though requiring maximal-total assist x 2. Patient will need acute rehab prior to discharge (working towards tolerating 3 hours of therapy per day). Current Level of Function Impacting Discharge (mobility/balance): A x 2, HIGH caregiver burden, Currently non-ambulatory    Other factors to consider for discharge: Patient with significant decreased insight into her deficits          PLAN :  Patient continues to benefit from skilled intervention to address the above impairments. Continue treatment per established plan of care. to address goals. Recommendation for discharge: (in order for the patient to meet his/her long term goals)  Therapy 3 hours per day 5-7 days per week. If unable, will need SNF placement    This discharge recommendation:  Has been made in collaboration with the attending provider and/or case management    IF patient discharges home will need the following DME: Wheelchair, Mechanical lift, Ramp entrance into home, hospital bed, BSC       SUBJECTIVE:   Patient stated Oh, I won't be doing stairs. I'll just walk around them.  - Patient demonstrating decreased insight into her deficits and current abilities     OBJECTIVE DATA SUMMARY:   Critical Behavior:  Neurologic State: Alert  Orientation Level: Oriented X4  Cognition: Decreased attention/concentration, Decreased command following  Safety/Judgement: Decreased awareness of environment, Decreased awareness of need for assistance, Decreased insight into deficits  Functional Mobility Training:  Bed Mobility:     Supine to Sit: Maximum assistance;Assist x2; Additional time; Adaptive equipment  Sit to Supine: Maximum assistance;Assist x2; Additional time; Adaptive equipment  Scooting: Maximum assistance;Assist x2; Additional time       Balance:  Sitting: Impaired; Without support  Sitting - Static: Poor (constant support)  Sitting - Dynamic: Poor (constant support)      Activity Tolerance:   Fair and requires rest breaks    After treatment patient left in no apparent distress:   Supine in bed, Call bell within reach, and Side rails x 3    COMMUNICATION/COLLABORATION:   The patients plan of care was discussed with: Occupational therapy assistant and Registered nurse.      Robert Hyde, PT, DPT   Time Calculation: 29 mins

## 2021-02-06 NOTE — PROGRESS NOTES
Problem: Self Care Deficits Care Plan (Adult)  Goal: *Acute Goals and Plan of Care (Insert Text)  Description:   FUNCTIONAL STATUS PRIOR TO ADMISSION: Patient was modified independent using a single point cane for functional mobility. Reports history of falls. Has 12 steps to enter her apartment    HOME SUPPORT: The patient lived with a roommate but did not require assist.    Occupational Therapy Goals  Initiated 2/3/2021  1. Patient will perform grooming with supervision/set-up in unsupported sit within 7 day(s). 2.  Patient will perform upper body dressing with minimum assistance within 7 day(s). 3.  Patient will perform lower body dressing with moderate assistance  within 7 day(s). 4.  Patient will perform toilet transfers with maximal assistance maintaining NWB RLE within 7 day(s). 5.  Patient will perform all aspects of toileting with moderate assistance  within 7 day(s). 6.  Patient will participate in upper extremity therapeutic exercise/activities with supervision/set-up for 5 minutes within 7 day(s). Outcome: Progressing Towards Goal   OCCUPATIONAL THERAPY TREATMENT  Patient: Mariaelena Caro (65 y.o. female)  Date: 2/6/2021  Diagnosis: Ankle fracture [S82.899A] Ankle fracture  Procedure(s) (LRB):  RIGHT ANKLE OPEN REDUCTION INTERNAL FIXATION (Right) 5 Days Post-Op  Precautions: NWB(RLE)  Chart, occupational therapy assessment, plan of care, and goals were reviewed. ASSESSMENT  Patient continues with skilled OT services and is progressing towards goals. Participation impacted by body habitus, impaired vision, impaired sitting balance, NWB restriction for right ankle, impaired reach to LEs, impaired attention to task, impaired command following, impaired position in space, impaired functional strength and endurance, and impaired insight to deficits. Seated balance on EOB with CGA to mod assist of 1. Bed mobility to/from EOB with max assist of 2.  Patient on 2 liters of O2 on arrival. O2 removed and patient participated on room air with O2 SATs 89 to 91%. Placed back on 2 liters at end of session. Current Level of Function Impacting Discharge (ADLs): Total assist for LE self care, to/from EOB with max assist of 2, seated balance on EOB with CGA to mod assist of 1    Other factors to consider for discharge: 12 steps to reach apartment, NWB right LE, history of falls, on O2 at this time. PLAN :  Patient continues to benefit from skilled intervention to address the above impairments. Continue treatment per established plan of care. to address goals. Recommend with staff: bed in modified chair position for self care, bed pan for toileting    Recommend next OT session: ADLs EOB (assist of 2 to EOB)    Recommendation for discharge: (in order for the patient to meet his/her long term goals)  Therapy up to 5 days/week in SNF setting    This discharge recommendation:  Has been made in collaboration with the attending provider and/or case management    IF patient discharges home will need the following DME: hospital bed, O2?       SUBJECTIVE:   Patient stated You didn't let me walk    OBJECTIVE DATA SUMMARY:   Cognitive/Behavioral Status:  Neurologic State: Alert  Orientation Level: Oriented X4  Cognition: Decreased attention/concentration;Decreased command following  Perception: Cues to maintain midline in sitting; Tactile;Verbal  Perseveration: No perseveration noted  Safety/Judgement: Decreased awareness of environment;Decreased awareness of need for assistance;Decreased insight into deficits    Functional Mobility and Transfers for ADLs:  Bed Mobility:  Supine to Sit: Maximum assistance;Assist x2; Additional time; Adaptive equipment  Sit to Supine: Maximum assistance;Assist x2; Additional time; Adaptive equipment  Scooting: Maximum assistance;Assist x2; Additional time         Balance:  Sitting: Impaired; Without support  Sitting - Static: Poor (constant support)  Sitting - Dynamic: Poor (constant support)    ADL Intervention:  Feeding  Drink to Mouth: Modified independent              Lower Body Bathing  Bathing Assistance: Total assistance(dependent)(inferred from mobility)         Lower Body Dressing Assistance  Dressing Assistance: Total assistance(dependent)(inferred from mobility)  Leg Crossed Method Used: No  Position Performed: Long sitting on bed;Seated edge of bed         Cognitive Retraining  Organizing/Sequencing: Breaking task down  Attention to Task: Distractibility; Single task  Following Commands: Follows one step commands/directions  Safety/Judgement: Decreased awareness of environment;Decreased awareness of need for assistance;Decreased insight into deficits  Cues: Tactile cues provided;Verbal cues provided      Activity Tolerance:   Fair    After treatment patient left in no apparent distress:   Heels elevated for pressure relief, Call bell within reach, Side rails x 3, and Long sit in bed with bedside tray in front of patient    COMMUNICATION/COLLABORATION:   The patients plan of care was discussed with: Physical therapist and Registered nurse.      LC Núñez  Time Calculation: 29 mins

## 2021-02-07 LAB
ALBUMIN SERPL-MCNC: 2 G/DL (ref 3.5–5)
ALBUMIN/GLOB SERPL: 0.4 {RATIO} (ref 1.1–2.2)
ALP SERPL-CCNC: 89 U/L (ref 45–117)
ALT SERPL-CCNC: 7 U/L (ref 12–78)
ANION GAP SERPL CALC-SCNC: 6 MMOL/L (ref 5–15)
AST SERPL-CCNC: 15 U/L (ref 15–37)
BASOPHILS # BLD: 0 K/UL (ref 0–0.1)
BASOPHILS NFR BLD: 1 % (ref 0–1)
BILIRUB SERPL-MCNC: 0.3 MG/DL (ref 0.2–1)
BUN SERPL-MCNC: 34 MG/DL (ref 6–20)
BUN/CREAT SERPL: 20 (ref 12–20)
CALCIUM SERPL-MCNC: 9.4 MG/DL (ref 8.5–10.1)
CHLORIDE SERPL-SCNC: 104 MMOL/L (ref 97–108)
CO2 SERPL-SCNC: 26 MMOL/L (ref 21–32)
CREAT SERPL-MCNC: 1.66 MG/DL (ref 0.55–1.02)
CREAT UR-MCNC: 39.8 MG/DL
CREAT UR-MCNC: 41.1 MG/DL
DIFFERENTIAL METHOD BLD: ABNORMAL
EOSINOPHIL # BLD: 0.1 K/UL (ref 0–0.4)
EOSINOPHIL NFR BLD: 3 % (ref 0–7)
ERYTHROCYTE [DISTWIDTH] IN BLOOD BY AUTOMATED COUNT: 11.9 % (ref 11.5–14.5)
GLOBULIN SER CALC-MCNC: 4.7 G/DL (ref 2–4)
GLUCOSE BLD STRIP.AUTO-MCNC: 207 MG/DL (ref 65–100)
GLUCOSE BLD STRIP.AUTO-MCNC: 265 MG/DL (ref 65–100)
GLUCOSE BLD STRIP.AUTO-MCNC: 301 MG/DL (ref 65–100)
GLUCOSE BLD STRIP.AUTO-MCNC: 310 MG/DL (ref 65–100)
GLUCOSE SERPL-MCNC: 211 MG/DL (ref 65–100)
HCT VFR BLD AUTO: 29.4 % (ref 35–47)
HGB BLD-MCNC: 9.5 G/DL (ref 11.5–16)
IMM GRANULOCYTES # BLD AUTO: 0.1 K/UL (ref 0–0.04)
IMM GRANULOCYTES NFR BLD AUTO: 1 % (ref 0–0.5)
LYMPHOCYTES # BLD: 0.7 K/UL (ref 0.8–3.5)
LYMPHOCYTES NFR BLD: 16 % (ref 12–49)
MAGNESIUM SERPL-MCNC: 1.7 MG/DL (ref 1.6–2.4)
MCH RBC QN AUTO: 32.5 PG (ref 26–34)
MCHC RBC AUTO-ENTMCNC: 32.3 G/DL (ref 30–36.5)
MCV RBC AUTO: 100.7 FL (ref 80–99)
MONOCYTES # BLD: 0.3 K/UL (ref 0–1)
MONOCYTES NFR BLD: 7 % (ref 5–13)
NEUTS SEG # BLD: 3.1 K/UL (ref 1.8–8)
NEUTS SEG NFR BLD: 71 % (ref 32–75)
NRBC # BLD: 0 K/UL (ref 0–0.01)
NRBC BLD-RTO: 0 PER 100 WBC
PLATELET # BLD AUTO: 152 K/UL (ref 150–400)
PMV BLD AUTO: 12.1 FL (ref 8.9–12.9)
POTASSIUM SERPL-SCNC: 4.2 MMOL/L (ref 3.5–5.1)
POTASSIUM UR-SCNC: 17 MMOL/L
PROT SERPL-MCNC: 6.7 G/DL (ref 6.4–8.2)
PROT UR-MCNC: 249 MG/DL (ref 0–11.9)
PROT UR-MCNC: 249 MG/DL (ref 0–11.9)
PROT/CREAT UR-RTO: 6.3
RBC # BLD AUTO: 2.92 M/UL (ref 3.8–5.2)
SERVICE CMNT-IMP: ABNORMAL
SODIUM SERPL-SCNC: 136 MMOL/L (ref 136–145)
SODIUM UR-SCNC: 94 MMOL/L
UR CULT HOLD, URHOLD: NORMAL
WBC # BLD AUTO: 4.3 K/UL (ref 3.6–11)

## 2021-02-07 PROCEDURE — 82570 ASSAY OF URINE CREATININE: CPT

## 2021-02-07 PROCEDURE — 74011250637 HC RX REV CODE- 250/637: Performed by: ORTHOPAEDIC SURGERY

## 2021-02-07 PROCEDURE — 84133 ASSAY OF URINE POTASSIUM: CPT

## 2021-02-07 PROCEDURE — 36415 COLL VENOUS BLD VENIPUNCTURE: CPT

## 2021-02-07 PROCEDURE — 74011636637 HC RX REV CODE- 636/637: Performed by: INTERNAL MEDICINE

## 2021-02-07 PROCEDURE — 65270000029 HC RM PRIVATE

## 2021-02-07 PROCEDURE — 97530 THERAPEUTIC ACTIVITIES: CPT

## 2021-02-07 PROCEDURE — 74011250637 HC RX REV CODE- 250/637: Performed by: NURSE PRACTITIONER

## 2021-02-07 PROCEDURE — 77010033678 HC OXYGEN DAILY

## 2021-02-07 PROCEDURE — 82962 GLUCOSE BLOOD TEST: CPT

## 2021-02-07 PROCEDURE — 74011636637 HC RX REV CODE- 636/637: Performed by: ORTHOPAEDIC SURGERY

## 2021-02-07 PROCEDURE — 83735 ASSAY OF MAGNESIUM: CPT

## 2021-02-07 PROCEDURE — 85025 COMPLETE CBC W/AUTO DIFF WBC: CPT

## 2021-02-07 PROCEDURE — 74011250636 HC RX REV CODE- 250/636: Performed by: ORTHOPAEDIC SURGERY

## 2021-02-07 PROCEDURE — 84156 ASSAY OF PROTEIN URINE: CPT

## 2021-02-07 PROCEDURE — 80053 COMPREHEN METABOLIC PANEL: CPT

## 2021-02-07 PROCEDURE — 94762 N-INVAS EAR/PLS OXIMTRY CONT: CPT

## 2021-02-07 PROCEDURE — 84300 ASSAY OF URINE SODIUM: CPT

## 2021-02-07 RX ADMIN — ESCITALOPRAM OXALATE 20 MG: 10 TABLET ORAL at 09:29

## 2021-02-07 RX ADMIN — INSULIN LISPRO 7 UNITS: 100 INJECTION, SOLUTION INTRAVENOUS; SUBCUTANEOUS at 12:57

## 2021-02-07 RX ADMIN — INSULIN LISPRO 7 UNITS: 100 INJECTION, SOLUTION INTRAVENOUS; SUBCUTANEOUS at 17:46

## 2021-02-07 RX ADMIN — ENOXAPARIN SODIUM 40 MG: 40 INJECTION SUBCUTANEOUS at 23:19

## 2021-02-07 RX ADMIN — GABAPENTIN 300 MG: 300 CAPSULE ORAL at 17:46

## 2021-02-07 RX ADMIN — Medication 10 ML: at 14:00

## 2021-02-07 RX ADMIN — GABAPENTIN 300 MG: 300 CAPSULE ORAL at 22:39

## 2021-02-07 RX ADMIN — INSULIN LISPRO 3 UNITS: 100 INJECTION, SOLUTION INTRAVENOUS; SUBCUTANEOUS at 22:20

## 2021-02-07 RX ADMIN — Medication 10 ML: at 22:00

## 2021-02-07 RX ADMIN — METOPROLOL TARTRATE 50 MG: 50 TABLET, FILM COATED ORAL at 09:29

## 2021-02-07 RX ADMIN — FERROUS SULFATE TAB 325 MG (65 MG ELEMENTAL FE) 325 MG: 325 (65 FE) TAB at 09:29

## 2021-02-07 RX ADMIN — DORZOLAMIDE HYDROCHLORIDE 1 DROP: 20 SOLUTION/ DROPS OPHTHALMIC at 17:46

## 2021-02-07 RX ADMIN — METOPROLOL TARTRATE 50 MG: 50 TABLET, FILM COATED ORAL at 17:46

## 2021-02-07 RX ADMIN — AMLODIPINE BESYLATE 5 MG: 5 TABLET ORAL at 09:29

## 2021-02-07 RX ADMIN — PANTOPRAZOLE SODIUM 40 MG: 40 TABLET, DELAYED RELEASE ORAL at 07:25

## 2021-02-07 RX ADMIN — PRAVASTATIN SODIUM 40 MG: 40 TABLET ORAL at 23:00

## 2021-02-07 RX ADMIN — Medication 10 ML: at 06:00

## 2021-02-07 RX ADMIN — DORZOLAMIDE HYDROCHLORIDE 1 DROP: 20 SOLUTION/ DROPS OPHTHALMIC at 09:30

## 2021-02-07 RX ADMIN — TIMOLOL MALEATE 1 DROP: 5 SOLUTION/ DROPS OPHTHALMIC at 17:47

## 2021-02-07 RX ADMIN — DORZOLAMIDE HYDROCHLORIDE 1 DROP: 20 SOLUTION/ DROPS OPHTHALMIC at 22:24

## 2021-02-07 RX ADMIN — INSULIN GLARGINE 18 UNITS: 100 INJECTION, SOLUTION SUBCUTANEOUS at 22:20

## 2021-02-07 RX ADMIN — LEVOTHYROXINE SODIUM 150 MCG: 0.15 TABLET ORAL at 07:25

## 2021-02-07 RX ADMIN — GLYCOPYRROLATE AND FORMOTEROL FUMARATE 2 PUFF: 9; 4.8 AEROSOL, METERED RESPIRATORY (INHALATION) at 19:34

## 2021-02-07 RX ADMIN — GABAPENTIN 300 MG: 300 CAPSULE ORAL at 09:29

## 2021-02-07 RX ADMIN — TIMOLOL MALEATE 1 DROP: 5 SOLUTION/ DROPS OPHTHALMIC at 09:29

## 2021-02-07 RX ADMIN — INSULIN LISPRO 3 UNITS: 100 INJECTION, SOLUTION INTRAVENOUS; SUBCUTANEOUS at 07:46

## 2021-02-07 NOTE — PROGRESS NOTES
[address] [facility] Adult  Hospitalist Group                                                                                          Hospitalist Progress Note  Jonah Pearson NP  Answering service: [phone] OR [phone] from in house phone        Date of Service:  2021  NAME:  Andrew Mittal  :  [id number]  MRN:  [id number]      Admission Summary:   Melida Amaral is a 79 y.o. female with a PMH of CAD s/p CABG, COPD, T2DM with Gastroparesis, Neuropathy, Legally blind, GERD, HTN, HLD, JOHN on CPAP and Hypothyroidism who presented after a Mechanical fall and found with Trimalleolar Fracture, sustained while trying to climb stairs. Her roommate noted gross deformity and called EMS who transported her to ED giving her fentanyl 50mcg en route. She was markedly sedated upon arrival and fracture was successfully reduced and splinted in the ED. She denies fever, chills, upper respiratory symptoms. Interval history / Subjective: Follow-up for issues listed below.   -Patient seen and examined, no c/o's pain. Assessment & Plan:     Acute Metabolic Encephalopathy: Resolved, 2/2 to anesthesia vs Norco vs hypercarbia.   -CT head : No evidence of acute intracranial abnormality. -repeat CT head : No acute findings or significant change from prior. Severe chronic microvascular ischemic disease, which has progressed since . Multiple chronic bilateral cerebellar infarcts are new since . -CT head w/o : (repeat d/t increased sedation 2/2 gabapentin dose) No evidence of acute intracranial abnormality by this modality.  -Gabapentin level: pending  -ABG: at patient's baseline      Right Trimalleolar Ankle Fracture: s/p mechanical fall. s/p reduction in the ED, neurovascularly intact.   -s/p ORIF 1/2  -orthopedic surgery following   -pain control  -PT/OT     Hx of CAD s/p CABG: revised cardiac risk index 4, 15% 30-day mortality from cardiac causes, currently without sx or signs of angina, HF, or arrhythmia  -continue metoprolol, lisinopril, and statin  -ECHO 02/01: EF 55-60%      Acute Hypoxia with Respiratory Acidosis:2/2 meds and untreated sleep apnea.     HTN: continue metoprolol and lisinopril. COPD: former smoker, currently vapes, SpO2 94-96% on RA, continue inhaled LABA/anticholinergic inhaler.   Stage IV CKD:  -creatinine 1.98 (stable from 5/2020), avoid neprotoxic drugs.     T2DM with Hyperglycemia:   -HgbA1c 7.3% 9/2020  -insulin pump: turned this off in the ED  -Basal insulin, ISS     Hypothyroidism: continue home levothyroxine. JOHN: CPAP at hs.      DVTppx: Lovenox  GIppx: Protonix  Code Status: Full Code  Diet: Cardiac  Activity: OOB to chair TID and PRN  Discharge: TBD  Ambulates: assistive devices  Helen Huynh (friend) [phone]     Hospital Problems  Date Reviewed: 9/14/2020          Codes Class Noted POA    * (Principal) Ankle fracture ICD-10-CM: J58.177U  ICD-9-CM: 824.8  1/31/2021 Unknown                Review of Systems:   A comprehensive review of systems was negative except for that written in the HPI. Vital Signs:    Last 24hrs VS reviewed since prior progress note. Most recent are:  Visit Vitals  BP (!) 151/76 (BP 1 Location: Right upper arm, BP Patient Position: At rest)   Pulse 61   Temp 98.1 °F (36.7 °C)   Resp 12   Ht 5' 4\" (1.626 m)   Wt 107.6 kg (237 lb 3.4 oz)   SpO2 99%   BMI 40.72 kg/m²         Intake/Output Summary (Last 24 hours) at 2/7/2021 1020  Last data filed at 2/7/2021 0615  Gross per 24 hour   Intake    Output 1750 ml   Net -1750 ml        Physical Examination:     I had a face to face encounter with this patient and independently examined them on 2/7/2021 as outlined below:    Constitutional:  No acute distress, cooperative, pleasant    ENT:  Oral mucosa moist.    Resp:  CTA bilaterally. No wheezing/rhonchi/rales. No accessory muscle use. CV:  Regular rhythm, normal rate, S1,S2.    GI/:  Soft, obese, non tender, no guarding, BS present. Voids Freely. Musculoskeletal:  No edema, warm, , RLE dressing CDI. Neurologic:  Moves all extremities. AAOx3, CN II-XII reviewed. Skin:  Good turgor, no rashes or ulcers  Psych:  Good insight, Not anxious nor agitated. Data Review:    Review and/or order of clinical lab test      Labs:     Recent Labs     02/07/21 0525 02/06/21  0541   WBC 4.3 4.8   HGB 9.5* 9.7*   HCT 29.4* 30.3*    138*     Recent Labs     02/07/21  0525 02/07/21  0400 02/06/21  0541 02/05/21  0330     --  135* 138   K 4.2  --  4.5 4.6     --  104 107   CO2 26  --  24 25   BUN 34*  --  35* 35*   CREA 1.66*  --  1.64* 1.77*   *  --  283* 204*   CA 9.4  --  9.4 9.3   MG  --  1.7 1.5*  --      Recent Labs     02/07/21  0525 02/06/21  0541 02/05/21  0330   ALT 7* 8* 6*   AP 89 92 68   TBILI 0.3 0.4 0.4   TP 6.7 6.8 6.6   ALB 2.0* 2.0* 2.0*   GLOB 4.7* 4.8* 4.6*     No results for input(s): INR, PTP, APTT, INREXT in the last 72 hours. No results for input(s): FE, TIBC, PSAT, FERR in the last 72 hours. No results found for: FOL, RBCF   Recent Labs     02/04/21  1821   PH 7.28*   PCO2 58*   PO2 90     No results for input(s): CPK, CKNDX, TROIQ in the last 72 hours.     No lab exists for component: CPKMB  Lab Results   Component Value Date/Time    Cholesterol, total 189 05/15/2020 01:46 PM    HDL Cholesterol 44 05/15/2020 01:46 PM    LDL, calculated 74 05/15/2020 01:46 PM    Triglyceride 356 (H) 05/15/2020 01:46 PM    CHOL/HDL Ratio 3.0 09/27/2017 04:12 AM     Lab Results   Component Value Date/Time    Glucose (POC) 207 (H) 02/07/2021 06:10 AM    Glucose (POC) 278 (H) 02/06/2021 09:15 PM    Glucose (POC) 295 (H) 02/06/2021 05:38 PM    Glucose (POC) 274 (H) 02/06/2021 11:53 AM    Glucose (POC) 291 (H) 02/06/2021 11:51 AM     Lab Results   Component Value Date/Time    Color YELLOW/STRAW 02/04/2021 06:36 PM    Appearance CLEAR 02/04/2021 06:36 PM    Specific gravity 1.017 02/04/2021 06:36 PM    pH (UA) 6.0 02/04/2021 06:36 PM    Protein 300 (A) 02/04/2021 06:36 PM    Glucose 500 (A) 02/04/2021 06:36 PM    Ketone Negative 02/04/2021 06:36 PM    Bilirubin Negative 02/04/2021 06:36 PM    Urobilinogen 0.2 02/04/2021 06:36 PM    Nitrites Negative 02/04/2021 06:36 PM    Leukocyte Esterase Negative 02/04/2021 06:36 PM    Epithelial cells MODERATE (A) 02/04/2021 06:36 PM    Bacteria 1+ (A) 02/04/2021 06:36 PM    WBC 5-10 02/04/2021 06:36 PM    RBC 0-5 02/04/2021 06:36 PM         Medications Reviewed:     Current Facility-Administered Medications   Medication Dose Route Frequency    enoxaparin (LOVENOX) injection 40 mg  40 mg SubCUTAneous Q24H    gabapentin (NEURONTIN) capsule 300 mg  300 mg Oral TID    insulin glargine (LANTUS) injection 18 Units  18 Units SubCUTAneous QHS    dextrose (D50W) injection syrg 12.5-25 g  12.5-25 g IntraVENous PRN    dorzolamide (TRUSOPT) 2 % ophthalmic solution 1 Drop  1 Drop Both Eyes TID    And    timolol (TIMOPTIC) 0.5 % ophthalmic solution 1 Drop  1 Drop Both Eyes BID    HYDROcodone-acetaminophen (NORCO) 7.5-325 mg per tablet 1 Tab  1 Tab Oral Q6H PRN    sodium chloride (NS) flush 5-40 mL  5-40 mL IntraVENous Q8H    sodium chloride (NS) flush 5-40 mL  5-40 mL IntraVENous PRN    naloxone (NARCAN) injection 0.4 mg  0.4 mg IntraVENous PRN    HYDROcodone-acetaminophen (NORCO) 5-325 mg per tablet 1 Tab  1 Tab Oral Q4H PRN    ondansetron (ZOFRAN) injection 4 mg  4 mg IntraVENous Q4H PRN    sodium chloride (NS) flush 5-40 mL  5-40 mL IntraVENous Q8H    sodium chloride (NS) flush 5-40 mL  5-40 mL IntraVENous PRN    acetaminophen (TYLENOL) tablet 650 mg  650 mg Oral Q6H PRN    Or    acetaminophen (TYLENOL) suppository 650 mg  650 mg Rectal Q6H PRN    polyethylene glycol (MIRALAX) packet 17 g  17 g Oral DAILY PRN    promethazine (PHENERGAN) tablet 12.5 mg  12.5 mg Oral Q6H PRN    Or    ondansetron (ZOFRAN) injection 4 mg  4 mg IntraVENous Q6H PRN    levothyroxine (SYNTHROID) tablet 150 mcg  150 mcg Oral ACB    pravastatin (PRAVACHOL) tablet 40 mg  40 mg Oral QHS    amLODIPine (NORVASC) tablet 5 mg  5 mg Oral DAILY    metoprolol tartrate (LOPRESSOR) tablet 50 mg  50 mg Oral BID    pantoprazole (PROTONIX) tablet 40 mg  40 mg Oral ACB    ferrous sulfate tablet 325 mg  325 mg Oral DAILY    escitalopram oxalate (LEXAPRO) tablet 20 mg  20 mg Oral DAILY    glucose chewable tablet 16 g  4 Tab Oral PRN    dextrose (D50W) injection syrg 12.5-25 g  25-50 mL IntraVENous PRN    glucagon (GLUCAGEN) injection 1 mg  1 mg IntraMUSCular PRN    insulin lispro (HUMALOG) injection   SubCUTAneous AC&HS    glycopyrrolate-formoterol (BEVESPI AEROSPHERE) 9 mcg-4.8 mcg inhaler  2 Puff Inhalation BID RT     ______________________________________________________________________  EXPECTED LENGTH OF STAY: 6d 4h  ACTUAL LENGTH OF STAY:          7                 Raina Rudolph NP

## 2021-02-07 NOTE — PROGRESS NOTES
Bedside and Verbal shift change report given to [address] (oncoming nurse) by Juliana Jc (offgoing nurse). Report included the following information SBAR, Kardex, MAR and Recent Results.

## 2021-02-07 NOTE — CONSULTS
[address] Renal Consult Note      NAME:  Cuong Flower   :      MRN:  [id number]     Requesting Physician Jessica Orosco MD   Reason for Consult:  CKD     PCP:  Abel Garcia MD     Date/Time:  2021 11:00 AM          Subjective:     CHIEF COMPLAINT: right leg fx     HISTORY OF PRESENT ILLNESS:     Ms. Mari Cordova is a 79 y.o.  female who is admitted to the medical  Service with right trimalleolar fx, underwent ORIF and developed delirum post op, which has now resolved. CKD based on evaluation of labs as far back as . .  We are asked to evaluate for CKD. She has not been seen by a nephrologist PTA. Not SOB, no chest or abd pain. NoN/V.  No lower ext edema, has decreased vision  Past Medical History:   Diagnosis Date    Adverse effect of anesthesia     SLOW WAKING PAST ANESTHESIA    Anxiety     Arthritis     CAD (coronary artery disease)     s/p CABG x 3v    Chest pain 2015    Chronic obstructive pulmonary disease (HCC)     CTS (carpal tunnel syndrome)     S/p bilateral release    Diabetes ([address]     Diabetic gastroparesis ([address]     Diabetic neuropathy ([address]     Diabetic retinopathy ([address]     GERD (gastroesophageal reflux disease)     GI bleed 2015    4 bleeds with 9 clips placed    Hypercholesteremia     Hypertension     Hypothyroid     Ill-defined condition     NEUROPATHY HANDS AND FEET    Ill-defined condition     GI BLEED    Nicotine vapor product user     JOHN on CPAP     Osteoporosis     Vitamin D deficiency         Past Surgical History:   Procedure Laterality Date    HX CERVICAL FUSION      HX ENDOSCOPY  2015    HX GI      HX HEART CATHETERIZATION  2015    HX LUMBAR FUSION      HX LUMBAR LAMINECTOMY      HX ORTHOPAEDIC Right 1970    CARPAL TUNNEL    HX ORTHOPAEDIC Left 2003    CARPAL TUNNEL    HX ROTATOR CUFF REPAIR Right     HX SHOULDER ARTHROSCOPY Right     HX TONSIL AND ADENOIDECTOMY  1968    HX TONSILLECTOMY      IN CABG, ARTERY-VEIN, THREE  2015       Social History     Tobacco Use    Smoking status: Former Smoker     Packs/day: 0.50     Years: 40.00     Pack years: 20.00     Quit date: 2015     Years since quittin.6    Smokeless tobacco: Never Used   Substance Use Topics    Alcohol use: No     Alcohol/week: 0.0 standard drinks        Family History   Problem Relation Age of Onset    COPD Mother     Diabetes Mother    Dossie Jazmine COPD Father     Diabetes Father     Cancer Father         pancreatic    Diabetes Brother     Heart Disease Brother     Diabetes Brother     Alcohol abuse Brother     Diabetes Sister     Cancer Sister         Melenoma    Bleeding Prob Sister         Factor V Leiden    Anesth Problems Neg Hx         Allergies   Allergen Reactions    Nsaids (Non-Steroidal Anti-Inflammatory Drug) Other (comments)     bleeding    Augmentin [Amoxicillin-Pot Clavulanate] Diarrhea    Vesicare [Solifenacin] Rash     BURNING WITH URINATION        Prior to Admission medications    Medication Sig Start Date End Date Taking? Authorizing Provider   glucose blood VI test strips (Contour Next Test Strips) strip TEST FOUR TIMES DAILY 10/5/20   Constantino Davis MD   levothyroxine (SYNTHROID) 150 mcg tablet Take 1 Tab by mouth Daily (before breakfast). One pill every day. Note dose change 20   Reed Martinez MD   prednisoLONE acetate (PRED FORTE) 1 % ophthalmic suspension  20   Provider, Historical   cyclopentolate (CYCLOGYL) 1 % ophthalmic solution Administer 1 Drop to right eye every four (4) hours.  HCL    Provider, Historical   colchicine 0.6 mg tablet Take two pills when you  the prescription, a third pill and hour later and then two pills per day for the next 3 days 20   Reed Martinez MD   insulin aspart U-100 (NovoLOG U-100 Insulin aspart) 100 unit/mL injection USE A MAX  UNITS DAILY WITH INSULIN PUMP 20   Reed Martinez MD   pravastatin (PRAVACHOL) 40 mg tablet Take 1 Tab by mouth nightly. SCHEDULE OFFICE VISIT 3/25/20   Ines Robert MD   acetaminophen (TYLENOL) 500 mg tablet Take 2 Tabs by mouth every six (6) hours. 1/23/19   Peg Molina ELVIN GUAN   umeclidinium-vilanterol (ANORO ELLIPTA) 62.5-25 mcg/actuation inhaler Take 1 Puff by inhalation daily. Provider, Historical   b complex vitamins tablet Take 1 Tab by mouth daily. Provider, Historical   amLODIPine (NORVASC) 5 mg tablet TK 1 T PO QD. PATINET TAKE MED DAILY 11/27/18   Provider, Historical   metoprolol tartrate (LOPRESSOR) 50 mg tablet TK 1 T PO BID WC. PATIENT TAKES MED TWICE DAILY 10/22/18   Provider, Historical   tiZANidine & Irritant Cntr 2 4 mg kit tizanidine 4 mg tablet AT BEDTIME    Provider, Historical   escitalopram oxalate (LEXAPRO) 20 mg tablet Take 20 mg by mouth daily. Provider, Historical   pantoprazole (PROTONIX) 40 mg tablet Take 40 mg by mouth daily. Provider, Historical   gabapentin (NEURONTIN) 800 mg tablet Take 800 mg by mouth three (3) times daily. Provider, Historical    insulin pump (PATIENT SUPPLIED) misc by SubCUTAneous route as needed. 1.7 units/hr    Provider, Historical   lisinopril (PRINIVIL, ZESTRIL) 10 mg tablet Take 1 Tab by mouth daily. Patient taking differently: Take 10 mg by mouth two (2) times a day. 5/12/16   Ines Robert MD   cholecalciferol, vitamin D3, (VITAMIN D3) 2,000 unit tab Take 1 Tab by mouth daily. Patient taking differently: Take 2,000 Units by mouth daily. TAKES 1000 UNITS PER DAY 5/12/16   Ines Robert MD   ferrous sulfate 325 mg (65 mg iron) tablet Take 325 mg by mouth daily. Provider, Historical   dorzolamide-timolol, PF, (COSOPT, PF,) 2-0.5 % dpet Administer 1 Drop to both eyes two (2) times a day. Indications: Ocular Hypertension 7/6/15   Provider, Historical   senna (SENNA) 8.6 mg tablet Take 1 Tab by mouth nightly as needed.     Provider, Historical         Current Facility-Administered Medications:     enoxaparin (LOVENOX) injection 40 mg, 40 mg, SubCUTAneous, Q24H, Mayelin Newton MD, 40 mg at 02/06/21 2239    gabapentin (NEURONTIN) capsule 300 mg, 300 mg, Oral, TID, Raina Rudolph NP, 300 mg at 02/07/21 0929    insulin glargine (LANTUS) injection 18 Units, 18 Units, SubCUTAneous, QHS, Serena Callahan MD, 18 Units at 02/06/21 2235    dextrose (D50W) injection syrg 12.5-25 g, 12.5-25 g, IntraVENous, PRN, Mayelin Newton MD    dorzolamide (TRUSOPT) 2 % ophthalmic solution 1 Drop, 1 Drop, Both Eyes, TID, 1 Drop at 02/07/21 0930 **AND** timolol (TIMOPTIC) 0.5 % ophthalmic solution 1 Drop, 1 Drop, Both Eyes, BID, Mayelin Newton MD, 1 Drop at 02/07/21 0929    HYDROcodone-acetaminophen (NORCO) 7.5-325 mg per tablet 1 Tab, 1 Tab, Oral, Q6H PRN, Mayelin Newton MD    sodium chloride (NS) flush 5-40 mL, 5-40 mL, IntraVENous, Q8H, Mayelin Newton MD, 10 mL at 02/07/21 0600    sodium chloride (NS) flush 5-40 mL, 5-40 mL, IntraVENous, PRN, Mayelin Newton MD    naloxone [facility]) injection 0.4 mg, 0.4 mg, IntraVENous, PRN, Mayelin Newton MD    HYDROcodone-acetaminophen (NORCO) 5-325 mg per tablet 1 Tab, 1 Tab, Oral, Q4H PRN, Mayelin Newton MD, 1 Tab at 02/02/21 2129    ondansetron (ZOFRAN) injection 4 mg, 4 mg, IntraVENous, Q4H PRN, Mayelin Newton MD    sodium chloride (NS) flush 5-40 mL, 5-40 mL, IntraVENous, Q8H, Mayelin Newton MD, 10 mL at 02/06/21 0753    sodium chloride (NS) flush 5-40 mL, 5-40 mL, IntraVENous, PRN, Mayelin Newton MD    acetaminophen (TYLENOL) tablet 650 mg, 650 mg, Oral, Q6H PRN, 650 mg at 02/05/21 1207 **OR** acetaminophen (TYLENOL) suppository 650 mg, 650 mg, Rectal, Q6H PRN, Mayelin Newton MD    polyethylene glycol (MIRALAX) packet 17 g, 17 g, Oral, DAILY PRN, Mayelin Newton MD    promethazine (PHENERGAN) tablet 12.5 mg, 12.5 mg, Oral, Q6H PRN **OR** ondansetron (ZOFRAN) injection 4 mg, 4 mg, IntraVENous, Q6H PRN, Mayelin Newton, MD    levothyroxine (SYNTHROID) tablet 150 mcg, 150 mcg, Oral, ACB, Mahnaz Sutton MD, 150 mcg at 02/07/21 0725    pravastatin (PRAVACHOL) tablet 40 mg, 40 mg, Oral, QHS, Mahnaz Sutton MD, 40 mg at 02/06/21 2200    amLODIPine (NORVASC) tablet 5 mg, 5 mg, Oral, DAILY, Mahnaz Sutton MD, 5 mg at 02/07/21 4384    metoprolol tartrate (LOPRESSOR) tablet 50 mg, 50 mg, Oral, BID, Mahnaz Sutton MD, 50 mg at 02/07/21 0929    pantoprazole (PROTONIX) tablet 40 mg, 40 mg, Oral, ACB, Mahnaz Sutton MD, 40 mg at 02/07/21 0725    ferrous sulfate tablet 325 mg, 325 mg, Oral, DAILY, Mahnaz Sutton MD, 325 mg at 02/07/21 7629    escitalopram oxalate (LEXAPRO) tablet 20 mg, 20 mg, Oral, DAILY, Mahnaz Sutton MD, 20 mg at 02/07/21 8978    glucose chewable tablet 16 g, 4 Tab, Oral, PRN, Mahnaz Sutton MD    dextrose (D50W) injection syrg 12.5-25 g, 25-50 mL, IntraVENous, PRN, Mahnaz Sutton MD    glucagon [facility]) injection 1 mg, 1 mg, IntraMUSCular, PRN, Mahnaz Sutton MD    insulin lispro (HUMALOG) injection, , SubCUTAneous, AC&HS, Mahnaz Sutton MD, 3 Units at 02/07/21 0746    glycopyrrolate-formoterol (BEVESPI AEROSPHERE) 9 mcg-4.8 mcg inhaler, 2 Puff, Inhalation, BID RT, Mahnaz Sutton MD, 2 Puff at 02/06/21 2100      Review of Systems:  A comprehensive review of systems was negative except for that written in the HPI.        Objective:      VITALS:    Vital signs reviewed; most recent are:    Visit Vitals  BP (!) 151/76 (BP 1 Location: Right upper arm, BP Patient Position: At rest)   Pulse 61   Temp 98.1 °F (36.7 °C)   Resp 12   Ht 5' 4\" (1.626 m)   Wt 107.6 kg (237 lb 3.4 oz)   SpO2 99%   BMI 40.72 kg/m²     SpO2 Readings from Last 6 Encounters:   02/07/21 99%   11/02/20 96%   11/05/19 96%   01/23/19 92%   06/04/18 95%   12/22/17 96%    O2 Flow Rate (L/min): 4 l/min       Intake/Output Summary (Last 24 hours) at 2/7/2021 1100  Last data filed at 2/7/2021 0615  Gross per 24 hour Intake    Output 1750 ml   Net -1750 ml            Exam:   Physical Exam:  General appearance: alert, cooperative, no distress, appears stated age  Neck: supple, symmetrical, trachea midline, no adenopathy and no JVD  Lungs: clear to auscultation bilaterally  Heart: regular rate and rhythm, no S3 or S4  Abdomen: soft, non-tender. Bowel sounds normal. No masses,  no organomegaly  Extremities: no edema left leg, right lower leg/ foot in soft cast-wrapped   Neurologic: Grossly normal- right ptosis     LABS:  Recent Labs     02/07/21  0525 02/07/21  0400 02/06/21  0541 02/05/21  0330     --  135* 138   K 4.2  --  4.5 4.6     --  104 107   CO2 26  --  24 25   BUN 34*  --  35* 35*   CREA 1.66*  --  1.64* 1.77*   CA 9.4  --  9.4 9.3   ALB 2.0*  --  2.0* 2.0*   MG  --  1.7 1.5*  --      Recent Labs     02/07/21  0525 02/06/21  0541 02/05/21  0330   WBC 4.3 4.8 5.8   HGB 9.5* 9.7* 10.0*   HCT 29.4* 30.3* 31.3*    138* 135*     Lab Results   Component Value Date/Time    Glucose (POC) 207 (H) 02/07/2021 06:10 AM    Glucose (POC) 278 (H) 02/06/2021 09:15 PM     Recent Labs     02/04/21  1821   PH 7.28*   PCO2 58*   PO2 90   HCO3 27*     No results for input(s): INR, INREXT in the last 72 hours. No results for input(s): TARAN, KU, CLU, CREAU in the last 72 hours. No lab exists for component: PROU   ECHO 2-1-21  Result status: Final result  · LV: Estimated LVEF is 55 - 60%. Normal cavity size, wall thickness and systolic function (ejection fraction normal). Mild (grade 1) left ventricular diastolic dysfunction. · MV: Mitral valve non-specific thickening and thickening. · LA: Mildly dilated left atrium. · AV: Probably trileaflet aortic valve. · PA: Pulmonary arterial systolic pressure is 25 mmHg. Retroperitonela US 2-3-21  TECHNIQUE:  Sonography of the kidneys, retroperitoneum, and bladder was performed.     FINDINGS:     RIGHT KIDNEY: 8.6 cm in length. Mildly increased cortical echogenicity. No  hydronephrosis. Multiple small echogenic foci in the renal collecting system. Probable 5 mm upper pole cyst.  LEFT KIDNEY: 10.2 cm in length. Mildly increased cortical echogenicity. No  hydronephrosis. Multiple small echogenic foci in the renal collecting system. AORTA: Normal caliber in its visualized portions. COMMON ILIAC ARTERIES: Normal caliber proximally. IVC: Normal caliber in its visualized portions. BLADDER: Normally distended.        IMPRESSION  IMPRESSION:   Mildly increased bilateral renal cortical echogenicity suggests medical renal  disease. Small echogenic foci in the bilateral renal collecting systems likely  represent small calculi. Probable 5 mm right renal cyst.    Assessment:   Renal Specific Problems  CKD 3B evident since 2018 w/o any major change. Increased serum creatinine post op is a reflection of acute inflammatory state with inability to increase GFR on demand.  Renal US shows eveidence of CKD        Plan:     Obtain/ Order: labs/cultures/radiology/procedures:  urine lytes and urine creatinine and spot urine protein and creatinine ratio  --assess for proteinuria      Therapeutic:    Avoid NSAIA and IV contrast (without preRx)  Adjust meds for GFR , stage 3B CKD    Risk of deterioration: low      Total time spent with patient: 30 Minutes                  Discussed:  Pearl Rivas NP           ___________________________________________________    Attending Physician: Christa Goode MD

## 2021-02-07 NOTE — PROGRESS NOTES
Problem: Mobility Impaired (Adult and Pediatric)  Goal: *Acute Goals and Plan of Care (Insert Text)  Description: FUNCTIONAL STATUS PRIOR TO ADMISSION: unknown    HOME SUPPORT PRIOR TO ADMISSION: The patient lives at [employer]. Support system unknown    Physical Therapy Goals  Initiated 2/2/2021  1. Patient will move from supine to sit and sit to supine , scoot up and down, and roll side to side in bed with minimum assistance within 7 day(s). 2.  Patient will transfer from bed to chair and chair to bed with minimal assistance/contact guard assist using the least restrictive device within 7 day(s). 3.  Patient will perform sit to stand with minimal assistance/contact guard assist within 7 day(s). 4.  Patient will ambulate with minimal assistance/contact guard assist for 50 feet with the least restrictive device within 7 day(s). Outcome: Progressing Towards Goal   PHYSICAL THERAPY TREATMENT  Patient: Jarek Peralta (64 y.o. female)  Date: 2/7/2021  Diagnosis: Ankle fracture [S82.899A] Ankle fracture  Procedure(s) (LRB):  RIGHT ANKLE OPEN REDUCTION INTERNAL FIXATION (Right) 6 Days Post-Op  Precautions: NWB(RLE), blind R eye  Chart, physical therapy assessment, plan of care and goals were reviewed. ASSESSMENT  Patient continues with skilled PT services and is slowly progressing towards goals. Pt pleasant and cooperative throughout session; demo decreased insight into mobility deficits and rehab process. Reviewed NWB status R LE; pt verbalized understanding. Pt continues to require 2 person assist with bed mob, although demo increased ability to actively assist. Pt demo post lean with initial unsupported sitting; required assist to maintain balance and cues for forward wt shift with poor/ fair carryover. Attempted sit to stand from elevated bed height to RW, however pt able to achieve minimal lift off only with max A x 2; demo good effort toward NWB R LE.  Instructed pt in lateral scoot along EOB toward R; required mod/ max A x 2. Unsafe to attempt transfer to chair this date. Pt eager to go home due to decreased insight into situation. Provided education on rehab process and role of acute PT. Recommend follow up SNF rehab at d/c. Current Level of Function Impacting Discharge (mobility/balance): bed mob mod A x 2, scooting along EOB mod A x 2    Other factors to consider for discharge: NWB status R LE, ongoing need for 2 person assist         PLAN :  Patient continues to benefit from skilled intervention to address the above impairments. Continue treatment per established plan of care. to address goals. Recommendation for discharge: (in order for the patient to meet his/her long term goals)  Therapy up to 5 days/week in SNF setting    This discharge recommendation:  Has been made in collaboration with the attending provider and/or case management    IF patient discharges home will need the following DME: to be determined (TBD)       SUBJECTIVE:   Patient stated Am I going home tomorrow? Rolando Black    OBJECTIVE DATA SUMMARY:   Critical Behavior:  Neurologic State: Alert  Orientation Level: Oriented to place, Oriented to person, Oriented to situation, Oriented to time, Oriented X4  Cognition: Follows commands  Safety/Judgement: Decreased awareness of environment, Decreased awareness of need for assistance, Decreased insight into deficits  Functional Mobility Training:  Bed Mobility:  Rolling: Maximum assistance  Supine to Sit: Maximum assistance  Sit to Supine: Maximum assistance  Scooting: Moderate assistance;Maximum assistance;Assist x2(for scooting along EOB to R)        Transfers:  Sit to Stand: Maximum assistance;Assist x2(to achieve partial stand from elevated EOB to RW)  Stand to Sit: Moderate assistance;Assist x2                             Balance:  Sitting: Impaired  Sitting - Static: Fair (occasional); Poor (constant support)  Sitting - Dynamic: Poor (constant support)  Ambulation/Gait Training:                    Right Side Weight Bearing: Non-weight bearing           Pain Rating:  Min c/o R LE pain during session    Activity Tolerance:   Fair    After treatment patient left in no apparent distress:   Supine in bed, Heels elevated for pressure relief, Call bell within reach and Side rails x 3    COMMUNICATION/COLLABORATION:   The patients plan of care was discussed with: Registered nurse.      Henny Alonso, PT   Time Calculation: 24 mins

## 2021-02-08 LAB
ALBUMIN SERPL-MCNC: 2.1 G/DL (ref 3.5–5)
ANION GAP SERPL CALC-SCNC: 4 MMOL/L (ref 5–15)
BUN SERPL-MCNC: 30 MG/DL (ref 6–20)
BUN/CREAT SERPL: 20 (ref 12–20)
CALCIUM SERPL-MCNC: 9.5 MG/DL (ref 8.5–10.1)
CHLORIDE SERPL-SCNC: 104 MMOL/L (ref 97–108)
CO2 SERPL-SCNC: 27 MMOL/L (ref 21–32)
CREAT SERPL-MCNC: 1.53 MG/DL (ref 0.55–1.02)
GABAPENTIN SERPLBLD-MCNC: 30.1 UG/ML (ref 4–16)
GLUCOSE BLD STRIP.AUTO-MCNC: 231 MG/DL (ref 65–100)
GLUCOSE BLD STRIP.AUTO-MCNC: 255 MG/DL (ref 65–100)
GLUCOSE BLD STRIP.AUTO-MCNC: 271 MG/DL (ref 65–100)
GLUCOSE BLD STRIP.AUTO-MCNC: 288 MG/DL (ref 65–100)
GLUCOSE SERPL-MCNC: 221 MG/DL (ref 65–100)
IRON SATN MFR SERPL: 19 % (ref 20–50)
IRON SERPL-MCNC: 40 UG/DL (ref 35–150)
MAGNESIUM SERPL-MCNC: 1.7 MG/DL (ref 1.6–2.4)
PHOSPHATE SERPL-MCNC: 2.9 MG/DL (ref 2.6–4.7)
POTASSIUM SERPL-SCNC: 4.3 MMOL/L (ref 3.5–5.1)
SARS-COV-2, COV2: NORMAL
SERVICE CMNT-IMP: ABNORMAL
SODIUM SERPL-SCNC: 135 MMOL/L (ref 136–145)
TIBC SERPL-MCNC: 210 UG/DL (ref 250–450)

## 2021-02-08 PROCEDURE — 94640 AIRWAY INHALATION TREATMENT: CPT

## 2021-02-08 PROCEDURE — 80069 RENAL FUNCTION PANEL: CPT

## 2021-02-08 PROCEDURE — 77010033678 HC OXYGEN DAILY

## 2021-02-08 PROCEDURE — 97110 THERAPEUTIC EXERCISES: CPT

## 2021-02-08 PROCEDURE — 94664 DEMO&/EVAL PT USE INHALER: CPT

## 2021-02-08 PROCEDURE — 82962 GLUCOSE BLOOD TEST: CPT

## 2021-02-08 PROCEDURE — 97530 THERAPEUTIC ACTIVITIES: CPT

## 2021-02-08 PROCEDURE — 74011250637 HC RX REV CODE- 250/637: Performed by: NURSE PRACTITIONER

## 2021-02-08 PROCEDURE — 83735 ASSAY OF MAGNESIUM: CPT

## 2021-02-08 PROCEDURE — 74011636637 HC RX REV CODE- 636/637: Performed by: ORTHOPAEDIC SURGERY

## 2021-02-08 PROCEDURE — 74011636637 HC RX REV CODE- 636/637: Performed by: INTERNAL MEDICINE

## 2021-02-08 PROCEDURE — 74011250637 HC RX REV CODE- 250/637: Performed by: ORTHOPAEDIC SURGERY

## 2021-02-08 PROCEDURE — 94760 N-INVAS EAR/PLS OXIMETRY 1: CPT

## 2021-02-08 PROCEDURE — 83540 ASSAY OF IRON: CPT

## 2021-02-08 PROCEDURE — 77030038269 HC DRN EXT URIN PURWCK BARD -A

## 2021-02-08 PROCEDURE — U0005 INFEC AGEN DETEC AMPLI PROBE: HCPCS

## 2021-02-08 PROCEDURE — 97530 THERAPEUTIC ACTIVITIES: CPT | Performed by: PHYSICAL THERAPIST

## 2021-02-08 PROCEDURE — 74011250636 HC RX REV CODE- 250/636: Performed by: ORTHOPAEDIC SURGERY

## 2021-02-08 PROCEDURE — 65270000029 HC RM PRIVATE

## 2021-02-08 PROCEDURE — 36415 COLL VENOUS BLD VENIPUNCTURE: CPT

## 2021-02-08 RX ADMIN — GABAPENTIN 300 MG: 300 CAPSULE ORAL at 16:50

## 2021-02-08 RX ADMIN — METOPROLOL TARTRATE 50 MG: 50 TABLET, FILM COATED ORAL at 10:00

## 2021-02-08 RX ADMIN — GABAPENTIN 300 MG: 300 CAPSULE ORAL at 10:00

## 2021-02-08 RX ADMIN — GLYCOPYRROLATE AND FORMOTEROL FUMARATE 2 PUFF: 9; 4.8 AEROSOL, METERED RESPIRATORY (INHALATION) at 08:47

## 2021-02-08 RX ADMIN — FERROUS SULFATE TAB 325 MG (65 MG ELEMENTAL FE) 325 MG: 325 (65 FE) TAB at 10:00

## 2021-02-08 RX ADMIN — INSULIN LISPRO 5 UNITS: 100 INJECTION, SOLUTION INTRAVENOUS; SUBCUTANEOUS at 08:10

## 2021-02-08 RX ADMIN — PANTOPRAZOLE SODIUM 40 MG: 40 TABLET, DELAYED RELEASE ORAL at 08:10

## 2021-02-08 RX ADMIN — Medication 10 ML: at 14:09

## 2021-02-08 RX ADMIN — INSULIN LISPRO 5 UNITS: 100 INJECTION, SOLUTION INTRAVENOUS; SUBCUTANEOUS at 17:05

## 2021-02-08 RX ADMIN — ESCITALOPRAM OXALATE 20 MG: 10 TABLET ORAL at 10:00

## 2021-02-08 RX ADMIN — METOPROLOL TARTRATE 50 MG: 50 TABLET, FILM COATED ORAL at 18:07

## 2021-02-08 RX ADMIN — INSULIN LISPRO 2 UNITS: 100 INJECTION, SOLUTION INTRAVENOUS; SUBCUTANEOUS at 22:07

## 2021-02-08 RX ADMIN — PRAVASTATIN SODIUM 40 MG: 40 TABLET ORAL at 22:06

## 2021-02-08 RX ADMIN — INSULIN GLARGINE 18 UNITS: 100 INJECTION, SOLUTION SUBCUTANEOUS at 22:07

## 2021-02-08 RX ADMIN — ENOXAPARIN SODIUM 40 MG: 40 INJECTION SUBCUTANEOUS at 22:07

## 2021-02-08 RX ADMIN — AMLODIPINE BESYLATE 5 MG: 5 TABLET ORAL at 10:00

## 2021-02-08 RX ADMIN — DORZOLAMIDE HYDROCHLORIDE 1 DROP: 20 SOLUTION/ DROPS OPHTHALMIC at 16:52

## 2021-02-08 RX ADMIN — GABAPENTIN 300 MG: 300 CAPSULE ORAL at 22:06

## 2021-02-08 RX ADMIN — Medication 10 ML: at 06:00

## 2021-02-08 RX ADMIN — DORZOLAMIDE HYDROCHLORIDE 1 DROP: 20 SOLUTION/ DROPS OPHTHALMIC at 22:09

## 2021-02-08 RX ADMIN — TIMOLOL MALEATE 1 DROP: 5 SOLUTION/ DROPS OPHTHALMIC at 18:08

## 2021-02-08 RX ADMIN — LEVOTHYROXINE SODIUM 150 MCG: 0.15 TABLET ORAL at 08:10

## 2021-02-08 RX ADMIN — INSULIN LISPRO 5 UNITS: 100 INJECTION, SOLUTION INTRAVENOUS; SUBCUTANEOUS at 12:44

## 2021-02-08 RX ADMIN — GLYCOPYRROLATE AND FORMOTEROL FUMARATE 2 PUFF: 9; 4.8 AEROSOL, METERED RESPIRATORY (INHALATION) at 20:33

## 2021-02-08 NOTE — PROGRESS NOTES
Reference # [id number]    [phone] and fax [phone]    The following information was submitted to [facility] for initial authorization:  Face Sheet/Demographics    (Include: Usual living situation)  History and Physical  Last 24 hours of MD and RN notes   (Include: Current Level of Functioning; cognitive status)  PT/OT/ST Evaluations and/or notes within the last 48 hours  MAR  MD orders  (Include: Attending or ordering MDs name and phone #; skilled nursing needs;    Wound care/etc)  Projected Date of Transfer to SNF  Requested SNF  SNF Point of Contact and Phone #  CM Point of Contact and Phone #  NPI # for SNF    [place], CRM  [phone]

## 2021-02-08 NOTE — PROGRESS NOTES
Bedside and Verbal shift change report given to Daneille RN (oncoming nurse) by Jolanda Kanner RN (offgoing nurse). Report included the following information SBAR, Kardex and MAR.

## 2021-02-08 NOTE — PROGRESS NOTES
Problem: Mobility Impaired (Adult and Pediatric)  Goal: *Acute Goals and Plan of Care (Insert Text)  Description: FUNCTIONAL STATUS PRIOR TO ADMISSION: unknown    HOME SUPPORT PRIOR TO ADMISSION: The patient lives at [employer]. Support system unknown    Physical Therapy Goals  Revised 2/8/2021  1. Patient will move from supine to sit and sit to supine  in bed with moderate assistance  within 7 day(s). 2.  Patient will transfer from bed to chair and chair to bed with maximal assistance using the least restrictive device within 7 day(s). 3.  Patient will perform sit to stand with maximal assistance within 7 day(s). 4.  Patient will ambulate with maximal assistance for 5 feet with the least restrictive device within 7 day(s). Initiated 2/2/2021  1. Patient will move from supine to sit and sit to supine , scoot up and down, and roll side to side in bed with minimum assistance within 7 day(s). 2.  Patient will transfer from bed to chair and chair to bed with minimal assistance/contact guard assist using the least restrictive device within 7 day(s). 3.  Patient will perform sit to stand with minimal assistance/contact guard assist within 7 day(s). 4.  Patient will ambulate with minimal assistance/contact guard assist for 50 feet with the least restrictive device within 7 day(s). Outcome: Progressing Towards Goal   PHYSICAL THERAPY TREATMENT: WEEKLY REASSESSMENT  Patient: Caitlin Terrazas (57 y.o. female)  Date: 2/8/2021  Primary Diagnosis: Ankle fracture [S82.899A]  Procedure(s) (LRB):  RIGHT ANKLE OPEN REDUCTION INTERNAL FIXATION (Right) 7 Days Post-Op   Precautions:   NWB(RLE)      ASSESSMENT  Patient continues with skilled PT services and is progressing very slowly towards goals. Currently limited by poor sitting balance, unable to come to stand or ambulate, and decreased activity tolerance. Patient currently needing maxA x 2 to come to EOB and has poor sitting balance. Unable to hold self at midline despite max cues and manual cues. Attempted sit to stand x 3 but unable to clear buttocks on any attempt. Patient continues to be well below her baseline and is most appropriate for SNF rehab. Will continue to follow. Patient's progression toward goals since last assessment: no goals met and all goals downgraded to reflect current level of function      Other factors to consider for discharge: at risk for falls, below functional baseline         PLAN :  Goals have been updated based on progression since last assessment. Patient continues to benefit from skilled intervention to address the above impairments. Recommendations and Planned Interventions: bed mobility training, transfer training, gait training, therapeutic exercises, patient and family training/education, and therapeutic activities      Frequency/Duration: Patient will be followed by physical therapy:  5 times a week to address goals. Recommendation for discharge: (in order for the patient to meet his/her long term goals)  Therapy up to 5 days/week in SNF setting      IF patient discharges home will need the following DME: patient owns DME required for discharge         SUBJECTIVE:   Patient stated I'm willing try.     OBJECTIVE DATA SUMMARY:   HISTORY:    Past Medical History:   Diagnosis Date    Adverse effect of anesthesia     SLOW WAKING PAST ANESTHESIA    Anxiety     Arthritis     CAD (coronary artery disease)     s/p CABG x 3v    Chest pain 7/2015    Chronic obstructive pulmonary disease (HCC)     CTS (carpal tunnel syndrome)     S/p bilateral release    Diabetes ([address]     Diabetic gastroparesis (HCC)     Diabetic neuropathy (HCC)     Diabetic retinopathy ([address]     GERD (gastroesophageal reflux disease)     GI bleed 7/2015    4 bleeds with 9 clips placed    Hypercholesteremia     Hypertension     Hypothyroid     Ill-defined condition     NEUROPATHY HANDS AND FEET    Ill-defined condition 2017    GI BLEED Nicotine vapor product user     JOHN on CPAP     Osteoporosis     Vitamin D deficiency      Past Surgical History:   Procedure Laterality Date    HX CERVICAL FUSION  2011    HX ENDOSCOPY  7/2015    HX GI      HX HEART CATHETERIZATION  7/2015    HX LUMBAR FUSION  2017    HX LUMBAR LAMINECTOMY  2011    HX ORTHOPAEDIC Right 1970    CARPAL TUNNEL    HX ORTHOPAEDIC Left 2003    CARPAL TUNNEL    HX ROTATOR CUFF REPAIR Right 2003    HX SHOULDER ARTHROSCOPY Right     HX TONSIL AND ADENOIDECTOMY  1968    HX TONSILLECTOMY      IL CABG, ARTERY-VEIN, THREE  7/13/2015       Personal factors and/or comorbidities impacting plan of care:     Home Situation  Home Environment: Apartment  # Steps to Enter: 12  One/Two Story Residence: One story  Living Alone: No(Roommate Katherin)  Support Systems: Friends \ neighbors, Family member(s)  Current DME Used/Available at Home: Cane, straight    EXAMINATION/PRESENTATION/DECISION MAKING:   Critical Behavior:  Neurologic State: Alert  Orientation Level: Appropriate for age, Oriented to place, Oriented to person, Oriented to time, Oriented to situation  Cognition: Appropriate for age attention/concentration, Appropriate decision making, Appropriate safety awareness, Follows commands  Safety/Judgement: Decreased awareness of environment, Decreased awareness of need for assistance, Decreased insight into deficits       Functional Mobility:  Bed Mobility:  Rolling: Maximum assistance  Supine to Sit: Maximum assistance;Assist x2  Sit to Supine: Maximum assistance;Assist x2     Transfers:  Sit to Stand: Maximum assistance;Assist x2(onlly partially clear buttocks)  Stand to Sit: Maximum assistance;Assist x2                       Balance:   Sitting: Impaired  Sitting - Static: Poor (constant support); Prop sitting  Sitting - Dynamic: Poor (constant support)  Standing: (cannot clear buttocks to stand)  Ambulation/Gait Training:                    Right Side Weight Bearing: Non-weight bearing Pain Rating:  No c/o pain    Activity Tolerance:   Fair and requires rest breaks    After treatment patient left in no apparent distress:   Sitting in chair, Call bell within reach, and Caregiver / family present    COMMUNICATION/EDUCATION:   The patients plan of care was discussed with: Physical therapist, Occupational therapist, and Registered nurse. Fall prevention education was provided and the patient/caregiver indicated understanding., Patient/family have participated as able in goal setting and plan of care. , and Patient/family agree to work toward stated goals and plan of care.     Thank you for this referral.  Hue Paul, PT, DPT   Time Calculation: 23 mins

## 2021-02-08 NOTE — PROGRESS NOTES
Name: Jarek Peralta MRN: [id number]   : 1950 Hospital: . [address]   Date: 2021        IMPRESSION:   · JAIDEN- unclear etiology. Patient's GFR is already improving. · CKD with nephrotic proteinuria and echogenic kidneys. Patient likely has diabetic kidney diasease and ? HTN nephrosclerosis. · Electrolytes are OK  · Mild anemia- blood loss and CKD      PLAN:   · No need of change is patient's home medications. · Will need outpatient renal follow up. Patient's severe proteinuria places her at very high risk for CKD progression. · Anemia management- check the iron profile. · Repeat renal panel to establish baseline GFR. Patient's Scr is still coming down daily. Subjective/Interval History:   I have reviewed the flowsheet and previous days notes. ROS:A comprehensive review of systems was negative. Objective:   Vital Signs:    Visit Vitals  BP (!) 107/59   Pulse (!) 55   Temp 98 °F (36.7 °C)   Resp 18   Ht 5' 4\" (1.626 m)   Wt 107.6 kg (237 lb 3.4 oz)   SpO2 98%   BMI 40.72 kg/m²       O2 Device: Nasal cannula   O2 Flow Rate (L/min): 2 l/min   Temp (24hrs), Av.2 °F (36.8 °C), Min:97.9 °F (36.6 °C), Max:98.5 °F (36.9 °C)       Intake/Output:   Last shift:      No intake/output data recorded. Last 3 shifts:  1901 -  0700  In: -   Out: 2650 [Urine:2650]    Intake/Output Summary (Last 24 hours) at 2021 1815  Last data filed at 2021 0500  Gross per 24 hour   Intake    Output 900 ml   Net -900 ml        Physical Exam:  General:    Alert, cooperative, no distress, appears stated age. Watching TV   Head:   Normocephalic, without obvious abnormality, atraumatic. Eyes:   Conjunctivae/corneas clear. Nose:  Nares normal. No drainage or sinus tenderness. Throat:    Lips, mucosa, and tongue normal.  No Thrush  Neck:  Supple, symmetrical,  no adenopathy, thyroid: non tender    no carotid bruit and no JVD. Lungs:   Clear to auscultation bilaterally.   No Wheezing or Rhonchi. No rales. Chest wall:  No tenderness or deformity. No Accessory muscle use. Heart:   Regular rate and rhythm,  no murmur, rub or gallop. Abdomen:   Soft, non-tender. Not distended. Bowel sounds normal. No masses  Extremities: Extremities right foot with a cast., No cyanosis. No edema. No clubbing  Skin:     Texture, turgor normal. No rashes or lesions. Not Jaundiced  Psych:  Fair insight. Not depressed. Not anxious or agitated. Neurologic: Normal strength, Alert and oriented X 3.        DATA:  Labs:  Recent Labs     02/08/21  0444 02/07/21  0525 02/07/21  0400 02/06/21  0541   * 136  --  135*   K 4.3 4.2  --  4.5    104  --  104   CO2 27 26  --  24   BUN 30* 34*  --  35*   CREA 1.53* 1.66*  --  1.64*   CA 9.5 9.4  --  9.4   ALB 2.1* 2.0*  --  2.0*   PHOS 2.9  --   --   --    MG 1.7  --  1.7 1.5*     Recent Labs     02/07/21  0525 02/06/21  0541   WBC 4.3 4.8   HGB 9.5* 9.7*   HCT 29.4* 30.3*    138*     Recent Labs     02/07/21  1302   TARAN 80   KU 17   CREAU 39.80  41.10       Total time spent with patient:  35 minutes    [] Critical Care Provided    Care Plan discussed with:   Staff, Tian Hinojosa MD

## 2021-02-08 NOTE — PROGRESS NOTES
KRIS: [facility] has denied patient. [address] SNF has accepted patient, and referrals are still pending with [facility] and [facility]. Josea Monae will be initiated today. Noted negative COVID test 1/31, another test pending 2/8. Patient will need BLS transport at discharge. Chart reviewed. CM called Neo Grayson with [facility] [phone] and left message to follow-up on referral.    CM called the patient's roommate Nai Mendez #[phone] and left message to follow-up on SNF choice. Awaiting response. 11:18 AM: CM received call from [facility] with [email or web address]. They are requesting therapy notes from today. CM notified therapy team.    CM spoke with Glendy Amanda who provided SNF choices. CM sent referrals. 3:30 PM: CM spoke with [facility] with [facility]. They are unable to accept patient. CM spoke with [facility]. They are able to accept patient.     Benja Hamilton, BSW/CRM

## 2021-02-08 NOTE — PROGRESS NOTES
Problem: Self Care Deficits Care Plan (Adult)  Goal: *Acute Goals and Plan of Care (Insert Text)  Description:   FUNCTIONAL STATUS PRIOR TO ADMISSION: Patient was modified independent using a single point cane for functional mobility. Reports history of falls. Has 12 steps to enter her apartment    HOME SUPPORT: The patient lived with a roommate but did not require assist.    Occupational Therapy Goals  Initiated 2/3/2021  1.  Patient will perform grooming with supervision/set-up in unsupported sit within 7 day(s).  2.  Patient will perform upper body dressing with minimum assistance within 7 day(s).  3.  Patient will perform lower body dressing with moderate assistance  within 7 day(s).  4.  Patient will perform toilet transfers with maximal assistance maintaining NWB RLE within 7 day(s).  5.  Patient will perform all aspects of toileting with moderate assistance  within 7 day(s).  6.  Patient will participate in upper extremity therapeutic exercise/activities with supervision/set-up for 5 minutes within 7 day(s).       Outcome: Progressing Towards Goal     OCCUPATIONAL THERAPY TREATMENT  Patient: Eliza Amaral (70 y.o. female)  Date: 2/8/2021  Diagnosis: Ankle fracture [S82.899A] Ankle fracture  Procedure(s) (LRB):  RIGHT ANKLE OPEN REDUCTION INTERNAL FIXATION (Right) 7 Days Post-Op  Precautions: NWB(RLE)  Chart, occupational therapy assessment, plan of care, and goals were reviewed.    ASSESSMENT  Patient continues with skilled OT services and is slowly progressing towards goals.  Pt continues to present with poor sitting balance, generalized weakness, and decreased activity tolerance. Pt further limited by decreased attention this session due to drowsiness upon arrival. She is received in bed asleep but arouses easily and is motivated to participate with therapy. She requires max A x 2 for bed mobility and requires near constant assistance for balance at EOB. She is limited in her ability to utilize UEs at  EOB for functional ADL tasks but does attempt to assist with sock mgmt with max A. Pt participates in standing trials in prep for ADL transfers but is unable to clear buttocks on all attempts. She is educated on UE exercises with yellow theraband for UE strengthening at end of session with increased cues for technique and form. Continue to recommend rehab at discharge, with pt working towards tolerating 3 hours/day. Current Level of Function Impacting Discharge (ADLs): up to max/total A for LB ADLs; up to min/mod A for UB ADLs; A x 2 for all mobility    Other factors to consider for discharge: high fall risk; below mod I baseline; partner reports balance deficits at baseline; blind in R eye; A x 2 for all mobility; currently requiring supplemental O2 (nocturnal O2 use at baseline)         PLAN :  Patient continues to benefit from skilled intervention to address the above impairments. Continue treatment per established plan of care. to address goals. Recommend with staff: bed in chair position at least 3x/day and for all meals; encourage participation in [address] ADLs    Recommend next OT session: EOB sitting balance; ADLs at EOB    Recommendation for discharge: (in order for the patient to meet his/her long term goals)  Therapy up to 5 days/week in rehab setting - likely SNF level at this time; working towards tolerating 3 hours/day    This discharge recommendation:  Has not yet been discussed the attending provider and/or case management    IF patient discharges home will need the following DME: TBD       SUBJECTIVE:   Patient stated I fell asleep waiting for you, I'm glad you're here.     OBJECTIVE DATA SUMMARY:   Cognitive/Behavioral Status:  Neurologic State: Alert  Orientation Level: Oriented to person;Oriented to place;Oriented to situation  Cognition: Decreased attention/concentration; Follows commands  Perception: Cues to maintain midline in sitting; Tactile;Verbal;Visual  Perseveration: No perseveration noted  Safety/Judgement: Decreased insight into deficits    Functional Mobility and Transfers for ADLs:  Bed Mobility:  Rolling: Maximum assistance  Supine to Sit: Maximum assistance;Assist x2  Sit to Supine: Maximum assistance;Assist x2    Transfers:  Sit to Stand: Maximum assistance;Assist x2(onlly partially clear buttocks)    Balance:  Sitting: Impaired  Sitting - Static: Poor (constant support); Prop sitting  Sitting - Dynamic: Poor (constant support)  Standing: (cannot clear buttocks to stand)    ADL Intervention:    Lower Body Dressing Assistance  Socks: Maximum assistance(for L sock; unable to fully cross LE)  Leg Crossed Method Used: No(pt did attempt)  Position Performed: Seated edge of bed  Cues: Don;Physical assistance; Tactile cues provided;Verbal cues provided;Visual cues provided    Cognitive Retraining  Safety/Judgement: Decreased insight into deficits    Therapeutic Exercises:   Pt instructed on and performed UE exercises in semi-supine at end of session for UE strengthening in prep for ADLs and ADL transfers. Pt issued a yellow thera-band for additional resistance and strengthening. Pt requires increased cueing and visual/verbal prompts for form and pacing. Instructed pt to perform exercises 2-3x/day with at least 5 reps of each.        EXERCISE   Sets   Reps   Active Active Assist   Passive   Comments   Biceps curl with yellow thera-band 1 5 (each side) [x]  []  []     Punches with yellow thera-band 1 5 (each side) [x]  []  []     Triceps pull down with yellow thera-band 1 5 (each side) [x]  []  []        []  []  []        []  []  []       Pain:  Pt reporting minimal pain    Activity Tolerance:   Fair and desaturates with exertion and requires oxygen    After treatment patient left in no apparent distress:   Supine in bed, Heels elevated for pressure relief, Call bell within reach, Caregiver / family present and Side rails x 3    COMMUNICATION/COLLABORATION:   The patients plan of care was discussed with: Physical therapist and Registered nurse.      Wilbert Curry, OT  Time Calculation: 34 mins

## 2021-02-08 NOTE — PROGRESS NOTES
[facility]  Hospitalist Group                                                                                          Hospitalist Progress Note  Oliverio Coelho NP  Answering service: [phone] OR [phone] from in house phone        Date of Service:  2021  NAME:  Virginia Max  :    MRN:  [id number]      Admission Summary:   Clem Amaral is a 79 y.o. female with a PMH of CAD s/p CABG, COPD, T2DM with Gastroparesis, Neuropathy, Legally blind, GERD, HTN, HLD, JOHN on CPAP and Hypothyroidism who presented after a Mechanical fall and found with Trimalleolar Fracture, sustained while trying to climb stairs. Her roommate noted gross deformity and called EMS who transported her to ED giving her fentanyl 50mcg en route. She was markedly sedated upon arrival and fracture was successfully reduced and splinted in the ED. She denies fever, chills, upper respiratory symptoms. Interval history / Subjective: Follow-up for issues listed below.   -Patient seen and examined, no c/o's. No acute changes. Assessment & Plan:     Acute Metabolic Encephalopathy: Resolved, 2/2 to anesthesia vs Norco vs hypercarbia.   -CT head : No evidence of acute intracranial abnormality. -repeat CT head : No acute findings or significant change from prior. Severe chronic microvascular ischemic disease, which has progressed since . Multiple chronic bilateral cerebellar infarcts are new since . -CT head w/o : (repeat d/t increased sedation 2/2 gabapentin dose) No evidence of acute intracranial abnormality by this modality.  -Gabapentin level: pending  -ABG: at patient's baseline      Right Trimalleolar Ankle Fracture: s/p mechanical fall. s/p reduction in the ED, neurovascularly intact.   -s/p ORIF 1/2  -orthopedic surgery following   -pain control  -PT/OT     Hx of CAD s/p CABG: revised cardiac risk index 4, 15% 30-day mortality from cardiac causes, currently without sx or signs of angina, HF, or arrhythmia  -continue metoprolol, lisinopril, and statin  -ECHO 02/01: EF 55-60%      Acute Hypoxia with Respiratory Acidosis:2/2 meds and untreated sleep apnea.     HTN: continue metoprolol and lisinopril. COPD: former smoker, currently vapes, SpO2 94-96% on RA, continue inhaled LABA/anticholinergic inhaler.   Stage IV CKD:  -creatinine 1.98 (stable from 5/2020), avoid neprotoxic drugs.     T2DM with Hyperglycemia:   -HgbA1c 7.3% 9/2020  -insulin pump: turned this off in the ED  -Basal insulin, ISS     Hypothyroidism: continue home levothyroxine. JOHN: CPAP at hs.      DVTppx: Lovenox  GIppx: Protonix  Code Status: Full Code  Diet: Cardiac  Activity: OOB to chair TID and PRN  Discharge: TBD  Ambulates: assistive devices  Hien Bang (friend) [phone]     Hospital Problems  Date Reviewed: 9/14/2020          Codes Class Noted POA    * (Principal) Ankle fracture ICD-10-CM: G64.744E  ICD-9-CM: 824.8  1/31/2021 Unknown                Review of Systems:   A comprehensive review of systems was negative except for that written in the HPI. Vital Signs:    Last 24hrs VS reviewed since prior progress note. Most recent are:  Visit Vitals  BP (!) 151/72 (BP 1 Location: Right upper arm, BP Patient Position: At rest)   Pulse (!) 58   Temp 98.2 °F (36.8 °C)   Resp 17   Ht 5' 4\" (1.626 m)   Wt 107.6 kg (237 lb 3.4 oz)   SpO2 98%   BMI 40.72 kg/m²         Intake/Output Summary (Last 24 hours) at 2/8/2021 1023  Last data filed at 2/8/2021 0500  Gross per 24 hour   Intake    Output 900 ml   Net -900 ml        Physical Examination:     I had a face to face encounter with this patient and independently examined them on 2/8/2021 as outlined below:    Constitutional:  No acute distress, cooperative, pleasant    ENT:  Oral mucosa moist.    Resp:  CTA bilaterally. No wheezing/rhonchi/rales. No accessory muscle use.    CV:  Regular rhythm, normal rate, S1,S2.    GI/:  Soft, obese, non tender, no guarding, BS present. Voids Freely. Musculoskeletal:  No edema, warm, , RLE dressing CDI. Neurologic:  Moves all extremities. AAOx3, CN II-XII reviewed. Skin:  Good turgor, no rashes or ulcers  Psych:  Good insight, Not anxious nor agitated.        Data Review:    Review and/or order of clinical lab test      Labs:     Recent Labs     02/07/21 0525 02/06/21  0541   WBC 4.3 4.8   HGB 9.5* 9.7*   HCT 29.4* 30.3*    138*     Recent Labs     02/08/21 0444 02/07/21 0525 02/07/21  0400 02/06/21  0541   * 136  --  135*   K 4.3 4.2  --  4.5    104  --  104   CO2 27 26  --  24   BUN 30* 34*  --  35*   CREA 1.53* 1.66*  --  1.64*   * 211*  --  283*   CA 9.5 9.4  --  9.4   MG 1.7  --  1.7 1.5*   PHOS 2.9  --   --   --      Recent Labs     02/08/21 0444 02/07/21 0525 02/06/21  0541   ALT  --  7* 8*   AP  --  89 92   TBILI  --  0.3 0.4   TP  --  6.7 6.8   ALB 2.1* 2.0* 2.0*   GLOB  --  4.7* 4.8*     No results for input(s): INR, PTP, APTT, INREXT in the last 72 hours. No results for input(s): FE, TIBC, PSAT, FERR in the last 72 hours. No results found for: FOL, RBCF   No results for input(s): PH, PCO2, PO2 in the last 72 hours. No results for input(s): CPK, CKNDX, TROIQ in the last 72 hours.     No lab exists for component: CPKMB  Lab Results   Component Value Date/Time    Cholesterol, total 189 05/15/2020 01:46 PM    HDL Cholesterol 44 05/15/2020 01:46 PM    LDL, calculated 74 05/15/2020 01:46 PM    Triglyceride 356 (H) 05/15/2020 01:46 PM    CHOL/HDL Ratio 3.0 09/27/2017 04:12 AM     Lab Results   Component Value Date/Time    Glucose (POC) 288 (H) 02/08/2021 06:59 AM    Glucose (POC) 265 (H) 02/07/2021 09:33 PM    Glucose (POC) 301 (H) 02/07/2021 04:55 PM    Glucose (POC) 310 (H) 02/07/2021 11:34 AM    Glucose (POC) 207 (H) 02/07/2021 06:10 AM     Lab Results   Component Value Date/Time    Color YELLOW/STRAW 02/04/2021 06:36 PM    Appearance CLEAR 02/04/2021 06:36 PM    Specific gravity 1.017 02/04/2021 06:36 PM    pH (UA) 6.0 02/04/2021 06:36 PM    Protein 300 (A) 02/04/2021 06:36 PM    Glucose 500 (A) 02/04/2021 06:36 PM    Ketone Negative 02/04/2021 06:36 PM    Bilirubin Negative 02/04/2021 06:36 PM    Urobilinogen 0.2 02/04/2021 06:36 PM    Nitrites Negative 02/04/2021 06:36 PM    Leukocyte Esterase Negative 02/04/2021 06:36 PM    Epithelial cells MODERATE (A) 02/04/2021 06:36 PM    Bacteria 1+ (A) 02/04/2021 06:36 PM    WBC 5-10 02/04/2021 06:36 PM    RBC 0-5 02/04/2021 06:36 PM         Medications Reviewed:     Current Facility-Administered Medications   Medication Dose Route Frequency    enoxaparin (LOVENOX) injection 40 mg  40 mg SubCUTAneous Q24H    gabapentin (NEURONTIN) capsule 300 mg  300 mg Oral TID    insulin glargine (LANTUS) injection 18 Units  18 Units SubCUTAneous QHS    dextrose (D50W) injection syrg 12.5-25 g  12.5-25 g IntraVENous PRN    dorzolamide (TRUSOPT) 2 % ophthalmic solution 1 Drop  1 Drop Both Eyes TID    And    timolol (TIMOPTIC) 0.5 % ophthalmic solution 1 Drop  1 Drop Both Eyes BID    HYDROcodone-acetaminophen (NORCO) 7.5-325 mg per tablet 1 Tab  1 Tab Oral Q6H PRN    sodium chloride (NS) flush 5-40 mL  5-40 mL IntraVENous Q8H    sodium chloride (NS) flush 5-40 mL  5-40 mL IntraVENous PRN    naloxone (NARCAN) injection 0.4 mg  0.4 mg IntraVENous PRN    HYDROcodone-acetaminophen (NORCO) 5-325 mg per tablet 1 Tab  1 Tab Oral Q4H PRN    ondansetron (ZOFRAN) injection 4 mg  4 mg IntraVENous Q4H PRN    sodium chloride (NS) flush 5-40 mL  5-40 mL IntraVENous Q8H    sodium chloride (NS) flush 5-40 mL  5-40 mL IntraVENous PRN    acetaminophen (TYLENOL) tablet 650 mg  650 mg Oral Q6H PRN    Or    acetaminophen (TYLENOL) suppository 650 mg  650 mg Rectal Q6H PRN    polyethylene glycol (MIRALAX) packet 17 g  17 g Oral DAILY PRN    promethazine (PHENERGAN) tablet 12.5 mg  12.5 mg Oral Q6H PRN    Or    ondansetron (ZOFRAN) injection 4 mg  4 mg IntraVENous Q6H PRN    levothyroxine (SYNTHROID) tablet 150 mcg  150 mcg Oral ACB    pravastatin (PRAVACHOL) tablet 40 mg  40 mg Oral QHS    amLODIPine (NORVASC) tablet 5 mg  5 mg Oral DAILY    metoprolol tartrate (LOPRESSOR) tablet 50 mg  50 mg Oral BID    pantoprazole (PROTONIX) tablet 40 mg  40 mg Oral ACB    ferrous sulfate tablet 325 mg  325 mg Oral DAILY    escitalopram oxalate (LEXAPRO) tablet 20 mg  20 mg Oral DAILY    glucose chewable tablet 16 g  4 Tab Oral PRN    dextrose (D50W) injection syrg 12.5-25 g  25-50 mL IntraVENous PRN    glucagon (GLUCAGEN) injection 1 mg  1 mg IntraMUSCular PRN    insulin lispro (HUMALOG) injection   SubCUTAneous AC&HS    glycopyrrolate-formoterol (BEVESPI AEROSPHERE) 9 mcg-4.8 mcg inhaler  2 Puff Inhalation BID RT     ______________________________________________________________________  EXPECTED LENGTH OF STAY: 6d 4h  ACTUAL LENGTH OF STAY:          8                 Raina Rudolph NP

## 2021-02-09 VITALS
HEIGHT: 64 IN | HEART RATE: 61 BPM | TEMPERATURE: 98.3 F | DIASTOLIC BLOOD PRESSURE: 62 MMHG | SYSTOLIC BLOOD PRESSURE: 123 MMHG | BODY MASS INDEX: 40.5 KG/M2 | WEIGHT: 237.22 LBS | RESPIRATION RATE: 16 BRPM | OXYGEN SATURATION: 97 %

## 2021-02-09 LAB
ALBUMIN SERPL-MCNC: 2 G/DL (ref 3.5–5)
ALBUMIN SERPL-MCNC: 2.1 G/DL (ref 3.5–5)
ALBUMIN/GLOB SERPL: 0.4 {RATIO} (ref 1.1–2.2)
ALP SERPL-CCNC: 86 U/L (ref 45–117)
ALT SERPL-CCNC: 8 U/L (ref 12–78)
ANION GAP SERPL CALC-SCNC: 7 MMOL/L (ref 5–15)
ANION GAP SERPL CALC-SCNC: 8 MMOL/L (ref 5–15)
AST SERPL-CCNC: 13 U/L (ref 15–37)
BILIRUB SERPL-MCNC: 0.2 MG/DL (ref 0.2–1)
BUN SERPL-MCNC: 31 MG/DL (ref 6–20)
BUN SERPL-MCNC: 31 MG/DL (ref 6–20)
BUN/CREAT SERPL: 17 (ref 12–20)
BUN/CREAT SERPL: 18 (ref 12–20)
CALCIUM SERPL-MCNC: 10 MG/DL (ref 8.5–10.1)
CALCIUM SERPL-MCNC: 9.7 MG/DL (ref 8.5–10.1)
CHLORIDE SERPL-SCNC: 103 MMOL/L (ref 97–108)
CHLORIDE SERPL-SCNC: 105 MMOL/L (ref 97–108)
CO2 SERPL-SCNC: 25 MMOL/L (ref 21–32)
CO2 SERPL-SCNC: 27 MMOL/L (ref 21–32)
CREAT SERPL-MCNC: 1.73 MG/DL (ref 0.55–1.02)
CREAT SERPL-MCNC: 1.81 MG/DL (ref 0.55–1.02)
GLOBULIN SER CALC-MCNC: 4.7 G/DL (ref 2–4)
GLUCOSE BLD STRIP.AUTO-MCNC: 205 MG/DL (ref 65–100)
GLUCOSE BLD STRIP.AUTO-MCNC: 229 MG/DL (ref 65–100)
GLUCOSE BLD STRIP.AUTO-MCNC: 305 MG/DL (ref 65–100)
GLUCOSE SERPL-MCNC: 206 MG/DL (ref 65–100)
GLUCOSE SERPL-MCNC: 218 MG/DL (ref 65–100)
MAGNESIUM SERPL-MCNC: 1.5 MG/DL (ref 1.6–2.4)
PHOSPHATE SERPL-MCNC: 3 MG/DL (ref 2.6–4.7)
POTASSIUM SERPL-SCNC: 4.2 MMOL/L (ref 3.5–5.1)
POTASSIUM SERPL-SCNC: 4.2 MMOL/L (ref 3.5–5.1)
PROT SERPL-MCNC: 6.7 G/DL (ref 6.4–8.2)
SARS-COV-2, XPLCVT: NOT DETECTED
SERVICE CMNT-IMP: ABNORMAL
SODIUM SERPL-SCNC: 137 MMOL/L (ref 136–145)
SODIUM SERPL-SCNC: 138 MMOL/L (ref 136–145)
SOURCE, COVRS: NORMAL

## 2021-02-09 PROCEDURE — 94760 N-INVAS EAR/PLS OXIMETRY 1: CPT

## 2021-02-09 PROCEDURE — 77010033678 HC OXYGEN DAILY

## 2021-02-09 PROCEDURE — 36415 COLL VENOUS BLD VENIPUNCTURE: CPT

## 2021-02-09 PROCEDURE — 94762 N-INVAS EAR/PLS OXIMTRY CONT: CPT

## 2021-02-09 PROCEDURE — 74011636637 HC RX REV CODE- 636/637: Performed by: ORTHOPAEDIC SURGERY

## 2021-02-09 PROCEDURE — 94640 AIRWAY INHALATION TREATMENT: CPT

## 2021-02-09 PROCEDURE — 80069 RENAL FUNCTION PANEL: CPT

## 2021-02-09 PROCEDURE — 83735 ASSAY OF MAGNESIUM: CPT

## 2021-02-09 PROCEDURE — 80053 COMPREHEN METABOLIC PANEL: CPT

## 2021-02-09 PROCEDURE — 82962 GLUCOSE BLOOD TEST: CPT

## 2021-02-09 PROCEDURE — 74011250637 HC RX REV CODE- 250/637: Performed by: ORTHOPAEDIC SURGERY

## 2021-02-09 PROCEDURE — 74011250637 HC RX REV CODE- 250/637: Performed by: NURSE PRACTITIONER

## 2021-02-09 RX ORDER — HYDROCODONE BITARTRATE AND ACETAMINOPHEN 7.5; 325 MG/1; MG/1
1 TABLET ORAL
Qty: 15 TAB | Refills: 0 | Status: SHIPPED
Start: 2021-02-09 | End: 2021-02-14

## 2021-02-09 RX ORDER — INSULIN GLARGINE 100 [IU]/ML
INJECTION, SOLUTION SUBCUTANEOUS
Qty: 1 VIAL | Refills: 0 | Status: SHIPPED
Start: 2021-02-09 | End: 2021-04-13

## 2021-02-09 RX ORDER — POLYETHYLENE GLYCOL 3350 17 G/17G
17 POWDER, FOR SOLUTION ORAL DAILY
Qty: 1 EACH | Refills: 0 | Status: SHIPPED
Start: 2021-02-09 | End: 2021-04-13

## 2021-02-09 RX ORDER — GABAPENTIN 300 MG/1
300 CAPSULE ORAL 3 TIMES DAILY
Qty: 90 CAP | Refills: 0 | Status: SHIPPED
Start: 2021-02-09 | End: 2021-04-13

## 2021-02-09 RX ORDER — INSULIN LISPRO 100 [IU]/ML
INJECTION, SOLUTION INTRAVENOUS; SUBCUTANEOUS
Qty: 1 VIAL | Refills: 0 | Status: SHIPPED
Start: 2021-02-09 | End: 2021-04-13

## 2021-02-09 RX ADMIN — ESCITALOPRAM OXALATE 20 MG: 10 TABLET ORAL at 09:24

## 2021-02-09 RX ADMIN — GABAPENTIN 300 MG: 300 CAPSULE ORAL at 15:42

## 2021-02-09 RX ADMIN — DORZOLAMIDE HYDROCHLORIDE 1 DROP: 20 SOLUTION/ DROPS OPHTHALMIC at 15:42

## 2021-02-09 RX ADMIN — TIMOLOL MALEATE 1 DROP: 5 SOLUTION/ DROPS OPHTHALMIC at 09:24

## 2021-02-09 RX ADMIN — INSULIN LISPRO 3 UNITS: 100 INJECTION, SOLUTION INTRAVENOUS; SUBCUTANEOUS at 12:20

## 2021-02-09 RX ADMIN — INSULIN LISPRO 7 UNITS: 100 INJECTION, SOLUTION INTRAVENOUS; SUBCUTANEOUS at 18:00

## 2021-02-09 RX ADMIN — PANTOPRAZOLE SODIUM 40 MG: 40 TABLET, DELAYED RELEASE ORAL at 07:04

## 2021-02-09 RX ADMIN — FERROUS SULFATE TAB 325 MG (65 MG ELEMENTAL FE) 325 MG: 325 (65 FE) TAB at 09:24

## 2021-02-09 RX ADMIN — METOPROLOL TARTRATE 50 MG: 50 TABLET, FILM COATED ORAL at 09:24

## 2021-02-09 RX ADMIN — LEVOTHYROXINE SODIUM 150 MCG: 0.15 TABLET ORAL at 07:04

## 2021-02-09 RX ADMIN — METOPROLOL TARTRATE 50 MG: 50 TABLET, FILM COATED ORAL at 18:00

## 2021-02-09 RX ADMIN — INSULIN LISPRO 3 UNITS: 100 INJECTION, SOLUTION INTRAVENOUS; SUBCUTANEOUS at 07:03

## 2021-02-09 RX ADMIN — GABAPENTIN 300 MG: 300 CAPSULE ORAL at 09:24

## 2021-02-09 RX ADMIN — GLYCOPYRROLATE AND FORMOTEROL FUMARATE 2 PUFF: 9; 4.8 AEROSOL, METERED RESPIRATORY (INHALATION) at 08:00

## 2021-02-09 RX ADMIN — TIMOLOL MALEATE 1 DROP: 5 SOLUTION/ DROPS OPHTHALMIC at 18:00

## 2021-02-09 RX ADMIN — DORZOLAMIDE HYDROCHLORIDE 1 DROP: 20 SOLUTION/ DROPS OPHTHALMIC at 09:24

## 2021-02-09 RX ADMIN — AMLODIPINE BESYLATE 5 MG: 5 TABLET ORAL at 09:24

## 2021-02-09 NOTE — PROGRESS NOTES
Hospital to [address]                                                                        79 y.o.   female    [address]   Room: [address] [place] [address] Brian Scherer  Unit Phone# :  [phone]      ST. [address]  [address] [place] [address]  Dept: [address]: [phone]                    SITUATION     Admitted:  1/30/2021         Attending Provider:  Elaine Elizabeth MD       Consultations:  IP CONSULT TO ORTHOPEDIC SURGERY  IP CONSULT TO NEPHROLOGY    PCP:  Lazarus Levy, [address] [place] [address]    Treatment Team: Attending Provider: Elaine Elizabeth MD; Consulting Provider: Makenzie Barnett MD; Utilization Review: Mumtaz Salinas RN; Consulting Provider: Jamar Gonzalez MD; Care Manager: Britni Ch; Consulting Provider: Kwabena Singer MD    Admitting Dx: Ankle fracture [S82.899A]       Principal Problem: Ankle fracture    8 Days Post-Op of   Procedure(s):  RIGHT ANKLE OPEN REDUCTION INTERNAL FIXATION   BY: Jamar Gonzalez MD             ON: 2/1/2021                  Code Status: Full Code                Advance Directives:   Advance Care Planning 1/22/2019   Patient's Healthcare Decision Maker is: -   Primary Decision Maker Name -   Primary Decision Maker Phone Number -   Confirm Advance Directive None   Patient Would Like to Complete Advance Directive Yes   Does the patient have other document types -    (Send w/patient)   Not Received       Isolation:  There are currently no Active Isolations       MDRO: No current active infections     Pain Medications given:      Last dose: at      Special Equipment needed: no  Type of equipment:       BACKGROUND     Allergies:   Allergies   Allergen Reactions    Nsaids (Non-Steroidal Anti-Inflammatory Drug) Other (comments)     bleeding    Augmentin [Amoxicillin-Pot Clavulanate] Diarrhea    Vesicare [Solifenacin] Rash     BURNING WITH URINATION       Past Medical History: Diagnosis Date    Adverse effect of anesthesia     SLOW WAKING PAST ANESTHESIA    Anxiety     Arthritis     CAD (coronary artery disease)     s/p CABG x 3v    Chest pain 7/2015    Chronic obstructive pulmonary disease (HCC)     CTS (carpal tunnel syndrome)     S/p bilateral release    Diabetes ([address]     Diabetic gastroparesis ([address]     Diabetic neuropathy ([address]     Diabetic retinopathy ([address]     GERD (gastroesophageal reflux disease)     GI bleed 7/2015    4 bleeds with 9 clips placed    Hypercholesteremia     Hypertension     Hypothyroid     Ill-defined condition     NEUROPATHY HANDS AND FEET    Ill-defined condition 2017    GI BLEED    Nicotine vapor product user     JOHN on CPAP     Osteoporosis     Vitamin D deficiency        Past Surgical History:   Procedure Laterality Date    HX CERVICAL FUSION  2011    HX ENDOSCOPY  7/2015    HX GI      HX HEART CATHETERIZATION  7/2015    HX LUMBAR FUSION  2017    HX LUMBAR LAMINECTOMY  2011    HX ORTHOPAEDIC Right 1970    CARPAL TUNNEL    HX ORTHOPAEDIC Left 2003    CARPAL TUNNEL    HX ROTATOR CUFF REPAIR Right 2003    HX SHOULDER ARTHROSCOPY Right     HX TONSIL AND ADENOIDECTOMY  1968    HX TONSILLECTOMY      OK CABG, ARTERY-VEIN, THREE  7/13/2015       Medications Prior to Admission   Medication Sig    glucose blood VI test strips (Contour Next Test Strips) strip TEST FOUR TIMES DAILY    levothyroxine (SYNTHROID) 150 mcg tablet Take 1 Tab by mouth Daily (before breakfast). One pill every day. Note dose change    prednisoLONE acetate (PRED FORTE) 1 % ophthalmic suspension     cyclopentolate (CYCLOGYL) 1 % ophthalmic solution Administer 1 Drop to right eye every four (4) hours.  HCL    colchicine 0.6 mg tablet Take two pills when you  the prescription, a third pill and hour later and then two pills per day for the next 3 days    insulin aspart U-100 (NovoLOG U-100 Insulin aspart) 100 unit/mL injection USE A MAX  UNITS DAILY WITH INSULIN PUMP    pravastatin (PRAVACHOL) 40 mg tablet Take 1 Tab by mouth nightly. SCHEDULE OFFICE VISIT    acetaminophen (TYLENOL) 500 mg tablet Take 2 Tabs by mouth every six (6) hours.  umeclidinium-vilanterol (ANORO ELLIPTA) 62.5-25 mcg/actuation inhaler Take 1 Puff by inhalation daily.  b complex vitamins tablet Take 1 Tab by mouth daily.  amLODIPine (NORVASC) 5 mg tablet TK 1 T PO QD. PATINET TAKE MED DAILY    metoprolol tartrate (LOPRESSOR) 50 mg tablet TK 1 T PO BID WC. PATIENT TAKES MED TWICE DAILY    tiZANidine & Irritant Cntr 2 4 mg kit tizanidine 4 mg tablet AT BEDTIME    escitalopram oxalate (LEXAPRO) 20 mg tablet Take 20 mg by mouth daily.  pantoprazole (PROTONIX) 40 mg tablet Take 40 mg by mouth daily.  gabapentin (NEURONTIN) 800 mg tablet Take 800 mg by mouth three (3) times daily.   insulin pump (PATIENT SUPPLIED) misc by SubCUTAneous route as needed. 1.7 units/hr    lisinopril (PRINIVIL, ZESTRIL) 10 mg tablet Take 1 Tab by mouth daily. (Patient taking differently: Take 10 mg by mouth two (2) times a day.)    cholecalciferol, vitamin D3, (VITAMIN D3) 2,000 unit tab Take 1 Tab by mouth daily. (Patient taking differently: Take 2,000 Units by mouth daily. TAKES 1000 UNITS PER DAY)    ferrous sulfate 325 mg (65 mg iron) tablet Take 325 mg by mouth daily.  dorzolamide-timolol, PF, (COSOPT, PF,) 2-0.5 % dpet Administer 1 Drop to both eyes two (2) times a day. Indications: Ocular Hypertension    senna (SENNA) 8.6 mg tablet Take 1 Tab by mouth nightly as needed. Hard scripts included in transfer packet no    Vaccinations:    Immunization History   Administered Date(s) Administered    Influenza Vaccine 09/25/2015    Pneumococcal Vaccine (Unspecified Type) 09/25/2015       Readmission Risks: Known Risks: The Charlson CoMorbitiy Index tool is an evidenced based tool that has more automatic generated information.  The tool looks at many different items such as the age of the patient, how many times they were admitted in the last calendar year, current length of stay in the hospital and their diagnosis. All of these items are pulled automatically from information documented in the chart from various places and will generate a score that predicts whether a patient is at low (less than 13), medium (13-20) or high (21 or greater) risk of being readmitted.         ASSESSMENT                Temp: 98.3 °F (36.8 °C) (02/09/21 1428) Pulse (Heart Rate): 61 (02/09/21 1428)     Resp Rate: 16 (02/09/21 1428)           BP: 123/62 (02/09/21 1428)     O2 Sat (%): 97 % (02/09/21 1428)     Weight: 107.6 kg (237 lb 3.4 oz)    Height: 5' 4\" (162.6 cm) (02/01/21 1132)       If above not within 1 hour of discharge:    BP:_____  P:____  R:____ T:_____ O2 Sat: ___%  O2: ______    Active Orders   Diet    DIET CARDIAC Regular         Orientation: oriented to time, place, person and situation     Active Behaviors: None                                   Active Lines/Drains:  (Peg Tube / Gipson / CL or S/L?): no    Urinary Status: Voiding, External catheter     Last BM: Last Bowel Movement Date: 02/02/21     Skin Integrity: Incision (comment)(right ankle)             Mobility: Very limited   Weight Bearing Status: NWB (Non Weight Bearing)                Lab Results   Component Value Date/Time    Glucose 218 (H) 02/09/2021 06:01 AM    Glucose 206 (H) 02/09/2021 06:01 AM    Hemoglobin A1c 7.3 (H) 09/25/2020 12:34 PM    INR 1.1 01/31/2021 06:04 AM    INR 1.0 01/07/2019 08:31 AM    HGB 9.5 (L) 02/07/2021 05:25 AM    HGB 9.7 (L) 02/06/2021 05:41 AM    Hemoglobin A1c, External 6.2 06/30/2015        RECOMMENDATION     See After Visit Summary (AVS) for:  · Discharge instructions  · After [address]   · Special equipment needed (entered pre-discharge by Care Management)  · Medication Reconciliation    · Follow up Appointment(s)         Report given/sent by:  Candie Reardon Verbal report given to: [address]  FAXED to:   [address] [facility] [address]         Estimated discharge time:  2/9/2021 at 16:45

## 2021-02-09 NOTE — PROGRESS NOTES
Bedside and Verbal shift change report given to Genet Segura (oncoming nurse) by Camron Shaikh (offgoing nurse). Report included the following information SBAR, Kardex, Procedure Summary, Intake/Output and MAR.

## 2021-02-09 NOTE — PROGRESS NOTES
Problem: Pressure Injury - Risk of  Goal: *Prevention of pressure injury  Description: Document Rehan Scale and appropriate interventions in the flowsheet.  Outcome: Resolved/Met  Note: Pressure Injury Interventions:  Sensory Interventions: Keep linens dry and wrinkle-free, Check visual cues for pain    Moisture Interventions: Internal/External urinary devices    Activity Interventions: Increase time out of bed, Pressure redistribution bed/mattress(bed type), PT/OT evaluation    Mobility Interventions: Pressure redistribution bed/mattress (bed type), PT/OT evaluation    Nutrition Interventions: Document food/fluid/supplement intake, Offer support with meals,snacks and hydration    Friction and Shear Interventions: Transferring/repositioning devices, Minimize layers                Problem: Patient Education: Go to Patient Education Activity  Goal: Patient/Family Education  Outcome: Resolved/Met     Problem: Falls - Risk of  Goal: *Absence of Falls  Description: Document Rachael Fall Risk and appropriate interventions in the flowsheet.  Outcome: Resolved/Met  Note: Fall Risk Interventions:  Mobility Interventions: Communicate number of staff needed for ambulation/transfer, Patient to call before getting OOB, PT Consult for mobility concerns    Mentation Interventions: Door open when patient unattended, More frequent rounding, Reorient patient    Medication Interventions: Evaluate medications/consider consulting pharmacy, Patient to call before getting OOB, Teach patient to arise slowly    Elimination Interventions: Call light in reach, Patient to call for help with toileting needs, Stay With Me (per policy)    History of Falls Interventions: Door open when patient unattended, Evaluate medications/consider consulting pharmacy         Problem: Patient Education: Go to Patient Education Activity  Goal: Patient/Family Education  Outcome: Resolved/Met     Problem: Patient Education: Go to Patient Education  Activity  Goal: Patient/Family Education  Outcome: Resolved/Met     Problem: Patient Education: Go to Patient Education Activity  Goal: Patient/Family Education  Outcome: Resolved/Met     Problem: Breathing Pattern - Ineffective  Goal: *Absence of hypoxia  Outcome: Resolved/Met  Goal: *Use of effective breathing techniques  Outcome: Resolved/Met  Goal: *PALLIATIVE CARE:  Alleviation of Dyspnea  Outcome: Resolved/Met     Problem: Patient Education: Go to Patient Education Activity  Goal: Patient/Family Education  Outcome: Resolved/Met

## 2021-02-09 NOTE — DISCHARGE INSTRUCTIONS
Discharge Instructions       PATIENT ID: Daniel Mejia  MRN: [id number]   YOB: 1950    DATE OF ADMISSION: 1/30/2021 11:12 PM    DATE OF DISCHARGE: 2/9/2021    PRIMARY CARE PROVIDER: Carlee Daniels MD     ATTENDING PHYSICIAN: Andrés Vazquez MD  DISCHARGING PROVIDER: Chantal Wagner NP    To contact this individual call [phone] and ask the  to page. If unavailable ask to be transferred the Adult Hospitalist Department. DISCHARGE DIAGNOSES: Acute Metabolic Encephalopathy, Right Trimalleolar Ankle Fracture, Hx of CAD s/p CABG, Acute Hypoxia with Respiratory Acidosis, HTN, COPD, Stage IV CKD, T2DM with Hyperglycemia,Hypothyroidism,JOHN    CONSULTATIONS: IP CONSULT TO ORTHOPEDIC SURGERY  IP CONSULT TO NEPHROLOGY    PROCEDURES/SURGERIES: Procedure(s):  RIGHT ANKLE OPEN REDUCTION INTERNAL FIXATION    PENDING TEST RESULTS:   At the time of discharge the following test results are still pending: none. FOLLOW UP APPOINTMENTS:   Follow-up Information     Follow up With Specialties Details Why Contact Info    Carlee Daniels MD Family Medicine In 3 days PCP [facility] [address]      Abi Garcia MD Nephrology In 1 week [facility] Doctor, Nephrologist,  [address]      Jesus Mancuso MD Orthopedic Surgery In 1 week follow- up ankle fracture, Orthopeadic Surgery [address] [place] [address]  [address] [place] [address]  [phone]             ADDITIONAL CARE RECOMMENDATIONS:   You have been deemed stable for discharge and are being discharged to Our [facility]. Should you need medication refills beyond what we have prescribed for you, you will need to contact your primary care provider for refills.     Return to the ER or notify your primary care office if you have a fever greater than 101.5 associated with chills, chest pain, or signs and symptoms of a stroke which are:  B E F A S T  -loss of balance, headaches or dizziness  -eyes blurred vision (sudden)  -asymmetrical facial symmetry (side of face droops)  -arms and leg weakness  -slurred speech / difficulty speaking  -if you experience any of these (time to call for an ambulance)      DIET: Cardiac Diet    ACTIVITY: PT/OT Eval and Treat- NWB MERRITT    WOUND CARE: Please maintain the dressing and/or splint placed at surgery until your follow up appointment, will remove it at your appointment. Please keep the dressing clean and dry. If you have any questions or problems with your dressing, please call our office at [phone]. EQUIPMENT needed: RLE boot. DISCHARGE MEDICATIONS:   See Medication Reconciliation Form    · It is important that you take the medication exactly as they are prescribed. · Keep your medication in the bottles provided by the pharmacist and keep a list of the medication names, dosages, and times to be taken in your wallet. · Do not take other medications without consulting your doctor. NOTIFY YOUR PHYSICIAN FOR ANY OF THE FOLLOWING:   Fever over 101 degrees for 24 hours. Chest pain, shortness of breath, fever, chills, nausea, vomiting, diarrhea, change in mentation, falling, weakness, bleeding. Severe pain or pain not relieved by medications. Or, any other signs or symptoms that you may have questions about. DISPOSITION:    Home With:   OT  PT  HH  RN      X SNF/Inpatient Rehab/LTAC: [address] [facility]    Independent/assisted living    Hospice    Other:       Signed:   Essence Hewitt NP  2/9/2021  12:33 PM          Dr. Shiloh Stein Postoperative Patient Instructions    1. Please maintain the dressing and/or splint placed at surgery until your follow up appointment. We will remove it at your appointment. Please keep the dressing clean and dry.   If you have any questions or problems with your dressing, please call our office at [phone].  2. Please elevate the operative extremity to the level of the heart to keep swelling at a minimum. You may get up to move around, but when seated please keep the extremity elevated as much as possible. This will decrease swelling and postoperative pain. 3. If you were told to be non-weight bearing following surgery, please do not place the foot on the ground. You may use crutches or we can help arrange for a scooter for you to stay off of your operative leg. 4. If you are in a special shoe or boot and told that you may bear weight as tolerated, then you may do so, but please keep the extremity elevated when not moving around. 5. If you had a block from the Anesthesia doctor before your surgery, expect this to wear off around 12-24 hours after you received it and you should start taking your pain medication when the feeling begins to come back into your leg. 6. A prescription for pain medicine is provided. The key to pain control is staying ahead. For the first 48-72 hours after your surgery, you may want to take your pain medication of a regular schedule. After that, you may only need it on an as-needed basis. 7. Please begin taking one Enteric Coated Aspirin 325 mg daily until you are told you do not need to do this anymore. This can lower the risk of developing a blood clot after surgery. If you are not sure if you can take an Aspirin daily, please check with your primary care doctor to verify. 8. Signs and symptoms of infection include: redness, increased pain, increased swelling not relieved by elevation, drainage, fever, or chills. If you develop any of these symptoms, call the office at [phone].  9. Please follow-up at my office at 12-15 days after surgery or when specified at your pre-operative appointment. Please follow the [facility] Discharge Instructions provided to you by Dr. Margherita Leyden for your medications.       [facility], [place]  2/4/2021

## 2021-02-09 NOTE — PROGRESS NOTES
Name: Virginia Max MRN: [id number]   : 1950 Hospital: . [address]   Date: 2021        IMPRESSION:   · JAIDEN- unclear etiology. Patient's GFR is now stable, most likely this is her baseline   · CKD with nephrotic proteinuria and echogenic kidneys. Patient likely has diabetic kidney diasease and ? HTN nephrosclerosis. · Electrolytes are OK  · Mild anemia- blood loss and CKD      PLAN:   · No need of change is patient's home medications. · Will need outpatient renal follow up. Patient's severe proteinuria places her at very high risk for CKD progression. · Anemia management- check the iron profile. · Repeat renal panel to establish baseline GFR. Patient's Scr is still coming down daily. Subjective/Interval History:   I have reviewed the flowsheet and previous days notes. ROS:A comprehensive review of systems was negative. Objective:   Vital Signs:    Visit Vitals  /64   Pulse 60   Temp 98.6 °F (37 °C)   Resp 16   Ht 5' 4\" (1.626 m)   Wt 107.6 kg (237 lb 3.4 oz)   SpO2 95%   BMI 40.72 kg/m²       O2 Device: Nasal cannula   O2 Flow Rate (L/min): 2 l/min   Temp (24hrs), Av.4 °F (36.9 °C), Min:98 °F (36.7 °C), Max:98.6 °F (37 °C)       Intake/Output:   Last shift:      No intake/output data recorded. Last 3 shifts:  1901 -  0700  In: -   Out: 3200 [Urine:3200]    Intake/Output Summary (Last 24 hours) at 2021 1038  Last data filed at 2021 0556  Gross per 24 hour   Intake    Output 2300 ml   Net -2300 ml        Physical Exam:  General:    Alert, cooperative, no distress, appears stated age. Watching TV   Head:   Normocephalic, without obvious abnormality, atraumatic. Eyes:   Conjunctivae/corneas clear. Nose:  Nares normal. No drainage or sinus tenderness. Throat:    Lips, mucosa, and tongue normal.  No Thrush  Neck:  Supple, symmetrical,  no adenopathy, thyroid: non tender    no carotid bruit and no JVD. Lungs:   Clear to auscultation bilaterally. No Wheezing or Rhonchi. No rales. Chest wall:  No tenderness or deformity. No Accessory muscle use. Heart:   Regular rate and rhythm,  no murmur, rub or gallop. Abdomen:   Soft, non-tender. Not distended. Bowel sounds normal. No masses  Extremities: Extremities right foot with a cast., No cyanosis. No edema. No clubbing  Skin:     Texture, turgor normal. No rashes or lesions. Not Jaundiced  Psych:  Fair insight. Not depressed. Not anxious or agitated. Neurologic: Normal strength, Alert and oriented X 3.        DATA:  Labs:  Recent Labs     02/09/21  0601 02/08/21  0444 02/07/21  0525 02/07/21  0400    135* 136  --    K 4.2 4.3 4.2  --     104 104  --    CO2 27 27 26  --    BUN 31* 30* 34*  --    CREA 1.73* 1.53* 1.66*  --    CA 10.0 9.5 9.4  --    ALB 2.0* 2.1* 2.0*  --    PHOS  --  2.9  --   --    MG  --  1.7  --  1.7     Recent Labs     02/07/21  0525   WBC 4.3   HGB 9.5*   HCT 29.4*        Recent Labs     02/07/21  1302   TARAN 80   KU 17   CREAU 39.80  41.10       Total time spent with patient:  35 minutes    [] Critical Care Provided    Care Plan discussed with:   Staff, Medical Team    Corry Soto MD

## 2021-02-09 NOTE — DISCHARGE SUMMARY
Discharge Summary       PATIENT ID: Virginia Max  MRN: [id number]   YOB: 1950    DATE OF ADMISSION: 1/30/2021 11:12 PM    DATE OF DISCHARGE: 2/9/2021   PRIMARY CARE PROVIDER: Greg Trujillo MD     ATTENDING PHYSICIAN: Yadiel Casanova MD  DISCHARGING PROVIDER: Oliverio Coelho NP    To contact this individual call [phone] and ask the  to page. If unavailable ask to be transferred the Adult Hospitalist Department. CONSULTATIONS: IP CONSULT TO ORTHOPEDIC SURGERY  IP CONSULT TO NEPHROLOGY    PROCEDURES/SURGERIES: Procedure(s):  RIGHT ANKLE OPEN REDUCTION INTERNAL FIXATION    ADMITTING DIAGNOSES & HOSPITAL COURSE:     Acute Metabolic Encephalopathy, Right Trimalleolar Ankle Fracture, Hx of CAD s/p CABG, Acute Hypoxia with Respiratory Acidosis, HTN, COPD, Stage IV CKD, T2DM with Hyperglycemia,Hypothyroidism,JOHN    Clem Amaral is a 79 y.o. female with a PMH of CAD s/p CABG, COPD, T2DM with Gastroparesis, Neuropathy, Legally blind, GERD, HTN, HLD, JOHN on CPAP and Hypothyroidism who presented after a Mechanical fall and found with Trimalleolar Fracture, sustained while trying to climb stairs. Her roommate noted gross deformity and called EMS who transported her to ED giving her fentanyl 50mcg en route. She was markedly sedated upon arrival and fracture was successfully reduced and splinted in the ED. She denies fever, chills, upper respiratory symptoms. DISCHARGE DIAGNOSES / PLAN:      Acute Metabolic Encephalopathy: Resolved, 2/2 to anesthesia vs Norco vs hypercarbia.   -CT head 01/31: No evidence of acute intracranial abnormality. -repeat CT head 02/02: No acute findings or significant change from prior. Severe chronic microvascular ischemic disease, which has progressed since 2012. Multiple chronic bilateral cerebellar infarcts are new since 2012.   -CT head w/o 02/04: (repeat d/t increased sedation 2/2 gabapentin dose) No evidence of acute intracranial abnormality by this modality.  -Gabapentin level: pending  -ABG: at patient's baseline      Right Trimalleolar Ankle Fracture: s/p mechanical fall. s/p reduction in the ED, neurovascularly intact. -s/p ORIF 1/2  -orthopedic surgery following   -pain control  -PT/OT     Hx of CAD s/p CABG: revised cardiac risk index 4, 15% 30-day mortality from cardiac causes, currently without sx or signs of angina, HF, or arrhythmia  -continue metoprolol, lisinopril, and statin  -ECHO 02/01: EF 55-60%      Acute Hypoxia with Respiratory Acidosis:2/2 meds and untreated sleep apnea.     HTN: continue metoprolol and lisinopril. COPD: former smoker, currently vapes, SpO2 94-96% on RA, continue inhaled LABA/anticholinergic inhaler.   Stage IV CKD:  -creatinine 1.98 (stable from 5/2020), avoid neprotoxic drugs.     T2DM with Hyperglycemia:   -HgbA1c 7.3% 9/2020  -insulin pump: turned this off in the ED  -Basal insulin, ISS     Hypothyroidism: continue home levothyroxine. XLQ: LIDL YB hs.      ADDITIONAL CARE RECOMMENDATIONS:   You have been deemed stable for discharge and are being discharged to [facility]. Should you need medication refills beyond what we have prescribed for you, you will need to contact your primary care provider for refills. Return to the ER or notify your primary care office if you have a fever greater than 101.5 associated with chills, chest pain, or signs and symptoms of a stroke which are:  B E F A S T  -loss of balance, headaches or dizziness  -eyes blurred vision (sudden)  -asymmetrical facial symmetry (side of face droops)  -arms and leg weakness  -slurred speech / difficulty speaking  -if you experience any of these (time to call for an ambulance)        PENDING TEST RESULTS:   At the time of discharge the following test results are still pending: none.     FOLLOW UP APPOINTMENTS:    Follow-up Information     Follow up With Specialties Details Why Contact Info    Darlyn Jacobs MD Family Medicine In 3 days PCP [facility]  [address] [facility] [address] [facility]      Anjali Culver MD Nephrology In 1 week [facility] Doctor, Nephrologist,  [facility]  [address] [facility]      Reinier Lang MD Orthopedic Surgery In 1 week follow- up ankle fracture, Orthopeadic Surgery [address]  [address]  [address]  [phone]               DIET: Cardiac Diet    ACTIVITY: PT/OT Eval and Treat- NWB RLE    WOUND CARE: Please maintain the dressing and/or splint placed at surgery until your follow up appointment, will remove it at your appointment. Please keep the dressing clean and dry. If you have any questions or problems with your dressing, please call our office at [phone]. EQUIPMENT needed: RLE boot. DISCHARGE MEDICATIONS:  Current Discharge Medication List      START taking these medications    Details   gabapentin (NEURONTIN) 300 mg capsule Take 1 Cap by mouth three (3) times daily. Max Daily Amount: 900 mg. 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 Max Daily Amount: 3 Tabs. Qty: 15 Tab, Refills: 0    Associated Diagnoses: Trimalleolar fracture of ankle, closed, right, initial encounter      insulin glargine (LANTUS) 100 unit/mL injection DO NOT hold without a physician's order. DO NOT mix with other insulins.     Indications: type 2 diabetes mellitus  Qty: 1 Vial, Refills: 0      insulin lispro (HUMALOG) 100 unit/mL injection INITIATE CORRECTIVE INSULIN PROTOCOL (CIPRIANO):  RX CIRPIANO Normal Sensitivity (Average weight)    AC (before meals), Q6H, and Q4H CORRECTIONAL SCALE only For Blood Sugar (mg/dl) of :             140-199=2 units 200-249=3 units  250-299=5 units  300-349=7 units  350 or greater = Call MD  Give in addition to basal medications. Do Not Hold for NPO    BEDTIME CORRECTIONAL sliding scale when scheduled:  200-249=2 units  250-299=3 units   300-349=4 units  350 or greater = Call MD  Give in addition to basal medications. Do Not Hold for NPO Fast Acting - Administer Immediately - or within 15 minutes of start of meal, if mealtime coverage. Qty: 1 Vial, Refills: 0      polyethylene glycol (MIRALAX) 17 gram packet Take 1 Packet by mouth daily. Qty: 1 Each, Refills: 0         CONTINUE these medications which have NOT CHANGED    Details   glucose blood VI test strips (Contour Next Test Strips) strip TEST FOUR TIMES DAILY  Qty: 400 Strip, Refills: 0      levothyroxine (SYNTHROID) 150 mcg tablet Take 1 Tab by mouth Daily (before breakfast). One pill every day. Note dose change  Qty: 90 Tab, Refills: 1      prednisoLONE acetate (PRED FORTE) 1 % ophthalmic suspension       cyclopentolate (CYCLOGYL) 1 % ophthalmic solution Administer 1 Drop to right eye every four (4) hours. HCL      colchicine 0.6 mg tablet Take two pills when you  the prescription, a third pill and hour later and then two pills per day for the next 3 days  Qty: 12 Tab, Refills: 1      pravastatin (PRAVACHOL) 40 mg tablet Take 1 Tab by mouth nightly. SCHEDULE OFFICE VISIT  Qty: 90 Tab, Refills: 3      acetaminophen (TYLENOL) 500 mg tablet Take 2 Tabs by mouth every six (6) hours. Qty: 120 Tab, Refills: 0    Associated Diagnoses: Primary osteoarthritis of right knee      umeclidinium-vilanterol (ANORO ELLIPTA) 62.5-25 mcg/actuation inhaler Take 1 Puff by inhalation daily. b complex vitamins tablet Take 1 Tab by mouth daily. amLODIPine (NORVASC) 5 mg tablet TK 1 T PO QD. PATINET TAKE MED DAILY  Refills: 1      metoprolol tartrate (LOPRESSOR) 50 mg tablet TK 1 T PO BID WC.  PATIENT TAKES MED TWICE DAILY  Refills: 5      escitalopram oxalate (LEXAPRO) 20 mg tablet Take 20 mg by mouth daily. pantoprazole (PROTONIX) 40 mg tablet Take 40 mg by mouth daily. lisinopril (PRINIVIL, ZESTRIL) 10 mg tablet Take 1 Tab by mouth daily. Qty: 90 Tab, Refills: 3      cholecalciferol, vitamin D3, (VITAMIN D3) 2,000 unit tab Take 1 Tab by mouth daily. Qty: 90 Tab, Refills: 3      ferrous sulfate 325 mg (65 mg iron) tablet Take 325 mg by mouth daily. dorzolamide-timolol, PF, (COSOPT, PF,) 2-0.5 % dpet Administer 1 Drop to both eyes two (2) times a day. Indications: Ocular Hypertension      senna (SENNA) 8.6 mg tablet Take 1 Tab by mouth nightly as needed. STOP taking these medications       insulin aspart U-100 (NovoLOG U-100 Insulin aspart) 100 unit/mL injection Comments:   Reason for Stopping:         tiZANidine & Irritant Cntr 2 4 mg kit Comments:   Reason for Stopping:         gabapentin (NEURONTIN) 800 mg tablet Comments:   Reason for Stopping:          insulin pump (PATIENT SUPPLIED) misc Comments:   Reason for Stopping:                 NOTIFY YOUR PHYSICIAN FOR ANY OF THE FOLLOWING:   Fever over 101 degrees for 24 hours. Chest pain, shortness of breath, fever, chills, nausea, vomiting, diarrhea, change in mentation, falling, weakness, bleeding. Severe pain or pain not relieved by medications. Or, any other signs or symptoms that you may have questions about. DISPOSITION:    Home With:   OT  PT  HH  RN      X Long term SNF/Inpatient Rehab: [address] [address]    Independent/assisted living    Hospice    Other:       PATIENT CONDITION AT DISCHARGE:     Functional status   X Poor     Deconditioned     Independent      Cognition   X Lucid     Forgetful     Dementia      Catheters/lines (plus indication)    [place]son     PICC     PEG    X None      Code status   X  Full code     DNR      PHYSICAL EXAMINATION AT DISCHARGE:    Constitutional:  No acute distress, cooperative, pleasant    ENT:  Oral mucosa moist.    Resp:  CTA bilaterally.  No wheezing/rhonchi/rales. No accessory muscle use. CV:  Regular rhythm, normal rate, S1,S2.    GI/:  Soft, obese, non tender, no guarding, BS present. Voids Freely.    Musculoskeletal:  No edema, warm, , RLE dressing CDI.    Neurologic:  Moves all extremities. AAOx3, CN II-XII reviewed. Skin:  Good turgor, no rashes or ulcers  Psych:  Good insight, Not anxious nor agitated.          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 ([facility] ICD-10-CM: E10.311  ICD-9-CM: 250.51, 362.07, 362.01  6/1/2018 - Present        Mixed hyperlipidemia ICD-10-CM: E78.2  ICD-9-CM: 272.2  6/1/2018 - Present        Sepsis ([facility] ICD-10-CM: A41.9  ICD-9-CM: 038.9, 995.91  12/19/2017 - Present        GI bleed ICD-10-CM: K92.2  ICD-9-CM: 578.9  12/19/2017 - Present        AVM (arteriovenous malformation) of duodenum, acquired with hemorrhage ICD-10-CM: K31.811  ICD-9-CM: 537.83  9/29/2017 - Present        Erosive gastritis with hemorrhage ICD-10-CM: K29.61  ICD-9-CM: 535.41  9/26/2017 - Present        Hypothyroidism due to Hashimoto's thyroiditis ICD-10-CM: E03.8, E06.3  ICD-9-CM: 244.8, 245.2  5/12/2016 - Present        Obesity, Class I, BMI 30.0-34.9 (see actual BMI) ICD-10-CM: E66.9  ICD-9-CM: 278.00  2/4/2016 - Present        Low back pain at multiple sites ICD-10-CM: M54.5  ICD-9-CM: 724.2  2/4/2016 - Present        Essential hypertension ICD-10-CM: I10  ICD-9-CM: 401.9  10/14/2015 - Present        CAD (coronary artery disease) ICD-10-CM: I25.10  ICD-9-CM: 414.00  7/9/2015 - Present        RESOLVED: S/P CABG x 3 ICD-10-CM: Z95.1  ICD-9-CM: V45.81  7/13/2015 - 2/4/2016    Overview Signed 7/13/2015 11:49 AM by Anabel Buckley PA-C     CABG X 3 LIMA to LAD, RSVG to OM, RSVG to RCA             RESOLVED: Anemia associated with acute blood loss ICD-10-CM: D62  ICD-9-CM: 285.1  7/10/2015 - 2/4/2016        RESOLVED: Tobacco abuse (Chronic) ICD-10-CM: Z72.0  ICD-9-CM: 305.1  7/9/2015 - 2/4/2016        RESOLVED: Chest pain ICD-10-CM: R07.9  ICD-9-CM: 786.50  7/5/2015 - 2/4/2016              Greater than 30 minutes were spent with the patient on counseling and coordination of care    Signed:   Yesica Denson NP  2/9/2021  12:45 PM

## 2021-02-09 NOTE — ROUTINE PROCESS
Bedside shift change report given to EDWINA Mendes (oncoming nurse) by Jan Merida RN(offgoing nurse).  Report given with SBAR.

## 2021-04-13 ENCOUNTER — VIRTUAL VISIT (OUTPATIENT)
Dept: ENDOCRINOLOGY | Age: 71
End: 2021-04-13
Payer: MEDICARE

## 2021-04-13 DIAGNOSIS — E03.8 HYPOTHYROIDISM DUE TO HASHIMOTO'S THYROIDITIS: ICD-10-CM

## 2021-04-13 DIAGNOSIS — E06.3 HYPOTHYROIDISM DUE TO HASHIMOTO'S THYROIDITIS: ICD-10-CM

## 2021-04-13 DIAGNOSIS — E78.2 MIXED HYPERLIPIDEMIA: ICD-10-CM

## 2021-04-13 DIAGNOSIS — E10.311 TYPE 1 DIABETES MELLITUS WITH RETINOPATHY AND MACULAR EDEMA, UNSPECIFIED LATERALITY, UNSPECIFIED RETINOPATHY SEVERITY (HCC): Primary | ICD-10-CM

## 2021-04-13 DIAGNOSIS — I10 ESSENTIAL HYPERTENSION: ICD-10-CM

## 2021-04-13 PROCEDURE — G8400 PT W/DXA NO RESULTS DOC: HCPCS | Performed by: INTERNAL MEDICINE

## 2021-04-13 PROCEDURE — 2022F DILAT RTA XM EVC RTNOPTHY: CPT | Performed by: INTERNAL MEDICINE

## 2021-04-13 PROCEDURE — 3046F HEMOGLOBIN A1C LEVEL >9.0%: CPT | Performed by: INTERNAL MEDICINE

## 2021-04-13 PROCEDURE — 1090F PRES/ABSN URINE INCON ASSESS: CPT | Performed by: INTERNAL MEDICINE

## 2021-04-13 PROCEDURE — 3017F COLORECTAL CA SCREEN DOC REV: CPT | Performed by: INTERNAL MEDICINE

## 2021-04-13 PROCEDURE — G8432 DEP SCR NOT DOC, RNG: HCPCS | Performed by: INTERNAL MEDICINE

## 2021-04-13 PROCEDURE — G8427 DOCREV CUR MEDS BY ELIG CLIN: HCPCS | Performed by: INTERNAL MEDICINE

## 2021-04-13 PROCEDURE — 1101F PT FALLS ASSESS-DOCD LE1/YR: CPT | Performed by: INTERNAL MEDICINE

## 2021-04-13 PROCEDURE — G8756 NO BP MEASURE DOC: HCPCS | Performed by: INTERNAL MEDICINE

## 2021-04-13 PROCEDURE — G8419 CALC BMI OUT NRM PARAM NOF/U: HCPCS | Performed by: INTERNAL MEDICINE

## 2021-04-13 PROCEDURE — 99214 OFFICE O/P EST MOD 30 MIN: CPT | Performed by: INTERNAL MEDICINE

## 2021-04-13 PROCEDURE — G8536 NO DOC ELDER MAL SCRN: HCPCS | Performed by: INTERNAL MEDICINE

## 2021-04-13 RX ORDER — BACLOFEN 5 MG/1
TABLET ORAL
COMMUNITY
Start: 2021-01-16 | End: 2022-02-04

## 2021-04-13 RX ORDER — INSULIN ASPART 100 [IU]/ML
INJECTION, SOLUTION INTRAVENOUS; SUBCUTANEOUS
Qty: 40 ML | Refills: 5 | Status: SHIPPED | OUTPATIENT
Start: 2021-04-13 | End: 2021-04-13 | Stop reason: SDUPTHER

## 2021-04-13 RX ORDER — INSULIN ASPART 100 [IU]/ML
INJECTION, SOLUTION INTRAVENOUS; SUBCUTANEOUS
Qty: 40 ML | Refills: 5 | Status: SHIPPED | OUTPATIENT
Start: 2021-04-13 | End: 2022-01-06

## 2021-04-13 RX ORDER — METOPROLOL TARTRATE 50 MG/1
TABLET ORAL
Status: ON HOLD | COMMUNITY
Start: 2020-07-15 | End: 2022-04-01

## 2021-04-13 NOTE — PROGRESS NOTES
Chief Complaint   Patient presents with    Diabetes     pcp and pharmacy verified. Eye exam: al  DOXY. ME    Thyroid Problem   Records since last visit reviewed          **THIS IS A VIRTUAL VISIT VIA A VIDEO ENCOUNTER. PATIENT AGREED TO HAVE THEIR CARE DELIVERED OVER VIDEO IN PLACE OF THEIR REGULARLY SCHEDULED OFFICE VISIT**      History of Present Illness: Matthias Long is a 79 y.o. female here for follow up of diabetes and hypothyroidism. Was diagnosed with diabetes at the age of 16. At our last visit in September 2020 her A1C was 6.2%. In February 2021 she presented after a fall with fracture to her Right Trimalleolar requiring surgical repair. Her pump was removed during this admission and she was treated with \"sliding scale insulin\". After surgery she did have JAIDEN and was followed by Dr. Dayanna Leon of Nephrology during the hospitalization. Pt is recovering well from surgery and has been in PT and \"it has been going well. I think I am just about finished. I am almost ready to walk again. I have been on my back for 2 months. \"    Basal  MN-8AM: 2.40  8AM-4PM: 2.30  4PM-MN: 2.35  Carb Ratio  1:4  Correction Factor  1:25  Target  120  Active Insulin Time  3     She changes her infusion site every 3 days. She checks her BGs 4 times per day. Her FBGs have been running in the 130-140's range  Her pre-lunch BG have been running in the 120-130's range  She notes her BGs have been high in the morning and better in the afternoon/evening. She notes her BGs are the lowest in the \"late afternoon\". She denies issues of hypoglycemia. She wakes up around 8AM   She has breakfast around 8AM, this AM she had torres, toast and diet soda. She has lunch around 1-2PM, yesterday she had a ham and cheese sandwich, chips and diet soda. She has dinner around 6PM, last night she had refried bean, crackers and water.    She goes to bed around 830-9PM     Pt has hx of CAD, s/p CABG she is following with Dr. Alexa aPinting Jovanni.       She has hx of Retinopathy she is followed by Ophthalmology. \"I refused to take any new injections. He was using a cancer drug and it scared me\". She last saw her eye doctor in November 2019. 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 She has follow up later this month. She has hx of COPD and was followed by Dr. Lakisha Monroy of Pulmonology. \"I don't need a lung doctor anymore, I quit smoking and my lungs are clear\". She has gastroparesis, and erosive gastritis and was followed by Dr. Rupinder Mera of GI. Pt has hx of nephropathy and CKD IV, she also has neuropathy and gastroparesis. Pt is now following with a nephrologist, she does not recall the nephrologist's name. She is still taking her LT4 150mcg daily. She denies issues of SOB, CP, she has had some ARZOLA. She denies palpitations, tremors, heat or cold intolerance, diarrhea or constipation. Current Outpatient Medications   Medication Sig    metoprolol tartrate (Lopressor) 50 mg tablet TAKE 1 TABLET TWICE DAILY WITH MEALS    insulin lispro (HUMALOG) 100 unit/mL injection INITIATE CORRECTIVE INSULIN PROTOCOL (CIPRIANO):  RX CIPRIANO Normal Sensitivity (Average weight)    AC (before meals), Q6H, and Q4H CORRECTIONAL SCALE only For Blood Sugar (mg/dl) of :             140-199=2 units            200-249=3 units  250-299=5 units  300-349=7 units  350 or greater = Call MD  Give in addition to basal medications. Do Not Hold for NPO    BEDTIME CORRECTIONAL sliding scale when scheduled:  200-249=2 units  250-299=3 units   300-349=4 units  350 or greater = Call MD  Give in addition to basal medications. Do Not Hold for NPO Fast Acting - Administer Immediately - or within 15 minutes of start of meal, if mealtime coverage.  glucose blood VI test strips (Contour Next Test Strips) strip TEST FOUR TIMES DAILY    levothyroxine (SYNTHROID) 150 mcg tablet Take 1 Tab by mouth Daily (before breakfast). One pill every day.  Note dose change    prednisoLONE acetate (PRED FORTE) 1 % ophthalmic suspension Administer 1 Drop to right eye two (2) times a day.  cyclopentolate (CYCLOGYL) 1 % ophthalmic solution Administer 1 Drop to right eye every four (4) hours. HCL    colchicine 0.6 mg tablet Take two pills when you  the prescription, a third pill and hour later and then two pills per day for the next 3 days    pravastatin (PRAVACHOL) 40 mg tablet Take 1 Tab by mouth nightly. SCHEDULE OFFICE VISIT    acetaminophen (TYLENOL) 500 mg tablet Take 2 Tabs by mouth every six (6) hours.  amLODIPine (NORVASC) 5 mg tablet TK 1 T PO QD. PATINET TAKE MED DAILY    escitalopram oxalate (LEXAPRO) 20 mg tablet Take 20 mg by mouth daily.  pantoprazole (PROTONIX) 40 mg tablet Take 40 mg by mouth daily.  lisinopril (PRINIVIL, ZESTRIL) 10 mg tablet Take 1 Tab by mouth daily. (Patient taking differently: Take 10 mg by mouth two (2) times a day.)    cholecalciferol, vitamin D3, (VITAMIN D3) 2,000 unit tab Take 1 Tab by mouth daily. (Patient taking differently: Take 2,000 Units by mouth daily. TAKES 1000 UNITS PER DAY)    ferrous sulfate 325 mg (65 mg iron) tablet Take 325 mg by mouth daily. 2 daily    dorzolamide-timolol, PF, (COSOPT, PF,) 2-0.5 % dpet Administer 1 Drop to left eye two (2) times a day. Indications: increased pressure in the eye    senna (SENNA) 8.6 mg tablet Take 1 Tab by mouth nightly as needed.  insulin aspart U-100 (NOVOLOG) 100 unit/mL injection INJECT A MAX  UNITS UNDER THE SKIN DAILY WITH INSULIN PUMP    baclofen 5 mg tab TAKE 1 TABLET BY MOUTH THREE TIMES DAILY WITH FOOD OR MILK AS NEEDED     No current facility-administered medications for this visit.       Allergies   Allergen Reactions    Nsaids (Non-Steroidal Anti-Inflammatory Drug) Other (comments)     bleeding    Augmentin [Amoxicillin-Pot Clavulanate] Diarrhea    Vesicare [Solifenacin] Rash     BURNING WITH URINATION     Review of Systems:  - Eyes: no blurry vision or double vision  - Cardiovascular: no chest pain  - Respiratory: no shortness of breath  - Musculoskeletal: no myalgias  - Neurological: no numbness/tingling in extremities    Physical Examination:  There were no vitals taken for this visit. - GENERAL: NCAT, Appears well nourished   - EYES: EOMI, non-icteric, no proptosis   - Ear/Nose/Throat: NCAT, no visible inflammation or masses   - CARDIOVASCULAR: no cyanosis, no visible JVD   - RESPIRATORY: respiratory effort normal without any distress or labored breathing   - MUSCULOSKELETAL: Normal ROM of neck and upper extremities observed   - SKIN: No rash on face   - NEUROLOGIC:  No facial asymmetry (Cranial nerve 7 motor function), No gaze palsy   - PSYCHIATRIC: Normal affect, Normal insight and judgement     Data Reviewed:   Component      Latest Ref Rng & Units 2/9/2021 2/9/2021 2/7/2021 2/7/2021           6:01 AM  6:01 AM  1:02 PM  1:02 PM   Sodium      136 - 145 mmol/L 137 138     Potassium      3.5 - 5.1 mmol/L 4.2 4.2     Chloride      97 - 108 mmol/L 105 103     CO2      21 - 32 mmol/L 25 27     Anion gap      5 - 15 mmol/L 7 8     Glucose      65 - 100 mg/dL 206 (H) 218 (H)     BUN      6 - 20 MG/DL 31 (H) 31 (H)     Creatinine      0.55 - 1.02 MG/DL 1.81 (H) 1.73 (H)     BUN/Creatinine ratio      12 - 20   17 18     GFR est AA      >60 ml/min/1.73m2 34 (L) 35 (L)     GFR est non-AA      >60 ml/min/1.73m2 28 (L) 29 (L)     Calcium      8.5 - 10.1 MG/DL 9.7 10.0     Bilirubin, total      0.2 - 1.0 MG/DL  0.2     ALT      12 - 78 U/L  8 (L)     AST      15 - 37 U/L  13 (L)     Alk. phosphatase      45 - 117 U/L  86     Protein, total      6.4 - 8.2 g/dL  6.7     Albumin      3.5 - 5.0 g/dL 2.1 (L) 2.0 (L)     Globulin      2.0 - 4.0 g/dL  4.7 (H)     A-G Ratio      1.1 - 2.2    0.4 (L)     Phosphorus      2.6 - 4.7 MG/DL 3.0      Protein, urine random      0.0 - 11.9 mg/dL   249 (H)    Creatinine, urine      mg/dL   39.80 41.10   Protein/Creat. urine Ratio         6.3      Component      Latest Ref Rng & Units 2/7/2021           1:02 PM   Sodium,urine random      MMOL/L 94       Assessment/Plan:   1) DM > Pt reports that her BGs are running in a good range and has not been having issues of hypoglycemia. Will not make any changes to her pump settings at this time. With her hx of neuropathy and calluses she would benefit from DM shoes and inserts  Pt to check her BGs 4 times per day and mail her BG logs to me in 2 weeks. Will order an A1C today. 2) HTN > Pt on lisinopril 100mg BID. for renal protection. Will not make any changes at this time. 3) HLD > Pt is still taking her Pravastatin 40mg daily, which she is tolerating well. Her Lipid panel was at goal in May 2020. 4) Hypothyroidism > Pt is clinically euthyroid on LT4 150mcg daily. Will order TFTs and adjust her dose as needed. Pt voices understanding and agreement with the plan.   Pain noted and pt was recommended to call her PCP for further evaluation and treatment, as needed     RTC 3 months     CC:  Dr. Jus Jackson

## 2021-05-12 LAB
ALBUMIN SERPL-MCNC: 3.5 G/DL (ref 3.7–4.7)
ALBUMIN/CREAT UR: 5788 MG/G CREAT (ref 0–29)
ALBUMIN/GLOB SERPL: 1 {RATIO} (ref 1.2–2.2)
ALP SERPL-CCNC: 88 IU/L (ref 39–117)
ALT SERPL-CCNC: 7 IU/L (ref 0–32)
AST SERPL-CCNC: 13 IU/L (ref 0–40)
BILIRUB SERPL-MCNC: 0.2 MG/DL (ref 0–1.2)
BUN SERPL-MCNC: 21 MG/DL (ref 8–27)
BUN/CREAT SERPL: 13 (ref 12–28)
CALCIUM SERPL-MCNC: 9.5 MG/DL (ref 8.7–10.3)
CHLORIDE SERPL-SCNC: 103 MMOL/L (ref 96–106)
CHOLEST SERPL-MCNC: 193 MG/DL (ref 100–199)
CO2 SERPL-SCNC: 25 MMOL/L (ref 20–29)
CREAT SERPL-MCNC: 1.64 MG/DL (ref 0.57–1)
CREAT UR-MCNC: 75.3 MG/DL
EST. AVERAGE GLUCOSE BLD GHB EST-MCNC: 146 MG/DL
GLOBULIN SER CALC-MCNC: 3.4 G/DL (ref 1.5–4.5)
GLUCOSE SERPL-MCNC: 99 MG/DL (ref 65–99)
HBA1C MFR BLD: 6.7 % (ref 4.8–5.6)
HDLC SERPL-MCNC: 53 MG/DL
IMP & REVIEW OF LAB RESULTS: NORMAL
INTERPRETATION: NORMAL
LDLC SERPL CALC-MCNC: 94 MG/DL (ref 0–99)
MICROALBUMIN UR-MCNC: 4358.4 UG/ML
POTASSIUM SERPL-SCNC: 5.1 MMOL/L (ref 3.5–5.2)
PROT SERPL-MCNC: 6.9 G/DL (ref 6–8.5)
SODIUM SERPL-SCNC: 140 MMOL/L (ref 134–144)
T4 FREE SERPL-MCNC: 1.27 NG/DL (ref 0.82–1.77)
TRIGL SERPL-MCNC: 277 MG/DL (ref 0–149)
TSH SERPL DL<=0.005 MIU/L-ACNC: 3.7 UIU/ML (ref 0.45–4.5)
VLDLC SERPL CALC-MCNC: 46 MG/DL (ref 5–40)

## 2021-07-20 ENCOUNTER — VIRTUAL VISIT (OUTPATIENT)
Dept: ENDOCRINOLOGY | Age: 71
End: 2021-07-20
Payer: MEDICARE

## 2021-07-20 DIAGNOSIS — E10.311 TYPE 1 DIABETES MELLITUS WITH RETINOPATHY AND MACULAR EDEMA, UNSPECIFIED LATERALITY, UNSPECIFIED RETINOPATHY SEVERITY (HCC): Primary | ICD-10-CM

## 2021-07-20 DIAGNOSIS — I10 ESSENTIAL HYPERTENSION: ICD-10-CM

## 2021-07-20 DIAGNOSIS — E78.2 MIXED HYPERLIPIDEMIA: ICD-10-CM

## 2021-07-20 DIAGNOSIS — E06.3 HYPOTHYROIDISM DUE TO HASHIMOTO'S THYROIDITIS: ICD-10-CM

## 2021-07-20 DIAGNOSIS — E03.8 HYPOTHYROIDISM DUE TO HASHIMOTO'S THYROIDITIS: ICD-10-CM

## 2021-07-20 PROCEDURE — 99442 PR PHYS/QHP TELEPHONE EVALUATION 11-20 MIN: CPT | Performed by: INTERNAL MEDICINE

## 2021-07-20 RX ORDER — ATROPINE SULFATE 10 MG/ML
SOLUTION/ DROPS OPHTHALMIC
Status: ON HOLD | COMMUNITY
Start: 2021-04-27 | End: 2022-04-01

## 2021-07-20 RX ORDER — UMECLIDINIUM BROMIDE AND VILANTEROL TRIFENATATE 62.5; 25 UG/1; UG/1
POWDER RESPIRATORY (INHALATION)
COMMUNITY
Start: 2021-06-10 | End: 2022-02-04

## 2021-07-20 RX ORDER — LEVOTHYROXINE SODIUM 150 UG/1
150 TABLET ORAL
Qty: 90 TABLET | Refills: 3 | Status: SHIPPED | OUTPATIENT
Start: 2021-07-20 | End: 2022-02-07 | Stop reason: SDUPTHER

## 2021-07-20 RX ORDER — GABAPENTIN 400 MG/1
400 CAPSULE ORAL 3 TIMES DAILY
Qty: 270 CAPSULE | Refills: 2 | Status: SHIPPED | OUTPATIENT
Start: 2021-07-20 | End: 2022-01-27 | Stop reason: SDUPTHER

## 2021-07-20 RX ORDER — ALBUTEROL SULFATE 90 UG/1
AEROSOL, METERED RESPIRATORY (INHALATION)
Status: ON HOLD | COMMUNITY
Start: 2021-06-13 | End: 2022-04-01

## 2021-07-20 RX ORDER — NITROGLYCERIN 0.4 MG/1
TABLET SUBLINGUAL
COMMUNITY
Start: 2021-06-08

## 2021-07-20 NOTE — PROGRESS NOTES
Chief Complaint   Patient presents with    Diabetes     pcp and pharmacy verified. TELEPHONE ONLY   Records since last visit reviewed          **THIS IS A VIRTUAL VISIT VIA A TELEPHONE ENCOUNTER. PATIENT AGREED TO HAVE THEIR CARE DELIVERED OVER THE PHONE IN PLACE OF THEIR REGULARLY SCHEDULED OFFICE VISIT**        History of Present Illness: Danyelle Carrion is a 70 y.o. female here for follow up of diabetes and hypothyroidism. Was diagnosed with diabetes at the age of 16. At our last visit in May 2021 her A1C was 6.7%. In February 2021 she presented after a fall with fracture to her Right Trimalleolar requiring surgical repair. Her pump was removed during this admission and she was treated with \"sliding scale insulin\". After surgery she did have JAIDEN and was followed by Dr. Bryon Nazario of Nephrology during the hospitalization. Pt is recovering well from surgery and has been in PT and \"it has been going well. I think I am just about finished. I am almost ready to walk again. I have been on my back for 5 months. \"    \"I am still having trouble with walking and I need more PT, but need to talk with ortho for that. \"    Basal  MN-8AM: 2.40  8AM-4PM: 2.30  4PM-MN: 2.35  Carb Ratio  1:4  Correction Factor  1:25  Target  120  Active Insulin Time  3     She changes her infusion site every 3 days. She checks her BGs 4 times per day. Her FBGs have been running in the 90-120s range  Her pre-lunch BG have been running in the 120-130's range  Her evening BGs, before dinner are in the 140s range. She denies issues of hypoglycemia. She wakes up around 8AM \"there is nothing for me to do so I go back to bed after breakfast\". She has breakfast around 8AM, this AM she had toast x2, torres x2 a pear and diet soda. She has lunch around 1-2PM, today she had a ham sandwich, corn chips and diet soda. She has dinner around 6-8PM, last night she had a mac and cheese and 1/2 a ham sandwich and water.    She goes to bed around 65-6PM     Pt has hx of CAD, s/p CABG she is following with Dr. Gaby Randle. \"I saw her in June 2021, she was happy with my labs and results. \"      She has hx of Retinopathy she is followed by Ophthalmology. \"I refused to take any new injections. He was using a cancer drug and it scared me\". She last saw her eye doctor in November 2019. 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 She has hx of COPD and was followed by Dr. Rachele Hernandez of Pulmonology. \"I don't need a lung doctor anymore, I quit smoking and my lungs are clear\". She has gastroparesis, and erosive gastritis and was followed by Dr. Katie Tony of GI. Pt has hx of nephropathy and CKD IV, she also has neuropathy and gastroparesis. Pt is now following with a nephrologist, she does not recall the nephrologist's name. She is still taking her LT4 150mcg daily. But she notes that for the last two weeks she has been out of the 125mcg and has been taking one pill Mon-Sat and 1-1/2 on Sunday. She denies issues of SOB, CP, she has had some ARZOLA. She denies palpitations, tremors, heat or cold intolerance, diarrhea or constipation. Current Outpatient Medications   Medication Sig    albuterol (PROVENTIL HFA, VENTOLIN HFA, PROAIR HFA) 90 mcg/actuation inhaler INHALE 2 PUFFS BY MOUTH EVERY 6 HOURS AS NEEDED    atropine 1 % ophthalmic solution INSTILL 1 DROP IN RIGHT EYE TWICE DAILY AS DIRECTED    nitroglycerin (NITROSTAT) 0.4 mg SL tablet     Anoro Ellipta 62.5-25 mcg/actuation inhaler INHALE 1 PUFF BY MOUTH EVERY DAY    gabapentin (NEURONTIN) 400 mg capsule Take 1 Capsule by mouth three (3) times daily. Max Daily Amount: 1,200 mg.  levothyroxine (SYNTHROID) 150 mcg tablet Take 1 Tablet by mouth Daily (before breakfast). One pill every day.  Note dose change    pravastatin (PRAVACHOL) 40 mg tablet One pill at night    baclofen 5 mg tab TAKE 1 TABLET BY MOUTH THREE TIMES DAILY WITH FOOD OR MILK AS NEEDED    metoprolol tartrate (Lopressor) 50 mg tablet TAKE 1 TABLET TWICE DAILY WITH MEALS    insulin aspart U-100 (NOVOLOG) 100 unit/mL injection INJECT A MAX  UNITS UNDER THE SKIN DAILY WITH INSULIN PUMP    glucose blood VI test strips (Contour Next Test Strips) strip TEST FOUR TIMES DAILY    prednisoLONE acetate (PRED FORTE) 1 % ophthalmic suspension Administer 1 Drop to right eye two (2) times a day.  cyclopentolate (CYCLOGYL) 1 % ophthalmic solution Administer 1 Drop to right eye every four (4) hours. HCL    colchicine 0.6 mg tablet Take two pills when you  the prescription, a third pill and hour later and then two pills per day for the next 3 days    acetaminophen (TYLENOL) 500 mg tablet Take 2 Tabs by mouth every six (6) hours.  amLODIPine (NORVASC) 5 mg tablet TK 1 T PO QD. PATINET TAKE MED DAILY    escitalopram oxalate (LEXAPRO) 20 mg tablet Take 20 mg by mouth daily.  pantoprazole (PROTONIX) 40 mg tablet Take 40 mg by mouth daily.  lisinopril (PRINIVIL, ZESTRIL) 10 mg tablet Take 1 Tab by mouth daily. (Patient taking differently: Take 10 mg by mouth two (2) times a day.)    cholecalciferol, vitamin D3, (VITAMIN D3) 2,000 unit tab Take 1 Tab by mouth daily. (Patient taking differently: Take 2,000 Units by mouth daily. TAKES 1000 UNITS PER DAY)    ferrous sulfate 325 mg (65 mg iron) tablet Take 325 mg by mouth daily. 2 daily    dorzolamide-timolol, PF, (COSOPT, PF,) 2-0.5 % dpet Administer 1 Drop to left eye two (2) times a day. Indications: increased pressure in the eye    senna (SENNA) 8.6 mg tablet Take 1 Tab by mouth nightly as needed. No current facility-administered medications for this visit.      Allergies   Allergen Reactions    Nsaids (Non-Steroidal Anti-Inflammatory Drug) Other (comments)     bleeding    Augmentin [Amoxicillin-Pot Clavulanate] Diarrhea    Vesicare [Solifenacin] Rash     BURNING WITH URINATION     Review of Systems:  - Eyes: no blurry vision or double vision  - Cardiovascular: no chest pain  - Respiratory: no shortness of breath  - Musculoskeletal: no myalgias  - Neurological: no numbness/tingling in extremities    Physical Examination:  There were no vitals taken for this visit. None    Data Reviewed:   None    Assessment/Plan:   1) DM > Pt reports that her BGs are running in a good range and has not been having issues of hypoglycemia. Will not make any changes to her pump settings at this time. With her hx of neuropathy and calluses she would benefit from DM shoes and inserts  Pt to check her BGs 4 times per day and mail her BG logs to me in 2 weeks. 2) HTN > Pt on lisinopril 10mg BID. for renal protection. Will not make any changes at this time. 3) HLD > Pt is still taking her Pravastatin 40mg daily, which she is tolerating well. Her Lipid panel was at goal in May 2021. 4) Hypothyroidism > Pt is clinically euthyroid, will restart her LT4 150mcg daily. Her TFTs were at goal in May 2021. Pt voices understanding and agreement with the plan. Pain noted and pt was recommended to call her PCP for further evaluation and treatment, as needed     RTC 4 months     CC:  Dr. Helena Almonte    We spent 13 minutes of total time together during this virtual visit with a telephone encounter. All questions were answered and a copy of the instructions were sent to her via [employer]/a letter. Follow-up and Dispositions    · Return in about 4 months (around 11/20/2021).

## 2022-01-06 RX ORDER — INSULIN ASPART 100 [IU]/ML
INJECTION, SOLUTION INTRAVENOUS; SUBCUTANEOUS
Qty: 40 ML | Refills: 5 | Status: SHIPPED | OUTPATIENT
Start: 2022-01-06 | End: 2022-01-12 | Stop reason: SDUPTHER

## 2022-01-06 RX ORDER — INSULIN ASPART 100 [IU]/ML
INJECTION, SOLUTION INTRAVENOUS; SUBCUTANEOUS
Qty: 40 ML | Refills: 5 | Status: SHIPPED | OUTPATIENT
Start: 2022-01-06

## 2022-01-12 RX ORDER — INSULIN ASPART 100 [IU]/ML
INJECTION, SOLUTION INTRAVENOUS; SUBCUTANEOUS
Qty: 40 ML | Refills: 5 | Status: ON HOLD | OUTPATIENT
Start: 2022-01-12 | End: 2022-04-01

## 2022-01-12 RX ORDER — INSULIN ASPART 100 [IU]/ML
INJECTION, SOLUTION INTRAVENOUS; SUBCUTANEOUS
Qty: 40 ML | Refills: 5 | Status: CANCELLED | OUTPATIENT
Start: 2022-01-12

## 2022-01-12 NOTE — TELEPHONE ENCOUNTER
Pharmacy called and [facility] 1/12 @8:41 pm. Stated they need a diagnosis code in order for medicare to cover the insulin pump pharmacy # [phone].

## 2022-01-21 ENCOUNTER — DOCUMENTATION ONLY (OUTPATIENT)
Dept: ENDOCRINOLOGY | Age: 72
End: 2022-01-21

## 2022-01-27 DIAGNOSIS — E10.311 TYPE 1 DIABETES MELLITUS WITH RETINOPATHY AND MACULAR EDEMA, UNSPECIFIED LATERALITY, UNSPECIFIED RETINOPATHY SEVERITY (HCC): ICD-10-CM

## 2022-01-27 RX ORDER — GABAPENTIN 400 MG/1
400 CAPSULE ORAL 3 TIMES DAILY
Qty: 270 CAPSULE | Refills: 1 | Status: ON HOLD | OUTPATIENT
Start: 2022-01-27 | End: 2022-04-01

## 2022-02-04 ENCOUNTER — OFFICE VISIT (OUTPATIENT)
Dept: ENDOCRINOLOGY | Age: 72
End: 2022-02-04
Payer: MEDICARE

## 2022-02-04 VITALS
SYSTOLIC BLOOD PRESSURE: 117 MMHG | HEIGHT: 64 IN | HEART RATE: 60 BPM | WEIGHT: 228.6 LBS | DIASTOLIC BLOOD PRESSURE: 65 MMHG | BODY MASS INDEX: 39.03 KG/M2

## 2022-02-04 DIAGNOSIS — E78.2 MIXED HYPERLIPIDEMIA: ICD-10-CM

## 2022-02-04 DIAGNOSIS — I10 ESSENTIAL HYPERTENSION: ICD-10-CM

## 2022-02-04 DIAGNOSIS — E06.3 HYPOTHYROIDISM DUE TO HASHIMOTO'S THYROIDITIS: ICD-10-CM

## 2022-02-04 DIAGNOSIS — E10.311 TYPE 1 DIABETES MELLITUS WITH RETINOPATHY AND MACULAR EDEMA, UNSPECIFIED LATERALITY, UNSPECIFIED RETINOPATHY SEVERITY (HCC): Primary | ICD-10-CM

## 2022-02-04 DIAGNOSIS — E03.8 HYPOTHYROIDISM DUE TO HASHIMOTO'S THYROIDITIS: ICD-10-CM

## 2022-02-04 LAB — HBA1C MFR BLD HPLC: 8.7 %

## 2022-02-04 PROCEDURE — G8417 CALC BMI ABV UP PARAM F/U: HCPCS | Performed by: INTERNAL MEDICINE

## 2022-02-04 PROCEDURE — 1090F PRES/ABSN URINE INCON ASSESS: CPT | Performed by: INTERNAL MEDICINE

## 2022-02-04 PROCEDURE — 3052F HG A1C>EQUAL 8.0%<EQUAL 9.0%: CPT | Performed by: INTERNAL MEDICINE

## 2022-02-04 PROCEDURE — 83036 HEMOGLOBIN GLYCOSYLATED A1C: CPT | Performed by: INTERNAL MEDICINE

## 2022-02-04 PROCEDURE — G8432 DEP SCR NOT DOC, RNG: HCPCS | Performed by: INTERNAL MEDICINE

## 2022-02-04 PROCEDURE — 3017F COLORECTAL CA SCREEN DOC REV: CPT | Performed by: INTERNAL MEDICINE

## 2022-02-04 PROCEDURE — G8427 DOCREV CUR MEDS BY ELIG CLIN: HCPCS | Performed by: INTERNAL MEDICINE

## 2022-02-04 PROCEDURE — G8754 DIAS BP LESS 90: HCPCS | Performed by: INTERNAL MEDICINE

## 2022-02-04 PROCEDURE — 99214 OFFICE O/P EST MOD 30 MIN: CPT | Performed by: INTERNAL MEDICINE

## 2022-02-04 PROCEDURE — 1101F PT FALLS ASSESS-DOCD LE1/YR: CPT | Performed by: INTERNAL MEDICINE

## 2022-02-04 PROCEDURE — G8752 SYS BP LESS 140: HCPCS | Performed by: INTERNAL MEDICINE

## 2022-02-04 PROCEDURE — G8400 PT W/DXA NO RESULTS DOC: HCPCS | Performed by: INTERNAL MEDICINE

## 2022-02-04 PROCEDURE — G8536 NO DOC ELDER MAL SCRN: HCPCS | Performed by: INTERNAL MEDICINE

## 2022-02-04 PROCEDURE — 2022F DILAT RTA XM EVC RTNOPTHY: CPT | Performed by: INTERNAL MEDICINE

## 2022-02-04 RX ORDER — FLUTICASONE FUROATE, UMECLIDINIUM BROMIDE AND VILANTEROL TRIFENATATE 100; 62.5; 25 UG/1; UG/1; UG/1
POWDER RESPIRATORY (INHALATION)
COMMUNITY
Start: 2022-01-04

## 2022-02-04 RX ORDER — LISINOPRIL 10 MG/1
10 TABLET ORAL 2 TIMES DAILY
Qty: 180 TABLET | Refills: 3 | Status: ON HOLD | OUTPATIENT
Start: 2022-02-04 | End: 2022-04-01

## 2022-02-04 NOTE — PROGRESS NOTES
Chief Complaint   Patient presents with    Diabetes     pcp and pharmacy verified   Records since last visit reviewed       History of Present Illness: Juanito Kaur is a 70 y.o. female here for follow up of diabetes and hypothyroidism. Was diagnosed with diabetes at the age of 16. In February 2021 she presented after a fall with fracture to her Right Trimalleolar requiring surgical repair. Her pump was removed during this admission and she was treated with \"sliding scale insulin\". After surgery she did have JAIDEN and was followed by Dr. Lamar Wasserman of Nephrology during the hospitalization. Pt is recovering from surgery and she notes that she has been having a lot of problems with my legs and my eyes. I still can not walk very well. I have to physically lift my legs and sometimes I just can't My left knee needs surgery and it is hurting me a lot. My eye doctor wants to take out my right eye, I am supposed to see him next week to discuss it further. Because my right eye is causing me a lot of pain and since I can not see out of it he feels the best would be to remove it and replace it with a prosthetic. She is not in PT \"I need to, but I am not. \"    Her A1C today was 8.7%. Basal  MN-630AM: 2.45  630AM-4PM: 2.30  4PM-MN: 2.35  Carb Ratio  1:4  Correction Factor  1:25  Target  120  Active Insulin Time  3     She changes her infusion site every 3 days. She checks her BGs 2 times per day. Her FBGs have been running in the 200s range. Her evening BGs have been in the 180-220s. She denies issues of hypoglycemia. She wakes around 9-11AM.  Her first meal is around 9-11AM, today she had a biscuit and diet soda. She has lunch around 2PM, yesterday she had a ham and cheese sandwich, chips and diet soda. She has dinner around 6-8PM, She does not recall what she had for dinner last night. She goes to bed around 10-11PM     Pt has hx of CAD, s/p CABG she is following with Dr. Stefan Peralta.  \"I saw her in June 2021, she was happy with my labs and results. I see her on 2/14/22. \"      She has hx of Retinopathy she is followed by Ophthalmology. \"I refused to take any new injections. He was using a cancer drug and it scared me\". She last saw her eye doctor in November 2019. 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 She has hx of COPD and was followed by Dr. Cristal Carrasco of Pulmonology. \"I don't need a lung doctor anymore, I quit smoking and my lungs are clear\". She has gastroparesis, and erosive gastritis and was followed by Dr. Robyn Ledezma of GI. Pt has hx of nephropathy and CKD IV, she also has neuropathy and gastroparesis. Pt is now following with a nephrologist, she does not recall the nephrologist's name. She is still taking her LT4 150mcg daily. She denies issues of SOB, CP, she has had some ARZOLA. She denies palpitations, tremors, heat or cold intolerance, diarrhea or constipation. Current Outpatient Medications   Medication Sig    Trelegy Ellipta 100-62.5-25 mcg inhaler INHALE 1 PUFF BY MOUTH EVERY DAY    lisinopriL (PRINIVIL, ZESTRIL) 10 mg tablet Take 1 Tablet by mouth two (2) times a day.  gabapentin (NEURONTIN) 400 mg capsule Take 1 Capsule by mouth three (3) times daily. Max Daily Amount: 1,200 mg.    insulin aspart U-100 (NOVOLOG) 100 unit/mL injection INJECT A MAX  UNITS VIA INSULIN PUMP DX: E10.311    insulin aspart U-100 (NovoLOG U-100 Insulin aspart) 100 unit/mL injection INJECT A MAX  UNITS UNDER THE SKIN DAILY WITH INSULIN PUMP    albuterol (PROVENTIL HFA, VENTOLIN HFA, PROAIR HFA) 90 mcg/actuation inhaler INHALE 2 PUFFS BY MOUTH EVERY 6 HOURS AS NEEDED    atropine 1 % ophthalmic solution INSTILL 1 DROP IN RIGHT EYE TWICE DAILY AS DIRECTED    nitroglycerin (NITROSTAT) 0.4 mg SL tablet     levothyroxine (SYNTHROID) 150 mcg tablet Take 1 Tablet by mouth Daily (before breakfast). One pill every day.  Note dose change    pravastatin (PRAVACHOL) 40 mg tablet One pill at night    metoprolol tartrate (Lopressor) 50 mg tablet TAKE 1 TABLET TWICE DAILY WITH MEALS    glucose blood VI test strips (Contour Next Test Strips) strip TEST FOUR TIMES DAILY    prednisoLONE acetate (PRED FORTE) 1 % ophthalmic suspension Administer 1 Drop to right eye two (2) times a day.  colchicine 0.6 mg tablet Take two pills when you  the prescription, a third pill and hour later and then two pills per day for the next 3 days    acetaminophen (TYLENOL) 500 mg tablet Take 2 Tabs by mouth every six (6) hours.  amLODIPine (NORVASC) 5 mg tablet TK 1 T PO QD. PATINET TAKE MED DAILY    escitalopram oxalate (LEXAPRO) 20 mg tablet Take 20 mg by mouth daily.  pantoprazole (PROTONIX) 40 mg tablet Take 40 mg by mouth daily.  cholecalciferol, vitamin D3, (VITAMIN D3) 2,000 unit tab Take 1 Tab by mouth daily. (Patient taking differently: Take 2,000 Units by mouth daily. TAKES 1000 UNITS PER DAY)    ferrous sulfate 325 mg (65 mg iron) tablet Take 325 mg by mouth daily. 2 daily    dorzolamide-timolol, PF, (COSOPT, PF,) 2-0.5 % dpet Administer 1 Drop to left eye two (2) times a day. Indications: increased pressure in the eye    senna (SENNA) 8.6 mg tablet Take 1 Tab by mouth nightly as needed. No current facility-administered medications for this visit. Allergies   Allergen Reactions    Nsaids (Non-Steroidal Anti-Inflammatory Drug) Other (comments)     bleeding    Augmentin [Amoxicillin-Pot Clavulanate] Diarrhea    Vesicare [Solifenacin] Rash     BURNING WITH URINATION     Review of Systems:  - Eyes: no blurry vision or double vision  - Cardiovascular: no chest pain  - Respiratory: no shortness of breath  - Musculoskeletal: no myalgias  - Neurological: + numbness in her feet    Physical Examination:  Blood pressure 117/65, pulse 60, height 5' 4\" (1.626 m), weight 228 lb 9.6 oz (103.7 kg).   - General: pleasant, no distress, good eye contact   - Neck: no carotid bruits  - Cardiovascular: regular, normal rate, nl s1 and s2, no m/r/g, 2+ DP pulses   - Respiratory: clear bilaterally  - Integumentary: no edema, no foot ulcers  - Psychiatric: normal mood and affect    Diabetic foot exam:     Left Foot:   Visual Exam: callous - present   Pulse DP: 2+ (normal)   Filament test: normal sensation    Vibratory sensation: normal      Right Foot:   Visual Exam: callous - present   Pulse DP: 2+ (normal)   Filament test: normal sensation    Vibratory sensation: normal        Data Reviewed:   Her A1C today was 8.7%. Assessment/Plan:   1) DM > Her A1C today was 8.7%. Will increase her basal rates by 10%. She has not had any issues of hypoglycemia, but with her co-morbidities, I do not want to be too aggressive with the changes. Basal  MN-630AM: 2.45 > 2.65  630AM-4PM: 2.30 > 2.50  4PM-MN: 2.35 > 2.55  Carb Ratio  1:4  Correction Factor  1:25  Target  120  Active Insulin Time  3    With her hx of neuropathy and calluses she would benefit from DM shoes and inserts  Pt to check her BGs 4 times per day and mail her BG logs to me in 2 weeks. 2) HTN > Her BP today was 117/65. Pt on lisinopril 10mg BID. for renal protection. Will not make any changes at this time. 3) HLD > Pt is still taking her Pravastatin 40mg daily, which she is tolerating well. Her Lipid panel was at goal in May 2021. 4) Hypothyroidism > Pt is clinically euthyroid on LT4 150mcg daily. Will order TFTs and adjust her dose as needed. Her TFTs were at goal in May 2021. Pt voices understanding and agreement with the plan. Pain noted and pt was recommended to call her PCP for further evaluation and treatment, as needed     RTC 4 months     CC:  Dr. Syed Tran    Follow-up and Dispositions    · Return in about 4 months (around 6/4/2022).

## 2022-02-04 NOTE — LETTER
2/4/2022    Patient: Kortney Richards   YOB: 1950   Date of Visit: 2/4/2022     Serene Mcadams MD  [address] [place] [id number]  Via Fax: [phone]    Dear Serene Mcadams MD,      Thank you for referring Ms. Latasha Bryant to [facility] for evaluation. My notes for this consultation are attached. If you have questions, please do not hesitate to call me. I look forward to following your patient along with you.       Sincerely,    Corry Scruggs MD

## 2022-02-07 RX ORDER — LEVOTHYROXINE SODIUM 175 UG/1
175 TABLET ORAL
Qty: 90 TABLET | Refills: 2 | Status: ON HOLD | OUTPATIENT
Start: 2022-02-07 | End: 2022-04-01

## 2022-03-18 PROBLEM — A41.9 SEPSIS (HCC): Status: ACTIVE | Noted: 2017-12-19

## 2022-03-18 PROBLEM — K92.2 GI BLEED: Status: ACTIVE | Noted: 2017-12-19

## 2022-03-19 PROBLEM — K29.61 EROSIVE GASTRITIS WITH HEMORRHAGE: Status: ACTIVE | Noted: 2017-09-26

## 2022-03-19 PROBLEM — K31.811 AVM (ARTERIOVENOUS MALFORMATION) OF DUODENUM, ACQUIRED WITH HEMORRHAGE: Status: ACTIVE | Noted: 2017-09-29

## 2022-03-19 PROBLEM — E78.2 MIXED HYPERLIPIDEMIA: Status: ACTIVE | Noted: 2018-06-01

## 2022-03-20 PROBLEM — E10.311: Status: ACTIVE | Noted: 2018-06-01

## 2022-03-20 PROBLEM — S82.899A ANKLE FRACTURE: Status: ACTIVE | Noted: 2021-01-31

## 2022-03-20 PROBLEM — E66.01 SEVERE OBESITY (HCC): Status: ACTIVE | Noted: 2018-12-12

## 2022-03-20 PROBLEM — M17.11 PRIMARY OSTEOARTHRITIS OF RIGHT KNEE: Status: ACTIVE | Noted: 2019-01-22

## 2022-03-20 PROBLEM — E87.5 HYPERKALEMIA: Status: ACTIVE | Noted: 2019-01-08

## 2022-04-01 ENCOUNTER — APPOINTMENT (OUTPATIENT)
Dept: GENERAL RADIOLOGY | Age: 72
DRG: 563 | End: 2022-04-01
Attending: EMERGENCY MEDICINE
Payer: MEDICARE

## 2022-04-01 ENCOUNTER — APPOINTMENT (OUTPATIENT)
Dept: GENERAL RADIOLOGY | Age: 72
DRG: 563 | End: 2022-04-01
Attending: PHYSICIAN ASSISTANT
Payer: MEDICARE

## 2022-04-01 ENCOUNTER — APPOINTMENT (OUTPATIENT)
Dept: CT IMAGING | Age: 72
DRG: 563 | End: 2022-04-01
Attending: EMERGENCY MEDICINE
Payer: MEDICARE

## 2022-04-01 ENCOUNTER — HOSPITAL ENCOUNTER (INPATIENT)
Age: 72
LOS: 7 days | Discharge: NURSING, REHAB OR LONG-TERM CARE | DRG: 563 | End: 2022-04-08
Attending: EMERGENCY MEDICINE | Admitting: HOSPITALIST
Payer: MEDICARE

## 2022-04-01 DIAGNOSIS — R29.6 FREQUENT FALLS: ICD-10-CM

## 2022-04-01 DIAGNOSIS — S82.102A CLOSED FRACTURE OF PROXIMAL END OF LEFT TIBIA, UNSPECIFIED FRACTURE MORPHOLOGY, INITIAL ENCOUNTER: ICD-10-CM

## 2022-04-01 DIAGNOSIS — E10.65 HYPERGLYCEMIA DUE TO TYPE 1 DIABETES MELLITUS (HCC): ICD-10-CM

## 2022-04-01 DIAGNOSIS — S82.891A CLOSED FRACTURE OF RIGHT ANKLE, INITIAL ENCOUNTER: Primary | ICD-10-CM

## 2022-04-01 PROBLEM — S82.209A TIBIA FRACTURE: Status: ACTIVE | Noted: 2022-04-01

## 2022-04-01 LAB
ALBUMIN SERPL-MCNC: 2.9 G/DL (ref 3.5–5)
ALBUMIN/GLOB SERPL: 0.6 {RATIO} (ref 1.1–2.2)
ALP SERPL-CCNC: 85 U/L (ref 45–117)
ALT SERPL-CCNC: 16 U/L (ref 12–78)
ANION GAP SERPL CALC-SCNC: 4 MMOL/L (ref 5–15)
APPEARANCE UR: CLEAR
AST SERPL-CCNC: 39 U/L (ref 15–37)
ATRIAL RATE: 69 BPM
BACTERIA URNS QL MICRO: NEGATIVE /HPF
BASOPHILS # BLD: 0 K/UL (ref 0–0.1)
BASOPHILS NFR BLD: 1 % (ref 0–1)
BILIRUB SERPL-MCNC: 0.4 MG/DL (ref 0.2–1)
BILIRUB UR QL: NEGATIVE
BUN SERPL-MCNC: 29 MG/DL (ref 6–20)
BUN/CREAT SERPL: 13 (ref 12–20)
CALCIUM SERPL-MCNC: 9.4 MG/DL (ref 8.5–10.1)
CALCULATED P AXIS, ECG09: 58 DEGREES
CALCULATED R AXIS, ECG10: 38 DEGREES
CALCULATED T AXIS, ECG11: 77 DEGREES
CHLORIDE SERPL-SCNC: 104 MMOL/L (ref 97–108)
CO2 SERPL-SCNC: 25 MMOL/L (ref 21–32)
COLOR UR: ABNORMAL
CREAT SERPL-MCNC: 2.27 MG/DL (ref 0.55–1.02)
DIAGNOSIS, 93000: NORMAL
DIFFERENTIAL METHOD BLD: ABNORMAL
EOSINOPHIL # BLD: 0.1 K/UL (ref 0–0.4)
EOSINOPHIL NFR BLD: 2 % (ref 0–7)
EPITH CASTS URNS QL MICRO: ABNORMAL /LPF
ERYTHROCYTE [DISTWIDTH] IN BLOOD BY AUTOMATED COUNT: 12.9 % (ref 11.5–14.5)
GLOBULIN SER CALC-MCNC: 5.2 G/DL (ref 2–4)
GLUCOSE BLD STRIP.AUTO-MCNC: 149 MG/DL (ref 65–117)
GLUCOSE BLD STRIP.AUTO-MCNC: 238 MG/DL (ref 65–117)
GLUCOSE SERPL-MCNC: 251 MG/DL (ref 65–100)
GLUCOSE UR STRIP.AUTO-MCNC: 250 MG/DL
HCT VFR BLD AUTO: 40 % (ref 35–47)
HGB BLD-MCNC: 13.1 G/DL (ref 11.5–16)
HGB UR QL STRIP: ABNORMAL
HYALINE CASTS URNS QL MICRO: ABNORMAL /LPF (ref 0–5)
IMM GRANULOCYTES # BLD AUTO: 0.1 K/UL (ref 0–0.04)
IMM GRANULOCYTES NFR BLD AUTO: 1 % (ref 0–0.5)
KETONES UR QL STRIP.AUTO: NEGATIVE MG/DL
LEUKOCYTE ESTERASE UR QL STRIP.AUTO: NEGATIVE
LYMPHOCYTES # BLD: 0.8 K/UL (ref 0.8–3.5)
LYMPHOCYTES NFR BLD: 13 % (ref 12–49)
MAGNESIUM SERPL-MCNC: 1.3 MG/DL (ref 1.6–2.4)
MAGNESIUM SERPL-MCNC: 1.4 MG/DL (ref 1.6–2.4)
MCH RBC QN AUTO: 32.7 PG (ref 26–34)
MCHC RBC AUTO-ENTMCNC: 32.8 G/DL (ref 30–36.5)
MCV RBC AUTO: 99.8 FL (ref 80–99)
MONOCYTES # BLD: 0.4 K/UL (ref 0–1)
MONOCYTES NFR BLD: 6 % (ref 5–13)
NEUTS SEG # BLD: 5 K/UL (ref 1.8–8)
NEUTS SEG NFR BLD: 77 % (ref 32–75)
NITRITE UR QL STRIP.AUTO: NEGATIVE
NRBC # BLD: 0 K/UL (ref 0–0.01)
NRBC BLD-RTO: 0 PER 100 WBC
P-R INTERVAL, ECG05: 186 MS
PH UR STRIP: 7 [PH] (ref 5–8)
PLATELET # BLD AUTO: 171 K/UL (ref 150–400)
PMV BLD AUTO: 12 FL (ref 8.9–12.9)
POTASSIUM SERPL-SCNC: 4.6 MMOL/L (ref 3.5–5.1)
POTASSIUM SERPL-SCNC: 5.8 MMOL/L (ref 3.5–5.1)
PROT SERPL-MCNC: 8.1 G/DL (ref 6.4–8.2)
PROT UR STRIP-MCNC: 300 MG/DL
Q-T INTERVAL, ECG07: 460 MS
QRS DURATION, ECG06: 78 MS
QTC CALCULATION (BEZET), ECG08: 492 MS
RBC # BLD AUTO: 4.01 M/UL (ref 3.8–5.2)
RBC #/AREA URNS HPF: ABNORMAL /HPF (ref 0–5)
SERVICE CMNT-IMP: ABNORMAL
SERVICE CMNT-IMP: ABNORMAL
SODIUM SERPL-SCNC: 133 MMOL/L (ref 136–145)
SP GR UR REFRACTOMETRY: 1.01 (ref 1–1.03)
TROPONIN-HIGH SENSITIVITY: 25 NG/L (ref 0–51)
UR CULT HOLD, URHOLD: NORMAL
UROBILINOGEN UR QL STRIP.AUTO: 0.2 EU/DL (ref 0.2–1)
VENTRICULAR RATE, ECG03: 69 BPM
WBC # BLD AUTO: 6.4 K/UL (ref 3.6–11)
WBC URNS QL MICRO: ABNORMAL /HPF (ref 0–4)

## 2022-04-01 PROCEDURE — 73600 X-RAY EXAM OF ANKLE: CPT

## 2022-04-01 PROCEDURE — 73610 X-RAY EXAM OF ANKLE: CPT

## 2022-04-01 PROCEDURE — 83735 ASSAY OF MAGNESIUM: CPT

## 2022-04-01 PROCEDURE — 96374 THER/PROPH/DIAG INJ IV PUSH: CPT

## 2022-04-01 PROCEDURE — 77030029684 HC NEB SM VOL KT MONA -A

## 2022-04-01 PROCEDURE — 73590 X-RAY EXAM OF LOWER LEG: CPT

## 2022-04-01 PROCEDURE — 80053 COMPREHEN METABOLIC PANEL: CPT

## 2022-04-01 PROCEDURE — 81001 URINALYSIS AUTO W/SCOPE: CPT

## 2022-04-01 PROCEDURE — 74011250637 HC RX REV CODE- 250/637: Performed by: HOSPITALIST

## 2022-04-01 PROCEDURE — 36415 COLL VENOUS BLD VENIPUNCTURE: CPT

## 2022-04-01 PROCEDURE — 74011636637 HC RX REV CODE- 636/637: Performed by: HOSPITALIST

## 2022-04-01 PROCEDURE — 84132 ASSAY OF SERUM POTASSIUM: CPT

## 2022-04-01 PROCEDURE — 72125 CT NECK SPINE W/O DYE: CPT

## 2022-04-01 PROCEDURE — 84484 ASSAY OF TROPONIN QUANT: CPT

## 2022-04-01 PROCEDURE — 82962 GLUCOSE BLOOD TEST: CPT

## 2022-04-01 PROCEDURE — 94640 AIRWAY INHALATION TREATMENT: CPT

## 2022-04-01 PROCEDURE — 74011250637 HC RX REV CODE- 250/637: Performed by: PHYSICIAN ASSISTANT

## 2022-04-01 PROCEDURE — 85025 COMPLETE CBC W/AUTO DIFF WBC: CPT

## 2022-04-01 PROCEDURE — 93005 ELECTROCARDIOGRAM TRACING: CPT

## 2022-04-01 PROCEDURE — 74011250636 HC RX REV CODE- 250/636: Performed by: HOSPITALIST

## 2022-04-01 PROCEDURE — 70450 CT HEAD/BRAIN W/O DYE: CPT

## 2022-04-01 PROCEDURE — 74011000250 HC RX REV CODE- 250: Performed by: HOSPITALIST

## 2022-04-01 PROCEDURE — 99285 EMERGENCY DEPT VISIT HI MDM: CPT

## 2022-04-01 PROCEDURE — 94664 DEMO&/EVAL PT USE INHALER: CPT

## 2022-04-01 PROCEDURE — 74011250636 HC RX REV CODE- 250/636: Performed by: EMERGENCY MEDICINE

## 2022-04-01 PROCEDURE — 96365 THER/PROPH/DIAG IV INF INIT: CPT

## 2022-04-01 PROCEDURE — 71045 X-RAY EXAM CHEST 1 VIEW: CPT

## 2022-04-01 PROCEDURE — 65270000029 HC RM PRIVATE

## 2022-04-01 RX ORDER — LISINOPRIL 10 MG/1
10 TABLET ORAL 2 TIMES DAILY
COMMUNITY
End: 2022-06-16

## 2022-04-01 RX ORDER — ESCITALOPRAM OXALATE 10 MG/1
10 TABLET ORAL 2 TIMES DAILY
Status: DISCONTINUED | OUTPATIENT
Start: 2022-04-01 | End: 2022-04-08 | Stop reason: HOSPADM

## 2022-04-01 RX ORDER — ESCITALOPRAM OXALATE 10 MG/1
10 TABLET ORAL 2 TIMES DAILY
COMMUNITY

## 2022-04-01 RX ORDER — PRAVASTATIN SODIUM 40 MG/1
40 TABLET ORAL DAILY
COMMUNITY

## 2022-04-01 RX ORDER — BUDESONIDE 0.5 MG/2ML
500 INHALANT ORAL
Status: DISCONTINUED | OUTPATIENT
Start: 2022-04-01 | End: 2022-04-08 | Stop reason: HOSPADM

## 2022-04-01 RX ORDER — HYDRALAZINE HYDROCHLORIDE 20 MG/ML
10 INJECTION INTRAMUSCULAR; INTRAVENOUS
Status: DISCONTINUED | OUTPATIENT
Start: 2022-04-01 | End: 2022-04-08 | Stop reason: HOSPADM

## 2022-04-01 RX ORDER — HYDROCODONE BITARTRATE AND ACETAMINOPHEN 5; 325 MG/1; MG/1
1 TABLET ORAL
Status: DISCONTINUED | OUTPATIENT
Start: 2022-04-01 | End: 2022-04-08 | Stop reason: HOSPADM

## 2022-04-01 RX ORDER — UREA 10 %
100 LOTION (ML) TOPICAL DAILY
Status: ON HOLD | COMMUNITY
End: 2022-04-01

## 2022-04-01 RX ORDER — TRAMADOL HYDROCHLORIDE 50 MG/1
50 TABLET ORAL
Status: DISCONTINUED | OUTPATIENT
Start: 2022-04-01 | End: 2022-04-08 | Stop reason: HOSPADM

## 2022-04-01 RX ORDER — ARFORMOTEROL TARTRATE 15 UG/2ML
15 SOLUTION RESPIRATORY (INHALATION)
Status: DISCONTINUED | OUTPATIENT
Start: 2022-04-01 | End: 2022-04-08 | Stop reason: HOSPADM

## 2022-04-01 RX ORDER — METOPROLOL TARTRATE 50 MG/1
50 TABLET ORAL 2 TIMES DAILY
COMMUNITY
End: 2022-06-16 | Stop reason: SDUPTHER

## 2022-04-01 RX ORDER — GABAPENTIN 400 MG/1
400 CAPSULE ORAL 2 TIMES DAILY
COMMUNITY

## 2022-04-01 RX ORDER — ACETAMINOPHEN 325 MG/1
650 TABLET ORAL
Status: DISCONTINUED | OUTPATIENT
Start: 2022-04-01 | End: 2022-04-08 | Stop reason: HOSPADM

## 2022-04-01 RX ORDER — PANTOPRAZOLE SODIUM 40 MG/1
40 TABLET, DELAYED RELEASE ORAL DAILY
COMMUNITY

## 2022-04-01 RX ORDER — LEVOTHYROXINE SODIUM 150 UG/1
150 TABLET ORAL
Status: ON HOLD | COMMUNITY
End: 2022-04-01

## 2022-04-01 RX ORDER — GABAPENTIN 400 MG/1
400 CAPSULE ORAL 2 TIMES DAILY
Status: CANCELLED | OUTPATIENT
Start: 2022-04-01

## 2022-04-01 RX ORDER — MAGNESIUM SULFATE HEPTAHYDRATE 40 MG/ML
2 INJECTION, SOLUTION INTRAVENOUS ONCE
Status: COMPLETED | OUTPATIENT
Start: 2022-04-01 | End: 2022-04-01

## 2022-04-01 RX ORDER — LEVOTHYROXINE SODIUM 100 UG/1
150 TABLET ORAL
Status: CANCELLED | OUTPATIENT
Start: 2022-04-01

## 2022-04-01 RX ORDER — PREDNISOLONE ACETATE 10 MG/ML
1 SUSPENSION/ DROPS OPHTHALMIC 2 TIMES DAILY
Status: DISCONTINUED | OUTPATIENT
Start: 2022-04-01 | End: 2022-04-08 | Stop reason: HOSPADM

## 2022-04-01 RX ORDER — LEVOTHYROXINE SODIUM 175 UG/1
175 TABLET ORAL
COMMUNITY
End: 2022-08-22 | Stop reason: SDUPTHER

## 2022-04-01 RX ORDER — DORZOLAMIDE HCL 20 MG/ML
1 SOLUTION/ DROPS OPHTHALMIC 3 TIMES DAILY
Status: DISCONTINUED | OUTPATIENT
Start: 2022-04-01 | End: 2022-04-08 | Stop reason: HOSPADM

## 2022-04-01 RX ORDER — PANTOPRAZOLE SODIUM 40 MG/1
40 TABLET, DELAYED RELEASE ORAL
Status: DISCONTINUED | OUTPATIENT
Start: 2022-04-02 | End: 2022-04-08 | Stop reason: HOSPADM

## 2022-04-01 RX ORDER — HYDROCODONE BITARTRATE AND ACETAMINOPHEN 5; 325 MG/1; MG/1
1 TABLET ORAL
Status: DISCONTINUED | OUTPATIENT
Start: 2022-04-01 | End: 2022-04-01

## 2022-04-01 RX ORDER — IPRATROPIUM BROMIDE AND ALBUTEROL SULFATE 2.5; .5 MG/3ML; MG/3ML
3 SOLUTION RESPIRATORY (INHALATION)
Status: DISCONTINUED | OUTPATIENT
Start: 2022-04-01 | End: 2022-04-02

## 2022-04-01 RX ORDER — PRAVASTATIN SODIUM 20 MG/1
40 TABLET ORAL DAILY
Status: DISCONTINUED | OUTPATIENT
Start: 2022-04-02 | End: 2022-04-08 | Stop reason: HOSPADM

## 2022-04-01 RX ORDER — OXYCODONE AND ACETAMINOPHEN 5; 325 MG/1; MG/1
1 TABLET ORAL
Status: DISCONTINUED | OUTPATIENT
Start: 2022-04-01 | End: 2022-04-01

## 2022-04-01 RX ORDER — PANTOPRAZOLE SODIUM 40 MG/1
40 TABLET, DELAYED RELEASE ORAL DAILY
Status: CANCELLED | OUTPATIENT
Start: 2022-04-01

## 2022-04-01 RX ORDER — LISINOPRIL 10 MG/1
10 TABLET ORAL 2 TIMES DAILY
Status: DISCONTINUED | OUTPATIENT
Start: 2022-04-01 | End: 2022-04-08 | Stop reason: HOSPADM

## 2022-04-01 RX ORDER — GABAPENTIN 400 MG/1
400 CAPSULE ORAL 2 TIMES DAILY
Status: DISCONTINUED | OUTPATIENT
Start: 2022-04-01 | End: 2022-04-08 | Stop reason: HOSPADM

## 2022-04-01 RX ORDER — METOPROLOL TARTRATE 50 MG/1
50 TABLET ORAL 2 TIMES DAILY
Status: DISCONTINUED | OUTPATIENT
Start: 2022-04-01 | End: 2022-04-08 | Stop reason: HOSPADM

## 2022-04-01 RX ORDER — ESCITALOPRAM OXALATE 10 MG/1
20 TABLET ORAL DAILY
Status: CANCELLED | OUTPATIENT
Start: 2022-04-01

## 2022-04-01 RX ORDER — MAGNESIUM SULFATE 100 %
4 CRYSTALS MISCELLANEOUS AS NEEDED
Status: DISCONTINUED | OUTPATIENT
Start: 2022-04-01 | End: 2022-04-08 | Stop reason: HOSPADM

## 2022-04-01 RX ORDER — LISINOPRIL 10 MG/1
10 TABLET ORAL 2 TIMES DAILY
Status: CANCELLED | OUTPATIENT
Start: 2022-04-01

## 2022-04-01 RX ORDER — METOPROLOL TARTRATE 25 MG/1
50 TABLET, FILM COATED ORAL 2 TIMES DAILY WITH MEALS
Status: CANCELLED | OUTPATIENT
Start: 2022-04-01

## 2022-04-01 RX ORDER — PRAVASTATIN SODIUM 40 MG/1
40 TABLET ORAL
Status: CANCELLED | OUTPATIENT
Start: 2022-04-01

## 2022-04-01 RX ORDER — INSULIN LISPRO 100 [IU]/ML
INJECTION, SOLUTION INTRAVENOUS; SUBCUTANEOUS EVERY 6 HOURS
Status: DISCONTINUED | OUTPATIENT
Start: 2022-04-01 | End: 2022-04-06

## 2022-04-01 RX ORDER — TIMOLOL MALEATE 5 MG/ML
1 SOLUTION/ DROPS OPHTHALMIC 2 TIMES DAILY
Status: DISCONTINUED | OUTPATIENT
Start: 2022-04-01 | End: 2022-04-08 | Stop reason: HOSPADM

## 2022-04-01 RX ADMIN — BUDESONIDE 500 MCG: 0.5 INHALANT RESPIRATORY (INHALATION) at 22:08

## 2022-04-01 RX ADMIN — METOPROLOL TARTRATE 50 MG: 50 TABLET, FILM COATED ORAL at 17:56

## 2022-04-01 RX ADMIN — ACETAMINOPHEN 650 MG: 325 TABLET ORAL at 10:10

## 2022-04-01 RX ADMIN — LISINOPRIL 10 MG: 10 TABLET ORAL at 12:38

## 2022-04-01 RX ADMIN — ARFORMOTEROL TARTRATE 15 MCG: 15 SOLUTION RESPIRATORY (INHALATION) at 22:08

## 2022-04-01 RX ADMIN — MAGNESIUM SULFATE IN WATER 2 G: 40 INJECTION, SOLUTION INTRAVENOUS at 06:43

## 2022-04-01 RX ADMIN — TIMOLOL MALEATE 1 DROP: 5 SOLUTION/ DROPS OPHTHALMIC at 21:09

## 2022-04-01 RX ADMIN — DORZOLAMIDE HYDROCHLORIDE 1 DROP: 20 SOLUTION/ DROPS OPHTHALMIC at 21:10

## 2022-04-01 RX ADMIN — IPRATROPIUM BROMIDE AND ALBUTEROL SULFATE 3 ML: .5; 2.5 SOLUTION RESPIRATORY (INHALATION) at 20:00

## 2022-04-01 RX ADMIN — PREDNISOLONE ACETATE 1 DROP: 10 SUSPENSION/ DROPS OPHTHALMIC at 21:09

## 2022-04-01 RX ADMIN — ESCITALOPRAM OXALATE 10 MG: 10 TABLET ORAL at 21:09

## 2022-04-01 RX ADMIN — TRAMADOL HYDROCHLORIDE 50 MG: 50 TABLET, COATED ORAL at 17:56

## 2022-04-01 RX ADMIN — METOPROLOL TARTRATE 50 MG: 50 TABLET, FILM COATED ORAL at 12:38

## 2022-04-01 RX ADMIN — GABAPENTIN 400 MG: 400 CAPSULE ORAL at 21:09

## 2022-04-01 RX ADMIN — HYDRALAZINE HYDROCHLORIDE 10 MG: 20 INJECTION, SOLUTION INTRAMUSCULAR; INTRAVENOUS at 10:24

## 2022-04-01 RX ADMIN — LISINOPRIL 10 MG: 10 TABLET ORAL at 17:56

## 2022-04-01 NOTE — PROGRESS NOTES
Ortho    See JONATHAN Zazueta note    Had R ankle ORIF last year  Was doing ok    Now new fall  Has fracture around old hardware but alignment ok  Did mention possibility of removing syndesmotic screw  Placing tibial Seth  But pt has a total knee and dont think we could do a seth  So non op treatment  NWB R leg 6 + weeks  Continue care  May need rehab placement

## 2022-04-01 NOTE — PROGRESS NOTES

## 2022-04-01 NOTE — ROUTINE PROCESS
TRANSFER - OUT REPORT:    Verbal report given to Peggy Canales (name) on Keith Kim  being transferred to 27-21-80-10 (unit) for routine progression of care       Report consisted of patients Situation, Background, Assessment and   Recommendations(SBAR). Information from the following report(s) SBAR, ED Summary, Intake/Output, MAR, Recent Results and Med Rec Status was reviewed with the receiving nurse. Lines:   Peripheral IV 04/01/22 Left Arm (Active)        Opportunity for questions and clarification was provided.       Patient transported with:   [facility]

## 2022-04-01 NOTE — PROGRESS NOTES
Admission Medication Reconciliation:    Information obtained from:  Patient, medication list   RxQuery data available¹:  YES    Comments/Recommendations: Updated PTA meds/reviewed patient's allergies. 1)  Medication list updated, patient and family member interviewed     2)  Medication changes (since last review): Added  - None    Adjusted  - Levothyroxine to 175 mcg  - Escitalopram from 20 mg daily to 10 mg bid   - Gabapentin from TID to BID    Removed  - [address] [place] [address] benefit data reflects medications filled and processed through the patient's insurance, however   this data does NOT capture whether the medication was picked up or is currently being taken by the patient. Allergies:  Nsaids (non-steroidal anti-inflammatory drug), Augmentin [amoxicillin-pot clavulanate], and Vesicare [solifenacin]    Significant PMH/Disease States:   Past Medical History:   Diagnosis Date    Adverse effect of anesthesia     SLOW WAKING PAST ANESTHESIA    Anxiety     Arthritis     CAD (coronary artery disease)     s/p CABG x 3v    Chest pain 7/2015    Chronic obstructive pulmonary disease (HCC)     CTS (carpal tunnel syndrome)     S/p bilateral release    Diabetes ([address]     Diabetic gastroparesis (HCC)     Diabetic neuropathy ([address]     Diabetic retinopathy [facility]     GERD (gastroesophageal reflux disease)     GI bleed 7/2015    4 bleeds with 9 clips placed    Hypercholesteremia     Hypertension     Hypothyroid     Ill-defined condition     NEUROPATHY HANDS AND FEET    Ill-defined condition 2017    GI BLEED    Nicotine vapor product user     JOHN on CPAP     Osteoporosis     Vitamin D deficiency      Chief Complaint for this Admission:    Chief Complaint   Patient presents with    Fall     Prior to Admission Medications:   Prior to Admission Medications   Prescriptions Last Dose Informant Taking?    Trelegy Ellipta 100-62.5-25 mcg inhaler 3/31/2022 at Unknown time  Yes   Sig: INHALE 1 PUFF BY MOUTH EVERY DAY dorzolamide-timolol, PF, (COSOPT, PF,) 2-0.5 % dpet 3/31/2022 at Unknown time  Yes   Sig: Administer 1 Drop to left eye two (2) times a day. Indications: increased pressure in the eye   escitalopram oxalate (LEXAPRO) 10 mg tablet   Yes   Sig: Take 10 mg by mouth two (2) times a day. ferrous sulfate 325 mg (65 mg iron) tablet 3/31/2022 at Unknown time  Yes   Sig: Take 650 mg by mouth nightly. 2 daily   gabapentin (NEURONTIN) 400 mg capsule   Yes   Sig: Take 400 mg by mouth two (2) times a day. insulin aspart U-100 (NovoLOG U-100 Insulin aspart) 100 unit/mL injection 4/1/2022 at Unknown time  Yes   Sig: INJECT A MAX  UNITS UNDER THE SKIN DAILY WITH INSULIN PUMP   levothyroxine (SYNTHROID) 175 mcg tablet   Yes   Sig: Take 175 mcg by mouth Daily (before breakfast). lisinopriL (PRINIVIL, ZESTRIL) 10 mg tablet   Yes   Sig: Take 10 mg by mouth two (2) times a day. metoprolol tartrate (LOPRESSOR) 50 mg tablet   Yes   Sig: Take 50 mg by mouth two (2) times a day. nitroglycerin (NITROSTAT) 0.4 mg SL tablet   Yes   pantoprazole (PROTONIX) 40 mg tablet   Yes   Sig: Take 40 mg by mouth daily. pravastatin (PRAVACHOL) 40 mg tablet   Yes   Sig: Take 40 mg by mouth daily. prednisoLONE acetate (PRED FORTE) 1 % ophthalmic suspension 3/31/2022 at Unknown time  Yes   Sig: Administer 1 Drop to right eye two (2) times a day. Facility-Administered Medications: None     Please contact the main inpatient pharmacy with any questions or concerns at [phone] and we will direct you to the clinical pharmacist covering this patient's care while in-house.    Radha Neely PHARMD

## 2022-04-01 NOTE — PROGRESS NOTES
HP dictated    RCRI pre operative risk is 15 percent 30 day mortality however no acute contra indications for Surgery

## 2022-04-01 NOTE — ED TRIAGE NOTES
Triage: Pt arrives via EMS with CC of right ankle pain and deformity following a fall tonight. She denies hitting her head. Denies anticoagulation. Hx of surgeries on the right leg requiring internal hardware. Extremity is markedly deformed. PMS intact.

## 2022-04-01 NOTE — H&P
[address]  HISTORY AND PHYSICAL    Name:  Shayy Richards  MR#:  [id number]  :  1950  ACCOUNT #:  [de-identified]  ADMIT DATE:  2022      PRIMARY CARE PHYSICIAN:  Yanna Fay MD    PRESENTING COMPLAINT:  Fall with pain in ankle. HISTORY OF PRESENTING ILLNESS:      The patient is a 72-year-old, obese,  female who has previous history significant for coronary artery disease, status post CABG; COPD; type 2 diabetes with gastroparesis, on insulin pump; history of neuropathy; legally blind; history of GERD; hypertension; hyperlipidemia; obstructive sleep apnea; and hypothyroidism, presented to the hospital after having a fall. The patient has frequent falls. This morning she got up around 02:00 a.m. and fell down. She did not lose consciousness, did not hit her head. She denies having any chest pain, any seizure-like activity. The patient was subsequently brought in the emergency room where an x-ray of the right ankle revealed a mildly displaced acute distal tibial fracture with nondisplaced distal fibular fracture, hence it was decided to admit the patient. The patient has no recent chest pain. She says her COPD is well controlled with Trelegy. She is not on home oxygen. The patient is fully COVID vaccinated. PAST MEDICAL HISTORY:      1.  Diabetes, on insulin pump. 2.  Coronary artery disease, status post CABG. 3.  Neuropathy. 4.  History of legal blindness. 5.  GERD. 6.  Hypertension. 7.  Hyperlipidemia. 8.  Obesity. 9.  Sleep apnea, on CPAP. 10.  History of hypothyroidism. 11.  History of osteoporosis. PAST SURGICAL HISTORY:      1.  Lumbar laminectomy. 2.  Heart catheterization. 3.  Rotator cuff repair. 4.  Tonsillectomy. 5.  Surgery of right ankle last year. 1.  Neurontin 400 mg b.i.d.  2.  Pravastatin 40 mg daily.   3.  Escitalopram 10 mg b.i.d.  4.  Lisinopril 10 mg b.i.d.  5.  Metoprolol 50 b.i.d.  6.  Levothyroxine 150 mcg daily.  7.  Protonix 20 mg daily. 8.  Insulin pump and Trelegy. CODE STATUS:  Full code. FAMILY HISTORY:  Significant for COPD, diabetes, and pancreatic cancer. SOCIOECONOMIC HISTORY:  The patient does not smoke. She quit 7 years ago. She does not drink. She lives with a roommate at her home. Walks with a rollator. PHYSICAL EXAMINATION:  GENERAL:  The patient is a 71-year-old female who is not in any acute distress, resting comfortably. VITAL SIGNS:  Temperature of 97.6, blood pressure 190/67, pulse 70, respiratory rate is 15, and saturation 95%. HEENT:  Examination reveals the patient's right pupil is dilated which is chronic. CHEST:  Chest is clear. No wheezing or crackles. Good air entry. CARDIOVASCULAR:  S1 and S2 regular. No murmur, no S3.  ABDOMEN:  No tenderness, no guarding, no rigidity. Bowel sounds are active. EXTREMITIES:  Right ankle is tender to touch. The patient has mild edema. CNS:  The patient is alert and oriented. Has normal strength, normal reflexes. Plantars are downgoing. LABORATORY DATA:  White count 6.4, hemoglobin of 13.1, hematocrit is 40, MCV is 99.8, platelet count is 458,308. Sodium 133, potassium is 4.6, chloride is 104, bicarb is 25, glucose 251, BUN is 29, creatinine is 2.27, calcium is 9.4, magnesium is 1.3, bilirubin is 0.4, protein is 8.1, albumin is 2.9, globulin is 5.2, ALT 16, AST 39, alkaline phosphatase is 85. Urinalysis revealed small amount of blood in the urine with proteinuria and some glucose. DIAGNOSTIC DATA:  EKG shows normal sinus rhythm. X-ray of the right ankle shows previous ORIF with mildly displaced acute distal tibial fracture and possible nondisplaced distal fibular fracture. Chest x-ray is unremarkable. CT head is unremarkable. CT of the cervical spine without contrast shows postoperative and degenerative changes similar to prior studies. No acute fractures were seen.     ASSESSMENT AND PLAN:      The patient is a 59-year-old, obese,  female who has multiple medical problems including chronic obstructive pulmonary disease; coronary artery disease, status post coronary artery bypass grafting; type 2 diabetes; chronic kidney disease; gastroesophageal reflux disease; hypertension; and hyperlipidemia, is being admitted with:    1. Fall with distal tibial and fibular fracture. We will consult orthopedics. We will keep her n.p.o., pain management. 2.  History of coronary artery disease, status post CABG:  The patient has no recent chest pain. Has been seen by Cardiology in November. She is at high risk but no acute contraindication for surgery. RCRI pre operative risk is 15 percent 30 day mortality however no acute contra indications for Surgery. 3.  Hypertension:  Blood pressure is elevated, probably because of pain. At home, the patient is on lisinopril and metoprolol. We will give her ,orning  dose of her BP meds and monitor closely. 4.  History of COPD:  The patient is saturating well. She is COVID vaccinated. She is on Trelegy at home. We will use DuoNebs scheduled. 5.  History of type 2 diabetes, on insulin pump. We will turn the pump off as she will be n.p.o. and use sliding scale insulin and once she is eating, her pump can be turned on. If surgery is not planned for today to keep on Insulin pump. 6.  History of hypothyroidism:  Continue levothyroxine. 7.  History of sleep apnea. Continue CPAP. 8.  DVT prophylaxis with SCD. Will need Lovenox after the surgery is done for DVT prophylaxis. 9.  Mild hypomagnesemia: We will replace magnesium. 10.  Consult orthopedics. 11. Manage pain meds carefully as last time she became delirious after ankle surgery. Avoid strong Narcotics.         Jerry Alejandro MD      [id number]  D:  04/01/2022 7:46  T:  04/01/2022 9:30  JOB #:  [id number]

## 2022-04-01 NOTE — PROGRESS NOTES
TRANSFER - IN REPORT:    Verbal report received from [facility] (name) on Arabella Curtis  being received from Emergency department (unit) for routine progression of care      Report consisted of patients Situation, Background, Assessment and   Recommendations(SBAR). Information from the following report(s) SBAR, Kardex, Intake/Output and MAR was reviewed with the receiving nurse. Opportunity for questions and clarification was provided. Assessment completed upon patients arrival to unit and care assumed.

## 2022-04-01 NOTE — CONSULTS
ORTHO CONSULT NOTE    Date of Consultation:  April 1, 2022  Referring Physician: Juan Oden MD  CC: Right Leg Pain    HPI:  Live Marquez is a 70 y.o. female who was found to have R distal tibia fracture, possible new R fibula fracture that is adjacent to hardware from previous R Ankle ORIF repaired 15 months ago. Pain is currently mild at rest in splint; there is no radiation or other extremity pain endorsed. She had recovered from her previous surgery and was having no issues regarding the R ankle before today. She has been using a walker at home, got up in the middle of night to go to bathroom, got dizzy and fell. Denies numbness, tingling, focal toe weakness, sob, cp, current dizziness, lighteadheadness, fever, chills.      Past Medical History:   Diagnosis Date    Adverse effect of anesthesia     SLOW WAKING PAST ANESTHESIA    Anxiety     Arthritis     CAD (coronary artery disease)     s/p CABG x 3v    Chest pain 7/2015    Chronic obstructive pulmonary disease (HCC)     CTS (carpal tunnel syndrome)     S/p bilateral release    Diabetes ([address]     Diabetic gastroparesis ([address]     Diabetic neuropathy ([address]     Diabetic retinopathy ([address]     GERD (gastroesophageal reflux disease)     GI bleed 7/2015    4 bleeds with 9 clips placed    Hypercholesteremia     Hypertension     Hypothyroid     Ill-defined condition     NEUROPATHY HANDS AND FEET    Ill-defined condition 2017    GI BLEED    Nicotine vapor product user     JOHN on CPAP     Osteoporosis     Vitamin D deficiency       Past Surgical History:   Procedure Laterality Date    HX CERVICAL FUSION  2011    HX ENDOSCOPY  7/2015    HX GI      HX HEART CATHETERIZATION  7/2015    HX LUMBAR FUSION  2017    HX LUMBAR LAMINECTOMY  2011    HX ORTHOPAEDIC Right 1970    CARPAL TUNNEL    HX ORTHOPAEDIC Left 2003    CARPAL TUNNEL    HX ROTATOR CUFF REPAIR Right 2003    HX SHOULDER ARTHROSCOPY Right     HX TONSIL AND ADENOIDECTOMY  1968    HX TONSILLECTOMY      [place] CABG, ARTERY-VEIN, THREE  2015      Family History   Problem Relation Age of Onset    COPD Mother     Diabetes Mother    [de-identified] COPD Father     Diabetes Father     Cancer Father         pancreatic    Diabetes Brother     Heart Disease Brother     Diabetes Brother     Alcohol abuse Brother     Diabetes Sister     Cancer Sister         Kenny Dux    Bleeding Prob Sister         Factor V Leiden    Anesth Problems Neg Hx       Social History     Tobacco Use    Smoking status: Former Smoker     Packs/day: 0.50     Years: 40.00     Pack years: 20.00     Quit date: 2015     Years since quittin.7    Smokeless tobacco: Never Used   Substance Use Topics    Alcohol use: No     Alcohol/week: 0.0 standard drinks     Allergies   Allergen Reactions    Nsaids (Non-Steroidal Anti-Inflammatory Drug) Other (comments)     bleeding    Augmentin [Amoxicillin-Pot Clavulanate] Diarrhea    Oxycodone Other (comments)     Altered mental status per patient. Has tolerated Hydrocodone in the past     Vesicare [Solifenacin] Rash     BURNING WITH URINATION        Review of Systems:  Per HPI. Objective:     Patient Vitals for the past 8 hrs:   BP Temp Pulse Resp SpO2   22 1024 (!) 197/89 -- -- -- --   22 0944 -- 97.6 °F (36.4 °C) 70 18 95 %   22 0744 (!) 179/89 -- 70 18 --   22 0659 (!) 141/119 -- 71 14 --   22 0647 (!) 190/67 -- 70 15 95 %   22 0505 -- -- -- -- 95 %   22 0423 (!) 218/86 97.6 °F (36.4 °C) 69 17 95 %   22 0408 (!) 226/77 -- 71 14 93 %     Temp (24hrs), Av.6 °F (36.4 °C), Min:97.6 °F (36.4 °C), Max:97.6 °F (36.4 °C)      EXAM:   R tibia with swelling distally, slight deformity; skin intact, no ecchymosis; ttp locally to fracture site, distal anterior tibia; no posterior calf, foot, specific ankle ttp. Wiggles all toes, able to lift leg, SILT; DP 2+, foot warm, cap refill brisk.      Imaging Review:   Results from [facility] Encounter encounter on 04/01/22    XR TIB/FIB RT    Narrative  INDICATION:   fall/pain    COMPARISON: February 1, 2021    FINDINGS:    2 views of the right tibia and fibula and 3 views of the right ankle demonstrate  previous ORIF of the distal fibula and medial malleolus with compression plate  and screws. Syndesmotic screw is also present with surrounding lucency. There is  mildly displaced fracture through the distal tibia just above the syndesmotic  screw. Possible nondisplaced distal fibular fracture just above the hardware. Diffuse soft tissue swelling. Ankle mortise is intact. Impression  Previous ORIF as above, with mildly displaced acute distal tibial fracture. Possible nondisplaced distal fibular fracture. Results from [place] encounter on 04/01/22    CT SPINE CERV WO CONT    Narrative  INDICATION: fall/pain    COMPARISON: January 31, 2021. TECHNIQUE:   Noncontrast axial CT imaging of the cervical spine was performed. Coronal and sagittal reconstructions were obtained. CT dose reduction was achieved through use of a standardized protocol tailored  for this examination and automatic exposure control for dose modulation. FINDINGS:    There is straightening of the cervical spine similar to prior study, with prior  laminectomies at C3 and C4, and posterior fusion hardware extending from C3-C7. Lucency around the C7 lamina screws without significant change. 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 Severe stenosis at the level of the foramen magnum and C1-2 without  definite change. No evidence of acute fracture or subluxation. No prevertebral  soft tissue swelling. Degenerative changes throughout cervical spine similar to  prior study. Lung apices are clear. Impression  Postoperative and degenerative changes as described similar to prior study, with  no acute fracture.       Labs:   Recent Results (from the past 24 hour(s))   CBC WITH AUTOMATED DIFF    Collection Time: 04/01/22  4:24 AM   Result Value Ref Range    WBC 6.4 3.6 - 11.0 K/uL    RBC 4.01 3.80 - 5.20 M/uL    HGB 13.1 11.5 - 16.0 g/dL    HCT 40.0 35.0 - 47.0 %    MCV 99.8 (H) 80.0 - 99.0 FL    MCH 32.7 26.0 - 34.0 PG    MCHC 32.8 30.0 - 36.5 g/dL    RDW 12.9 11.5 - 14.5 %    PLATELET 939 904 - 791 K/uL    MPV 12.0 8.9 - 12.9 FL    NRBC 0.0 0  WBC    ABSOLUTE NRBC 0.00 0.00 - 0.01 K/uL    NEUTROPHILS 77 (H) 32 - 75 %    LYMPHOCYTES 13 12 - 49 %    MONOCYTES 6 5 - 13 %    EOSINOPHILS 2 0 - 7 %    BASOPHILS 1 0 - 1 %    IMMATURE GRANULOCYTES 1 (H) 0.0 - 0.5 %    ABS. NEUTROPHILS 5.0 1.8 - 8.0 K/UL    ABS. LYMPHOCYTES 0.8 0.8 - 3.5 K/UL    ABS. MONOCYTES 0.4 0.0 - 1.0 K/UL    ABS. EOSINOPHILS 0.1 0.0 - 0.4 K/UL    ABS. BASOPHILS 0.0 0.0 - 0.1 K/UL    ABS. IMM. GRANS. 0.1 (H) 0.00 - 0.04 K/UL    DF AUTOMATED     METABOLIC PANEL, COMPREHENSIVE    Collection Time: 04/01/22  4:24 AM   Result Value Ref Range    Sodium 133 (L) 136 - 145 mmol/L    Potassium 5.8 (H) 3.5 - 5.1 mmol/L    Chloride 104 97 - 108 mmol/L    CO2 25 21 - 32 mmol/L    Anion gap 4 (L) 5 - 15 mmol/L    Glucose 251 (H) 65 - 100 mg/dL    BUN 29 (H) 6 - 20 MG/DL    Creatinine 2.27 (H) 0.55 - 1.02 MG/DL    BUN/Creatinine ratio 13 12 - 20      GFR est AA 26 (L) >60 ml/min/1.73m2    GFR est non-AA 21 (L) >60 ml/min/1.73m2    Calcium 9.4 8.5 - 10.1 MG/DL    Bilirubin, total 0.4 0.2 - 1.0 MG/DL    ALT (SGPT) 16 12 - 78 U/L    AST (SGOT) 39 (H) 15 - 37 U/L    Alk.  phosphatase 85 45 - 117 U/L    Protein, total 8.1 6.4 - 8.2 g/dL    Albumin 2.9 (L) 3.5 - 5.0 g/dL    Globulin 5.2 (H) 2.0 - 4.0 g/dL    A-G Ratio 0.6 (L) 1.1 - 2.2     MAGNESIUM    Collection Time: 04/01/22  4:24 AM   Result Value Ref Range    Magnesium 1.4 (L) 1.6 - 2.4 mg/dL   URINALYSIS W/MICROSCOPIC    Collection Time: 04/01/22  4:24 AM   Result Value Ref Range    Color YELLOW/STRAW      Appearance CLEAR CLEAR      Specific gravity 1.014 1.003 - 1.030      pH (UA) 7.0 5.0 - 8.0      Protein 300 (A) NEG mg/dL    Glucose 250 (A) NEG mg/dL    Ketone Negative NEG mg/dL    Bilirubin Negative NEG      Blood SMALL (A) NEG      Urobilinogen 0.2 0.2 - 1.0 EU/dL    Nitrites Negative NEG      Leukocyte Esterase Negative NEG      WBC 0-4 0 - 4 /hpf    RBC 0-5 0 - 5 /hpf    Epithelial cells FEW FEW /lpf    Bacteria Negative NEG /hpf    Hyaline cast 0-2 0 - 5 /lpf   URINE CULTURE HOLD SAMPLE    Collection Time: 04/01/22  4:24 AM    Specimen: Serum; Urine   Result Value Ref Range    Urine culture hold        Urine on hold in Microbiology dept for 2 days. If unpreserved urine is submitted, it cannot be used for addtional testing after 24 hours, recollection will be required. POTASSIUM    Collection Time: 04/01/22  5:41 AM   Result Value Ref Range    Potassium 4.6 3.5 - 5.1 mmol/L   MAGNESIUM    Collection Time: 04/01/22  5:41 AM   Result Value Ref Range    Magnesium 1.3 (L) 1.6 - 2.4 mg/dL   TROPONIN-HIGH SENSITIVITY    Collection Time: 04/01/22  5:41 AM   Result Value Ref Range    Troponin-High Sensitivity 25 0 - 51 ng/L   EKG, 12 LEAD, INITIAL    Collection Time: 04/01/22  5:42 AM   Result Value Ref Range    Ventricular Rate 69 BPM    Atrial Rate 69 BPM    P-R Interval 186 ms    QRS Duration 78 ms    Q-T Interval 460 ms    QTC Calculation (Bezet) 492 ms    Calculated P Axis 58 degrees    Calculated R Axis 38 degrees    Calculated T Axis 77 degrees    Diagnosis       Normal sinus rhythm  Cannot rule out Anterior infarct , age undetermined  Abnormal ECG  When compared with ECG of 31-JAN-2021 00:17,  Nonspecific T wave abnormality no longer evident in Inferior leads         Impression:       Active Problems:    Tibia fracture (4/1/2022)        Plan:   - Nonoperative management; resplinted, will check new films to ensure alignment still acceptable. - NWB RLE x 6 weeks.    - Ice, Elevation, Remain in splint keep clean and dry until followup; will be converted to splint or boot at that time.   - Will likely need placement as this was required after last surgery. Dr. Galina Chandler is aware and agrees with above plan.       JONATHAN Gomez  Orthopedic Trauma Service  [facility]

## 2022-04-01 NOTE — ED PROVIDER NOTES
HPI     70 female with a history of anxiety, arthritis, coronary artery disease, COPD, diabetes, acid reflux, GI bleed, hypertension, high cholesterol, presents emergency department after a fall. Patient states she was hospitalized for a ankle fracture and went to rehab. She states things were delayed due to Covid but she eventually ended up back home. She states she falls frequently. She states tonight she got up around 2 AM to go to the bathroom got dizzy and fell. She states she did not lose consciousness but she did hit her head. She complains primarily of right ankle pain. She states she was in her usual state of health without any recent fever, chest pain, shortness of breath, abdominal pain, nausea vomiting, or difficulty urinating.     Past Medical History:   Diagnosis Date    Adverse effect of anesthesia     SLOW WAKING PAST ANESTHESIA    Anxiety     Arthritis     CAD (coronary artery disease)     s/p CABG x 3v    Chest pain 7/2015    Chronic obstructive pulmonary disease (HCC)     CTS (carpal tunnel syndrome)     S/p bilateral release    Diabetes ([address]     Diabetic gastroparesis ([address]     Diabetic neuropathy ([address]     Diabetic retinopathy ([address]     GERD (gastroesophageal reflux disease)     GI bleed 7/2015    4 bleeds with 9 clips placed    Hypercholesteremia     Hypertension     Hypothyroid     Ill-defined condition     NEUROPATHY HANDS AND FEET    Ill-defined condition 2017    GI BLEED    Nicotine vapor product user     JOHN on CPAP     Osteoporosis     Vitamin D deficiency        Past Surgical History:   Procedure Laterality Date    HX CERVICAL FUSION  2011    HX ENDOSCOPY  7/2015    HX GI      HX HEART CATHETERIZATION  7/2015    HX LUMBAR FUSION  2017    HX LUMBAR LAMINECTOMY  2011    HX ORTHOPAEDIC Right 1970    CARPAL TUNNEL    HX ORTHOPAEDIC Left 2003    CARPAL TUNNEL    HX ROTATOR CUFF REPAIR Right 2003    HX SHOULDER ARTHROSCOPY Right     HX TONSIL AND ADENOIDECTOMY  1968    HX TONSILLECTOMY      IA CABG, ARTERY-VEIN, THREE  2015         Family History:   Problem Relation Age of Onset   Deb Fickle COPD Mother    Deb Miguel Diabetes Mother    Deb Miguel COPD Father     Diabetes Father     Cancer Father         pancreatic    Diabetes Brother     Heart Disease Brother     Diabetes Brother     Alcohol abuse Brother     Diabetes Sister     Cancer Sister         Melenoma    Bleeding Prob Sister         Factor V Leiden    Anesth Problems Neg Hx        Social History     Socioeconomic History    Marital status: LIFE PARTNER     Spouse name: Not on file    Number of children: Not on file    Years of education: Not on file    Highest education level: Not on file   Occupational History    Occupation: [employer]   Tobacco Use    Smoking status: Former Smoker     Packs/day: 0.50     Years: 40.00     Pack years: 20.00     Quit date: 2015     Years since quittin.7    Smokeless tobacco: Never Used   Substance and Sexual Activity    Alcohol use: No     Alcohol/week: 0.0 standard drinks    Drug use: No    Sexual activity: Never   Other Topics Concern    Not on file   Social History Narrative    Lives in [place] with partner, moved from [place] to be closer to sister and \"doctors in [place] almost killed me\"     Social Determinants of Health     Financial Resource Strain:     Difficulty of Paying Living Expenses: Not on file   Food Insecurity:     Worried About Running Out of Food in the Last Year: Not on file    Ese of Food in the Last Year: Not on file   Transportation Needs:     Lack of Transportation (Medical): Not on file    Lack of Transportation (Non-Medical):  Not on file   Physical Activity:     Days of Exercise per Week: Not on file    Minutes of Exercise per Session: Not on file   Stress:     Feeling of Stress : Not on file   Social Connections:     Frequency of Communication with Friends and Family: Not on file    Frequency of Social Gatherings with Friends and Family: Not on file    Attends Buddhism Services: Not on file    Active Member of Clubs or Organizations: Not on file    Attends Club or Organization Meetings: Not on file    Marital Status: Not on file   Intimate Partner Violence:     Fear of Current or Ex-Partner: Not on file    Emotionally Abused: Not on file    Physically Abused: Not on file    Sexually Abused: Not on file   Housing Stability:     Unable to Pay for Housing in the Last Year: Not on file    Number of [place] in the Last Year: Not on file    Unstable Housing in the Last Year: Not on file         ALLERGIES: Nsaids (non-steroidal anti-inflammatory drug), Augmentin [amoxicillin-pot clavulanate], and Vesicare [solifenacin]    Review of Systems   Constitutional: Negative for fever. HENT: Negative for congestion. Eyes: Negative for visual disturbance. Respiratory: Negative for cough and shortness of breath. Cardiovascular: Negative for chest pain. Gastrointestinal: Negative for abdominal pain, nausea and vomiting. Endocrine: Negative for polyuria. Genitourinary: Negative for dysuria. Musculoskeletal: Positive for arthralgias, gait problem and myalgias. Neurological: Negative for headaches. Psychiatric/Behavioral: Negative for dysphoric mood. Vitals:    04/01/22 0408   BP: (!) 226/77   Pulse: 71   Resp: 14   SpO2: 93%            Physical Exam  Constitutional:       General: She is not in acute distress. Appearance: She is well-developed. HENT:      Head: Normocephalic and atraumatic. Mouth/Throat:      Pharynx: No oropharyngeal exudate. Eyes:      General: No scleral icterus. Right eye: No discharge. Left eye: No discharge. Pupils: Pupils are equal, round, and reactive to light. Neck:      Vascular: No JVD. Cardiovascular:      Rate and Rhythm: Normal rate and regular rhythm. Heart sounds: Normal heart sounds. No murmur heard.       Pulmonary:      Effort: Pulmonary effort is normal. No respiratory distress. Breath sounds: Normal breath sounds. No stridor. No wheezing or rales. Chest:      Chest wall: No tenderness. Abdominal:      General: Bowel sounds are normal. There is no distension. Palpations: Abdomen is soft. There is no mass. Tenderness: There is no abdominal tenderness. There is no guarding or rebound. Musculoskeletal:         General: Swelling, tenderness (distal right tibia) and deformity present. Normal range of motion. Cervical back: Normal range of motion and neck supple. Skin:     General: Skin is warm and dry. Capillary Refill: Capillary refill takes less than 2 seconds. Findings: No rash. Neurological:      Mental Status: She is oriented to person, place, and time. Psychiatric:         Behavior: Behavior normal.         Thought Content: Thought content normal.         Judgment: Judgment normal.          MDM       Procedures      ED EKG interpretation:  Rhythm: normal sinus rhythm; and regular . Rate (approx.): 69; Axis: normal; P wave: normal; QRS interval: normal ; ST/T wave: non-specific changes; This EKG was interpreted by Shreyas Yi MD,ED Provider. Acute fracture around hardware of prior ankle fracture. Perfect serve messaged with Dr. Harika Granados. He will show pictures to Dr. Blanco Mehta for advise. For now recommending short leg splint. Given non weight bearing, and already a fall risk, will admit to medicine. Perfect Serve Consult for Admission  6:51 AM    ED Room Number: ER08/08  Patient Name and age:  Fariha Chiu 70 y.o.  female  Working Diagnosis:   1. Closed fracture of right ankle, initial encounter    2.  Frequent falls        COVID-19 Suspicion:  no  Sepsis present:  no  Reassessment needed: no  Code Status:  Full Code  Readmission: no  Isolation Requirements:  no  Recommended Level of Care:  med/surg  Department:[facility] ED - ([phone]  Other:  70year old female with IDDM, copd, CAD, DM, HTN, Chol, presents after a fall. Gets \"dizzy\" and falls frequently. Has an ankle fracture around her prior hardware. Ortho aware. Short leg splint for now. SHe is already a fall risk so now with non weight bearing she can't go home.

## 2022-04-02 LAB
ANION GAP SERPL CALC-SCNC: 3 MMOL/L (ref 5–15)
BUN SERPL-MCNC: 30 MG/DL (ref 6–20)
BUN/CREAT SERPL: 14 (ref 12–20)
CALCIUM SERPL-MCNC: 9.2 MG/DL (ref 8.5–10.1)
CHLORIDE SERPL-SCNC: 104 MMOL/L (ref 97–108)
CO2 SERPL-SCNC: 27 MMOL/L (ref 21–32)
CREAT SERPL-MCNC: 2.2 MG/DL (ref 0.55–1.02)
ERYTHROCYTE [DISTWIDTH] IN BLOOD BY AUTOMATED COUNT: 13.1 % (ref 11.5–14.5)
GLUCOSE BLD STRIP.AUTO-MCNC: 120 MG/DL (ref 65–117)
GLUCOSE BLD STRIP.AUTO-MCNC: 150 MG/DL (ref 65–117)
GLUCOSE BLD STRIP.AUTO-MCNC: 168 MG/DL (ref 65–117)
GLUCOSE BLD STRIP.AUTO-MCNC: 216 MG/DL (ref 65–117)
GLUCOSE SERPL-MCNC: 182 MG/DL (ref 65–100)
HCT VFR BLD AUTO: 36.6 % (ref 35–47)
HGB BLD-MCNC: 11.9 G/DL (ref 11.5–16)
MAGNESIUM SERPL-MCNC: 1.7 MG/DL (ref 1.6–2.4)
MCH RBC QN AUTO: 32.7 PG (ref 26–34)
MCHC RBC AUTO-ENTMCNC: 32.5 G/DL (ref 30–36.5)
MCV RBC AUTO: 100.5 FL (ref 80–99)
NRBC # BLD: 0 K/UL (ref 0–0.01)
NRBC BLD-RTO: 0 PER 100 WBC
PLATELET # BLD AUTO: 177 K/UL (ref 150–400)
PMV BLD AUTO: 11.2 FL (ref 8.9–12.9)
POTASSIUM SERPL-SCNC: 4.3 MMOL/L (ref 3.5–5.1)
RBC # BLD AUTO: 3.64 M/UL (ref 3.8–5.2)
SERVICE CMNT-IMP: ABNORMAL
SODIUM SERPL-SCNC: 134 MMOL/L (ref 136–145)
WBC # BLD AUTO: 5.8 K/UL (ref 3.6–11)

## 2022-04-02 PROCEDURE — 74011000250 HC RX REV CODE- 250: Performed by: HOSPITALIST

## 2022-04-02 PROCEDURE — 74011250637 HC RX REV CODE- 250/637: Performed by: PHYSICIAN ASSISTANT

## 2022-04-02 PROCEDURE — 74011250637 HC RX REV CODE- 250/637: Performed by: HOSPITALIST

## 2022-04-02 PROCEDURE — 85027 COMPLETE CBC AUTOMATED: CPT

## 2022-04-02 PROCEDURE — 65270000029 HC RM PRIVATE

## 2022-04-02 PROCEDURE — 36415 COLL VENOUS BLD VENIPUNCTURE: CPT

## 2022-04-02 PROCEDURE — 97530 THERAPEUTIC ACTIVITIES: CPT | Performed by: PHYSICAL THERAPIST

## 2022-04-02 PROCEDURE — 74011636637 HC RX REV CODE- 636/637: Performed by: HOSPITALIST

## 2022-04-02 PROCEDURE — 83735 ASSAY OF MAGNESIUM: CPT

## 2022-04-02 PROCEDURE — 94640 AIRWAY INHALATION TREATMENT: CPT

## 2022-04-02 PROCEDURE — 80048 BASIC METABOLIC PNL TOTAL CA: CPT

## 2022-04-02 PROCEDURE — 97161 PT EVAL LOW COMPLEX 20 MIN: CPT | Performed by: PHYSICAL THERAPIST

## 2022-04-02 PROCEDURE — 82962 GLUCOSE BLOOD TEST: CPT

## 2022-04-02 RX ORDER — IPRATROPIUM BROMIDE AND ALBUTEROL SULFATE 2.5; .5 MG/3ML; MG/3ML
3 SOLUTION RESPIRATORY (INHALATION)
Status: DISCONTINUED | OUTPATIENT
Start: 2022-04-02 | End: 2022-04-08 | Stop reason: HOSPADM

## 2022-04-02 RX ADMIN — LISINOPRIL 10 MG: 10 TABLET ORAL at 19:05

## 2022-04-02 RX ADMIN — DORZOLAMIDE HYDROCHLORIDE 1 DROP: 20 SOLUTION/ DROPS OPHTHALMIC at 22:00

## 2022-04-02 RX ADMIN — DORZOLAMIDE HYDROCHLORIDE 1 DROP: 20 SOLUTION/ DROPS OPHTHALMIC at 09:00

## 2022-04-02 RX ADMIN — TRAMADOL HYDROCHLORIDE 50 MG: 50 TABLET, COATED ORAL at 21:04

## 2022-04-02 RX ADMIN — TIMOLOL MALEATE 1 DROP: 5 SOLUTION/ DROPS OPHTHALMIC at 18:00

## 2022-04-02 RX ADMIN — TRAMADOL HYDROCHLORIDE 50 MG: 50 TABLET, COATED ORAL at 07:10

## 2022-04-02 RX ADMIN — LEVOTHYROXINE SODIUM 175 MCG: 0.15 TABLET ORAL at 07:02

## 2022-04-02 RX ADMIN — GABAPENTIN 400 MG: 400 CAPSULE ORAL at 19:05

## 2022-04-02 RX ADMIN — METOPROLOL TARTRATE 50 MG: 50 TABLET, FILM COATED ORAL at 11:34

## 2022-04-02 RX ADMIN — ESCITALOPRAM OXALATE 10 MG: 10 TABLET ORAL at 19:06

## 2022-04-02 RX ADMIN — BUDESONIDE 500 MCG: 0.5 INHALANT RESPIRATORY (INHALATION) at 23:12

## 2022-04-02 RX ADMIN — BUDESONIDE 500 MCG: 0.5 INHALANT RESPIRATORY (INHALATION) at 08:25

## 2022-04-02 RX ADMIN — METOPROLOL TARTRATE 50 MG: 50 TABLET, FILM COATED ORAL at 19:06

## 2022-04-02 RX ADMIN — ARFORMOTEROL TARTRATE 15 MCG: 15 SOLUTION RESPIRATORY (INHALATION) at 08:25

## 2022-04-02 RX ADMIN — DORZOLAMIDE HYDROCHLORIDE 1 DROP: 20 SOLUTION/ DROPS OPHTHALMIC at 16:00

## 2022-04-02 RX ADMIN — TRAMADOL HYDROCHLORIDE 50 MG: 50 TABLET, COATED ORAL at 15:17

## 2022-04-02 RX ADMIN — PREDNISOLONE ACETATE 1 DROP: 10 SUSPENSION/ DROPS OPHTHALMIC at 19:07

## 2022-04-02 RX ADMIN — PREDNISOLONE ACETATE 1 DROP: 10 SUSPENSION/ DROPS OPHTHALMIC at 11:35

## 2022-04-02 RX ADMIN — LISINOPRIL 10 MG: 10 TABLET ORAL at 11:34

## 2022-04-02 RX ADMIN — IPRATROPIUM BROMIDE AND ALBUTEROL SULFATE 3 ML: .5; 2.5 SOLUTION RESPIRATORY (INHALATION) at 08:24

## 2022-04-02 RX ADMIN — ESCITALOPRAM OXALATE 10 MG: 10 TABLET ORAL at 09:00

## 2022-04-02 RX ADMIN — PANTOPRAZOLE SODIUM 40 MG: 40 TABLET, DELAYED RELEASE ORAL at 07:02

## 2022-04-02 RX ADMIN — TIMOLOL MALEATE 1 DROP: 5 SOLUTION/ DROPS OPHTHALMIC at 11:34

## 2022-04-02 RX ADMIN — PRAVASTATIN SODIUM 40 MG: 20 TABLET ORAL at 11:35

## 2022-04-02 RX ADMIN — GABAPENTIN 400 MG: 400 CAPSULE ORAL at 11:38

## 2022-04-02 RX ADMIN — ARFORMOTEROL TARTRATE 15 MCG: 15 SOLUTION RESPIRATORY (INHALATION) at 23:12

## 2022-04-02 NOTE — PROGRESS NOTES
Bedside shift change report given to Landon Cuello (oncoming nurse) by Linda Shore (offgoing nurse). Report included the following information SBAR, Kardex, Intake/Output, MAR and Recent Results.

## 2022-04-02 NOTE — PROGRESS NOTES
Bedside and Verbal shift change report given to Janet Rodrigues (oncoming nurse) by Luis Beltran (offgoing nurse). Report included the following information SBAR, Kardex, Procedure Summary, Intake/Output and MAR.

## 2022-04-02 NOTE — PROGRESS NOTES
Problem: Mobility Impaired (Adult and Pediatric)  Goal: *Acute Goals and Plan of Care (Insert Text)  Description: FUNCTIONAL STATUS PRIOR TO ADMISSION: The patient is legally blind and uses either a wheelchair or a RW. HOME SUPPORT PRIOR TO ADMISSION: The patient lived with a roommate who she states provided some support. She states she has about 24 steps to go up to get into her apt. Physical Therapy Goals  Initiated 4/2/2022  1. Patient will move from supine to sit and sit to supine , scoot up and down, and roll side to side in bed with supervision/set-up within 7 day(s). 2.  Patient will transfer from bed to chair and chair to bed with moderate assistance  using the least restrictive device/sliding board within 7 day(s). 3.  The patient will sit EOB for 15 minutes with supervision within 7 days. Outcome: Not Met  PHYSICAL THERAPY EVALUATION  Patient: Marquita Kelsey (83 y.o. female)  Date: 4/2/2022  Primary Diagnosis: Tibia fracture [S82.209A]        Precautions: fall         ASSESSMENT  Based on the objective data described below, the patient presents with impaired mobility and gait following a fall and fx around the hardware of a previous right ankle fx/ORIF. She is splinted and NWB x 6 weeks. She is legally blind. She is pleasant and cooperative and willing to try mobility but she really couldn't tolerate bringing the RRE to the EOB or lowering it enough to sit up. She is able to begin to lift the RLE off the pillow but required assistance to reposition it on the pillows. She had spilled her water in the bed and with max assist of 2 she was able to roll side to side to be cleaned up and required assist of 2 to be repositioned in the bed. Her RLE is elevated on pillows. She will need SNFrehab. Current Level of Function Impacting Discharge (mobility/balance): Unable to sit or stand    Functional Outcome Measure:   The patient scored Total: 20/100 on the Barthel Index outcome measure which is indicative of being 80% in basic self-care. Other factors to consider for discharge: legally blind, 24 steps into apt. NWB, obese. Patient will benefit from skilled therapy intervention to address the above noted impairments. PLAN :  Recommendations and Planned Interventions: bed mobility training, transfer training, and therapeutic exercises      Frequency/Duration: Patient will be followed by physical therapy:  daily to address goals. Recommendation for discharge: (in order for the patient to meet his/her long term goals)  Therapy up to 5 days/week in SNF setting    This discharge recommendation:  Has not yet been discussed the attending provider and/or case management    IF patient discharges home will need the following DME: Not safe for disch home. 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 I will try as long as it doesn't hurt.     OBJECTIVE DATA SUMMARY:   HISTORY:    Past Medical History:   Diagnosis Date    Adverse effect of anesthesia     SLOW WAKING PAST ANESTHESIA    Anxiety     Arthritis     CAD (coronary artery disease)     s/p CABG x 3v    Chest pain 7/2015    Chronic obstructive pulmonary disease (HCC)     CTS (carpal tunnel syndrome)     S/p bilateral release    Diabetes ([address]     Diabetic gastroparesis ([address]     Diabetic neuropathy ([address]     Diabetic retinopathy ([address]     GERD (gastroesophageal reflux disease)     GI bleed 7/2015    4 bleeds with 9 clips placed    Hypercholesteremia     Hypertension     Hypothyroid     Ill-defined condition     NEUROPATHY HANDS AND FEET    Ill-defined condition 2017    GI BLEED    Nicotine vapor product user     JOHN on CPAP     Osteoporosis     Vitamin D deficiency      Past Surgical History:   Procedure Laterality Date    HX CERVICAL FUSION  2011    HX ENDOSCOPY  7/2015    HX GI      HX HEART CATHETERIZATION  7/2015    HX LUMBAR FUSION  2017    HX LUMBAR LAMINECTOMY  2011    HX ORTHOPAEDIC Right 1970    CARPAL TUNNEL    HX ORTHOPAEDIC Left 2003    CARPAL TUNNEL    HX ROTATOR CUFF REPAIR Right 2003    HX SHOULDER ARTHROSCOPY Right     HX TONSIL AND ADENOIDECTOMY  1968    HX TONSILLECTOMY      CT CABG, ARTERY-VEIN, THREE  2015       Personal factors and/or comorbidities impacting plan of care: None    Home Situation  Home Environment: Apartment  # Steps to Enter: 24  One/Two Story Residence: One story  Living Alone: No  Support Systems: Other (Comment) (roommate)  Patient Expects to be Discharged to[de-identified] Skilled nursing facility  Current DME Used/Available at Home: [facility] [facility] [employer], rolling,Commode, bedside,Shower chair,Raised toilet seat,Grab bars  Tub or Shower Type: Tub/Shower combination    EXAMINATION/PRESENTATION/DECISION MAKING:   Critical Behavior:  Neurologic State: Alert  Orientation Level: Oriented X4  Cognition: Follows commands     Hearing: Auditory  Auditory Impairment: None  Skin:  per nursing. Edema: per nursing. Range Of Motion:  AROM: Generally decreased, functional           PROM: Generally decreased, functional           Strength:    Strength: Generally decreased, functional                    Tone & Sensation:   Tone: Normal              Sensation: Intact               Coordination:  Coordination: Within functional limits  Vision:      Functional Mobility:  Bed Mobility:  Rolling: Maximum assistance;Assist x2  Supine to Sit: Other (comment) (unable at this time)        Transfers:   Unable at this time. Ambulation/Gait Training:                    Right Side Weight Bearing: Non-weight bearing  Left Side Weight Bearing: Full                              Unable at this time. Functional Measure:  Barthel Index:    Bathin  Bladder: 5  Bowels: 10  Groomin  Dressin  Feedin  Mobility: 0  Stairs: 0  Toilet Use: 0  Transfer (Bed to Chair and Back): 0  Total: 20/100       The Barthel ADL Index: Guidelines  1.  The index should be used as a record of what a patient does, not as a record of what a patient could do. 2. The main aim is to establish degree of independence from any help, physical or verbal, however minor and for whatever reason. 3. The need for supervision renders the patient not independent. 4. A patient's performance should be established using the best available evidence. Asking the patient, friends/relatives and nurses are the usual sources, but direct observation and common sense are also important. However direct testing is not needed. 5. Usually the patient's performance over the preceding 24-48 hours is important, but occasionally longer periods will be relevant. 6. Middle categories imply that the patient supplies over 50 per cent of the effort. 7. Use of aids to be independent is allowed. Score Interpretation (from [facility])    Independent   60-79 Minimally independent   40-59 Partially dependent   20-39 Very dependent   <20 Totally dependent     -Ted Akers., BarthelGUADALUPE. (1965). Functional evaluation: the Barthel Index. [facility] ([address] (1997). The Barthel activities of daily living index: self-reporting versus actual performance in the old (> or = 75 years). Journal of [address] [place] [address] 457), [facility], RafaelJONI, Francisco J Dear., Blair Gomez. (1999). Measuring the change in disability after inpatient rehabilitation; comparison of the responsiveness of the Barthel Index and Functional [place] Measure. Journal of Neurology, Neurosurgery, and Psychiatry, 66(4), 442-526. Wali Wagner, N.J.A, ALICIA Bagley, & Gloria Bustamante M.A. (2004) Assessment of post-stroke quality of life in cost-effectiveness studies: The usefulness of the Barthel Index and the EuroQoL-5D.  Quality of Life Research, 15, 627-19        Physical Therapy Evaluation Charge Determination   History Examination Presentation Decision-Making   LOW Complexity : Zero comorbidities / personal factors that will impact the outcome / POC LOW Complexity : 1-2 Standardized tests and measures addressing body structure, function, activity limitation and / or participation in recreation  LOW Complexity : Stable, uncomplicated  LOW Complexity : FOTO score of       Based on the above components, the patient evaluation is determined to be of the following complexity level: LOW     Pain Rating:  Per nursing. Activity Tolerance:   Painful right ankle    After treatment patient left in no apparent distress:   Supine in bed, Heels elevated for pressure relief, Call bell within reach, and Side rails x 3    COMMUNICATION/EDUCATION:   The patients plan of care was discussed with: Registered nurse and Certified nursing assistant/patient care technician. Fall prevention education was provided and the patient/caregiver indicated understanding., Patient/family have participated as able in goal setting and plan of care. , and Patient/family agree to work toward stated goals and plan of care.     Thank you for this referral.  Erasto Paci, PT   Time Calculation: 26 mins

## 2022-04-02 NOTE — PROGRESS NOTES
[facility] [facility] Adult  Hospitalist Group                                                                                          Hospitalist Progress Note  Cliff Silva NP  Answering service: [phone] from in house phone        Date of Service:  2022  NAME:  Eddy Kunz  :    MRN:  [id number]      Admission Summary:   Per H&P, The patient is a 77-year-old, obese,  female who has previous history significant for coronary artery disease, status post CABG; COPD; type 2 diabetes with gastroparesis, on insulin pump; history of neuropathy; legally blind; history of GERD; hypertension; hyperlipidemia; obstructive sleep apnea; and hypothyroidism, presented to the hospital after having a fall. The patient has frequent falls. This morning she got up around 02:00 a.m. and fell down. She did not lose consciousness, did not hit her head. She denies having any chest pain, any seizure-like activity. The patient was subsequently brought in the emergency room where an x-ray of the right ankle revealed a mildly displaced acute distal tibial fracture with nondisplaced distal fibular fracture, hence it was decided to admit the patient. The patient has no recent chest pain. She says her COPD is well controlled with Trelegy. She is not on home oxygen. The patient is fully COVID vaccinated. Interval history / Subjective:   Patient seen on morning rounds. No complaints the moment of the encounter, no acute events overnight     Assessment & Plan:     Distal tib-fib fracture  S/p fall, fracture around ORIF hardware from previous fracture roughly 1 year ago.   Fracture is around the old hardware but alignment is okay, per Ortho  Orthopedics following, thank you for recommendations  Has been splinted by orthopedics  Continuing multimodal pain control  Nonweightbearing for 6 weeks  Physical therapy to evaluate    CAD  S/p CABG  No chest pain  Continuing metoprolol    Hypertension  Blood pressure currently controlled  Continue lisinopril  Continue metoprolol    COPD  Not in exacerbation, no wheezing  Continuing LABA and ICS  As needed nebulizers    Type 2 diabetes  Blood sugar currently controlled  Continue insulin pump  Continuing SSI    Hypothyroid  Stable  Continuing levothyroxine    Obesity  BMI of 39  Consult on healthy dietary choices      Code status: FULL  DVT prophylaxis: SCD    Care Plan discussed with: Patient/Family and Nurse  Anticipated Disposition: tbd  Anticipated Discharge: Greater than 48 hours     Hospital Problems  Date Reviewed: 2/4/2022          Codes Class Noted POA    Tibia fracture ICD-10-CM: S82.209A  ICD-9-CM: 823.80  4/1/2022 Unknown                Review of Systems:   A comprehensive review of systems was negative except for that written in the HPI. Vital Signs:    Last 24hrs VS reviewed since prior progress note. Most recent are:  Visit Vitals  /72 (BP 1 Location: Right upper arm)   Pulse 61   Temp 98.3 °F (36.8 °C)   Resp 16   SpO2 91%         Intake/Output Summary (Last 24 hours) at 4/2/2022 1316  Last data filed at 4/2/2022 0825  Gross per 24 hour   Intake 240 ml   Output 350 ml   Net -110 ml        Physical Examination:     I had a face to face encounter with this patient and independently examined them on 4/2/2022 as outlined below:          Constitutional:  No acute distress, cooperative, pleasant    ENT:  Oral mucosa moist, oropharynx benign. Resp:  CTA bilaterally. No wheezing/rhonchi/rales. No accessory muscle use   CV:  Regular rhythm, normal rate, no murmurs, gallops, rubs    GI:  Soft, non distended, non tender. normoactive bowel sounds, no hepatosplenomegaly     Musculoskeletal:  No edema, warm, 2+ pulses throughout, splint intact, able to wiggle toes, toes warm with brisk cap refill    Neurologic:  Moves all extremities.   AAOx3, CN II-XII reviewed            Data Review:    Review and/or order of clinical lab test  Review and/or order of tests in the radiology section of CPT  I personally reviewed  Image      Labs:     Recent Labs     04/02/22 0429 04/01/22 0424   WBC 5.8 6.4   HGB 11.9 13.1   HCT 36.6 40.0    171     Recent Labs     04/02/22 0429 04/01/22  0541 04/01/22 0424   *  --  133*   K 4.3 4.6 5.8*     --  104   CO2 27  --  25   BUN 30*  --  29*   CREA 2.20*  --  2.27*   *  --  251*   CA 9.2  --  9.4   MG 1.7 1.3* 1.4*     Recent Labs     04/01/22 0424   ALT 16   AP 85   TBILI 0.4   TP 8.1   ALB 2.9*   GLOB 5.2*     No results for input(s): INR, PTP, APTT, INREXT in the last 72 hours. No results for input(s): FE, TIBC, PSAT, FERR in the last 72 hours. No results found for: FOL, RBCF   No results for input(s): PH, PCO2, PO2 in the last 72 hours. No results for input(s): CPK, CKNDX, TROIQ in the last 72 hours.     No lab exists for component: CPKMB  Lab Results   Component Value Date/Time    Cholesterol, total 220 (H) 02/04/2022 11:44 AM    HDL Cholesterol 48 02/04/2022 11:44 AM    LDL, calculated 108.8 (H) 02/04/2022 11:44 AM    Triglyceride 316 (H) 02/04/2022 11:44 AM    CHOL/HDL Ratio 4.6 02/04/2022 11:44 AM     Lab Results   Component Value Date/Time    Glucose (POC) 168 (H) 04/02/2022 12:03 PM    Glucose (POC) 150 (H) 04/02/2022 05:34 AM    Glucose (POC) 238 (H) 04/01/2022 11:34 PM    Glucose (POC) 149 (H) 04/01/2022 11:59 AM    Glucose (POC) 305 (H) 02/09/2021 05:41 PM     Lab Results   Component Value Date/Time    Color YELLOW/STRAW 04/01/2022 04:24 AM    Appearance CLEAR 04/01/2022 04:24 AM    Specific gravity 1.014 04/01/2022 04:24 AM    pH (UA) 7.0 04/01/2022 04:24 AM    Protein 300 (A) 04/01/2022 04:24 AM    Glucose 250 (A) 04/01/2022 04:24 AM    Ketone Negative 04/01/2022 04:24 AM    Bilirubin Negative 04/01/2022 04:24 AM    Urobilinogen 0.2 04/01/2022 04:24 AM    Nitrites Negative 04/01/2022 04:24 AM    Leukocyte Esterase Negative 04/01/2022 04:24 AM    Epithelial cells FEW 04/01/2022 04:24 AM    Bacteria Negative 04/01/2022 04:24 AM    WBC 0-4 04/01/2022 04:24 AM    RBC 0-5 04/01/2022 04:24 AM         Medications Reviewed:     Current Facility-Administered Medications   Medication Dose Route Frequency    albuterol-ipratropium (DUO-NEB) 2.5 MG-0.5 MG/3 ML  3 mL Nebulization Q6H PRN    insulin lispro (HUMALOG) injection   SubCUTAneous Q6H    glucose chewable tablet 16 g  4 Tablet Oral PRN    glucagon (GLUCAGEN) injection 1 mg  1 mg IntraMUSCular PRN    acetaminophen (TYLENOL) tablet 650 mg  650 mg Oral Q6H PRN    hydrALAZINE (APRESOLINE) 20 mg/mL injection 10 mg  10 mg IntraVENous Q6H PRN    escitalopram oxalate (LEXAPRO) tablet 10 mg  10 mg Oral BID    levothyroxine (SYNTHROID) tablet 175 mcg  175 mcg Oral ACB    lisinopriL (PRINIVIL, ZESTRIL) tablet 10 mg  10 mg Oral BID    metoprolol tartrate (LOPRESSOR) tablet 50 mg  50 mg Oral BID    pantoprazole (PROTONIX) tablet 40 mg  40 mg Oral ACB    pravastatin (PRAVACHOL) tablet 40 mg  40 mg Oral DAILY    prednisoLONE acetate (PRED FORTE) 1 % ophthalmic suspension 1 Drop  1 Drop Right Eye BID    gabapentin (NEURONTIN) capsule 400 mg  400 mg Oral BID    traMADoL (ULTRAM) tablet 50 mg  50 mg Oral Q6H PRN    HYDROcodone-acetaminophen (NORCO) 5-325 mg per tablet 1 Tablet  1 Tablet Oral Q6H PRN    arformoteroL (BROVANA) neb solution 15 mcg  15 mcg Nebulization BID RT    And    budesonide (PULMICORT) 500 mcg/2 ml nebulizer suspension  500 mcg Nebulization BID RT    dorzolamide (TRUSOPT) 2 % ophthalmic solution 1 Drop  1 Drop Left Eye TID    And    timolol (TIMOPTIC) 0.5 % ophthalmic solution 1 Drop  1 Drop Left Eye BID     ______________________________________________________________________  EXPECTED LENGTH OF STAY: - - -  ACTUAL LENGTH OF STAY:          1                 Rian Mink, NP

## 2022-04-03 ENCOUNTER — APPOINTMENT (OUTPATIENT)
Dept: GENERAL RADIOLOGY | Age: 72
DRG: 563 | End: 2022-04-03
Attending: NURSE PRACTITIONER
Payer: MEDICARE

## 2022-04-03 LAB
ALBUMIN SERPL-MCNC: 2.5 G/DL (ref 3.5–5)
ALBUMIN/GLOB SERPL: 0.6 {RATIO} (ref 1.1–2.2)
ALP SERPL-CCNC: 70 U/L (ref 45–117)
ALT SERPL-CCNC: 11 U/L (ref 12–78)
ANION GAP SERPL CALC-SCNC: 4 MMOL/L (ref 5–15)
AST SERPL-CCNC: 6 U/L (ref 15–37)
BASOPHILS # BLD: 0 K/UL (ref 0–0.1)
BASOPHILS NFR BLD: 1 % (ref 0–1)
BILIRUB SERPL-MCNC: 0.3 MG/DL (ref 0.2–1)
BUN SERPL-MCNC: 36 MG/DL (ref 6–20)
BUN/CREAT SERPL: 13 (ref 12–20)
CALCIUM SERPL-MCNC: 8.9 MG/DL (ref 8.5–10.1)
CHLORIDE SERPL-SCNC: 103 MMOL/L (ref 97–108)
CO2 SERPL-SCNC: 27 MMOL/L (ref 21–32)
CREAT SERPL-MCNC: 2.81 MG/DL (ref 0.55–1.02)
DIFFERENTIAL METHOD BLD: ABNORMAL
EOSINOPHIL # BLD: 0.2 K/UL (ref 0–0.4)
EOSINOPHIL NFR BLD: 3 % (ref 0–7)
ERYTHROCYTE [DISTWIDTH] IN BLOOD BY AUTOMATED COUNT: 13.1 % (ref 11.5–14.5)
GLOBULIN SER CALC-MCNC: 4.4 G/DL (ref 2–4)
GLUCOSE BLD STRIP.AUTO-MCNC: 101 MG/DL (ref 65–117)
GLUCOSE BLD STRIP.AUTO-MCNC: 136 MG/DL (ref 65–117)
GLUCOSE SERPL-MCNC: 136 MG/DL (ref 65–100)
HCT VFR BLD AUTO: 36.6 % (ref 35–47)
HGB BLD-MCNC: 11.8 G/DL (ref 11.5–16)
IMM GRANULOCYTES # BLD AUTO: 0.1 K/UL (ref 0–0.04)
IMM GRANULOCYTES NFR BLD AUTO: 1 % (ref 0–0.5)
LYMPHOCYTES # BLD: 1.1 K/UL (ref 0.8–3.5)
LYMPHOCYTES NFR BLD: 19 % (ref 12–49)
MCH RBC QN AUTO: 32.9 PG (ref 26–34)
MCHC RBC AUTO-ENTMCNC: 32.2 G/DL (ref 30–36.5)
MCV RBC AUTO: 101.9 FL (ref 80–99)
MONOCYTES # BLD: 0.5 K/UL (ref 0–1)
MONOCYTES NFR BLD: 8 % (ref 5–13)
NEUTS SEG # BLD: 3.9 K/UL (ref 1.8–8)
NEUTS SEG NFR BLD: 68 % (ref 32–75)
NRBC # BLD: 0 K/UL (ref 0–0.01)
NRBC BLD-RTO: 0 PER 100 WBC
PLATELET # BLD AUTO: 179 K/UL (ref 150–400)
PMV BLD AUTO: 11.1 FL (ref 8.9–12.9)
POTASSIUM SERPL-SCNC: 4.5 MMOL/L (ref 3.5–5.1)
PROT SERPL-MCNC: 6.9 G/DL (ref 6.4–8.2)
RBC # BLD AUTO: 3.59 M/UL (ref 3.8–5.2)
SERVICE CMNT-IMP: ABNORMAL
SERVICE CMNT-IMP: NORMAL
SODIUM SERPL-SCNC: 134 MMOL/L (ref 136–145)
WBC # BLD AUTO: 5.7 K/UL (ref 3.6–11)

## 2022-04-03 PROCEDURE — 74011000250 HC RX REV CODE- 250: Performed by: HOSPITALIST

## 2022-04-03 PROCEDURE — 94640 AIRWAY INHALATION TREATMENT: CPT

## 2022-04-03 PROCEDURE — 74011250636 HC RX REV CODE- 250/636: Performed by: NURSE PRACTITIONER

## 2022-04-03 PROCEDURE — 65270000029 HC RM PRIVATE

## 2022-04-03 PROCEDURE — 74011250637 HC RX REV CODE- 250/637: Performed by: HOSPITALIST

## 2022-04-03 PROCEDURE — 82962 GLUCOSE BLOOD TEST: CPT

## 2022-04-03 PROCEDURE — 71045 X-RAY EXAM CHEST 1 VIEW: CPT

## 2022-04-03 PROCEDURE — 80053 COMPREHEN METABOLIC PANEL: CPT

## 2022-04-03 PROCEDURE — 36415 COLL VENOUS BLD VENIPUNCTURE: CPT

## 2022-04-03 PROCEDURE — 85025 COMPLETE CBC W/AUTO DIFF WBC: CPT

## 2022-04-03 RX ORDER — SODIUM CHLORIDE 9 MG/ML
75 INJECTION, SOLUTION INTRAVENOUS CONTINUOUS
Status: DISPENSED | OUTPATIENT
Start: 2022-04-03 | End: 2022-04-04

## 2022-04-03 RX ADMIN — DORZOLAMIDE HYDROCHLORIDE 1 DROP: 20 SOLUTION/ DROPS OPHTHALMIC at 16:00

## 2022-04-03 RX ADMIN — LEVOTHYROXINE SODIUM 175 MCG: 0.15 TABLET ORAL at 06:30

## 2022-04-03 RX ADMIN — DORZOLAMIDE HYDROCHLORIDE 1 DROP: 20 SOLUTION/ DROPS OPHTHALMIC at 09:00

## 2022-04-03 RX ADMIN — PANTOPRAZOLE SODIUM 40 MG: 40 TABLET, DELAYED RELEASE ORAL at 06:30

## 2022-04-03 RX ADMIN — TIMOLOL MALEATE 1 DROP: 5 SOLUTION/ DROPS OPHTHALMIC at 09:00

## 2022-04-03 RX ADMIN — ACETAMINOPHEN 650 MG: 325 TABLET ORAL at 09:30

## 2022-04-03 RX ADMIN — ARFORMOTEROL TARTRATE 15 MCG: 15 SOLUTION RESPIRATORY (INHALATION) at 23:39

## 2022-04-03 RX ADMIN — SODIUM CHLORIDE 75 ML/HR: 9 INJECTION, SOLUTION INTRAVENOUS at 09:37

## 2022-04-03 RX ADMIN — GABAPENTIN 400 MG: 400 CAPSULE ORAL at 09:30

## 2022-04-03 RX ADMIN — GABAPENTIN 400 MG: 400 CAPSULE ORAL at 17:39

## 2022-04-03 RX ADMIN — PREDNISOLONE ACETATE 1 DROP: 10 SUSPENSION/ DROPS OPHTHALMIC at 09:00

## 2022-04-03 RX ADMIN — PREDNISOLONE ACETATE 1 DROP: 10 SUSPENSION/ DROPS OPHTHALMIC at 18:00

## 2022-04-03 RX ADMIN — ESCITALOPRAM OXALATE 10 MG: 10 TABLET ORAL at 17:39

## 2022-04-03 RX ADMIN — PRAVASTATIN SODIUM 40 MG: 20 TABLET ORAL at 09:31

## 2022-04-03 RX ADMIN — BUDESONIDE 500 MCG: 0.5 INHALANT RESPIRATORY (INHALATION) at 23:39

## 2022-04-03 RX ADMIN — TIMOLOL MALEATE 1 DROP: 5 SOLUTION/ DROPS OPHTHALMIC at 18:00

## 2022-04-03 RX ADMIN — ESCITALOPRAM OXALATE 10 MG: 10 TABLET ORAL at 09:00

## 2022-04-03 RX ADMIN — DORZOLAMIDE HYDROCHLORIDE 1 DROP: 20 SOLUTION/ DROPS OPHTHALMIC at 22:00

## 2022-04-03 NOTE — PROGRESS NOTES
Bedside and Verbal shift change report given to [address] [place] [address]  (oncoming nurse) by Taj Hui  (offgoing nurse). Report included the following information SBAR, Kardex and Recent Results. Patient alert and responsive pt notes no signs of distress during shift change will continue to assess pt       0350  Patient alert and responsive pt in room alone, patient family member went home. Patient notes no distress, nurse checks patient q2hrs, patient tolerates lab draws no distress noted will continue to assess pt     Bedside and Verbal shift change report given to [facility]  (oncoming nurse) by Servando Merida RN  (offgoing nurse). Report included the following information SBAR, Kardex, Procedure Summary and Intake/Output.      Patient alert and responsive during shift change report no distress noted

## 2022-04-03 NOTE — PROGRESS NOTES
Bedside and Verbal shift change report given to Oumar Hogue (oncoming nurse) by Demond Mukherjee (offgoing nurse). Report included the following information SBAR, Kardex, Procedure Summary, Intake/Output and MAR.

## 2022-04-03 NOTE — PROGRESS NOTES
Bedside shift change report given to Oumar Hogue (oncoming nurse) by Brayan Blair (offgoing nurse). Report included the following information SBAR, Kardex, ED Summary, OR Summary, Procedure Summary, Intake/Output and MAR.

## 2022-04-03 NOTE — PROGRESS NOTES
[facility] [facility] Adult  Hospitalist Group                                                                                          Hospitalist Progress Note  Lianna Romero NP  Answering service: [phone] or [phone] from in house phone        Date of Service:  4/3/2022  NAME:  Shan Lambert  :    MRN:  [id number]      Admission Summary:   Per H&P, The patient is a 70-year-old, obese,  female who has previous history significant for coronary artery disease, status post CABG; COPD; type 2 diabetes with gastroparesis, on insulin pump; history of neuropathy; legally blind; history of GERD; hypertension; hyperlipidemia; obstructive sleep apnea; and hypothyroidism, presented to the hospital after having a fall. The patient has frequent falls. This morning she got up around 02:00 a.m. and fell down. She did not lose consciousness, did not hit her head. She denies having any chest pain, any seizure-like activity. The patient was subsequently brought in the emergency room where an x-ray of the right ankle revealed a mildly displaced acute distal tibial fracture with nondisplaced distal fibular fracture, hence it was decided to admit the patient. The patient has no recent chest pain. She says her COPD is well controlled with Trelegy. She is not on home oxygen. The patient is fully COVID vaccinated. Interval history / Subjective:     I saw the patient today on rounds. At the moment of the encounter, nursing had just taken vital signs, low-grade fever of 100 F. Patient denies dysuria, cough, nausea, vomiting, diarrhea, abdominal discomfort         Assessment & Plan:     Distal tib-fib fracture  S/p fall, fracture around ORIF hardware from previous fracture roughly 1 year ago.   Fracture is around the old hardware but alignment is okay, per Ortho  Orthopedics following, thank you for recommendations  Has been splinted by orthopedics  Continuing with multimodal pain control  Patient will be nonweightbearing for 6 weeks  Physical therapy recommending SNF    Low-grade fever  Unclear etiology, will monitor closely. Patient has no leukocytosis, no urinary symptoms,  No cough  We will monitor closely  We will check a chest radiograph    CKD stage III with JAIDEN  Does have a mildly elevated creatinine, has not been drinking adequate amounts  It looks like her baseline creatinine is 2.2, 2.81 today  We will give some IV fluids and monitor  Avoiding nephrotoxins    CAD  S/p CABG  Denies chest pain  Continue metoprolol        Hypertension  Blood pressure currently controlled  Continue metoprolol  Continue lisinopril        COPD  Not in exacerbation, no wheezing  Continuing LABA and ICS  As needed nebulizers    Type 2 diabetes  Blood sugar currently controlled  Continue insulin pump  Continuing SSI    Hypothyroid  Stable  Continuing levothyroxine    Obesity  BMI of 39  Consult on healthy dietary choices      Code status: FULL    DVT prophylaxis: SCD    Care Plan discussed with: Patient/Family and Nurse  Anticipated Disposition: SNF/LTC  Anticipated Discharge: Greater than 48 hours     Hospital Problems  Date Reviewed: 2/4/2022          Codes Class Noted POA    Tibia fracture ICD-10-CM: S82.209A  ICD-9-CM: 823.80  4/1/2022 Unknown                Review of Systems:   A comprehensive review of systems was negative except for that written in the HPI. Vital Signs:    Last 24hrs VS reviewed since prior progress note. Most recent are:  Visit Vitals  /60   Pulse (!) 53   Temp 100.1 °F (37.8 °C)   Resp 16   SpO2 93%       No intake or output data in the 24 hours ending 04/03/22 1021     Physical Examination:     I had a face to face encounter with this patient and independently examined them on 4/3/2022 as outlined below:          Constitutional:  No acute distress, cooperative, pleasant    ENT:  Oral mucosa moist, oropharynx benign. Resp:  CTA bilaterally. No wheezing/rhonchi/rales.  No accessory muscle use   CV:  Regular rhythm, normal rate, no murmurs, gallops, rubs    GI:  Soft, non distended, non tender. normoactive bowel sounds, no hepatosplenomegaly     Musculoskeletal:  No edema, warm, 2+ pulses throughout, splint intact, able to wiggle toes, toes warm with brisk cap refill    Neurologic:  Moves all extremities. AAOx3, CN II-XII reviewed            Data Review:    Review and/or order of clinical lab test  Review and/or order of tests in the radiology section of [facility]  I personally reviewed  Image      Labs:     Recent Labs     04/03/22 0202 04/02/22 0429   WBC 5.7 5.8   HGB 11.8 11.9   HCT 36.6 36.6    177     Recent Labs     04/03/22 0202 04/02/22 0429 04/01/22 0541 04/01/22 0424 04/01/22 0424   * 134*  --   --  133*   K 4.5 4.3 4.6   < > 5.8*    104  --   --  104   CO2 27 27  --   --  25   BUN 36* 30*  --   --  29*   CREA 2.81* 2.20*  --   --  2.27*   * 182*  --   --  251*   CA 8.9 9.2  --   --  9.4   MG  --  1.7 1.3*  --  1.4*    < > = values in this interval not displayed. Recent Labs     04/03/22 0202 04/01/22 0424   ALT 11* 16   AP 70 85   TBILI 0.3 0.4   TP 6.9 8.1   ALB 2.5* 2.9*   GLOB 4.4* 5.2*     No results for input(s): INR, PTP, APTT, INREXT, INREXT in the last 72 hours. No results for input(s): FE, TIBC, PSAT, FERR in the last 72 hours. No results found for: FOL, RBCF   No results for input(s): PH, PCO2, PO2 in the last 72 hours. No results for input(s): CPK, CKNDX, TROIQ in the last 72 hours.     No lab exists for component: CPKMB  Lab Results   Component Value Date/Time    Cholesterol, total 220 (H) 02/04/2022 11:44 AM    HDL Cholesterol 48 02/04/2022 11:44 AM    LDL, calculated 108.8 (H) 02/04/2022 11:44 AM    Triglyceride 316 (H) 02/04/2022 11:44 AM    CHOL/HDL Ratio 4.6 02/04/2022 11:44 AM     Lab Results   Component Value Date/Time    Glucose (POC) 136 (H) 04/03/2022 06:25 AM    Glucose (POC) 216 (H) 04/02/2022 09:03 PM    Glucose (POC) 120 (H) 04/02/2022 04:55 PM    Glucose (POC) 168 (H) 04/02/2022 12:03 PM    Glucose (POC) 150 (H) 04/02/2022 05:34 AM     Lab Results   Component Value Date/Time    Color YELLOW/STRAW 04/01/2022 04:24 AM    Appearance CLEAR 04/01/2022 04:24 AM    Specific gravity 1.014 04/01/2022 04:24 AM    pH (UA) 7.0 04/01/2022 04:24 AM    Protein 300 (A) 04/01/2022 04:24 AM    Glucose 250 (A) 04/01/2022 04:24 AM    Ketone Negative 04/01/2022 04:24 AM    Bilirubin Negative 04/01/2022 04:24 AM    Urobilinogen 0.2 04/01/2022 04:24 AM    Nitrites Negative 04/01/2022 04:24 AM    Leukocyte Esterase Negative 04/01/2022 04:24 AM    Epithelial cells FEW 04/01/2022 04:24 AM    Bacteria Negative 04/01/2022 04:24 AM    WBC 0-4 04/01/2022 04:24 AM    RBC 0-5 04/01/2022 04:24 AM         Medications Reviewed:     Current Facility-Administered Medications   Medication Dose Route Frequency    0.9% sodium chloride infusion  75 mL/hr IntraVENous CONTINUOUS    albuterol-ipratropium (DUO-NEB) 2.5 MG-0.5 MG/3 ML  3 mL Nebulization Q6H PRN    insulin lispro (HUMALOG) injection   SubCUTAneous Q6H    glucose chewable tablet 16 g  4 Tablet Oral PRN    glucagon (GLUCAGEN) injection 1 mg  1 mg IntraMUSCular PRN    acetaminophen (TYLENOL) tablet 650 mg  650 mg Oral Q6H PRN    hydrALAZINE (APRESOLINE) 20 mg/mL injection 10 mg  10 mg IntraVENous Q6H PRN    escitalopram oxalate (LEXAPRO) tablet 10 mg  10 mg Oral BID    levothyroxine (SYNTHROID) tablet 175 mcg  175 mcg Oral ACB    lisinopriL (PRINIVIL, ZESTRIL) tablet 10 mg  10 mg Oral BID    metoprolol tartrate (LOPRESSOR) tablet 50 mg  50 mg Oral BID    pantoprazole (PROTONIX) tablet 40 mg  40 mg Oral ACB    pravastatin (PRAVACHOL) tablet 40 mg  40 mg Oral DAILY    prednisoLONE acetate (PRED FORTE) 1 % ophthalmic suspension 1 Drop  1 Drop Right Eye BID    gabapentin (NEURONTIN) capsule 400 mg  400 mg Oral BID    traMADoL (ULTRAM) tablet 50 mg  50 mg Oral Q6H PRN    HYDROcodone-acetaminophen (NORCO) 5-325 mg per tablet 1 Tablet  1 Tablet Oral Q6H PRN    arformoteroL (BROVANA) neb solution 15 mcg  15 mcg Nebulization BID RT    And    budesonide (PULMICORT) 500 mcg/2 ml nebulizer suspension  500 mcg Nebulization BID RT    dorzolamide (TRUSOPT) 2 % ophthalmic solution 1 Drop  1 Drop Left Eye TID    And    timolol (TIMOPTIC) 0.5 % ophthalmic solution 1 Drop  1 Drop Left Eye BID     ______________________________________________________________________  EXPECTED LENGTH OF STAY: - - -  ACTUAL LENGTH OF STAY:          2                 Mak Mohr NP

## 2022-04-03 NOTE — PROGRESS NOTES
Occupational Therapy:    Chart reviewed and attempted to see for OT session, however deferring 2/2 fatigue. Will continue to follow up and attempt as able.      Dann Bacon, OT

## 2022-04-04 ENCOUNTER — APPOINTMENT (OUTPATIENT)
Dept: GENERAL RADIOLOGY | Age: 72
DRG: 563 | End: 2022-04-04
Attending: NURSE PRACTITIONER
Payer: MEDICARE

## 2022-04-04 LAB
ALBUMIN SERPL-MCNC: 2.4 G/DL (ref 3.5–5)
ALBUMIN/GLOB SERPL: 0.5 {RATIO} (ref 1.1–2.2)
ALP SERPL-CCNC: 69 U/L (ref 45–117)
ALT SERPL-CCNC: 12 U/L (ref 12–78)
ANION GAP SERPL CALC-SCNC: 3 MMOL/L (ref 5–15)
APPEARANCE UR: ABNORMAL
AST SERPL-CCNC: 24 U/L (ref 15–37)
BACTERIA URNS QL MICRO: NEGATIVE /HPF
BASOPHILS # BLD: 0.1 K/UL (ref 0–0.1)
BASOPHILS NFR BLD: 1 % (ref 0–1)
BILIRUB SERPL-MCNC: 0.3 MG/DL (ref 0.2–1)
BILIRUB UR QL: NEGATIVE
BUN SERPL-MCNC: 44 MG/DL (ref 6–20)
BUN/CREAT SERPL: 11 (ref 12–20)
CALCIUM SERPL-MCNC: 8.4 MG/DL (ref 8.5–10.1)
CHLORIDE SERPL-SCNC: 107 MMOL/L (ref 97–108)
CO2 SERPL-SCNC: 28 MMOL/L (ref 21–32)
COLOR UR: ABNORMAL
CREAT SERPL-MCNC: 3.83 MG/DL (ref 0.55–1.02)
DIFFERENTIAL METHOD BLD: ABNORMAL
EOSINOPHIL # BLD: 0.2 K/UL (ref 0–0.4)
EOSINOPHIL NFR BLD: 3 % (ref 0–7)
EPITH CASTS URNS QL MICRO: ABNORMAL /LPF
ERYTHROCYTE [DISTWIDTH] IN BLOOD BY AUTOMATED COUNT: 13.1 % (ref 11.5–14.5)
GLOBULIN SER CALC-MCNC: 4.4 G/DL (ref 2–4)
GLUCOSE BLD STRIP.AUTO-MCNC: 127 MG/DL (ref 65–117)
GLUCOSE BLD STRIP.AUTO-MCNC: 143 MG/DL (ref 65–117)
GLUCOSE BLD STRIP.AUTO-MCNC: 204 MG/DL (ref 65–117)
GLUCOSE BLD STRIP.AUTO-MCNC: 279 MG/DL (ref 65–117)
GLUCOSE BLD STRIP.AUTO-MCNC: 54 MG/DL (ref 65–117)
GLUCOSE BLD STRIP.AUTO-MCNC: 68 MG/DL (ref 65–117)
GLUCOSE BLD STRIP.AUTO-MCNC: 73 MG/DL (ref 65–117)
GLUCOSE BLD STRIP.AUTO-MCNC: 77 MG/DL (ref 65–117)
GLUCOSE SERPL-MCNC: 66 MG/DL (ref 65–100)
GLUCOSE UR STRIP.AUTO-MCNC: 100 MG/DL
HCT VFR BLD AUTO: 36.9 % (ref 35–47)
HGB BLD-MCNC: 11.4 G/DL (ref 11.5–16)
HGB UR QL STRIP: ABNORMAL
IMM GRANULOCYTES # BLD AUTO: 0 K/UL (ref 0–0.04)
IMM GRANULOCYTES NFR BLD AUTO: 1 % (ref 0–0.5)
KETONES UR QL STRIP.AUTO: NEGATIVE MG/DL
LEUKOCYTE ESTERASE UR QL STRIP.AUTO: NEGATIVE
LYMPHOCYTES # BLD: 1.2 K/UL (ref 0.8–3.5)
LYMPHOCYTES NFR BLD: 15 % (ref 12–49)
MCH RBC QN AUTO: 32.7 PG (ref 26–34)
MCHC RBC AUTO-ENTMCNC: 30.9 G/DL (ref 30–36.5)
MCV RBC AUTO: 105.7 FL (ref 80–99)
MONOCYTES # BLD: 0.7 K/UL (ref 0–1)
MONOCYTES NFR BLD: 9 % (ref 5–13)
NEUTS SEG # BLD: 5.6 K/UL (ref 1.8–8)
NEUTS SEG NFR BLD: 71 % (ref 32–75)
NITRITE UR QL STRIP.AUTO: NEGATIVE
NRBC # BLD: 0 K/UL (ref 0–0.01)
NRBC BLD-RTO: 0 PER 100 WBC
PH UR STRIP: 5 [PH] (ref 5–8)
PLATELET # BLD AUTO: 167 K/UL (ref 150–400)
PMV BLD AUTO: 11.4 FL (ref 8.9–12.9)
POTASSIUM SERPL-SCNC: 4.9 MMOL/L (ref 3.5–5.1)
PROCALCITONIN SERPL-MCNC: 0.05 NG/ML
PROT SERPL-MCNC: 6.8 G/DL (ref 6.4–8.2)
PROT UR STRIP-MCNC: 300 MG/DL
RBC # BLD AUTO: 3.49 M/UL (ref 3.8–5.2)
RBC #/AREA URNS HPF: ABNORMAL /HPF (ref 0–5)
SERVICE CMNT-IMP: ABNORMAL
SERVICE CMNT-IMP: NORMAL
SODIUM SERPL-SCNC: 138 MMOL/L (ref 136–145)
SP GR UR REFRACTOMETRY: 1.01 (ref 1–1.03)
UA: UC IF INDICATED,UAUC: ABNORMAL
UROBILINOGEN UR QL STRIP.AUTO: 0.2 EU/DL (ref 0.2–1)
WBC # BLD AUTO: 7.7 K/UL (ref 3.6–11)
WBC URNS QL MICRO: ABNORMAL /HPF (ref 0–4)

## 2022-04-04 PROCEDURE — 74011250636 HC RX REV CODE- 250/636: Performed by: NURSE PRACTITIONER

## 2022-04-04 PROCEDURE — 74011250637 HC RX REV CODE- 250/637: Performed by: HOSPITALIST

## 2022-04-04 PROCEDURE — 36415 COLL VENOUS BLD VENIPUNCTURE: CPT

## 2022-04-04 PROCEDURE — 71045 X-RAY EXAM CHEST 1 VIEW: CPT

## 2022-04-04 PROCEDURE — 74011636637 HC RX REV CODE- 636/637: Performed by: HOSPITALIST

## 2022-04-04 PROCEDURE — 81001 URINALYSIS AUTO W/SCOPE: CPT

## 2022-04-04 PROCEDURE — 94640 AIRWAY INHALATION TREATMENT: CPT

## 2022-04-04 PROCEDURE — 87040 BLOOD CULTURE FOR BACTERIA: CPT

## 2022-04-04 PROCEDURE — 94760 N-INVAS EAR/PLS OXIMETRY 1: CPT

## 2022-04-04 PROCEDURE — 82962 GLUCOSE BLOOD TEST: CPT

## 2022-04-04 PROCEDURE — 77010033678 HC OXYGEN DAILY

## 2022-04-04 PROCEDURE — 84145 PROCALCITONIN (PCT): CPT

## 2022-04-04 PROCEDURE — 85025 COMPLETE CBC W/AUTO DIFF WBC: CPT

## 2022-04-04 PROCEDURE — 74011000250 HC RX REV CODE- 250: Performed by: HOSPITALIST

## 2022-04-04 PROCEDURE — 65270000029 HC RM PRIVATE

## 2022-04-04 PROCEDURE — 80053 COMPREHEN METABOLIC PANEL: CPT

## 2022-04-04 RX ORDER — SODIUM CHLORIDE 9 MG/ML
75 INJECTION, SOLUTION INTRAVENOUS CONTINUOUS
Status: DISPENSED | OUTPATIENT
Start: 2022-04-04 | End: 2022-04-05

## 2022-04-04 RX ADMIN — SODIUM CHLORIDE 75 ML/HR: 900 INJECTION, SOLUTION INTRAVENOUS at 17:00

## 2022-04-04 RX ADMIN — LEVOTHYROXINE SODIUM 175 MCG: 0.15 TABLET ORAL at 06:55

## 2022-04-04 RX ADMIN — Medication 3 UNITS: at 18:01

## 2022-04-04 RX ADMIN — PREDNISOLONE ACETATE 1 DROP: 10 SUSPENSION/ DROPS OPHTHALMIC at 18:01

## 2022-04-04 RX ADMIN — Medication 3 UNITS: at 23:46

## 2022-04-04 RX ADMIN — TIMOLOL MALEATE 1 DROP: 5 SOLUTION/ DROPS OPHTHALMIC at 18:01

## 2022-04-04 RX ADMIN — GABAPENTIN 400 MG: 400 CAPSULE ORAL at 18:00

## 2022-04-04 RX ADMIN — ARFORMOTEROL TARTRATE 15 MCG: 15 SOLUTION RESPIRATORY (INHALATION) at 07:22

## 2022-04-04 RX ADMIN — PANTOPRAZOLE SODIUM 40 MG: 40 TABLET, DELAYED RELEASE ORAL at 07:00

## 2022-04-04 RX ADMIN — BUDESONIDE 500 MCG: 0.5 INHALANT RESPIRATORY (INHALATION) at 07:22

## 2022-04-04 RX ADMIN — ACETAMINOPHEN 650 MG: 325 TABLET ORAL at 23:34

## 2022-04-04 RX ADMIN — ESCITALOPRAM OXALATE 10 MG: 10 TABLET ORAL at 18:01

## 2022-04-04 RX ADMIN — SODIUM CHLORIDE 75 ML/HR: 9 INJECTION, SOLUTION INTRAVENOUS at 03:18

## 2022-04-04 RX ADMIN — DORZOLAMIDE HYDROCHLORIDE 1 DROP: 20 SOLUTION/ DROPS OPHTHALMIC at 17:00

## 2022-04-04 RX ADMIN — DORZOLAMIDE HYDROCHLORIDE 1 DROP: 20 SOLUTION/ DROPS OPHTHALMIC at 22:00

## 2022-04-04 NOTE — CONSULTS
[address]    Name:  Shannon Hwang  MR#:  [id number]  :  1950  ACCOUNT #:  [de-identified]  DATE OF SERVICE:    IN-HOSPITAL NEPHROLOGY CONSULTATION    REFERRING PHYSICIAN:  Dre Nogueiar NP    REASON FOR CONSULTATION:  Worsening renal function. HISTORY OF PRESENT ILLNESS:  The patient is a 24-year-old  woman, who was seen by our service a year ago for abnormal renal function. At that time, the patient's serum creatinine was in the range of 1.6-1.8. She underwent a full evaluation and she was found to have severe proteinuria, nephrotic syndrome, which was felt to be related to her diabetic kidney disease. The patient was suppose to be followed up as an outpatient, but she did not come to our office. At that time, she had fracture of her ankle. The patient apparently had multiple falls, and she presents again with a repeat fall and fracture of distal tibia and fibula. The patient is admitted for further management. She is legally blind. She has multiple comorbidities, has coronary artery disease, status post CABG surgery; COPD, type 2 diabetes with gastroparesis, on insulin pump; neuropathy, GERD, hypertension, hyperlipidemia, obstructive sleep apnea, morbid obesity, hypothyroidism, frequent falls, and osteoporosis. PAST SURGICAL HISTORY:  1.  Lumbar laminectomy. 2.  Heart catheterization. 3.  Rotator cuff repair. 4.  Tonsillectomy. 5.  Right ankle surgery last year. 6.  Coronary artery bypass grafting. ALLERGIES:  ALLERGIC TO AUGMENTIN, OXYCODONE, VESICARE, AND NONSTEROIDAL'S. MEDICATIONS:  Her medication list includes,  1. Neurontin. 2.  Pravastatin. 3.  Escitalopram.  4.  Lisinopril. 5.  Metoprolol. 6.  Levothyroxine. 7.  Protonix. 8.  Insulin pump. FAMILY HISTORY:  Positive for COPD, diabetes, and pancreatic cancer. SOCIOECONOMIC HISTORY:  The patient quit smoking cigarettes seven years ago.   Denies alcohol and illicit drug abuse.  She lives with a roommate, and walks with a walker. REVIEW OF SYSTEMS:  Pain in her leg. Legally blind. Denies shortness of breath, nausea, vomiting, diarrhea, chills, and fevers. PHYSICAL EXAMINATION:  GENERAL:  Elderly white woman, who looks chronically ill. She is obese. She is sleeping comfortably. Most probably, she has received some pain medication, she is falling asleep in mid sentence. VITAL SIGNS:  Blood pressure is 114/56, heart rate is 75, respirations 17, temperature is 101.2. HEENT:  Head is normocephalic. Eyes with fixed dilated pupil on the right side. NECK:  Supple with no increased JVP, carotid bruit, or thyromegaly. LUNGS:  Clear to also auscultation. HEART:  S1 and S2 with regular rate and rhythm, with no friction, rub, or gallop. ABDOMEN:  Soft, not tender, not distended with positive bowel sounds. EXTREMITIES:  Right ankle is tender to touch. The patient has trace edema. NEUROLOGIC:  Exam is nonfocal.    LABORATORY DATA:  Sodium is 138, potassium is 4.9, CO2 is 28, BUN is 44, creatinine is 3.8. Hemoglobin is 11.4. The patient has 6 g of proteinuria. Ultrasound of the kidneys that was done last year shows echogenic kidneys with small echogenic foci in the bilateral renal collecting system, which most probably represents calculi. The patient had at that time a 5-mm right renal cyst.    IMPRESSION:  1. Chronic kidney disease with significant loss of GFR, which is typical for diabetic patients with diabetic kidney disease and significant proteinuria. Unfortunately, the patient was not followed up as an outpatient. Her current serum creatinine corresponds to late stage IV chronic kidney disease. Electrolytes are normal.  The patient is not anemic. She has no significant hypervolemia. 2.  History of hypertension. Blood pressure is at target. 3.  Osteoporosis with frequent falls and fractures. RECOMMENDATIONS:  1. Continue present care.   There is no need of repeating the renal workup. The patient will need to be monitored closely as an outpatient since she is at risk of further progression of her CKD and developing end-stage renal disease. 2.  Screen for secondary hyperparathyroidism with PTH, phosphorus, and calcium. 3.  Avoid nephrotoxic agents, especially nonsteroidal anti-inflammatory medications. 4.  Adjust all current meds for GFR of less than 30 mL/minute. Thank you very much for the opportunity to be part of this patient's care. We will followup with you closely.       Slick Duron MD      [id number]  D:  04/04/2022 13:22  T:  04/04/2022 15:02  JOB #:  [id number]  CC:  Xiomara Lopes MD

## 2022-04-04 NOTE — PROGRESS NOTES
----- Message from Hailee Cárdenas sent at 9/3/2020  9:40 AM CDT -----  Patient is calling in reference to her appt on 9/8 when she left office they was scheduled for 8:15 they she got a notice saying it changed    Please contact patient at [phone]     CM attempted to see the patient and she was very lethargic and unable to hold a conversation at this point. CM called and left a message with the patient's sister, Enma Dale ([phone]) listed as an emergency contact. Disposition pending possible SNF placement. SNF choices pending conversation with the patient and family. CM following for discharge needs.     Padmini Bull RN/CRM

## 2022-04-04 NOTE — PROGRESS NOTES
Physical therapy    Pt chart reviewed up to date, last BP 87/52, sleeping unable to wake up for functional therapy. PT will defer at this time.      Professionally,     Henrique Fields, PTA SHIFT SUMMARY
 
ASSUMED CARE OF PATIENT AT 2135 FROM ICU. PT CAME ON 4L NC, TITRATED DOWN TO
2L. MAINTAINING O2 SATS OVER 95%. VSS. HEP GTT INFUSING AT 22 U/KG/HR, 36.5
ML/HR IN RIGHT A/C. ALERT AND ORIENTED X4. HR NSR. VOIDING INDEPENDENTLY WITH
BEDSIDE URINAL. NPO SINCE MIDNIGHT. IN BED RESTING WITH CALL ALARM AT SIDE.

## 2022-04-04 NOTE — PROGRESS NOTES
Orders received, chart reviewed and patient received sleeping. Patients s/p fall, splint to right tibia, and NWB 6 weeks. Patient not arousing to noxious stimuli. Thank you for setting patient up with ADL/meal items with verbal and tactile cues to compensate for vision 2* legally blind. PLOF: FUNCTIONAL STATUS PRIOR TO ADMISSION: The patient is legally blind and uses either a wheelchair or a RW. HOME SUPPORT PRIOR TO ADMISSION: The patient lived with a roommate who she states provided some support. She states she has about 24 steps to go up to get into her apt.

## 2022-04-04 NOTE — PROGRESS NOTES
[facility] Adult  Hospitalist Group                                                                                          Hospitalist Progress Note  Prudencio Gleason NP  Answering service: [phone] OR [phone] from in house phone        Date of Service:  2022  NAME:  Donn Stevens  :    MRN:  [id number]      Admission Summary:   Per H&P, The patient is a 43-year-old, obese,  female who has previous history significant for coronary artery disease, status post CABG; COPD; type 2 diabetes with gastroparesis, on insulin pump; history of neuropathy; legally blind; history of GERD; hypertension; hyperlipidemia; obstructive sleep apnea; and hypothyroidism, presented to the hospital after having a fall. The patient has frequent falls. This morning she got up around 02:00 a.m. and fell down. She did not lose consciousness, did not hit her head. She denies having any chest pain, any seizure-like activity. The patient was subsequently brought in the emergency room where an x-ray of the right ankle revealed a mildly displaced acute distal tibial fracture with nondisplaced distal fibular fracture, hence it was decided to admit the patient. The patient has no recent chest pain. She says her COPD is well controlled with Trelegy. She is not on home oxygen. The patient is fully COVID vaccinated. Interval history / Subjective:     I saw the patient this morning on rounds. Creatinine elevation noted. Informed by nursing at that moment patient with fever. Patient lungs clear however she is lethargic. She is arousable, obeys commands then drifts back off to sleep         Assessment & Plan:     Distal tib-fib fracture  S/p fall, fracture around ORIF hardware from previous fracture roughly 1 year ago.   Fracture is around the old hardware but alignment is okay, per Ortho  Orthopedics following, thank you for recommendations  Has been splinted by orthopedics  Continuing with multimodal pain control  Continue nonweightbearing status for 6 weeks  Physical therapy has evaluated the patient and recommend SNF         fever  Unclear etiology. No leukocytosis, no cough, denies abdominal pain and also denies diarrhea  Radiograph yesterday was unremarkable  I did check a radiograph again today, again unremarkable  We will send blood cultures  We will send urinalysis with reflex culture  We will send procalcitonin  Unclear etiology of the lethargy. This could be due to fever versus Norco which I have since placed on hold versus infectious process      CKD stage III with JAIDEN  Does have a mildly elevated creatinine, has not been drinking adequate amounts  Creatinine baseline is somewhere around 2.2, trending upward in spite of IV fluids. This morning creatinine was 3.83  Avoiding nephrotoxins  Consulting with nephrology        CAD  S/p CABG  Denies chest pain  Continue metoprolol            Hypertension  Blood pressure currently controlled  Holding lisinopril  Continue metoprolol            COPD  Not in exacerbation, no wheezing  As needed nebulizers  Continue LABA/ICS        Type 2 diabetes  Blood sugar currently controlled however did have hypoglycemia down to 54 today improved with food  We will discontinue insulin pump  Continue SSI for now        Hypothyroid  Stable  Continuing levothyroxine    Obesity  BMI of 39  Consult on healthy dietary choices      Code status: FULL    DVT prophylaxis: SCD    Care Plan discussed with: Patient/Family, Nurse and      Anticipated Disposition: SNF/LTC     Anticipated Discharge: Greater than 48 hours     Hospital Problems  Date Reviewed: 2/4/2022          Codes Class Noted POA    Tibia fracture ICD-10-CM: S82.209A  ICD-9-CM: 823.80  4/1/2022 Unknown                Review of Systems:   A comprehensive review of systems was negative except for that written in the HPI. Vital Signs:    Last 24hrs VS reviewed since prior progress note.  Most recent are:  Visit Vitals  /64   Pulse 75   Temp (!) 101.2 °F (38.4 °C)   Resp 14   SpO2 95%         Intake/Output Summary (Last 24 hours) at 4/4/2022 1058  Last data filed at 4/4/2022 1055  Gross per 24 hour   Intake 1085 ml   Output 700 ml   Net 385 ml        Physical Examination:     I had a face to face encounter with this patient and independently examined them on 4/4/2022 as outlined below:          Constitutional:  No acute distress, cooperative, pleasant    ENT:  Oral mucosa moist, oropharynx benign. Resp:  CTA bilaterally. No wheezing/rhonchi/rales. No accessory muscle use   CV:  Regular rhythm, normal rate, no murmurs, gallops, rubs    GI:  Soft, non distended, non tender. normoactive bowel sounds, no hepatosplenomegaly     Musculoskeletal:  No edema, warm, 2+ pulses throughout, splint intact, able to wiggle toes, toes warm with brisk cap refill    Neurologic:  Moves all extremities. AAOx3, CN II-XII reviewed            Data Review:    Review and/or order of clinical lab test  Review and/or order of tests in the radiology section of CPT  I personally reviewed  Image      Labs:     Recent Labs     04/04/22 0320 04/03/22  0202   WBC 7.7 5.7   HGB 11.4* 11.8   HCT 36.9 36.6    179     Recent Labs     04/04/22 0320 04/03/22  0202 04/02/22  0429    134* 134*   K 4.9 4.5 4.3    103 104   CO2 28 27 27   BUN 44* 36* 30*   CREA 3.83* 2.81* 2.20*   GLU 66 136* 182*   CA 8.4* 8.9 9.2   MG  --   --  1.7     Recent Labs     04/04/22  0320 04/03/22  0202   ALT 12 11*   AP 69 70   TBILI 0.3 0.3   TP 6.8 6.9   ALB 2.4* 2.5*   GLOB 4.4* 4.4*     No results for input(s): INR, PTP, APTT, INREXT, INREXT in the last 72 hours. No results for input(s): FE, TIBC, PSAT, FERR in the last 72 hours. No results found for: FOL, RBCF   No results for input(s): PH, PCO2, PO2 in the last 72 hours. No results for input(s): CPK, CKNDX, TROIQ in the last 72 hours.     No lab exists for component: CPKMB  Lab Results   Component Value Date/Time    Cholesterol, total 220 (H) 02/04/2022 11:44 AM    HDL Cholesterol 48 02/04/2022 11:44 AM    LDL, calculated 108.8 (H) 02/04/2022 11:44 AM    Triglyceride 316 (H) 02/04/2022 11:44 AM    CHOL/HDL Ratio 4.6 02/04/2022 11:44 AM     Lab Results   Component Value Date/Time    Glucose (POC) 127 (H) 04/04/2022 09:32 AM    Glucose (POC) 77 04/04/2022 06:52 AM    Glucose (POC) 68 04/04/2022 06:27 AM    Glucose (POC) 73 04/04/2022 06:21 AM    Glucose (POC) 54 (L) 04/04/2022 06:18 AM     Lab Results   Component Value Date/Time    Color YELLOW/STRAW 04/01/2022 04:24 AM    Appearance CLEAR 04/01/2022 04:24 AM    Specific gravity 1.014 04/01/2022 04:24 AM    pH (UA) 7.0 04/01/2022 04:24 AM    Protein 300 (A) 04/01/2022 04:24 AM    Glucose 250 (A) 04/01/2022 04:24 AM    Ketone Negative 04/01/2022 04:24 AM    Bilirubin Negative 04/01/2022 04:24 AM    Urobilinogen 0.2 04/01/2022 04:24 AM    Nitrites Negative 04/01/2022 04:24 AM    Leukocyte Esterase Negative 04/01/2022 04:24 AM    Epithelial cells FEW 04/01/2022 04:24 AM    Bacteria Negative 04/01/2022 04:24 AM    WBC 0-4 04/01/2022 04:24 AM    RBC 0-5 04/01/2022 04:24 AM         Medications Reviewed:     Current Facility-Administered Medications   Medication Dose Route Frequency    albuterol-ipratropium (DUO-NEB) 2.5 MG-0.5 MG/3 ML  3 mL Nebulization Q6H PRN    insulin lispro (HUMALOG) injection   SubCUTAneous Q6H    glucose chewable tablet 16 g  4 Tablet Oral PRN    glucagon (GLUCAGEN) injection 1 mg  1 mg IntraMUSCular PRN    acetaminophen (TYLENOL) tablet 650 mg  650 mg Oral Q6H PRN    hydrALAZINE (APRESOLINE) 20 mg/mL injection 10 mg  10 mg IntraVENous Q6H PRN    escitalopram oxalate (LEXAPRO) tablet 10 mg  10 mg Oral BID    levothyroxine (SYNTHROID) tablet 175 mcg  175 mcg Oral ACB    lisinopriL (PRINIVIL, ZESTRIL) tablet 10 mg  10 mg Oral BID    metoprolol tartrate (LOPRESSOR) tablet 50 mg  50 mg Oral BID    pantoprazole (PROTONIX) tablet 40 mg  40 mg Oral ACB    pravastatin (PRAVACHOL) tablet 40 mg  40 mg Oral DAILY    prednisoLONE acetate (PRED FORTE) 1 % ophthalmic suspension 1 Drop  1 Drop Right Eye BID    gabapentin (NEURONTIN) capsule 400 mg  400 mg Oral BID    traMADoL (ULTRAM) tablet 50 mg  50 mg Oral Q6H PRN    [Held by provider] HYDROcodone-acetaminophen (NORCO) 5-325 mg per tablet 1 Tablet  1 Tablet Oral Q6H PRN    arformoteroL (BROVANA) neb solution 15 mcg  15 mcg Nebulization BID RT    And    budesonide (PULMICORT) 500 mcg/2 ml nebulizer suspension  500 mcg Nebulization BID RT    dorzolamide (TRUSOPT) 2 % ophthalmic solution 1 Drop  1 Drop Left Eye TID    And    timolol (TIMOPTIC) 0.5 % ophthalmic solution 1 Drop  1 Drop Left Eye BID     ______________________________________________________________________  EXPECTED LENGTH OF STAY: - - -  ACTUAL LENGTH OF STAY:          3                 Clay Gipson NP

## 2022-04-05 ENCOUNTER — APPOINTMENT (OUTPATIENT)
Dept: VASCULAR SURGERY | Age: 72
DRG: 563 | End: 2022-04-05
Attending: NURSE PRACTITIONER
Payer: MEDICARE

## 2022-04-05 LAB
ALBUMIN SERPL-MCNC: 2.3 G/DL (ref 3.5–5)
ANION GAP SERPL CALC-SCNC: 3 MMOL/L (ref 5–15)
ANION GAP SERPL CALC-SCNC: 4 MMOL/L (ref 5–15)
B PERT DNA SPEC QL NAA+PROBE: NOT DETECTED
BORDETELLA PARAPERTUSSIS PCR, BORPAR: NOT DETECTED
BUN SERPL-MCNC: 46 MG/DL (ref 6–20)
BUN SERPL-MCNC: 47 MG/DL (ref 6–20)
BUN/CREAT SERPL: 13 (ref 12–20)
BUN/CREAT SERPL: 13 (ref 12–20)
C PNEUM DNA SPEC QL NAA+PROBE: NOT DETECTED
CALCIUM SERPL-MCNC: 8.3 MG/DL (ref 8.5–10.1)
CALCIUM SERPL-MCNC: 8.7 MG/DL (ref 8.5–10.1)
CALCIUM SERPL-MCNC: 8.9 MG/DL (ref 8.5–10.1)
CHLORIDE SERPL-SCNC: 106 MMOL/L (ref 97–108)
CHLORIDE SERPL-SCNC: 108 MMOL/L (ref 97–108)
CO2 SERPL-SCNC: 24 MMOL/L (ref 21–32)
CO2 SERPL-SCNC: 25 MMOL/L (ref 21–32)
CREAT SERPL-MCNC: 3.5 MG/DL (ref 0.55–1.02)
CREAT SERPL-MCNC: 3.55 MG/DL (ref 0.55–1.02)
D DIMER PPP FEU-MCNC: 18.67 MG/L FEU (ref 0–0.65)
FLUAV H1 2009 PAND RNA SPEC QL NAA+PROBE: NOT DETECTED
FLUAV H1 RNA SPEC QL NAA+PROBE: NOT DETECTED
FLUAV H3 RNA SPEC QL NAA+PROBE: NOT DETECTED
FLUAV SUBTYP SPEC NAA+PROBE: NOT DETECTED
FLUBV RNA SPEC QL NAA+PROBE: NOT DETECTED
GLUCOSE BLD STRIP.AUTO-MCNC: 233 MG/DL (ref 65–117)
GLUCOSE BLD STRIP.AUTO-MCNC: 293 MG/DL (ref 65–117)
GLUCOSE BLD STRIP.AUTO-MCNC: 319 MG/DL (ref 65–117)
GLUCOSE BLD STRIP.AUTO-MCNC: 327 MG/DL (ref 65–117)
GLUCOSE SERPL-MCNC: 303 MG/DL (ref 65–100)
GLUCOSE SERPL-MCNC: 337 MG/DL (ref 65–100)
HADV DNA SPEC QL NAA+PROBE: NOT DETECTED
HCOV 229E RNA SPEC QL NAA+PROBE: NOT DETECTED
HCOV HKU1 RNA SPEC QL NAA+PROBE: NOT DETECTED
HCOV NL63 RNA SPEC QL NAA+PROBE: NOT DETECTED
HCOV OC43 RNA SPEC QL NAA+PROBE: NOT DETECTED
HMPV RNA SPEC QL NAA+PROBE: NOT DETECTED
HPIV1 RNA SPEC QL NAA+PROBE: NOT DETECTED
HPIV2 RNA SPEC QL NAA+PROBE: NOT DETECTED
HPIV3 RNA SPEC QL NAA+PROBE: NOT DETECTED
HPIV4 RNA SPEC QL NAA+PROBE: NOT DETECTED
M PNEUMO DNA SPEC QL NAA+PROBE: NOT DETECTED
PHOSPHATE SERPL-MCNC: 4.6 MG/DL (ref 2.6–4.7)
POTASSIUM SERPL-SCNC: 5.3 MMOL/L (ref 3.5–5.1)
POTASSIUM SERPL-SCNC: 5.4 MMOL/L (ref 3.5–5.1)
PTH-INTACT SERPL-MCNC: 248.1 PG/ML (ref 18.4–88)
RSV RNA SPEC QL NAA+PROBE: NOT DETECTED
RV+EV RNA SPEC QL NAA+PROBE: NOT DETECTED
SARS-COV-2 PCR, COVPCR: NOT DETECTED
SERVICE CMNT-IMP: ABNORMAL
SODIUM SERPL-SCNC: 135 MMOL/L (ref 136–145)
SODIUM SERPL-SCNC: 135 MMOL/L (ref 136–145)

## 2022-04-05 PROCEDURE — 76937 US GUIDE VASCULAR ACCESS: CPT

## 2022-04-05 PROCEDURE — 93005 ELECTROCARDIOGRAM TRACING: CPT

## 2022-04-05 PROCEDURE — 97530 THERAPEUTIC ACTIVITIES: CPT

## 2022-04-05 PROCEDURE — 74011250636 HC RX REV CODE- 250/636: Performed by: NURSE PRACTITIONER

## 2022-04-05 PROCEDURE — 74011636637 HC RX REV CODE- 636/637: Performed by: NURSE PRACTITIONER

## 2022-04-05 PROCEDURE — 93970 EXTREMITY STUDY: CPT

## 2022-04-05 PROCEDURE — 83970 ASSAY OF PARATHORMONE: CPT

## 2022-04-05 PROCEDURE — 74011000250 HC RX REV CODE- 250: Performed by: HOSPITALIST

## 2022-04-05 PROCEDURE — 85379 FIBRIN DEGRADATION QUANT: CPT

## 2022-04-05 PROCEDURE — 77010033678 HC OXYGEN DAILY

## 2022-04-05 PROCEDURE — 94640 AIRWAY INHALATION TREATMENT: CPT

## 2022-04-05 PROCEDURE — 74011250637 HC RX REV CODE- 250/637: Performed by: NURSE PRACTITIONER

## 2022-04-05 PROCEDURE — 80048 BASIC METABOLIC PNL TOTAL CA: CPT

## 2022-04-05 PROCEDURE — 99233 SBSQ HOSP IP/OBS HIGH 50: CPT | Performed by: CLINICAL NURSE SPECIALIST

## 2022-04-05 PROCEDURE — 0202U NFCT DS 22 TRGT SARS-COV-2: CPT

## 2022-04-05 PROCEDURE — 80069 RENAL FUNCTION PANEL: CPT

## 2022-04-05 PROCEDURE — 74011636637 HC RX REV CODE- 636/637: Performed by: HOSPITALIST

## 2022-04-05 PROCEDURE — 82962 GLUCOSE BLOOD TEST: CPT

## 2022-04-05 PROCEDURE — 74011250637 HC RX REV CODE- 250/637: Performed by: HOSPITALIST

## 2022-04-05 PROCEDURE — 65270000029 HC RM PRIVATE

## 2022-04-05 PROCEDURE — 97165 OT EVAL LOW COMPLEX 30 MIN: CPT

## 2022-04-05 PROCEDURE — 36415 COLL VENOUS BLD VENIPUNCTURE: CPT

## 2022-04-05 RX ORDER — INSULIN LISPRO 100 [IU]/ML
0.05 INJECTION, SOLUTION INTRAVENOUS; SUBCUTANEOUS
Status: DISCONTINUED | OUTPATIENT
Start: 2022-04-05 | End: 2022-04-07

## 2022-04-05 RX ORDER — HEPARIN SODIUM 5000 [USP'U]/ML
5000 INJECTION, SOLUTION INTRAVENOUS; SUBCUTANEOUS EVERY 8 HOURS
Status: DISCONTINUED | OUTPATIENT
Start: 2022-04-05 | End: 2022-04-08 | Stop reason: HOSPADM

## 2022-04-05 RX ADMIN — Medication 7 UNITS: at 17:41

## 2022-04-05 RX ADMIN — HEPARIN SODIUM 5000 UNITS: 5000 INJECTION INTRAVENOUS; SUBCUTANEOUS at 14:29

## 2022-04-05 RX ADMIN — PRAVASTATIN SODIUM 40 MG: 20 TABLET ORAL at 10:15

## 2022-04-05 RX ADMIN — BUDESONIDE 500 MCG: 0.5 INHALANT RESPIRATORY (INHALATION) at 08:57

## 2022-04-05 RX ADMIN — PREDNISOLONE ACETATE 1 DROP: 10 SUSPENSION/ DROPS OPHTHALMIC at 10:17

## 2022-04-05 RX ADMIN — ESCITALOPRAM OXALATE 10 MG: 10 TABLET ORAL at 17:42

## 2022-04-05 RX ADMIN — Medication 5 UNITS: at 17:41

## 2022-04-05 RX ADMIN — PREDNISOLONE ACETATE 1 DROP: 10 SUSPENSION/ DROPS OPHTHALMIC at 17:43

## 2022-04-05 RX ADMIN — METOPROLOL TARTRATE 50 MG: 50 TABLET, FILM COATED ORAL at 17:42

## 2022-04-05 RX ADMIN — METOPROLOL TARTRATE 50 MG: 50 TABLET, FILM COATED ORAL at 10:15

## 2022-04-05 RX ADMIN — PANTOPRAZOLE SODIUM 40 MG: 40 TABLET, DELAYED RELEASE ORAL at 07:19

## 2022-04-05 RX ADMIN — TIMOLOL MALEATE 1 DROP: 5 SOLUTION/ DROPS OPHTHALMIC at 17:43

## 2022-04-05 RX ADMIN — GABAPENTIN 400 MG: 400 CAPSULE ORAL at 17:42

## 2022-04-05 RX ADMIN — Medication 7 UNITS: at 12:15

## 2022-04-05 RX ADMIN — Medication 16 UNITS: at 17:41

## 2022-04-05 RX ADMIN — SODIUM ZIRCONIUM CYCLOSILICATE 10 G: 10 POWDER, FOR SUSPENSION ORAL at 19:12

## 2022-04-05 RX ADMIN — BUDESONIDE 500 MCG: 0.5 INHALANT RESPIRATORY (INHALATION) at 21:13

## 2022-04-05 RX ADMIN — ARFORMOTEROL TARTRATE 15 MCG: 15 SOLUTION RESPIRATORY (INHALATION) at 08:57

## 2022-04-05 RX ADMIN — ARFORMOTEROL TARTRATE 15 MCG: 15 SOLUTION RESPIRATORY (INHALATION) at 21:13

## 2022-04-05 RX ADMIN — LEVOTHYROXINE SODIUM 175 MCG: 0.15 TABLET ORAL at 07:19

## 2022-04-05 RX ADMIN — ESCITALOPRAM OXALATE 10 MG: 10 TABLET ORAL at 10:15

## 2022-04-05 RX ADMIN — Medication 2 UNITS: at 07:15

## 2022-04-05 RX ADMIN — TIMOLOL MALEATE 1 DROP: 5 SOLUTION/ DROPS OPHTHALMIC at 10:17

## 2022-04-05 RX ADMIN — GABAPENTIN 400 MG: 400 CAPSULE ORAL at 10:15

## 2022-04-05 RX ADMIN — SODIUM CHLORIDE 75 ML/HR: 900 INJECTION, SOLUTION INTRAVENOUS at 07:20

## 2022-04-05 NOTE — PROGRESS NOTES
[facility] Adult  Hospitalist Group                                                                                          Hospitalist Progress Note  Cliff Silva NP  Answering service: [phone] from in house phone        Date of Service:  2022  NAME:  Eddy Kunz  :    MRN:  [id number]      Admission Summary:   Per H&P, The patient is a 77-year-old, obese,  female who has previous history significant for coronary artery disease, status post CABG; COPD; type 2 diabetes with gastroparesis, on insulin pump; history of neuropathy; legally blind; history of GERD; hypertension; hyperlipidemia; obstructive sleep apnea; and hypothyroidism, presented to the hospital after having a fall. The patient has frequent falls. This morning she got up around 02:00 a.m. and fell down. She did not lose consciousness, did not hit her head. She denies having any chest pain, any seizure-like activity. The patient was subsequently brought in the emergency room where an x-ray of the right ankle revealed a mildly displaced acute distal tibial fracture with nondisplaced distal fibular fracture, hence it was decided to admit the patient. The patient has no recent chest pain. She says her COPD is well controlled with Trelegy. She is not on home oxygen. The patient is fully COVID vaccinated. Interval history / Subjective:     Patient seen on morning rounds. Much more awake and interactive today. Lethargy yesterday likely due to opiates         Assessment & Plan:     Distal tib-fib fracture  S/p fall, fracture around ORIF hardware from previous fracture roughly 1 year ago.   Fracture is around the old hardware but alignment is okay, per Ortho  Orthopedics following, thank you for recommendations  Has been splinted by orthopedics  Continuing multimodal pain control, pain currently controlled  Holding opiates for now  Nonweightbearing for 6 weeks           fever  Unclear etiology. No leukocytosis, no cough, denies abdominal pain and also denies diarrhea  Radiograph  was unremarkable  I did check a radiograph again today, again unremarkable  Urinalysis unremarkable  Blood cultures no growth  Procalcitonin 0.05  Viral panel was all negative  Covid negative  We will send D-dimer but no lower extremity edema          CKD stage III with JAIDEN  Does have a mildly elevated creatinine, has not been drinking adequate amounts  Creatinine baseline is somewhere around 2.2, trending upward in spite of IV fluids. This morning creatinine 3.55  Nephrology following  Mildly elevated potassium, 5.3 we will recheck  Avoiding nephrotoxins            CAD  S/p CABG  Denies chest pain  Continue metoprolol            Hypertension  Blood pressure currently controlled  Continuing metoprolol  Holding lisinopril            COPD  Not in exacerbation, no wheezing  As needed nebulizers  Continue LABA/ICS        Type 2 diabetes  Blood sugar currently controlled however did have hypoglycemia down to 47 yesterday  Today blood sugars elevated  Restart insulin pump  Continue SSI  Consulting with diabetes management            Hypothyroid  Stable  Continuing levothyroxine    Obesity  BMI of 39  Consult on healthy dietary choices      Code status: FULL    DVT prophylaxis: UFH    Care Plan discussed with: Patient/Family, Nurse and      Anticipated Disposition: SNF/LTC     Anticipated Discharge: Greater than 48 hours     Hospital Problems  Date Reviewed: 2/4/2022          Codes Class Noted POA    Tibia fracture ICD-10-CM: S82.209A  ICD-9-CM: 823.80  4/1/2022 Unknown                Review of Systems:   A comprehensive review of systems was negative except for that written in the HPI. Vital Signs:    Last 24hrs VS reviewed since prior progress note.  Most recent are:  Visit Vitals  /74 (BP 1 Location: Right upper arm, BP Patient Position: Lying)   Pulse 75   Temp 98.3 °F (36.8 °C)   Resp 15   SpO2 94%         Intake/Output Summary (Last 24 hours) at 4/5/2022 1252  Last data filed at 4/4/2022 2055  Gross per 24 hour   Intake --   Output 750 ml   Net -750 ml        Physical Examination:     I had a face to face encounter with this patient and independently examined them on 4/5/2022 as outlined below:          Constitutional:  No acute distress, cooperative, pleasant    ENT:  Oral mucosa moist, oropharynx benign. Resp:  CTA bilaterally. No wheezing/rhonchi/rales. No accessory muscle use   CV:  Regular rhythm, normal rate, no murmurs, gallops, rubs    GI:  Soft, non distended, non tender. normoactive bowel sounds, no hepatosplenomegaly     Musculoskeletal:  No edema, warm, 2+ pulses throughout, splint intact, able to wiggle toes, toes warm with brisk cap refill    Neurologic:  Moves all extremities. AAOx3, CN II-XII reviewed            Data Review:    Review and/or order of clinical lab test  Review and/or order of tests in the radiology section of CPT  I personally reviewed  Image      Labs:     Recent Labs     04/04/22 0320 04/03/22 0202   WBC 7.7 5.7   HGB 11.4* 11.8   HCT 36.9 36.6    179     Recent Labs     04/05/22 0423 04/05/22 0421 04/04/22 0320 04/03/22 0202 04/03/22 0202   NA  --  135* 138  --  134*   K  --  5.3* 4.9  --  4.5   CL  --  108 107  --  103   CO2  --  24 28  --  27   BUN  --  46* 44*  --  36*   CREA  --  3.55* 3.83*  --  2.81*   GLU  --  303* 66  --  136*   CA 8.3* 8.7 8.4*   < > 8.9   PHOS  --  4.6  --   --   --     < > = values in this interval not displayed. Recent Labs     04/05/22 0421 04/04/22 0320 04/03/22 0202   ALT  --  12 11*   AP  --  69 70   TBILI  --  0.3 0.3   TP  --  6.8 6.9   ALB 2.3* 2.4* 2.5*   GLOB  --  4.4* 4.4*     No results for input(s): INR, PTP, APTT, INREXT, INREXT in the last 72 hours. No results for input(s): FE, TIBC, PSAT, FERR in the last 72 hours.    No results found for: FOL, RBCF   No results for input(s): PH, PCO2, PO2 in the last 72 hours. No results for input(s): CPK, CKNDX, TROIQ in the last 72 hours.     No lab exists for component: CPKMB  Lab Results   Component Value Date/Time    Cholesterol, total 220 (H) 02/04/2022 11:44 AM    HDL Cholesterol 48 02/04/2022 11:44 AM    LDL, calculated 108.8 (H) 02/04/2022 11:44 AM    Triglyceride 316 (H) 02/04/2022 11:44 AM    CHOL/HDL Ratio 4.6 02/04/2022 11:44 AM     Lab Results   Component Value Date/Time    Glucose (POC) 319 (H) 04/05/2022 11:33 AM    Glucose (POC) 293 (H) 04/05/2022 05:45 AM    Glucose (POC) 279 (H) 04/04/2022 11:36 PM    Glucose (POC) 204 (H) 04/04/2022 05:29 PM    Glucose (POC) 143 (H) 04/04/2022 02:21 PM     Lab Results   Component Value Date/Time    Color YELLOW/STRAW 04/04/2022 11:38 AM    Appearance TURBID (A) 04/04/2022 11:38 AM    Specific gravity 1.015 04/04/2022 11:38 AM    pH (UA) 5.0 04/04/2022 11:38 AM    Protein 300 (A) 04/04/2022 11:38 AM    Glucose 100 (A) 04/04/2022 11:38 AM    Ketone Negative 04/04/2022 11:38 AM    Bilirubin Negative 04/04/2022 11:38 AM    Urobilinogen 0.2 04/04/2022 11:38 AM    Nitrites Negative 04/04/2022 11:38 AM    Leukocyte Esterase Negative 04/04/2022 11:38 AM    Epithelial cells MODERATE (A) 04/04/2022 11:38 AM    Bacteria Negative 04/04/2022 11:38 AM    WBC 5-10 04/04/2022 11:38 AM    RBC 0-5 04/04/2022 11:38 AM         Medications Reviewed:     Current Facility-Administered Medications   Medication Dose Route Frequency    0.9% sodium chloride infusion  75 mL/hr IntraVENous CONTINUOUS    albuterol-ipratropium (DUO-NEB) 2.5 MG-0.5 MG/3 ML  3 mL Nebulization Q6H PRN    insulin lispro (HUMALOG) injection   SubCUTAneous Q6H    glucose chewable tablet 16 g  4 Tablet Oral PRN    glucagon (GLUCAGEN) injection 1 mg  1 mg IntraMUSCular PRN    acetaminophen (TYLENOL) tablet 650 mg  650 mg Oral Q6H PRN    hydrALAZINE (APRESOLINE) 20 mg/mL injection 10 mg  10 mg IntraVENous Q6H PRN    escitalopram oxalate (LEXAPRO) tablet 10 mg  10 mg Oral BID    levothyroxine (SYNTHROID) tablet 175 mcg  175 mcg Oral ACB    [Held by provider] lisinopriL (PRINIVIL, ZESTRIL) tablet 10 mg  10 mg Oral BID    metoprolol tartrate (LOPRESSOR) tablet 50 mg  50 mg Oral BID    pantoprazole (PROTONIX) tablet 40 mg  40 mg Oral ACB    pravastatin (PRAVACHOL) tablet 40 mg  40 mg Oral DAILY    prednisoLONE acetate (PRED FORTE) 1 % ophthalmic suspension 1 Drop  1 Drop Right Eye BID    gabapentin (NEURONTIN) capsule 400 mg  400 mg Oral BID    traMADoL (ULTRAM) tablet 50 mg  50 mg Oral Q6H PRN    [Held by provider] HYDROcodone-acetaminophen (NORCO) 5-325 mg per tablet 1 Tablet  1 Tablet Oral Q6H PRN    arformoteroL (BROVANA) neb solution 15 mcg  15 mcg Nebulization BID RT    And    budesonide (PULMICORT) 500 mcg/2 ml nebulizer suspension  500 mcg Nebulization BID RT    dorzolamide (TRUSOPT) 2 % ophthalmic solution 1 Drop  1 Drop Left Eye TID    And    timolol (TIMOPTIC) 0.5 % ophthalmic solution 1 Drop  1 Drop Left Eye BID     ______________________________________________________________________  EXPECTED LENGTH OF STAY: 3d 0h  ACTUAL LENGTH OF STAY:          [address] [facility], NP

## 2022-04-05 NOTE — PROGRESS NOTES
Problem: Self Care Deficits Care Plan (Adult)  Goal: *Acute Goals and Plan of Care (Insert Text)  Description: FUNCTIONAL STATUS PRIOR TO ADMISSION: Pt was using wheelchair and RW for functional mobility at home. Required assistance for LB dressing and roommate provided assistance PRN. Pt is legally blind. HOME SUPPORT: The patient lived with roommate who assisted PRN. Occupational Therapy Goals  Initiated 4/5/2022  1. Patient will perform grooming at EOB with supervision/set-up within 7 day(s). 2.  Patient will perform bathing from anterior neck to thigh with supervision/set-up within 7 day(s). 3.  Patient will perform lower body dressing with maximal assistance within 7 day(s). 4.  Patient will perform toilet transfers with moderate assistance  within 7 day(s). 5.  Patient will perform all aspects of toileting with maximal assistance within 7 day(s). 6.  Patient will participate in upper extremity therapeutic exercise/activities with modified independence for 10 minutes within 7 day(s). 7.  Patient will utilize energy conservation techniques during functional activities with verbal cues within 7 day(s). Outcome: Not Met     OCCUPATIONAL THERAPY EVALUATION  Patient: Shan Lambert (33 y.o. female)  Date: 4/5/2022  Primary Diagnosis: Tibia fracture [S82.209A]        Precautions: RLE NWB    ASSESSMENT  Based on the objective data described below, the patient presents with pain with mobility, decreased activity tolerance, generalized weakness, and impaired functional mobility following admission for R tibia fracture. Pt cleared for therapy by nursing and received supine on 3L O2 via NC, willing to work with therapy. Pt reporting she does not require O2 at home. O2 sats at 96% and O2 turned to 2L for session. Attempted bed mobility to sit EOB, however, pt with too much pain to move RLE. Attempted rolling with Max Ax2 and pt limited by pain.  Will require further pain management for bed mobility and functional mobility. At end of session pt in supine on 2L O2 with all needs met. Currently recommend SNF at discharge to increase safety and independence in ADLs. Current Level of Function Impacting Discharge (ADLs/self-care): SBA-Min A for UB ADLs, Total -Max for bed mobility and LB ADLs    Functional Outcome Measure: The patient scored Total: 25/100 on the Barthel Index outcome measure which is indicative of being dependent in basic self-care. Other factors to consider for discharge: Lives with roommate who provides some assistance at baseline      Patient will benefit from skilled therapy intervention to address the above noted impairments. PLAN :  Recommendations and Planned Interventions: self care training, functional mobility training, therapeutic exercise, balance training, therapeutic activities, endurance activities, patient education, home safety training and family training/education    Frequency/Duration: Patient will be followed by occupational therapy 5 times a week to address goals. Recommendation for discharge: (in order for the patient to meet his/her long term goals)  Therapy up to 5 days/week in SNF setting    This discharge recommendation:  Has been made in collaboration with the attending provider and/or case management    IF patient discharges home will need the following DME: tbd at discharge       SUBJECTIVE:   Patient stated [facility] roommate helps me.     OBJECTIVE DATA SUMMARY:   HISTORY:   Past Medical History:   Diagnosis Date    Adverse effect of anesthesia     SLOW WAKING PAST ANESTHESIA    Anxiety     Arthritis     CAD (coronary artery disease)     s/p CABG x 3v    Chest pain 7/2015    Chronic obstructive pulmonary disease (HCC)     CTS (carpal tunnel syndrome)     S/p bilateral release    Diabetes ([address]     Diabetic gastroparesis ([address]     Diabetic neuropathy ([address]     Diabetic retinopathy ([address]     GERD (gastroesophageal reflux disease)     GI bleed 7/2015    4 bleeds with 9 clips placed    Hypercholesteremia     Hypertension     Hypothyroid     Ill-defined condition     NEUROPATHY HANDS AND FEET    Ill-defined condition 2017    GI BLEED    Nicotine vapor product user     JOHN on CPAP     Osteoporosis     Vitamin D deficiency      Past Surgical History:   Procedure Laterality Date    HX CERVICAL FUSION  2011    HX ENDOSCOPY  7/2015    HX GI      HX HEART CATHETERIZATION  7/2015    HX LUMBAR FUSION  2017    HX LUMBAR LAMINECTOMY  2011    HX ORTHOPAEDIC Right 1970    CARPAL TUNNEL    HX ORTHOPAEDIC Left 2003    CARPAL TUNNEL    HX ROTATOR CUFF REPAIR Right 2003    HX SHOULDER ARTHROSCOPY Right     HX TONSIL AND ADENOIDECTOMY  1968    HX TONSILLECTOMY      HI CABG, ARTERY-VEIN, THREE  7/13/2015       Expanded or extensive additional review of patient history:     Home Situation  Home Environment: Apartment  # Steps to Enter: 24  One/Two Story Residence: One story  Living Alone: No  Support Systems: Other (Comment) (roommate)  Patient Expects to be Discharged to[de-identified] Skilled nursing facility  Current DME Used/Available at Home: Orthocon, rolling,Commode, bedside,Shower chair,Raised toilet seat,Grab bars  Tub or Shower Type: Tub/Shower combination        EXAMINATION OF PERFORMANCE DEFICITS:  Cognitive/Behavioral Status:  Neurologic State: Alert  Orientation Level: Oriented to person;Oriented to place;Oriented to situation  Cognition: Follows commands; Appropriate for age attention/concentration  Perception: Appears intact  Perseveration: No perseveration noted  Safety/Judgement: Awareness of environment; Insight into deficits    Skin: dry, intact     Edema: none noted     Hearing:   Auditory  Auditory Impairment: None    Vision/Perceptual:       Acuity:  (legally blind)         Range of Motion:    AROM: Generally decreased, functional  PROM: Generally decreased, functional     Strength:    Strength: Generally decreased, functional Coordination:  Coordination: Within functional limits  Fine Motor Skills-Upper: Left Intact; Right Intact    Gross Motor Skills-Upper: Left Intact; Right Intact    Tone & Sensation:    Tone: Normal  Sensation: Intact      Functional Mobility and Transfers for ADLs:  Bed Mobility:  Rolling: Assist x2;Maximum assistance (unsuccessful due to increased R knee pain)    ADL Assessment:  Feeding: Setup;Modified independent    Oral Facial Hygiene/Grooming: Setup;Stand-by assistance    Bathing: Maximum assistance    Upper Body Dressing: Setup;Stand-by assistance    Lower Body Dressing: Total assistance    Toileting: Total assistance      ADL Intervention and task modifications:   Cognitive Retraining  Safety/Judgement: Awareness of environment; Insight into deficits       Functional Measure:    Barthel Index:  Bathin  Bladder: 0  Bowels: 10  Groomin  Dressin  Feedin  Mobility: 0  Stairs: 0  Toilet Use: 0  Transfer (Bed to Chair and Back): 0  Total: 25/100      The Barthel ADL Index: Guidelines  1. The index should be used as a record of what a patient does, not as a record of what a patient could do. 2. The main aim is to establish degree of independence from any help, physical or verbal, however minor and for whatever reason. 3. The need for supervision renders the patient not independent. 4. A patient's performance should be established using the best available evidence. Asking the patient, friends/relatives and nurses are the usual sources, but direct observation and common sense are also important. However direct testing is not needed. 5. Usually the patient's performance over the preceding 24-48 hours is important, but occasionally longer periods will be relevant. 6. Middle categories imply that the patient supplies over 50 per cent of the effort. 7. Use of aids to be independent is allowed.     Score Interpretation (from [address] [address])    Independent   60-79 Minimally independent   40-59 Partially dependent   20-39 Very dependent   <20 Totally dependent     -Ayleen Akers, Barthel, D.W. (0753). Functional evaluation: the Barthel Index. [address] ([address] (1997). The Barthel activities of daily living index: self-reporting versus actual performance in the old (> or = 75 years). Journal of [address] [place] [address] 45(7), [facility], J.ILNA.M.F, Sami Campbell., [facility]. (1999). Measuring the change in disability after inpatient rehabilitation; comparison of the responsiveness of the Barthel Index and Functional Peculiar Measure. Journal of Neurology, Neurosurgery, and Psychiatry, 66(4), 661-825. Dani Delgado, N.J.A, ALICIA Bagley, & Pau Camejo M.A. (2004) Assessment of post-stroke quality of life in cost-effectiveness studies: The usefulness of the Barthel Index and the EuroQoL-5D. Quality of Life Research, 15, 899-51     Occupational Therapy Evaluation Charge Determination   History Examination Decision-Making   LOW Complexity : Brief history review  MEDIUM Complexity : 3-5 performance deficits relating to physical, cognitive , or psychosocial skils that result in activity limitations and / or participation restrictions MEDIUM Complexity : Patient may present with comorbidities that affect occupational performnce. Miniml to moderate modification of tasks or assistance (eg, physical or verbal ) with assesment(s) is necessary to enable patient to complete evaluation       Based on the above components, the patient evaluation is determined to be of the following complexity level: LOW   Pain Rating:  Pain in RLE with slight movement     Activity Tolerance:   Poor    After treatment patient left in no apparent distress:    Supine in bed, Call bell within reach and Side rails x 3    COMMUNICATION/EDUCATION:   The patients plan of care was discussed with: Physical therapy assistant and Registered nurse.      Home safety education was provided and the patient/caregiver indicated understanding. This patients plan of care is appropriate for delegation to [facility]U.     Thank you for this referral.  Cynthia Hankins OT  Time Calculation: 26 mins

## 2022-04-05 NOTE — PROGRESS NOTES
Problem: Mobility Impaired (Adult and Pediatric)  Goal: *Acute Goals and Plan of Care (Insert Text)  Description: FUNCTIONAL STATUS PRIOR TO ADMISSION: The patient is legally blind and uses either a wheelchair or a RW. HOME SUPPORT PRIOR TO ADMISSION: The patient lived with a roommate who she states provided some support. She states she has about 24 steps to go up to get into her apt. Physical Therapy Goals  Initiated 4/2/2022  1. Patient will move from supine to sit and sit to supine , scoot up and down, and roll side to side in bed with supervision/set-up within 7 day(s). 2.  Patient will transfer from bed to chair and chair to bed with moderate assistance  using the least restrictive device/sliding board within 7 day(s). 3.  The patient will sit EOB for 15 minutes with supervision within 7 days. Outcome: Progressing Towards Goal    PHYSICAL THERAPY TREATMENT  Patient: Eddy Kunz (55 y.o. female)  Date: 4/5/2022  Diagnosis: Tibia fracture [S82.209A] <principal problem not specified>       Precautions: NWB  Chart, physical therapy assessment, plan of care and goals were reviewed. ASSESSMENT  Patient continues with skilled PT services and is slowly progressing towards goals. Pt received supine in bed and willing to work with therapy. Pt presents with improved alertness this session, decreased mobility, strength and independence this session. Pt able to tolerate bed mobility to R side EOB with reported R knee pain with activity to 10/10. Pt continued with attempting to Roll to L side with max A x2 with unsuccessful due to continued increase with pain in R knee. Pt continued with supine scooting to [facility] with max A x 2 with VC for LE assistance. Pt completed session supine with call bell within reach and all needs met at the time. RN notified of session.      Current Level of Function Impacting Discharge (mobility/balance): max A x2 rolling     Other factors to consider for discharge: legally blind, pain management, fall risk         PLAN :  Patient continues to benefit from skilled intervention to address the above impairments. Continue treatment per established plan of care. to address goals. Recommendation for discharge: (in order for the patient to meet his/her long term goals)  Therapy up to 5 days/week in SNF setting    This discharge recommendation:  Has not yet been discussed the attending provider and/or case management    IF patient discharges home will need the following DME: to be determined (TBD)       SUBJECTIVE:   Patient stated I normally stand and pivot to my chair.     OBJECTIVE DATA SUMMARY:   Critical Behavior:  Neurologic State: Alert (patient more alert today )  Orientation Level: Oriented to person,Oriented to place,Oriented to situation  Cognition: Follows commands,Appropriate decision making,Appropriate for age attention/concentration     Functional Mobility Training:  Bed Mobility:  Rolling: Assist x2;Maximum assistance (unsuccessful due to increased R knee pain)    Pain Rating:  10/10 with activity with R knee    Activity Tolerance:   Fair and Poor    After treatment patient left in no apparent distress:   Supine in bed and Call bell within reach    COMMUNICATION/COLLABORATION:   The patients plan of care was discussed with: Occupational therapist and Registered nurse.      Joaquina Coughlin PTA   Time Calculation: 26 mins

## 2022-04-05 NOTE — DIABETES MGMT
[address] [place] [address]    CLINICAL NURSE SPECIALIST CONSULT     Initial Presentation   Nain Eubanks is a 70 y.o. female who presented to the ED 4/1/22 via EMS with right ankle pain and deformity following a fall. X-ray of the right ankle shows previous ORIF with mildly displaced acute distal tibial fracture and possible nondisplaced distal fibular fracture. HX:   Past Medical History:   Diagnosis Date    Adverse effect of anesthesia     SLOW WAKING PAST ANESTHESIA    Anxiety     Arthritis     CAD (coronary artery disease)     s/p CABG x 3v    Chest pain 7/2015    Chronic obstructive pulmonary disease (HCC)     CTS (carpal tunnel syndrome)     S/p bilateral release    Diabetes ([address]     Diabetic gastroparesis ([address]     Diabetic neuropathy ([address]     Diabetic retinopathy ([address]     GERD (gastroesophageal reflux disease)     GI bleed 7/2015    4 bleeds with 9 clips placed    Hypercholesteremia     Hypertension     Hypothyroid     Ill-defined condition     NEUROPATHY HANDS AND FEET    Ill-defined condition 2017    GI BLEED    Nicotine vapor product user     JOHN on CPAP     Osteoporosis     Vitamin D deficiency         INITIAL DX:   Tibia fracture [S82.209A]     Current Treatment     TX: Specialty consultation: Ortho and nephrology    Consulted by Edwige Nicholson NP for advanced diabetes nursing assessment and care for:   [x] Inpatient management strategy    Hospital Course   Clinical progress has been uncomplicated. 4/1: Admission. Seen by ortho: alignment ok and will be non-weight bearing x6 wks  4/4: Fevers, elevated creat. CXR without acute change. Cx sent. Seen by nephrology for elevated creat. No inpatient adjustments- needs outpatient FU  Diabetes History   Type 1 Diabetes- Diagnosed at age 16  Ambulatory BG management provided by: Endocrinology- Hema Sheth MD- Last visit 2/4/22  Family history positive for diabetes:  Mother and sister with DM2    Diabetes-related Medical History  Acute complications  None   Neurological complications  Gastroparesis and Peripheral neuropathy  Microvascular disease  Retinopathy and Nephropathy  Macrovascular disease  CAD  Other associated conditions     Osteoporosis and frequent falls    Diabetes Medication History  Key Antihyperglycemic Medications             insulin aspart U-100 (NovoLOG U-100 Insulin aspart) 100 unit/mL injection (Taking) INJECT A MAX  UNITS UNDER THE SKIN DAILY WITH INSULIN PUMP         Basal   MN-630AM: 2.65  630AM-4PM: 2.50  4PM-MN: 2.55  TDD: 60 units    Carb Ratio  1:4    Correction Factor  1:25    Target  120    Active Insulin Time  3    Diabetes self-management practices:   Eating pattern: Too drowsy to discuss      Physical activity pattern  using a walker at home  Monitoring pattern: Too drowsy to discuss. Per last note with endo in Feb fasting BG is in the 200s and evening BG is 180-200s  Taking medications pattern  [x] Consistent administration: Not able to assess    Overall evaluation:    [x] Not achieving A1c target with drug therapy & self-care practices    Subjective   I haven't seen you in forever (we have never met)     Has a life partner  Former smoker- quit 7 yrs ago  Frequent falls  Relocated to [place] from [place] to be closer to sister  15 mos ago had previous R Ankle ORIF   Sees optho: She tells me that they recommended removal of R eye for prosthetic eye  Objective   Physical exam  General Obese white female in no acute distress/ill-appearing. Drowsy and falling asleep during our conversation   Neuro  Drowsy, oriented to place and time but not person. Not appropriately answering all my questions- question if she is confused? Vital Signs   Visit Vitals  /74 (BP 1 Location: Right upper arm, BP Patient Position: Lying)   Pulse 75   Temp 98.3 °F (36.8 °C)   Resp 15   SpO2 94%     Skin  Warm and dry. No acanthosis noted along neckline.  No lipohypertrophy or lipoatrophy noted at injection sites   Heart   Regular rate and rhythm. No murmurs, rubs or gallops  Lungs  Clear to auscultation without rales or rhonchi  Extremities R foot in surgical wrap    Laboratory  Recent Labs     22  0421 22  0320 22  0202   * 66 136*   AGAP 3* 3* 4*   WBC  --  7.7 5.7   CREA 3.55* 3.83* 2.81*   GFRNA 13* 12* 17*   AST  --  24 6*   ALT  --  12 11*       Factors impacting BG management  Factor Dose Comments   Nutrition:  Standard meals     60 grams/meal      Pain R ankle pain    Other:   Kidney function   GFR 13      Blood glucose pattern      Significant diabetes-related events over the past 24-72 hours  A1C 8.7% in February   Was on insulin pump on admission, nursing staff thinks that it was taken off yesterday or day before? No pump at bedside. Leisa Ammon tells me she thinks that Antony took it home\". -238 the first two days of admission. Fasting hypoglycemia at 66 yesterday  Fasting BG this am: 303  Pre-prandial B    Assessment and Plan   Nursing Diagnosis Risk for unstable blood glucose pattern   Nursing Intervention Domain 2081 Decision-making Support   Nursing Interventions Examined current inpatient diabetes/blood glucose control   Explored factors facilitating and impeding inpatient management  Explored corrective strategies with patient and responsible inpatient provider   Informed patient of rational for insulin strategy while hospitalized     Evaluation   Jaxon Vazquez is a 70year old female, with a nearly 48 year history of Type 1 Diabetes on a medtronic insulin pump, with long term complications including CAD s/p CABG, gastroparesis, retinopathy, nephropathy and osteoporosis admitted with a right tibia fracture after a fall. A1C two months ago was 8.7%. Per clinical staff, her insulin pump was continued the first two days of hospital admission but stopped yesterday as she experienced fasting hypoglycemia.   Her BG has risen to over 300 since her pump has stopped and only correctional insulin has been ordered. She is not alert enough at this time for independent insulin pump therapy nor is the pump at bedside for us to review PTA pump use. Given limited history if insulin use, would start renal dose basal/bolus/correctional insulin and adjust to target an inpatient glucose of 140-180mg/dl. If the pump is available for review and her mental status improves, we are happy to assist with insulin pump use. Recommendations   1. POC glucose ACHS    2. Consistent carbohydrate diet (60 grams CHO/meal)    3. A1C add on (or send with the next set of labs)    4. Start the subcutaneous Insulin Order set (5309): Insulin Dosing Specific recommendation   Basal                                      (Based on weight, BMI & GFR) [x] (Moderate/Renal dose) 0.3 units/kg/D: 15 units NPH Q12    Nutritional                                      (Based on CHO/dextrose load) [x] Normal sensitivity: Start with 5 units Humalog/meal    Hold if patient consumes less than 50% of carbohydrates on meal tray    Corrective                                       (Useful in adjusting insulin dosing) [x] Normal sensitivity: ACHS        Happy to help assist with transitioning back to insulin pump- if the pump comes back to the hospital- would prefer that we evaluate settings and previous pump use prior to re-starting. Will need to be alert and independent in managing her pump. She will also need back up insulin transfusion sets and supplies at bedside. Discharge Recommendations   1. Phil Ang with endocrinologist scheduled for 6/16/22 with Gonzalez Shepard MD at 11:30am    2. Insulin: TBD  Billing Code(s)   [x] [id number]     Before making these care recommendations, I personally reviewed the hospitalization record, including notes, laboratory & diagnostic data and current medications, and examined the patient at the bedside (circumstances permitting) before making care recommendations.  More than fifty (50) percent of the time was spent in patient counseling and/or care coordination.   Total minutes: 48    Sheyla Walker CNS  Diabetes Clinical Nurse Specialist  [facility]  Access via [employer]

## 2022-04-05 NOTE — PROGRESS NOTES
Bedside and Verbal shift change report given to [address]  (oncoming nurse) by Pat Wright RN  (offgoing nurse). Report included the following information SBAR, Kardex and OR Summary. Patient alert and responsive during shift change no distress noted     0350  stephenie alert and responsive pt notes no signs of distress during shift pt tolerates bed bath will continue to assess patient. Bedside shift change report given to RN (oncoming nurse) by  Stanislav Fox RN  (offgoing nurse). Report included the following information SBAR and Kardex.

## 2022-04-05 NOTE — PROGRESS NOTES
Disposition pending. SNF is the potential plan. CM attempted to see the patient again today to do an assessment. The patient is lethargic and unable to answer questions. CM called and left 2nd message with the listed sister, Roland Escobar ([phone]). CM spoke with the hospitalist NP about this patient. SNF list left with the patient. CM will continue to follow.      John Moralez RN/CRM

## 2022-04-06 LAB
ALBUMIN SERPL-MCNC: 2.3 G/DL (ref 3.5–5)
ALBUMIN/GLOB SERPL: 0.5 {RATIO} (ref 1.1–2.2)
ALP SERPL-CCNC: 62 U/L (ref 45–117)
ALT SERPL-CCNC: 8 U/L (ref 12–78)
ANION GAP SERPL CALC-SCNC: 5 MMOL/L (ref 5–15)
AST SERPL-CCNC: 15 U/L (ref 15–37)
ATRIAL RATE: 69 BPM
BASOPHILS # BLD: 0 K/UL (ref 0–0.1)
BASOPHILS NFR BLD: 1 % (ref 0–1)
BILIRUB SERPL-MCNC: 0.4 MG/DL (ref 0.2–1)
BUN SERPL-MCNC: 46 MG/DL (ref 6–20)
BUN/CREAT SERPL: 15 (ref 12–20)
CALCIUM SERPL-MCNC: 9.1 MG/DL (ref 8.5–10.1)
CALCULATED P AXIS, ECG09: 59 DEGREES
CALCULATED R AXIS, ECG10: 40 DEGREES
CALCULATED T AXIS, ECG11: 113 DEGREES
CHLORIDE SERPL-SCNC: 110 MMOL/L (ref 97–108)
CO2 SERPL-SCNC: 23 MMOL/L (ref 21–32)
CREAT SERPL-MCNC: 3.05 MG/DL (ref 0.55–1.02)
DIAGNOSIS, 93000: NORMAL
DIFFERENTIAL METHOD BLD: ABNORMAL
EOSINOPHIL # BLD: 0.1 K/UL (ref 0–0.4)
EOSINOPHIL NFR BLD: 2 % (ref 0–7)
ERYTHROCYTE [DISTWIDTH] IN BLOOD BY AUTOMATED COUNT: 12.8 % (ref 11.5–14.5)
EST. AVERAGE GLUCOSE BLD GHB EST-MCNC: 192 MG/DL
GLOBULIN SER CALC-MCNC: 4.4 G/DL (ref 2–4)
GLUCOSE BLD STRIP.AUTO-MCNC: 156 MG/DL (ref 65–117)
GLUCOSE BLD STRIP.AUTO-MCNC: 227 MG/DL (ref 65–117)
GLUCOSE BLD STRIP.AUTO-MCNC: 231 MG/DL (ref 65–117)
GLUCOSE BLD STRIP.AUTO-MCNC: 258 MG/DL (ref 65–117)
GLUCOSE SERPL-MCNC: 162 MG/DL (ref 65–100)
HBA1C MFR BLD: 8.3 % (ref 4–5.6)
HCT VFR BLD AUTO: 31.9 % (ref 35–47)
HGB BLD-MCNC: 10.2 G/DL (ref 11.5–16)
IMM GRANULOCYTES # BLD AUTO: 0 K/UL (ref 0–0.04)
IMM GRANULOCYTES NFR BLD AUTO: 1 % (ref 0–0.5)
IRON SATN MFR SERPL: 16 % (ref 20–50)
IRON SERPL-MCNC: 41 UG/DL (ref 35–150)
LYMPHOCYTES # BLD: 0.8 K/UL (ref 0.8–3.5)
LYMPHOCYTES NFR BLD: 14 % (ref 12–49)
MCH RBC QN AUTO: 33.2 PG (ref 26–34)
MCHC RBC AUTO-ENTMCNC: 32 G/DL (ref 30–36.5)
MCV RBC AUTO: 103.9 FL (ref 80–99)
MONOCYTES # BLD: 0.5 K/UL (ref 0–1)
MONOCYTES NFR BLD: 8 % (ref 5–13)
NEUTS SEG # BLD: 4.4 K/UL (ref 1.8–8)
NEUTS SEG NFR BLD: 74 % (ref 32–75)
NRBC # BLD: 0 K/UL (ref 0–0.01)
NRBC BLD-RTO: 0 PER 100 WBC
P-R INTERVAL, ECG05: 190 MS
PLATELET # BLD AUTO: 148 K/UL (ref 150–400)
PMV BLD AUTO: 11.4 FL (ref 8.9–12.9)
POTASSIUM SERPL-SCNC: 4.8 MMOL/L (ref 3.5–5.1)
PROT SERPL-MCNC: 6.7 G/DL (ref 6.4–8.2)
Q-T INTERVAL, ECG07: 442 MS
QRS DURATION, ECG06: 88 MS
QTC CALCULATION (BEZET), ECG08: 473 MS
RBC # BLD AUTO: 3.07 M/UL (ref 3.8–5.2)
SERVICE CMNT-IMP: ABNORMAL
SODIUM SERPL-SCNC: 138 MMOL/L (ref 136–145)
TIBC SERPL-MCNC: 251 UG/DL (ref 250–450)
VENTRICULAR RATE, ECG03: 69 BPM
WBC # BLD AUTO: 5.9 K/UL (ref 3.6–11)

## 2022-04-06 PROCEDURE — 36415 COLL VENOUS BLD VENIPUNCTURE: CPT

## 2022-04-06 PROCEDURE — 94760 N-INVAS EAR/PLS OXIMETRY 1: CPT

## 2022-04-06 PROCEDURE — 83036 HEMOGLOBIN GLYCOSYLATED A1C: CPT

## 2022-04-06 PROCEDURE — 94640 AIRWAY INHALATION TREATMENT: CPT

## 2022-04-06 PROCEDURE — 74011250636 HC RX REV CODE- 250/636: Performed by: NURSE PRACTITIONER

## 2022-04-06 PROCEDURE — 85025 COMPLETE CBC W/AUTO DIFF WBC: CPT

## 2022-04-06 PROCEDURE — 74011636637 HC RX REV CODE- 636/637: Performed by: HOSPITALIST

## 2022-04-06 PROCEDURE — 74011000250 HC RX REV CODE- 250: Performed by: HOSPITALIST

## 2022-04-06 PROCEDURE — 83540 ASSAY OF IRON: CPT

## 2022-04-06 PROCEDURE — 77010033678 HC OXYGEN DAILY

## 2022-04-06 PROCEDURE — 82962 GLUCOSE BLOOD TEST: CPT

## 2022-04-06 PROCEDURE — 80053 COMPREHEN METABOLIC PANEL: CPT

## 2022-04-06 PROCEDURE — 74011250637 HC RX REV CODE- 250/637: Performed by: HOSPITALIST

## 2022-04-06 PROCEDURE — 97535 SELF CARE MNGMENT TRAINING: CPT

## 2022-04-06 PROCEDURE — 65270000029 HC RM PRIVATE

## 2022-04-06 PROCEDURE — 99233 SBSQ HOSP IP/OBS HIGH 50: CPT | Performed by: CLINICAL NURSE SPECIALIST

## 2022-04-06 PROCEDURE — 74011636637 HC RX REV CODE- 636/637: Performed by: NURSE PRACTITIONER

## 2022-04-06 RX ORDER — INSULIN LISPRO 100 [IU]/ML
INJECTION, SOLUTION INTRAVENOUS; SUBCUTANEOUS
Status: COMPLETED | OUTPATIENT
Start: 2022-04-06 | End: 2022-04-06

## 2022-04-06 RX ADMIN — Medication 5 UNITS: at 13:27

## 2022-04-06 RX ADMIN — DORZOLAMIDE HYDROCHLORIDE 1 DROP: 20 SOLUTION/ DROPS OPHTHALMIC at 22:54

## 2022-04-06 RX ADMIN — PREDNISOLONE ACETATE 1 DROP: 10 SUSPENSION/ DROPS OPHTHALMIC at 10:32

## 2022-04-06 RX ADMIN — PANTOPRAZOLE SODIUM 40 MG: 40 TABLET, DELAYED RELEASE ORAL at 06:45

## 2022-04-06 RX ADMIN — DORZOLAMIDE HYDROCHLORIDE 1 DROP: 20 SOLUTION/ DROPS OPHTHALMIC at 01:34

## 2022-04-06 RX ADMIN — HEPARIN SODIUM 5000 UNITS: 5000 INJECTION INTRAVENOUS; SUBCUTANEOUS at 18:32

## 2022-04-06 RX ADMIN — TIMOLOL MALEATE 1 DROP: 5 SOLUTION/ DROPS OPHTHALMIC at 09:32

## 2022-04-06 RX ADMIN — BUDESONIDE 500 MCG: 0.5 INHALANT RESPIRATORY (INHALATION) at 07:47

## 2022-04-06 RX ADMIN — Medication 3 UNITS: at 00:16

## 2022-04-06 RX ADMIN — Medication 2 UNITS: at 23:28

## 2022-04-06 RX ADMIN — ARFORMOTEROL TARTRATE 15 MCG: 15 SOLUTION RESPIRATORY (INHALATION) at 07:47

## 2022-04-06 RX ADMIN — GABAPENTIN 400 MG: 400 CAPSULE ORAL at 18:32

## 2022-04-06 RX ADMIN — METOPROLOL TARTRATE 50 MG: 50 TABLET, FILM COATED ORAL at 18:32

## 2022-04-06 RX ADMIN — Medication 2 UNITS: at 06:44

## 2022-04-06 RX ADMIN — HEPARIN SODIUM 5000 UNITS: 5000 INJECTION INTRAVENOUS; SUBCUTANEOUS at 07:06

## 2022-04-06 RX ADMIN — Medication 16 UNITS: at 09:27

## 2022-04-06 RX ADMIN — DORZOLAMIDE HYDROCHLORIDE 1 DROP: 20 SOLUTION/ DROPS OPHTHALMIC at 18:33

## 2022-04-06 RX ADMIN — TIMOLOL MALEATE 1 DROP: 5 SOLUTION/ DROPS OPHTHALMIC at 19:34

## 2022-04-06 RX ADMIN — ESCITALOPRAM OXALATE 10 MG: 10 TABLET ORAL at 18:32

## 2022-04-06 RX ADMIN — PREDNISOLONE ACETATE 1 DROP: 10 SUSPENSION/ DROPS OPHTHALMIC at 19:34

## 2022-04-06 RX ADMIN — GABAPENTIN 400 MG: 400 CAPSULE ORAL at 09:27

## 2022-04-06 RX ADMIN — PRAVASTATIN SODIUM 40 MG: 20 TABLET ORAL at 09:27

## 2022-04-06 RX ADMIN — DORZOLAMIDE HYDROCHLORIDE 1 DROP: 20 SOLUTION/ DROPS OPHTHALMIC at 09:31

## 2022-04-06 RX ADMIN — ESCITALOPRAM OXALATE 10 MG: 10 TABLET ORAL at 09:27

## 2022-04-06 RX ADMIN — Medication 5 UNITS: at 18:31

## 2022-04-06 RX ADMIN — HEPARIN SODIUM 5000 UNITS: 5000 INJECTION INTRAVENOUS; SUBCUTANEOUS at 00:17

## 2022-04-06 RX ADMIN — METOPROLOL TARTRATE 50 MG: 50 TABLET, FILM COATED ORAL at 09:27

## 2022-04-06 RX ADMIN — LEVOTHYROXINE SODIUM 175 MCG: 0.15 TABLET ORAL at 06:45

## 2022-04-06 RX ADMIN — HEPARIN SODIUM 5000 UNITS: 5000 INJECTION INTRAVENOUS; SUBCUTANEOUS at 22:59

## 2022-04-06 NOTE — PROGRESS NOTES
Name: Parveen Carmichael MRN: [id number]   : 1950 Hospital: . 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 Patient was first evaluated by our cervice last year. It was recommended to have her followed at our office due to the very high risk for CKD progression and ESRD. Patient has not come to the office. She is legally blind and very limited in her ability to ambulate. · Mild hyperkalemia - resolved. · JAIDEN from volume depletion. GFR is gradually improving  · DM  · HTN - BP is at target      PLAN:   · Continue monitoring GFR  · Avoid nephrotoxic agents. · No need of Retacrit at present. Will check the Fe sats     Subjective/Interval History:   I have reviewed the flowsheet and previous days notes. ROS:A comprehensive review of systems was negative except for that written in the HPI. Objective:   Vital Signs:    Visit Vitals  /72 (BP 1 Location: Right upper arm, BP Patient Position: Lying)   Pulse 63   Temp 98.8 °F (37.1 °C)   Resp 16   Wt 105.3 kg (232 lb 2.3 oz)   SpO2 94%   BMI 39.85 kg/m²       O2 Device: Nasal cannula   O2 Flow Rate (L/min): 2 l/min   Temp (24hrs), Av.7 °F (37.1 °C), Min:98.6 °F (37 °C), Max:98.8 °F (37.1 °C)       Intake/Output:   Last shift:      701 - 1900  In: -   Out: 1000 [Urine:1000]  Last 3 shifts: 1901 -  07  In: -   Out: 7547 [Urine:1825]    Intake/Output Summary (Last 24 hours) at 2022 1433  Last data filed at 2022 1205  Gross per 24 hour   Intake --   Output 2075 ml   Net -2075 ml        Physical Exam:  General:    Alert, cooperative, no distress, appears stated age. Obese. Head:   Normocephalic, without obvious abnormality, atraumatic. Eyes:   Conjunctivae/corneas clear. Blind  Nose:  Nares normal. No drainage or sinus tenderness.   Throat:    Lips, mucosa, and tongue normal.  No Thrush  Neck:  Supple, symmetrical,  no adenopathy, thyroid: non tender    no carotid bruit and no JVD. Lungs:   Clear to auscultation bilaterally. No Wheezing or Rhonchi. No rales. Chest wall:  No tenderness or deformity. No Accessory muscle use. Heart:   Regular rate and rhythm,  no murmur, rub or gallop. Abdomen:   Soft, non-tender. Not distended. Bowel sounds normal. No masses  Extremities: Extremities normal, atraumatic, No cyanosis. 1 + edema. No clubbing  Skin:     Texture, turgor normal. No rashes or lesions. Not Jaundiced  Psych:  fair insight. Not depressed. Not anxious or agitated. Neurologic: Normal strength, Alert and oriented X 3. DATA:  Labs:  Recent Labs     04/06/22  0641 04/05/22  1600 04/05/22  0423 04/05/22  0421 04/05/22  0421 04/04/22  0320 04/04/22  0320    135*  --   --  135*   < > 138   K 4.8 5.4*  --   --  5.3*   < > 4.9   * 106  --   --  108   < > 107   CO2 23 25  --   --  24   < > 28   BUN 46* 47*  --   --  46*   < > 44*   CREA 3.05* 3.50*  --   --  3.55*   < > 3.83*   CA 9.1 8.9 8.3*   < > 8.7   < > 8.4*   ALB 2.3*  --   --   --  2.3*  --  2.4*   PHOS  --   --   --   --  4.6  --   --     < > = values in this interval not displayed. Recent Labs     04/06/22  0641 04/04/22  0320   WBC 5.9 7.7   HGB 10.2* 11.4*   HCT 31.9* 36.9   * 167     No results for input(s): TARAN, KU, CLU, CREAU in the last 72 hours.     No lab exists for component: PROU    Total time spent with patient:  35 minutes    [] Critical Care Provided    Care Plan discussed with:   Staff, Medical Team    Ena Jones MD

## 2022-04-06 NOTE — PROGRESS NOTES
[facility]                                                                                          Hospitalist Progress Note  Magdy García NP  Answering service: [phone] OR 36 from in house phone        Date of Service:  2022  NAME:  Marlen Castanon  :  [birth year]  MRN:  [id number]      Admission Summary:   Per H&P, The patient is a 77-year-old, obese,  female who has previous history significant for coronary artery disease, status post CABG; COPD; type 2 diabetes with gastroparesis, on insulin pump; history of neuropathy; legally blind; history of GERD; hypertension; hyperlipidemia; obstructive sleep apnea; and hypothyroidism, presented to the hospital after having a fall. The patient has frequent falls. This morning she got up around 02:00 a.m. and fell down. She did not lose consciousness, did not hit her head. She denies having any chest pain, any seizure-like activity. The patient was subsequently brought in the emergency room where an x-ray of the right ankle revealed a mildly displaced acute distal tibial fracture with nondisplaced distal fibular fracture, hence it was decided to admit the patient. The patient has no recent chest pain. She says her COPD is well controlled with Trelegy. She is not on home oxygen. The patient is fully COVID vaccinated. Interval history / Subjective:     I saw the patient today on rounds. Patient awake alert oriented and interactive         Assessment & Plan:     Distal tib-fib fracture  S/p fall, fracture around ORIF hardware from previous fracture roughly 1 year ago.   Fracture is around the old hardware but alignment is okay, per Ortho  Orthopedics following, thank you for recommendations  Has been splinted by orthopedics  Holding opiates for now, as patient became lethargic with oxycodone  We will continue with acetaminophen and add 2.5 of oxycodone if needed  Nonweightbearing for 6 weeks fever  Unclear etiology. No leukocytosis, no cough, denies abdominal pain and also denies diarrhea  Radiograph  was unremarkable  Follow-up radiograph was also negative  Urinalysis unremarkable  Blood cultures no growth  Procalcitonin 0.05  Viral panel was all negative  Covid negative  D-dimer was elevated however venous Dopplers were negative  No further fevers          CKD stage III with JAIDEN  Does have a mildly elevated creatinine, has not been drinking adequate amounts  Creatinine baseline is somewhere around 2.2, trending upward in spite of IV fluids. This morning creatinine 3.05  Nephrology following commendations are greatly appreciated  Potassium was elevated yesterday for which I gave a dose of Lokelma  Potassium stable at 4.8 today  Avoiding nephrotoxins                CAD  S/p CABG  Denies chest pain  Continue metoprolol              Hypertension  Blood pressure currently controlled  Continuing metoprolol  In light of JAIDEN holding lisinopril for now                COPD  Not in exacerbation, no wheezing  As needed nebulizers  Continue LABA/ICS        Type 2 diabetes  Blood sugar currently controlled however did have hypoglycemia down to 54   Today blood sugars elevated  Restart insulin pump  Continue SSI  Diabetes management following, appreciate recommendations            Hypothyroid  Stable  Continuing levothyroxine    Obesity  BMI of 39  Consult on healthy dietary choices      Code status: FULL    DVT prophylaxis: UFH    Care Plan discussed with: Patient/Family, Nurse and      Anticipated Disposition: SNF/LTC     Anticipated Discharge: Greater than 48 hours     Hospital Problems  Date Reviewed: 2/4/2022          Codes Class Noted POA    Tibia fracture ICD-10-CM: S82.209A  ICD-9-CM: 823.80  4/1/2022 Unknown                Review of Systems:   A comprehensive review of systems was negative except for that written in the HPI.        Vital Signs:    Last 24hrs VS reviewed since prior progress note. Most recent are:  Visit Vitals  /72 (BP 1 Location: Right upper arm, BP Patient Position: Lying)   Pulse 63   Temp 98.8 °F (37.1 °C)   Resp 16   Wt 105.3 kg (232 lb 2.3 oz)   SpO2 94%   BMI 39.85 kg/m²         Intake/Output Summary (Last 24 hours) at 4/6/2022 1321  Last data filed at 4/6/2022 1205  Gross per 24 hour   Intake --   Output 2075 ml   Net -2075 ml        Physical Examination:     I had a face to face encounter with this patient and independently examined them on 4/6/2022 as outlined below:          Constitutional:  No acute distress, cooperative, pleasant    ENT:  Oral mucosa moist, oropharynx benign. Resp:  CTA bilaterally. No wheezing/rhonchi/rales. No accessory muscle use   CV:  Regular rhythm, normal rate, no murmurs, gallops, rubs    GI:  Soft, non distended, non tender. normoactive bowel sounds, no hepatosplenomegaly     Musculoskeletal:  No edema, warm, 2+ pulses throughout, splint intact, able to wiggle toes, toes warm with brisk cap refill    Neurologic:  Moves all extremities. AAOx3, CN II-XII reviewed            Data Review:    Review and/or order of clinical lab test  Review and/or order of tests in the radiology section of CPT  I personally reviewed  Image      Labs:     Recent Labs     04/06/22  0641 04/04/22  0320   WBC 5.9 7.7   HGB 10.2* 11.4*   HCT 31.9* 36.9   * 167     Recent Labs     04/06/22  0641 04/05/22  1600 04/05/22  0423 04/05/22  0421 04/05/22  0421    135*  --   --  135*   K 4.8 5.4*  --   --  5.3*   * 106  --   --  108   CO2 23 25  --   --  24   BUN 46* 47*  --   --  46*   CREA 3.05* 3.50*  --   --  3.55*   * 337*  --   --  303*   CA 9.1 8.9 8.3*   < > 8.7   PHOS  --   --   --   --  4.6    < > = values in this interval not displayed.      Recent Labs     04/06/22  0641 04/05/22  0421 04/04/22  0320   ALT 8*  --  12   AP 62  --  69   TBILI 0.4  --  0.3   TP 6.7  --  6.8   ALB 2.3* 2.3* 2.4*   GLOB 4.4*  --  4.4*     No results for input(s): INR, PTP, APTT, INREXT, INREXT in the last 72 hours. No results for input(s): FE, TIBC, PSAT, FERR in the last 72 hours. No results found for: FOL, RBCF   No results for input(s): PH, PCO2, PO2 in the last 72 hours. No results for input(s): CPK, CKNDX, TROIQ in the last 72 hours.     No lab exists for component: CPKMB  Lab Results   Component Value Date/Time    Cholesterol, total 220 (H) 02/04/2022 11:44 AM    HDL Cholesterol 48 02/04/2022 11:44 AM    LDL, calculated 108.8 (H) 02/04/2022 11:44 AM    Triglyceride 316 (H) 02/04/2022 11:44 AM    CHOL/HDL Ratio 4.6 02/04/2022 11:44 AM     Lab Results   Component Value Date/Time    Glucose (POC) 231 (H) 04/06/2022 11:45 AM    Glucose (POC) 156 (H) 04/06/2022 06:17 AM    Glucose (POC) 233 (H) 04/05/2022 10:52 PM    Glucose (POC) 327 (H) 04/05/2022 04:24 PM    Glucose (POC) 319 (H) 04/05/2022 11:33 AM     Lab Results   Component Value Date/Time    Color YELLOW/STRAW 04/04/2022 11:38 AM    Appearance TURBID (A) 04/04/2022 11:38 AM    Specific gravity 1.015 04/04/2022 11:38 AM    pH (UA) 5.0 04/04/2022 11:38 AM    Protein 300 (A) 04/04/2022 11:38 AM    Glucose 100 (A) 04/04/2022 11:38 AM    Ketone Negative 04/04/2022 11:38 AM    Bilirubin Negative 04/04/2022 11:38 AM    Urobilinogen 0.2 04/04/2022 11:38 AM    Nitrites Negative 04/04/2022 11:38 AM    Leukocyte Esterase Negative 04/04/2022 11:38 AM    Epithelial cells MODERATE (A) 04/04/2022 11:38 AM    Bacteria Negative 04/04/2022 11:38 AM    WBC 5-10 04/04/2022 11:38 AM    RBC 0-5 04/04/2022 11:38 AM         Medications Reviewed:     Current Facility-Administered Medications   Medication Dose Route Frequency    insulin lispro (HUMALOG) injection   SubCUTAneous AC&HS    insulin pump (PATIENT SUPPLIED)   SubCUTAneous PRN    dextrose 10 % infusion 0-250 mL  0-250 mL IntraVENous PRN    heparin (porcine) injection 5,000 Units  5,000 Units SubCUTAneous Q8H    dextrose 10 % infusion 0-250 mL  0-250 mL IntraVENous PRN    insulin lispro (HUMALOG) injection 5 Units  0.05 Units/kg SubCUTAneous TID WITH MEALS    albuterol-ipratropium (DUO-NEB) 2.5 MG-0.5 MG/3 ML  3 mL Nebulization Q6H PRN    glucose chewable tablet 16 g  4 Tablet Oral PRN    glucagon (GLUCAGEN) injection 1 mg  1 mg IntraMUSCular PRN    acetaminophen (TYLENOL) tablet 650 mg  650 mg Oral Q6H PRN    hydrALAZINE (APRESOLINE) 20 mg/mL injection 10 mg  10 mg IntraVENous Q6H PRN    escitalopram oxalate (LEXAPRO) tablet 10 mg  10 mg Oral BID    levothyroxine (SYNTHROID) tablet 175 mcg  175 mcg Oral ACB    [Held by provider] lisinopriL (PRINIVIL, ZESTRIL) tablet 10 mg  10 mg Oral BID    metoprolol tartrate (LOPRESSOR) tablet 50 mg  50 mg Oral BID    pantoprazole (PROTONIX) tablet 40 mg  40 mg Oral ACB    pravastatin (PRAVACHOL) tablet 40 mg  40 mg Oral DAILY    prednisoLONE acetate (PRED FORTE) 1 % ophthalmic suspension 1 Drop  1 Drop Right Eye BID    gabapentin (NEURONTIN) capsule 400 mg  400 mg Oral BID    traMADoL (ULTRAM) tablet 50 mg  50 mg Oral Q6H PRN    [Held by provider] HYDROcodone-acetaminophen (NORCO) 5-325 mg per tablet 1 Tablet  1 Tablet Oral Q6H PRN    arformoteroL (BROVANA) neb solution 15 mcg  15 mcg Nebulization BID RT    And    budesonide (PULMICORT) 500 mcg/2 ml nebulizer suspension  500 mcg Nebulization BID RT    dorzolamide (TRUSOPT) 2 % ophthalmic solution 1 Drop  1 Drop Left Eye TID    And    timolol (TIMOPTIC) 0.5 % ophthalmic solution 1 Drop  1 Drop Left Eye BID     ______________________________________________________________________  EXPECTED LENGTH OF STAY: 3d 0h  ACTUAL LENGTH OF STAY:          Ted Myrick NP

## 2022-04-06 NOTE — PROGRESS NOTES
Spiritual Care Assessment/Progress Note  [facility]      NAME: Harvey Ash      MRN: [id number]  AGE: 70 y.o. SEX: female  Yarsanism Affiliation: Spiritism   Language: English     4/6/2022     Total Time (in minutes): 19     Spiritual Assessment begun in [facility] through conversation with:         [x]Patient        [x] Family    [] Friend(s)        Reason for Consult: Initial/Spiritual assessment, patient floor     Spiritual beliefs: (Please include comment if needed)     [x] Identifies with a danielle tradition:   Yazidism       [x] Supported by a danielle community:    [employer]        [] Claims no spiritual orientation:           [] Seeking spiritual identity:                [] Adheres to an individual form of spirituality:           [] Not able to assess:                           Identified resources for coping:      [x] Prayer                               [] Music                  [] Guided Imagery     [x] Family/friends                 [] Pet visits     [] Devotional reading                         [] Unknown     [] Other:                                           Interventions offered during this visit: (See comments for more details)    Patient Interventions: Affirmation of emotions/emotional suffering,Affirmation of danielle,Iconic (affirming the presence of God/Higher Power),Initial/Spiritual assessment, patient floor,Prayer (actual),Prayer (assurance of)     Family/Friend(s):  Affirmation of emotions/emotional suffering,Affirmation of danielle,Prayer (assurance of),Prayer (actual),Iconic (affirming the presence of God/Higher Power)     Plan of Care:     [] Support spiritual and/or cultural needs    [] Support AMD and/or advance care planning process      [] Support grieving process   [] Coordinate Rites and/or Rituals    [] Coordination with community clergy   [] No spiritual needs identified at this time   [] Detailed Plan of Care below (See Comments)  [] Make referral to Music Therapy  [] Make referral to Pet Therapy     [] Make referral to Addiction services  [] Make referral to [facility]  [] Make referral to Spiritual Care Partner  [] No future visits requested        [x] Contact Spiritual Care for further referrals     Comments: Initial spiritual assessment in [facility]. Reviewed chart prior to visit. Miss Tony Tao and a good friend Hipolito Malcolm were in the room. Eleazar Clement shared they attend [facility] [employer] [facility] [employer]. She shared they are looking for patient Gonzalo Erinn to go to rehab. They have good support from Eliza's sister and while they have not been to [employer] much recently they watch Dr Lynda Cruz on TV. Provided spiritual presence and prayer at their request.  Contact Spiritual Care for any further referrals.   Amberly Albarran., MS., [phone] ([phone])

## 2022-04-06 NOTE — DIABETES MGMT
[address] [facility]    CLINICAL NURSE SPECIALIST CONSULT     Initial Presentation   Live Marquez is a 70 y.o. female who presented to the ED 4/1/22 via EMS with right ankle pain and deformity following a fall. X-ray of the right ankle shows previous ORIF with mildly displaced acute distal tibial fracture and possible nondisplaced distal fibular fracture. HX:   Past Medical History:   Diagnosis Date    Adverse effect of anesthesia     SLOW WAKING PAST ANESTHESIA    Anxiety     Arthritis     CAD (coronary artery disease)     s/p CABG x 3v    Chest pain 7/2015    Chronic obstructive pulmonary disease (HCC)     CTS (carpal tunnel syndrome)     S/p bilateral release    Diabetes ([address]     Diabetic gastroparesis ([address]     Diabetic neuropathy ([address]     Diabetic retinopathy ([address]     GERD (gastroesophageal reflux disease)     GI bleed 7/2015    4 bleeds with 9 clips placed    Hypercholesteremia     Hypertension     Hypothyroid     Ill-defined condition     NEUROPATHY HANDS AND FEET    Ill-defined condition 2017    GI BLEED    Nicotine vapor product user     OJHN on CPAP     Osteoporosis     Vitamin D deficiency         INITIAL DX:   Tibia fracture [S82.209A]     Current Treatment     TX: Specialty consultation: Ortho and nephrology    Consulted by Leah Lynne NP for advanced diabetes nursing assessment and care for:   [x] Inpatient management strategy    Hospital Course   Clinical progress has been uncomplicated. 4/1: Admission. Seen by ortho: alignment ok and will be non-weight bearing x6 wks  4/4: Fevers, elevated creat. CXR without acute change. Cx sent. Seen by nephrology for elevated creat. No inpatient adjustments- needs outpatient FU  Diabetes History   Type 1 Diabetes- Diagnosed at age 16  Ambulatory BG management provided by: Endocrinology- Muriel Bains MD- Last visit 2/4/22  Family history positive for diabetes:  Mother and sister with DM2    Diabetes-related Medical History  Acute complications  None   Neurological complications  Gastroparesis and Peripheral neuropathy  Microvascular disease  Retinopathy and Nephropathy  Macrovascular disease  CAD  Other associated conditions     Osteoporosis and frequent falls    Diabetes Medication History  Key Antihyperglycemic Medications             insulin aspart U-100 (NovoLOG U-100 Insulin aspart) 100 unit/mL injection (Taking) INJECT A MAX  UNITS UNDER THE SKIN DAILY WITH INSULIN PUMP         Basal   MN-630AM: 2.65  630AM-4PM: 2.50  4PM-MN: 2.55  TDD: 61.375 units    Carb Ratio  1:4    Correction Factor  1:25    Target  120    Active Insulin Time  3    Diabetes self-management practices:   Eating pattern: Too drowsy to discuss      Physical activity pattern  using a walker at home  Monitoring pattern: Too drowsy to discuss. Per last note with endo in Feb fasting BG is in the 200s and evening BG is 180-200s  Taking medications pattern  [x] Consistent administration: Not able to assess    Overall evaluation:    [x] Not achieving A1c target with drug therapy & self-care practices    Subjective   I want to go home\"     Has a life partner  Former smoker- quit 7 yrs ago  Frequent falls  Relocated to [place] from [place] to be closer to sister  15 mos ago had previous R Ankle ORIF   Sees optho: She tells me that they recommended removal of R eye for prosthetic eye  Objective   Physical exam  General Obese white female in no acute distress/ill-appearing. Conversant and cooperative  Neuro  Awake, alert, oriented. Vital Signs   Visit Vitals  /72 (BP 1 Location: Right upper arm, BP Patient Position: Lying)   Pulse 63   Temp 98.8 °F (37.1 °C)   Resp 16   Wt 105.3 kg (232 lb 2.3 oz)   SpO2 94%   BMI 39.85 kg/m²     Skin  Warm and dry. No acanthosis noted along neckline. No lipohypertrophy or lipoatrophy noted at injection sites   Heart   Regular rate and rhythm.  No murmurs, rubs or gallops  Lungs  Clear to auscultation without rales or rhonchi  Extremities R foot in surgical wrap    Laboratory  Recent Labs     22  0641 22  1600 22  0421 22  0320 22  0320   * 337* 303*   < > 66   AGAP 5 4* 3*   < > 3*   WBC 5.9  --   --   --  7.7   CREA 3.05* 3.50* 3.55*   < > 3.83*   GFRNA 15* 13* 13*   < > 12*   AST 15  --   --   --  24   ALT 8*  --   --   --  12    < > = values in this interval not displayed. Factors impacting BG management  Factor Dose Comments   Nutrition:  Standard meals     60 grams/meal      Pain R ankle pain    Other:   Kidney function   GFR 13      Blood glucose pattern      Significant diabetes-related events over the past 24-72 hours  A1C 8.7% in February   Was on insulin pump on admission, nursing staff thinks that it was taken off yesterday or day before? No pump at bedside. Abhinav Sparks tells me she thinks that Antony took it home\". -238 the first two days of admission. Fasting hypoglycemia at 66 4/4    Fasting BG this am: 156  Pre-prandial B-337  Basal: 16 units NPH Q12  Bolus: 5 units with meals- dose held this am  Correctional: 12 units in the last 24h  Wants to get back on her insulin pump    Assessment and Plan   Nursing Diagnosis Risk for unstable blood glucose pattern   Nursing Intervention Domain 5541 Decision-making Support   Nursing Interventions Examined current inpatient diabetes/blood glucose control   Explored factors facilitating and impeding inpatient management  Explored corrective strategies with patient and responsible inpatient provider   Informed patient of rational for insulin strategy while hospitalized     Evaluation   Vicky Rouse is a 70year old female, with a nearly 48 year history of Type 1 Diabetes on a medtronic insulin pump, with long term complications including CAD s/p CABG, gastroparesis, retinopathy, nephropathy and osteoporosis admitted with a right tibia fracture after a fall. A1C two months ago was 8.7%.   Her insulin pump was continued the first two days of hospital admission but stopped 4/4 as she experienced fasting hypoglycemia. Her BG emelia to over 300 since her pump has stopped and only correctional insulin was ordered. Given limited history if insulin use at that time, renal dose basal/bolus/correctional insulin was started. Her fasting BG was 153 but 231 after eating breakfast without bolus insulin. She does want to transition back to her insulin pump and this is reasonable as she is much more alert and oriented today. She does have a higher PTA basal dose that she may need- if her fasting BG dips below goal, we can adjust to a temporary basal rate while here in the hospital.    Recommendations   1. POC glucose ACHS    2. Consistent carbohydrate diet (60 grams CHO/meal)    3. A1C add on (or send with the next set of labs)    4. Continue the subcutaneous Insulin Order set (1554) until 6pm tonight  Insulin Dosing Specific recommendation   Basal                                      (Based on weight, BMI & GFR) [x] (Moderate/Renal dose) 0.3 units/kg/D: 15 units NPH Q12    Nutritional                                      (Based on CHO/dextrose load) [x] Normal sensitivity: 5 units Humalog/meal    Hold if patient consumes less than 50% of carbohydrates on meal tray    Corrective                                       (Useful in adjusting insulin dosing) [x] Normal sensitivity: ACHS        Please have her resume insulin pump therapy at 6pm tonight at PTA settings. 1. Patient will bolus off insulin pump ACHS for correctional insulin and with all carbohydrate intake   2. No additional SQ insulin will be given while on insulin pump  3. Nursing: in flowsheets- add LDA for continuous subcutaneous insulin infusion. Please assess site Q12 hrs. There is a column for RN to enter the amount of insulin bolused with each administration    Discharge Recommendations   1.  Preston Mckeon with endocrinologist scheduled for 6/16/22 with Alisia Crenshaw MD at 11:30am    2. Insulin: TBD  Billing Code(s)   [x] [id number]     Before making these care recommendations, I personally reviewed the hospitalization record, including notes, laboratory & diagnostic data and current medications, and examined the patient at the bedside (circumstances permitting) before making care recommendations. More than fifty (50) percent of the time was spent in patient counseling and/or care coordination.   Total minutes: 48    KIMBERLI Ingram  Diabetes Clinical Nurse Specialist  [facility]  Access via [employer]

## 2022-04-06 NOTE — PROGRESS NOTES
Problem: Self Care Deficits Care Plan (Adult)  Goal: *Acute Goals and Plan of Care (Insert Text)  Description: FUNCTIONAL STATUS PRIOR TO ADMISSION: Pt was using wheelchair and RW for functional mobility at home. Required assistance for LB dressing and roommate provided assistance PRN. Pt is legally blind. HOME SUPPORT: The patient lived with roommate who assisted PRN. Occupational Therapy Goals  Initiated 4/5/2022  1. Patient will perform grooming at EOB with supervision/set-up within 7 day(s). 2.  Patient will perform bathing from anterior neck to thigh with supervision/set-up within 7 day(s). 3.  Patient will perform lower body dressing with maximal assistance within 7 day(s). 4.  Patient will perform toilet transfers with moderate assistance  within 7 day(s). 5.  Patient will perform all aspects of toileting with maximal assistance within 7 day(s). 6.  Patient will participate in upper extremity therapeutic exercise/activities with modified independence for 10 minutes within 7 day(s). 7.  Patient will utilize energy conservation techniques during functional activities with verbal cues within 7 day(s). Outcome: Not Met    OCCUPATIONAL THERAPY TREATMENT  [facility]76 [facility]  Date: 4/6/2022  Diagnosis: Tibia fracture [S82.209A] <principal problem not specified>       Precautions: NWB  Chart, occupational therapy assessment, plan of care, and goals were reviewed. ASSESSMENT  Patient continues with skilled OT services and is not progressing towards goals. Although drowsy, pt pleasant during skilled OT, with good engagement when pt presented simple directive cues, with pt requiring Total A for bed level toileting, with c/o RLE pain with movement (did not quantify; RN aware and following). Pt continues to benefit from skilled OT to address functional deficits during acute hospitalization, with reporting therapist believing pt would benefit from SNF rehab upon discharge. Current Level of Function Impacting Discharge (ADLs): Total A    Other factors to consider for discharge: Total A         PLAN :  Patient continues to benefit from skilled intervention to address the above impairments. Continue treatment per established plan of care to address goals. Recommend with staff: Frequent positional changes, Bed level ADL engagement    Recommend next OT session: POC progression    Recommendation for discharge: (in order for the patient to meet his/her long term goals)  Therapy up to 5 days/week in SNF setting    This discharge recommendation:  Has been made in collaboration with the attending provider and/or case management    IF patient discharges home will need the following DME: TBD       SUBJECTIVE:   Patient stated thanks chaya, I hope you come back tomorrow.     OBJECTIVE DATA SUMMARY:   Cognitive/Behavioral Status:  Neurologic State: Drowsy  Orientation Level: Oriented to person;Oriented to place; Disoriented to situation;Disoriented to time  Cognition: Follows commands  Perception: Appears intact  Perseveration: No perseveration noted  Safety/Judgement: Awareness of environment    Functional Mobility and Transfers for ADLs:  Bed Mobility:  Rolling: Total assistance    ADL Intervention:  Toileting  Toileting Assistance: Total assistance(dependent)  Bowel Hygiene:  Total assistance (dependent)  Clothing Management: Total assistance (dependent)  Cues: Verbal cues provided (Max cues for task engagement)  Adaptive Equipment:  (bed rails/bedpan)    Cognitive Retraining  Safety/Judgement: Awareness of environment    Pain:  High c/o RLE pain with movement, did not quantify; RN aware and following    Activity Tolerance:   Fair, Poor, and requires frequent rest breaks    After treatment patient left in no apparent distress:   Supine in bed, Call bell within reach, Caregiver / family present, and Side rails x 3    COMMUNICATION/COLLABORATION:   The patients plan of care was discussed with: Registered nurse and Case management.      Regina Pelletier, OT  Time Calculation: 16 mins

## 2022-04-06 NOTE — PROGRESS NOTES
KRIS: SNF plan pending. Referrals pending with [facility] and [place] [address]. Insurance Jai Cage will need to be obtained from [place] Snyder was initiated through [facility] today, reference [id number]. Noted negative PCR covid test 4/5. BLS to transport at discharge. Care Management Interventions  PCP Verified by CM: Yes  Mode of Transport at Discharge: BLS  Transition of Care Consult (CM Consult): Discharge Planning,SNF  MyChart Signup: No  Discharge Durable Medical Equipment: No  Physical Therapy Consult: Yes  Occupational Therapy Consult: Yes  Support Systems: Spouse/Significant Other  Confirm Follow Up Transport: Other (see comment) (BLS)  The Patient and/or Patient Representative was Provided with a Choice of Provider and Agrees with the Discharge Plan?: Yes  Discharge Location  Patient Expects to be Discharged to[de-identified] Skilled nursing facility     Chart reviewed. CM met with patient and life partner Brie Sloan B t bedside. Patient lives at home in an apartment with Brie Sloan, with 13 stairs to enter. Brie Nathalia assists with ADLs as needed. Patient owns wheelchair and rolling walker. CM discussed SNF rehab and offered freedom of choice. The Plan for Transition of Care is related to the following treatment goals: SNF    The Patient and/or patient representative, life partner, Brie Sloan was provided with a choice of provider and agrees   with the discharge plan. [x] Yes [] No    Freedom of choice list was provided with basic dialogue that supports the patient's individualized plan of care/goals, treatment preferences and shares the quality data associated with the providers. [x] Yes [] No     [facility] denied patient as they are out of network. CM called [facility] to initiate insurance auth.     The following information was submitted to [facility] for initial authorization:  Face Sheet/Demographics    (Include: Usual living situation)  History and Physical  Last 24 hours of MD and RN notes   (Include: Current Level of Functioning; Subjective





- Review of Systems


Events since last encounter: 





in no distress 





Objective





- Results


Result Diagrams: 


 18 07:57





 18 07:57


Recent Labs: 


 Laboratory Last Values











WBC  8.1 Th/cmm (4.8-10.8)   18  07:57    


 


RBC  4.54 Mil/cmm (4.30-5.70)   18  07:57    


 


Hgb  12.5 gm/dL (12-16)   18  07:57    


 


Hct  35.8 % (41.0-60)  L  18  07:57    


 


MCV  78.8 fl (80-99)  L  18  07:57    


 


MCH  27.5 pg (26.0-30.0)   18  07:57    


 


MCHC Differential  34.9 pg (28.0-36.0)   18  07:57    


 


RDW  13.1 % (11.5-20.0)   18  07:57    


 


Plt Count  322 Th/cmm (150-400)   18  07:57    


 


MPV  7.4 fl  18  07:57    


 


Add Manual Diff  YES   18  08:04    


 


Neutrophils %  63.6 % (40.0-80.0)   18  07:57    


 


Band Neutrophils %  1 % (0-10)   18  08:04    


 


Lymphocytes %  23.7 % (20.0-50.0)   18  07:57    


 


Monocytes %  10.4 % (2.0-10.0)  H  18  07:57    


 


Eosinophils %  1.5 % (0.0-5.0)   18  07:57    


 


Basophils %  0.8 % (0.0-2.0)   18  07:57    


 


Neutrophils (Manual)  65 % (40-80)   18  08:04    


 


Lymphocytes  27 % (20-50)   18  08:04    


 


Monocytes  6 % (2-10)   18  08:04    


 


Eosinophils  1 % (0-5)   18  08:04    


 


Basophils  0 % (0-3)   18  08:04    


 


D-Dimer  < 100 ng/mL (100-400)  L  18  23:40    


 


Sodium  135 mEq/L (136-145)  L  18  07:57    


 


Potassium  3.8 mEq/L (3.5-5.1)   18  07:57    


 


Chloride  100 mEq/L ()   18  07:57    


 


Carbon Dioxide  25.2 mEq/L (21.0-31.0)   18  07:57    


 


Anion Gap  13.6  (7.0-16.0)   18  07:57    


 


BUN  14 mg/dL (7-25)   18  07:57    


 


Creatinine  0.7 mg/dL (0.7-1.3)   18  07:57    


 


Est GFR ( Amer)  > 60.0 ml/min (>90)   18  07:57    


 


Est GFR (Non-Af Amer)  > 60.0 ml/min  18  07:57    


 


BUN/Creatinine Ratio  20.0   18  07:57    


 


Glucose  312 mg/dL ()  H  18  07:57    


 


POC Glucose  326 MG/DL (70 - 105)  H  18  20:36    


 


Hemoglobin A1c %  10.1 % (4.0-6.0)  H  18  23:40    


 


Uric Acid  4.2 mg/dL (4.4-7.6)  L  18  23:40    


 


Calcium  9.1 mg/dL (8.6-10.3)   18  07:57    


 


Phosphorus  3.3 mg/dL (2.5-5.0)   18  23:40    


 


Magnesium  1.7 mg/dL (1.9-2.7)  L  18  23:40    


 


Total Bilirubin  0.2 mg/dL (0.3-1.0)  L  18  07:57    


 


AST  44 U/L (13-39)  H  18  07:57    


 


ALT  33 U/L (7-52)   18  07:57    


 


Alkaline Phosphatase  68 U/L ()   18  07:57    


 


Troponin I  0.01 ng/mL (0.01-0.05)   18  01:10    


 


B-Natriuretic Peptide  < 5.0 pg/mL (5.0-100.0)  L  18  08:20    


 


Total Protein  6.6 gm/dL (6.0-8.3)   18  07:57    


 


Albumin  4.0 gm/dL (4.2-5.5)  L  18  07:57    


 


Globulin  2.6 gm/dL  18  07:57    


 


Albumin/Globulin Ratio  1.5  (1.0-1.8)   18  07:57    


 


Triglycerides  1016 mg/dL (<150)  H  18  08:20    


 


Cholesterol  193 mg/dL (<200)   18  08:20    


 


LDL Cholesterol Direct  69 mg/dL ()  L  18  08:20    


 


HDL Cholesterol  20 mg/dL (23-92)  L  18  08:20    


 


TSH  1.91 uIU/ml (0.34-5.60)   18  08:20    


 


Vancomycin Trough  12.7 ug/mL (5-10)  H  18  07:57    














- Physical Exam


Vitals and I&O: 


 Vital Signs











Temp  98.0 F   18 11:47


 


Pulse  85   18 11:47


 


Resp  18   18 11:47


 


BP  162/87   18 11:47


 


Pulse Ox  96   18 11:47








 Intake & Output











 18





 18:59 06:59 18:59


 


Intake Total 1850 500 


 


Output Total 1600  


 


Balance 250 500 


 


Weight (lbs) 124.738 kg 121.018 kg 120.656 kg


 


Intake:   


 


  Intake, IV Amount 500 500 


 


    Vancomycin HCl 1.5 gm In 500 500 





    Sodium Chloride 0.9% 500   





    ml @ 250 mls/hr IV Q8H   





    [facility] Rx#:[id number]   


 


  Oral 1350  


 


Output:   


 


  Urine 1600  


 


Other:   


 


  # Bowel Movements 2  


 


  Stool Characteristics Soft Soft 


 


  Weight Source Bedscale Bedscale Patient stated











Active Medications: 


Current Medications





Acetaminophen (Tylenol)  650 mg PO Q4H PRN


   PRN Reason: PAIN OR FEVER >100.4


   Stop: 10/20/18 14:36


Acetaminophen/Hydrocodone Bitart (Norco 5mg/325mg)  1 tab PO Q6H PRN


   PRN Reason: mild pain


   Stop: 10/20/18 14:41


   Last Admin: 18 09:37 Dose:  1 tab


Albuterol Sulfate (Albuterol 2.5mg/3ml Neb Ud)  2.5 mg HHN Q4HRT PRN


   PRN Reason: Shortness of Breath


   Stop: 10/20/18 14:36


Aspirin (Aspirin Chewable)  81 mg PO DAILY [facility]


   Stop: 10/21/18 08:59


   Last Admin: 18 09:14 Dose:  81 mg


Benzocaine/Menthol (Cepacol)  1 josiah MM Q4HR PRN


   PRN Reason: Sore Throat


   Stop: 10/20/18 14:36


Bisacodyl (Dulcolax 10 Mg Supp)  10 mg RC DAILY PRN


   PRN Reason: Constipation


   Stop: 10/20/18 14:36


Camphor/Menthol (Bengay Greaseless 10%-15%)  1 appl TP Q4H PRN


   PRN Reason: Pain (Mild)


   Stop: 10/20/18 14:36


Clopidogrel Bisulfate (Plavix)  75 mg PO DAILY [facility]


   Stop: 10/21/18 08:59


   Last Admin: 18 09:12 Dose:  75 mg


Docusate Sodium (Colace)  100 mg PO DAILY PRN


   PRN Reason: Constipation


   Stop: 10/20/18 14:36


Famotidine (Pepcid)  20 mg PO BID [facility]


   Stop: 10/20/18 16:59


   Last Admin: 18 09:13 Dose:  20 mg


Furosemide (Lasix)  20 mg PO DAILY [facility]


   Stop: 10/21/18 08:59


   Last Admin: 18 09:08 Dose:  20 mg


Gabapentin (Neurontin)  600 mg PO BID [facility]


   Stop: 10/20/18 16:59


   Last Admin: 18 09:12 Dose:  600 mg


Gemfibrozil (Lopid)  600 mg PO BIDAC [facility]


   Stop: 10/20/18 16:29


   Last Admin: 18 16:32 Dose:  600 mg


Glucagon (Glucagen)  1 mg IM PRN PRN


   PRN Reason: IF BS <70


   Stop: 10/20/18 14:36


Hydralazine HCl (Apresoline)  50 mg PO TID [facility]


   Stop: 10/20/18 20:59


   Last Admin: 18 09:38 Dose:  50 mg


Vancomycin HCl 1.5 gm/ Sodium (Chloride)  500 mls @ 250 mls/hr IV Q8H [facility]


   Stop: 10/21/18 16:59


   Last Admin: 18 09:41 Dose:  250 mls/hr


Insulin Aspart (Novolog Insulin Sliding Scale)  0 units SUBQ ACHS [facility]; Protocol


   Stop: 10/20/18 16:29


   Last Admin: 18 06:46 Dose:  6 units


Insulin Detemir (Levemir Insulin)  20 units SUBQ QAM [facility]; Protocol


   Stop: 10/21/18 08:59


   Last Admin: 18 09:21 Dose:  20 units


Insulin Detemir (Levemir Insulin)  35 units SUBQ HS [facility]; Protocol


   Stop: 10/20/18 20:59


   Last Admin: 18 20:44 Dose:  35 units


Lactobacillus Rhamnosus (Culturelle 15b)  1 each PO DAILY [facility]


   Stop: 10/21/18 08:59


   Last Admin: 18 09:12 Dose:  1 each


Lactulose (Cephulac)  20 gm PO TID PRN


   PRN Reason: Constipation


   Stop: 10/20/18 15:17


Losartan Potassium (Cozaar)  100 mg PO DAILY [facility]


   Stop: 10/21/18 08:59


   Last Admin: 18 09:12 Dose:  100 mg


Magnesium Hydroxide (Milk Of Magnesia)  30 ml PO DAILY PRN


   PRN Reason: Constipation


   Stop: 10/20/18 14:36


Metformin HCl (Glucophage)  850 mg PO BID [facility]


   Stop: 10/20/18 16:59


   Last Admin: 18 09:13 Dose:  850 mg


Methocarbamol (Robaxin)  500 mg PO Q6HR [facility]


   Stop: 10/20/18 17:59


   Last Admin: 18 06:15 Dose:  500 mg


Miscellaneous (Vancomycin Iv Per Pharmacy)  1 ea MC PRN [facility]


   Stop: 10/20/18 14:44


Miscellaneous (Probiotic Screen)  1 ea MC PRN PRN


   PRN Reason: PROTOCOL


   Stop: 10/21/18 11:44


Morphine Sulfate (Morphine)  1 mg IV Q4HR PRN


   PRN Reason: MODERATE PAIN (level 4-6)


   Stop: 10/20/18 01:19


   Last Admin: 18 08:02 Dose:  1 mg


Morphine Sulfate (Morphine)  2 mg IV Q4HR PRN


   PRN Reason: FOR SEVERE PAIN (LEVEL 7-10)


   Stop: 10/20/18 01:23


   Last Admin: 18 21:41 Dose:  2 mg


Multivitamins/Vitamin C (Theragran)  1 tab PO DAILY [facility]


   Stop: 10/21/18 08:59


   Last Admin: 18 09:13 Dose:  1 tab


Nifedipine (Procardia Xl)  60 mg PO DAILY [facility]


   Stop: 10/21/18 08:59


   Last Admin: 18 09:37 Dose:  60 mg


Ondansetron HCl (Zofran)  4 mg IV Q6HR PRN


   PRN Reason: FOR NAUSEA AND VOMITING


   Stop: 10/20/18 01:24


Pantoprazole Sodium (Protonix)  40 mg PO DAILY [facility]


   Stop: 10/21/18 08:59


   Last Admin: 18 09:14 Dose:  40 mg


Potassium Chloride (Klor-Con)  20 meq PO DAILY [facility]


   Stop: 10/21/18 08:59


   Last Admin: 18 09:14 Dose:  20 meq


Rivaroxaban (Xarelto)  10 mg PO DAILY [facility]


   Stop: 10/21/18 08:59


   Last Admin: 18 09:13 Dose:  10 mg


Sodium Phosphate (Fleet Enema)  135 ml RC DAILY PRN


   PRN Reason: IF MOM OR DULCOLAX INEFFECTIVE


   Stop: 10/20/18 14:36


Tamsulosin HCl (Flomax)  0.4 mg PO DAILY [facility]


   Stop: 10/22/18 08:59


   Last Admin: 18 09:13 Dose:  0.4 mg


Terbinafine HCl (Lamisil 1% Cream)  1 appl TP BID [facility]


   Stop: 10/20/18 16:59


   Last Admin: 18 16:31 Dose:  1 appl


Zolpidem Tartrate (Ambien)  10 mg PO HS PRN


   PRN Reason: Insomnia


   Last Admin: 18 23:55 Dose:  10 mg











- Procedures


Procedures: 


 Procedures











Procedure Code Date


 


EXCISION OF DUODENUM, ENDO, DIAGN 9SX39AT 17


 


EXCISION OF STOMACH, ENDO, DIAGN 3FN03BV 17


 


REPOSITION LEFT UPPER FEMUR WITH INT FIX, OPEN APPROACH [id number] 18


 


TREAT THIGH FRACTURE 56988 18














Nutritional Asmnt/Malnutr-PDOC





- Dietary Evaluation


Malnutrition Findings (Please click <Entered> for more info): 








Nutritional Asmnt/Malnutrition                             Start:  18 15:

59


Text:                                                      Status: Complete    

  


Freq:                                                                          

  


Protocol:                                                                      

  


 Document     18 15:59  LCHENG  (Rec: 18 16:16  LCHENG  LIDA-FNS1)


 Nutritional Asmnt/Malnutrition


     Patient General Information


      Nutritional Screening                      High Risk


                                                 Consult


      Diagnosis                                  right foot pain possible


                                                 osteomyelitis, peripheral


      Pertinent Medical Hx/Surgical Hx           DM, CAD, arthirits


      Subjective Information                     Consult received for .


                                                 Pt seen lying in bed at time


                                                 of visit, awake and alert. Pt


                                                 reported good appetite, like


                                                 the food. Pt prefer double


                                                 portion of meat. Pt appeared


                                                 not interested in further


                                                 conversation. Will attempt to


                                                 provide nutrition education


                                                 next time.


      Current Diet Order/ Nutrition Support      [facility] 75gm


      Pertinent Medications                      colace, pepcid, lasix,


                                                 glucagen, novolog, levemir,


                                                 culturelle, cephulac,


                                                 glucophage, theragran, zofran,


                                                 protonix, kcl, vancomycin


      Pertinent Labs                              Na 135, glucose 308, POC


                                                 282-332


                                                  Na 134, glucose 320, Mg 1


                                                 .7, Alb 4.0


     Nutritional Hx/Data


      Height                                     1.8 m


      Height (Calculated Centimeters)            180.3


      Current Weight (lbs)                       127.913 kg


      Weight (Calculated Kilograms)              127.9


      Weight (Calculated Grams)                  625071.0


      Ideal Body Weight                          172


      Body Mass Index (BMI)                      39.3


      Weight Status                              Obese


     GI Symptoms


      GI Symptoms                                None


      Last BM                                    not indicated


      Difficult in:                              None


      Skin Integrity/Comment:                    right toe nail discoloration


     Estimated Nutritional Goals


      BEE in Kcals:                              Adj wt of IBW


      Calories/Kcals/Kg                          23-27


      Kcals Calculated                           9819-2650


      Protein:                                   Adj wt of IBW


      Protein g/k.8-1


      Protein Calculated                         72-90


      Fluid: ml                                  2070-2430ml (1m/kcal)


     Nutritional Problem


      1. Problem


       Problem                                   altered nutrition related labs


       Etiology                                  hx of DM


       Signs/Symptoms:                           glucose 308-320, -332


     Malnutrition Alert


      Is there a minimum of two criteria         No


       selected?                                 


       Query Text:Check all the applicable       


       criteria. A minimum of two criteria are   


       recommended for diagnosis of either       


       severe or non-severe malnutrition.        


     Malnutrition Related to Morbid Obesity


      Malnutrition related to morbid obesity     No


     Intervention/Recommendation


      Comments                                   1. Continue with [facility] 75gm


                                                 diet as ordered. Diet


                                                 preference updated. Will


                                                 provide nutrition education


                                                 next time. MD to ajust insulin


                                                 for optimal glycemic control.


                                                 2. Monitor PO intake, wt, labs


                                                 and skin integrity


                                                 3. F/U as high risk in 2-3


                                                 days, -


     Expected Outcomes/Goals


      Expected Outcomes/Goals                    1. PO intake to meet at least


                                                 75% of nutritional needs.


                                                 2. Wt stability, skin to


                                                 remain intact, labs to


                                                 approach WNL. cognitive status)  PT/OT/ST Evaluations and/or notes within the last 48 hours  MAR  MD orders  (Include: Attending or ordering MDs name and phone #; skilled nursing needs;    Wound care/etc)  Projected Date of Transfer to SNF  Requested SNF  SNF Point of Contact and Phone #  CM Point of Contact and Phone #  NPI # for SNF    TEGAN Badillo/CRM

## 2022-04-07 LAB
ALBUMIN SERPL-MCNC: 2.3 G/DL (ref 3.5–5)
ALBUMIN SERPL-MCNC: 2.4 G/DL (ref 3.5–5)
ALBUMIN/GLOB SERPL: 0.5 {RATIO} (ref 1.1–2.2)
ALP SERPL-CCNC: 73 U/L (ref 45–117)
ALT SERPL-CCNC: 12 U/L (ref 12–78)
ANION GAP SERPL CALC-SCNC: 6 MMOL/L (ref 5–15)
ANION GAP SERPL CALC-SCNC: 7 MMOL/L (ref 5–15)
AST SERPL-CCNC: 13 U/L (ref 15–37)
BASOPHILS # BLD: 0 K/UL (ref 0–0.1)
BASOPHILS NFR BLD: 1 % (ref 0–1)
BILIRUB SERPL-MCNC: 0.5 MG/DL (ref 0.2–1)
BUN SERPL-MCNC: 46 MG/DL (ref 6–20)
BUN SERPL-MCNC: 47 MG/DL (ref 6–20)
BUN/CREAT SERPL: 16 (ref 12–20)
BUN/CREAT SERPL: 16 (ref 12–20)
CALCIUM SERPL-MCNC: 9 MG/DL (ref 8.5–10.1)
CALCIUM SERPL-MCNC: 9.3 MG/DL (ref 8.5–10.1)
CHLORIDE SERPL-SCNC: 104 MMOL/L (ref 97–108)
CHLORIDE SERPL-SCNC: 105 MMOL/L (ref 97–108)
CO2 SERPL-SCNC: 23 MMOL/L (ref 21–32)
CO2 SERPL-SCNC: 23 MMOL/L (ref 21–32)
CREAT SERPL-MCNC: 2.89 MG/DL (ref 0.55–1.02)
CREAT SERPL-MCNC: 2.92 MG/DL (ref 0.55–1.02)
DIFFERENTIAL METHOD BLD: ABNORMAL
EOSINOPHIL # BLD: 0.1 K/UL (ref 0–0.4)
EOSINOPHIL NFR BLD: 3 % (ref 0–7)
ERYTHROCYTE [DISTWIDTH] IN BLOOD BY AUTOMATED COUNT: 12.6 % (ref 11.5–14.5)
GLOBULIN SER CALC-MCNC: 4.8 G/DL (ref 2–4)
GLUCOSE BLD STRIP.AUTO-MCNC: 192 MG/DL (ref 65–117)
GLUCOSE BLD STRIP.AUTO-MCNC: 241 MG/DL (ref 65–117)
GLUCOSE BLD STRIP.AUTO-MCNC: 251 MG/DL (ref 65–117)
GLUCOSE BLD STRIP.AUTO-MCNC: 251 MG/DL (ref 65–117)
GLUCOSE BLD STRIP.AUTO-MCNC: 260 MG/DL (ref 65–117)
GLUCOSE BLD STRIP.AUTO-MCNC: 287 MG/DL (ref 65–117)
GLUCOSE SERPL-MCNC: 243 MG/DL (ref 65–100)
GLUCOSE SERPL-MCNC: 253 MG/DL (ref 65–100)
HCT VFR BLD AUTO: 32.9 % (ref 35–47)
HGB BLD-MCNC: 10.3 G/DL (ref 11.5–16)
IMM GRANULOCYTES # BLD AUTO: 0 K/UL (ref 0–0.04)
IMM GRANULOCYTES NFR BLD AUTO: 1 % (ref 0–0.5)
LYMPHOCYTES # BLD: 0.8 K/UL (ref 0.8–3.5)
LYMPHOCYTES NFR BLD: 16 % (ref 12–49)
MCH RBC QN AUTO: 32.8 PG (ref 26–34)
MCHC RBC AUTO-ENTMCNC: 31.3 G/DL (ref 30–36.5)
MCV RBC AUTO: 104.8 FL (ref 80–99)
MONOCYTES # BLD: 0.4 K/UL (ref 0–1)
MONOCYTES NFR BLD: 7 % (ref 5–13)
NEUTS SEG # BLD: 3.8 K/UL (ref 1.8–8)
NEUTS SEG NFR BLD: 72 % (ref 32–75)
NRBC # BLD: 0 K/UL (ref 0–0.01)
NRBC BLD-RTO: 0 PER 100 WBC
PHOSPHATE SERPL-MCNC: 3.9 MG/DL (ref 2.6–4.7)
PLATELET # BLD AUTO: 145 K/UL (ref 150–400)
PMV BLD AUTO: 11.6 FL (ref 8.9–12.9)
POTASSIUM SERPL-SCNC: 4.6 MMOL/L (ref 3.5–5.1)
POTASSIUM SERPL-SCNC: 4.7 MMOL/L (ref 3.5–5.1)
PROT SERPL-MCNC: 7.1 G/DL (ref 6.4–8.2)
RBC # BLD AUTO: 3.14 M/UL (ref 3.8–5.2)
SERVICE CMNT-IMP: ABNORMAL
SODIUM SERPL-SCNC: 134 MMOL/L (ref 136–145)
SODIUM SERPL-SCNC: 134 MMOL/L (ref 136–145)
WBC # BLD AUTO: 5.2 K/UL (ref 3.6–11)

## 2022-04-07 PROCEDURE — 82962 GLUCOSE BLOOD TEST: CPT

## 2022-04-07 PROCEDURE — 80053 COMPREHEN METABOLIC PANEL: CPT

## 2022-04-07 PROCEDURE — 74011250636 HC RX REV CODE- 250/636: Performed by: NURSE PRACTITIONER

## 2022-04-07 PROCEDURE — 85025 COMPLETE CBC W/AUTO DIFF WBC: CPT

## 2022-04-07 PROCEDURE — 36415 COLL VENOUS BLD VENIPUNCTURE: CPT

## 2022-04-07 PROCEDURE — 65270000029 HC RM PRIVATE

## 2022-04-07 PROCEDURE — 99232 SBSQ HOSP IP/OBS MODERATE 35: CPT | Performed by: CLINICAL NURSE SPECIALIST

## 2022-04-07 PROCEDURE — 74011636637 HC RX REV CODE- 636/637: Performed by: STUDENT IN AN ORGANIZED HEALTH CARE EDUCATION/TRAINING PROGRAM

## 2022-04-07 PROCEDURE — 74011636637 HC RX REV CODE- 636/637: Performed by: NURSE PRACTITIONER

## 2022-04-07 PROCEDURE — 74011250637 HC RX REV CODE- 250/637: Performed by: HOSPITALIST

## 2022-04-07 PROCEDURE — 80069 RENAL FUNCTION PANEL: CPT

## 2022-04-07 RX ORDER — INSULIN LISPRO 100 [IU]/ML
6 INJECTION, SOLUTION INTRAVENOUS; SUBCUTANEOUS
Status: DISCONTINUED | OUTPATIENT
Start: 2022-04-07 | End: 2022-04-08 | Stop reason: HOSPADM

## 2022-04-07 RX ORDER — INSULIN LISPRO 100 [IU]/ML
INJECTION, SOLUTION INTRAVENOUS; SUBCUTANEOUS
Status: DISCONTINUED | OUTPATIENT
Start: 2022-04-07 | End: 2022-04-08 | Stop reason: HOSPADM

## 2022-04-07 RX ORDER — TRAMADOL HYDROCHLORIDE 50 MG/1
50 TABLET ORAL
Qty: 9 TABLET | Refills: 0 | Status: SHIPPED | OUTPATIENT
Start: 2022-04-07 | End: 2022-04-10

## 2022-04-07 RX ORDER — INSULIN LISPRO 100 [IU]/ML
6 INJECTION, SOLUTION INTRAVENOUS; SUBCUTANEOUS
Status: DISCONTINUED | OUTPATIENT
Start: 2022-04-07 | End: 2022-04-07

## 2022-04-07 RX ORDER — INSULIN LISPRO 100 [IU]/ML
INJECTION, SOLUTION INTRAVENOUS; SUBCUTANEOUS
Status: DISCONTINUED | OUTPATIENT
Start: 2022-04-07 | End: 2022-04-07

## 2022-04-07 RX ORDER — LANOLIN ALCOHOL/MO/W.PET/CERES
1 CREAM (GRAM) TOPICAL
Status: DISCONTINUED | OUTPATIENT
Start: 2022-04-08 | End: 2022-04-08 | Stop reason: HOSPADM

## 2022-04-07 RX ADMIN — ESCITALOPRAM OXALATE 10 MG: 10 TABLET ORAL at 09:58

## 2022-04-07 RX ADMIN — Medication 6 UNITS: at 18:28

## 2022-04-07 RX ADMIN — GABAPENTIN 400 MG: 400 CAPSULE ORAL at 18:27

## 2022-04-07 RX ADMIN — PANTOPRAZOLE SODIUM 40 MG: 40 TABLET, DELAYED RELEASE ORAL at 07:27

## 2022-04-07 RX ADMIN — Medication 7 UNITS: at 17:23

## 2022-04-07 RX ADMIN — PREDNISOLONE ACETATE 1 DROP: 10 SUSPENSION/ DROPS OPHTHALMIC at 09:58

## 2022-04-07 RX ADMIN — PRAVASTATIN SODIUM 40 MG: 20 TABLET ORAL at 09:57

## 2022-04-07 RX ADMIN — METOPROLOL TARTRATE 50 MG: 50 TABLET, FILM COATED ORAL at 09:58

## 2022-04-07 RX ADMIN — HEPARIN SODIUM 5000 UNITS: 5000 INJECTION INTRAVENOUS; SUBCUTANEOUS at 17:24

## 2022-04-07 RX ADMIN — Medication 16 UNITS: at 10:28

## 2022-04-07 RX ADMIN — GABAPENTIN 400 MG: 400 CAPSULE ORAL at 09:58

## 2022-04-07 RX ADMIN — METOPROLOL TARTRATE 50 MG: 50 TABLET, FILM COATED ORAL at 18:27

## 2022-04-07 RX ADMIN — HEPARIN SODIUM 5000 UNITS: 5000 INJECTION INTRAVENOUS; SUBCUTANEOUS at 08:41

## 2022-04-07 RX ADMIN — DORZOLAMIDE HYDROCHLORIDE 1 DROP: 20 SOLUTION/ DROPS OPHTHALMIC at 17:24

## 2022-04-07 RX ADMIN — Medication 5 UNITS: at 08:41

## 2022-04-07 RX ADMIN — ESCITALOPRAM OXALATE 10 MG: 10 TABLET ORAL at 18:27

## 2022-04-07 RX ADMIN — TIMOLOL MALEATE 1 DROP: 5 SOLUTION/ DROPS OPHTHALMIC at 19:34

## 2022-04-07 RX ADMIN — TIMOLOL MALEATE 1 DROP: 5 SOLUTION/ DROPS OPHTHALMIC at 09:58

## 2022-04-07 RX ADMIN — Medication 6 UNITS: at 10:29

## 2022-04-07 RX ADMIN — DORZOLAMIDE HYDROCHLORIDE 1 DROP: 20 SOLUTION/ DROPS OPHTHALMIC at 09:58

## 2022-04-07 RX ADMIN — PREDNISOLONE ACETATE 1 DROP: 10 SUSPENSION/ DROPS OPHTHALMIC at 18:28

## 2022-04-07 RX ADMIN — DORZOLAMIDE HYDROCHLORIDE 1 DROP: 20 SOLUTION/ DROPS OPHTHALMIC at 22:16

## 2022-04-07 NOTE — PROGRESS NOTES
[facility]  Hospitalist Group                                                                                          Hospitalist Progress Note  Arvin Melendez MD  Answering service: [phone] or [phone] from in house phone        Date of Service:  2022  NAME:  Keith Kim  :    MRN:  [id number]      Admission Summary:   Per H&P, The patient is a 54-year-old, obese,  female who has previous history significant for coronary artery disease, status post CABG; COPD; type 2 diabetes with gastroparesis, on insulin pump; history of neuropathy; legally blind; history of GERD; hypertension; hyperlipidemia; obstructive sleep apnea; and hypothyroidism, presented to the hospital after having a fall. The patient has frequent falls. This morning she got up around 02:00 a.m. and fell down. She did not lose consciousness, did not hit her head. She denies having any chest pain, any seizure-like activity. The patient was subsequently brought in the emergency room where an x-ray of the right ankle revealed a mildly displaced acute distal tibial fracture with nondisplaced distal fibular fracture, hence it was decided to admit the patient. The patient has no recent chest pain. She says her COPD is well controlled with Trelegy. She is not on home oxygen. The patient is fully COVID vaccinated. Interval history / Subjective:     patient seen examined at bedside earlier no acute complaints feels well. Assessment & Plan:     Distal tib-fib fracture  S/p fall, fracture around ORIF hardware from previous fracture roughly 1 year ago.   Fracture is around the old hardware but alignment is okay, per Ortho  Orthopedics following, thank you for recommendations  Has been splinted by orthopedics  Holding opiates for now, as patient became lethargic with oxycodone  We will continue with acetaminophen and add 2.5 of oxycodone if needed  Nonweightbearing for 6 weeks fever  Unclear etiology. No leukocytosis, no cough, denies abdominal pain and also denies diarrhea  Radiograph  was unremarkable  Follow-up radiograph was also negative  Urinalysis unremarkable  Blood cultures no growth  Procalcitonin 0.05  Viral panel was all negative  Covid negative  D-dimer was elevated however venous Dopplers were negative  No further fevers    CKD stage III with JAIDEN improved  Does have a mildly elevated creatinine, has not been drinking adequate amounts  Creatinine baseline is somewhere around 2.2, trending upward in spite of IV fluids. This morning creatinine 3.05  Nephrology following commendations are greatly appreciated  Potassium was elevated yesterday for which I gave a dose of Lokelma  Potassium stable  Avoiding nephrotoxins    CAD  S/p CABG  Denies chest pain  Continue metoprolol    Hypertension  Blood pressure currently controlled  Continuing metoprolol  In light of JAIDEN holding lisinopril for now    COPD  Not in exacerbation, no wheezing  As needed nebulizers  Continue LABA/ICS    Type 2 diabetes  Blood sugar currently controlled however did have hypoglycemia down to 54   Today blood sugars elevated  Restart insulin pump  Continue SSI  Diabetes management following, appreciate recommendations    Hypothyroid  Stable  Continuing levothyroxine    Obesity  BMI of 39  Consult on healthy dietary choices    Medically ready for discharge. Pending SNF placement Case management to work on this    Code status: FULL    DVT prophylaxis: UFH    Care Plan discussed with: Patient/Family, Nurse and      Anticipated Disposition: SNF/LTC     Anticipated Discharge: 24-48 hrs     Hospital Problems  Date Reviewed: 2/4/2022          Codes Class Noted POA    Tibia fracture ICD-10-CM: S82.209A  ICD-9-CM: 823.80  4/1/2022 Unknown                Review of Systems:   A comprehensive review of systems was negative except for that written in the HPI.        Vital Signs:    Last 24hrs VS reviewed since prior progress note. Most recent are:  Visit Vitals  BP (!) 150/67 (BP 1 Location: Left lower arm, BP Patient Position: At rest)   Pulse 64   Temp 99 °F (37.2 °C)   Resp 26   Wt 105.3 kg (232 lb 2.3 oz)   SpO2 94%   BMI 39.85 kg/m²         Intake/Output Summary (Last 24 hours) at 4/7/2022 1353  Last data filed at 4/7/2022 0700  Gross per 24 hour   Intake --   Output 1350 ml   Net -1350 ml        Physical Examination:     I had a face to face encounter with this patient and independently examined them on 4/7/2022 as outlined below:          Constitutional:  No acute distress, cooperative, pleasant    ENT:  Oral mucosa moist, oropharynx benign. Resp:  CTA bilaterally. No wheezing/rhonchi/rales. No accessory muscle use   CV:  Regular rhythm, normal rate, no murmurs, gallops, rubs    GI:  Soft, non distended, non tender. normoactive bowel sounds, no hepatosplenomegaly     Musculoskeletal:  No edema, warm, 2+ pulses throughout, splint intact, able to wiggle toes, toes warm with brisk cap refill    Neurologic:  Moves all extremities. AAOx3, CN II-XII reviewed            Data Review:    Review and/or order of clinical lab test  Review and/or order of tests in the radiology section of CPT  I personally reviewed  Image      Labs:     Recent Labs     04/07/22  0543 04/06/22  0641   WBC 5.2 5.9   HGB 10.3* 10.2*   HCT 32.9* 31.9*   * 148*     Recent Labs     04/07/22  0544 04/07/22  0543 04/06/22  0641 04/05/22  0423 04/05/22  0421   * 134* 138   < > 135*   K 4.7 4.6 4.8   < > 5.3*    104 110*   < > 108   CO2 23 23 23   < > 24   BUN 47* 46* 46*   < > 46*   CREA 2.89* 2.92* 3.05*   < > 3.55*   * 243* 162*   < > 303*   CA 9.3 9.0 9.1   < > 8.7   PHOS 3.9  --   --   --  4.6    < > = values in this interval not displayed.      Recent Labs     04/07/22  0544 04/07/22  0543 04/06/22  0641   ALT  --  12 8*   AP  --  73 62   TBILI  --  0.5 0.4   TP  --  7.1 6.7   ALB 2.4* 2.3* 2.3*   GLOB  --  4.8* 4.4*     No results for input(s): INR, PTP, APTT, INREXT, INREXT in the last 72 hours. Recent Labs     04/06/22  1844   TIBC 251   PSAT 16*      No results found for: FOL, RBCF   No results for input(s): PH, PCO2, PO2 in the last 72 hours. No results for input(s): CPK, CKNDX, TROIQ in the last 72 hours.     No lab exists for component: CPKMB  Lab Results   Component Value Date/Time    Cholesterol, total 220 (H) 02/04/2022 11:44 AM    HDL Cholesterol 48 02/04/2022 11:44 AM    LDL, calculated 108.8 (H) 02/04/2022 11:44 AM    Triglyceride 316 (H) 02/04/2022 11:44 AM    CHOL/HDL Ratio 4.6 02/04/2022 11:44 AM     Lab Results   Component Value Date/Time    Glucose (POC) 241 (H) 04/07/2022 11:44 AM    Glucose (POC) 287 (H) 04/07/2022 11:08 AM    Glucose (POC) 251 (H) 04/07/2022 08:24 AM    Glucose (POC) 260 (H) 04/07/2022 06:13 AM    Glucose (POC) 227 (H) 04/06/2022 11:05 PM     Lab Results   Component Value Date/Time    Color YELLOW/STRAW 04/04/2022 11:38 AM    Appearance TURBID (A) 04/04/2022 11:38 AM    Specific gravity 1.015 04/04/2022 11:38 AM    pH (UA) 5.0 04/04/2022 11:38 AM    Protein 300 (A) 04/04/2022 11:38 AM    Glucose 100 (A) 04/04/2022 11:38 AM    Ketone Negative 04/04/2022 11:38 AM    Bilirubin Negative 04/04/2022 11:38 AM    Urobilinogen 0.2 04/04/2022 11:38 AM    Nitrites Negative 04/04/2022 11:38 AM    Leukocyte Esterase Negative 04/04/2022 11:38 AM    Epithelial cells MODERATE (A) 04/04/2022 11:38 AM    Bacteria Negative 04/04/2022 11:38 AM    WBC 5-10 04/04/2022 11:38 AM    RBC 0-5 04/04/2022 11:38 AM         Medications Reviewed:     Current Facility-Administered Medications   Medication Dose Route Frequency    dextrose 10 % infusion 0-250 mL  0-250 mL IntraVENous PRN    insulin NPH (NOVOLIN N, HUMULIN N) injection 16 Units  0.15 Units/kg SubCUTAneous Q12H    insulin lispro (HUMALOG) injection 6 Units  6 Units SubCUTAneous TID WITH MEALS    insulin lispro (HUMALOG) injection   SubCUTAneous AC&HS  [START ON 4/8/2022] ferrous sulfate tablet 325 mg  1 Tablet Oral DAILY WITH BREAKFAST    dextrose 10 % infusion 0-250 mL  0-250 mL IntraVENous PRN    heparin (porcine) injection 5,000 Units  5,000 Units SubCUTAneous Q8H    dextrose 10 % infusion 0-250 mL  0-250 mL IntraVENous PRN    albuterol-ipratropium (DUO-NEB) 2.5 MG-0.5 MG/3 ML  3 mL Nebulization Q6H PRN    glucose chewable tablet 16 g  4 Tablet Oral PRN    glucagon (GLUCAGEN) injection 1 mg  1 mg IntraMUSCular PRN    acetaminophen (TYLENOL) tablet 650 mg  650 mg Oral Q6H PRN    hydrALAZINE (APRESOLINE) 20 mg/mL injection 10 mg  10 mg IntraVENous Q6H PRN    escitalopram oxalate (LEXAPRO) tablet 10 mg  10 mg Oral BID    levothyroxine (SYNTHROID) tablet 175 mcg  175 mcg Oral ACB    [Held by provider] lisinopriL (PRINIVIL, ZESTRIL) tablet 10 mg  10 mg Oral BID    metoprolol tartrate (LOPRESSOR) tablet 50 mg  50 mg Oral BID    pantoprazole (PROTONIX) tablet 40 mg  40 mg Oral ACB    pravastatin (PRAVACHOL) tablet 40 mg  40 mg Oral DAILY    prednisoLONE acetate (PRED FORTE) 1 % ophthalmic suspension 1 Drop  1 Drop Right Eye BID    gabapentin (NEURONTIN) capsule 400 mg  400 mg Oral BID    traMADoL (ULTRAM) tablet 50 mg  50 mg Oral Q6H PRN    [Held by provider] HYDROcodone-acetaminophen (NORCO) 5-325 mg per tablet 1 Tablet  1 Tablet Oral Q6H PRN    arformoteroL (BROVANA) neb solution 15 mcg  15 mcg Nebulization BID RT    And    budesonide (PULMICORT) 500 mcg/2 ml nebulizer suspension  500 mcg Nebulization BID RT    dorzolamide (TRUSOPT) 2 % ophthalmic solution 1 Drop  1 Drop Left Eye TID    And    timolol (TIMOPTIC) 0.5 % ophthalmic solution 1 Drop  1 Drop Left Eye BID     ______________________________________________________________________  EXPECTED LENGTH OF STAY: 3d 0h  ACTUAL LENGTH OF STAY:          6                 Alexa Puri MD

## 2022-04-07 NOTE — PROGRESS NOTES
[facility]: [facility] patient, bed available tomorrow. Insurance Idalia Hare has been approved through [employer], [id number]. reference [id number]. Noted negative PCR covid test 4/5. BLS to transport at discharge. CM spoke with Kirstie Knight with [facility]. They are currently on a bed hold for possible norovirus, they may be able to accept patient's tomorrow, but this is not confirmed. CM called Pat at [address]. They can accept patient tomorrow    [facility] and spoke with Elaina Mcgee to follow-up on status of auth, auth still pending. CM updated patient at bedside. CM called patient's partner [employer] [phone] and gave update    CM received call from All Armstrong with [facility] has been approved, Rjkajal Hare #[id number]. Approved for 5 days, start date 52, next review date 4/11. Fax clinicals to [facility]#[phone].       Danelle Quezada, BSW/CRM

## 2022-04-07 NOTE — DIABETES MGMT
[facility]    CLINICAL NURSE SPECIALIST CONSULT     Initial Presentation   Lawanda Jones is a 70 y.o. female who presented to the ED 4/1/22 via EMS with right ankle pain and deformity following a fall. X-ray of the right ankle shows previous ORIF with mildly displaced acute distal tibial fracture and possible nondisplaced distal fibular fracture. HX:   Past Medical History:   Diagnosis Date    Adverse effect of anesthesia     SLOW WAKING PAST ANESTHESIA    Anxiety     Arthritis     CAD (coronary artery disease)     s/p CABG x 3v    Chest pain 7/2015    Chronic obstructive pulmonary disease (HCC)     CTS (carpal tunnel syndrome)     S/p bilateral release    Diabetes ([address]     Diabetic gastroparesis ([address]     Diabetic neuropathy ([address]     Diabetic retinopathy ([address]     GERD (gastroesophageal reflux disease)     GI bleed 7/2015    4 bleeds with 9 clips placed    Hypercholesteremia     Hypertension     Hypothyroid     Ill-defined condition     NEUROPATHY HANDS AND FEET    Ill-defined condition 2017    GI BLEED    Nicotine vapor product user     JOHN on CPAP     Osteoporosis     Vitamin D deficiency         INITIAL DX:   Tibia fracture [S82.209A]     Current Treatment     TX: Specialty consultation: Ortho and nephrology    Consulted by Ester Nolasco NP for advanced diabetes nursing assessment and care for:   [x] Inpatient management strategy    Hospital Course   Clinical progress has been uncomplicated. 4/1: Admission. Seen by ortho: alignment ok and will be non-weight bearing x6 wks  4/4: Fevers, elevated creat. CXR without acute change. Cx sent. Seen by nephrology for elevated creat. No inpatient adjustments- needs outpatient FU  Diabetes History   Type 1 Diabetes- Diagnosed at age 16  Ambulatory BG management provided by: Endocrinology- Oswaldo Quintana MD- Last visit 2/4/22  Family history positive for diabetes:  Mother and sister with DM2    Diabetes-related Medical History  Acute complications  None   Neurological complications  Gastroparesis and Peripheral neuropathy  Microvascular disease  Retinopathy and Nephropathy  Macrovascular disease  CAD  Other associated conditions     Osteoporosis and frequent falls    Diabetes Medication History  Key Antihyperglycemic Medications             insulin aspart U-100 (NovoLOG U-100 Insulin aspart) 100 unit/mL injection (Taking) INJECT A MAX  UNITS UNDER THE SKIN DAILY WITH INSULIN PUMP         Basal   MN-630AM: 2.65  630AM-4PM: 2.50  4PM-MN: 2.55  TDD: 61.375 units    Carb Ratio  1:4    Correction Factor  1:25    Target  120    Active Insulin Time  3    Diabetes self-management practices:   Eating pattern: Too drowsy to discuss      Physical activity pattern  using a walker at home  Monitoring pattern: Too drowsy to discuss. Per last note with endo in Feb fasting BG is in the 200s and evening BG is 180-200s  Taking medications pattern  [x] Consistent administration: Not able to assess    Overall evaluation:    [x] Not achieving A1c target with drug therapy & self-care practices    Subjective   I didn't touch my pump last night\"     Has a life partner  Former smoker- quit 7 yrs ago  Frequent falls  Relocated to [place] from [place] to be closer to sister  15 mos ago had previous R Ankle ORIF   Sees optho: She tells me that they recommended removal of R eye for prosthetic eye  Objective   Physical exam  General Obese white female in no acute distress/ill-appearing. Conversant and cooperative  Neuro  Awake, alert, oriented. Vital Signs   Visit Vitals  BP (!) 148/73 (BP 1 Location: Left upper arm, BP Patient Position: At rest)   Pulse 64   Temp 98.3 °F (36.8 °C)   Resp 16   Wt 105.3 kg (232 lb 2.3 oz)   SpO2 99%   BMI 39.85 kg/m²     Skin  Warm and dry. No acanthosis noted along neckline. No lipohypertrophy or lipoatrophy noted at injection sites   Heart   Regular rate and rhythm.  No murmurs, rubs or gallops  Lungs  Clear to auscultation without rales or rhonchi  Extremities R foot in surgical wrap    Laboratory  Recent Labs     22  0544 22  0543 22  0641   * 243* 162*   AGAP 6 7 5   WBC  --  5.2 5.9   CREA 2.89* 2.92* 3.05*   GFRNA 16* 16* 15*   AST  --  13* 15   ALT  --  12 8*       Factors impacting BG management  Factor Dose Comments   Nutrition:  Standard meals     60 grams/meal      Pain R ankle pain    Other:   Kidney function   GFR 13      Blood glucose pattern      Significant diabetes-related events over the past 24-72 hours  A1C 8.7% in February   Was on insulin pump on admission, nursing staff thinks that it was taken off yesterday or day before? No pump at bedside. Luis Ayers tells me she thinks that Antony took it home\". -238 the first two days of admission. Fasting hypoglycemia at 66 4/4    Fasting BG this am: 260  Pre-prandial B-258  Wants to get back on her insulin pump- plan was to start overnight but \"forgot\" and didn't remember the plan    Assessment and Plan   Nursing Diagnosis Risk for unstable blood glucose pattern   Nursing Intervention Domain 5250 Decision-making Support   Nursing Interventions Examined current inpatient diabetes/blood glucose control   Explored factors facilitating and impeding inpatient management  Explored corrective strategies with patient and responsible inpatient provider   Informed patient of rational for insulin strategy while hospitalized     Evaluation   Supriya Willams is a 70year old female, with a nearly 48 year history of Type 1 Diabetes on a medtronic insulin pump, with long term complications including CAD s/p CABG, gastroparesis, retinopathy, nephropathy and osteoporosis admitted with a right tibia fracture after a fall. A1C two months ago was 8.7%. Her insulin pump was continued the first two days of hospital admission but stopped 4/4 as she experienced fasting hypoglycemia.   Her BG emelia to over 300 since her pump has stopped and only correctional insulin was ordered. Given limited history if insulin use at that time, renal dose basal/bolus/correctional insulin was started. Her fasting BG was 153 but 231 after eating breakfast without bolus insulin. She did want to transition back to her insulin pump but never resumed her pump last night. Given that her support partner is not at bedside and she continues to forget insulin plan, we will not resume pump therapy and resume SQ basal/bolus insulin. BG goal 140-180mg/dl. Recommendations   1. POC glucose ACHS    2. Consistent carbohydrate diet (60 grams CHO/meal)    3. Resume the subcutaneous Insulin Order set (4538): Insulin Dosing Specific recommendation   Basal                                      (Based on weight, BMI & GFR) [x] (Moderate/Renal dose) 0.3 units/kg/D: 15 units NPH Q12    Nutritional                                      (Based on CHO/dextrose load) [x] Normal sensitivity: 6 units Humalog/meal    Hold if patient consumes less than 50% of carbohydrates on meal tray    Corrective                                       (Useful in adjusting insulin dosing) [x] Resistant sensitivity: ACHS          Please discharge to [facility] on above insulin therapy doses  Discharge Recommendations   1. Ema Cohen with endocrinologist scheduled for 6/16/22 with Cole Davis MD at 11:30am    Billing Code(s)   [x] 36815    Before making these care recommendations, I personally reviewed the hospitalization record, including notes, laboratory & diagnostic data and current medications, and examined the patient at the bedside (circumstances permitting) before making care recommendations. More than fifty (50) percent of the time was spent in patient counseling and/or care coordination.   Total minutes: 25    Ayanna Bergman CNS  Diabetes Clinical Nurse Specialist  [facility]  Access via [employer]

## 2022-04-07 NOTE — PROGRESS NOTES
Name: Edmond Franks MRN: [id number]   : 1950 Hospital: . 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 Patient was first evaluated by our cervice last year. It was recommended to have her followed at our office due to the very high risk for CKD progression and ESRD. Patient has not come to the office. She is legally blind and very limited in her ability to ambulate. · Mild hyperkalemia - resolved. · JAIDEN from volume depletion. GFR is gradually improving  · DM  · HTN - BP is at target  · Iron deficiency       PLAN:   · Continue monitoring GFR  · Start iron replacement  · Avoid nephrotoxic agents. · No need of Retacrit at present. Will check the Fe sats     Subjective/Interval History:   I have reviewed the flowsheet and previous days notes. ROS:A comprehensive review of systems was negative except for that written in the HPI. Objective:   Vital Signs:    Visit Vitals  BP (!) 150/67 (BP 1 Location: Left lower arm, BP Patient Position: At rest)   Pulse 64   Temp 99 °F (37.2 °C)   Resp 26   Wt 105.3 kg (232 lb 2.3 oz)   SpO2 94%   BMI 39.85 kg/m²       O2 Device: Nasal cannula   O2 Flow Rate (L/min): 2 l/min   Temp (24hrs), Av.4 °F (36.9 °C), Min:97.8 °F (36.6 °C), Max:99 °F (37.2 °C)       Intake/Output:   Last shift:      No intake/output data recorded. Last 3 shifts:  190 -  0700  In: -   Out: 2550 [Urine:2550]    Intake/Output Summary (Last 24 hours) at 2022 1347  Last data filed at 2022 0700  Gross per 24 hour   Intake --   Output 1350 ml   Net -1350 ml        Physical Exam:  General:    Alert, cooperative, no distress, appears stated age. Obese. Head:   Normocephalic, without obvious abnormality, atraumatic. Eyes:   Conjunctivae/corneas clear. Blind  Nose:  Nares normal. No drainage or sinus tenderness.   Throat:    Lips, mucosa, and tongue normal.  No Thrush  Neck:  Supple, symmetrical,  no adenopathy, thyroid: non tender    no carotid bruit and no JVD. Lungs:   Clear to auscultation bilaterally. No Wheezing or Rhonchi. No rales. Chest wall:  No tenderness or deformity. No Accessory muscle use. Heart:   Regular rate and rhythm,  no murmur, rub or gallop. Abdomen:   Soft, non-tender. Not distended. Bowel sounds normal. No masses  Extremities: Extremities normal, atraumatic, No cyanosis. 1 + edema. No clubbing  Skin:     Texture, turgor normal. No rashes or lesions. Not Jaundiced  Psych:  fair insight. Not depressed. Not anxious or agitated. Neurologic: Normal strength, Alert and oriented X 3. DATA:  Labs:  Recent Labs     04/07/22  0544 04/07/22  0543 04/06/22  0641 04/05/22  0423 04/05/22  0421   * 134* 138   < > 135*   K 4.7 4.6 4.8   < > 5.3*    104 110*   < > 108   CO2 23 23 23   < > 24   BUN 47* 46* 46*   < > 46*   CREA 2.89* 2.92* 3.05*   < > 3.55*   CA 9.3 9.0 9.1   < > 8.7   ALB 2.4* 2.3* 2.3*   < > 2.3*   PHOS 3.9  --   --   --  4.6    < > = values in this interval not displayed. Recent Labs     04/07/22  0543 04/06/22  0641   WBC 5.2 5.9   HGB 10.3* 10.2*   HCT 32.9* 31.9*   * 148*     No results for input(s): TARAN, KU, CLU, CREAU in the last 72 hours.     No lab exists for component: PROU    Total time spent with patient:  35 minutes    [] Critical Care Provided    Care Plan discussed with:   Staff, Medical Team    Jasvir Stanton MD

## 2022-04-07 NOTE — PROGRESS NOTES
Problem: Patient Education: Go to Patient Education Activity  Goal: Patient/Family Education  Outcome: Progressing Towards Goal     Problem: Patient Education: Go to Patient Education Activity  Goal: Patient/Family Education  Outcome: Progressing Towards Goal     Problem: Patient Education: Go to Patient Education Activity  Goal: Patient/Family Education  Outcome: Progressing Towards Goal     Problem: Diabetes Self-Management  Goal: *Disease process and treatment process  Description: Define diabetes and identify own type of diabetes; list 3 options for treating diabetes. Outcome: Progressing Towards Goal  Goal: *Incorporating nutritional management into lifestyle  Description: Describe effect of type, amount and timing of food on blood glucose; list 3 methods for planning meals. Outcome: Progressing Towards Goal  Goal: *Incorporating physical activity into lifestyle  Description: State effect of exercise on blood glucose levels. Outcome: Progressing Towards Goal  Goal: *Developing strategies to promote health/change behavior  Description: Define the ABC's of diabetes; identify appropriate screenings, schedule and personal plan for screenings. Outcome: Progressing Towards Goal  Goal: *Using medications safely  Description: State effect of diabetes medications on diabetes; name diabetes medication taking, action and side effects. Outcome: Progressing Towards Goal  Goal: *Monitoring blood glucose, interpreting and using results  Description: Identify recommended blood glucose targets  and personal targets. Outcome: Progressing Towards Goal  Goal: *Prevention, detection, treatment of acute complications  Description: List symptoms of hyper- and hypoglycemia; describe how to treat low blood sugar and actions for lowering  high blood glucose level.   Outcome: Progressing Towards Goal  Goal: *Prevention, detection and treatment of chronic complications  Description: Define the natural course of diabetes and describe the relationship of blood glucose levels to long term complications of diabetes.   Outcome: Progressing Towards Goal  Goal: *Developing strategies to address psychosocial issues  Description: Describe feelings about living with diabetes; identify support needed and support network  Outcome: Progressing Towards Goal  Goal: *Insulin pump training  Outcome: Progressing Towards Goal  Goal: *Sick day guidelines  Outcome: Progressing Towards Goal  Goal: *Patient Specific Goal (EDIT GOAL, INSERT TEXT)  Outcome: Progressing Towards Goal     Problem: Patient Education: Go to Patient Education Activity  Goal: Patient/Family Education  Outcome: Progressing Towards Goal

## 2022-04-08 VITALS
WEIGHT: 232.14 LBS | SYSTOLIC BLOOD PRESSURE: 153 MMHG | RESPIRATION RATE: 16 BRPM | DIASTOLIC BLOOD PRESSURE: 74 MMHG | BODY MASS INDEX: 39.85 KG/M2 | HEART RATE: 59 BPM | OXYGEN SATURATION: 99 % | TEMPERATURE: 98 F

## 2022-04-08 LAB
ALBUMIN SERPL-MCNC: 2.3 G/DL (ref 3.5–5)
ALBUMIN/GLOB SERPL: 0.5 {RATIO} (ref 1.1–2.2)
ALP SERPL-CCNC: 76 U/L (ref 45–117)
ALT SERPL-CCNC: 11 U/L (ref 12–78)
ANION GAP SERPL CALC-SCNC: 5 MMOL/L (ref 5–15)
AST SERPL-CCNC: 11 U/L (ref 15–37)
BASOPHILS # BLD: 0 K/UL (ref 0–0.1)
BASOPHILS NFR BLD: 1 % (ref 0–1)
BILIRUB SERPL-MCNC: 0.3 MG/DL (ref 0.2–1)
BUN SERPL-MCNC: 46 MG/DL (ref 6–20)
BUN/CREAT SERPL: 17 (ref 12–20)
CALCIUM SERPL-MCNC: 9.2 MG/DL (ref 8.5–10.1)
CHLORIDE SERPL-SCNC: 105 MMOL/L (ref 97–108)
CO2 SERPL-SCNC: 25 MMOL/L (ref 21–32)
CREAT SERPL-MCNC: 2.76 MG/DL (ref 0.55–1.02)
DIFFERENTIAL METHOD BLD: ABNORMAL
EOSINOPHIL # BLD: 0.2 K/UL (ref 0–0.4)
EOSINOPHIL NFR BLD: 4 % (ref 0–7)
ERYTHROCYTE [DISTWIDTH] IN BLOOD BY AUTOMATED COUNT: 12.8 % (ref 11.5–14.5)
GLOBULIN SER CALC-MCNC: 4.7 G/DL (ref 2–4)
GLUCOSE BLD STRIP.AUTO-MCNC: 185 MG/DL (ref 65–117)
GLUCOSE BLD STRIP.AUTO-MCNC: 201 MG/DL (ref 65–117)
GLUCOSE SERPL-MCNC: 216 MG/DL (ref 65–100)
HCT VFR BLD AUTO: 31.3 % (ref 35–47)
HGB BLD-MCNC: 10.1 G/DL (ref 11.5–16)
IMM GRANULOCYTES # BLD AUTO: 0 K/UL (ref 0–0.04)
IMM GRANULOCYTES NFR BLD AUTO: 1 % (ref 0–0.5)
LYMPHOCYTES # BLD: 0.8 K/UL (ref 0.8–3.5)
LYMPHOCYTES NFR BLD: 16 % (ref 12–49)
MCH RBC QN AUTO: 32.9 PG (ref 26–34)
MCHC RBC AUTO-ENTMCNC: 32.3 G/DL (ref 30–36.5)
MCV RBC AUTO: 102 FL (ref 80–99)
MONOCYTES # BLD: 0.4 K/UL (ref 0–1)
MONOCYTES NFR BLD: 8 % (ref 5–13)
NEUTS SEG # BLD: 3.3 K/UL (ref 1.8–8)
NEUTS SEG NFR BLD: 70 % (ref 32–75)
NRBC # BLD: 0 K/UL (ref 0–0.01)
NRBC BLD-RTO: 0 PER 100 WBC
PLATELET # BLD AUTO: 147 K/UL (ref 150–400)
PMV BLD AUTO: 12 FL (ref 8.9–12.9)
POTASSIUM SERPL-SCNC: 4.6 MMOL/L (ref 3.5–5.1)
PROT SERPL-MCNC: 7 G/DL (ref 6.4–8.2)
RBC # BLD AUTO: 3.07 M/UL (ref 3.8–5.2)
RBC MORPH BLD: ABNORMAL
SERVICE CMNT-IMP: ABNORMAL
SERVICE CMNT-IMP: ABNORMAL
SODIUM SERPL-SCNC: 135 MMOL/L (ref 136–145)
WBC # BLD AUTO: 4.7 K/UL (ref 3.6–11)

## 2022-04-08 PROCEDURE — 94640 AIRWAY INHALATION TREATMENT: CPT

## 2022-04-08 PROCEDURE — 74011636637 HC RX REV CODE- 636/637: Performed by: STUDENT IN AN ORGANIZED HEALTH CARE EDUCATION/TRAINING PROGRAM

## 2022-04-08 PROCEDURE — 80053 COMPREHEN METABOLIC PANEL: CPT

## 2022-04-08 PROCEDURE — 36415 COLL VENOUS BLD VENIPUNCTURE: CPT

## 2022-04-08 PROCEDURE — 74011000250 HC RX REV CODE- 250: Performed by: HOSPITALIST

## 2022-04-08 PROCEDURE — 74011250637 HC RX REV CODE- 250/637: Performed by: INTERNAL MEDICINE

## 2022-04-08 PROCEDURE — 74011250637 HC RX REV CODE- 250/637: Performed by: HOSPITALIST

## 2022-04-08 PROCEDURE — 74011250636 HC RX REV CODE- 250/636: Performed by: NURSE PRACTITIONER

## 2022-04-08 PROCEDURE — 85025 COMPLETE CBC W/AUTO DIFF WBC: CPT

## 2022-04-08 PROCEDURE — 99231 SBSQ HOSP IP/OBS SF/LOW 25: CPT | Performed by: CLINICAL NURSE SPECIALIST

## 2022-04-08 PROCEDURE — 82962 GLUCOSE BLOOD TEST: CPT

## 2022-04-08 RX ADMIN — Medication 3 UNITS: at 13:34

## 2022-04-08 RX ADMIN — Medication 6 UNITS: at 13:35

## 2022-04-08 RX ADMIN — PREDNISOLONE ACETATE 1 DROP: 10 SUSPENSION/ DROPS OPHTHALMIC at 10:39

## 2022-04-08 RX ADMIN — ARFORMOTEROL TARTRATE 15 MCG: 15 SOLUTION RESPIRATORY (INHALATION) at 09:33

## 2022-04-08 RX ADMIN — DORZOLAMIDE HYDROCHLORIDE 1 DROP: 20 SOLUTION/ DROPS OPHTHALMIC at 10:39

## 2022-04-08 RX ADMIN — GABAPENTIN 400 MG: 400 CAPSULE ORAL at 10:38

## 2022-04-08 RX ADMIN — FERROUS SULFATE TAB 325 MG (65 MG ELEMENTAL FE) 325 MG: 325 (65 FE) TAB at 10:37

## 2022-04-08 RX ADMIN — METOPROLOL TARTRATE 50 MG: 50 TABLET, FILM COATED ORAL at 09:05

## 2022-04-08 RX ADMIN — PANTOPRAZOLE SODIUM 40 MG: 40 TABLET, DELAYED RELEASE ORAL at 06:56

## 2022-04-08 RX ADMIN — Medication 4 UNITS: at 06:57

## 2022-04-08 RX ADMIN — ESCITALOPRAM OXALATE 10 MG: 10 TABLET ORAL at 10:38

## 2022-04-08 RX ADMIN — HEPARIN SODIUM 5000 UNITS: 5000 INJECTION INTRAVENOUS; SUBCUTANEOUS at 07:00

## 2022-04-08 RX ADMIN — TIMOLOL MALEATE 1 DROP: 5 SOLUTION/ DROPS OPHTHALMIC at 10:39

## 2022-04-08 RX ADMIN — BUDESONIDE 500 MCG: 0.5 INHALANT RESPIRATORY (INHALATION) at 09:33

## 2022-04-08 RX ADMIN — Medication 6 UNITS: at 09:37

## 2022-04-08 RX ADMIN — LEVOTHYROXINE SODIUM 175 MCG: 0.15 TABLET ORAL at 06:56

## 2022-04-08 RX ADMIN — HEPARIN SODIUM 5000 UNITS: 5000 INJECTION INTRAVENOUS; SUBCUTANEOUS at 00:03

## 2022-04-08 RX ADMIN — Medication 16 UNITS: at 10:37

## 2022-04-08 RX ADMIN — PRAVASTATIN SODIUM 40 MG: 20 TABLET ORAL at 10:38

## 2022-04-08 NOTE — DISCHARGE SUMMARY
Discharge Summary       PATIENT ID: Po Miller  MRN: [id number]   YOB: 1950    DATE OF ADMISSION: 4/1/2022  4:00 AM    DATE OF DISCHARGE: 04/08/22   PRIMARY CARE PROVIDER: Franklin Mcnulty MD     ATTENDING PHYSICIAN: Derian Beltre MD  DISCHARGING PROVIDER: Derian Beltre MD    To contact this individual call [phone] and ask the  to page. If unavailable ask to be transferred the Adult Hospitalist Department. CONSULTATIONS: IP CONSULT TO ORTHOPEDIC SURGERY  IP CONSULT TO ORTHOPEDIC SURGERY  IP CONSULT TO NEPHROLOGY  IP CONSULT TO NEPHROLOGY    PROCEDURES/SURGERIES: * No surgery found *    ADMITTING [address] [place] [address] COURSE:   \"Per H&P, The patient is a 59-year-old, obese,  female who has previous history significant for coronary artery disease, status post CABG; COPD; type 2 diabetes with gastroparesis, on insulin pump; history of neuropathy; legally blind; history of GERD; hypertension; hyperlipidemia; obstructive sleep apnea; and hypothyroidism, presented to the hospital after having a fall.  The patient has frequent falls.  This morning she got up around 02:00 a.m. and fell down.  She did not lose consciousness, did not hit her head.  She denies having any chest pain, any seizure-like activity.  The patient was subsequently brought in the emergency room where an x-ray of the right ankle revealed a mildly displaced acute distal tibial fracture with nondisplaced distal fibular fracture, hence it was decided to admit the patient.  The patient has no recent chest pain.  She says her COPD is well controlled.  She is not on home oxygen.  The patient is fully COVID vaccinated. \"    Managed for distal tibial fracture orthopedics evaluated and splinted. Was also evaluated by nephrology for JAIDEN on CKD stage IV with progression need to follow-up outpatient with nephrology closely. PT OT evaluation recommended SNF. DC to SNF today.   Up with PCP, nephrology, orthopedics outpatient. DISCHARGE DIAGNOSES / PLAN:      Distal tib-fib fracture  S/p fall, fracture around ORIF hardware from previous fracture roughly 1 year ago. Fracture is around the old hardware but alignment is okay, per Ortho  Orthopedics following, thank you for recommendations  Has been splinted by orthopedics  Holding opiates for now, as patient became lethargic with oxycodone  We will continue with acetaminophen and add 2.5 of oxycodone if needed  Nonweightbearing for 6 weeks      fever  Unclear etiology. No leukocytosis, no cough, denies abdominal pain and also denies diarrhea  Radiograph  was unremarkable  Follow-up radiograph was also negative  Urinalysis unremarkable  Blood cultures no growth  Procalcitonin 0.05  Viral panel was all negative  Covid negative  D-dimer was elevated however venous Dopplers were negative  No further fevers     CKD stage IV with JAIDEN -stable   Does have a mildly elevated creatinine, has not been drinking adequate amounts  Creatinine baseline is somewhere around 2.2, trending upward in spite of IV fluids. This morning creatinine 3.05  Nephrology following commendations are greatly appreciated  Potassium was elevated yesterday for which I gave a dose of Lokelma  Potassium stable  Avoiding nephrotoxins     CAD  S/p CABG  Denies chest pain  Continue metoprolol     Hypertension  Blood pressure currently controlled  Continuing metoprolol  In light of JAIDEN holding lisinopril for now     COPD  Not in exacerbation, no wheezing  As needed nebulizers while inpatient   Continue LABA/ICS > resume home meds on dc      Type 2 diabetes  Blood sugar currently controlled however did have hypoglycemia down to 54   Today blood sugars elevated  Restart insulin pump  Continue SSI  Diabetes management following, appreciate recommendations     Hypothyroid  Stable  Continuing levothyroxine     Obesity  BMI of 39  Consult on healthy dietary choices     Medically ready for discharge. SNF d/c     Code status: FULL     DVT prophylaxis: [facility]     Care Plan discussed with: Patient/Family, Nurse and       Anticipated Disposition: SNF/LTC      Anticipated Discharge: 24-48 hrs         FOLLOW UP APPOINTMENTS:    Follow-up Information     Follow up With Specialties Details Why Contact Info    Vargas Ludwig MD Family Medicine In 3 days  [address] [address]  [phone]      Teena Matias MD Nephrology In 1 week  [address] [facility]  [address] [facility] [phone]      Jessica Butler MD Endocrinology In 1 week  [address]  MOB 2 [address] [facility]  [address]       Alfredo Serrano MD Orthopedic Surgery In 1 week  [address] [facility]  [address]  [address]  [phone]      [address]   [address]  [phone]           ADDITIONAL CARE RECOMMENDATIONS: Repeat CBC, BMP 3-5    DIET: Resume previous diet    ACTIVITY: Activity as tolerated        DISCHARGE MEDICATIONS:  Current Discharge Medication List      START taking these medications    Details   traMADoL (ULTRAM) 50 mg tablet Take 1 Tablet by mouth every eight (8) hours as needed for Pain for up to 3 days. Max Daily Amount: 150 mg.  Qty: 9 Tablet, Refills: 0  Start date: 4/7/2022, End date: 4/10/2022    Associated Diagnoses: Closed fracture of proximal end of left tibia, unspecified fracture morphology, initial encounter         CONTINUE these medications which have NOT CHANGED    Details   gabapentin (NEURONTIN) 400 mg capsule Take 400 mg by mouth two (2) times a day. pravastatin (PRAVACHOL) 40 mg tablet Take 40 mg by mouth daily. escitalopram oxalate (LEXAPRO) 10 mg tablet Take 10 mg by mouth two (2) times a day. lisinopriL (PRINIVIL, ZESTRIL) 10 mg tablet Take 10 mg by mouth two (2) times a day.       metoprolol tartrate (LOPRESSOR) 50 mg tablet Take 50 mg by mouth two (2) times a day. pantoprazole (PROTONIX) 40 mg tablet Take 40 mg by mouth daily. levothyroxine (SYNTHROID) 175 mcg tablet Take 175 mcg by mouth Daily (before breakfast). Trelegy Ellipta 100-62.5-25 mcg inhaler INHALE 1 PUFF BY MOUTH EVERY DAY      insulin aspart U-100 (NovoLOG U-100 Insulin aspart) 100 unit/mL injection INJECT A MAX  UNITS UNDER THE SKIN DAILY WITH INSULIN PUMP  Qty: 40 mL, Refills: 5      nitroglycerin (NITROSTAT) 0.4 mg SL tablet       prednisoLONE acetate (PRED FORTE) 1 % ophthalmic suspension Administer 1 Drop to right eye two (2) times a day. ferrous sulfate 325 mg (65 mg iron) tablet Take 650 mg by mouth nightly. 2 daily      dorzolamide-timolol, PF, (COSOPT, PF,) 2-0.5 % dpet Administer 1 Drop to left eye two (2) times a day. Indications: increased pressure in the eye               NOTIFY [address] OF THE FOLLOWING:   Fever over 101 degrees for 24 hours. Chest pain, shortness of breath, fever, chills, nausea, vomiting, diarrhea, change in mentation, falling, weakness, bleeding. Severe pain or pain not relieved by medications. Or, any other signs or symptoms that you may have questions about. DISPOSITION:    Home With:   OT  PT  HH  RN      x Long term SNF/Inpatient Rehab    Independent/assisted living    Hospice    Other:       PATIENT CONDITION AT DISCHARGE:     Functional status    Poor    x Deconditioned     Independent      Cognition    x Lucid     Forgetful     Dementia        Code status   x  Full code     DNR      PHYSICAL EXAMINATION AT DISCHARGE:     I had a face to face encounter with this patient and independently examined them on 4/8/2022 as outlined below:                                                   Constitutional:  No acute distress, cooperative, pleasant    ENT:  Oral mucosa moist, oropharynx benign. Resp:  CTA bilaterally. No wheezing/rhonchi/rales.  No accessory muscle use   CV:  Regular rhythm, normal rate, no murmurs, gallops, rubs    GI:  Soft, non distended, non tender. normoactive bowel sounds, no hepatosplenomegaly     Musculoskeletal:  No edema, warm, 2+ pulses throughout, splint intact, able to wiggle toes, toes warm with brisk cap refill    Neurologic:  Moves all extremities.   AAOx3, CN II-XII reviewed           CHRONIC MEDICAL DIAGNOSES:  Problem List as of 4/8/2022 Date Reviewed: 2/4/2022          Codes Class Noted - Resolved    Tibia fracture ICD-10-CM: S82.209A  ICD-9-CM: 823.80  4/1/2022 - Present        Ankle fracture ICD-10-CM: S82.899A  ICD-9-CM: 824.8  1/31/2021 - Present        Primary osteoarthritis of right knee ICD-10-CM: M17.11  ICD-9-CM: 715.16  1/22/2019 - Present        Hyperkalemia ICD-10-CM: E87.5  ICD-9-CM: 276.7  1/8/2019 - Present        Severe obesity ([facility] ICD-10-CM: E66.01  ICD-9-CM: 278.01  12/12/2018 - Present        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 ([facility] ICD-10-CM: E10.311  ICD-9-CM: 250.51, 362.07, 362.01  6/1/2018 - Present        Mixed hyperlipidemia ICD-10-CM: E78.2  ICD-9-CM: 272.2  6/1/2018 - Present        Sepsis ([facility] ICD-10-CM: A41.9  ICD-9-CM: 038.9, 995.91  12/19/2017 - Present        GI bleed ICD-10-CM: K92.2  ICD-9-CM: 578.9  12/19/2017 - Present        AVM (arteriovenous malformation) of duodenum, acquired with hemorrhage ICD-10-CM: K31.811  ICD-9-CM: 537.83  9/29/2017 - Present        Erosive gastritis with hemorrhage ICD-10-CM: K29.61  ICD-9-CM: 535.41  9/26/2017 - Present        Hypothyroidism due to Hashimoto's thyroiditis ICD-10-CM: E03.8, E06.3  ICD-9-CM: 244.8, 245.2  5/12/2016 - Present        Obesity, Class I, BMI 30.0-34.9 (see actual BMI) ICD-10-CM: E66.9  ICD-9-CM: 278.00  2/4/2016 - Present        Low back pain at multiple sites ICD-10-CM: M54.50  ICD-9-CM: 724.2  2/4/2016 - Present        Essential hypertension ICD-10-CM: I10  ICD-9-CM: 401.9  10/14/2015 - Present        CAD (coronary artery disease) ICD-10-CM: I25.10  ICD-9-CM: 414.00 7/9/2015 - Present        RESOLVED: S/P CABG x 3 ICD-10-CM: Z95.1  ICD-9-CM: V45.81  7/13/2015 - 2/4/2016    Overview Signed 7/13/2015 11:49 AM by Kiarra Rao PA-C     CABG X 3 LIMA to LAD, RSVG to OM, RSVG to RCA             RESOLVED: Anemia associated with acute blood loss ICD-10-CM: D62  ICD-9-CM: 285.1  7/10/2015 - 2/4/2016        RESOLVED: Tobacco abuse (Chronic) ICD-10-CM: Z72.0  ICD-9-CM: 305.1  7/9/2015 - 2/4/2016        RESOLVED: Chest pain ICD-10-CM: R07.9  ICD-9-CM: 786.50  7/5/2015 - 2/4/2016              Greater than 30 minutes were spent with the patient on counseling and coordination of care    Signed:   Mita Sharma MD  4/8/2022  1:06 PM

## 2022-04-08 NOTE — PROGRESS NOTES
KRIS: Plan for discharge to [place] [address] today. Negative PCR 4/5. [facility] phone [phone] transport set for 2 PM.      Discharge folder located on hard chart to include ambulance for, and discharge AVS. RN to follow with kardex, MAR and call report to #[phone]    Chart reviewed. CM spoke with Natasha Huynh at [place] [address]. They can accept patient today at 2 PM. CM requested transport with [facility]. CM called patient's partner Quynh Woods [phone] and notified of transport time. Samara Dan will bring alvin's CPAP machine to [place] [address]. Medicare pt has received, reviewed, and signed 2nd IM letter informing them of their right to appeal the discharge. Signed copy has been placed on pt bedside chart.     Dotty Lima BSW/CRM

## 2022-04-08 NOTE — DIABETES MGMT
[address] [facility]    CLINICAL NURSE SPECIALIST CONSULT     Initial Presentation   Corry Soto is a 70 y.o. female who presented to the ED 4/1/22 via EMS with right ankle pain and deformity following a fall. X-ray of the right ankle shows previous ORIF with mildly displaced acute distal tibial fracture and possible nondisplaced distal fibular fracture. HX:   Past Medical History:   Diagnosis Date    Adverse effect of anesthesia     SLOW WAKING PAST ANESTHESIA    Anxiety     Arthritis     CAD (coronary artery disease)     s/p CABG x 3v    Chest pain 7/2015    Chronic obstructive pulmonary disease (HCC)     CTS (carpal tunnel syndrome)     S/p bilateral release    Diabetes ([address]     Diabetic gastroparesis ([address]     Diabetic neuropathy ([address]     Diabetic retinopathy ([address]     GERD (gastroesophageal reflux disease)     GI bleed 7/2015    4 bleeds with 9 clips placed    Hypercholesteremia     Hypertension     Hypothyroid     Ill-defined condition     NEUROPATHY HANDS AND FEET    Ill-defined condition 2017    GI BLEED    Nicotine vapor product user     JOHN on CPAP     Osteoporosis     Vitamin D deficiency         INITIAL DX:   Tibia fracture [S82.209A]     Current Treatment     TX: Specialty consultation: Ortho and nephrology    Consulted by Hung Rodriges NP for advanced diabetes nursing assessment and care for:   [x] Inpatient management strategy    Hospital Course   Clinical progress has been uncomplicated. 4/1: Admission. Seen by ortho: alignment ok and will be non-weight bearing x6 wks  4/4: Fevers, elevated creat. CXR without acute change. Cx sent. Seen by nephrology for elevated creat. No inpatient adjustments- needs outpatient FU  Diabetes History   Type 1 Diabetes- Diagnosed at age 16  Ambulatory BG management provided by: Endocrinology- Yomaira Vargas MD- Last visit 2/4/22  Family history positive for diabetes:  Mother and sister with DM2    Diabetes-related Medical History  Acute complications  None   Neurological complications  Gastroparesis and Peripheral neuropathy  Microvascular disease  Retinopathy and Nephropathy  Macrovascular disease  CAD  Other associated conditions     Osteoporosis and frequent falls    Diabetes Medication History  Key Antihyperglycemic Medications             insulin aspart U-100 (NovoLOG U-100 Insulin aspart) 100 unit/mL injection (Taking) INJECT A MAX  UNITS UNDER THE SKIN DAILY WITH INSULIN PUMP         Basal   MN-630AM: 2.65  630AM-4PM: 2.50  4PM-MN: 2.55  TDD: 61.375 units    Carb Ratio  1:4    Correction Factor  1:25    Target  120    Active Insulin Time  3    Diabetes self-management practices:   Eating pattern: Too drowsy to discuss      Physical activity pattern  using a walker at home  Monitoring pattern: Too drowsy to discuss. Per last note with endo in Feb fasting BG is in the 200s and evening BG is 180-200s  Taking medications pattern  [x] Consistent administration: Not able to assess    Overall evaluation:    [x] Not achieving A1c target with drug therapy & self-care practices    Subjective   I didn't touch my pump last night\"     Has a life partner  Former smoker- quit 7 yrs ago  Frequent falls  Relocated to [place] from [place] to be closer to sister  15 mos ago had previous R Ankle ORIF   Sees optho: She tells me that they recommended removal of R eye for prosthetic eye  Objective   Physical exam  General Obese white female in no acute distress/ill-appearing. Conversant and cooperative  Neuro  Awake, alert, oriented. Vital Signs   Visit Vitals  BP (!) 155/70   Pulse (!) 55   Temp 98 °F (36.7 °C)   Resp 16   Wt 105.3 kg (232 lb 2.3 oz)   SpO2 97%   BMI 39.85 kg/m²     Skin  Warm and dry. No acanthosis noted along neckline. No lipohypertrophy or lipoatrophy noted at injection sites   Heart   Regular rate and rhythm.  No murmurs, rubs or gallops  Lungs  Clear to auscultation without rales or rhonchi  Extremities R foot in surgical wrap    Laboratory  Recent Labs     22  0535 22  0544 22  0543 22  0641 22  0641   * 253* 243*   < > 162*   AGAP 5 6 7   < > 5   WBC 4.7  --  5.2  --  5.9   CREA 2.76* 2.89* 2.92*   < > 3.05*   GFRNA 17* 16* 16*   < > 15*   AST 11*  --  13*  --  15   ALT 11*  --  12  --  8*    < > = values in this interval not displayed. Factors impacting BG management  Factor Dose Comments   Nutrition:  Standard meals     60 grams/meal      Pain R ankle pain    Other:   Kidney function   GFR 13      Blood glucose pattern      Significant diabetes-related events over the past 24-72 hours  A1C 8.7% in February   -238 the first two days of admission. Fasting hypoglycemia at 66 4/4    Fasting BG this am: 201 (but she refused basal last night)  Pre-prandial B-251  Basal: 16 units NPH Q12  Bolus: 6 units humalog/meal  Correction: 11 units in the last 24h  Remains off insulin pump  Discharge today to SNF    Assessment and Plan   Nursing Diagnosis Risk for unstable blood glucose pattern   Nursing Intervention Domain 9391 Decision-making Support   Nursing Interventions Examined current inpatient diabetes/blood glucose control   Explored factors facilitating and impeding inpatient management  Explored corrective strategies with patient and responsible inpatient provider   Informed patient of rational for insulin strategy while hospitalized     Evaluation   Ken Bill is a 70year old female, with a nearly 48 year history of Type 1 Diabetes on a medtronic insulin pump, with long term complications including CAD s/p CABG, gastroparesis, retinopathy, nephropathy and osteoporosis admitted with a right tibia fracture after a fall. A1C two months ago was 8.7%. Her insulin pump was continued the first two days of hospital admission but stopped 4/4 as she experienced fasting hypoglycemia.   Her BG emelia to over 300 since her pump has stopped and only correctional insulin was ordered. Given limited history if insulin use at that time, renal dose basal/bolus/correctional insulin was started. Her fasting BG was 153 but 231 after eating breakfast without bolus insulin. She did want to transition back to her insulin pump but never resumed her pump as instructed. Given that her support partner is not at bedside and she continues to forget insulin plan, we will not resume pump therapy and resumed moderate dose SQ basal/bolus insulin. BG goal 140-180mg/dl. Recommendations   1. POC glucose ACHS    2. Consistent carbohydrate diet (60 grams CHO/meal)    3. Continue the subcutaneous Insulin Order set (2604): Insulin Dosing Specific recommendation   Basal                                      (Based on weight, BMI & GFR) [x] (Moderate/Renal dose) 0.3 units/kg/D: 16 units NPH Q12    Nutritional                                      (Based on CHO/dextrose load) [x] Normal sensitivity: 6 units Humalog/meal    Hold if patient consumes less   than 50% of carbohydrates on  meal tray Advance by 2 units daily for   persistent pre-prandial   hyperglycemia   Corrective                                       (Useful in adjusting insulin dosing) [x] Resistant sensitivity: ACHS          Please discharge to [facility] on above insulin therapy doses  Discharge Recommendations   1. Lana Roblero with endocrinologist scheduled for 6/16/22 with Junior Eastman MD at 11:30am    Billing Code(s)   [x] 94703    Before making these care recommendations, I personally reviewed the hospitalization record, including notes, laboratory & diagnostic data and current medications, and examined the patient at the bedside (circumstances permitting) before making care recommendations. More than fifty (50) percent of the time was spent in patient counseling and/or care coordination.   Total minutes: 13    KIMBERLI Mack  Diabetes Clinical Nurse Specialist  [facility]  Access via [facility]

## 2022-04-08 NOTE — PROGRESS NOTES
Problem: Falls - Risk of  Goal: *Absence of Falls  Description: Document Liza Machado Fall Risk and appropriate interventions in the flowsheet. Outcome: Progressing Towards Goal  Note: Fall Risk Interventions:  Mobility Interventions: Communicate number of staff needed for ambulation/transfer    Mentation Interventions: Bed/chair exit alarm    Medication Interventions: Patient to call before getting OOB    Elimination Interventions: Call light in reach    History of Falls Interventions: Door open when patient unattended         Problem: Patient Education: Go to Patient Education Activity  Goal: Patient/Family Education  Outcome: Progressing Towards Goal     Problem: Diabetes Self-Management  Goal: *Disease process and treatment process  Description: Define diabetes and identify own type of diabetes; list 3 options for treating diabetes. Outcome: Progressing Towards Goal     Problem: Pressure Injury - Risk of  Goal: *Prevention of pressure injury  Description: Document Rehan Scale and appropriate interventions in the flowsheet.   Outcome: Progressing Towards Goal  Note: Pressure Injury Interventions:  Sensory Interventions: Assess changes in LOC    Moisture Interventions: Absorbent underpads    Activity Interventions: Pressure redistribution bed/mattress(bed type)    Mobility Interventions: HOB 30 degrees or less,Float heels    Nutrition Interventions: Document food/fluid/supplement intake    Friction and Shear Interventions: HOB 30 degrees or less                Problem: Patient Education: Go to Patient Education Activity  Goal: Patient/Family Education  Outcome: Progressing Towards Goal

## 2022-04-09 LAB
BACTERIA SPEC CULT: NORMAL
SERVICE CMNT-IMP: NORMAL

## 2022-04-14 ENCOUNTER — TELEPHONE (OUTPATIENT)
Dept: ENDOCRINOLOGY | Age: 72
End: 2022-04-14

## 2022-04-14 NOTE — LETTER
4/15/2022 7:58 AM    Ms. Chris Hutton  [address] [facility] [address]   1950    Based on her blood sugar readings and based on her insulin pump settings my initial recommendation would be to give Ms Tijerina How her insulin pump back and allow her to control her insulin. My secondary recommendation would be to increase her Lantus from 15 units HS to 30 units HS and change her Aspart to 10 units with each meal plus correction:     150-199 1 additional units  200-249 2 additional units  250-299 3 additional units  300-349 4 additional units  350-399 5 additional units  400-449 6 additional units  450-499 7 additional units  500-549 8 additional units  550+ 9 additional units     Test her blood sugar BEFORE EACH MEAL AND AT BEDTIME and give additional Aspart as needed based on the above correction IN ADDITION to the 10 units of Aspart with each meal.     Fax to us her blood sugar readings in 2 weeks: [phone].       Sincerely,        Junior Eastman MD

## 2022-04-14 NOTE — TELEPHONE ENCOUNTER
See [employer] message from 4/14/22. Requested MAR and blood sugar readings from [facility]. Spoke with Jesus Manuel Nicolas RN at She states will fax here. Patient is on [address]. Fax number is [phone].

## 2022-04-14 NOTE — TELEPHONE ENCOUNTER
----- Message from Gee Griffith sent at 4/14/2022  3:18 PM EDT -----  Regarding: Blood sugar too high   Broke my ankle (again! ) on April 1st. At [facility] until April 8th, been here since then.    Yane Weaver MD  [phone]

## 2022-04-15 ENCOUNTER — HOSPITAL ENCOUNTER (EMERGENCY)
Age: 72
Discharge: HOME | End: 2022-04-15
Attending: EMERGENCY MEDICINE | Admitting: EMERGENCY MEDICINE
Payer: MEDICARE

## 2022-04-15 VITALS
WEIGHT: 235.89 LBS | HEART RATE: 53 BPM | SYSTOLIC BLOOD PRESSURE: 179 MMHG | OXYGEN SATURATION: 96 % | BODY MASS INDEX: 39.3 KG/M2 | RESPIRATION RATE: 15 BRPM | TEMPERATURE: 98.6 F | HEIGHT: 65 IN | DIASTOLIC BLOOD PRESSURE: 77 MMHG

## 2022-04-15 DIAGNOSIS — R73.9 HYPERGLYCEMIA: ICD-10-CM

## 2022-04-15 DIAGNOSIS — N39.0 URINARY TRACT INFECTION WITHOUT HEMATURIA, SITE UNSPECIFIED: Primary | ICD-10-CM

## 2022-04-15 LAB
ALBUMIN SERPL-MCNC: 3.1 G/DL (ref 3.5–5)
ALBUMIN/GLOB SERPL: 0.6 {RATIO} (ref 1.1–2.2)
ALP SERPL-CCNC: 111 U/L (ref 45–117)
ALT SERPL-CCNC: 12 U/L (ref 12–78)
ANION GAP SERPL CALC-SCNC: 5 MMOL/L (ref 5–15)
APPEARANCE UR: ABNORMAL
AST SERPL-CCNC: 9 U/L (ref 15–37)
ATRIAL RATE: 57 BPM
BACTERIA URNS QL MICRO: ABNORMAL /HPF
BASOPHILS # BLD: 0.1 K/UL (ref 0–0.1)
BASOPHILS NFR BLD: 1 % (ref 0–1)
BILIRUB SERPL-MCNC: 0.2 MG/DL (ref 0.2–1)
BILIRUB UR QL: NEGATIVE
BUN SERPL-MCNC: 40 MG/DL (ref 6–20)
BUN/CREAT SERPL: 15 (ref 12–20)
CALCIUM SERPL-MCNC: 9.6 MG/DL (ref 8.5–10.1)
CALCULATED P AXIS, ECG09: 46 DEGREES
CALCULATED R AXIS, ECG10: 46 DEGREES
CALCULATED T AXIS, ECG11: 77 DEGREES
CHLORIDE SERPL-SCNC: 96 MMOL/L (ref 97–108)
CO2 SERPL-SCNC: 29 MMOL/L (ref 21–32)
COLOR UR: ABNORMAL
COMMENT, HOLDF: NORMAL
COMMENT, HOLDF: NORMAL
CREAT SERPL-MCNC: 2.65 MG/DL (ref 0.55–1.02)
DIAGNOSIS, 93000: NORMAL
DIFFERENTIAL METHOD BLD: ABNORMAL
EOSINOPHIL # BLD: 0.2 K/UL (ref 0–0.4)
EOSINOPHIL NFR BLD: 3 % (ref 0–7)
EPITH CASTS URNS QL MICRO: ABNORMAL /LPF
ERYTHROCYTE [DISTWIDTH] IN BLOOD BY AUTOMATED COUNT: 12.4 % (ref 11.5–14.5)
GLOBULIN SER CALC-MCNC: 5 G/DL (ref 2–4)
GLUCOSE BLD STRIP.AUTO-MCNC: 347 MG/DL (ref 65–117)
GLUCOSE BLD STRIP.AUTO-MCNC: 463 MG/DL (ref 65–117)
GLUCOSE BLD STRIP.AUTO-MCNC: 521 MG/DL (ref 65–117)
GLUCOSE BLD STRIP.AUTO-MCNC: 524 MG/DL (ref 65–117)
GLUCOSE SERPL-MCNC: 547 MG/DL (ref 65–100)
GLUCOSE UR STRIP.AUTO-MCNC: >1000 MG/DL
HCT VFR BLD AUTO: 36.7 % (ref 35–47)
HGB BLD-MCNC: 11.8 G/DL (ref 11.5–16)
HGB UR QL STRIP: ABNORMAL
IMM GRANULOCYTES # BLD AUTO: 0.1 K/UL (ref 0–0.04)
IMM GRANULOCYTES NFR BLD AUTO: 1 % (ref 0–0.5)
KETONES UR QL STRIP.AUTO: NEGATIVE MG/DL
LEUKOCYTE ESTERASE UR QL STRIP.AUTO: ABNORMAL
LYMPHOCYTES # BLD: 0.7 K/UL (ref 0.8–3.5)
LYMPHOCYTES NFR BLD: 12 % (ref 12–49)
MCH RBC QN AUTO: 32.3 PG (ref 26–34)
MCHC RBC AUTO-ENTMCNC: 32.2 G/DL (ref 30–36.5)
MCV RBC AUTO: 100.5 FL (ref 80–99)
MONOCYTES # BLD: 0.4 K/UL (ref 0–1)
MONOCYTES NFR BLD: 8 % (ref 5–13)
NEUTS SEG # BLD: 4 K/UL (ref 1.8–8)
NEUTS SEG NFR BLD: 75 % (ref 32–75)
NITRITE UR QL STRIP.AUTO: NEGATIVE
NRBC # BLD: 0 K/UL (ref 0–0.01)
NRBC BLD-RTO: 0 PER 100 WBC
P-R INTERVAL, ECG05: 142 MS
PH UR STRIP: 7 [PH] (ref 5–8)
PLATELET # BLD AUTO: 229 K/UL (ref 150–400)
PMV BLD AUTO: 11.9 FL (ref 8.9–12.9)
POTASSIUM SERPL-SCNC: 5.3 MMOL/L (ref 3.5–5.1)
PROT SERPL-MCNC: 8.1 G/DL (ref 6.4–8.2)
PROT UR STRIP-MCNC: 300 MG/DL
Q-T INTERVAL, ECG07: 494 MS
QRS DURATION, ECG06: 86 MS
QTC CALCULATION (BEZET), ECG08: 480 MS
RBC # BLD AUTO: 3.65 M/UL (ref 3.8–5.2)
RBC #/AREA URNS HPF: ABNORMAL /HPF (ref 0–5)
RBC MORPH BLD: ABNORMAL
SAMPLES BEING HELD,HOLD: NORMAL
SAMPLES BEING HELD,HOLD: NORMAL
SERVICE CMNT-IMP: ABNORMAL
SODIUM SERPL-SCNC: 130 MMOL/L (ref 136–145)
SP GR UR REFRACTOMETRY: 1.02 (ref 1–1.03)
UROBILINOGEN UR QL STRIP.AUTO: 1 EU/DL (ref 0.2–1)
VENTRICULAR RATE, ECG03: 57 BPM
WBC # BLD AUTO: 5.5 K/UL (ref 3.6–11)
WBC URNS QL MICRO: ABNORMAL /HPF (ref 0–4)

## 2022-04-15 PROCEDURE — 99284 EMERGENCY DEPT VISIT MOD MDM: CPT

## 2022-04-15 PROCEDURE — 81001 URINALYSIS AUTO W/SCOPE: CPT

## 2022-04-15 PROCEDURE — 87086 URINE CULTURE/COLONY COUNT: CPT

## 2022-04-15 PROCEDURE — 96374 THER/PROPH/DIAG INJ IV PUSH: CPT

## 2022-04-15 PROCEDURE — 74011250636 HC RX REV CODE- 250/636: Performed by: EMERGENCY MEDICINE

## 2022-04-15 PROCEDURE — 82962 GLUCOSE BLOOD TEST: CPT

## 2022-04-15 PROCEDURE — 93005 ELECTROCARDIOGRAM TRACING: CPT

## 2022-04-15 PROCEDURE — 85025 COMPLETE CBC W/AUTO DIFF WBC: CPT

## 2022-04-15 PROCEDURE — 74011636637 HC RX REV CODE- 636/637: Performed by: EMERGENCY MEDICINE

## 2022-04-15 PROCEDURE — 80053 COMPREHEN METABOLIC PANEL: CPT

## 2022-04-15 PROCEDURE — 96376 TX/PRO/DX INJ SAME DRUG ADON: CPT

## 2022-04-15 PROCEDURE — 74011000250 HC RX REV CODE- 250: Performed by: EMERGENCY MEDICINE

## 2022-04-15 PROCEDURE — 96361 HYDRATE IV INFUSION ADD-ON: CPT

## 2022-04-15 PROCEDURE — 36415 COLL VENOUS BLD VENIPUNCTURE: CPT

## 2022-04-15 PROCEDURE — 96375 TX/PRO/DX INJ NEW DRUG ADDON: CPT

## 2022-04-15 RX ORDER — CEPHALEXIN 250 MG/1
250 CAPSULE ORAL 3 TIMES DAILY
Qty: 30 CAPSULE | Refills: 0 | Status: SHIPPED | OUTPATIENT
Start: 2022-04-15 | End: 2022-04-25

## 2022-04-15 RX ADMIN — Medication 10 UNITS: at 13:20

## 2022-04-15 RX ADMIN — SODIUM CHLORIDE 1000 ML: 9 INJECTION, SOLUTION INTRAVENOUS at 13:19

## 2022-04-15 RX ADMIN — SODIUM CHLORIDE 1000 ML: 9 INJECTION, SOLUTION INTRAVENOUS at 17:00

## 2022-04-15 RX ADMIN — Medication 10 UNITS: at 18:31

## 2022-04-15 RX ADMIN — CEFTRIAXONE SODIUM 1 G: 1 INJECTION, POWDER, FOR SOLUTION INTRAMUSCULAR; INTRAVENOUS at 20:00

## 2022-04-15 RX ADMIN — Medication 10 UNITS: at 15:49

## 2022-04-15 NOTE — TELEPHONE ENCOUNTER
Sent a copy to patient via [address] Faxed letter to [facility] Attn: Lynda Jennings RN. Fax: [phone].  Phone: [phone]

## 2022-04-15 NOTE — ED PROVIDER NOTES
This is a 72-year-old female with a history of coronary artery disease COPD, diabetes and diabetic neuropathy as well as gastroparesis. She has a history of hypertension and recently fell and fractured her right ankle. She was noted today at the nursing home to have a blood sugar at 575, and her potassium was noted to be 7.5. EMS was called to bring her to the hospital for further evaluation. Patient denies any acute symptomatology. She has had no nausea or vomiting, chest pain, diarrhea or urinary symptoms. There is been no shortness of breath. She is not aware of any changes in her medication. She says her normal sugars will run anywhere from 3-600. She has never had elevated potassium previously.            Past Medical History:   Diagnosis Date    Adverse effect of anesthesia     SLOW WAKING PAST ANESTHESIA    Anxiety     Arthritis     CAD (coronary artery disease)     s/p CABG x 3v    Chest pain 7/2015    Chronic obstructive pulmonary disease (HCC)     CTS (carpal tunnel syndrome)     S/p bilateral release    Diabetes ([address]     Diabetic gastroparesis ([address]     Diabetic neuropathy ([address]     Diabetic retinopathy ([address]     GERD (gastroesophageal reflux disease)     GI bleed 7/2015    4 bleeds with 9 clips placed    Hypercholesteremia     Hypertension     Hypothyroid     Ill-defined condition     NEUROPATHY HANDS AND FEET    Ill-defined condition 2017    GI BLEED    Nicotine vapor product user     JOHN on CPAP     Osteoporosis     Vitamin D deficiency        Past Surgical History:   Procedure Laterality Date    HX CERVICAL FUSION  2011    HX ENDOSCOPY  7/2015    HX GI      HX HEART CATHETERIZATION  7/2015    HX LUMBAR FUSION  2017    HX LUMBAR LAMINECTOMY  2011    HX ORTHOPAEDIC Right 1970    CARPAL TUNNEL    HX ORTHOPAEDIC Left 2003    CARPAL TUNNEL    HX ROTATOR CUFF REPAIR Right 2003    HX SHOULDER ARTHROSCOPY Right     HX TONSIL AND ADENOIDECTOMY  1968    HX TONSILLECTOMY      NE CABG, ARTERY-VEIN, THREE  2015         Family History:   Problem Relation Age of Onset   Heather Melchor COPD Mother    Heather Melchor Diabetes Mother    Heather Melchor COPD Father     Diabetes Father     Cancer Father         pancreatic    Diabetes Brother     Heart Disease Brother     Diabetes Brother     Alcohol abuse Brother     Diabetes Sister     Cancer Sister         Melenoma    Bleeding Prob Sister         Factor V Leiden    Anesth Problems Neg Hx        Social History     Socioeconomic History    Marital status: LIFE PARTNER     Spouse name: Not on file    Number of children: Not on file    Years of education: Not on file    Highest education level: Not on file   Occupational History    Occupation: [employer]   Tobacco Use    Smoking status: Former Smoker     Packs/day: 0.50     Years: 40.00     Pack years: 20.00     Quit date: 2015     Years since quittin.7    Smokeless tobacco: Never Used   Substance and Sexual Activity    Alcohol use: No     Alcohol/week: 0.0 standard drinks    Drug use: No    Sexual activity: Never   Other Topics Concern    Not on file   Social History Narrative    Lives in [place] with partner, moved from [place] to be closer to sister and \"doctors in [place] almost killed me\"     Social Determinants of Health     Financial Resource Strain:     Difficulty of Paying Living Expenses: Not on file   Food Insecurity:     Worried About Running Out of Food in the Last Year: Not on file    Ese of Food in the Last Year: Not on file   Transportation Needs:     Lack of Transportation (Medical): Not on file    Lack of Transportation (Non-Medical):  Not on file   Physical Activity:     Days of Exercise per Week: Not on file    Minutes of Exercise per Session: Not on file   Stress:     Feeling of Stress : Not on file   Social Connections:     Frequency of Communication with Friends and Family: Not on file    Frequency of Social Gatherings with Friends and Family: Not on file   Heather Melchor Attends Alevism Services: Not on file    Active Member of Clubs or Organizations: Not on file    Attends Club or Organization Meetings: Not on file    Marital Status: Not on file   Intimate Partner Violence:     Fear of Current or Ex-Partner: Not on file    Emotionally Abused: Not on file    Physically Abused: Not on file    Sexually Abused: Not on file   Housing Stability:     Unable to Pay for Housing in the Last Year: Not on file    Number of [place] in the Last Year: Not on file    Unstable Housing in the Last Year: Not on file         ALLERGIES: Nsaids (non-steroidal anti-inflammatory drug), Augmentin [amoxicillin-pot clavulanate], Oxycodone, and Vesicare [solifenacin]    Review of Systems   Constitutional: Negative for activity change, appetite change and fatigue. HENT: Negative for ear pain, facial swelling, sore throat and trouble swallowing. Eyes: Negative for pain, discharge and visual disturbance. Respiratory: Negative for chest tightness, shortness of breath and wheezing. Cardiovascular: Negative for chest pain and palpitations. Gastrointestinal: Negative for abdominal pain, blood in stool, nausea and vomiting. Genitourinary: Negative for difficulty urinating, flank pain and hematuria. Musculoskeletal: Negative for arthralgias, joint swelling, myalgias and neck pain. Recent fracture of the right lower leg ankle. Skin: Negative for color change and rash. Neurological: Negative for dizziness, weakness, numbness and headaches. Hematological: Negative for adenopathy. Does not bruise/bleed easily. Psychiatric/Behavioral: Negative for behavioral problems, confusion and sleep disturbance. All other systems reviewed and are negative.       Vitals:    04/15/22 1254 04/15/22 1301   BP: (!) 203/68 (!) 172/90   Pulse: (!) 58 (!) 57   Resp: 20 14   Temp: 99.1 °F (37.3 °C)    SpO2: 95%    Weight: 107 kg (235 lb 14.3 oz)    Height: 5' 5\" (1.651 m)             Physical Exam  Vitals and nursing note reviewed. Constitutional:       General: She is not in acute distress. Appearance: She is well-developed. She is obese. She is not ill-appearing or diaphoretic. HENT:      Head: Normocephalic and atraumatic. Nose: Nose normal.   Eyes:      General: No scleral icterus. Conjunctiva/sclera: Conjunctivae normal.      Pupils: Pupils are equal, round, and reactive to light. Neck:      Thyroid: No thyromegaly. Vascular: No JVD. Trachea: No tracheal deviation. Comments: No carotid bruits noted. Cardiovascular:      Rate and Rhythm: Normal rate and regular rhythm. Heart sounds: Normal heart sounds. No murmur heard. No friction rub. No gallop. Pulmonary:      Effort: Pulmonary effort is normal. No respiratory distress. Breath sounds: Normal breath sounds. No wheezing or rales. Chest:      Chest wall: No tenderness. Abdominal:      General: Bowel sounds are normal. There is no distension. Palpations: Abdomen is soft. There is no mass. Tenderness: There is no abdominal tenderness. There is no guarding or rebound. Musculoskeletal:         General: No tenderness. Normal range of motion. Cervical back: Normal range of motion and neck supple. Comments: There is a cast on the right foot and lower leg. Lymphadenopathy:      Cervical: No cervical adenopathy. Skin:     General: Skin is warm and dry. Capillary Refill: Capillary refill takes more than 3 seconds. Findings: No erythema or rash. Neurological:      General: No focal deficit present. Mental Status: She is alert and oriented to person, place, and time. Cranial Nerves: No cranial nerve deficit. Motor: No weakness. Coordination: Coordination normal.      Deep Tendon Reflexes: Reflexes are normal and symmetric. Psychiatric:         Behavior: Behavior normal.         Thought Content:  Thought content normal.         Judgment: Judgment normal.          MDM  Number of Diagnoses or Management Options     Amount and/or Complexity of Data Reviewed  Clinical lab tests: ordered and reviewed  Tests in the radiology section of CPT®: ordered and reviewed  Decide to obtain previous medical records or to obtain history from someone other than the patient: yes  Review and summarize past medical records: yes  Discuss the patient with other providers: yes  Independent visualization of images, tracings, or specimens: yes    Risk of Complications, Morbidity, and/or Mortality  Presenting problems: high  Diagnostic procedures: high  Management options: high    Patient Progress  Patient progress: stable         Procedures    This is a 70-year-old female who is diabetic and comes in for elevated blood sugar and elevated potassium from the nursing home. She is in no acute distress. Will give fluids and some insulin for her blood sugar 525. ED MD EKG interpretation: There is a normal sinus rhythm at 57 beats a minute. Axis is normal and intervals are normal.  No ectopy or acute ischemic change appreciated. me    The patient continues to run blood sugars in the 460 range and this is after 2 L of fluid and 2 doses of 10 units of regular insulin. She is not vomiting and not having any stool changes. I will consult her primary care physician. She was only sent here for suspected elevated potassium. Suspect she may be dischargeable home. 7:42 PM  Patient's labs suggest a UTI. Her blood sugar is now down in acceptable range. I have spoken with the physician on-call for Dr. Herrera Arechiga she will follow-up with Dr. Herrera Arechiga in the next 7 to 10 days for further evaluation and UTI. .  We are discharging the patient back to rehab to continue her insulin and to also begin Keflex. Patient's final sugar prior to discharge is 347.

## 2022-04-15 NOTE — TELEPHONE ENCOUNTER
Based on her blood sugar readings and based on her insulin pump settings my initial recommendation would be to give Ms Janie Jarrett her insulin pump back and allow her to control her insulin. My secondary recommendation would be to increase her Lantus from 15 units HS to 30 units HS and change her Aspart to 10 units with each meal plus correction. 150-199 1 additional units  200-249 2 additional units  250-299 3 additional units  300-349 4 additional units  350-399 5 additional units  400-449 6 additional units  450-499 7 additional units  500-549 8 additional units  550+ 9 additional units    Test her blood sugar BEFORE EACH MEAL AND AT BEDTIME and give additional Aspart as needed based on the above correction IN ADDITION to the 10 units of Aspart with each meal.    Fax to us her blood sugar readings in 2 weeks.

## 2022-04-15 NOTE — DISCHARGE INSTRUCTIONS
Use the medications as directed for infection of the urine. Culture has been done and will be resulted in 2 to 3 days. Will need to follow-up with your own physician if continued difficulty. Drink plenty of fluids. You will need to make sure you keep your sugars well controlled with insulin. Follow-up with your own physician for continued difficulty.

## 2022-04-17 LAB
BACTERIA SPEC CULT: NORMAL
CC UR VC: NORMAL
SERVICE CMNT-IMP: NORMAL

## 2022-06-16 ENCOUNTER — VIRTUAL VISIT (OUTPATIENT)
Dept: ENDOCRINOLOGY | Age: 72
End: 2022-06-16
Payer: MEDICARE

## 2022-06-16 DIAGNOSIS — E06.3 HYPOTHYROIDISM DUE TO HASHIMOTO'S THYROIDITIS: ICD-10-CM

## 2022-06-16 DIAGNOSIS — E10.311 TYPE 1 DIABETES MELLITUS WITH RETINOPATHY AND MACULAR EDEMA, UNSPECIFIED LATERALITY, UNSPECIFIED RETINOPATHY SEVERITY (HCC): Primary | ICD-10-CM

## 2022-06-16 DIAGNOSIS — E78.2 MIXED HYPERLIPIDEMIA: ICD-10-CM

## 2022-06-16 DIAGNOSIS — E03.8 HYPOTHYROIDISM DUE TO HASHIMOTO'S THYROIDITIS: ICD-10-CM

## 2022-06-16 DIAGNOSIS — I10 ESSENTIAL HYPERTENSION: ICD-10-CM

## 2022-06-16 PROCEDURE — G8427 DOCREV CUR MEDS BY ELIG CLIN: HCPCS | Performed by: INTERNAL MEDICINE

## 2022-06-16 PROCEDURE — G8400 PT W/DXA NO RESULTS DOC: HCPCS | Performed by: INTERNAL MEDICINE

## 2022-06-16 PROCEDURE — 1090F PRES/ABSN URINE INCON ASSESS: CPT | Performed by: INTERNAL MEDICINE

## 2022-06-16 PROCEDURE — 2022F DILAT RTA XM EVC RTNOPTHY: CPT | Performed by: INTERNAL MEDICINE

## 2022-06-16 PROCEDURE — 3052F HG A1C>EQUAL 8.0%<EQUAL 9.0%: CPT | Performed by: INTERNAL MEDICINE

## 2022-06-16 PROCEDURE — 1101F PT FALLS ASSESS-DOCD LE1/YR: CPT | Performed by: INTERNAL MEDICINE

## 2022-06-16 PROCEDURE — G8756 NO BP MEASURE DOC: HCPCS | Performed by: INTERNAL MEDICINE

## 2022-06-16 PROCEDURE — 1123F ACP DISCUSS/DSCN MKR DOCD: CPT | Performed by: INTERNAL MEDICINE

## 2022-06-16 PROCEDURE — 3017F COLORECTAL CA SCREEN DOC REV: CPT | Performed by: INTERNAL MEDICINE

## 2022-06-16 PROCEDURE — 99214 OFFICE O/P EST MOD 30 MIN: CPT | Performed by: INTERNAL MEDICINE

## 2022-06-16 PROCEDURE — G8432 DEP SCR NOT DOC, RNG: HCPCS | Performed by: INTERNAL MEDICINE

## 2022-06-16 RX ORDER — DILTIAZEM HYDROCHLORIDE 180 MG/1
CAPSULE, COATED, EXTENDED RELEASE ORAL
Qty: 90 CAPSULE | Refills: 3 | Status: SHIPPED | OUTPATIENT
Start: 2022-06-16 | End: 2022-06-25

## 2022-06-16 RX ORDER — DILTIAZEM HYDROCHLORIDE 180 MG/1
CAPSULE, COATED, EXTENDED RELEASE ORAL
COMMUNITY
Start: 2022-05-03 | End: 2022-06-16 | Stop reason: SDUPTHER

## 2022-06-16 RX ORDER — TAMSULOSIN HYDROCHLORIDE 0.4 MG/1
0.4 CAPSULE ORAL DAILY
COMMUNITY
Start: 2022-06-14 | End: 2022-08-22

## 2022-06-16 RX ORDER — ERGOCALCIFEROL 1.25 MG/1
CAPSULE ORAL
COMMUNITY
Start: 2022-03-15

## 2022-06-16 RX ORDER — METOPROLOL TARTRATE 50 MG/1
50 TABLET ORAL 2 TIMES DAILY
Qty: 180 TABLET | Refills: 3 | Status: SHIPPED | OUTPATIENT
Start: 2022-06-16

## 2022-06-16 NOTE — LETTER
6/16/2022 11:49 AM    Patient:  Rl Arias   YOB: 1950  Date of Visit: 6/16/2022      Dear Marleen Mohan MD  [address] [facility] [address]  Via Fax: [phone] Camacho Johnson MD  [address] [place] [address] [place] [address]  Via In [address] [place], [place] [address] [address]  Via Fax: [phone]     Maritza Lan MD  [address] [address]  Via In [facility] [address]: Thank you for referring Ms. Jaxon Vazquez to me for evaluation/treatment. Below are the relevant portions of my assessment and plan of care. If you have questions, please do not hesitate to call me. I look forward to following Ms. Amaral along with you. Chief Complaint   Patient presents with    Diabetes     pcp and pharmacy verified [facility] [place]  [phone] (M)    Records since last visit reviewed          **THIS IS A VIRTUAL VISIT VIA A VIDEO ENCOUNTER. PATIENT AGREED TO HAVE THEIR CARE DELIVERED OVER VIDEO IN PLACE OF THEIR REGULARLY SCHEDULED OFFICE VISIT**       Rl Arias, was evaluated through a synchronous (real-time) audio-video encounter. The patient (or guardian if applicable) is aware that this is a billable service, which includes applicable co-pays. This Virtual Visit was conducted with patient's (and/or legal guardian's) consent. The visit was conducted pursuant to the emergency declaration under the [address] [place] [address], [address] [place] [address] authority and the [employer] General Act. Patient identification was verified, and a caregiver was present when appropriate. The patient was located in a state where the provider was licensed to provide care. History of Present Illness: Rl Arias is a 67 y.o. female here for follow up of diabetes and hypothyroidism. Was diagnosed with diabetes at the age of 16.     On 4/1/22 she fractured her ankle again and was at [facility] for rehab. At insulin pump was taken away and her BGs were running in the 300s. She sent me a message in April and I send orders to let her start her pump back to control her BGs. Pt notes she is still wearing the walking boot and she notes that she is not able to get in an out of a car and is not able to go in for face-to-face visits at this time. She just saw her Nephrologist Dr. Ashish Lam on 6/15/22 \"he found that my kidneys had too much protein so he keith labs and did a UA. \"    She is back to using her insulin pump     Basal  MN-630AM: 2.65  630AM-4PM: 2.50  4PM-MN: 2.55  Carb Ratio  1:4  Correction Factor  1:25  Target  120  Active Insulin Time  3     She changes her infusion site every 3 days. She checks her BGs 2 times per day. Her FBGs have been running in the 180-250s range. Her pre-dinner BGs have been in the 160-180s. She denies issues of hypoglycemia. She wakes around 10AM.  Her first meal is around 11AM, today she had two slices of bread, torres, cheese and water. She has lunch 4-5 days per week, around 2PM, yesterday she did not eat lunch. She has dinner around 6-8PM, last night she had corn and empanada and diet soda. She goes to bed around 10-11PM     Pt has hx of CAD, s/p CABG she is following with Dr. Colleen Angulo. \"I saw her in 2021, she was happy with my labs and results. I am trying to make an appointment to see her soon. \"      She has hx of Retinopathy she is followed by Ophthalmology. \"I refused to take any new injections. He was using a cancer drug and it scared me\". She last saw her eye doctor in early . 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 She has hx of COPD and was followed by Dr. Darryle Amsterdam of Pulmonology. \"I don't need a lung doctor anymore, I quit smoking and my lungs are clear\".      She has gastroparesis, and erosive gastritis and was followed by Dr. Clemente Méndez of GI. Pt has hx of nephropathy and CKD IV, she also has neuropathy and gastroparesis. She is followed by Dr. Meghan Michael of Nephrology and Dr. Sophia Dias of GI. She is still taking her LT4 175mcg daily. She denies issues of SOB, CP, she has had some ARZOLA. She denies palpitations, tremors, heat or cold intolerance, diarrhea or constipation. Current Outpatient Medications   Medication Sig    tamsulosin (FLOMAX) 0.4 mg capsule 0.4 mg daily.  ergocalciferol (ERGOCALCIFEROL) 1,250 mcg (50,000 unit) capsule     dilTIAZem ER (CARDIZEM CD) 180 mg capsule TAKE ONE CAPSULE BY MOUTH EVERY DAY FOR HIGH BLOOD PRESSURE    AZO CRANBERRY 951-25-84 mg-mg-million tab Take 1 Capsule by mouth daily.  gabapentin (NEURONTIN) 400 mg capsule Take 400 mg by mouth two (2) times a day.  pravastatin (PRAVACHOL) 40 mg tablet Take 40 mg by mouth daily.  escitalopram oxalate (LEXAPRO) 10 mg tablet Take 10 mg by mouth two (2) times a day.  metoprolol tartrate (LOPRESSOR) 50 mg tablet Take 50 mg by mouth two (2) times a day.  pantoprazole (PROTONIX) 40 mg tablet Take 40 mg by mouth daily.  levothyroxine (SYNTHROID) 175 mcg tablet Take 175 mcg by mouth Daily (before breakfast).  Trelegy Ellipta 100-62.5-25 mcg inhaler INHALE 1 PUFF BY MOUTH EVERY DAY    insulin aspart U-100 (NovoLOG U-100 Insulin aspart) 100 unit/mL injection INJECT A MAX  UNITS UNDER THE SKIN DAILY WITH INSULIN PUMP    nitroglycerin (NITROSTAT) 0.4 mg SL tablet     prednisoLONE acetate (PRED FORTE) 1 % ophthalmic suspension Administer 1 Drop to right eye two (2) times a day.  ferrous sulfate 325 mg (65 mg iron) tablet Take 650 mg by mouth nightly. 2 daily    dorzolamide-timolol, PF, (COSOPT, PF,) 2-0.5 % dpet Administer 1 Drop to left eye two (2) times a day. Indications: increased pressure in the eye     No current facility-administered medications for this visit. Allergies   Allergen Reactions    Nsaids (Non-Steroidal Anti-Inflammatory Drug) Other (comments)     bleeding    Augmentin [Amoxicillin-Pot Clavulanate] Diarrhea    Oxycodone Other (comments)     Altered mental status per patient. Has tolerated Hydrocodone in the past     Vesicare [Solifenacin] Rash     BURNING WITH URINATION     Review of Systems:  - Eyes: no blurry vision or double vision  - Cardiovascular: no chest pain  - Respiratory: no shortness of breath  - Musculoskeletal: no myalgias  - Neurological: + numbness in her feet    Physical Examination:  There were no vitals taken for this visit. - GENERAL: NCAT, Appears well nourished   - EYES: EOMI, non-icteric, no proptosis   - Ear/Nose/Throat: NCAT, no visible inflammation or masses   - CARDIOVASCULAR: no cyanosis, no visible JVD   - RESPIRATORY: respiratory effort normal without any distress or labored breathing   - MUSCULOSKELETAL: Normal ROM of neck and upper extremities observed   - SKIN: No rash on face   - NEUROLOGIC:  No facial asymmetry (Cranial nerve 7 motor function), No gaze palsy   - PSYCHIATRIC: Normal affect, Normal insight and judgement         Data Reviewed:   None    Assessment/Plan:   1) DM > Pt notes her BGs have been running higher, particularly early in the morning. She denies issues of hypoglycemia. With her with her co-morbidities, I do not want to be too aggressive with the changes. Basal  MN-630AM: 2.65 >2.70  630AM-4PM: 2.50 >2.75  4PM-MN: 2.55 >2.60  Carb Ratio  1:4  Correction Factor  1:25  Target  120  Active Insulin Time  3    With her hx of neuropathy and calluses she would benefit from DM shoes and inserts  Pt to check her BGs 4 times per day and mail her BG logs to me in 2 weeks. 2) HTN > Will not make any changes at this time. 3) HLD > Pt is still taking her Pravastatin 40mg daily, which she is tolerating well. Pt to continue this current dose.        4) Hypothyroidism > Pt is clinically euthyroid on LT4 175mcg daily. Will order TFTs and adjust her dose as needed. Pt voices understanding and agreement with the plan.   Pain noted and pt was recommended to call her PCP for further evaluation and treatment, as needed     RTC 4 months     CC:  Dr. Saniya Riley            Sincerely,      Leopold Ramming, MD

## 2022-06-16 NOTE — PROGRESS NOTES
Chief Complaint   Patient presents with    Diabetes     pcp and pharmacy verified [facility]. [place]  [phone] (M)    Records since last visit reviewed          **THIS IS A VIRTUAL VISIT VIA A VIDEO ENCOUNTER. PATIENT AGREED TO HAVE THEIR CARE DELIVERED OVER VIDEO IN PLACE OF THEIR REGULARLY SCHEDULED OFFICE VISIT**       Nain Eubanks, was evaluated through a synchronous (real-time) audio-video encounter. The patient (or guardian if applicable) is aware that this is a billable service, which includes applicable co-pays. This Virtual Visit was conducted with patient's (and/or legal guardian's) consent. The visit was conducted pursuant to the emergency declaration under the [facility], [address] [place] [address] authority and the [employer] Act. Patient identification was verified, and a caregiver was present when appropriate. The patient was located in a state where the provider was licensed to provide care. History of Present Illness: Nain Eubanks is a 67 y.o. female here for follow up of diabetes and hypothyroidism. Was diagnosed with diabetes at the age of 16. On 4/1/22 she fractured her ankle again and was at [facility] for rehab. At insulin pump was taken away and her BGs were running in the 300s. She sent me a message in April and I send orders to let her start her pump back to control her BGs. Pt notes she is still wearing the walking boot and she notes that she is not able to get in an out of a car and is not able to go in for face-to-face visits at this time. She just saw her Nephrologist Dr. Hilario Sims on 6/15/22 \"he found that my kidneys had too much protein so he keith labs and did a UA. \"    She is back to using her insulin pump     Basal  MN-630AM: 2.65  630AM-4PM: 2.50  4PM-MN: 2.55  Carb Ratio  1:4  Correction Factor  1:25  Target  120  Active Insulin Time  3     She changes her infusion site every 3 days. She checks her BGs 2 times per day. Her FBGs have been running in the 180-250s range. Her pre-dinner BGs have been in the 160-180s. She denies issues of hypoglycemia. She wakes around 10AM.  Her first meal is around 11AM, today she had two slices of bread, torres, cheese and water. She has lunch 4-5 days per week, around 2PM, yesterday she did not eat lunch. She has dinner around 6-8PM, last night she had corn and empanada and diet soda. She goes to bed around 10-11PM     Pt has hx of CAD, s/p CABG she is following with Dr. Maria L Grayson. \"I saw her in June 2021, she was happy with my labs and results. I am trying to make an appointment to see her soon. \"      She has hx of Retinopathy she is followed by Ophthalmology. \"I refused to take any new injections. He was using a cancer drug and it scared me\". She last saw her eye doctor in early 2022. 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 She has hx of COPD and was followed by Dr. Miguel Angel Kong of Pulmonology. \"I don't need a lung doctor anymore, I quit smoking and my lungs are clear\". She has gastroparesis, and erosive gastritis and was followed by Dr. All Izquierdo of GI. Pt has hx of nephropathy and CKD IV, she also has neuropathy and gastroparesis. She is followed by Dr. Fahad Noguera of Nephrology and Dr. Vernon Glover of GI. She is still taking her LT4 175mcg daily. She denies issues of SOB, CP, she has had some ARZOLA. She denies palpitations, tremors, heat or cold intolerance, diarrhea or constipation. Current Outpatient Medications   Medication Sig    tamsulosin (FLOMAX) 0.4 mg capsule 0.4 mg daily.  ergocalciferol (ERGOCALCIFEROL) 1,250 mcg (50,000 unit) capsule     AZO CRANBERRY 043-74-85 mg-mg-million tab Take 1 Capsule by mouth daily.     metoprolol tartrate (LOPRESSOR) 50 mg tablet Take 1 Tablet by mouth two (2) times a day.    dilTIAZem ER (CARDIZEM CD) 180 mg capsule TAKE ONE CAPSULE BY MOUTH EVERY DAY FOR HIGH BLOOD PRESSURE    gabapentin (NEURONTIN) 400 mg capsule Take 400 mg by mouth two (2) times a day.  pravastatin (PRAVACHOL) 40 mg tablet Take 40 mg by mouth daily.  escitalopram oxalate (LEXAPRO) 10 mg tablet Take 10 mg by mouth two (2) times a day.  pantoprazole (PROTONIX) 40 mg tablet Take 40 mg by mouth daily.  levothyroxine (SYNTHROID) 175 mcg tablet Take 175 mcg by mouth Daily (before breakfast).  Trelegy Ellipta 100-62.5-25 mcg inhaler INHALE 1 PUFF BY MOUTH EVERY DAY    insulin aspart U-100 (NovoLOG U-100 Insulin aspart) 100 unit/mL injection INJECT A MAX  UNITS UNDER THE SKIN DAILY WITH INSULIN PUMP    nitroglycerin (NITROSTAT) 0.4 mg SL tablet     prednisoLONE acetate (PRED FORTE) 1 % ophthalmic suspension Administer 1 Drop to right eye two (2) times a day.  ferrous sulfate 325 mg (65 mg iron) tablet Take 650 mg by mouth nightly. 2 daily    dorzolamide-timolol, PF, (COSOPT, PF,) 2-0.5 % dpet Administer 1 Drop to left eye two (2) times a day. Indications: increased pressure in the eye     No current facility-administered medications for this visit. Allergies   Allergen Reactions    Nsaids (Non-Steroidal Anti-Inflammatory Drug) Other (comments)     bleeding    Augmentin [Amoxicillin-Pot Clavulanate] Diarrhea    Oxycodone Other (comments)     Altered mental status per patient. Has tolerated Hydrocodone in the past     Vesicare [Solifenacin] Rash     BURNING WITH URINATION     Review of Systems:  - Eyes: no blurry vision or double vision  - Cardiovascular: no chest pain  - Respiratory: no shortness of breath  - Musculoskeletal: no myalgias  - Neurological: + numbness in her feet    Physical Examination:  There were no vitals taken for this visit.   - GENERAL: NCAT, Appears well nourished   - EYES: EOMI, non-icteric, no proptosis   - Ear/Nose/Throat: NCAT, no visible inflammation or masses   - CARDIOVASCULAR: no cyanosis, no visible JVD   - RESPIRATORY: respiratory effort normal without any distress or labored breathing   - MUSCULOSKELETAL: Normal ROM of neck and upper extremities observed   - SKIN: No rash on face   - NEUROLOGIC:  No facial asymmetry (Cranial nerve 7 motor function), No gaze palsy   - PSYCHIATRIC: Normal affect, Normal insight and judgement         Data Reviewed:   None    Assessment/Plan:   1) DM > Pt notes her BGs have been running higher, particularly early in the morning. She denies issues of hypoglycemia. With her with her co-morbidities, I do not want to be too aggressive with the changes. Basal  MN-630AM: 2.65 >2.70  630AM-4PM: 2.50 >2.75  4PM-MN: 2.55 >2.60  Carb Ratio  1:4  Correction Factor  1:25  Target  120  Active Insulin Time  3    With her hx of neuropathy and calluses she would benefit from DM shoes and inserts  Pt to check her BGs 4 times per day and mail her BG logs to me in 2 weeks. 2) HTN > Will not make any changes at this time. 3) HLD > Pt is still taking her Pravastatin 40mg daily, which she is tolerating well. Pt to continue this current dose. 4) Hypothyroidism > Pt is clinically euthyroid on LT4 175mcg daily. Will order TFTs and adjust her dose as needed. Pt voices understanding and agreement with the plan.   Pain noted and pt was recommended to call her PCP for further evaluation and treatment, as needed     RTC 4 months     CC:  Dr. Yanna Fay

## 2022-06-21 ENCOUNTER — APPOINTMENT (OUTPATIENT)
Dept: GENERAL RADIOLOGY | Age: 72
DRG: 244 | End: 2022-06-21
Attending: EMERGENCY MEDICINE
Payer: MEDICARE

## 2022-06-21 ENCOUNTER — HOSPITAL ENCOUNTER (INPATIENT)
Age: 72
LOS: 4 days | Discharge: HOME | DRG: 244 | End: 2022-06-25
Attending: EMERGENCY MEDICINE | Admitting: INTERNAL MEDICINE
Payer: MEDICARE

## 2022-06-21 DIAGNOSIS — R00.1 SYMPTOMATIC BRADYCARDIA: Primary | ICD-10-CM

## 2022-06-21 DIAGNOSIS — I44.2 COMPLETE HEART BLOCK (HCC): ICD-10-CM

## 2022-06-21 DIAGNOSIS — E10.65 HYPERGLYCEMIA DUE TO TYPE 1 DIABETES MELLITUS (HCC): ICD-10-CM

## 2022-06-21 LAB
ALBUMIN SERPL-MCNC: 2.6 G/DL (ref 3.5–5)
ALBUMIN/GLOB SERPL: 0.6 {RATIO} (ref 1.1–2.2)
ALP SERPL-CCNC: 114 U/L (ref 45–117)
ALT SERPL-CCNC: 14 U/L (ref 12–78)
ANION GAP SERPL CALC-SCNC: 3 MMOL/L (ref 5–15)
AST SERPL-CCNC: 14 U/L (ref 15–37)
BASOPHILS # BLD: 0.1 K/UL (ref 0–0.1)
BASOPHILS NFR BLD: 1 % (ref 0–1)
BILIRUB SERPL-MCNC: 0.2 MG/DL (ref 0.2–1)
BNP SERPL-MCNC: 1723 PG/ML
BUN SERPL-MCNC: 36 MG/DL (ref 6–20)
BUN/CREAT SERPL: 14 (ref 12–20)
CALCIUM SERPL-MCNC: 9.4 MG/DL (ref 8.5–10.1)
CHLORIDE SERPL-SCNC: 110 MMOL/L (ref 97–108)
CO2 SERPL-SCNC: 25 MMOL/L (ref 21–32)
COMMENT, HOLDF: NORMAL
CREAT SERPL-MCNC: 2.62 MG/DL (ref 0.55–1.02)
DIFFERENTIAL METHOD BLD: ABNORMAL
EOSINOPHIL # BLD: 0.3 K/UL (ref 0–0.4)
EOSINOPHIL NFR BLD: 4 % (ref 0–7)
ERYTHROCYTE [DISTWIDTH] IN BLOOD BY AUTOMATED COUNT: 13.2 % (ref 11.5–14.5)
GLOBULIN SER CALC-MCNC: 4.2 G/DL (ref 2–4)
GLUCOSE BLD STRIP.AUTO-MCNC: 290 MG/DL (ref 65–117)
GLUCOSE SERPL-MCNC: 164 MG/DL (ref 65–100)
HCT VFR BLD AUTO: 33.5 % (ref 35–47)
HGB BLD-MCNC: 10.7 G/DL (ref 11.5–16)
IMM GRANULOCYTES # BLD AUTO: 0.1 K/UL (ref 0–0.04)
IMM GRANULOCYTES NFR BLD AUTO: 1 % (ref 0–0.5)
LYMPHOCYTES # BLD: 1 K/UL (ref 0.8–3.5)
LYMPHOCYTES NFR BLD: 13 % (ref 12–49)
MAGNESIUM SERPL-MCNC: 1.5 MG/DL (ref 1.6–2.4)
MCH RBC QN AUTO: 32.6 PG (ref 26–34)
MCHC RBC AUTO-ENTMCNC: 31.9 G/DL (ref 30–36.5)
MCV RBC AUTO: 102.1 FL (ref 80–99)
MONOCYTES # BLD: 0.5 K/UL (ref 0–1)
MONOCYTES NFR BLD: 7 % (ref 5–13)
NEUTS SEG # BLD: 5.7 K/UL (ref 1.8–8)
NEUTS SEG NFR BLD: 74 % (ref 32–75)
NRBC # BLD: 0 K/UL (ref 0–0.01)
NRBC BLD-RTO: 0 PER 100 WBC
PLATELET # BLD AUTO: 188 K/UL (ref 150–400)
PMV BLD AUTO: 11.3 FL (ref 8.9–12.9)
POTASSIUM SERPL-SCNC: 5.3 MMOL/L (ref 3.5–5.1)
PROT SERPL-MCNC: 6.8 G/DL (ref 6.4–8.2)
RBC # BLD AUTO: 3.28 M/UL (ref 3.8–5.2)
SAMPLES BEING HELD,HOLD: NORMAL
SERVICE CMNT-IMP: ABNORMAL
SODIUM SERPL-SCNC: 138 MMOL/L (ref 136–145)
T4 FREE SERPL-MCNC: 1 NG/DL (ref 0.8–1.5)
TROPONIN-HIGH SENSITIVITY: 13 NG/L (ref 0–51)
TSH SERPL DL<=0.05 MIU/L-ACNC: 7.52 UIU/ML (ref 0.36–3.74)
WBC # BLD AUTO: 7.6 K/UL (ref 3.6–11)

## 2022-06-21 PROCEDURE — 82947 ASSAY GLUCOSE BLOOD QUANT: CPT

## 2022-06-21 PROCEDURE — 71045 X-RAY EXAM CHEST 1 VIEW: CPT

## 2022-06-21 PROCEDURE — 74011636637 HC RX REV CODE- 636/637: Performed by: INTERNAL MEDICINE

## 2022-06-21 PROCEDURE — 85025 COMPLETE CBC W/AUTO DIFF WBC: CPT

## 2022-06-21 PROCEDURE — 84443 ASSAY THYROID STIM HORMONE: CPT

## 2022-06-21 PROCEDURE — 74011636637 HC RX REV CODE- 636/637: Performed by: EMERGENCY MEDICINE

## 2022-06-21 PROCEDURE — 74011000250 HC RX REV CODE- 250: Performed by: EMERGENCY MEDICINE

## 2022-06-21 PROCEDURE — 96368 THER/DIAG CONCURRENT INF: CPT

## 2022-06-21 PROCEDURE — 84484 ASSAY OF TROPONIN QUANT: CPT

## 2022-06-21 PROCEDURE — 83735 ASSAY OF MAGNESIUM: CPT

## 2022-06-21 PROCEDURE — 83880 ASSAY OF NATRIURETIC PEPTIDE: CPT

## 2022-06-21 PROCEDURE — 93005 ELECTROCARDIOGRAM TRACING: CPT

## 2022-06-21 PROCEDURE — 96361 HYDRATE IV INFUSION ADD-ON: CPT

## 2022-06-21 PROCEDURE — 84439 ASSAY OF FREE THYROXINE: CPT

## 2022-06-21 PROCEDURE — 82962 GLUCOSE BLOOD TEST: CPT

## 2022-06-21 PROCEDURE — 96365 THER/PROPH/DIAG IV INF INIT: CPT

## 2022-06-21 PROCEDURE — 99285 EMERGENCY DEPT VISIT HI MDM: CPT

## 2022-06-21 PROCEDURE — 74011250636 HC RX REV CODE- 250/636: Performed by: EMERGENCY MEDICINE

## 2022-06-21 PROCEDURE — 74011000250 HC RX REV CODE- 250: Performed by: ANESTHESIOLOGY

## 2022-06-21 PROCEDURE — 80053 COMPREHEN METABOLIC PANEL: CPT

## 2022-06-21 PROCEDURE — 96375 TX/PRO/DX INJ NEW DRUG ADDON: CPT

## 2022-06-21 PROCEDURE — 65620000000 HC RM CCU GENERAL

## 2022-06-21 PROCEDURE — 96374 THER/PROPH/DIAG INJ IV PUSH: CPT

## 2022-06-21 RX ORDER — SODIUM CHLORIDE 0.9 % (FLUSH) 0.9 %
5-40 SYRINGE (ML) INJECTION EVERY 8 HOURS
Status: DISCONTINUED | OUTPATIENT
Start: 2022-06-21 | End: 2022-06-25 | Stop reason: HOSPADM

## 2022-06-21 RX ORDER — POLYETHYLENE GLYCOL 3350 17 G/17G
17 POWDER, FOR SOLUTION ORAL DAILY PRN
Status: DISCONTINUED | OUTPATIENT
Start: 2022-06-21 | End: 2022-06-25 | Stop reason: HOSPADM

## 2022-06-21 RX ORDER — CALCIUM GLUCONATE 20 MG/ML
INJECTION, SOLUTION INTRAVENOUS
Status: DISCONTINUED
Start: 2022-06-21 | End: 2022-06-22 | Stop reason: WASHOUT

## 2022-06-21 RX ORDER — ONDANSETRON 2 MG/ML
4 INJECTION INTRAMUSCULAR; INTRAVENOUS
Status: COMPLETED | OUTPATIENT
Start: 2022-06-21 | End: 2022-06-21

## 2022-06-21 RX ORDER — PANTOPRAZOLE SODIUM 40 MG/1
40 TABLET, DELAYED RELEASE ORAL
Status: DISCONTINUED | OUTPATIENT
Start: 2022-06-22 | End: 2022-06-25 | Stop reason: HOSPADM

## 2022-06-21 RX ORDER — INSULIN LISPRO 100 [IU]/ML
INJECTION, SOLUTION INTRAVENOUS; SUBCUTANEOUS EVERY 6 HOURS
Status: DISCONTINUED | OUTPATIENT
Start: 2022-06-22 | End: 2022-06-23

## 2022-06-21 RX ORDER — DOPAMINE HYDROCHLORIDE 320 MG/100ML
2 INJECTION, SOLUTION INTRAVENOUS
Status: DISCONTINUED | OUTPATIENT
Start: 2022-06-21 | End: 2022-06-21

## 2022-06-21 RX ORDER — MAGNESIUM SULFATE 100 %
4 CRYSTALS MISCELLANEOUS AS NEEDED
Status: DISCONTINUED | OUTPATIENT
Start: 2022-06-21 | End: 2022-06-21 | Stop reason: SDUPTHER

## 2022-06-21 RX ORDER — ONDANSETRON 2 MG/ML
INJECTION INTRAMUSCULAR; INTRAVENOUS
Status: DISPENSED
Start: 2022-06-21 | End: 2022-06-22

## 2022-06-21 RX ORDER — DEXTROSE MONOHYDRATE 100 MG/ML
INJECTION, SOLUTION INTRAVENOUS
Status: DISPENSED
Start: 2022-06-21 | End: 2022-06-22

## 2022-06-21 RX ORDER — CALCIUM CHLORIDE INJECTION 100 MG/ML
2 INJECTION, SOLUTION INTRAVENOUS
Status: DISCONTINUED | OUTPATIENT
Start: 2022-06-21 | End: 2022-06-21

## 2022-06-21 RX ORDER — ONDANSETRON 2 MG/ML
4 INJECTION INTRAMUSCULAR; INTRAVENOUS
Status: DISCONTINUED | OUTPATIENT
Start: 2022-06-21 | End: 2022-06-25 | Stop reason: HOSPADM

## 2022-06-21 RX ORDER — SODIUM CHLORIDE 0.9 % (FLUSH) 0.9 %
5-40 SYRINGE (ML) INJECTION AS NEEDED
Status: DISCONTINUED | OUTPATIENT
Start: 2022-06-21 | End: 2022-06-25 | Stop reason: HOSPADM

## 2022-06-21 RX ORDER — ACETAMINOPHEN 325 MG/1
650 TABLET ORAL
Status: DISCONTINUED | OUTPATIENT
Start: 2022-06-21 | End: 2022-06-25 | Stop reason: HOSPADM

## 2022-06-21 RX ORDER — ONDANSETRON 4 MG/1
4 TABLET, ORALLY DISINTEGRATING ORAL
Status: DISCONTINUED | OUTPATIENT
Start: 2022-06-21 | End: 2022-06-25 | Stop reason: HOSPADM

## 2022-06-21 RX ORDER — PREDNISOLONE ACETATE 10 MG/ML
1 SUSPENSION/ DROPS OPHTHALMIC 2 TIMES DAILY
Status: DISCONTINUED | OUTPATIENT
Start: 2022-06-21 | End: 2022-06-25 | Stop reason: HOSPADM

## 2022-06-21 RX ORDER — ENOXAPARIN SODIUM 100 MG/ML
30 INJECTION SUBCUTANEOUS DAILY
Status: DISCONTINUED | OUTPATIENT
Start: 2022-06-22 | End: 2022-06-25 | Stop reason: HOSPADM

## 2022-06-21 RX ORDER — CALCIUM GLUCONATE 20 MG/ML
2 INJECTION, SOLUTION INTRAVENOUS ONCE
Status: COMPLETED | OUTPATIENT
Start: 2022-06-21 | End: 2022-06-21

## 2022-06-21 RX ORDER — PRAVASTATIN SODIUM 40 MG/1
40 TABLET ORAL DAILY
Status: DISCONTINUED | OUTPATIENT
Start: 2022-06-22 | End: 2022-06-25 | Stop reason: HOSPADM

## 2022-06-21 RX ORDER — DORZOLAMIDE HCL 20 MG/ML
1 SOLUTION/ DROPS OPHTHALMIC 3 TIMES DAILY
Status: DISCONTINUED | OUTPATIENT
Start: 2022-06-22 | End: 2022-06-22 | Stop reason: SDUPTHER

## 2022-06-21 RX ORDER — DOPAMINE HYDROCHLORIDE 320 MG/100ML
0-20 INJECTION, SOLUTION INTRAVENOUS
Status: DISCONTINUED | OUTPATIENT
Start: 2022-06-21 | End: 2022-06-25 | Stop reason: HOSPADM

## 2022-06-21 RX ORDER — DOPAMINE HYDROCHLORIDE 320 MG/100ML
INJECTION, SOLUTION INTRAVENOUS
Status: DISPENSED
Start: 2022-06-21 | End: 2022-06-22

## 2022-06-21 RX ORDER — MAGNESIUM SULFATE 100 %
4 CRYSTALS MISCELLANEOUS AS NEEDED
Status: DISCONTINUED | OUTPATIENT
Start: 2022-06-21 | End: 2022-06-25 | Stop reason: HOSPADM

## 2022-06-21 RX ORDER — ACETAMINOPHEN 650 MG/1
650 SUPPOSITORY RECTAL
Status: DISCONTINUED | OUTPATIENT
Start: 2022-06-21 | End: 2022-06-25 | Stop reason: HOSPADM

## 2022-06-21 RX ADMIN — SODIUM CHLORIDE 1000 ML: 9 INJECTION, SOLUTION INTRAVENOUS at 16:47

## 2022-06-21 RX ADMIN — DEXTROSE MONOHYDRATE 250 ML: 10 INJECTION, SOLUTION INTRAVENOUS at 17:16

## 2022-06-21 RX ADMIN — Medication 5 UNITS: at 23:11

## 2022-06-21 RX ADMIN — SODIUM CHLORIDE, PRESERVATIVE FREE 10 ML: 5 INJECTION INTRAVENOUS at 21:38

## 2022-06-21 RX ADMIN — ONDANSETRON HYDROCHLORIDE 4 MG: 2 SOLUTION INTRAMUSCULAR; INTRAVENOUS at 18:43

## 2022-06-21 RX ADMIN — DOPAMINE HYDROCHLORIDE 5 MCG/KG/MIN: 320 INJECTION, SOLUTION INTRAVENOUS at 17:19

## 2022-06-21 RX ADMIN — ONDANSETRON HYDROCHLORIDE 4 MG: 2 SOLUTION INTRAMUSCULAR; INTRAVENOUS at 17:31

## 2022-06-21 RX ADMIN — CALCIUM GLUCONATE 2 G: 20 INJECTION, SOLUTION INTRAVENOUS at 17:17

## 2022-06-21 RX ADMIN — Medication 10 UNITS: at 17:32

## 2022-06-21 NOTE — Clinical Note
TRANSFER - IN REPORT:     Verbal report received from: Ellen Willoughby RN (CCU). Report consisted of patient's Situation, Background, Assessment and   Recommendations(SBAR). Opportunity for questions and clarification was provided. Assessment completed upon patient's arrival to unit and care assumed. Patient transported with a Registered Nurse and Oxygen. Oxygen used for patient = @ 2 - 6 Liters.

## 2022-06-21 NOTE — ED PROVIDER NOTES
35-year-old female with a history of COPD, CAD, diabetes, hypertension, hypothyroidism, hypercholesterolemia presents with a chief complaint of fatigue. Patient states that she felt fine yesterday. She woke up this morning and felt very tired. She did go to an orthopedic surgery appointment. On her way to the car she had a near syncopal event. She slid down to the ground and called EMS. She denies hitting her head or loss of consciousness. She was found to be hypotensive and bradycardic for EMS. She denies history of low heart rate. She denies being short of breath, having fever, abdominal pain, GI or urinary symptoms.            Past Medical History:   Diagnosis Date    Adverse effect of anesthesia     SLOW WAKING PAST ANESTHESIA    Anxiety     Arthritis     CAD (coronary artery disease)     s/p CABG x 3v    Chest pain 7/2015    Chronic obstructive pulmonary disease (HCC)     CTS (carpal tunnel syndrome)     S/p bilateral release    Diabetes ([address]     Diabetic gastroparesis ([address]     Diabetic neuropathy ([address]     Diabetic retinopathy ([address]     GERD (gastroesophageal reflux disease)     GI bleed 7/2015    4 bleeds with 9 clips placed    Hypercholesteremia     Hypertension     Hypothyroid     Ill-defined condition     NEUROPATHY HANDS AND FEET    Ill-defined condition 2017    GI BLEED    Nicotine vapor product user     JOHN on CPAP     Osteoporosis     Vitamin D deficiency        Past Surgical History:   Procedure Laterality Date    HX CERVICAL FUSION  2011    HX ENDOSCOPY  7/2015    HX GI      HX HEART CATHETERIZATION  7/2015    HX LUMBAR FUSION  2017    HX LUMBAR LAMINECTOMY  2011    HX ORTHOPAEDIC Right 1970    CARPAL TUNNEL    HX ORTHOPAEDIC Left 2003    CARPAL TUNNEL    HX ROTATOR CUFF REPAIR Right 2003    HX SHOULDER ARTHROSCOPY Right     HX TONSIL AND ADENOIDECTOMY  1968    HX TONSILLECTOMY      OR CABG, ARTERY-VEIN, THREE  7/13/2015         Family History:   Problem Relation Age of Onset    COPD Mother    Shannon Grewal Diabetes Mother    Shannon Grewla COPD Father     Diabetes Father     Cancer Father         pancreatic    Diabetes Brother     Heart Disease Brother     Diabetes Brother     Alcohol abuse Brother     Diabetes Sister     Cancer Sister         Melenoma    Bleeding Prob Sister         Factor V Leiden    Anesth Problems Neg Hx        Social History     Socioeconomic History    Marital status: LIFE PARTNER     Spouse name: Not on file    Number of children: Not on file    Years of education: Not on file    Highest education level: Not on file   Occupational History    Occupation: [employer]   Tobacco Use    Smoking status: Former Smoker     Packs/day: 0.50     Years: 40.00     Pack years: 20.00     Quit date: 2015     Years since quittin.9    Smokeless tobacco: Never Used   Substance and Sexual Activity    Alcohol use: No     Alcohol/week: 0.0 standard drinks    Drug use: No    Sexual activity: Never   Other Topics Concern    Not on file   Social History Narrative    Lives in [place] with partner, moved from [place] to be closer to sister and \"doctors in [place] almost killed me\"     Social Determinants of Health     Financial Resource Strain:     Difficulty of Paying Living Expenses: Not on file   Food Insecurity:     Worried About Running Out of Food in the Last Year: Not on file    Ese of Food in the Last Year: Not on file   Transportation Needs:     Lack of Transportation (Medical): Not on file    Lack of Transportation (Non-Medical):  Not on file   Physical Activity:     Days of Exercise per Week: Not on file    Minutes of Exercise per Session: Not on file   Stress:     Feeling of Stress : Not on file   Social Connections:     Frequency of Communication with Friends and Family: Not on file    Frequency of Social Gatherings with Friends and Family: Not on file    Attends Zoroastrianism Services: Not on file    Active Member of Clubs or Organizations: Not on file    Attends Club or Organization Meetings: Not on file    Marital Status: Not on file   Intimate Partner Violence:     Fear of Current or Ex-Partner: Not on file    Emotionally Abused: Not on file    Physically Abused: Not on file    Sexually Abused: Not on file   Housing Stability:     Unable to Pay for Housing in the Last Year: Not on file    Number of [place] in the Last Year: Not on file    Unstable Housing in the Last Year: Not on file         ALLERGIES: Nsaids (non-steroidal anti-inflammatory drug), Augmentin [amoxicillin-pot clavulanate], Oxycodone, and Vesicare [solifenacin]    Review of Systems   Constitutional: Positive for fatigue. HENT: Negative for rhinorrhea. Respiratory: Negative for shortness of breath. Cardiovascular: Negative for chest pain. Gastrointestinal: Negative for abdominal pain. Genitourinary: Negative for dysuria. Musculoskeletal: Negative for back pain. Skin: Negative for wound. Neurological: Negative for headaches. Psychiatric/Behavioral: Negative for confusion. Vitals:    06/21/22 1629 06/21/22 1636   BP: (!) 89/29    Pulse: (!) 44    Resp: 20    Temp: 97.8 °F (36.6 °C)    SpO2: 94%    Weight:  104.2 kg (229 lb 11.5 oz)            Physical Exam  Vitals and nursing note reviewed. Constitutional:       General: She is not in acute distress. Appearance: Normal appearance. She is obese. She is not ill-appearing, toxic-appearing or diaphoretic. HENT:      Head: Normocephalic and atraumatic. Eyes:      Extraocular Movements: Extraocular movements intact. Cardiovascular:      Rate and Rhythm: Bradycardia present. Pulses: Normal pulses. Heart sounds: Normal heart sounds. Pulmonary:      Effort: Pulmonary effort is normal. No respiratory distress. Breath sounds: Normal breath sounds. Abdominal:      General: Bowel sounds are normal. There is no distension. Tenderness: There is no abdominal tenderness. Musculoskeletal:         General: Normal range of motion. Cervical back: Normal range of motion. Skin:     General: Skin is dry. Neurological:      General: No focal deficit present. Mental Status: She is alert and oriented to person, place, and time. Psychiatric:         Mood and Affect: Mood normal.          MDM  Number of Diagnoses or Management Options  Symptomatic bradycardia  Diagnosis management comments:   Perfect Serve Consult for Admission  5:51 PM    ED Room Number: QI93/43  Patient Name and age:  Fariha Chiu 67 y.o.  female  Working Diagnosis: Symptomatic bradycardia  (primary encounter diagnosis)    COVID-19 Suspicion:  no  Sepsis present:  no  Reassessment needed: no  Code Status:  Full Code  Readmission: no  Isolation Requirements:  no  Recommended Level of Care:  step down  Department:[facility] Adult ED - 21   Other: Initially bradycardic and hypotensive. Now on dopamine drip at 7 mcg/kg/min. Cardiology consulted and they plan for possible pacemaker placement. Discussed case with ICU and they felt the patient may be able to be admitted to medicine. Procedures    MEDICAL DECISION MAKIN y.o. female presents with Hypotension    Differential diagnosis includes but not limited to: ACS, symptomatic bradycardia due to AV franchesca block, hyperkalemia among others. LABORATORY TESTS:  Labs Reviewed   CBC WITH AUTOMATED DIFF - Abnormal; Notable for the following components:       Result Value    RBC 3.28 (*)     HGB 10.7 (*)     HCT 33.5 (*)     .1 (*)     IMMATURE GRANULOCYTES 1 (*)     ABS. IMM.  GRANS. 0.1 (*)     All other components within normal limits   METABOLIC PANEL, COMPREHENSIVE - Abnormal; Notable for the following components:    Potassium 5.3 (*)     Chloride 110 (*)     Anion gap 3 (*)     Glucose 164 (*)     BUN 36 (*)     Creatinine 2.62 (*)     GFR est AA 22 (*)     GFR est non-AA 18 (*)     AST (SGOT) 14 (*)     Albumin 2.6 (*)     Globulin 4.2 (*)     A-G Ratio 0.6 (*)     All other components within normal limits   NT-PRO BNP - Abnormal; Notable for the following components:    NT pro-BNP 1,723 (*)     All other components within normal limits   MAGNESIUM - Abnormal; Notable for the following components:    Magnesium 1.5 (*)     All other components within normal limits   TSH 3RD GENERATION - Abnormal; Notable for the following components:    TSH 7.52 (*)     All other components within normal limits   GLUCOSE, POC - Abnormal; Notable for the following components:    Glucose (POC) 290 (*)     All other components within normal limits   URINE CULTURE HOLD SAMPLE   SAMPLES BEING HELD   TROPONIN-HIGH SENSITIVITY   T4, FREE   URINALYSIS W/MICROSCOPIC   CBC W/O DIFF   METABOLIC PANEL, BASIC   POC CHEM8       IMAGING RESULTS:  XR CHEST PORT   Final Result   No evidence of pneumonia or pulmonary edema. There is grossly stable   bilateral lung scarring. MEDICATIONS GIVEN:  Medications   dextrose 10 % infusion 125-250 mL (has no administration in time range)   DOPamine (INTROPIN) 800 mg in dextrose 5% 250 mL infusion (7 mcg/kg/min × 104.2 kg IntraVENous Rate Change 6/21/22 1734)   sodium chloride 0.9 % bolus infusion 1,000 mL (1,000 mL IntraVENous New Bag 6/21/22 1647)   insulin regular (NOVOLIN R, HUMULIN R) injection 10 Units (10 Units IntraVENous Given 6/21/22 1732)   dextrose 10 % infusion 250 mL (250 mL IntraVENous New Bag 6/21/22 1716)   calcium gluconate 2 g/100 mL sodium chloride (ISO-OSM) (2 g IntraVENous New Bag 6/21/22 1717)   ondansetron (ZOFRAN) injection 4 mg (4 mg IntraVENous Given 6/21/22 1731)       PROGRESS NOTE:   70-year-old female presents with near syncope. She is bradycardic and hypotensive. EKG shows junctional bradycardia without ischemia. Point-of-care chemistry panel shows a potassium of 5.8. We will treat for hyperkalemia but will also start dopamine.   Patient's heart rate and blood pressure improved significantly on dopamine drip. Cardiology consulted for possible pacemaker placement. Patient will be admitted to ICU for further management. EKG:  Repeat EKG shows sinus rhythm at a rate of 74, first-degree AV block, otherwise normal intervals, normal axis, no ischemic changes. CONSULTS:  Cardiology  ICU    IMPRESSION:  1. Symptomatic bradycardia        PLAN:  Admit to ICU    Shamika Gutierrez MD      Please note that this dictation was completed with ozuke, the computer voice recognition software. Quite often unanticipated grammatical, syntax, homophones, and other interpretive errors are inadvertently transcribed by the computer software. Please disregard these errors. Please excuse any errors that have escaped final proofreading.         Total critical care time spent exclusive of procedures: 40 minutes

## 2022-06-21 NOTE — H&P
[facility]    ICU TEAM Progress Note    Name: Parveen Carmichael   : 1950   MRN: [id number]   Date: 2022      I  Subjective:   Progress Note: 2022      Reason for ICU Admission: Bradycardia, near syncope    HPI:   This is a very pleasant 75-year-old lady with past medical history significant for diabetes mellitus type 1 since the age of 16, hypothyroidism, coronary artery disease status post CABG in , retinopathy and other medical problem who has been complaining of progressive fatigue for couple of days. Today she had an orthopedic appointment and she was found to be hypotensive and bradycardic with junctional rhythm in the 30s. She was sent to the ER where junctional rhythm was confirmed. Evaluated by cardiologist and patient condition improved with dopamine infusion. Currently she is in first-degree heart block but blood pressure has improved. Patient is being admitted to the ICU. Possible plan for permanent pacemaker in the morning. Patient takes beta-blocker and calcium channel blocker, she also has chronic kidney disease with chronic elevation in potassium. Her presentation could be related to medication effect or hyperkalemia though more likely to be related to degenerative conductive system disease.     Active Problem List:     Problem List  Date Reviewed: 2022          Codes Class    Bradycardia ICD-10-CM: R00.1  ICD-9-CM: 427.89         Tibia fracture ICD-10-CM: S82.209A  ICD-9-CM: 823.80         Ankle fracture ICD-10-CM: S82.899A  ICD-9-CM: 824.8         Primary osteoarthritis of right knee ICD-10-CM: M17.11  ICD-9-CM: 715.16         Hyperkalemia ICD-10-CM: E87.5  ICD-9-CM: 276.7         Severe obesity ([facility] [address] ICD-10-CM: E66.01  ICD-9-CM: 278.01         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 [facility] ICD-10-CM: E10.311  ICD-9-CM: 250.51, 362.07, 362.01         Mixed hyperlipidemia ICD-10-CM: E78.2  ICD-9-CM: 272.2         Sepsis ([facility] ICD-10-CM: A41.9  ICD-9-CM: 038.9, 995.91         GI bleed ICD-10-CM: K92.2  ICD-9-CM: 578.9         AVM (arteriovenous malformation) of duodenum, acquired with hemorrhage ICD-10-CM: K31.811  ICD-9-CM: 537.83         Erosive gastritis with hemorrhage ICD-10-CM: K29.61  ICD-9-CM: 535.41         Hypothyroidism due to Hashimoto's thyroiditis ICD-10-CM: E03.8, E06.3  ICD-9-CM: 244.8, 245.2         Obesity, Class I, BMI 30.0-34.9 (see actual BMI) ICD-10-CM: E66.9  ICD-9-CM: 278.00         Low back pain at multiple sites ICD-10-CM: M54.50  ICD-9-CM: 724.2         Essential hypertension ICD-10-CM: I10  ICD-9-CM: 401.9         CAD (coronary artery disease) ICD-10-CM: I25.10  ICD-9-CM: 414.00               Past Medical History:      has a past medical history of Adverse effect of anesthesia, Anxiety, Arthritis, CAD (coronary artery disease), Chest pain (7/2015), Chronic obstructive pulmonary disease ([facility] [address] CTS (carpal tunnel syndrome), Diabetes ([address] Diabetic gastroparesis ([address] Diabetic neuropathy ([facility] [address] Diabetic retinopathy ([facility] [address] GERD (gastroesophageal reflux disease), GI bleed (7/2015), Hypercholesteremia, Hypertension, Hypothyroid, Ill-defined condition, Ill-defined condition (2017), Nicotine vapor product user, JOHN on CPAP, Osteoporosis, and Vitamin D deficiency. Past Surgical History:      has a past surgical history that includes hx endoscopy (7/2015); hx gi; hx tonsil and adenoidectomy (1968); hx heart catheterization (7/2015); pr cabg, artery-vein, three (7/13/2015); hx cervical fusion (2011); hx lumbar fusion (2017); hx lumbar laminectomy (2011); hx shoulder arthroscopy (Right); hx orthopaedic (Right, 1970); hx orthopaedic (Left, 2003); hx rotator cuff repair (Right, 2003); and hx tonsillectomy. Home Medications:     Prior to Admission medications    Medication Sig Start Date End Date Taking? Authorizing Provider   tamsulosin (FLOMAX) 0.4 mg capsule 0.4 mg daily.  6/14/22   Provider, Historical   ergocalciferol (ERGOCALCIFEROL) 1,250 mcg (50,000 unit) capsule  3/15/22   Provider, Historical   AZO CRANBERRY 039-51-97 mg-mg-million tab Take 1 Capsule by mouth daily. Provider, Historical   metoprolol tartrate (LOPRESSOR) 50 mg tablet Take 1 Tablet by mouth two (2) times a day. 6/16/22   Aleksandar Cameron MD   dilTIAZem ER (CARDIZEM CD) 180 mg capsule TAKE ONE CAPSULE BY MOUTH EVERY DAY FOR HIGH BLOOD PRESSURE 6/16/22   Aleksandar Cameron MD   gabapentin (NEURONTIN) 400 mg capsule Take 400 mg by mouth two (2) times a day. Provider, Historical   pravastatin (PRAVACHOL) 40 mg tablet Take 40 mg by mouth daily. Provider, Historical   escitalopram oxalate (LEXAPRO) 10 mg tablet Take 10 mg by mouth two (2) times a day. Provider, Historical   pantoprazole (PROTONIX) 40 mg tablet Take 40 mg by mouth daily. Provider, Historical   levothyroxine (SYNTHROID) 175 mcg tablet Take 175 mcg by mouth Daily (before breakfast). Provider, Historical   Trelegy Ellipta 100-62.5-25 mcg inhaler INHALE 1 PUFF BY MOUTH EVERY DAY 1/4/22   Provider, Historical   insulin aspart U-100 (NovoLOG U-100 Insulin aspart) 100 unit/mL injection INJECT A MAX  UNITS UNDER THE SKIN DAILY WITH INSULIN PUMP 1/6/22   Aleksandar Cameron MD   nitroglycerin (NITROSTAT) 0.4 mg SL tablet  6/8/21   Provider, Historical   prednisoLONE acetate (PRED FORTE) 1 % ophthalmic suspension Administer 1 Drop to right eye two (2) times a day. 9/9/20   Provider, Historical   ferrous sulfate 325 mg (65 mg iron) tablet Take 650 mg by mouth nightly. 2 daily    Provider, Historical   dorzolamide-timolol, PF, (COSOPT, PF,) 2-0.5 % dpet Administer 1 Drop to left eye two (2) times a day. Indications: increased pressure in the eye 7/6/15   Provider, Historical       Allergies/Social/Family History:      Allergies   Allergen Reactions    Nsaids (Non-Steroidal Anti-Inflammatory Drug) Other (comments)     bleeding    Augmentin [Amoxicillin-Pot Clavulanate] Diarrhea    Oxycodone Other (comments)     Altered mental status per patient. Has tolerated Hydrocodone in the past     Vesicare [Solifenacin] Rash     BURNING WITH URINATION      Social History     Tobacco Use    Smoking status: Former Smoker     Packs/day: 0.50     Years: 40.00     Pack years: 20.00     Quit date: 2015     Years since quittin.9    Smokeless tobacco: Never Used   Substance Use Topics    Alcohol use: No     Alcohol/week: 0.0 standard drinks      Family History   Problem Relation Age of Onset    COPD Mother     Diabetes Mother     COPD Father     Diabetes Father     Cancer Father         pancreatic    Diabetes Brother     Heart Disease Brother     Diabetes Brother     Alcohol abuse Brother     Diabetes Sister     Cancer Sister         Melenoma    Bleeding Prob Sister         Factor V Leiden    Anesth Problems Neg Hx        Review of Systems:     10 system reviewed and negative but as in interval history    Objective:   Vital Signs:  Visit Vitals  BP (!) 149/57   Pulse 75   Temp 97.8 °F (36.6 °C)   Resp 16   Wt 104.2 kg (229 lb 11.5 oz)   SpO2 94%   BMI 38.23 kg/m²    O2 Flow Rate (L/min): 3 l/min O2 Device: Nasal cannula Temp (24hrs), Av.8 °F (36.6 °C), Min:97.8 °F (36.6 °C), Max:97.8 °F (36.6 °C)           Intake/Output:   No intake or output data in the 24 hours ending 22    Physical Exam:    General:  Alert, cooperative, well noursished, well developed, appears stated age   Eyes:  Sclera anicteric. Pupils equally round and reactive to light. Mouth/Throat: Mucous membranes normal, oral pharynx clear   Neck: Supple   Lungs:   Clear to auscultation bilaterally, good effort   CV:  Regular rate and rhythm,no murmur, click, rub or gallop   Abdomen:   Soft, non-tender.  bowel sounds normal. non-distended   Extremities: No cyanosis or edema   Skin: Skin color, texture, turgor normal. no acute rash or lesions   Lymph nodes: Cervical and supraclavicular normal Musculoskeletal: No swelling or deformity   Lines/Devices:  Intact, no erythema, drainage or tenderness   Psych: Alert and oriented, normal mood affect given the setting       LABS AND  DATA: Personally reviewed  Recent Labs     06/21/22  1642   WBC 7.6   HGB 10.7*   HCT 33.5*        Recent Labs     06/21/22 1642      K 5.3*   *   CO2 25   BUN 36*   CREA 2.62*   *   CA 9.4   MG 1.5*     Recent Labs     06/21/22  1642      TP 6.8   ALB 2.6*   GLOB 4.2*     No results for input(s): INR, PTP, APTT, INREXT in the last 72 hours. No results for input(s): PHI, PCO2I, PO2I, FIO2I in the last 72 hours. No results for input(s): CPK, CKMB, TROIQ, BNPP in the last 72 hours. Hemodynamics:   PAP:   CO:     Wedge:   CI:     CVP:    SVR:       PVR:       Ventilator Settings:  Mode Rate Tidal Volume Pressure FiO2 PEEP                    Peak airway pressure:      Minute ventilation:          MEDS: Reviewed    Chest X-Ray:  CXR Results  (Last 48 hours)               06/21/22 1655  XR CHEST PORT Final result    Impression:  No evidence of pneumonia or pulmonary edema. There is grossly stable   bilateral lung scarring. Narrative:  EXAM: XR CHEST PORT       INDICATION: hypoxia       COMPARISON: Chest April 4, 2022       FINDINGS: A portable AP radiograph of the chest was obtained at 1647 hours. Patient is status post sternotomy and CABG. The lung volumes are again low. No   new lung consolidation is identified. Bilateral scarring is noted. The vascular   clarity is normal.  No definite effusion or pneumothorax is seen.                 ECHO:  Ordered and pending    Assessment and Plan:   - Junctional bradycardia:  - Near syncope due to the above:  -Longstanding diabetes mellitus with multiple complication:  - Chronic kidney disease:  - Mild hyperkalemia:  - History of coronary artery disease:  -History of hypothyroidism    Patient takes beta-blocker and calcium channel blocker, she also has chronic kidney disease with chronic elevation in potassium. Her presentation could be related to medication effect or hyperkalemia though more likely to be related to degenerative conductive system disease.  -Patient condition improved on dopamine. Appreciate cardiology. Wean dopamine as tolerated. Possible need for permanent pacemaker insertion in the morning. We will keep her n.p.o.  -Patient uses insulin pump at home. However her partner manage her requirement. We will remove the pump and start sliding scale. Goal to keep blood sugar below 180  - Patient received medical management for hyperkalemia in the ER. Continue to monitor. DVT and GI prophylaxis    CRITICAL CARE CONSULTANT NOTE  I had a face to face encounter with the patient, reviewed and interpreted patient data including clinical events, labs, images, vital signs, I/O's, and examined patient. I have discussed the case and the plan and management of the patient's care with the consulting services, the bedside nurses and the respiratory therapist.      NOTE OF PERSONAL INVOLVEMENT IN CARE   This patient has a high probability of imminent, clinically significant deterioration, which requires the highest level of preparedness to intervene urgently. I participated in the decision-making and personally managed or directed the management of the following life and organ supporting interventions that required my frequent assessment to treat or prevent imminent deterioration. I personally spent 40 minutes of critical care time. This is time spent at this critically ill patient's bedside actively involved in patient care as well as the coordination of care and discussions with the patient's family. This does not include any procedural time which has been billed separately. Sherri Simmons M.D.   Staff Intensivist/Pulmonologist  [facility]  6/21/2022

## 2022-06-21 NOTE — ED TRIAGE NOTES
Pt called for a lift assistance but she was bradycardiac and hypotensive. Hr was in 40s, bp 44/27, and pt was minimal responsive.

## 2022-06-21 NOTE — CONSULTS
ICU consult note    I saw pt in ED. Symptomatic bradycardia requiring dopamine infusion to maintain HR. Will admit to ICU and full H and P to follow. Current plan is continue dopamine infusion and wean as tolerated. Cardiology on board.       Gen NAD  CV first degree block with HR 70s  Resp shallow on NC  Abd soft  Extremities 1+ edema    Will admit to ICU once bed available    Pauline Guevara MD

## 2022-06-21 NOTE — Clinical Note
Patient transported with a Registered Nurse, Monitor and Oxygen. Oxygen used for patient = nasal cannula, @ 2 - 6 Liters.

## 2022-06-21 NOTE — PROGRESS NOTES
ICU Consult Noted    4th floor Intensivist to see. Appreciate cardiology input.     Kar Ontiveros DO   Staff Intensivist/Anesthesiologist  Critical Care Medicine

## 2022-06-21 NOTE — CONSULTS
[address] [facility] Cardiology Consultation    Date of Consult:  06/21/22  Date of Admission: 6/21/2022  Primary Cardiologist: Dr. Vin Cunningham  Physician Requesting consult: Dr. Kodak Escobar     Assessment/Plan:  1. Junctional bradycardia, near-syncope - initial ECG junctional bradycardia, HR 30s and hypotensive. Now hemodynamically stable with dopamine in NSR with 1st degree AV block and HR 70s with normal BP.   - continue dopamine infusion and titrate as tolerated   - please notify us if she becomes hemodynamically unstable as she may need a temporary pacemaker  - hold home metoprolol, diltiazem, and avoid other AV franchesca blockers  - echo  - admit to CCU or CVICU. NPO at midnight. Discussed with Dr. Benny Benson - will likely need a permanent pacemaker. 2. Hyperkalemia - POC K 5.8 and CMP with K 5.3. She has CKD and baseline high normal K, so bradycardia is likely due to degenerative conduction system disease and not due to electrolyte issues  - received insulin in the ER     3. CAD s/p CABG 2015 - no anginal symptoms. ECG with NS T wave changes unchanged from baseline    4. Type I DM c/b kidney disease, retinopathy  5. CKD   6. COPD   7. Altered mental status - lethargic and intermittently confused despite improvement in BP. Check UA, other sources of infection. Discussed with RN and Dr. Kodak Escobar. Attempted to call her partner but unable to reach her. Thank you for the opportunity to participate in the care of Marlen Castanon and please do not hesitate to contact us should you have any questions. Chief Complaint / Reason for Consult:   Bradycardia    History of Present Illness:  Marlen Castanon is a 67 y.o. female with the below listed medical history who presented with bradycardia and hypotension. She had been feeling generally fatigued but became significantly worse this morning. She was at ortho appointment and had a near syncopal episode. On EMS evaluation she was bradycardic to 30 and hypotensive.  On arrival to ER she was in junctional bradycardia, HR 30s and hypotensive. Started on dopamine and given IVF with improvement of BP and return of NSR with HR 70s. History is limited as she appears confused. Denies any chest pain or dyspnea. No fever or cough. Notes that she was recently treated for UTI. Past Medical History:   Diagnosis Date    Adverse effect of anesthesia     SLOW WAKING PAST ANESTHESIA    Anxiety     Arthritis     CAD (coronary artery disease)     s/p CABG x 3v    Chest pain 7/2015    Chronic obstructive pulmonary disease (HCC)     CTS (carpal tunnel syndrome)     S/p bilateral release    Diabetes ([address]     Diabetic gastroparesis ([address]     Diabetic neuropathy [facility]     Diabetic retinopathy ([facility]     GERD (gastroesophageal reflux disease)     GI bleed 7/2015    4 bleeds with 9 clips placed    Hypercholesteremia     Hypertension     Hypothyroid     Ill-defined condition     NEUROPATHY HANDS AND FEET    Ill-defined condition 2017    GI BLEED    Nicotine vapor product user     JOHN on CPAP     Osteoporosis     Vitamin D deficiency        Prior to Admission medications    Medication Sig Start Date End Date Taking? Authorizing Provider   tamsulosin (FLOMAX) 0.4 mg capsule 0.4 mg daily. 6/14/22   Provider, Historical   ergocalciferol (ERGOCALCIFEROL) 1,250 mcg (50,000 unit) capsule  3/15/22   Provider, Historical   AZO CRANBERRY 958-26-81 mg-mg-million tab Take 1 Capsule by mouth daily. Provider, Historical   metoprolol tartrate (LOPRESSOR) 50 mg tablet Take 1 Tablet by mouth two (2) times a day. 6/16/22   Eric Jaimes MD   dilTIAZem ER (CARDIZEM CD) 180 mg capsule TAKE ONE CAPSULE BY MOUTH EVERY DAY FOR HIGH BLOOD PRESSURE 6/16/22   Eric Jaimes MD   gabapentin (NEURONTIN) 400 mg capsule Take 400 mg by mouth two (2) times a day. Provider, Historical   pravastatin (PRAVACHOL) 40 mg tablet Take 40 mg by mouth daily.     Provider, Historical   escitalopram oxalate (LEXAPRO) 10 mg tablet Take 10 mg by mouth two (2) times a day. Provider, Historical   pantoprazole (PROTONIX) 40 mg tablet Take 40 mg by mouth daily. Provider, Historical   levothyroxine (SYNTHROID) 175 mcg tablet Take 175 mcg by mouth Daily (before breakfast). Provider, Historical   Trelegy Ellipta 100-62.5-25 mcg inhaler INHALE 1 PUFF BY MOUTH EVERY DAY 1/4/22   Provider, Historical   insulin aspart U-100 (NovoLOG U-100 Insulin aspart) 100 unit/mL injection INJECT A MAX  UNITS UNDER THE SKIN DAILY WITH INSULIN PUMP 1/6/22   Keenan Mchugh MD   nitroglycerin (NITROSTAT) 0.4 mg SL tablet  6/8/21   Provider, Historical   prednisoLONE acetate (PRED FORTE) 1 % ophthalmic suspension Administer 1 Drop to right eye two (2) times a day. 9/9/20   Provider, Historical   ferrous sulfate 325 mg (65 mg iron) tablet Take 650 mg by mouth nightly. 2 daily    Provider, Historical   dorzolamide-timolol, PF, (COSOPT, PF,) 2-0.5 % dpet Administer 1 Drop to left eye two (2) times a day. Indications: increased pressure in the eye 7/6/15   Provider, Historical       Current Facility-Administered Medications   Medication Dose Route Frequency    dextrose 10 % infusion 125-250 mL  125-250 mL IntraVENous PRN    DOPamine (INTROPIN) 800 mg in dextrose 5% 250 mL infusion  0-20 mcg/kg/min IntraVENous TITRATE     Current Outpatient Medications   Medication Sig    tamsulosin (FLOMAX) 0.4 mg capsule 0.4 mg daily.  ergocalciferol (ERGOCALCIFEROL) 1,250 mcg (50,000 unit) capsule     AZO CRANBERRY 232-05-13 mg-mg-million tab Take 1 Capsule by mouth daily.  metoprolol tartrate (LOPRESSOR) 50 mg tablet Take 1 Tablet by mouth two (2) times a day.  dilTIAZem ER (CARDIZEM CD) 180 mg capsule TAKE ONE CAPSULE BY MOUTH EVERY DAY FOR HIGH BLOOD PRESSURE    gabapentin (NEURONTIN) 400 mg capsule Take 400 mg by mouth two (2) times a day.  pravastatin (PRAVACHOL) 40 mg tablet Take 40 mg by mouth daily.     escitalopram oxalate (LEXAPRO) 10 mg tablet Take 10 mg by mouth two (2) times a day.  pantoprazole (PROTONIX) 40 mg tablet Take 40 mg by mouth daily.  levothyroxine (SYNTHROID) 175 mcg tablet Take 175 mcg by mouth Daily (before breakfast).  Trelegy Ellipta 100-62.5-25 mcg inhaler INHALE 1 PUFF BY MOUTH EVERY DAY    insulin aspart U-100 (NovoLOG U-100 Insulin aspart) 100 unit/mL injection INJECT A MAX  UNITS UNDER THE SKIN DAILY WITH INSULIN PUMP    nitroglycerin (NITROSTAT) 0.4 mg SL tablet     prednisoLONE acetate (PRED FORTE) 1 % ophthalmic suspension Administer 1 Drop to right eye two (2) times a day.  ferrous sulfate 325 mg (65 mg iron) tablet Take 650 mg by mouth nightly. 2 daily    dorzolamide-timolol, PF, (COSOPT, PF,) 2-0.5 % dpet Administer 1 Drop to left eye two (2) times a day.  Indications: increased pressure in the eye       Family History   Problem Relation Age of Onset   Jesse Porch COPD Mother    Jesse Porch Diabetes Mother    Jesse Porch COPD Father     Diabetes Father     Cancer Father         pancreatic    Diabetes Brother     Heart Disease Brother     Diabetes Brother     Alcohol abuse Brother     Diabetes Sister     Cancer Sister         Melenoma    Bleeding Prob Sister         Factor V Leiden    Anesth Problems Neg Hx      No family h/o SCD or premature CAD    Social History     Socioeconomic History    Marital status: LIFE PARTNER     Spouse name: Not on file    Number of children: Not on file    Years of education: Not on file    Highest education level: Not on file   Occupational History    Occupation: [employer]   Tobacco Use    Smoking status: Former Smoker     Packs/day: 0.50     Years: 40.00     Pack years: 20.00     Quit date: 2015     Years since quittin.9    Smokeless tobacco: Never Used   Substance and Sexual Activity    Alcohol use: No     Alcohol/week: 0.0 standard drinks    Drug use: No    Sexual activity: Never   Other Topics Concern    Not on file   Social History Narrative    Lives in [place] with partner, moved from [place] to be closer to sister and \"doctors in [place] almost killed me\"     Social Determinants of Health     Financial Resource Strain:     Difficulty of Paying Living Expenses: Not on file   Food Insecurity:     Worried About Running Out of Food in the Last Year: Not on file    Ese of Food in the Last Year: Not on file   Transportation Needs:     Lack of Transportation (Medical): Not on file    Lack of Transportation (Non-Medical): Not on file   Physical Activity:     Days of Exercise per Week: Not on file    Minutes of Exercise per Session: Not on file   Stress:     Feeling of Stress : Not on file   Social Connections:     Frequency of Communication with Friends and Family: Not on file    Frequency of Social Gatherings with Friends and Family: Not on file    Attends Scientology Services: Not on file    Active Member of [address] [place] [address] [employer] or Organizations: Not on file    Attends Club or Organization Meetings: Not on file    Marital Status: Not on file   Intimate Partner Violence:     Fear of Current or Ex-Partner: Not on file    Emotionally Abused: Not on file    Physically Abused: Not on file    Sexually Abused: Not on file   Housing Stability:     Unable to Pay for Housing in the Last Year: Not on file    Number of [place] in the Last Year: Not on file    Unstable Housing in the Last Year: Not on file       ROS  ROS: All other systems reviewed and were negative other than mentioned above.      Visit Vitals  BP (!) 114/41   Pulse (!) 53   Temp 97.8 °F (36.6 °C)   Resp 15   Wt 104.2 kg (229 lb 11.5 oz)   SpO2 94%   BMI 38.23 kg/m²       No intake or output data in the 24 hours ending 06/21/22 1814     General: resting comfortably in no distress, obese  HEENT: sclera anicteric, moist mucous membranes  Neck: supple, no JVD   CV: regular rate and rhythm, normal S1 and S2, II/VI RANDALL  Lungs: no respiratory distress, lungs clear to auscultation anteriorly  Abdomen: soft, non distended, non tender  MSK: no lower extremity edema  Skin: warm to touch  Neuro: lethargic, opens eyes to verbal stimulation  Psych: calm    Lab Review:  BMP:   Lab Results   Component Value Date/Time     06/21/2022 04:42 PM    K 5.3 (H) 06/21/2022 04:42 PM     (H) 06/21/2022 04:42 PM    CO2 25 06/21/2022 04:42 PM    AGAP 3 (L) 06/21/2022 04:42 PM     (H) 06/21/2022 04:42 PM    BUN 36 (H) 06/21/2022 04:42 PM    CREA 2.62 (H) 06/21/2022 04:42 PM    GFRAA 22 (L) 06/21/2022 04:42 PM    GFRNA 18 (L) 06/21/2022 04:42 PM        CBC:  Lab Results   Component Value Date/Time    WBC 7.6 06/21/2022 04:42 PM    HGB 10.7 (L) 06/21/2022 04:42 PM    Hematocrit (POC) 38 01/22/2019 06:59 AM    HCT 33.5 (L) 06/21/2022 04:42 PM    PLATELET 548 46/72/5038 04:42 PM    .1 (H) 06/21/2022 04:42 PM       All Cardiac Markers in the last 24 hours:  No results found for: CPK, CK, CKMMB, CKMB, RCK3, CKMBT, CKMBPOC, CKNDX, CKND1, BARTOLOME, TROPT, TROIQ, MALINI, TROPT, TNIPOC, BNP, BNPP, BNPNT    Lab Results   Component Value Date/Time    Cholesterol, total 220 (H) 02/04/2022 11:44 AM    HDL Cholesterol 48 02/04/2022 11:44 AM    LDL, calculated 108.8 (H) 02/04/2022 11:44 AM    VLDL, calculated 63.2 02/04/2022 11:44 AM    Triglyceride 316 (H) 02/04/2022 11:44 AM    CHOL/HDL Ratio 4.6 02/04/2022 11:44 AM        Data Review:  ECG tracing personally reviewed: junctional bradycardia, HR 44. Repeat ECG NSR 1st degree AV block    Echocardiogram:   01/30/21    ECHO ADULT COMPLETE 02/01/2021 2/1/2021    Interpretation Summary  · LV: Estimated LVEF is 55 - 60%. Normal cavity size, wall thickness and systolic function (ejection fraction normal). Mild (grade 1) left ventricular diastolic dysfunction. · MV: Mitral valve non-specific thickening and thickening. · LA: Mildly dilated left atrium. · AV: Probably trileaflet aortic valve. · PA: Pulmonary arterial systolic pressure is 25 mmHg.     Signed by: Marylou Wilson MD on 2/1/2021  2:45 PM    Total critical care time - 35 minutes     MDM:  High  Risk of decompensation:  High    I personally spent the above critical care time. This is time spent at this critically ill patient's bedside / unit / floor actively involved in patient care as well as the coordination of care and discussions with the patient's family. This does not include any procedural time which has been billed separately. Signed:  Lavern Montana.  Sheyla Richards, [address] [facility]  06/21/22

## 2022-06-22 ENCOUNTER — APPOINTMENT (OUTPATIENT)
Dept: NON INVASIVE DIAGNOSTICS | Age: 72
DRG: 244 | End: 2022-06-22
Attending: STUDENT IN AN ORGANIZED HEALTH CARE EDUCATION/TRAINING PROGRAM
Payer: MEDICARE

## 2022-06-22 ENCOUNTER — APPOINTMENT (OUTPATIENT)
Dept: GENERAL RADIOLOGY | Age: 72
DRG: 244 | End: 2022-06-22
Attending: INTERNAL MEDICINE
Payer: MEDICARE

## 2022-06-22 LAB
ANION GAP SERPL CALC-SCNC: 3 MMOL/L (ref 5–15)
ANION GAP SERPL CALC-SCNC: 7 MMOL/L (ref 5–15)
APPEARANCE UR: ABNORMAL
ATRIAL RATE: 74 BPM
BACTERIA URNS QL MICRO: ABNORMAL /HPF
BASE DEFICIT BLD-SCNC: 5.7 MMOL/L
BILIRUB UR QL: NEGATIVE
BUN SERPL-MCNC: 43 MG/DL (ref 6–20)
BUN SERPL-MCNC: 45 MG/DL (ref 6–20)
BUN/CREAT SERPL: 15 (ref 12–20)
BUN/CREAT SERPL: 15 (ref 12–20)
CA-I BLD-MCNC: 1.33 MMOL/L (ref 1.12–1.32)
CALCIUM SERPL-MCNC: 9.4 MG/DL (ref 8.5–10.1)
CALCIUM SERPL-MCNC: 9.4 MG/DL (ref 8.5–10.1)
CALCULATED P AXIS, ECG09: 78 DEGREES
CALCULATED R AXIS, ECG10: 62 DEGREES
CALCULATED R AXIS, ECG10: 69 DEGREES
CALCULATED T AXIS, ECG11: 65 DEGREES
CALCULATED T AXIS, ECG11: 94 DEGREES
CHLORIDE BLD-SCNC: 112 MMOL/L (ref 100–108)
CHLORIDE SERPL-SCNC: 109 MMOL/L (ref 97–108)
CHLORIDE SERPL-SCNC: 110 MMOL/L (ref 97–108)
CO2 BLD-SCNC: 23 MMOL/L (ref 19–24)
CO2 SERPL-SCNC: 20 MMOL/L (ref 21–32)
CO2 SERPL-SCNC: 23 MMOL/L (ref 21–32)
COLOR UR: ABNORMAL
CREAT SERPL-MCNC: 2.84 MG/DL (ref 0.55–1.02)
CREAT SERPL-MCNC: 3.03 MG/DL (ref 0.55–1.02)
CREAT UR-MCNC: 2.9 MG/DL (ref 0.6–1.3)
DIAGNOSIS, 93000: NORMAL
DIAGNOSIS, 93000: NORMAL
ECHO AO ROOT DIAM: 2.8 CM
ECHO AO ROOT INDEX: 1.35 CM/M2
ECHO AV PEAK GRADIENT: 10 MMHG
ECHO AV PEAK VELOCITY: 1.6 M/S
ECHO AV VELOCITY RATIO: 0.69
ECHO LA DIAMETER INDEX: 1.49 CM/M2
ECHO LA DIAMETER: 3.1 CM
ECHO LA TO AORTIC ROOT RATIO: 1.11
ECHO LA VOL 2C: 52 ML (ref 22–52)
ECHO LA VOL 4C: 44 ML (ref 22–52)
ECHO LA VOLUME AREA LENGTH: 50 ML
ECHO LA VOLUME INDEX A2C: 25 ML/M2 (ref 16–34)
ECHO LA VOLUME INDEX A4C: 21 ML/M2 (ref 16–34)
ECHO LA VOLUME INDEX AREA LENGTH: 24 ML/M2 (ref 16–34)
ECHO LV E' LATERAL VELOCITY: 4 CM/S
ECHO LV E' SEPTAL VELOCITY: 5 CM/S
ECHO LV FRACTIONAL SHORTENING: 30 % (ref 28–44)
ECHO LV INTERNAL DIMENSION DIASTOLE INDEX: 2.12 CM/M2
ECHO LV INTERNAL DIMENSION DIASTOLIC: 4.4 CM (ref 3.9–5.3)
ECHO LV INTERNAL DIMENSION SYSTOLIC INDEX: 1.49 CM/M2
ECHO LV INTERNAL DIMENSION SYSTOLIC: 3.1 CM
ECHO LV IVSD: 0.9 CM (ref 0.6–0.9)
ECHO LV MASS 2D: 137.8 G (ref 67–162)
ECHO LV MASS INDEX 2D: 66.2 G/M2 (ref 43–95)
ECHO LV POSTERIOR WALL DIASTOLIC: 1 CM (ref 0.6–0.9)
ECHO LV RELATIVE WALL THICKNESS RATIO: 0.45
ECHO LVOT PEAK GRADIENT: 5 MMHG
ECHO LVOT PEAK VELOCITY: 1.1 M/S
ECHO MV A VELOCITY: 1.13 M/S
ECHO MV AREA PHT: 2.2 CM2
ECHO MV E DECELERATION TIME (DT): 350.9 MS
ECHO MV E VELOCITY: 0.83 M/S
ECHO MV E/A RATIO: 0.73
ECHO MV E/E' LATERAL: 20.75
ECHO MV E/E' RATIO (AVERAGED): 18.68
ECHO MV E/E' SEPTAL: 16.6
ECHO MV PRESSURE HALF TIME (PHT): 101.8 MS
ECHO PV MAX VELOCITY: 1.2 M/S
ECHO PV PEAK GRADIENT: 5 MMHG
ECHO RV TAPSE: 1.8 CM (ref 1.7–?)
EPITH CASTS URNS QL MICRO: ABNORMAL /LPF
ERYTHROCYTE [DISTWIDTH] IN BLOOD BY AUTOMATED COUNT: 13.2 % (ref 11.5–14.5)
GLUCOSE BLD STRIP.AUTO-MCNC: 153 MG/DL (ref 74–106)
GLUCOSE BLD STRIP.AUTO-MCNC: 157 MG/DL (ref 65–117)
GLUCOSE BLD STRIP.AUTO-MCNC: 169 MG/DL (ref 65–117)
GLUCOSE BLD STRIP.AUTO-MCNC: 252 MG/DL (ref 65–117)
GLUCOSE BLD STRIP.AUTO-MCNC: 253 MG/DL (ref 65–117)
GLUCOSE BLD STRIP.AUTO-MCNC: 335 MG/DL (ref 65–117)
GLUCOSE BLD STRIP.AUTO-MCNC: 355 MG/DL (ref 65–117)
GLUCOSE SERPL-MCNC: 274 MG/DL (ref 65–100)
GLUCOSE SERPL-MCNC: 332 MG/DL (ref 65–100)
GLUCOSE UR STRIP.AUTO-MCNC: 500 MG/DL
HCO3 BLDA-SCNC: 23 MMOL/L
HCT VFR BLD AUTO: 34.2 % (ref 35–47)
HGB BLD-MCNC: 10.8 G/DL (ref 11.5–16)
HGB UR QL STRIP: ABNORMAL
HYALINE CASTS URNS QL MICRO: ABNORMAL /LPF (ref 0–5)
KETONES UR QL STRIP.AUTO: NEGATIVE MG/DL
LACTATE BLD-SCNC: 1.48 MMOL/L (ref 0.4–2)
LEUKOCYTE ESTERASE UR QL STRIP.AUTO: ABNORMAL
MCH RBC QN AUTO: 32.8 PG (ref 26–34)
MCHC RBC AUTO-ENTMCNC: 31.6 G/DL (ref 30–36.5)
MCV RBC AUTO: 104 FL (ref 80–99)
NITRITE UR QL STRIP.AUTO: NEGATIVE
NRBC # BLD: 0 K/UL (ref 0–0.01)
NRBC BLD-RTO: 0 PER 100 WBC
P-R INTERVAL, ECG05: 210 MS
PCO2 BLDV: 56.8 MMHG (ref 41–51)
PH BLDV: 7.21 [PH] (ref 7.32–7.42)
PH UR STRIP: 5.5 [PH] (ref 5–8)
PLATELET # BLD AUTO: 164 K/UL (ref 150–400)
PMV BLD AUTO: 11 FL (ref 8.9–12.9)
PO2 BLDV: 25 MMHG (ref 25–40)
POTASSIUM BLD-SCNC: 5.8 MMOL/L (ref 3.5–5.5)
POTASSIUM SERPL-SCNC: 5.8 MMOL/L (ref 3.5–5.1)
POTASSIUM SERPL-SCNC: 5.9 MMOL/L (ref 3.5–5.1)
PROT UR STRIP-MCNC: 300 MG/DL
Q-T INTERVAL, ECG07: 422 MS
Q-T INTERVAL, ECG07: 544 MS
QRS DURATION, ECG06: 98 MS
QRS DURATION, ECG06: 98 MS
QTC CALCULATION (BEZET), ECG08: 465 MS
QTC CALCULATION (BEZET), ECG08: 468 MS
RBC # BLD AUTO: 3.29 M/UL (ref 3.8–5.2)
RBC #/AREA URNS HPF: ABNORMAL /HPF (ref 0–5)
SERVICE CMNT-IMP: ABNORMAL
SODIUM BLD-SCNC: 139 MMOL/L (ref 136–145)
SODIUM SERPL-SCNC: 136 MMOL/L (ref 136–145)
SODIUM SERPL-SCNC: 136 MMOL/L (ref 136–145)
SP GR UR REFRACTOMETRY: 1.02 (ref 1–1.03)
SPECIMEN SITE: ABNORMAL
UR CULT HOLD, URHOLD: NORMAL
UROBILINOGEN UR QL STRIP.AUTO: 0.2 EU/DL (ref 0.2–1)
VENTRICULAR RATE, ECG03: 44 BPM
VENTRICULAR RATE, ECG03: 74 BPM
WBC # BLD AUTO: 9.7 K/UL (ref 3.6–11)
WBC URNS QL MICRO: >100 /HPF (ref 0–4)

## 2022-06-22 PROCEDURE — C1898 LEAD, PMKR, OTHER THAN TRANS: HCPCS | Performed by: INTERNAL MEDICINE

## 2022-06-22 PROCEDURE — 02H63JZ INSERTION OF PACEMAKER LEAD INTO RIGHT ATRIUM, PERCUTANEOUS APPROACH: ICD-10-PCS | Performed by: INTERNAL MEDICINE

## 2022-06-22 PROCEDURE — 74011250636 HC RX REV CODE- 250/636: Performed by: INTERNAL MEDICINE

## 2022-06-22 PROCEDURE — C1769 GUIDE WIRE: HCPCS | Performed by: INTERNAL MEDICINE

## 2022-06-22 PROCEDURE — 36415 COLL VENOUS BLD VENIPUNCTURE: CPT

## 2022-06-22 PROCEDURE — C1887 CATHETER, GUIDING: HCPCS | Performed by: INTERNAL MEDICINE

## 2022-06-22 PROCEDURE — 74011000250 HC RX REV CODE- 250: Performed by: INTERNAL MEDICINE

## 2022-06-22 PROCEDURE — 74011250637 HC RX REV CODE- 250/637: Performed by: INTERNAL MEDICINE

## 2022-06-22 PROCEDURE — 77030010507 HC ADH SKN DERMBND J&J -B: Performed by: INTERNAL MEDICINE

## 2022-06-22 PROCEDURE — 99153 MOD SED SAME PHYS/QHP EA: CPT | Performed by: INTERNAL MEDICINE

## 2022-06-22 PROCEDURE — 71045 X-RAY EXAM CHEST 1 VIEW: CPT

## 2022-06-22 PROCEDURE — 33208 INSRT HEART PM ATRIAL & VENT: CPT | Performed by: INTERNAL MEDICINE

## 2022-06-22 PROCEDURE — 77030018729 HC ELECTRD DEFIB PAD CARD -B: Performed by: INTERNAL MEDICINE

## 2022-06-22 PROCEDURE — C1893 INTRO/SHEATH, FIXED,NON-PEEL: HCPCS | Performed by: INTERNAL MEDICINE

## 2022-06-22 PROCEDURE — 77030022704 HC SUT VLOC COVD -B: Performed by: INTERNAL MEDICINE

## 2022-06-22 PROCEDURE — 81001 URINALYSIS AUTO W/SCOPE: CPT

## 2022-06-22 PROCEDURE — 2709999900 HC NON-CHARGEABLE SUPPLY: Performed by: INTERNAL MEDICINE

## 2022-06-22 PROCEDURE — A4565 SLINGS: HCPCS | Performed by: INTERNAL MEDICINE

## 2022-06-22 PROCEDURE — C1781 MESH (IMPLANTABLE): HCPCS | Performed by: INTERNAL MEDICINE

## 2022-06-22 PROCEDURE — 77030031139 HC SUT VCRL2 J&J -A: Performed by: INTERNAL MEDICINE

## 2022-06-22 PROCEDURE — 99152 MOD SED SAME PHYS/QHP 5/>YRS: CPT | Performed by: INTERNAL MEDICINE

## 2022-06-22 PROCEDURE — C1892 INTRO/SHEATH,FIXED,PEEL-AWAY: HCPCS | Performed by: INTERNAL MEDICINE

## 2022-06-22 PROCEDURE — 99233 SBSQ HOSP IP/OBS HIGH 50: CPT | Performed by: CLINICAL NURSE SPECIALIST

## 2022-06-22 PROCEDURE — 0JH606Z INSERTION OF PACEMAKER, DUAL CHAMBER INTO CHEST SUBCUTANEOUS TISSUE AND FASCIA, OPEN APPROACH: ICD-10-PCS | Performed by: INTERNAL MEDICINE

## 2022-06-22 PROCEDURE — 02HK3JZ INSERTION OF PACEMAKER LEAD INTO RIGHT VENTRICLE, PERCUTANEOUS APPROACH: ICD-10-PCS | Performed by: INTERNAL MEDICINE

## 2022-06-22 PROCEDURE — 74011000250 HC RX REV CODE- 250: Performed by: ANESTHESIOLOGY

## 2022-06-22 PROCEDURE — 77030041018: Performed by: INTERNAL MEDICINE

## 2022-06-22 PROCEDURE — 74011636637 HC RX REV CODE- 636/637: Performed by: INTERNAL MEDICINE

## 2022-06-22 PROCEDURE — 85027 COMPLETE CBC AUTOMATED: CPT

## 2022-06-22 PROCEDURE — 80048 BASIC METABOLIC PNL TOTAL CA: CPT

## 2022-06-22 PROCEDURE — C1785 PMKR, DUAL, RATE-RESP: HCPCS | Performed by: INTERNAL MEDICINE

## 2022-06-22 PROCEDURE — 77030002996 HC SUT SLK J&J -A: Performed by: INTERNAL MEDICINE

## 2022-06-22 PROCEDURE — 65620000000 HC RM CCU GENERAL

## 2022-06-22 PROCEDURE — 93306 TTE W/DOPPLER COMPLETE: CPT

## 2022-06-22 DEVICE — LEAD 383069 MRI US
Type: IMPLANTABLE DEVICE | Status: FUNCTIONAL
Brand: SELECTSECURE™ MRI SURESCAN™

## 2022-06-22 DEVICE — ENVELOPE CMRM6133 ABSORB LRG MR
Type: IMPLANTABLE DEVICE | Status: FUNCTIONAL
Brand: TYRX™

## 2022-06-22 DEVICE — IPG W1DR01 AZURE XT DR MRI USA
Type: IMPLANTABLE DEVICE | Status: FUNCTIONAL
Brand: AZURE™ XT DR MRI SURESCAN™

## 2022-06-22 DEVICE — LEAD 5076-52 MRI US RCMCRD
Type: IMPLANTABLE DEVICE | Status: FUNCTIONAL
Brand: CAPSUREFIX NOVUS MRI™ SURESCAN®

## 2022-06-22 RX ORDER — SODIUM CHLORIDE 0.9 % (FLUSH) 0.9 %
5-40 SYRINGE (ML) INJECTION EVERY 8 HOURS
Status: DISCONTINUED | OUTPATIENT
Start: 2022-06-22 | End: 2022-06-22 | Stop reason: HOSPADM

## 2022-06-22 RX ORDER — DORZOLAMIDE HCL 20 MG/ML
1 SOLUTION/ DROPS OPHTHALMIC 3 TIMES DAILY
Status: DISCONTINUED | OUTPATIENT
Start: 2022-06-22 | End: 2022-06-25 | Stop reason: HOSPADM

## 2022-06-22 RX ORDER — FENTANYL CITRATE 50 UG/ML
INJECTION, SOLUTION INTRAMUSCULAR; INTRAVENOUS AS NEEDED
Status: DISCONTINUED | OUTPATIENT
Start: 2022-06-22 | End: 2022-06-22 | Stop reason: HOSPADM

## 2022-06-22 RX ORDER — FUROSEMIDE 10 MG/ML
20 INJECTION INTRAMUSCULAR; INTRAVENOUS ONCE
Status: COMPLETED | OUTPATIENT
Start: 2022-06-22 | End: 2022-06-22

## 2022-06-22 RX ORDER — SODIUM CHLORIDE 0.9 % (FLUSH) 0.9 %
5-40 SYRINGE (ML) INJECTION AS NEEDED
Status: DISCONTINUED | OUTPATIENT
Start: 2022-06-22 | End: 2022-06-22 | Stop reason: HOSPADM

## 2022-06-22 RX ORDER — TIMOLOL MALEATE 5 MG/ML
1 SOLUTION/ DROPS OPHTHALMIC 2 TIMES DAILY
Status: DISCONTINUED | OUTPATIENT
Start: 2022-06-22 | End: 2022-06-25 | Stop reason: HOSPADM

## 2022-06-22 RX ORDER — SODIUM CHLORIDE 0.9 % (FLUSH) 0.9 %
5-40 SYRINGE (ML) INJECTION AS NEEDED
Status: DISCONTINUED | OUTPATIENT
Start: 2022-06-22 | End: 2022-06-25 | Stop reason: HOSPADM

## 2022-06-22 RX ORDER — MIDAZOLAM HYDROCHLORIDE 1 MG/ML
INJECTION, SOLUTION INTRAMUSCULAR; INTRAVENOUS AS NEEDED
Status: DISCONTINUED | OUTPATIENT
Start: 2022-06-22 | End: 2022-06-22 | Stop reason: HOSPADM

## 2022-06-22 RX ORDER — SODIUM CHLORIDE 0.9 % (FLUSH) 0.9 %
5-40 SYRINGE (ML) INJECTION EVERY 8 HOURS
Status: DISCONTINUED | OUTPATIENT
Start: 2022-06-22 | End: 2022-06-25 | Stop reason: HOSPADM

## 2022-06-22 RX ADMIN — PREDNISOLONE ACETATE 1 DROP: 10 SUSPENSION/ DROPS OPHTHALMIC at 19:02

## 2022-06-22 RX ADMIN — PREDNISOLONE ACETATE 1 DROP: 10 SUSPENSION/ DROPS OPHTHALMIC at 08:34

## 2022-06-22 RX ADMIN — LEVOTHYROXINE SODIUM 175 MCG: 0.07 TABLET ORAL at 05:50

## 2022-06-22 RX ADMIN — DORZOLAMIDE HYDROCHLORIDE 1 DROP: 20 SOLUTION/ DROPS OPHTHALMIC at 19:02

## 2022-06-22 RX ADMIN — Medication 20 UNITS: at 20:36

## 2022-06-22 RX ADMIN — PANTOPRAZOLE SODIUM 40 MG: 40 TABLET, DELAYED RELEASE ORAL at 06:42

## 2022-06-22 RX ADMIN — Medication 5 UNITS: at 23:51

## 2022-06-22 RX ADMIN — Medication 20 UNITS: at 09:51

## 2022-06-22 RX ADMIN — TIMOLOL MALEATE 1 DROP: 5 SOLUTION OPHTHALMIC at 19:02

## 2022-06-22 RX ADMIN — Medication 5 UNITS: at 19:01

## 2022-06-22 RX ADMIN — Medication 7 UNITS: at 05:49

## 2022-06-22 RX ADMIN — SODIUM CHLORIDE, PRESERVATIVE FREE 10 ML: 5 INJECTION INTRAVENOUS at 05:50

## 2022-06-22 RX ADMIN — Medication 10 UNITS: at 12:40

## 2022-06-22 RX ADMIN — PRAVASTATIN SODIUM 40 MG: 40 TABLET ORAL at 08:35

## 2022-06-22 RX ADMIN — DORZOLAMIDE HYDROCHLORIDE 1 DROP: 20 SOLUTION/ DROPS OPHTHALMIC at 08:34

## 2022-06-22 RX ADMIN — FUROSEMIDE 20 MG: 10 INJECTION, SOLUTION INTRAVENOUS at 11:06

## 2022-06-22 NOTE — PROGRESS NOTES
Transitions of Care Plan   RUR: 19% - moderate   Admission Dx: bradycardia; plans for PPM this afternoon   Consults: Cardiology   Baseline: independent without DME; resides w spouse   Barriers to Discharge: medical   Disposition: home with family   Estimated Discharge Date: 6/23/22    Reason for Admission:   Bradycardia                  RUR Score:     19% - moderate             PCP: First and Last name:   Facundo Franks MD     Name of Practice:    Are you a current patient: Yes/No:    Approximate date of last visit:    Can you participate in a virtual visit if needed:     Do you (patient/family) have any concerns for transition/discharge? No              Plan for utilizing home health:   No    Current Advanced Directive/Advance Care Plan:  Full Code      Healthcare Decision Maker:   Click here to complete [address] including selection of the Healthcare Decision Maker Relationship (ie \"Primary\")            Spouse Pancho Suarez - p: [phone]    Transition of Care Plan:          Patient is independent at baseline without the use of DME. Admitted to the hospital for symptomatic bradycardia and hypotension. Plans for pacemaker possibly this afternoon. Anticipate discharge tomorrow. No CM needs identified. Marah Reynolds, MPH  Care Manager [facility]  Available via [facility] or      Care Management Interventions  PCP Verified by CM: Yes  Palliative Care Criteria Met (RRAT>21 & CHF Dx)?: No  Mode of Transport at Discharge:  Other (see comment)  Transition of Care Consult (CM Consult): Discharge Planning  MyChart Signup: Yes  Discharge Durable Medical Equipment: No  Health Maintenance Reviewed: Yes  Physical Therapy Consult: No  Occupational Therapy Consult: No  Speech Therapy Consult: No  Support Systems: Spouse/Significant Other  Confirm Follow Up Transport: Family  Discharge Location  Patient Expects to be Discharged to[de-identified] Home

## 2022-06-22 NOTE — PROGRESS NOTES
TRANSFER - IN REPORT:  Telephone report received from Casi, RN (name) on Fnai Jaimes  being received from CCU (unit) for ordered procedure  Report consisted of patients Situation, Background, Assessment and Recommendations(SBAR). Information from the following report(s) SBAR, Intake/Output, MAR, Recent Results, Med Rec Status and Cardiac Rhythm NSR 70's was reviewed with the receiving nurse. Opportunity for questions and clarification was provided. Assessment completed upon patients arrival to unit and care assumed.

## 2022-06-22 NOTE — PROGRESS NOTES
Physician Progress Note      PATIENTDava Tulio  CSN #:                  [id number]  :                       1950  ADMIT DATE:       2022 4:24 PM  DISCH DATE:  RESPONDING  PROVIDER #:        Birgit Beaver MD [facility] MD        QUERY TEXT:    Stage of Chronic Kidney Disease: Please provide further specificity, if known. Clinical indicators include: chronic kidney disease, bun  Options provided:  -- Chronic kidney disease stage 1  -- Chronic kidney disease stage 2  -- Chronic kidney disease stage 3  -- Chronic kidney disease stage 3a  -- Chronic kidney disease stage 3b  -- Chronic kidney disease stage 4  -- Chronic kidney disease stage 5  -- Chronic kidney disease stage 5, requiring dialysis  -- End stage renal disease  -- Other - I will add my own diagnosis  -- Disagree - Not applicable / Not valid  -- Disagree - Clinically Unable to determine / Unknown        PROVIDER RESPONSE TEXT:    Provider was unable to determine a response for this query.       Electronically signed by:  Birgit Perrin MD 2022 6:48 PM

## 2022-06-22 NOTE — PROGRESS NOTES
Cardiac Cath Lab Procedure Area Arrival Note:    Po Miller arrived to Cardiac Cath Lab, Procedure Area. Patient identifiers verified with NAME and DATE OF BIRTH. Procedure verified with patient. Consent forms verified. Allergies verified. Patient informed of procedure and plan of care. Questions answered with review. Patient voiced understanding of procedure and plan of care. Patient on cardiac monitor, non-invasive blood pressure, SPO2 monitor. On  O2 @ 4 lpm via nc. IV of ns on pump at 25 ml/hr. Patient status doing well without problems. Patient is A&Ox 4. Patient reports no pain. Patient medicated during procedure with orders obtained and verified by Dr. Kaylee Marley. Refer to patients Cardiac Cath Lab PROCEDURE REPORT for vital signs, assessment, status, and response during procedure, printed at end of case. Printed report on chart or scanned into chart.

## 2022-06-22 NOTE — PROGRESS NOTES
TRANSFER - OUT REPORT:    Verbal [facility] to Marcel[facility] for routine progression of care       Report consisted of patients Situation, Background, Assessment and   Recommendations(SBAR). Information from the following report(s) SBAR, Procedure Summary and MAR was reviewed with the receiving nurse. Opportunity for questions and clarification was provided.

## 2022-06-22 NOTE — ROUTINE PROCESS
TRANSFER - OUT REPORT:    Verbal report given to EDWINA Gonzalez(name) on Parveen Carmichael  being transferred to CCU(unit) for routine progression of care       Report consisted of patients Situation, Background, Assessment and   Recommendations(SBAR). Information from the following report(s) SBAR and Kardex was reviewed with the receiving nurse. Lines:   Peripheral IV 06/21/22 Left (Active)   Site Assessment Clean, dry, & intact 06/21/22 1628       Peripheral IV 06/21/22 Anterior;Right Forearm (Active)       Peripheral IV 06/21/22 Posterior;Right Hand (Active)        Opportunity for questions and clarification was provided.       Patient transported with:   Monitor  Registered Nurse

## 2022-06-22 NOTE — PROGRESS NOTES
0730 Report received from [address] [place] [address], [address] [place] [address]. Dopamine @2.     0930 Diabetic management @bedside adjusting pt's insulin regimen. 1000 IDR held. Plans for possible PPM today, 1x dose of IV Lasix, BMP this afternoon, monitor BGLs, and wean Dobutamine gtt as pt tolerates. 1400 CHG bath completed. 1445 Dopamine gtt stopped. HR/BP stable. 1500 Attempted to obtain consent from pt and pt's significant other, Katherin. Spouse states pt was recently started on Tamsulosin (3 days ago) and was concerned this is contributing to the pt's condition and would like to speak with the Cardiologist prior to signing consent for PPM.     1600 Paged Dr. Almas Hughes.     1615 BMP obtained and sent. [address] [place] [address] Dr. Almas Hughes calling pt's [facility] @this time. 1645 S/w Katherin, patient, and patient's sister. Pt and sister in pt's room, consent obtained and placed on pt's chart. CCL RN @bedside transporting pt down. Report given to [facility], RN.     1218 Reviewed labs (K, Creatinine) with MD, pt currently KELLY.     1830 Report received from [facility], [address] [place] [address]. Pt s/p Dual Chamber PPM with left chest insertion site. Pt's SO @bedside and made aware.      [address] Dr. Jessica Horner @bedside updating pt's SO.

## 2022-06-22 NOTE — PROGRESS NOTES
2109: TRANSFER - IN REPORT:    Verbal report received from [facility] (name) on Deondre Ramirez  being received from [facility] (unit) for routine progression of care      Report consisted of patients Situation, Background, Assessment and   Recommendations(SBAR). Information from the following report(s) SBAR, Kardex, Intake/Output, MAR and Recent Results was reviewed with the receiving nurse. Opportunity for questions and clarification was provided. Assessment completed upon patients arrival to unit and care assumed. 2125: patient arrived to unit, drowsy but A/O x4. VS as expected with dopamine infusing. Primary Nurse Miriam Vazquez, EDWINA and [facility], RN performed a dual skin assessment on this patient No impairment noted  Current Bed: [facility]. Rehan score is 16.     2300: HR 77 / MAP 74 - will wean dopamine as tolerated. 0715: updated cardiology - keep patient NPO and hold AM Lovenox for potential PPM.     0730: Bedside and Verbal shift change report given to [address] [place] [address]  (oncoming nurse) by Shanti Woods RN  (offgoing nurse). Report included the following information SBAR, Kardex, Intake/Output, MAR and Recent Results.

## 2022-06-22 NOTE — PROGRESS NOTES
[facility]    ICU TEAM Progress Note    Name: Hipolito Waddell   :    MRN: [id number]   Date: 2022           ICU Assessment     Junctional bradycardia             ICU Comprehensive Plan of Care:     From admission note: This is a very pleasant 59-year-old lady with past medical history significant for diabetes mellitus type 1 since the age of 16, hypothyroidism, coronary artery disease status post CABG in , retinopathy and other medical problem who has been complaining of progressive fatigue for couple of days. Today she had an orthopedic appointment and she was found to be hypotensive and bradycardic with junctional rhythm in the 30s. She was sent to the ER where junctional rhythm was confirmed. Evaluated by cardiologist and patient condition improved with dopamine infusion. Currently she is in first-degree heart block but blood pressure has improved. Patient is being admitted to the ICU. Possible plan for permanent pacemaker in the morning. Symptomatic bradycardia: Likely secondary to conduction system disease. Responded well to dopamine infusion and now in normal sinus rhythm. Wean dopamine as tolerated for heart rate more than 60. Will be assessed by interventional cardiology today for possible pacemaker. Continue holding all AV franchesca blocking agents including beta and calcium channel blockers. Hyperkalemia: Secondary to acute on chronic kidney injury and lack of insulin, creatinine at 2.8 from 2.6. Attempt 1 dose of Lasix, recheck BMP at noon. Hyperglycemia: In the setting of type 1 diabetes. Agree with basal NPH in addition to sliding scale. Diabetes management follow-up appreciated.     Subjective:   Progress Note: 2022      Reason for ICU Admission: Junctional bradycardia    HPI: as above    Overnight Events:   2022      POD:  * No surgery date entered *    S/P:   Procedure(s):  INSERT PPM DUAL    Active Problem List:     Problem List  Date Reviewed: 6/16/2022          Codes Class    Bradycardia ICD-10-CM: R00.1  ICD-9-CM: 427.89         Tibia fracture ICD-10-CM: S82.209A  ICD-9-CM: 823.80         Ankle fracture ICD-10-CM: S82.899A  ICD-9-CM: 824.8         Primary osteoarthritis of right knee ICD-10-CM: M17.11  ICD-9-CM: 715.16         Hyperkalemia ICD-10-CM: E87.5  ICD-9-CM: 276.7         Severe obesity (HCC) ICD-10-CM: E66.01  ICD-9-CM: 278.01         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 (HCC) ICD-10-CM: E10.311  ICD-9-CM: 250.51, 362.07, 362.01         Mixed hyperlipidemia ICD-10-CM: E78.2  ICD-9-CM: 272.2         Sepsis ([facility] ICD-10-CM: A41.9  ICD-9-CM: 038.9, 995.91         GI bleed ICD-10-CM: K92.2  ICD-9-CM: 578.9         AVM (arteriovenous malformation) of duodenum, acquired with hemorrhage ICD-10-CM: K31.811  ICD-9-CM: 537.83         Erosive gastritis with hemorrhage ICD-10-CM: K29.61  ICD-9-CM: 535.41         Hypothyroidism due to Hashimoto's thyroiditis ICD-10-CM: E03.8, E06.3  ICD-9-CM: 244.8, 245.2         Obesity, Class I, BMI 30.0-34.9 (see actual BMI) ICD-10-CM: E66.9  ICD-9-CM: 278.00         Low back pain at multiple sites ICD-10-CM: M54.50  ICD-9-CM: 724.2         Essential hypertension ICD-10-CM: I10  ICD-9-CM: 401.9         CAD (coronary artery disease) ICD-10-CM: I25.10  ICD-9-CM: 414.00               Past Medical History:      has a past medical history of Adverse effect of anesthesia, Anxiety, Arthritis, CAD (coronary artery disease), Chest pain (7/2015), Chronic obstructive pulmonary disease ([address] CTS (carpal tunnel syndrome), Diabetes ([address] Diabetic gastroparesis ([address] Diabetic neuropathy ([address] Diabetic retinopathy ([address] GERD (gastroesophageal reflux disease), GI bleed (7/2015), Hypercholesteremia, Hypertension, Hypothyroid, Ill-defined condition, Ill-defined condition (2017), Nicotine vapor product user, JOHN on CPAP, Osteoporosis, and Vitamin D deficiency.     Past Surgical History:      has a past surgical history that includes hx endoscopy (7/2015); hx gi; hx tonsil and adenoidectomy (1968); hx heart catheterization (7/2015); pr cabg, artery-vein, three (7/13/2015); hx cervical fusion (2011); hx lumbar fusion (2017); hx lumbar laminectomy (2011); hx shoulder arthroscopy (Right); hx orthopaedic (Right, 1970); hx orthopaedic (Left, 2003); hx rotator cuff repair (Right, 2003); and hx tonsillectomy. Home Medications:     Prior to Admission medications    Medication Sig Start Date End Date Taking? Authorizing Provider   tamsulosin (FLOMAX) 0.4 mg capsule 0.4 mg daily. 6/14/22   Provider, Historical   ergocalciferol (ERGOCALCIFEROL) 1,250 mcg (50,000 unit) capsule  3/15/22   Provider, Historical   AZO CRANBERRY 290-30-58 mg-mg-million tab Take 1 Capsule by mouth daily. Provider, Historical   metoprolol tartrate (LOPRESSOR) 50 mg tablet Take 1 Tablet by mouth two (2) times a day. 6/16/22   Swapna Oneil MD   dilTIAZem ER (CARDIZEM CD) 180 mg capsule TAKE ONE CAPSULE BY MOUTH EVERY DAY FOR HIGH BLOOD PRESSURE 6/16/22   Swapna Oneil MD   gabapentin (NEURONTIN) 400 mg capsule Take 400 mg by mouth two (2) times a day. Provider, Historical   pravastatin (PRAVACHOL) 40 mg tablet Take 40 mg by mouth daily. Provider, Historical   escitalopram oxalate (LEXAPRO) 10 mg tablet Take 10 mg by mouth two (2) times a day. Provider, Historical   pantoprazole (PROTONIX) 40 mg tablet Take 40 mg by mouth daily. Provider, Historical   levothyroxine (SYNTHROID) 175 mcg tablet Take 175 mcg by mouth Daily (before breakfast).     Provider, Historical   Trelegy Ellipta 100-62.5-25 mcg inhaler INHALE 1 PUFF BY MOUTH EVERY DAY 1/4/22   Provider, Historical   insulin aspart U-100 (NovoLOG U-100 Insulin aspart) 100 unit/mL injection INJECT A MAX  UNITS UNDER THE SKIN DAILY WITH INSULIN PUMP 1/6/22   Swapna Oneil MD   nitroglycerin (NITROSTAT) 0.4 mg SL tablet  6/8/21   Provider, Historical   prednisoLONE acetate (PRED FORTE) 1 % ophthalmic suspension Administer 1 Drop to right eye two (2) times a day. 20   Provider, Historical   ferrous sulfate 325 mg (65 mg iron) tablet Take 650 mg by mouth nightly. 2 daily    Provider, Historical   dorzolamide-timolol, PF, (COSOPT, PF,) 2-0.5 % dpet Administer 1 Drop to left eye two (2) times a day. Indications: increased pressure in the eye 7/6/15   Provider, Historical       Allergies/Social/Family History: Allergies   Allergen Reactions    Nsaids (Non-Steroidal Anti-Inflammatory Drug) Other (comments)     bleeding    Augmentin [Amoxicillin-Pot Clavulanate] Diarrhea    Oxycodone Other (comments)     Altered mental status per patient. Has tolerated Hydrocodone in the past     Vesicare [Solifenacin] Rash     BURNING WITH URINATION      Social History     Tobacco Use    Smoking status: Former Smoker     Packs/day: 0.50     Years: 40.00     Pack years: 20.00     Quit date: 2015     Years since quittin.9    Smokeless tobacco: Never Used   Substance Use Topics    Alcohol use: No     Alcohol/week: 0.0 standard drinks      Family History   Problem Relation Age of Onset    COPD Mother     Diabetes Mother     COPD Father     Diabetes Father     Cancer Father         pancreatic    Diabetes Brother     Heart Disease Brother     Diabetes Brother     Alcohol abuse Brother     Diabetes Sister     Cancer Sister         Jennaseem Husky    Bleeding Prob Sister         Factor V Leiden    Anesth Problems Neg Hx        Review of Systems:     A comprehensive review of systems was negative except for that written in the HPI.     Objective:   Vital Signs:  Visit Vitals  BP (!) 122/44   Pulse 75   Temp 99.6 °F (37.6 °C)   Resp 19   Ht 5' 5\" (1.651 m)   Wt 102 kg (224 lb 13.9 oz)   SpO2 93%   BMI 37.42 kg/m²    O2 Flow Rate (L/min): 3 l/min O2 Device: Nasal cannula Temp (24hrs), Av.1 °F (36.7 °C), Min:97.7 °F (36.5 °C), Max:99.6 °F (37.6 °C)           Intake/Output: Intake/Output Summary (Last 24 hours) at 6/22/2022 1147  Last data filed at 6/22/2022 1000  Gross per 24 hour   Intake 1499.44 ml   Output 300 ml   Net 1199.44 ml       Physical Exam:    General appearance: alert, cooperative, no distress, appears older than stated age  Lungs: Decreased breath sounds bilaterally  Heart: Normal sinus rhythm  Abdomen: soft, non-tender. Bowel sounds normal. No masses,  no organomegaly  Extremities: extremities normal, atraumatic, no cyanosis or edema      LABS AND  DATA: Personally reviewed  Recent Labs     06/22/22  0537 06/21/22  1642   WBC 9.7 7.6   HGB 10.8* 10.7*   HCT 34.2* 33.5*    188     Recent Labs     06/22/22  0537 06/21/22  1642    138   K 5.9* 5.3*   * 110*   CO2 20* 25   BUN 43* 36*   CREA 2.84* 2.62*   * 164*   CA 9.4 9.4   MG  --  1.5*     Recent Labs     06/21/22  1642      TP 6.8   ALB 2.6*   GLOB 4.2*     No results for input(s): INR, PTP, APTT, INREXT in the last 72 hours. No results for input(s): PHI, PCO2I, PO2I, FIO2I in the last 72 hours. No results for input(s): CPK, CKMB, TROIQ, BNPP in the last 72 hours. Hemodynamics:   PAP:   CO:     Wedge:   CI:     CVP:    SVR:       PVR:       Ventilator Settings:  Mode Rate Tidal Volume Pressure FiO2 PEEP                    Peak airway pressure:      Minute ventilation:          MEDS: Reviewed    Chest X-Ray:  CXR Results  (Last 48 hours)               06/21/22 1655  XR CHEST PORT Final result    Impression:  No evidence of pneumonia or pulmonary edema. There is grossly stable   bilateral lung scarring. Narrative:  EXAM: XR CHEST PORT       INDICATION: hypoxia       COMPARISON: Chest April 4, 2022       FINDINGS: A portable AP radiograph of the chest was obtained at 1647 hours. Patient is status post sternotomy and CABG. The lung volumes are again low. No   new lung consolidation is identified. Bilateral scarring is noted.  The vascular   clarity is normal.  No definite effusion or pneumothorax is seen. ECHO:        Multidisciplinary Rounds Completed: Yes    ABCDEF Bundle/Checklist Completed:  Yes    SPECIAL EQUIPMENT  None    DISPOSITION  Stay in ICU    CRITICAL CARE CONSULTANT NOTE  I had a face to face encounter with the patient, reviewed and interpreted patient data including clinical events, labs, images, vital signs, I/O's, and examined patient. I have discussed the case and the plan and management of the patient's care with the consulting services, the bedside nurses and the respiratory therapist.      NOTE OF PERSONAL INVOLVEMENT IN CARE   This patient has a high probability of imminent, clinically significant deterioration, which requires the highest level of preparedness to intervene urgently. I participated in the decision-making and personally managed or directed the management of the following life and organ supporting interventions that required my frequent assessment to treat or prevent imminent deterioration. I personally spent 35 minutes of critical care time. This is time spent at this critically ill patient's bedside actively involved in patient care as well as the coordination of care. This does not include any procedural time which has been billed separately.     Capo Ashley, DO  Staff Intensivist/Anesthesiologist  [facility]  6/22/2022

## 2022-06-22 NOTE — DIABETES MGMT
[facility]    CLINICAL NURSE SPECIALIST CONSULT     Initial Presentation   Live Marquez is a 67 y.o. female admitted from an orthopedic appointment -she was found to be hypotensive and bradycardic with junctional rhythm in the 30s. She was sent to the ER where junctional rhythm was confirmed. Evaluated by cardiologist and patient condition improved with dopamine infusion. Currently she is in first-degree heart block but blood pressure has improved. Patient is being admitted to the ICU. Possible plan for permanent pacemaker in the morning. *. HX:   Past Medical History:   Diagnosis Date    Adverse effect of anesthesia     SLOW WAKING PAST ANESTHESIA    Anxiety     Arthritis     CAD (coronary artery disease)     s/p CABG x 3v    Chest pain 7/2015    Chronic obstructive pulmonary disease (HCC)     CTS (carpal tunnel syndrome)     S/p bilateral release    Diabetes ([address]     Diabetic gastroparesis ([address]     Diabetic neuropathy ([address]     Diabetic retinopathy ([address]     GERD (gastroesophageal reflux disease)     GI bleed 7/2015    4 bleeds with 9 clips placed    Hypercholesteremia     Hypertension     Hypothyroid     Ill-defined condition     NEUROPATHY HANDS AND FEET    Ill-defined condition 2017    GI BLEED    Nicotine vapor product user     JOHN on CPAP     Osteoporosis     Vitamin D deficiency         INITIAL DX:   Bradycardia [R00.1]     Current Treatment     TX: cardiology/     Consulted by Provider for advanced diabetes nursing assessment and care for:   [x] Inpatient management strategy  [x] Home management assessment      Hospital Course   Clinical progress has been complicated by new heart block necessitating pacemaker -slated for placement today. Diabetes History   Type 1 DM since age 16. A1C 8.3%- which is just above target range for her duration of diabetes/co-morbid conditions-   PTA was on insulin pump as of June follow up appt.  With endocrinologist , Dr. Blanca Landin. Diabetes-related Medical History  Acute complications  NONE  Neurological complications  Gastroparesis and Peripheral neuropathy  Microvascular disease  Retinopathy and Nephropathy  Macrovascular disease  CAD  Other associated conditions     Hypothyroid/ HTN    Diabetes Medication History  Key Antihyperglycemic Medications             insulin aspart U-100 (NovoLOG U-100 Insulin aspart) 100 unit/mL injection INJECT A MAX  UNITS UNDER THE SKIN DAILY WITH INSULIN PUMP         PUMP SETTINGS- as of June 2022 visit with endocrinologist     Basal  MN-630AM: 2.65  630AM-4PM: 2.50  4PM-MN: 2.55  TDD: 56 units/ day     Carb Ratio  1:4  Correction Factor  1:25  Target  120  Diabetes self-management practices:   Eating pattern-deferred today-due to patient sleeping at time of visit     Physical activity pattern-limted to none, recent ankle fracture in April 2022     Monitoring pattern-per recent endo visit; She checks her BGs 2 times per day. Her FBGs have been running in the 180-250s range. Her pre-dinner BGs have been in the 160-180s. She denies issues of hypoglycemia. Taking medications pattern  [x] Consistent administration  [x] Affordable  Social determinants of health impacting diabetes self-management practices   Struggling with anxiety and/or depression and Concerned that you need to know more about how to stay healthy with diabetes  Overall evaluation:    [x] A1c slightly above target with drug therapy & self-care practices    Subjective   Patient is currently resting soundly; RN states she's been so \"sleepy\"    Objective   Physical exam  General Obese  female in no acute distress. Neuro  Not assessed  Vital Signs   Visit Vitals  BP (!) 127/43 (BP 1 Location: Left arm, BP Patient Position: At rest)   Pulse 71   Temp 99.6 °F (37.6 °C)   Resp 18   Wt 102 kg (224 lb 13.9 oz)   SpO2 94%   BMI 37.42 kg/m²     Skin  Warm and dry.    Heart   Regular rate and rhythm. No murmurs, rubs or gallops  Lungs  Clear to auscultation without rales or rhonchi  Extremities No foot wounds    Diabetic foot exam: documented peripheral neuropathy -takes gabapentin    . Laboratory  Recent Labs     06/22/22  0537 06/21/22  1642   * 164*   AGAP 7 3*   WBC 9.7 7.6   CREA 2.84* 2.62*   GFRNA 16* 18*   AST  --  14*   ALT  --  14       Factors impacting BG management  Factor Dose Comments   Nutrition:  NPO     60 grams/meal      Drugs:  Other: Dopamine infusion       Other:   Kidney function  Liver function     GFR 16/ Cr+ 2.84-appears to be patient's baseline       Blood glucose pattern    Significant diabetes-related events over the past 24-72 hours  A1C 8.3%- PTA on insulin pump removed 6/21 @ 2030- now off while hospitalized   Admitting bG 157  Fasting bG 6/22: 335  Only on correctional scale    Assessment and Plan   Nursing Diagnosis Risk for unstable blood glucose pattern   Nursing Intervention Domain 5250 Decision-making Support   Nursing Interventions Examined current inpatient diabetes/blood glucose control   Explored factors facilitating and impeding inpatient management  Explored corrective strategies with patient and responsible inpatient provider   Informed patient of rational for insulin strategy while hospitalized     Nursing Diagnosis 06628 Ineffective Health Management   Nursing Intervention Domain 5250 Decision-makingSupport   Nursing Interventions Identified diabetes self-management practices impeding diabetes control  Discussed diabetes survival skills related to  1. Healthy Plate eating plan; given handouts  2. Role of physical activity in improving insulin sensitivity and action  3. Procedure for blood glucose monitoring & options for low-cost products available from [facility]   4. Medications plan at discharge     Evaluation   Chris Hutton is a 68 yo female who has lived with Type 1 diabetes since age 16.   She is currently admitted for pacemaker placement after she was found to be in a junctional rhythm with HR in 30s at a ambulatory doctor's appointment. Patient is now on dopamine infusion -Insulin pump removed on 6/21 @2030 with only correctional insulin ordered. Conferred with ICU intensivist re: need for basal insulin since pump is not in place. NPH BID ordered at 20% reduction from TDD in insulin pump. While hospitalized keep -180. Once she is post op and cognitively able to manage her insulin pump, she may get back on the insulin pump per hospital policy. Unable to have face to face interview with her this AM as she was soundly sleeping at time of visit. Per chart review, she has been on her insulin pump with BG in 180-250s. She also suffers with micro and macrovascular long term complications from long standing T1 diabetes -    Recommendations   1. [] Use of Subcutaneous Insulin Order set (2034)  Insulin Dosing Specific recommendation   START Basal  - NPH                                   (Based on weight, BMI & GFR) 20 units BID-NPH    Once taking PO food diet >50% ADD Nutritional coverage                           (Based on CHO/dextrose load) Cover carbs with 1 unit of insulin per 4g of CHO    CONTINUE Corrective                                       (Useful in adjusting insulin dosing) [x] Normal sensitivity          2. Please contact Diabetes CNS if patient desires to get back on insuiln pump- she will need to have all supplies and insulin at bedside. Billing Code(s)   72552    Before making these care recommendations, I personally reviewed the hospitalization record, including notes, laboratory & diagnostic data and current medications, and examined the patient at the bedside (circumstances permitting) before making care recommendations. More than fifty (50) percent of the time was spent in patient counseling and/or care coordination.   Total minutes: [address] [facility] KIMBERLI Patterson  Diabetes Clinical Nurse Specialist  [facility]  Access via "[employer] Serve

## 2022-06-22 NOTE — PROCEDURES
Procedure:  Pacemaker implant left side    Indication:  Junctional bradycardia, near-syncope - initial ECG junctional bradycardia, HR 30s and hypotensive      PROCEDURES PERFORMED:  1. Conscious sedation. 2. Fluoroscopy for lead placement. 3. Permanent Pacemaker Implantation:      A: Med[employer]onic DDDR Pacemaker [place]       B: Placement of ventricular lead in left bundle position with threshold testing. Lead: 3830      C: Placement of atrial lead with threshold testing: Lead 8609     COMPLICATIONS:None. ESTIMATED BLOOD LOSS: Minimal  SPECIMENS: None  ASSISTANTS: See Rosie    PROCEDURAL NOTE:  The patient was brought to the laboratory in a sedated postabsorptive state. The left chest wall was prepped and draped in the usual fashion and anesthetized with 20 mL of 2% lidocaine. Incision was made over the deltopectoral groove and extended to the level of the pectoralis fascia. A pocket was made anterior to the pectoralis fascia for in which to implant the device. The left cephalic vein was ligated with one 0- silk tie. A venotomy was created followed by the passing of a guide wire and tear away sheath. With the use of  a C-304 His sheath a 3830 lead was placed to the His position and advanced aprox 2 cm apically. Based on EKG criteria with avl/ avr discordance and lead II R wave  > III, the lead was placed until a terminal R wave was observed in V1. QRSD was 142msec ( baseline 136 msec)    Onset to peak in V5 was  75 msec. The ventricular lead was inserted and advanced to the right ventricular apex. Threshold testing was performed. Once adequate position was obtained the peel-away sheath was removed after a new J-wire was advanced using a retained wire technique with a 7-Malian peel-away sheath. The atrial lead was inserted and advanced to the right atrial appendage. Threshold testing was performed.  Both leads were then secured to the pectoralis muscle utilizing its protective sleeve with #2-0 silk ties around the lead. The pacemaker pocket was flushed with antibiotic containing sterile saline  solution. The leads were then attached to generator. Leads and generator placed in the pocket with the leads posterior to the generator. Subcutaneous tissue closed in 2 layers utilizing #2-0 Vicryl. Skin closed with dermabond glue with an excellent final result. The patient was transferred to the holding area    No complications were noted. IMPRESSION AND RECOMMENDATION:  The patient will be followed up in the Pacemaker [place] [facility]. 2..

## 2022-06-22 NOTE — PROGRESS NOTES
Cardiology Progress Note  2022    Admit Date: 2022  Admit Diagnosis: Bradycardia [R00.1]  CC:  Bradycardia    Assessment/Plan:  1. Junctional bradycardia, near-syncope - initial ECG junctional bradycardia, HR 30s and hypotensive. Now hemodynamically stable with dopamine in NSR with 1st degree AV block and HR 70s with normal BP.   - continue dopamine infusion  - hold home metoprolol, diltiazem, and avoid other AV franchesca blockers  - echo  - Dr. Benny Benson (EP) to evaluate for pacemaker. Possibly later today. Please keep NPO     2. Hyperkalemia - POC K 5.8 and CMP with K 5.3. She has CKD and baseline high normal K, so bradycardia is likely due to degenerative conduction system disease and not due to electrolyte issues  - consider nephrology consult     3. CAD s/p CABG  - no anginal symptoms. ECG with NS T wave changes unchanged from baseline    4. Hypoxia - no clear signs of volume overload. Chronic scarring noted on xray. BNP mildly elevated. ?COPD related  - follow up echo     4. Type I DM c/b kidney disease, retinopathy  5. CKD   6. COPD     Thank you for this consult. We will continue to follow along. Please contact us for any further questions or concerns. Hospital problem list     Active Problems:    Bradycardia (2022)                                                          Subjective:     Denies any complaints. Feeling better overall. No shortness of breath or chest pain. On low dose dopamine. ROS: All other systems reviewed and were negative other than mentioned above. No change in family and social history from H&P/Consult note.     Objective:    Physical Exam:  24 hr VS reviewed, overall VSSAF  Temp (24hrs), Av.8 °F (36.6 °C), Min:97.7 °F (36.5 °C), Max:98 °F (36.7 °C)    Patient Vitals for the past 8 hrs:   Pulse   22 0700 70   22 0600 75   22 0500 75   22 0400 70   22 0300 76   22 0200 69   06/22/22 0100 74   06/22/22 0000 72    Patient Vitals for the past 8 hrs:   Resp   06/22/22 0700 17   06/22/22 0600 19   06/22/22 0500 16   06/22/22 0400 19   06/22/22 0300 16   06/22/22 0200 15   06/22/22 0100 14   06/22/22 0000 16    Patient Vitals for the past 8 hrs:   BP   06/22/22 0700 (!) 124/45   06/22/22 0600 (!) 115/39   06/22/22 0500 (!) 136/44   06/22/22 0400 (!) 127/45   06/22/22 0300 (!) 142/52   06/22/22 0200 (!) 143/44   06/22/22 0100 (!) 145/48   06/22/22 0000 (!) 143/44          Intake/Output Summary (Last 24 hours) at 6/22/2022 0728  Last data filed at 6/22/2022 0700  Gross per 24 hour   Intake 1487.74 ml   Output 0 ml   Net 1487.74 ml       General: resting comfortably in no distress, obese  HEENT: sclera anicteric, moist mucous membranes  Neck: supple, no JVD   CV: regular rate and rhythm, normal S1 and S2, II/VI RANDALL  Lungs: no respiratory distress, lungs clear to auscultation anteriorly  Abdomen: soft, non distended, non tender  MSK: no lower extremity edema  Skin: warm to touch  Neuro: alert and oriented  Psych: calm    Data Review:  Lab results reviewed as noted below. Current medications reviewed as noted below. Telemetry independently reviewed : [x]  sinus    []  chronic afib     []  par afib    []  NSVT    ECG independently reviewed: []  NSR    []  no significant changes   [x]  no new ECG provided for review    No results for input(s): PH, PCO2, PO2 in the last 72 hours. No results for input(s): CPK, CKMB in the last 72 hours.     No lab exists for component: TROPONINI  Recent Labs     06/22/22  0537 06/21/22  1642    138   K 5.9* 5.3*   * 110*   CO2 20* 25   BUN 43* 36*   CREA 2.84* 2.62*   * 164*   CA 9.4 9.4   ALB  --  2.6*   WBC 9.7 7.6   HGB 10.8* 10.7*   HCT 34.2* 33.5*    188     Recent Labs     06/21/22  1642      TBILI 0.2   TP 6.8   ALB 2.6*   GLOB 4.2*     No results for input(s): INR, PTP, APTT, INREXT in the last 72 hours. No results for input(s): FE, TIBC, PSAT, FERR in the last 72 hours.    Lab Results   Component Value Date/Time    Glucose (POC) 335 (H) 06/22/2022 05:43 AM    Glucose (POC) 290 (H) 06/21/2022 11:08 PM    Glucose (POC) 157 (H) 06/21/2022 04:55 PM    Glucose (POC) 169 (H) 06/21/2022 04:43 PM    Glucose (POC) 347 (H) 04/15/2022 07:39 PM    Glucose,  (H) 06/21/2022 04:43 PM       Current Facility-Administered Medications   Medication Dose Route Frequency    dorzolamide (TRUSOPT) 2 % ophthalmic solution 1 Drop  1 Drop Left Eye TID    And    timolol (TIMOPTIC) 0.5 % ophthalmic solution 1 Drop  1 Drop Left Eye BID    dextrose 10 % infusion 125-250 mL  125-250 mL IntraVENous PRN    DOPamine (INTROPIN) 800 mg in dextrose 5% 250 mL infusion  0-20 mcg/kg/min IntraVENous TITRATE    sodium chloride (NS) flush 5-40 mL  5-40 mL IntraVENous Q8H    sodium chloride (NS) flush 5-40 mL  5-40 mL IntraVENous PRN    acetaminophen (TYLENOL) tablet 650 mg  650 mg Oral Q6H PRN    Or    acetaminophen (TYLENOL) suppository 650 mg  650 mg Rectal Q6H PRN    polyethylene glycol (MIRALAX) packet 17 g  17 g Oral DAILY PRN    ondansetron (ZOFRAN ODT) tablet 4 mg  4 mg Oral Q8H PRN    Or    ondansetron (ZOFRAN) injection 4 mg  4 mg IntraVENous Q6H PRN    enoxaparin (LOVENOX) injection 30 mg  30 mg SubCUTAneous DAILY    levothyroxine (SYNTHROID) tablet 175 mcg  175 mcg Oral ACB    pantoprazole (PROTONIX) tablet 40 mg  40 mg Oral ACB    pravastatin (PRAVACHOL) tablet 40 mg  40 mg Oral DAILY    prednisoLONE acetate (PRED FORTE) 1 % ophthalmic suspension 1 Drop  1 Drop Right Eye BID    glucose chewable tablet 16 g  4 Tablet Oral PRN    dextrose 10 % infusion 0-250 mL  0-250 mL IntraVENous PRN    glucagon (GLUCAGEN) injection 1 mg  1 mg IntraMUSCular PRN    insulin lispro (HUMALOG) injection   SubCUTAneous Q6H       Total critical care time - 35 minutes     MDM:  High  Risk of decompensation:  High    I personally spent the above critical care time. This is time spent at this critically ill patient's bedside / unit / floor actively involved in patient care as well as the coordination of care and discussions with the patient's family. This does not include any procedural time which has been billed separately. Lavern Montana.  Sheyla Richards, [address] [facility]  06/22/22

## 2022-06-23 LAB
ANION GAP SERPL CALC-SCNC: 5 MMOL/L (ref 5–15)
BUN SERPL-MCNC: 46 MG/DL (ref 6–20)
BUN/CREAT SERPL: 15 (ref 12–20)
CALCIUM SERPL-MCNC: 9 MG/DL (ref 8.5–10.1)
CHLORIDE SERPL-SCNC: 109 MMOL/L (ref 97–108)
CO2 SERPL-SCNC: 24 MMOL/L (ref 21–32)
CREAT SERPL-MCNC: 3.04 MG/DL (ref 0.55–1.02)
ERYTHROCYTE [DISTWIDTH] IN BLOOD BY AUTOMATED COUNT: 13.2 % (ref 11.5–14.5)
GLUCOSE BLD STRIP.AUTO-MCNC: 235 MG/DL (ref 65–117)
GLUCOSE BLD STRIP.AUTO-MCNC: 244 MG/DL (ref 65–117)
GLUCOSE BLD STRIP.AUTO-MCNC: 265 MG/DL (ref 65–117)
GLUCOSE SERPL-MCNC: 171 MG/DL (ref 65–100)
HCT VFR BLD AUTO: 31.5 % (ref 35–47)
HGB BLD-MCNC: 9.7 G/DL (ref 11.5–16)
MCH RBC QN AUTO: 32.6 PG (ref 26–34)
MCHC RBC AUTO-ENTMCNC: 30.8 G/DL (ref 30–36.5)
MCV RBC AUTO: 105.7 FL (ref 80–99)
NRBC # BLD: 0 K/UL (ref 0–0.01)
NRBC BLD-RTO: 0 PER 100 WBC
PLATELET # BLD AUTO: 141 K/UL (ref 150–400)
PMV BLD AUTO: 11 FL (ref 8.9–12.9)
POTASSIUM SERPL-SCNC: 5.2 MMOL/L (ref 3.5–5.1)
RBC # BLD AUTO: 2.98 M/UL (ref 3.8–5.2)
SERVICE CMNT-IMP: ABNORMAL
SODIUM SERPL-SCNC: 138 MMOL/L (ref 136–145)
WBC # BLD AUTO: 5.7 K/UL (ref 3.6–11)

## 2022-06-23 PROCEDURE — 74011636637 HC RX REV CODE- 636/637: Performed by: INTERNAL MEDICINE

## 2022-06-23 PROCEDURE — 82962 GLUCOSE BLOOD TEST: CPT

## 2022-06-23 PROCEDURE — 97530 THERAPEUTIC ACTIVITIES: CPT

## 2022-06-23 PROCEDURE — 36415 COLL VENOUS BLD VENIPUNCTURE: CPT

## 2022-06-23 PROCEDURE — 74011250637 HC RX REV CODE- 250/637: Performed by: ANESTHESIOLOGY

## 2022-06-23 PROCEDURE — 85027 COMPLETE CBC AUTOMATED: CPT

## 2022-06-23 PROCEDURE — 74011250636 HC RX REV CODE- 250/636: Performed by: ANESTHESIOLOGY

## 2022-06-23 PROCEDURE — 74011000250 HC RX REV CODE- 250: Performed by: INTERNAL MEDICINE

## 2022-06-23 PROCEDURE — 74011250637 HC RX REV CODE- 250/637: Performed by: INTERNAL MEDICINE

## 2022-06-23 PROCEDURE — 77030027138 HC INCENT SPIROMETER -A

## 2022-06-23 PROCEDURE — 74011000250 HC RX REV CODE- 250: Performed by: ANESTHESIOLOGY

## 2022-06-23 PROCEDURE — 99232 SBSQ HOSP IP/OBS MODERATE 35: CPT | Performed by: CLINICAL NURSE SPECIALIST

## 2022-06-23 PROCEDURE — 97161 PT EVAL LOW COMPLEX 20 MIN: CPT

## 2022-06-23 PROCEDURE — 65270000046 HC RM TELEMETRY

## 2022-06-23 PROCEDURE — 80048 BASIC METABOLIC PNL TOTAL CA: CPT

## 2022-06-23 PROCEDURE — 74011250637 HC RX REV CODE- 250/637: Performed by: STUDENT IN AN ORGANIZED HEALTH CARE EDUCATION/TRAINING PROGRAM

## 2022-06-23 RX ORDER — SODIUM POLYSTYRENE SULFONATE 15 G/60ML
30 SUSPENSION ORAL; RECTAL
Status: DISPENSED | OUTPATIENT
Start: 2022-06-23 | End: 2022-06-24

## 2022-06-23 RX ORDER — INSULIN LISPRO 100 [IU]/ML
INJECTION, SOLUTION INTRAVENOUS; SUBCUTANEOUS
Status: DISCONTINUED | OUTPATIENT
Start: 2022-06-23 | End: 2022-06-25 | Stop reason: HOSPADM

## 2022-06-23 RX ORDER — METOPROLOL TARTRATE 25 MG/1
25 TABLET, FILM COATED ORAL EVERY 12 HOURS
Status: DISCONTINUED | OUTPATIENT
Start: 2022-06-23 | End: 2022-06-25

## 2022-06-23 RX ADMIN — DORZOLAMIDE HYDROCHLORIDE 1 DROP: 20 SOLUTION/ DROPS OPHTHALMIC at 17:18

## 2022-06-23 RX ADMIN — Medication 20 UNITS: at 21:34

## 2022-06-23 RX ADMIN — Medication 2 UNITS: at 21:40

## 2022-06-23 RX ADMIN — PREDNISOLONE ACETATE 1 DROP: 10 SUSPENSION/ DROPS OPHTHALMIC at 08:41

## 2022-06-23 RX ADMIN — Medication 20 UNITS: at 08:40

## 2022-06-23 RX ADMIN — PREDNISOLONE ACETATE 1 DROP: 10 SUSPENSION/ DROPS OPHTHALMIC at 17:18

## 2022-06-23 RX ADMIN — TIMOLOL MALEATE 1 DROP: 5 SOLUTION OPHTHALMIC at 17:18

## 2022-06-23 RX ADMIN — LEVOTHYROXINE SODIUM 175 MCG: 0.07 TABLET ORAL at 06:22

## 2022-06-23 RX ADMIN — Medication 2 UNITS: at 06:21

## 2022-06-23 RX ADMIN — SODIUM CHLORIDE, PRESERVATIVE FREE 10 ML: 5 INJECTION INTRAVENOUS at 21:35

## 2022-06-23 RX ADMIN — ENOXAPARIN SODIUM 30 MG: 100 INJECTION SUBCUTANEOUS at 08:41

## 2022-06-23 RX ADMIN — Medication 4 UNITS: at 17:42

## 2022-06-23 RX ADMIN — SODIUM CHLORIDE, PRESERVATIVE FREE 10 ML: 5 INJECTION INTRAVENOUS at 06:22

## 2022-06-23 RX ADMIN — METOPROLOL TARTRATE 25 MG: 25 TABLET, FILM COATED ORAL at 11:13

## 2022-06-23 RX ADMIN — PRAVASTATIN SODIUM 40 MG: 40 TABLET ORAL at 08:41

## 2022-06-23 RX ADMIN — PANTOPRAZOLE SODIUM 40 MG: 40 TABLET, DELAYED RELEASE ORAL at 06:22

## 2022-06-23 RX ADMIN — DORZOLAMIDE HYDROCHLORIDE 1 DROP: 20 SOLUTION/ DROPS OPHTHALMIC at 21:35

## 2022-06-23 RX ADMIN — DORZOLAMIDE HYDROCHLORIDE 1 DROP: 20 SOLUTION/ DROPS OPHTHALMIC at 08:41

## 2022-06-23 RX ADMIN — TIMOLOL MALEATE 1 DROP: 5 SOLUTION OPHTHALMIC at 08:41

## 2022-06-23 RX ADMIN — ACETAMINOPHEN 650 MG: 325 TABLET ORAL at 17:42

## 2022-06-23 RX ADMIN — METOPROLOL TARTRATE 25 MG: 25 TABLET, FILM COATED ORAL at 21:34

## 2022-06-23 RX ADMIN — Medication 3 UNITS: at 11:36

## 2022-06-23 RX ADMIN — Medication 3 UNITS: at 11:37

## 2022-06-23 RX ADMIN — SODIUM CHLORIDE, PRESERVATIVE FREE 10 ML: 5 INJECTION INTRAVENOUS at 06:23

## 2022-06-23 RX ADMIN — Medication 5 UNITS: at 17:43

## 2022-06-23 RX ADMIN — ACETAMINOPHEN 650 MG: 325 TABLET ORAL at 08:47

## 2022-06-23 NOTE — PROGRESS NOTES
[facility]    ICU TEAM Progress Note    Name: Chris Stout   : [birth year]   MRN: [id number]   Date: 2022           ICU Assessment     Junctional bradycardia             ICU Comprehensive Plan of Care:     From admission note: \"This is a very pleasant 79-year-old lady with past medical history significant for diabetes mellitus type 1 since the age of 16, hypothyroidism, coronary artery disease status post CABG in , retinopathy and other medical problem who has been complaining of progressive fatigue for couple of days. Today she had an orthopedic appointment and she was found to be hypotensive and bradycardic with junctional rhythm in the 30s. She was sent to the ER where junctional rhythm was confirmed. Evaluated by cardiologist and patient condition improved with dopamine infusion. The patient was transferred to ICU for further care. \"    Symptomatic bradycardia: Likely secondary to conduction system disease. Status post permanent pacemaker placement. Hemodynamically stable. Low-dose metoprolol started as per cardiology. Acute on chronic kidney injury: Secondary to hypotension/symptomatic bradycardia. Creatinine at 3.04 from 3.03. Continue to monitor. Potassium has stabilized at 5.2. Hyperglycemia: In the setting of type 1 diabetes. Continue basal NPH twice daily in addition to sliding scale. Diabetes management follow-up appreciated-Premeal insulin added. Can likely be transition to home insulin pump tomorrow    Disposition: Patient will be transferred to telemetry for further care.     Subjective:   Progress Note: 2022      Reason for ICU Admission: Junctional bradycardia    HPI: as above    Overnight Events:   2022      POD:  * No surgery date entered *    S/P:   Procedure(s):  INSERT PPM DUAL    Active Problem List:     Problem List  Date Reviewed: 2022          Codes Class    Bradycardia ICD-10-CM: R00.1  ICD-9-CM: 427.89         Tibia fracture ICD-10-CM: S82.209A  ICD-9-CM: 823.80         Ankle fracture ICD-10-CM: S82.899A  ICD-9-CM: 824.8         Primary osteoarthritis of right knee ICD-10-CM: M17.11  ICD-9-CM: 715.16         Hyperkalemia ICD-10-CM: E87.5  ICD-9-CM: 276.7         Severe obesity (HCC) ICD-10-CM: E66.01  ICD-9-CM: 278.01         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 (HCC) ICD-10-CM: E10.311  ICD-9-CM: 250.51, 362.07, 362.01         Mixed hyperlipidemia ICD-10-CM: E78.2  ICD-9-CM: 272.2         Sepsis ([facility] ICD-10-CM: A41.9  ICD-9-CM: 038.9, 995.91         GI bleed ICD-10-CM: K92.2  ICD-9-CM: 578.9         AVM (arteriovenous malformation) of duodenum, acquired with hemorrhage ICD-10-CM: K31.811  ICD-9-CM: 537.83         Erosive gastritis with hemorrhage ICD-10-CM: K29.61  ICD-9-CM: 535.41         Hypothyroidism due to Hashimoto's thyroiditis ICD-10-CM: E03.8, E06.3  ICD-9-CM: 244.8, 245.2         Obesity, Class I, BMI 30.0-34.9 (see actual BMI) ICD-10-CM: E66.9  ICD-9-CM: 278.00         Low back pain at multiple sites ICD-10-CM: M54.50  ICD-9-CM: 724.2         Essential hypertension ICD-10-CM: I10  ICD-9-CM: 401.9         CAD (coronary artery disease) ICD-10-CM: I25.10  ICD-9-CM: 414.00               Past Medical History:      has a past medical history of Adverse effect of anesthesia, Anxiety, Arthritis, CAD (coronary artery disease), Chest pain (7/2015), Chronic obstructive pulmonary disease ([facility] CTS (carpal tunnel syndrome), Diabetes ([facility] [address] [address] Diabetic gastroparesis ([facility] Diabetic neuropathy ([facility] Diabetic retinopathy ([facility] GERD (gastroesophageal reflux disease), GI bleed (7/2015), Hypercholesteremia, Hypertension, Hypothyroid, Ill-defined condition, Ill-defined condition (2017), Nicotine vapor product user, JOHN on CPAP, Osteoporosis, and Vitamin D deficiency.     Past Surgical History:      has a past surgical history that includes hx endoscopy (7/2015); hx gi; hx tonsil and adenoidectomy (1968); hx heart catheterization (7/2015); pr cabg, artery-vein, three (7/13/2015); hx cervical fusion (2011); hx lumbar fusion (2017); hx lumbar laminectomy (2011); hx shoulder arthroscopy (Right); hx orthopaedic (Right, 1970); hx orthopaedic (Left, 2003); hx rotator cuff repair (Right, 2003); and hx tonsillectomy. Home Medications:     Prior to Admission medications    Medication Sig Start Date End Date Taking? Authorizing Provider   tamsulosin (FLOMAX) 0.4 mg capsule 0.4 mg daily. 6/14/22   Provider, Historical   ergocalciferol (ERGOCALCIFEROL) 1,250 mcg (50,000 unit) capsule  3/15/22   Provider, Historical   AZO CRANBERRY 140-45-67 mg-mg-million tab Take 1 Capsule by mouth daily. Provider, Historical   metoprolol tartrate (LOPRESSOR) 50 mg tablet Take 1 Tablet by mouth two (2) times a day. 6/16/22   Allen Newton MD   dilTIAZem ER (CARDIZEM CD) 180 mg capsule TAKE ONE CAPSULE BY MOUTH EVERY DAY FOR HIGH BLOOD PRESSURE 6/16/22   Allen Newton MD   gabapentin (NEURONTIN) 400 mg capsule Take 400 mg by mouth two (2) times a day. Provider, Historical   pravastatin (PRAVACHOL) 40 mg tablet Take 40 mg by mouth daily. Provider, Historical   escitalopram oxalate (LEXAPRO) 10 mg tablet Take 10 mg by mouth two (2) times a day. Provider, Historical   pantoprazole (PROTONIX) 40 mg tablet Take 40 mg by mouth daily. Provider, Historical   levothyroxine (SYNTHROID) 175 mcg tablet Take 175 mcg by mouth Daily (before breakfast). Provider, Historical   Trelegy Ellipta 100-62.5-25 mcg inhaler INHALE 1 PUFF BY MOUTH EVERY DAY 1/4/22   Provider, Historical   insulin aspart U-100 (NovoLOG U-100 Insulin aspart) 100 unit/mL injection INJECT A MAX  UNITS UNDER THE SKIN DAILY WITH INSULIN PUMP 1/6/22   Allen Newton MD   nitroglycerin (NITROSTAT) 0.4 mg SL tablet  6/8/21   Provider, Historical   prednisoLONE acetate (PRED FORTE) 1 % ophthalmic suspension Administer 1 Drop to right eye two (2) times a day.  9/9/20 Provider, Historical   ferrous sulfate 325 mg (65 mg iron) tablet Take 650 mg by mouth nightly. 2 daily    Provider, Historical   dorzolamide-timolol, PF, (COSOPT, PF,) 2-0.5 % dpet Administer 1 Drop to left eye two (2) times a day. Indications: increased pressure in the eye 7/6/15   Provider, Historical       Allergies/Social/Family History: Allergies   Allergen Reactions    Nsaids (Non-Steroidal Anti-Inflammatory Drug) Other (comments)     bleeding    Augmentin [Amoxicillin-Pot Clavulanate] Diarrhea    Oxycodone Other (comments)     Altered mental status per patient. Has tolerated Hydrocodone in the past     Vesicare [Solifenacin] Rash     BURNING WITH URINATION      Social History     Tobacco Use    Smoking status: Former Smoker     Packs/day: 0.50     Years: 40.00     Pack years: 20.00     Quit date: 2015     Years since quittin.9    Smokeless tobacco: Never Used   Substance Use Topics    Alcohol use: No     Alcohol/week: 0.0 standard drinks      Family History   Problem Relation Age of Onset    COPD Mother     Diabetes Mother     COPD Father     Diabetes Father     Cancer Father         pancreatic    Diabetes Brother     Heart Disease Brother     Diabetes Brother     Alcohol abuse Brother     Diabetes Sister     Cancer Sister         [place]    Bleeding Prob Sister         Factor V Leiden    Anesth Problems Neg Hx        Review of Systems:     A comprehensive review of systems was negative except for that written in the HPI.     Objective:   Vital Signs:  Visit Vitals  BP (!) 124/104   Pulse 94   Temp 98.7 °F (37.1 °C)   Resp 16   Ht 5' 5\" (1.651 m)   Wt 102 kg (224 lb 13.9 oz)   SpO2 96%   BMI 37.42 kg/m²    O2 Flow Rate (L/min): 1 l/min O2 Device: Nasal cannula Temp (24hrs), Av.8 °F (37.1 °C), Min:98.6 °F (37 °C), Max:99 °F (37.2 °C)           Intake/Output:     Intake/Output Summary (Last 24 hours) at 2022 1120  Last data filed at 2022 0600  Gross per 24 hour Intake 9.07 ml   Output 750 ml   Net -740.93 ml       Physical Exam:    General appearance: alert, cooperative, no distress, appears older than stated age  Lungs: Decreased breath sounds bilaterally  Heart: Normal sinus rhythm  Abdomen: soft, non-tender. Bowel sounds normal. No masses,  no organomegaly  Extremities: extremities normal, atraumatic, no cyanosis or edema      LABS AND  DATA: Personally reviewed  Recent Labs     06/23/22  0403 06/22/22  0537   WBC 5.7 9.7   HGB 9.7* 10.8*   HCT 31.5* 34.2*   * 164     Recent Labs     06/23/22  0403 06/22/22  1620 06/22/22  0537 06/21/22  1642    136   < > 138   K 5.2* 5.8*   < > 5.3*   * 110*   < > 110*   CO2 24 23   < > 25   BUN 46* 45*   < > 36*   CREA 3.04* 3.03*   < > 2.62*   * 274*   < > 164*   CA 9.0 9.4   < > 9.4   MG  --   --   --  1.5*    < > = values in this interval not displayed. Recent Labs     06/21/22  1642      TP 6.8   ALB 2.6*   GLOB 4.2*     No results for input(s): INR, PTP, APTT, INREXT, INREXT in the last 72 hours. No results for input(s): PHI, PCO2I, PO2I, FIO2I in the last 72 hours. No results for input(s): CPK, CKMB, TROIQ, BNPP in the last 72 hours. Hemodynamics:   PAP:   CO:     Wedge:   CI:     CVP:    SVR:       PVR:       Ventilator Settings:  Mode Rate Tidal Volume Pressure FiO2 PEEP                    Peak airway pressure:      Minute ventilation:          MEDS: Reviewed    Chest X-Ray:  CXR Results  (Last 48 hours)               06/22/22 1913  XR CHEST PORT Final result    Impression:  No pneumothorax. Narrative:  EXAM: XR CHEST PORT       INDICATION: Evaluate for pneumothorax       COMPARISON: Prior day       FINDINGS: A portable AP radiograph of the chest was obtained at 1910 hours. The   patient is on a cardiac monitor. There is stool lead pacemaker in place without   evidence for lead fracture. Sternotomy wires are intact.  The lungs are clear   with exception of the minimal subsegmental atelectasis in the left mid lung. The   cardiac and mediastinal contours and pulmonary vascularity are normal.  The   bones and soft tissues are grossly within normal limits. 06/21/22 1655  XR CHEST PORT Final result    Impression:  No evidence of pneumonia or pulmonary edema. There is grossly stable   bilateral lung scarring. Narrative:  EXAM: XR CHEST PORT       INDICATION: hypoxia       COMPARISON: Chest April 4, 2022       FINDINGS: A portable AP radiograph of the chest was obtained at 1647 hours. Patient is status post sternotomy and CABG. The lung volumes are again low. No   new lung consolidation is identified. Bilateral scarring is noted. The vascular   clarity is normal.  No definite effusion or pneumothorax is seen. ECHO:        Multidisciplinary Rounds Completed: Yes    ABCDEF Bundle/Checklist Completed:  Yes    SPECIAL EQUIPMENT  None    DISPOSITION  Stay in ICU    CRITICAL CARE CONSULTANT NOTE  I had a face to face encounter with the patient, reviewed and interpreted patient data including clinical events, labs, images, vital signs, I/O's, and examined patient. I have discussed the case and the plan and management of the patient's care with the consulting services, the bedside nurses and the respiratory therapist.      NOTE OF PERSONAL INVOLVEMENT IN CARE   This patient has a high probability of imminent, clinically significant deterioration, which requires the highest level of preparedness to intervene urgently. I participated in the decision-making and personally managed or directed the management of the following life and organ supporting interventions that required my frequent assessment to treat or prevent imminent deterioration. I personally spent 35 minutes of critical care time. This is time spent at this critically ill patient's bedside actively involved in patient care as well as the coordination of care.   This does not include any procedural time which has been billed separately.     Charissa Porter DO  Staff Intensivist/Anesthesiologist  [facility]  6/23/2022

## 2022-06-23 NOTE — PROGRESS NOTES
7984 Report received from Munir, [address] [facility].     0900 DM @bedside adjusting pt's insulin regimen. 1000 IDR held. Plans for PT/OT evaluation, introduce home scheduled medications, and discharge disposition planning. 56 Dr. Isis Rodriguez @bedside. 36 Dr. Ginna Hooker @bedside updating pt and pt's SO. Plans for transfer out of CCU today. 1500 PT/OT @bedside. Pt placed in chair x3 max assist.    1530 S/w Dr. Lucretia Garcia MD will be in to see pt this evening. 1800 Pt tolerated two hours OOB, pt placed back in bed via [address] 1930 Bedside and Verbal shift change report given to EDWINA Amaral (oncoming nurse) by Jude Flores RN (offgoing nurse). Report included the following information SBAR, Kardex, Intake/Output, MAR, Accordion and Recent Results.

## 2022-06-23 NOTE — DIABETES MGMT
[address] [facility]    CLINICAL NURSE SPECIALIST CONSULT     Initial Presentation   Arden Black is a 67 y.o. female admitted from an orthopedic appointment -she was found to be hypotensive and bradycardic with junctional rhythm in the 30s. She was sent to the ER where junctional rhythm was confirmed. Evaluated by cardiologist and patient condition improved with dopamine infusion. Currently she is in first-degree heart block but blood pressure has improved. Patient is being admitted to the ICU. Possible plan for permanent pacemaker in the morning. *. HX:   Past Medical History:   Diagnosis Date    Adverse effect of anesthesia     SLOW WAKING PAST ANESTHESIA    Anxiety     Arthritis     CAD (coronary artery disease)     s/p CABG x 3v    Chest pain 7/2015    Chronic obstructive pulmonary disease (HCC)     CTS (carpal tunnel syndrome)     S/p bilateral release    Diabetes ([address]     Diabetic gastroparesis ([address]     Diabetic neuropathy ([address]     Diabetic retinopathy ([address]     GERD (gastroesophageal reflux disease)     GI bleed 7/2015    4 bleeds with 9 clips placed    Hypercholesteremia     Hypertension     Hypothyroid     Ill-defined condition     NEUROPATHY HANDS AND FEET    Ill-defined condition 2017    GI BLEED    Nicotine vapor product user     JOHN on CPAP     Osteoporosis     Vitamin D deficiency         INITIAL DX:   Bradycardia [R00.1]     Current Treatment     TX: cardiology/     Consulted by Provider for advanced diabetes nursing assessment and care for:   [x] Inpatient management strategy  [x] Home management assessment      Hospital Course   Clinical progress has been complicated by new heart block necessitating pacemaker -slated for placement today. 6/23: s/p pacemaker placement/ possible discharge today? Diabetes History   Type 1 DM since age 16.  A1C 8.3%- which is just above target range for her duration of diabetes/co-morbid conditions-   PTA was on insulin pump as of June follow up appt. With endocrinologist , Dr. Adrianna Grandaos. Diabetes-related Medical History  Acute complications  NONE  Neurological complications  Gastroparesis and Peripheral neuropathy  Microvascular disease  Retinopathy and Nephropathy  Macrovascular disease  CAD  Other associated conditions     Hypothyroid/ HTN    Diabetes Medication History  Key Antihyperglycemic Medications             insulin aspart U-100 (NovoLOG U-100 Insulin aspart) 100 unit/mL injection INJECT A MAX  UNITS UNDER THE SKIN DAILY WITH INSULIN PUMP         PUMP SETTINGS- as of June 2022 visit with endocrinologist     Basal  MN-630AM: 2.65  630AM-4PM: 2.50  4PM-MN: 2.55  TDD: 56 units/ day     Carb Ratio  1:4  Correction Factor  1:25  Target  120  Diabetes self-management practices:   Eating pattern-deferred today-due to patient sleeping at time of visit     Physical activity pattern-limted to none, recent ankle fracture in April 2022     Monitoring pattern-per recent endo visit; She checks her BGs 2 times per day. Her FBGs have been running in the 180-250s range. Her pre-dinner BGs have been in the 160-180s. She denies issues of hypoglycemia. Taking medications pattern  [x] Consistent administration  [x] Affordable  Social determinants of health impacting diabetes self-management practices   Struggling with anxiety and/or depression and Concerned that you need to know more about how to stay healthy with diabetes  Overall evaluation:    [x] A1c slightly above target with drug therapy & self-care practices    Subjective   \" My best friend manages my insulin pump b/c I have retinopathy\"  Best friend lives with her  Desires to get back on her pump prior to diacharge   Objective   Physical exam  General Obese  female in no acute distress.    Neuro  Alert and oriented x4  Vital Signs   Visit Vitals  BP (!) 96/51   Pulse 76   Temp 98.6 °F (37 °C)   Resp 16   Ht 5' 5\" (1.651 m)   Wt 102 kg (224 lb 13.9 oz)   SpO2 97%   BMI 37.42 kg/m²     Skin  Warm and dry. Heart   Regular rate and rhythm. No murmurs, rubs or gallops  Lungs  Clear to auscultation without rales or rhonchi  Extremities No foot wounds    Diabetic foot exam: documented peripheral neuropathy -takes gabapentin    . Laboratory  Recent Labs     06/23/22  0403 06/22/22  1620 06/22/22  0537 06/21/22  1642 06/21/22  1642   * 274* 332*   < > 164*   AGAP 5 3* 7   < > 3*   WBC 5.7  --  9.7  --  7.6   CREA 3.04* 3.03* 2.84*   < > 2.62*   GFRNA 15* 15* 16*   < > 18*   AST  --   --   --   --  14*   ALT  --   --   --   --  14    < > = values in this interval not displayed. Factors impacting BG management  Factor Dose Comments   Nutrition:       60 grams/meal      Drugs:  Other: Dopamine infusion   stopped    Other:   Kidney function  Liver function     GFR 16/ Cr+ 2.84-appears to be patient's baseline       Blood glucose pattern    Significant diabetes-related events over the past 24-72 hours  A1C 8.3%- PTA on insulin pump removed 6/21 @ 2030- now off while hospitalized   Admitting bG 157  Fasting bG 6/2: 171 (lab)  Basal NPH 20 units BID  Bolus-ordered 6/23- carb coverage TID, 1: 6    Assessment and Plan   Nursing Diagnosis Risk for unstable blood glucose pattern   Nursing Intervention Domain 5250 Decision-making Support   Nursing Interventions Examined current inpatient diabetes/blood glucose control   Explored factors facilitating and impeding inpatient management  Explored corrective strategies with patient and responsible inpatient provider   Informed patient of rational for insulin strategy while hospitalized     Nursing Diagnosis 26272 Ineffective Health Management   Nursing Intervention Domain 5250 Decision-makingSupport   Nursing Interventions Identified diabetes self-management practices impeding diabetes control  Discussed diabetes survival skills related to  1. Healthy Plate eating plan; given handouts  2.  Role of physical activity in improving insulin sensitivity and action  3. Procedure for blood glucose monitoring & options for low-cost products available from [facility]   4. Medications plan at discharge     Evaluation   Papito Nguyễn is a 68 yo female who has lived with Type 1 diabetes since age 16. She is currently admitted for pacemaker placement after she was found to be in a junctional rhythm with HR in 30s at a ambulatory doctor's appointment. Patient is now on dopamine infusion -Insulin pump removed on 6/21 @2030 with only correctional insulin ordered. Conferred with ICU intensivist re: need for basal insulin since pump is not in place. NPH BID ordered at 20% reduction from TDD in insulin pump. While hospitalized keep -180. Once she is post op and cognitively able to manage her insulin pump, she may get back on the insulin pump per hospital policy. Unable to have face to face interview with her this AM as she was soundly sleeping at time of visit. Per chart review, she has been on her insulin pump with BG in 180-250s. She also suffers with micro and macrovascular long term complications from long standing T1 diabetes -    Fasting BG post procedure in target. Taking PO food diet >50% this AM.  Discussed insulin adjustment recommendations with Dr. Sundeep Smith. Will likely be moving out of CCU today. Recommendations   1. [] Use of Subcutaneous Insulin Order set (5708)  Insulin Dosing Specific recommendation   CONTINUE  Basal  - NPH                                   (Based on weight, BMI & GFR) 20 units BID-NPH    Once taking PO food diet >50% ADDED Nutritional coverage                           (Based on CHO/dextrose load) Cover carbs with 1 unit of insulin per 6 g of CHO    CONTINUE Corrective                                       (Useful in adjusting insulin dosing) [x] Normal sensitivity          2.  Please [facility] CNS  if patient desires to get back on insuiln pump- she will need to have all supplies and insulin at bedside. She desires to get back on her pump just before she's discharged home. Billing Code(s)   60715    Before making these care recommendations, I personally reviewed the hospitalization record, including notes, laboratory & diagnostic data and current medications, and examined the patient at the bedside (circumstances permitting) before making care recommendations. More than fifty (50) percent of the time was spent in patient counseling and/or care coordination.   Total minutes: [address], CNS  Diabetes Clinical Nurse Specialist  [facility]  Access via [address] [place] [address]

## 2022-06-23 NOTE — PROGRESS NOTES
Cardiology Progress Note  2022    Admit Date: 2022  Admit Diagnosis: Bradycardia [R00.1]  CC:  Bradycardia    Assessment/Plan:  1. Junctional bradycardia, near-syncope - s/p DC PPM by Dr. Denise Lundy    - restart lower dose metoprolol 25 mg BID     2. CAD s/p CABG  - no anginal symptoms. ECG with NS T wave changes unchanged from baseline  - lower metoprolol as above. Continue other home cardiac meds     3. Hypoxia - no clear signs of volume overload. Chronic scarring noted on xray. Echo with preserved LVEF. ? COPD related     4. Type I DM c/b kidney disease, retinopathy  5. CKD   6. COPD     No further inpatient cardiac plans. Follow up with Dr. Kathy June in clinic as scheduled. Hospital problem list     Active Problems:    Bradycardia (2022)                                                          Subjective:     Underwent pacemaker placement yesterday without complaints. Feels well, denies any complaints other than left arm soreness. ROS: All other systems reviewed and were negative other than mentioned above. No change in family and social history from H&P/Consult note.     Objective:    Physical Exam:  24 hr VS reviewed, overall VSSAF  Temp (24hrs), Av.8 °F (37.1 °C), Min:98.6 °F (37 °C), Max:99 °F (37.2 °C)    Patient Vitals for the past 8 hrs:   Pulse   22 1000 93   22 0900 91   22 0800 84   22 0700 84   22 0600 76   22 0500 80   22 0400 80   22 0300 72    Patient Vitals for the past 8 hrs:   Resp   22 1000 14   22 0900 20   22 0800 26   22 0700 13   22 0600 16   22 0500 21   22 0400 20   22 0300 15    Patient Vitals for the past 8 hrs:   BP   22 1000 139/77   22 0900 115/66   22 0800 117/72   22 0700 117/68   06/23/22 0600 (!) 96/51   06/23/22 0500 109/60   22 0400 125/70   22 0300 (!) 81/48 Intake/Output Summary (Last 24 hours) at 6/23/2022 1030  Last data filed at 6/23/2022 0600  Gross per 24 hour   Intake 12.97 ml   Output 750 ml   Net -737.03 ml       General: resting comfortably in no distress, obese  HEENT: sclera anicteric, moist mucous membranes  Neck: supple, no JVD   CV: regular rate and rhythm, normal S1 and S2, II/VI RANDALL  Lungs: no respiratory distress, lungs clear to auscultation anteriorly  Abdomen: soft, non distended, non tender  MSK: no lower extremity edema, L upper chest wall incision/dressign c/d/i  Skin: warm to touch  Neuro: alert and oriented  Psych: calm    Data Review:  Lab results reviewed as noted below. Current medications reviewed as noted below. Telemetry independently reviewed : [x]  sinus    []  chronic afib     []  par afib    []  NSVT    ECG independently reviewed: []  NSR    []  no significant changes   [x]  no new ECG provided for review    No results for input(s): PH, PCO2, PO2 in the last 72 hours. No results for input(s): CPK, CKMB in the last 72 hours. No lab exists for component: TROPONINI  Recent Labs     06/23/22  0403 06/22/22  1620 06/22/22  0537 06/21/22  1642 06/21/22  1642    136 136   < > 138   K 5.2* 5.8* 5.9*   < > 5.3*   * 110* 109*   < > 110*   CO2 24 23 20*   < > 25   BUN 46* 45* 43*   < > 36*   CREA 3.04* 3.03* 2.84*   < > 2.62*   * 274* 332*   < > 164*   CA 9.0 9.4 9.4   < > 9.4   ALB  --   --   --   --  2.6*   WBC 5.7  --  9.7  --  7.6   HGB 9.7*  --  10.8*  --  10.7*   HCT 31.5*  --  34.2*  --  33.5*   *  --  164  --  188    < > = values in this interval not displayed. Recent Labs     06/21/22  1642      TBILI 0.2   TP 6.8   ALB 2.6*   GLOB 4.2*     No results for input(s): INR, PTP, APTT, INREXT, INREXT in the last 72 hours. No results for input(s): FE, TIBC, PSAT, FERR in the last 72 hours.    Lab Results   Component Value Date/Time    Glucose (POC) 252 (H) 06/22/2022 11:47 PM    Glucose (POC) 253 (H) 06/22/2022 06:53 PM    Glucose (POC) 355 (H) 06/22/2022 12:37 PM    Glucose (POC) 335 (H) 06/22/2022 05:43 AM    Glucose (POC) 290 (H) 06/21/2022 11:08 PM       Current Facility-Administered Medications   Medication Dose Route Frequency    insulin lispro (HUMALOG) injection   SubCUTAneous AC&HS    insulin lispro (HUMALOG) injection   SubCUTAneous TIDAC    metoprolol tartrate (LOPRESSOR) tablet 25 mg  25 mg Oral Q12H    dorzolamide (TRUSOPT) 2 % ophthalmic solution 1 Drop  1 Drop Left Eye TID    And    timolol (TIMOPTIC) 0.5 % ophthalmic solution 1 Drop  1 Drop Left Eye BID    insulin NPH (NOVOLIN N, HUMULIN N) injection 20 Units  20 Units SubCUTAneous BID    sodium chloride (NS) flush 5-40 mL  5-40 mL IntraVENous Q8H    sodium chloride (NS) flush 5-40 mL  5-40 mL IntraVENous PRN    dextrose 10 % infusion 125-250 mL  125-250 mL IntraVENous PRN    DOPamine (INTROPIN) 800 mg in dextrose 5% 250 mL infusion  0-20 mcg/kg/min IntraVENous TITRATE    sodium chloride (NS) flush 5-40 mL  5-40 mL IntraVENous Q8H    sodium chloride (NS) flush 5-40 mL  5-40 mL IntraVENous PRN    acetaminophen (TYLENOL) tablet 650 mg  650 mg Oral Q6H PRN    Or    acetaminophen (TYLENOL) suppository 650 mg  650 mg Rectal Q6H PRN    polyethylene glycol (MIRALAX) packet 17 g  17 g Oral DAILY PRN    ondansetron (ZOFRAN ODT) tablet 4 mg  4 mg Oral Q8H PRN    Or    ondansetron (ZOFRAN) injection 4 mg  4 mg IntraVENous Q6H PRN    enoxaparin (LOVENOX) injection 30 mg  30 mg SubCUTAneous DAILY    levothyroxine (SYNTHROID) tablet 175 mcg  175 mcg Oral ACB    pantoprazole (PROTONIX) tablet 40 mg  40 mg Oral ACB    pravastatin (PRAVACHOL) tablet 40 mg  40 mg Oral DAILY    prednisoLONE acetate (PRED FORTE) 1 % ophthalmic suspension 1 Drop  1 Drop Right Eye BID    glucose chewable tablet 16 g  4 Tablet Oral PRN    dextrose 10 % infusion 0-250 mL  0-250 mL IntraVENous PRN    glucagon (GLUCAGEN) injection 1 mg  1 mg IntraMUSCular PRN         Sonny Coburn Clamp, [address] [facility]  06/23/22

## 2022-06-23 NOTE — PROGRESS NOTES
[facility] [facility] Adult  Hospitalist Group     ICU Transfer/Accept Summary     [place] [address] [place]  DATE OF TRANSFER: 6/23/2022       PATIENT ID: Timothy Echols  MRN: [id number]   YOB: 1950    PRIMARY CARE PROVIDER: Abraham Marin MD   DATE OF ADMISSION: 6/21/2022  4:24 PM    ATTENDING PHYSICIAN: Lanette Farrell MD  CONSULTATIONS:   IP CONSULT TO NEPHROLOGY    PROCEDURES/SURGERIES:   Procedure(s):  INSERT PPM DUAL    REASON FOR ADMISSION: <principal problem not specified>     HOSPITAL PROBLEM LIST:  Patient Active Problem List   Diagnosis Code    CAD (coronary artery disease) I25.10    Essential hypertension I10    Obesity, Class I, BMI 30.0-34.9 (see actual BMI) E66.9    Low back pain at multiple sites M54.50    Hypothyroidism due to Hashimoto's thyroiditis E03.8, E06.3    Erosive gastritis with hemorrhage K29.61    AVM (arteriovenous malformation) of duodenum, 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    Sepsis (HCC) A41.9    GI bleed K92.2    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 (HCC) E10.311    Mixed hyperlipidemia E78.2    Severe obesity (HCC) E66.01    Hyperkalemia E87.5    Primary osteoarthritis of right knee M17.11    Ankle fracture S82.899A    Tibia fracture S82.209A    Bradycardia R00.1         Brief HPI and Hospital Course: This is a very pleasant 77-year-old lady with past medical history significant for diabetes mellitus type 1 since the age of 16, hypothyroidism, coronary artery disease status post CABG in 2015, retinopathy and other medical problem who has been complaining of progressive fatigue for couple of days. Today she had an orthopedic appointment and she was found to be hypotensive and bradycardic with junctional rhythm in the 30s. She was sent to the ER where junctional rhythm was confirmed. Evaluated by cardiologist and patient condition improved with dopamine infusion.   Currently she is in first-degree heart block but blood pressure has improved. Patient is being admitted to the ICU. Possible plan for permanent pacemaker in the morning. Patient takes beta-blocker and calcium channel blocker, she also has chronic kidney disease with chronic elevation in potassium. Her presentation could be related to medication effect or hyperkalemia though more likely to be related to degenerative conductive system disease. Assessment and Plan:  - Junctional bradycardia:  - Near syncope due to the above:  -Longstanding diabetes mellitus with multiple complication:  - Chronic kidney disease:  - Mild hyperkalemia:  - History of coronary artery disease:  -History of hypothyroidism     Patient status post PPM placement, hemodynamically stable, started on low-dose of metoprolol per cardiology, on telemetry and close monitoring  Syncope likely secondary to above, closely monitor if persist may consider further intervention and diagnostics  Sliding-scale insulin, Accu-Cheks, diet control, close monitoring  Patient with chronic kidney disease, avoid nephrotoxic medication, renally dose all medication, patient with hyperkalemia, obtain renal consult given dose Kayexalate and continue to closely monitor  Continue home medications        GI DVT prophylaxis: SCD         PHYSICAL EXAMINATION:  Visit Vitals  /70 (BP 1 Location: Right arm, BP Patient Position: At rest)   Pulse 93   Temp 98.6 °F (37 °C)   Resp 18   Ht 5' 5\" (1.651 m)   Wt 102 kg (224 lb 13.9 oz)   SpO2 92%   BMI 37.42 kg/m²       General:          Alert, cooperative, no distress  HEENT:           Atraumatic, MMM            Neck:               Supple, symmetrical,  thyroid: non tender  Lungs:             Clear to auscultation bilaterally. No Wheezing or Rhonchi. No rales. Heart:              Regular  rhythm,  No  murmur   No edema  Abdomen:       Soft, non-tender. Not distended. Bowel sounds normal  Extremities:     No cyanosis.   No clubbing,  +2 distal pulses  Skin:                Not pale. Not Jaundiced  No rashes   Psych:             Not anxious or agitated. Neurologic:      Alert, moves all extremities, oriented X 3. Labs:     Recent Labs     06/23/22  0403 06/22/22  0537   WBC 5.7 9.7   HGB 9.7* 10.8*   HCT 31.5* 34.2*   * 164     Recent Labs     06/23/22  0403 06/22/22  1620 06/22/22  0537 06/21/22  1642 06/21/22  1642    136 136   < > 138   K 5.2* 5.8* 5.9*   < > 5.3*   * 110* 109*   < > 110*   CO2 24 23 20*   < > 25   BUN 46* 45* 43*   < > 36*   CREA 3.04* 3.03* 2.84*   < > 2.62*   * 274* 332*   < > 164*   CA 9.0 9.4 9.4   < > 9.4   MG  --   --   --   --  1.5*    < > = values in this interval not displayed. Recent Labs     06/21/22 1642   ALT 14      TBILI 0.2   TP 6.8   ALB 2.6*   GLOB 4.2*     No results for input(s): INR, PTP, APTT, INREXT in the last 72 hours. No results for input(s): FE, TIBC, PSAT, FERR in the last 72 hours. No results found for: FOL, RBCF   No results for input(s): PH, PCO2, PO2 in the last 72 hours. No results for input(s): CPK, CKNDX, TROIQ in the last 72 hours.     No lab exists for component: CPKMB  Lab Results   Component Value Date/Time    Cholesterol, total 220 (H) 02/04/2022 11:44 AM    HDL Cholesterol 48 02/04/2022 11:44 AM    LDL, calculated 108.8 (H) 02/04/2022 11:44 AM    Triglyceride 316 (H) 02/04/2022 11:44 AM    CHOL/HDL Ratio 4.6 02/04/2022 11:44 AM     Lab Results   Component Value Date/Time    Glucose (POC) 235 (H) 06/23/2022 11:13 AM    Glucose (POC) 252 (H) 06/22/2022 11:47 PM    Glucose (POC) 253 (H) 06/22/2022 06:53 PM    Glucose (POC) 355 (H) 06/22/2022 12:37 PM    Glucose (POC) 335 (H) 06/22/2022 05:43 AM     Lab Results   Component Value Date/Time    Color YELLOW/STRAW 06/22/2022 02:43 PM    Appearance TURBID (A) 06/22/2022 02:43 PM    Specific gravity 1.018 06/22/2022 02:43 PM    pH (UA) 5.5 06/22/2022 02:43 PM    Protein 300 (A) 06/22/2022 02:43 PM Glucose 500 (A) 06/22/2022 02:43 PM    Ketone Negative 06/22/2022 02:43 PM    Bilirubin Negative 06/22/2022 02:43 PM    Urobilinogen 0.2 06/22/2022 02:43 PM    Nitrites Negative 06/22/2022 02:43 PM    Leukocyte Esterase MODERATE (A) 06/22/2022 02:43 PM    Epithelial cells MANY (A) 06/22/2022 02:43 PM    Bacteria 3+ (A) 06/22/2022 02:43 PM    WBC >100 (H) 06/22/2022 02:43 PM    RBC 0-5 06/22/2022 02:43 PM         CODE STATUS:  x Full Code    DNR    Partial    Comfort Care       Signed:   Joan Moreno MD  Date of Service:  6/23/2022  2:47 PM

## 2022-06-23 NOTE — PROGRESS NOTES
Called for consult.   Patient seen by [facility] in the past  Please consult their group for evaluation

## 2022-06-23 NOTE — PROGRESS NOTES
Problem: Mobility Impaired (Adult and Pediatric)  Goal: *Acute Goals and Plan of Care (Insert Text)  Description: FUNCTIONAL STATUS PRIOR TO ADMISSION: The patient required assist x2 for stand pivot transfers and ambulated couple of feet w/ a walker with assist x2 and RLE WBAT in a walking boot s/p ankle injury. She was otherwise in the wheelchair or bed and had been relying on a milad lift for transfers OOB. Patient is a retired Respiratory Therapist.    [address]: The patient lived with her partner who assists with her care and mobility. She is open to [facility] for PT and OT. Physical Therapy Goals  Initiated 6/23/2022  1. Patient will move from supine to sit and sit to supine  and roll side to side in bed with moderate assistance  within 7 day(s). 2.  Patient will transfer from bed to chair and chair to bed with minimal assistance x2 using the least restrictive device within 7 day(s). 3.  Patient will perform sit to stand with minimal assistance assist x2 within 7 day(s). 4.  Patient will ambulate with moderate assistance  for 5 feet with the least restrictive device within 7 day(s). Outcome: Not Met     PHYSICAL THERAPY EVALUATION  Patient: Cesar Felipe (42 y.o. female)  Date: 6/23/2022  Primary Diagnosis: Bradycardia [R00.1]  Procedure(s) (LRB):  INSERT PPM DUAL (N/A) 1 Day Post-Op   Precautions: Fall,Other (comment) (PPM precautions; RLE WBAT in walking boot)    ASSESSMENT  Based on the objective data described below, the patient presents with impaired functional mobility s/p PPM insertion, now POD 1. Currently her mobility is limited by PPM precautions, impaired sitting and stand balance (right lateral lean, reported to be baseline), generalized weakness, RLE WBAT in walking boot s/p an ankle injury (she was previously NWB), and activity tolerance. Recommend rehab when medically stable for DC.   Patient prefers to return home with her partner and resumption of HHPT/ OT (currently open to [place]). Patient has all the equipment needed for safe discharge home (WC, walkers, milad lift, BSC) and her partner has been assisting with her recovery from her ankle injury and feels she is able to continue. Current Level of Function Impacting Discharge (mobility/balance): Max A x2-3     Functional Outcome Measure: The patient scored 0/28 on the Tinetti outcome measure which is indicative of high fall risk. Other factors to consider for discharge:      Patient will benefit from skilled therapy intervention to address the above noted impairments. PLAN :  Recommendations and Planned Interventions: bed mobility training, transfer training, gait training, therapeutic exercises, neuromuscular re-education, patient and family training/education, and therapeutic activities      Frequency/Duration: Patient will be followed by physical therapy:  5 times a week to address goals. Recommendation for discharge: (in order for the patient to meet his/her long term goals)  To be determined: Recommending rehab however, pt prefers home with her partner and resumption of HHPT d/t extensive SNF stays s/p her ankle injury. This discharge recommendation:  Has not yet been discussed the attending provider and/or case management    IF patient discharges home will need the following DME: Patient owns DME required for discharge; Will require medical transport home.          SUBJECTIVE:       OBJECTIVE DATA SUMMARY:   HISTORY:    Past Medical History:   Diagnosis Date    Adverse effect of anesthesia     SLOW WAKING PAST ANESTHESIA    Anxiety     Arthritis     CAD (coronary artery disease)     s/p CABG x 3v    Chest pain 7/2015    Chronic obstructive pulmonary disease (HCC)     CTS (carpal tunnel syndrome)     S/p bilateral release    Diabetes ([address]     Diabetic gastroparesis ([address]     Diabetic neuropathy ([address]     Diabetic retinopathy ([address]     GERD (gastroesophageal reflux disease)     GI bleed 7/2015    4 bleeds with 9 clips placed    Hypercholesteremia     Hypertension     Hypothyroid     Ill-defined condition     NEUROPATHY HANDS AND FEET    Ill-defined condition 2017    GI BLEED    Nicotine vapor product user     JOHN on CPAP     Osteoporosis     Vitamin D deficiency      Past Surgical History:   Procedure Laterality Date    HX CERVICAL FUSION  2011    HX ENDOSCOPY  7/2015    HX GI      HX HEART CATHETERIZATION  7/2015    HX LUMBAR FUSION  2017    HX LUMBAR LAMINECTOMY  2011    HX ORTHOPAEDIC Right 1970    CARPAL TUNNEL    HX ORTHOPAEDIC Left 2003    CARPAL TUNNEL    HX ROTATOR CUFF REPAIR Right 2003    HX SHOULDER ARTHROSCOPY Right     HX TONSIL AND ADENOIDECTOMY  1968    HX TONSILLECTOMY      AK CABG, ARTERY-VEIN, THREE  7/13/2015       Personal factors and/or comorbidities impacting plan of care: PMH/ HPI to include right ankle injury at which time she was NWB. She is now WBAT in a walking boot.       Home Situation  Home Environment: Apartment  # Steps to Enter: 7  Rails to Enter: Yes  One/Two Story Residence: One story  Living Alone: No  Support Systems: Spouse/Significant Other  Patient Expects to be Discharged to[de-identified] Home  Current DME Used/Available at Home: Cane, straight,Commode, bedside,Shower chair,Walker,Walker, rolling,Wheelchair,Other (comment) (transport chair, milad lift, )  Tub or Shower Type: Tub/Shower combination    EXAMINATION/PRESENTATION/DECISION MAKING:   Critical Behavior:  Neurologic State: Alert  Orientation Level: Oriented X4  Cognition: Follows commands       Range Of Motion:  AROM: Generally decreased, functional           PROM: Generally decreased, functional           Strength:    Strength: Generally decreased, functional                    Tone & Sensation:   Tone: Normal                              Coordination:  Coordination: Within functional limits  Vision:      Functional Mobility:  Bed Mobility:  Supine to Sit: Maximum assistance;Assist x2; Additional time (pt assisting pushing up with her unaffected RUE)  Sit to Supine: Other (comment) (remained sitting up in the chair)  Scooting: Maximum assistance;Assist x2   Rolling: Maximum assistance; Assist x2  Tx: instructed in technique for supine to sit with cues provided throughout for. Transfers:  Sit to Stand: Moderate assistance;Maximum assistance;Assist x2  Stand to Sit: Moderate assistance;Maximum assistance;Assist x2  Stand Pivot Transfers: Maximum assistance;Assist x2 (with Assist of a 3rd person for safety)                   Balance:   Sitting: Impaired; With support  Sitting - Static: Poor (constant support)  Sitting - Dynamic: Poor (constant support)  Standing: Impaired; With support  Standing - Static: Poor;Constant support  Standing - Dynamic : Poor;Constant support  Ambulation/Gait Training:                                                  Therapeutic Activity/ Education:   - Educated in LUE PPM precautions (no lifting >10#, no pushing or pulling, no adb/ flx >90 degs) and the impact on functional mobility (transfers, supine<->sit, walking/ no longer able to use the walker) x30 days. Pt and her partner demonstrate understanding as evidenced by teach back/ discussions.          Functional Measure:  Tinetti test:    Sitting Balance: 0  Arises: 0  Attempts to Rise: 0  Immediate Standing Balance: 0  Standing Balance: 0  Nudged: 0  Eyes Closed: 0  Turn 360 Degrees - Continuous/Discontinuous: 0  Turn 360 Degrees - Steady/Unsteady: 0  Sitting Down: 0  Balance Score: 0 Balance total score  Indication of Gait: 0  R Step Length/Height: 0  L Step Length/Height: 0  R Foot Clearance: 0  L Foot Clearance: 0  Step Symmetry: 0  Step Continuity: 0  Path: 0  Trunk: 0  Walking Time: 0  Gait Score: 0 Gait total score  Total Score: 0/28 Overall total score         Tinetti Tool Score Risk of Falls  <19 = High Fall Risk  19-24 = Moderate Fall Risk  25-28 = Low Fall Risk  Tindena JIMENEZ. Performance-Oriented Assessment of Mobility Problems in Elderly Patients. [facility]; [id number]. (Scoring Description: PT Bulletin Feb. 10, 1993)    Older adults: Capri Huggins et al, 2009; n = 1000 [facility] elderly evaluated with ABC, LAYO, ADL, and IADL)  · Mean LAYO score for males aged 69-68 years = 26.21(3.40)  · Mean LAYO score for females age 69-68 years = 25.16(4.30)  · Mean LAYO score for males over 80 years = 23.29(6.02)  · Mean LAYO score for females over 80 years = 17.20(8.32)            Physical Therapy Evaluation Charge Determination   History Examination Presentation Decision-Making   MEDIUM  Complexity : 1-2 comorbidities / personal factors will impact the outcome/ POC  LOW Complexity : 1-2 Standardized tests and measures addressing body structure, function, activity limitation and / or participation in recreation  LOW Complexity : Stable, uncomplicated  Other outcome measures Tinetti 0/28  HIGH       Based on the above components, the patient evaluation is determined to be of the following complexity level: LOW     Pain Rating:  None indicated at rest.  Pt voiced pain with right ankle movement when donning the walking boot. Activity Tolerance:   Fair and requires rest breaks    After treatment patient left in no apparent distress:   Sitting in chair, Call bell within reach, Caregiver / family present and VSS    COMMUNICATION/EDUCATION:   The patients plan of care was discussed with: Registered nurse. Patient/family have participated as able in goal setting and plan of care. and Patient/family agree to work toward stated goals and plan of care.     Thank you for this referral.  Jessie Valenzuela, PT   Time Calculation: 31 mins

## 2022-06-23 NOTE — PROGRESS NOTES
Physician Progress Note      PATIENTChristal Stands  CSN #:                  [id number]  :                       1950  ADMIT DATE:       2022 4:24 PM  [address] [place] DATE:  RESPONDING  PROVIDER #:        Yael Mnoge MD          QUERY TEXT:    Patient admitted and is s/p Permanent Pacemaker Implantation. Noted documentation of Sepsis as an active hospital problem in H&P and MD Progress Notes. ? Please document in progress notes the clinical indicators to support this diagnosis on current admission or document if this diagnosis is PMH only or has been ruled out after study. ? Please update active hospital problems appropriately to reflect response. The medical record reflects the following:  Risk Factors: 72F hypotensive and bradycardic on presentation  Clinical Indicators:    H&P-Current Attending Progress Notes  Active Problem List Date Reviewed 22  Sepsis     WBC 7.6    Treatment: The patient lacks clinical indicators and treatment for the diagnosis  Options provided:  -- Sepsis is currently being treated/evaluated as evidenced by, Please document clinical support. -- Sepsis has been ruled out after study  -- Sepsis is PMH only  -- Other - I will add my own diagnosis  -- Disagree - Not applicable / Not valid  -- Disagree - Clinically unable to determine / Unknown  -- Refer to Clinical Documentation Reviewer    PROVIDER RESPONSE TEXT:    Sepsis has been ruled out after study. Query created by: Ina Gaming on 2022 11:56 AM      QUERY TEXT:    Pt admitted in Junctional Bradycardia and is s/p Permanent Pacemaker Placement. Pt noted to have documentation that states \"Symptomatic bradycardia: Likely secondary to conduction system disease\". If possible, please document in progress notes and discharge summary the relationship if you are or are not treating the following:     The medical record reflects the following:  Risk Factors: 72F DMI hypothyroidism  Clinical Indicators:    6/23 Zachary OCHOA Progress Note  Today she had an orthopedic appointment and she was found to be hypotensive and bradycardic with junctional rhythm in the 30s. She was sent to the ER where junctional rhythm was confirmed. Evaluated by cardiologist and patient condition improved with dopamine infusion. Symptomatic bradycardia: Likely secondary to conduction system disease. Status post permanent pacemaker placement. Hemodynamically stable. Low-dose metoprolol started as per cardiology    6/22 Procedure Note Truman Arnold MD  Procedure:  Pacemaker implant left side  Indication:  Junctional bradycardia, near-syncope - initial ECG junctional bradycardia, HR 30s and hypotensive    Treatment: EKG, Dopamine gtt, ICU Admission, Cardio Vascular Surgery Consult, Permanent Pacemaker Implantation [employer] DDDR Pacemaker [place], telemetry Monitoring, Adjustment of PTA medications  Options provided:  -- Symptomatic bradycardia due to Sick Sinus Syndrome  -- Symptomatic bradycardia due to, Please document etiology  -- Other - I will add my own diagnosis  -- Disagree - Not applicable / Not valid  -- Disagree - Clinically unable to determine / Unknown  -- Refer to Clinical Documentation Reviewer    PROVIDER RESPONSE TEXT:    This patient has Symptomatic bradycardia due to sick sinus syndrome.     Query created by: Bailee Gamino on 6/23/2022 12:03 PM      Electronically signed by:  Jaylon Zhao MD 6/23/2022 3:11 PM

## 2022-06-24 LAB
ANION GAP SERPL CALC-SCNC: 4 MMOL/L (ref 5–15)
BUN SERPL-MCNC: 46 MG/DL (ref 6–20)
BUN/CREAT SERPL: 18 (ref 12–20)
CALCIUM SERPL-MCNC: 9.1 MG/DL (ref 8.5–10.1)
CHLORIDE SERPL-SCNC: 110 MMOL/L (ref 97–108)
CO2 SERPL-SCNC: 24 MMOL/L (ref 21–32)
CREAT SERPL-MCNC: 2.55 MG/DL (ref 0.55–1.02)
ERYTHROCYTE [DISTWIDTH] IN BLOOD BY AUTOMATED COUNT: 13 % (ref 11.5–14.5)
GLUCOSE BLD STRIP.AUTO-MCNC: 160 MG/DL (ref 65–117)
GLUCOSE BLD STRIP.AUTO-MCNC: 160 MG/DL (ref 65–117)
GLUCOSE BLD STRIP.AUTO-MCNC: 176 MG/DL (ref 65–117)
GLUCOSE BLD STRIP.AUTO-MCNC: 222 MG/DL (ref 65–117)
GLUCOSE SERPL-MCNC: 159 MG/DL (ref 65–100)
HCT VFR BLD AUTO: 33 % (ref 35–47)
HGB BLD-MCNC: 10.7 G/DL (ref 11.5–16)
MCH RBC QN AUTO: 33.2 PG (ref 26–34)
MCHC RBC AUTO-ENTMCNC: 32.4 G/DL (ref 30–36.5)
MCV RBC AUTO: 102.5 FL (ref 80–99)
NRBC # BLD: 0 K/UL (ref 0–0.01)
NRBC BLD-RTO: 0 PER 100 WBC
PLATELET # BLD AUTO: 150 K/UL (ref 150–400)
PMV BLD AUTO: 11.2 FL (ref 8.9–12.9)
POTASSIUM SERPL-SCNC: 5.2 MMOL/L (ref 3.5–5.1)
RBC # BLD AUTO: 3.22 M/UL (ref 3.8–5.2)
SERVICE CMNT-IMP: ABNORMAL
SODIUM SERPL-SCNC: 138 MMOL/L (ref 136–145)
WBC # BLD AUTO: 5.4 K/UL (ref 3.6–11)

## 2022-06-24 PROCEDURE — 97110 THERAPEUTIC EXERCISES: CPT

## 2022-06-24 PROCEDURE — 80048 BASIC METABOLIC PNL TOTAL CA: CPT

## 2022-06-24 PROCEDURE — 74011000250 HC RX REV CODE- 250: Performed by: INTERNAL MEDICINE

## 2022-06-24 PROCEDURE — 74011250637 HC RX REV CODE- 250/637: Performed by: INTERNAL MEDICINE

## 2022-06-24 PROCEDURE — 97530 THERAPEUTIC ACTIVITIES: CPT

## 2022-06-24 PROCEDURE — 82962 GLUCOSE BLOOD TEST: CPT

## 2022-06-24 PROCEDURE — 74011250637 HC RX REV CODE- 250/637: Performed by: STUDENT IN AN ORGANIZED HEALTH CARE EDUCATION/TRAINING PROGRAM

## 2022-06-24 PROCEDURE — 74011250636 HC RX REV CODE- 250/636: Performed by: ANESTHESIOLOGY

## 2022-06-24 PROCEDURE — 74011636637 HC RX REV CODE- 636/637: Performed by: INTERNAL MEDICINE

## 2022-06-24 PROCEDURE — 36415 COLL VENOUS BLD VENIPUNCTURE: CPT

## 2022-06-24 PROCEDURE — 99231 SBSQ HOSP IP/OBS SF/LOW 25: CPT | Performed by: CLINICAL NURSE SPECIALIST

## 2022-06-24 PROCEDURE — 97535 SELF CARE MNGMENT TRAINING: CPT

## 2022-06-24 PROCEDURE — 85027 COMPLETE CBC AUTOMATED: CPT

## 2022-06-24 PROCEDURE — 65270000046 HC RM TELEMETRY

## 2022-06-24 PROCEDURE — 74011000250 HC RX REV CODE- 250: Performed by: ANESTHESIOLOGY

## 2022-06-24 PROCEDURE — 97165 OT EVAL LOW COMPLEX 30 MIN: CPT

## 2022-06-24 RX ADMIN — DORZOLAMIDE HYDROCHLORIDE 1 DROP: 20 SOLUTION/ DROPS OPHTHALMIC at 08:16

## 2022-06-24 RX ADMIN — Medication 20 UNITS: at 21:44

## 2022-06-24 RX ADMIN — SODIUM CHLORIDE, PRESERVATIVE FREE 10 ML: 5 INJECTION INTRAVENOUS at 21:42

## 2022-06-24 RX ADMIN — Medication 2 UNITS: at 08:26

## 2022-06-24 RX ADMIN — TIMOLOL MALEATE 1 DROP: 5 SOLUTION OPHTHALMIC at 18:11

## 2022-06-24 RX ADMIN — SODIUM CHLORIDE, PRESERVATIVE FREE 10 ML: 5 INJECTION INTRAVENOUS at 21:43

## 2022-06-24 RX ADMIN — Medication 2 UNITS: at 18:16

## 2022-06-24 RX ADMIN — DORZOLAMIDE HYDROCHLORIDE 1 DROP: 20 SOLUTION/ DROPS OPHTHALMIC at 21:46

## 2022-06-24 RX ADMIN — Medication 2 UNITS: at 12:25

## 2022-06-24 RX ADMIN — ENOXAPARIN SODIUM 30 MG: 100 INJECTION SUBCUTANEOUS at 08:14

## 2022-06-24 RX ADMIN — TIMOLOL MALEATE 1 DROP: 5 SOLUTION OPHTHALMIC at 08:15

## 2022-06-24 RX ADMIN — PREDNISOLONE ACETATE 1 DROP: 10 SUSPENSION/ DROPS OPHTHALMIC at 08:15

## 2022-06-24 RX ADMIN — DORZOLAMIDE HYDROCHLORIDE 1 DROP: 20 SOLUTION/ DROPS OPHTHALMIC at 16:17

## 2022-06-24 RX ADMIN — Medication 5 UNITS: at 08:34

## 2022-06-24 RX ADMIN — Medication 20 UNITS: at 08:24

## 2022-06-24 RX ADMIN — LEVOTHYROXINE SODIUM 175 MCG: 0.07 TABLET ORAL at 06:05

## 2022-06-24 RX ADMIN — PRAVASTATIN SODIUM 40 MG: 40 TABLET ORAL at 08:07

## 2022-06-24 RX ADMIN — METOPROLOL TARTRATE 25 MG: 25 TABLET, FILM COATED ORAL at 08:07

## 2022-06-24 RX ADMIN — PREDNISOLONE ACETATE 1 DROP: 10 SUSPENSION/ DROPS OPHTHALMIC at 18:11

## 2022-06-24 RX ADMIN — PANTOPRAZOLE SODIUM 40 MG: 40 TABLET, DELAYED RELEASE ORAL at 06:06

## 2022-06-24 RX ADMIN — Medication 2 UNITS: at 21:45

## 2022-06-24 RX ADMIN — METOPROLOL TARTRATE 25 MG: 25 TABLET, FILM COATED ORAL at 21:42

## 2022-06-24 NOTE — PROGRESS NOTES
Hospitalist Progress Note      Hospital summary: 67 y.o lady w/ DM, CAD s/p CABG, COPD, hypothyroidism, who presented with fatigue and presyncope. She was found to have junctional bradycardia and is s/p pacemaker placement. Assessment/Plan:  Junctional bradycardia  Near syncope due to the above  Longstanding type 1 diabetes mellitus with multiple complications  Chronic kidney disease  Mild hyperkalemia  History of coronary artery disease  History of hypothyroidism  COPD    Stable s/p pacemaker placement  Continue beta blocker  Renal function improved, monitor  Continue current insulin regimen  Patient states she's been prescribed home O2 in the past for COPD. Her O2 was 85% on room air during my assessment. Will need to arrange home O2. Code status: full  DVT prophylaxis: sq Lovenox  Disposition: home w/ home health per her preference  ----------------------------------------------    CC: f/u  bradycardia    S: feels better, denies cp, dyspnea, n/v/d, dizziness     Review of Systems:  Pertinent items are noted in HPI.     O:  Visit Vitals  BP (!) 156/76   Pulse 75   Temp 98.6 °F (37 °C)   Resp 17   Ht 5' 5\" (1.651 m)   Wt 102.1 kg (225 lb 1.4 oz)   SpO2 95%   BMI 37.46 kg/m²       PHYSICAL EXAM:  Gen: NAD, non-toxic  HEENT: anicteric sclerae, normal conjunctiva  Neck: supple, trachea midline, no adenopathy  Heart: RRR, no MRG, no JVD, no peripheral edema  Lungs: CTA b/l, non-labored respirations  Abd: soft, NT, ND, BS+  Extr: warm  Skin: dry, no rash  Neuro: grossly non-focal  Psych: normal mood, appropriate affect      Intake/Output Summary (Last 24 hours) at 6/24/2022 1533  Last data filed at 6/24/2022 1225  Gross per 24 hour   Intake 201.4 ml   Output 1850 ml   Net -1648.6 ml        Recent labs & imaging reviewed:  Recent Results (from the past 24 hour(s))   GLUCOSE, POC    Collection Time: 06/23/22  5:04 PM   Result Value Ref Range    Glucose (POC) 265 (H) 65 - 117 mg/dL    Performed by Jr GUAN (CON)    GLUCOSE, POC    Collection Time: 06/23/22  9:33 PM   Result Value Ref Range    Glucose (POC) 244 (H) 65 - 117 mg/dL    Performed by Vidal franco RN    CBC W/O DIFF    Collection Time: 06/24/22  3:20 AM   Result Value Ref Range    WBC 5.4 3.6 - 11.0 K/uL    RBC 3.22 (L) 3.80 - 5.20 M/uL    HGB 10.7 (L) 11.5 - 16.0 g/dL    HCT 33.0 (L) 35.0 - 47.0 %    .5 (H) 80.0 - 99.0 FL    MCH 33.2 26.0 - 34.0 PG    MCHC 32.4 30.0 - 36.5 g/dL    RDW 13.0 11.5 - 14.5 %    PLATELET 458 932 - 018 K/uL    MPV 11.2 8.9 - 12.9 FL    NRBC 0.0 0  WBC    ABSOLUTE NRBC 0.00 0.00 - 3.42 K/uL   METABOLIC PANEL, BASIC    Collection Time: 06/24/22  3:20 AM   Result Value Ref Range    Sodium 138 136 - 145 mmol/L    Potassium 5.2 (H) 3.5 - 5.1 mmol/L    Chloride 110 (H) 97 - 108 mmol/L    CO2 24 21 - 32 mmol/L    Anion gap 4 (L) 5 - 15 mmol/L    Glucose 159 (H) 65 - 100 mg/dL    BUN 46 (H) 6 - 20 MG/DL    Creatinine 2.55 (H) 0.55 - 1.02 MG/DL    BUN/Creatinine ratio 18 12 - 20      GFR est AA 22 (L) >60 ml/min/1.73m2    GFR est non-AA 19 (L) >60 ml/min/1.73m2    Calcium 9.1 8.5 - 10.1 MG/DL   GLUCOSE, POC    Collection Time: 06/24/22  8:07 AM   Result Value Ref Range    Glucose (POC) 160 (H) 65 - 117 mg/dL    Performed by Vaishnavi Craft, POC    Collection Time: 06/24/22 12:16 PM   Result Value Ref Range    Glucose (POC) 160 (H) 65 - 117 mg/dL    Performed by Juliette Siemens [facility]     06/24/22  0320 06/23/22  0403   WBC 5.4 5.7   HGB 10.7* 9.7*   HCT 33.0* 31.5*    141*     Recent Labs     06/24/22  0320 06/23/22  0403 06/22/22  1620 06/22/22  0537 06/21/22  1642    138 136   < > 138   K 5.2* 5.2* 5.8*   < > 5.3*   * 109* 110*   < > 110*   CO2 24 24 23   < > 25   BUN 46* 46* 45*   < > 36*   CREA 2.55* 3.04* 3.03*   < > 2.62*   * 171* 274*   < > 164*   CA 9.1 9.0 9.4   < > 9.4   MG  --   --   --   --  1.5*    < > = values in this interval not displayed. Recent Labs     06/21/22  1642   ALT 14      TBILI 0.2   TP 6.8   ALB 2.6*   GLOB 4.2*     No results for input(s): INR, PTP, APTT, INREXT in the last 72 hours. No results for input(s): FE, TIBC, PSAT, FERR in the last 72 hours. No results found for: FOL, RBCF   No results for input(s): PH, PCO2, PO2 in the last 72 hours. No results for input(s): CPK, CKNDX, TROIQ in the last 72 hours.     No lab exists for component: CPKMB  Lab Results   Component Value Date/Time    Cholesterol, total 220 (H) 02/04/2022 11:44 AM    HDL Cholesterol 48 02/04/2022 11:44 AM    LDL, calculated 108.8 (H) 02/04/2022 11:44 AM    Triglyceride 316 (H) 02/04/2022 11:44 AM    CHOL/HDL Ratio 4.6 02/04/2022 11:44 AM     Lab Results   Component Value Date/Time    Glucose (POC) 160 (H) 06/24/2022 12:16 PM    Glucose (POC) 160 (H) 06/24/2022 08:07 AM    Glucose (POC) 244 (H) 06/23/2022 09:33 PM    Glucose (POC) 265 (H) 06/23/2022 05:04 PM    Glucose (POC) 235 (H) 06/23/2022 11:13 AM     Lab Results   Component Value Date/Time    Color YELLOW/STRAW 06/22/2022 02:43 PM    Appearance TURBID (A) 06/22/2022 02:43 PM    Specific gravity 1.018 06/22/2022 02:43 PM    pH (UA) 5.5 06/22/2022 02:43 PM    Protein 300 (A) 06/22/2022 02:43 PM    Glucose 500 (A) 06/22/2022 02:43 PM    Ketone Negative 06/22/2022 02:43 PM    Bilirubin Negative 06/22/2022 02:43 PM    Urobilinogen 0.2 06/22/2022 02:43 PM    Nitrites Negative 06/22/2022 02:43 PM    Leukocyte Esterase MODERATE (A) 06/22/2022 02:43 PM    Epithelial cells MANY (A) 06/22/2022 02:43 PM    Bacteria 3+ (A) 06/22/2022 02:43 PM    WBC >100 (H) 06/22/2022 02:43 PM    RBC 0-5 06/22/2022 02:43 PM       Med list reviewed  Current Facility-Administered Medications   Medication Dose Route Frequency    insulin lispro (HUMALOG) injection   SubCUTAneous AC&HS    insulin lispro (HUMALOG) injection   SubCUTAneous TIDAC    metoprolol tartrate (LOPRESSOR) tablet 25 mg  25 mg Oral Q12H    dorzolamide (TRUSOPT) 2 % ophthalmic solution 1 Drop  1 Drop Left Eye TID    And    timolol (TIMOPTIC) 0.5 % ophthalmic solution 1 Drop  1 Drop Left Eye BID    insulin NPH (NOVOLIN N, HUMULIN N) injection 20 Units  20 Units SubCUTAneous BID    sodium chloride (NS) flush 5-40 mL  5-40 mL IntraVENous Q8H    sodium chloride (NS) flush 5-40 mL  5-40 mL IntraVENous PRN    dextrose 10 % infusion 125-250 mL  125-250 mL IntraVENous PRN    [Held by provider] DOPamine (INTROPIN) 800 mg in dextrose 5% 250 mL infusion  0-20 mcg/kg/min IntraVENous TITRATE    sodium chloride (NS) flush 5-40 mL  5-40 mL IntraVENous Q8H    sodium chloride (NS) flush 5-40 mL  5-40 mL IntraVENous PRN    acetaminophen (TYLENOL) tablet 650 mg  650 mg Oral Q6H PRN    Or    acetaminophen (TYLENOL) suppository 650 mg  650 mg Rectal Q6H PRN    polyethylene glycol (MIRALAX) packet 17 g  17 g Oral DAILY PRN    ondansetron (ZOFRAN ODT) tablet 4 mg  4 mg Oral Q8H PRN    Or    ondansetron (ZOFRAN) injection 4 mg  4 mg IntraVENous Q6H PRN    enoxaparin (LOVENOX) injection 30 mg  30 mg SubCUTAneous DAILY    levothyroxine (SYNTHROID) tablet 175 mcg  175 mcg Oral ACB    pantoprazole (PROTONIX) tablet 40 mg  40 mg Oral ACB    pravastatin (PRAVACHOL) tablet 40 mg  40 mg Oral DAILY    prednisoLONE acetate (PRED FORTE) 1 % ophthalmic suspension 1 Drop  1 Drop Right Eye BID    glucose chewable tablet 16 g  4 Tablet Oral PRN    dextrose 10 % infusion 0-250 mL  0-250 mL IntraVENous PRN    glucagon (GLUCAGEN) injection 1 mg  1 mg IntraMUSCular PRN       Care Plan discussed with:  Patient/Family, Nurse and     Bryan Sahni MD  Internal Medicine  Date of Service: 6/24/2022

## 2022-06-24 NOTE — DIABETES MGMT
[address] [facility]    CLINICAL NURSE SPECIALIST CONSULT     Initial Presentation   Arabella Curtis is a 67 y.o. female admitted from an orthopedic appointment -she was found to be hypotensive and bradycardic with junctional rhythm in the 30s. She was sent to the ER where junctional rhythm was confirmed. Evaluated by cardiologist and patient condition improved with dopamine infusion. Currently she is in first-degree heart block but blood pressure has improved. Patient is being admitted to the ICU. Possible plan for permanent pacemaker in the morning. *. HX:   Past Medical History:   Diagnosis Date    Adverse effect of anesthesia     SLOW WAKING PAST ANESTHESIA    Anxiety     Arthritis     CAD (coronary artery disease)     s/p CABG x 3v    Chest pain 7/2015    Chronic obstructive pulmonary disease (HCC)     CTS (carpal tunnel syndrome)     S/p bilateral release    Diabetes ([address]     Diabetic gastroparesis ([address]     Diabetic neuropathy ([address]     Diabetic retinopathy ([address]     GERD (gastroesophageal reflux disease)     GI bleed 7/2015    4 bleeds with 9 clips placed    Hypercholesteremia     Hypertension     Hypothyroid     Ill-defined condition     NEUROPATHY HANDS AND FEET    Ill-defined condition 2017    GI BLEED    Nicotine vapor product user     JOHN on CPAP     Osteoporosis     Vitamin D deficiency         INITIAL DX:   Bradycardia [R00.1]     Current Treatment     TX: cardiology/     Consulted by Provider for advanced diabetes nursing assessment and care for:   [x] Inpatient management strategy  [x] Home management assessment      Hospital Course   Clinical progress has been complicated by new heart block necessitating pacemaker -slated for placement today. 6/23: s/p pacemaker placement/   6/24: Likely transferring out of unit. Diabetes History   Type 1 DM since age 16.  A1C 8.3%- which is just above target range for her duration of diabetes/co-morbid conditions-   PTA was on insulin pump as of June follow up appt. With endocrinologist , Dr. Betzy Keith. Diabetes-related Medical History  Acute complications  NONE  Neurological complications  Gastroparesis and Peripheral neuropathy  Microvascular disease  Retinopathy and Nephropathy  Macrovascular disease  CAD  Other associated conditions     Hypothyroid/ HTN    Diabetes Medication History  Key Antihyperglycemic Medications             insulin aspart U-100 (NovoLOG U-100 Insulin aspart) 100 unit/mL injection INJECT A MAX  UNITS UNDER THE SKIN DAILY WITH INSULIN PUMP         PUMP SETTINGS- as of June 2022 visit with endocrinologist     Basal  MN-630AM: 2.65  630AM-4PM: 2.50  4PM-MN: 2.55  TDD: 56 units/ day     Carb Ratio  1:4  Correction Factor  1:25  Target  120  Diabetes self-management practices:   Eating pattern-deferred today-due to patient sleeping at time of visit     Physical activity pattern-limted to none, recent ankle fracture in April 2022     Monitoring pattern-per recent endo visit; She checks her BGs 2 times per day. Her FBGs have been running in the 180-250s range. Her pre-dinner BGs have been in the 160-180s. She denies issues of hypoglycemia. Taking medications pattern  [x] Consistent administration  [x] Affordable  Social determinants of health impacting diabetes self-management practices   Struggling with anxiety and/or depression and Concerned that you need to know more about how to stay healthy with diabetes  Overall evaluation:    [x] A1c slightly above target with drug therapy & self-care practices    Subjective   Lying in bed at time of visit. No complaints voiced. My best friend manages my insulin pump b/c I have retinopathy\"  Best friend lives with her  Desires to get back on her pump prior to diacharge   Objective   Physical exam  General Obese  female in no acute distress.    Neuro  Alert and oriented x4  Vital Signs   Visit Vitals  BP (!) 149/93 Pulse 65   Temp 98.1 °F (36.7 °C)   Resp 15   Ht 5' 5\" (1.651 m)   Wt 102.1 kg (225 lb 1.4 oz)   SpO2 97%   BMI 37.46 kg/m²     Skin  Warm and dry. Heart   Regular rate and rhythm. No murmurs, rubs or gallops  Lungs  Clear to auscultation without rales or rhonchi  Extremities No foot wounds    Diabetic foot exam: documented peripheral neuropathy -takes gabapentin    . Laboratory  Recent Labs     06/24/22  0320 06/23/22  0403 06/22/22  1620 06/22/22  0537 06/22/22  0537 06/21/22  1642 06/21/22  1642   * 171* 274*   < > 332*   < > 164*   AGAP 4* 5 3*   < > 7   < > 3*   WBC 5.4 5.7  --   --  9.7   < > 7.6   CREA 2.55* 3.04* 3.03*   < > 2.84*   < > 2.62*   GFRNA 19* 15* 15*   < > 16*   < > 18*   AST  --   --   --   --   --   --  14*   ALT  --   --   --   --   --   --  14    < > = values in this interval not displayed.        Factors impacting BG management  Factor Dose Comments   Nutrition:       60 grams/meal      Drugs:  Other: Dopamine infusion   stopped    Other:   Kidney function  Liver function     GFR 16/ Cr+ 2.84-appears to be patient's baseline       Blood glucose pattern    Significant diabetes-related events over the past 24-72 hours  A1C 8.3%- PTA on insulin pump removed 6/21 @ 2030- now off while hospitalized   Admitting bG 157  Fasting bG 6/24 160  Basal NPH 20 units BID  Bolus-carb coverage TID, 1: 6  Correction: normal sensitivity scale    Assessment and Plan   Nursing Diagnosis Risk for unstable blood glucose pattern   Nursing Intervention Domain 5699 Decision-making Support   Nursing Interventions Examined current inpatient diabetes/blood glucose control   Explored factors facilitating and impeding inpatient management  Explored corrective strategies with patient and responsible inpatient provider   Informed patient of rational for insulin strategy while hospitalized     Nursing Diagnosis 64097 Ineffective Health Management   Nursing Intervention Domain [facility] Nursing Interventions Identified diabetes self-management practices impeding diabetes control  Discussed diabetes survival skills related to  1. Healthy Plate eating plan; given handouts  2. Role of physical activity in improving insulin sensitivity and action  3. Procedure for blood glucose monitoring & options for low-cost products available from [facility] ([place], [place])   4. Medications plan at discharge     Evaluation   Nain Eubanks is a 66 yo female who has lived with Type 1 diabetes since age 16. She is currently admitted for pacemaker placement after she was found to be in a junctional rhythm with HR in 30s at a ambulatory doctor's appointment. Patient is now on dopamine infusion -Insulin pump removed on 6/21 @2030 with only correctional insulin ordered. Conferred with ICU intensivist re: need for basal insulin since pump is not in place. NPH BID ordered at 20% reduction from TDD in insulin pump. While hospitalized keep -180. Once she is post op and cognitively able to manage her insulin pump, she may get back on the insulin pump per hospital policy. Unable to have face to face interview with her this AM as she was soundly sleeping at time of visit. Per chart review, she has been on her insulin pump with BG in 180-250s. She also suffers with micro and macrovascular long term complications from long standing T1 diabetes -    Fasting BG post procedure in target. Taking PO food diet >50% this AM.  Discussed insulin adjustment recommendations with Dr. Libra Pat. Will likely be moving out of CCU today. Recommendations   1.    [] Use of Subcutaneous Insulin Order set (5472)  Insulin Dosing Specific recommendation   CONTINUE  Basal  - NPH                                   (Based on weight, BMI & GFR) 20 units BID-NPH    Once taking PO food diet >50% CONTINUE  Nutritional coverage                           (Based on CHO/dextrose load) Cover carbs with 1 unit of insulin per 6 g of CHO    CONTINUE Corrective (Useful in adjusting insulin dosing) [x] Normal sensitivity          2. Please [facility] CNS  if patient desires to get back on insuiln pump- she will need to have all supplies and insulin at bedside. she desires to get back on her pump just before she's discharged home. Billing Code(s)   26916    Before making these care recommendations, I personally reviewed the hospitalization record, including notes, laboratory & diagnostic data and current medications, and examined the patient at the bedside (circumstances permitting) before making care recommendations. More than fifty (50) percent of the time was spent in patient counseling and/or care coordination.   Total minutes: [address]LE[address] Asp, CNS  Diabetes Clinical Nurse Specialist  [facility]  Access via [facility]

## 2022-06-24 NOTE — ROUTINE PROCESS
07:30 SBAR from edmundo    09:24 When found on room air, O2 sats were 81%. 10:05 States she has been on home O2 in the past and is willing to have it again. 14:22 PT/OT at bedside for eval    17:30 Attempted twice to get patient up to chair using the [facility] lift. She is refusing, states she is too tired. 19:30 Bedside shift change report given to Princess (oncoming nurse) by mom (offgoing nurse). Report included the following information SBAR, Kardex, Procedure Summary, Intake/Output, MAR, Accordion, Recent Results, Med Rec Status, Cardiac Rhythm NSR left BBB, Alarm Parameters , Pre Procedure Checklist, Procedure Verification and Quality Measures.

## 2022-06-24 NOTE — CONSULTS
[address]    Name:  Damián Lindsey  MR#:  [id number]  :  1950  ACCOUNT #:  [de-identified]  DATE OF SERVICE:  2022      HOSPITAL NEPHROLOGY CONSULTATION    REFERRING PHYSICIAN:  Dr. Jamee Jackson. REASON FOR CONSULTATION:  Management of patient with established chronic kidney disease. HISTORY OF PRESENT ILLNESS:  The patient is well known to our service, 70-year-old white woman, who has a well-established stage IV chronic kidney disease due to diabetic nephropathy. The patient has type 1 diabetes mellitus. She was diagnosed at the age of 16. She ultimately developed diabetic retinopathy, became blind, and recently she was diagnosed with diabetic nephropathy. The patient was seen at least twice prior to this consultation, but always seen on hospital settings. She was scheduled to see us in the office for management and followup, but she never made it most likely due to disability related to her poor vision and being unable to ambulate and arrange transportation. This current admission is for bradycardia and near syncope. The patient was found to be in junctional rhythm. She just had a pacemaker placement. She states that she feels really very well. PAST MEDICAL HISTORY:  1. Tibia fracture. 2.  Ankle fracture. 3.  Osteoarthritis. 4.  Hyperkalemia. 5.  Obesity. 6.  Type 1 diabetes mellitus with retinopathy and nephropathy. 7.  Chronic kidney disease, stage IV. 8.  Mixed hyperlipidemia. 9.  Sepsis. 10.  GI bleed. 11.  Arteriovenous malformation. 12.  Erosive gastritis. 13.  Hypothyroidism. 14.  Low-back pain. 15.  Essential hypertension. 16.  Coronary artery disease. PAST SURGICAL HISTORY:  1. Endoscopies. 2.  Tonsils removed. 3.  Adenoidectomy. 4.  Heart catheterization. 5.  CABG surgery. 6.  Cervical fusion. 7.  Lumbar fusion. 8.  Lumbar laminectomy. 9.  Shoulder arthroscopy. 10.  Other orthopedic surgeries.       MEDICATIONS PRIOR TO ADMISSION:  Includes,  1. Tamsulosin. 2.  Ergocalciferol. 3.  Lopressor 50 mg extended release once a day. 4.  Neurontin. 5.  Pravachol. 6.  Lexapro. 7.  Protonix. 8.  Synthroid. 9.  Trelegy Ellipta inhaler. 10.  Insulin. 11.  Nitrostat when needed. 12.  Ferrous sulphate. 13.  Dorzolamide/Timolol eyedrops. FAMILY HISTORY:  Mother with COPD and diabetes. Father with COPD and diabetes. Brother with diabetes and heart disease and alcohol abuse. SOCIAL HISTORY:  The patient is a former smoker. She has 20-pack years of smoking history. She quit in 2015. She does not drink alcohol and does not use illicit drugs. ALLERGIES:  ALLERGIC TO AUGMENTIN, NONSTEROIDAL ANTIINFLAMMATORY MEDICATIONS, OXYCODONE, AND VESICARE. REVIEW OF SYSTEMS:  Denies chest pain, fever, chills, nausea, vomiting, shortness of breath, cough, joint pains, dysuric symptoms. She feels well. PHYSICAL EXAMINATION:  GENERAL:  Elderly white woman morbidly obese, who is awake, alert, oriented. She is in good spirits, cooperative. VITAL SIGNS:  Blood pressure is 154/76, heart rate is 68, respirations 16. HEENT:  The patient wears dark glasses. Head is normocephalic. Mouth with moist oral mucosa. NECK:  Supple with no increased JVP. CHEST:  Symmetrical.  The patient has a fresh pacemaker placed on the left upper chest with ice pack over it. LUNGS:  Clear to auscultation with no wheezing, thrills, or rhonchi. HEART:  S1 and S2 with regular rate and rhythm with no friction rub or gallop. The patient is well paced. ABDOMEN:  Soft, nontender, and nondistended. EXTREMITIES:  With no edema, cyanosis, or clubbing. LABORATORY DATA:  Sodium is 138, potassium is 5.2, CO2 is 24, BUN is 46, creatinine is 2.55 (this is patient's baseline). White blood count is 5.4. Hemoglobin is 10.7. IMPRESSION:  Chronic kidney disease from diabetic nephropathy with proteinuria. Previously completed workup.   The patient in stage IV, and her baseline sodium creatinine is around 2.5 and 3. Electrolytes are with mild hyperkalemia. Please note that hyperkalemia below 6 cannot be responsible for junctional rhythm. Volume status is currently euvolemic. Hypertension with good control of blood pressure. Near syncope due to junctional rhythm bradycardia, now with pacemaker. Mild anemia with stable hemoglobin. RECOMMENDATIONS:  The patient is at her baseline. There is no new recommendations for her medical care. She will continue her previously  home medications. She needs to keep low potassium diet. If potassium becomes persistently high, Lokelma would have excellent result in controlling her hyperkalemia. The patient is at risk of progressing to end-stage renal disease given her advance chronic kidney disease and proteinuria. No need of Retacrit for anemia management since her hemoglobin is above 10. Thank you very much for the opportunity to be part of this patient's care.   Our service will follow up while the patient is in the hospital.      Adri Ni MD      [id number]  D:  06/24/2022 11:35  T:  06/24/2022 15:38  JOB #:  [id number]  CC:  Ross Covarrubias MD

## 2022-06-24 NOTE — PROGRESS NOTES
Problem: Mobility Impaired (Adult and Pediatric)  Goal: *Acute Goals and Plan of Care (Insert Text)  Description: FUNCTIONAL STATUS PRIOR TO ADMISSION: The patient required assist x2 for stand pivot transfers and ambulated couple of feet w/ a walker with assist x2 and RLE WBAT in a walking boot s/p ankle injury. She was otherwise in the wheelchair or bed and had been relying on a milad lift for transfers OOB. Patient is a retired Respiratory Therapist.    [facility]: The patient lived with her partner who assists with her care and mobility. She is open to [facility] for PT and OT. Physical Therapy Goals  Initiated 6/23/2022  1. Patient will move from supine to sit and sit to supine  and roll side to side in bed with moderate assistance  within 7 day(s). 2.  Patient will transfer from bed to chair and chair to bed with minimal assistance x2 using the least restrictive device within 7 day(s). 3.  Patient will perform sit to stand with minimal assistance assist x2 within 7 day(s). 4.  Patient will ambulate with moderate assistance  for 5 feet with the least restrictive device within 7 day(s). Outcome: Not Met   PHYSICAL THERAPY TREATMENT  Patient: Mario Grullon (68 y.o. female)  Date: 6/24/2022  Diagnosis: Bradycardia [R00.1] <principal problem not specified>  Procedure(s) (LRB):  INSERT PPM DUAL (N/A) 2 Days Post-Op  Precautions: Fall,Other (comment) (PPM precautions; RLE WBAT in walking boot)  Chart, physical therapy assessment, plan of care and goals were reviewed. ASSESSMENT  Patient continues with skilled PT services. Received pt in bed with her caregiver/ life partner in the room. Reviewed PPM precautions in prep for today's mobility training. Pt recalled 1 of 3 precautions through demonstration, unable to articulate precautions. Patient's partner recalls 3/3 precautions. Patient amenable to therapy session and plan to work towards bed to chair transfer. She presents today with decreased participation d/t lethargy and falling asleep throughout this session. Pt and her caregiver indicate poor sleep last night. Patient is Max A x3 supine to sit. She demonstrates poor sitting balance with right lateral lean/ loss of balance. She is able to correct with assist and cues provided, does not maintain greater than a few seconds requiring Max A for sitting balance. Attempted bed to chair transfer 2x w/ Max A x3. Patient had increased difficulty achieving full upright stance, unable to take steps, and closed her eyes while standing. Pt endorses extreme fatigue, felt she could not attempt again. Assisted pt back to bed. Worked on rolling and bridges while OT assisted with hygiene and clothing change. Pt voiced LUE and shoulder pain with all movement. Educated in the benefit of movement and using her LUE during functional activity as permitted w/in precautions. Assisted pt sitting up in the bed in modified chair position to instruct her and her caregiver in LUE A/AAROM exercises to perform on the weekend. Pt requireing cues and assist throughout. Her caregiver demonstrates understanding. Recommend milad transfers to the chair for dinner this evening and over the weekend. Discussed with RN. Current Level of Function Impacting Discharge (mobility/balance): Max x3 supine<->sit; Max A x3 sit<->stand (x2 assist w/ 3rd posterior to pt for safety); Unable to completed bed to chair stand pivot transfer today d/t lethargy, inability to take steps. Other factors to consider for discharge: Pt prefers home, caregiver in agreement and has experience assisting pt in the home when she was RLE NWB. Patient has milad lift, wheelchair, bedside commode, and ADs. She would benefit from hosp bed. PLAN :  Patient continues to benefit from skilled intervention to address the above impairments. Continue treatment per established plan of care.   to address goals. Recommendation for discharge: (in order for the patient to meet his/her long term goals)  Therapy up to 5 days/week in SNF setting. Pt prefers home. If home, resume HHPT    This discharge recommendation:  Has not yet been discussed the attending provider and/or case management    IF patient discharges home will need the following DME: If home, pt would benefit from hosp bed. Will require stretcher transport home. SUBJECTIVE:   Patient stated I'm tired.     OBJECTIVE DATA SUMMARY:   Critical Behavior:  Neurologic State: Alert  Orientation Level: Oriented X4  Cognition: Follows commands,Appropriate decision making,Appropriate for age attention/concentration,Appropriate safety awareness     Functional Mobility Training:  Bed Mobility:  Supine to Sit: Maximum assistance (Assist x3)  Sit to Supine: Total assistance (Assist x3)  Scooting: Total assistance        Transfers:  Sit to Stand: Maximum assistance; Other (comment) (Assist x3)  Stand to Sit: Maximum assistance (Assist x3)                             Balance:  Sitting: Impaired; With support  Sitting - Static: Poor (constant support)  Sitting - Dynamic: Poor (constant support)  Standing: Impaired; With support  Standing - Static: Poor;Constant support  Ambulation/Gait Training:                                                    Therapeutic Exercises:   LUE A/AAROM: elbow flex/ ext; gripping objects/ opening/ close hand; shoulder flexion to 90 (required assist to support arm); shoulder hikes transition to rolls as able. Pain Rating:  Left shoulder and pacemaker side    Activity Tolerance:   Poor, requires frequent rest breaks and lethargy, falling asleep    After treatment patient left in no apparent distress:   Call bell within reach, Caregiver / family present and bed in modified chair position with siderails x4    COMMUNICATION/COLLABORATION:   The patients plan of care was discussed with: Occupational therapist and Registered nurse. Demetrius John, PT   Time Calculation: 45 mins

## 2022-06-24 NOTE — PROGRESS NOTES
Transitions of Care Plan   RUR: 19% - moderate   Clinical Update: OT ordered; pt may need home O2 (no qualifying dx); s/p PPM   Consults:  Cardiology   Baseline: requires assistance - DME and human assistance; resides w wife   Barriers to Discharge: oxygen needs; possible placement   Disposition: HH vs SNF - Pt wishes to return home with resumption of care   Estimated Discharge Date: 6/25/22    CM updated by CCU RN - patient will likely need rehab and home oxygen set up. CM reviewed chart - noted there is not a chronic condition for patient to qualify for home oxygen. CM will follow up with patient and spouse on discharge plan. CM notes therapy session from yesterday - patient would like to return home; however appropriate for rehab. UPDATE:  CM spoke with patient and spouse at the bedside. Offered choice for SNF - both politely declined SNF for rehab services. Patient and spouse advised CM that they have the following DME: Wheelchair, RW, slide board, and milad lift at home. BLS transport will be needed as patient has to ambulate 7 steps for entrance. Patient states she has an older concentrator at home that no longer works and was diagnosed with COPD years ago. She previously only wore oxygen at night. Patient likely needs chronic oxygen at discharge. Concentrator was delivered by [facility]. Patient is open to having [facility] for home oxygen set up. CM updated CCU RN of above. Anticipate weekend discharge home health with resumption of care through [facility] and DME - supplemental oxygen. CM will continue to follow.     Annalise Espinoza, MPH  Care Manager [facility]  Available via [employer] or

## 2022-06-24 NOTE — PROGRESS NOTES
Problem: Self Care Deficits Care Plan (Adult)  Goal: *Acute Goals and Plan of Care (Insert Text)  Description: FUNCTIONAL STATUS PRIOR TO ADMISSION: Patient lives with her partner who provides assistance with ADLs. Per report, since ankle injury patient has been taking bed baths, using bed pan, and requiring help for lower body dressing as limited by earlier RLE NWB status, now changed to WBAT wearing walking boot. Patient spends most time in wheelchair or bed, using milad for OOB. Patient was able to ambulate short distance with assist x2 and RW. Per partner, since cervical surgery several years ago, patient with R-lean and impaired balance. HOME SUPPORT: The patient lived with life partner who provides assistance. Occupational Therapy Goals  Initiated 6/24/2022  1. Patient will roll L/R to facilitate bed-level toileting with moderate assistance x1 within 7 day(s). 2.  Patient will perform upper-body dressing with minimal assistance within 7 day(s). 3.  Patient will perform seated/supine bathing with moderate assistance within 7 day(s). 4.  Patient will sit EOB with min assist x1 and multimodal cues in prep for functional transfers within 7 day(s). 5.  Patient will verbalize/demonstrate PPM precautions during functional activity with minimal cues within 7 day(s). 6.  Patient will participate in upper extremity therapeutic exercise/activities with moderate assistance for 10 minutes within 7 day(s). 7.  Patient will perform grooming ADLs with supervision/set-up in supported position within 7 day(s).   Outcome: Progressing Towards Goal   OCCUPATIONAL THERAPY EVALUATION  Patient: Corry Soto (86 y.o. female)  Date: 6/24/2022  Primary Diagnosis: Bradycardia [R00.1]  Procedure(s) (LRB):  INSERT PPM DUAL (N/A) 2 Days Post-Op   Precautions:  Fall,Other (comment) (PPM precautions; RLE WBAT in walking boot)    ASSESSMENT  Based on the objective data described below, the patient presents with generalized weakness, decreased activity tolerance and low arousal, impaired functional mobility and balance, significant L shoulder pain, decline in functional independence, and baseline impaired vision s/p admission for bradycardia, now POD 2 PPM insertion. Patient is received in bed with life partner present and supportive. Patient fairly alert upon entry, and although unable to verbalize PPM precautions, when asked patient if she could demonstrate how high she is able to lift her arms, patient does remember that she cannot lift her LUE above shoulder height and raises RUE only. Patient participates in EOB activity this session, requiring max assist x3 to achieve sitting EOB. Patient initially requires max-total assist for sitting balance with strong R-lean preference, requiring total assist for lower body dressing at EOB. After several minutes, patient balance does improve and she is able to maintain approximate midline for two 10-15 second periods with CGA-min assist. Patient participates in two trials sit<>stand from EOB today, however unable to achieve full stand with patient extremely fatigued and having difficulty maintaining arousal. Patient is returned to supine where she participates in bed level toilet hygiene and changing hospital gown with max-total assist needed for task. Due to L shoulder pain, patient guarding LUE and seeming to avoid all movement despite education on safe exercises and need to mobilize within precautions to maintain ROM. Will continue to follow with rehab recommended at discharge as patient currently requires multiple person assist vs the single person assist she had been needing at home. If returns home, would recommend hospital bed to encourage greater time in upright posture, allow for easier repositioning, etc.     Current Level of Function Impacting Discharge (ADLs/self-care): max-total A    Functional Outcome Measure: The patient scored 15/100 on the Barthel Index.      Other factors to consider for discharge: multiple person assist for mobility     Patient will benefit from skilled therapy intervention to address the above noted impairments. PLAN :  Recommendations and Planned Interventions: self care training, functional mobility training, therapeutic exercise, balance training, therapeutic activities, endurance activities, patient education, home safety training, and family training/education    Frequency/Duration: Patient will be followed by occupational therapy 3 times a week to address goals. Recommendation for discharge: (in order for the patient to meet his/her long term goals)  To be determined: Rehab vs HH w/ 24/7    This discharge recommendation:  Has not yet been discussed the attending provider and/or case management    IF patient discharges home will need the following DME: hospital bed       SUBJECTIVE:   Patient stated I can't believe how much this thing hurts.     OBJECTIVE DATA SUMMARY:   HISTORY:   Past Medical History:   Diagnosis Date    Adverse effect of anesthesia     SLOW WAKING PAST ANESTHESIA    Anxiety     Arthritis     CAD (coronary artery disease)     s/p CABG x 3v    Chest pain 7/2015    Chronic obstructive pulmonary disease (HCC)     CTS (carpal tunnel syndrome)     S/p bilateral release    Diabetes [address]     Diabetic gastroparesis ([address]     Diabetic neuropathy [address]     Diabetic retinopathy ([address]     GERD (gastroesophageal reflux disease)     GI bleed 7/2015    4 bleeds with 9 clips placed    Hypercholesteremia     Hypertension     Hypothyroid     Ill-defined condition     NEUROPATHY HANDS AND FEET    Ill-defined condition 2017    GI BLEED    Nicotine vapor product user     JOHN on CPAP     Osteoporosis     Vitamin D deficiency      Past Surgical History:   Procedure Laterality Date    HX CERVICAL FUSION  2011    HX ENDOSCOPY  7/2015    HX GI      HX HEART CATHETERIZATION  7/2015    HX LUMBAR FUSION  2017    HX LUMBAR LAMINECTOMY 2011    HX ORTHOPAEDIC Right 1970    CARPAL TUNNEL    HX ORTHOPAEDIC Left 2003    CARPAL TUNNEL    HX ROTATOR CUFF REPAIR Right 2003    HX SHOULDER ARTHROSCOPY Right     HX TONSIL AND ADENOIDECTOMY  1968    HX TONSILLECTOMY      ME CABG, ARTERY-VEIN, THREE  7/13/2015       Expanded or extensive additional review of patient history:     Home Situation  Home Environment: Apartment  # Steps to Enter: 7  Rails to Enter: Yes  One/Two Story Residence: One story  Living Alone: No  Support Systems: Spouse/Significant Other  Patient Expects to be Discharged to[de-identified] Home  Current DME Used/Available at Home: [address], straight,Walker, rolling,Commode, bedside,Shower chair,Walker,Wheelchair (transport chair; milad lift)  Tub or Shower Type:  (has been doing bed-baths)    Hand dominance: Right    EXAMINATION OF PERFORMANCE DEFICITS:  Cognitive/Behavioral Status:  Neurologic State: Alert;Drowsy  Orientation Level: Oriented to person;Oriented to place;Oriented to situation  Cognition: Follows commands;Decreased command following;Decreased attention/concentration  Perception: Appears intact  Perseveration: No perseveration noted  Safety/Judgement: Decreased insight into deficits; Decreased awareness of need for safety; Fall prevention    Skin:     Edema:     Hearing:       Vision/Perceptual:    Acuity: Impaired near vision; Impaired far vision (reports 20/100 in L eye; blind in R eye)       Range of Motion:  AROM: Generally decreased, functional  PROM: Generally decreased, functional    Strength:  Strength: Grossly decreased, non-functional    Coordination:  Coordination: Generally decreased, functional  Fine Motor Skills-Upper: Left Intact; Right Intact    Gross Motor Skills-Upper: Right Intact (LUE precautions 2/2 PPM)    Tone & Sensation:  Tone: Normal    Balance:  Sitting: Impaired; With support  Sitting - Static: Poor (constant support)  Sitting - Dynamic: Poor (constant support)  Standing: Impaired; With support  Standing - Static: Poor;Constant support    Functional Mobility and Transfers for ADLs:  Bed Mobility:  Rolling: Maximum assistance;Assist x1;Assist x2  Supine to Sit: Maximum assistance (Assist x3)  Sit to Supine: Total assistance (Assist x3)  Scooting: Total assistance    Transfers:  Sit to Stand: Maximum assistance; Other (comment) (Assist x3)  Stand to Sit: Maximum assistance (Assist x3)    ADL Assessment:  Feeding: Moderate assistance    Oral Facial Hygiene/Grooming: Moderate assistance    Bathing: Maximum assistance; Total assistance    Type of Bath: Bath Pack    Upper Body Dressing: Maximum assistance    Lower Body Dressing: Total assistance    Toileting: Total assistance    ADL Intervention and task modifications:  Upper Body Dressing Assistance  Dressing Assistance: Maximum assistance  Hospital Gown: Maximum assistance; Compensatory technique training  Cues: Verbal cues provided;Visual cues provided;Physical assistance    Lower Body Dressing Assistance  Dressing Assistance: Total assistance(dependent)  Shoes with Velcro: Total assistance (dependent) (RLE boot)  Slip on Shoes with Back: Total assistance (dependent) (personal L shoe)  Position Performed: Seated edge of bed  Cues: Verbal cues provided;Physical assistance    Toileting  Toileting Assistance: Total assistance(dependent)  Bladder Hygiene: Total assistance (dependent)  Cues: Verbal cues provided (physical assist; bed level)    Cognitive Retraining  Orientation Retraining: Reorienting; Awareness of environment  Problem Solving: Identifying the problem; Identifying the task; Awareness of environment  Executive Functions: Executing cognitive plans  Organizing/Sequencing: Breaking task down;Prioritizing  Attention to Task: Single task  Safety/Judgement: Decreased insight into deficits; Decreased awareness of need for safety; Fall prevention      Therapeutic Exercise:  Education on PPM precautions - encouraging movement within allowed range to maintain ROM.  Patient very reluctant due to pain levels. She tolerates PROM and AAROM (with external proximal support at elbows) for brief period. Recommend gentle AROM or AAROM at elbow, wrist, and hand as well as shoulder to 90 degrees. Functional Measure:    Barthel Index:  Bathin  Bladder: 5  Bowels: 5  Groomin  Dressin  Feedin  Mobility: 0  Stairs: 0  Toilet Use: 0  Transfer (Bed to Chair and Back): 0  Total: 15/100      The Barthel ADL Index: Guidelines  1. The index should be used as a record of what a patient does, not as a record of what a patient could do. 2. The main aim is to establish degree of independence from any help, physical or verbal, however minor and for whatever reason. 3. The need for supervision renders the patient not independent. 4. A patient's performance should be established using the best available evidence. Asking the patient, friends/relatives and nurses are the usual sources, but direct observation and common sense are also important. However direct testing is not needed. 5. Usually the patient's performance over the preceding 24-48 hours is important, but occasionally longer periods will be relevant. 6. Middle categories imply that the patient supplies over 50 per cent of the effort. 7. Use of aids to be independent is allowed. Score Interpretation (from [address] [address])    Independent   60-79 Minimally independent   40-59 Partially dependent   20-39 Very dependent   <20 Totally dependent     -Ted Akers., Barthel, D.W. (1965). Functional evaluation: the Barthel Index. [facility] ([address] (). The Barthel activities of daily living index: self-reporting versus actual performance in the old (> or = 75 years). Journal of [address] [place] [address] 45(7), [facility], .JONIF, Rashi Slade., Corbin Richards. (1999).  Measuring the change in disability after inpatient rehabilitation; comparison of the responsiveness of the Barthel Index and Functional Throckmorton Measure. Journal of Neurology, Neurosurgery, and Psychiatry, 66(4), 449-216. TAMRA Hoffmann, ALICIA Bagley, & Roe Gaxiola M.A. (2004) Assessment of post-stroke quality of life in cost-effectiveness studies: The usefulness of the Barthel Index and the EuroQoL-5D. Quality of Life Research, 15, 850-17         Occupational Therapy Evaluation Charge Determination   History Examination Decision-Making   LOW Complexity : Brief history review  HIGH Complexity : 5 or more performance deficits relating to physical, cognitive , or psychosocial skils that result in activity limitations and / or participation restrictions HIGH Complexity : Patient presents with comorbidities that affect occupational performance. Signifigant modification of tasks or assistance (eg, physical or verbal) with assessment (s) is necessary to enable patient to complete evaluation       Based on the above components, the patient evaluation is determined to be of the following complexity level: LOW   Pain Rating:  Reports severe LUE pain at [facility] site. RN notified and aware, following. Activity Tolerance:   Fair and requires frequent rest breaks    After treatment patient left in no apparent distress:    Supine in bed, Call bell within reach, Caregiver / family present, Side rails x 3, and LUE supported    COMMUNICATION/EDUCATION:   The patients plan of care was discussed with: Physical therapist and Registered nurse. Home safety education was provided and the patient/caregiver indicated understanding., Patient/family have participated as able in goal setting and plan of care. , and Patient/family agree to work toward stated goals and plan of care.     Thank you for this referral.  Andrés Lang OT  Time Calculation: 48 mins

## 2022-06-25 VITALS
BODY MASS INDEX: 36.62 KG/M2 | DIASTOLIC BLOOD PRESSURE: 92 MMHG | HEIGHT: 65 IN | OXYGEN SATURATION: 87 % | SYSTOLIC BLOOD PRESSURE: 156 MMHG | RESPIRATION RATE: 16 BRPM | TEMPERATURE: 98 F | HEART RATE: 73 BPM | WEIGHT: 219.8 LBS

## 2022-06-25 LAB
GLUCOSE BLD STRIP.AUTO-MCNC: 110 MG/DL (ref 65–117)
GLUCOSE BLD STRIP.AUTO-MCNC: 144 MG/DL (ref 65–117)
GLUCOSE BLD STRIP.AUTO-MCNC: 161 MG/DL (ref 65–117)
GLUCOSE BLD STRIP.AUTO-MCNC: 167 MG/DL (ref 65–117)
SERVICE CMNT-IMP: ABNORMAL
SERVICE CMNT-IMP: NORMAL

## 2022-06-25 PROCEDURE — 74011636637 HC RX REV CODE- 636/637: Performed by: INTERNAL MEDICINE

## 2022-06-25 PROCEDURE — 74011250636 HC RX REV CODE- 250/636: Performed by: ANESTHESIOLOGY

## 2022-06-25 PROCEDURE — 74011250637 HC RX REV CODE- 250/637: Performed by: ANESTHESIOLOGY

## 2022-06-25 PROCEDURE — 82962 GLUCOSE BLOOD TEST: CPT

## 2022-06-25 PROCEDURE — 74011250637 HC RX REV CODE- 250/637: Performed by: INTERNAL MEDICINE

## 2022-06-25 PROCEDURE — 74011250637 HC RX REV CODE- 250/637: Performed by: STUDENT IN AN ORGANIZED HEALTH CARE EDUCATION/TRAINING PROGRAM

## 2022-06-25 PROCEDURE — 74011000250 HC RX REV CODE- 250: Performed by: ANESTHESIOLOGY

## 2022-06-25 PROCEDURE — 74011000250 HC RX REV CODE- 250: Performed by: INTERNAL MEDICINE

## 2022-06-25 PROCEDURE — 74011250637 HC RX REV CODE- 250/637: Performed by: HOSPITALIST

## 2022-06-25 RX ORDER — METOPROLOL TARTRATE 25 MG/1
25 TABLET, FILM COATED ORAL ONCE
Status: COMPLETED | OUTPATIENT
Start: 2022-06-25 | End: 2022-06-25

## 2022-06-25 RX ORDER — HYDRALAZINE HYDROCHLORIDE 20 MG/ML
20 INJECTION INTRAMUSCULAR; INTRAVENOUS
Status: DISCONTINUED | OUTPATIENT
Start: 2022-06-25 | End: 2022-06-25 | Stop reason: HOSPADM

## 2022-06-25 RX ORDER — METOPROLOL TARTRATE 50 MG/1
50 TABLET ORAL EVERY 12 HOURS
Status: DISCONTINUED | OUTPATIENT
Start: 2022-06-25 | End: 2022-06-25 | Stop reason: HOSPADM

## 2022-06-25 RX ADMIN — PRAVASTATIN SODIUM 40 MG: 40 TABLET ORAL at 09:02

## 2022-06-25 RX ADMIN — SODIUM CHLORIDE, PRESERVATIVE FREE 10 ML: 5 INJECTION INTRAVENOUS at 06:31

## 2022-06-25 RX ADMIN — PREDNISOLONE ACETATE 1 DROP: 10 SUSPENSION/ DROPS OPHTHALMIC at 17:27

## 2022-06-25 RX ADMIN — Medication 3 UNITS: at 13:06

## 2022-06-25 RX ADMIN — Medication 2 UNITS: at 13:06

## 2022-06-25 RX ADMIN — PANTOPRAZOLE SODIUM 40 MG: 40 TABLET, DELAYED RELEASE ORAL at 06:31

## 2022-06-25 RX ADMIN — ENOXAPARIN SODIUM 30 MG: 100 INJECTION SUBCUTANEOUS at 09:02

## 2022-06-25 RX ADMIN — METOPROLOL TARTRATE 25 MG: 25 TABLET, FILM COATED ORAL at 11:08

## 2022-06-25 RX ADMIN — LEVOTHYROXINE SODIUM 175 MCG: 0.07 TABLET ORAL at 06:31

## 2022-06-25 RX ADMIN — PREDNISOLONE ACETATE 1 DROP: 10 SUSPENSION/ DROPS OPHTHALMIC at 09:05

## 2022-06-25 RX ADMIN — METOPROLOL TARTRATE 25 MG: 25 TABLET, FILM COATED ORAL at 09:02

## 2022-06-25 RX ADMIN — TIMOLOL MALEATE 1 DROP: 5 SOLUTION OPHTHALMIC at 09:05

## 2022-06-25 RX ADMIN — ACETAMINOPHEN 650 MG: 325 TABLET ORAL at 18:33

## 2022-06-25 RX ADMIN — Medication 20 UNITS: at 09:02

## 2022-06-25 RX ADMIN — DORZOLAMIDE HYDROCHLORIDE 1 DROP: 20 SOLUTION/ DROPS OPHTHALMIC at 09:05

## 2022-06-25 RX ADMIN — Medication 2 UNITS: at 09:02

## 2022-06-25 NOTE — PROGRESS NOTES
CM received consult for home oxygen. CM noted that patient has [employer]. The current preferred DME oxygen is [facility]. CM sent referral in [employer] to the agency , and the referral was accepted. CM spoke to Angelito Suggs, [facility] Representative [phone], who confirmed acceptance of the referral and that an oxygen tank was en-route for delivery to the hospital. CM provided contact info for patient's spouse Geena Cash for the purpose of coordinating the delivery of the oxygen concentrator to the residence. CM received request from patient's spouse to arrange S transport for transport to residence. . CM sent referral via All[employer] to  [id number] [facility]/ [phone]), and referral was accepted for a 4:45PM . CM completed PCS and placed them on chart. CM gave verbal update to staff nurse.

## 2022-06-25 NOTE — PROGRESS NOTES
2000 - Report received from [facility] bath given. Bedside and Verbal shift change report given to Manuela Stevenson (oncoming nurse) by Timo Santa (offgoing nurse). Report included the following information SBAR, Kardex, Intake/Output, MAR, Accordion, Recent Results, Cardiac Rhythm -NSR and Alarm Parameters .

## 2022-06-25 NOTE — PROGRESS NOTES
Renal Progress Note    NAME:  Serenity Farrell   :   1950   MRN:   [id number]     Date/Time:  2022 12:51 PM      Assessment:   1. CKD - at baseline  2. Junctional Bradycardia - S/P PPM       Plan:   1. Continue the present course. 2. Avoid NSAIDs + IV contrast.    Ms Ernesto Richardson should f/u with her Primary Nephrologist in 2 to 4 weeks. Subjective:         F/u Chronic Kidney Disease --> 22      Feeling better. Getting ready for discharge.       Review of Systems:  Y  N       Y  N  []         []          Fever/chills                                               []         []          Chest Pain  []         []          Cough                                                       []         []          Diarrhea   []         []          Sputum                                                     []         []          Constipation  []         []          SOB/ARZOLA                                                []         []          Nausea/Vomit  []         []          Abd Pain                                                    []         []          Tolerating PT  []         []          Dysuria                                                      []         []          Tolerating Diet     []        Unable to obtain  ROS due to  []        mental status change  []        sedated   []        intubated    Medications reviewed:  Current Facility-Administered Medications   Medication Dose Route Frequency    metoprolol tartrate (LOPRESSOR) tablet 50 mg  50 mg Oral Q12H    hydrALAZINE (APRESOLINE) 20 mg/mL injection 20 mg  20 mg IntraVENous Q6H PRN    insulin lispro (HUMALOG) injection   SubCUTAneous AC&HS    insulin lispro (HUMALOG) injection   SubCUTAneous TIDAC    dorzolamide (TRUSOPT) 2 % ophthalmic solution 1 Drop  1 Drop Left Eye TID    And    timolol (TIMOPTIC) 0.5 % ophthalmic solution 1 Drop  1 Drop Left Eye BID    insulin NPH (NOVOLIN N, HUMULIN N) injection 20 Units  20 Units SubCUTAneous BID    sodium chloride (NS) flush 5-40 mL  5-40 mL IntraVENous Q8H    sodium chloride (NS) flush 5-40 mL  5-40 mL IntraVENous PRN    dextrose 10 % infusion 125-250 mL  125-250 mL IntraVENous PRN    [Held by provider] DOPamine (INTROPIN) 800 mg in dextrose 5% 250 mL infusion  0-20 mcg/kg/min IntraVENous TITRATE    sodium chloride (NS) flush 5-40 mL  5-40 mL IntraVENous Q8H    sodium chloride (NS) flush 5-40 mL  5-40 mL IntraVENous PRN    acetaminophen (TYLENOL) tablet 650 mg  650 mg Oral Q6H PRN    Or    acetaminophen (TYLENOL) suppository 650 mg  650 mg Rectal Q6H PRN    polyethylene glycol (MIRALAX) packet 17 g  17 g Oral DAILY PRN    ondansetron (ZOFRAN ODT) tablet 4 mg  4 mg Oral Q8H PRN    Or    ondansetron (ZOFRAN) injection 4 mg  4 mg IntraVENous Q6H PRN    enoxaparin (LOVENOX) injection 30 mg  30 mg SubCUTAneous DAILY    levothyroxine (SYNTHROID) tablet 175 mcg  175 mcg Oral ACB    pantoprazole (PROTONIX) tablet 40 mg  40 mg Oral ACB    pravastatin (PRAVACHOL) tablet 40 mg  40 mg Oral DAILY    prednisoLONE acetate (PRED FORTE) 1 % ophthalmic suspension 1 Drop  1 Drop Right Eye BID    glucose chewable tablet 16 g  4 Tablet Oral PRN    dextrose 10 % infusion 0-250 mL  0-250 mL IntraVENous PRN    glucagon (GLUCAGEN) injection 1 mg  1 mg IntraMUSCular PRN        Objective:   Vitals:  Visit Vitals  BP (!) 156/57   Pulse 74   Temp 98 °F (36.7 °C)   Resp 21   Ht 5' 5\" (1.651 m)   Wt 99.7 kg (219 lb 12.8 oz)   SpO2 92%   BMI 36.58 kg/m²     Temp (24hrs), Av.5 °F (36.9 °C), Min:98 °F (36.7 °C), Max:99.2 °F (37.3 °C)    O2 Flow Rate (L/min): 2 l/min O2 Device: Nasal cannula    Last 24hr Input/Output:    Intake/Output Summary (Last 24 hours) at 2022 1251  Last data filed at 2022 1100  Gross per 24 hour   Intake 120 ml   Output 800 ml   Net -680 ml        PHYSICAL EXAM:    Seen in CCU - bed 18. A family member was present.     General:    Alert, cooperative, no distress, appears stated age. Head:   Normocephalic    Chest :            Left --> PPM noted. Lungs:   Clear to auscultation , No rales. Heart:   No S3  Gallop , no pericardial rub. Abdomen:   Not distended. Extremities: No significant leg oedema. Psych:  Not anxious or agitated. Neurologic: Alert and oriented .         []        Telemetry Reviewed     []        NSR []        PAC/PVCs   []        Afib  []        Paced   []        NSVT   []        [place] []        NGT  []        Intubated on vent    Lab Data Reviewed:    Recent Results (from the past 24 hour(s))   GLUCOSE, POC    Collection Time: 06/24/22  5:21 PM   Result Value Ref Range    Glucose (POC) 176 (H) 65 - 117 mg/dL    Performed by Keith GUAN (CON)    GLUCOSE, POC    Collection Time: 06/24/22  9:44 PM   Result Value Ref Range    Glucose (POC) 222 (H) 65 - 117 mg/dL    Performed by [employer], POC    Collection Time: 06/25/22  3:51 AM   Result Value Ref Range    Glucose (POC) 144 (H) 65 - 117 mg/dL    Performed by [employer], POC    Collection Time: 06/25/22  8:52 AM   Result Value Ref Range    Glucose (POC) 161 (H) 65 - 117 mg/dL    Performed by [employer] Jerry        Total time spent with patient:  []        15   []        25   []        35   []         __ minutes    []        Critical Care Provided    Care Plan discussed with:        []        Patient   []        Family    []        Care Manager   []        Consultant/Specialist :      []          >50% of visit spent in counseling and coordination of care   (Discussed []        CODE status,  []        Care Plan, []        D/C Planning)    ___________________________________________________    Attending Physician: Butch Dallas MD

## 2022-06-25 NOTE — DISCHARGE INSTRUCTIONS
Discharge Instructions       PATIENT ID: Perez Monsivais  MRN: [id number]   YOB: 1950    DATE OF ADMISSION: 6/21/2022  4:24 PM    DATE OF DISCHARGE: 6/25/2022    PRIMARY CARE PROVIDER: Luis Cesar MD     ATTENDING PHYSICIAN: Yosef Montes De Oca MD  DISCHARGING PROVIDER: Rohit Chapa MD    To contact this individual call [phone] and ask the  to page. If unavailable ask to be transferred the Adult Hospitalist Department. DISCHARGE DIAGNOSES junctional nradycardia    CONSULTATIONS: None    PROCEDURES/SURGERIES: Procedure(s):  INSERT PPM DUAL    PENDING TEST RESULTS:   At the time of discharge the following test results are still pending: n/a    FOLLOW UP APPOINTMENTS:   Follow-up Information     Follow up With Specialties Details Why Prakash Rodriguez MD [facility]   [address] [place] [address] [id number] [phone]           Future Appointments   Date Time Provider Juan Pablo Avilesisti   8/22/2022  9:10 AM Leslie Dumont MD RDE [facility] 332 BS AMB     ADDITIONAL CARE RECOMMENDATIONS: you were admitted with a low heart rate which has been treated with a pacemaker. See pacemaker care instructions below. Your oxygen levels were also noted to be low due to COPD, so home oxygen has been arranged for you. DIET: Diabetic Diet      DISCHARGE MEDICATIONS:   See Medication Reconciliation Form    · It is important that you take the medication exactly as they are prescribed. · Keep your medication in the bottles provided by the pharmacist and keep a list of the medication names, dosages, and times to be taken in your wallet. · Do not take other medications without consulting your doctor. NOTIFY YOUR PHYSICIAN FOR ANY OF THE FOLLOWING:   Fever over 101 degrees for 24 hours. Chest pain, shortness of breath, fever, chills, nausea, vomiting, diarrhea, change in mentation, falling, weakness, bleeding.  Severe pain or pain not relieved by medications. Or, any other signs or symptoms that you may have questions about. DISPOSITION:   x Home With:   OT  PT x HH  RN       SNF/Inpatient Rehab/LTAC    Independent/assisted living    Hospice    Other:       Signed:   Bryan Sahni MD  6/25/2022  10:45 AM      Pacemaker and ICD incision care and Discharge Instructions    Limitations and Precautions    Do not life the affected arm over your head for the next 2 weeks. If you are given a sling, only use it for 2-3 days. The sling is just a comfort measure and a remind to not lift your arm. Do not lift anything heavy for 2 weeks. Restriction of 10 lbs total weight. For example 1 six pack of 12 oz soda weighs 4 lbs. 1 gallon of milk weighs 8.5 lbs. Some swelling, redness, and pain are common with all incisions and normally will go away as the incision heals. If swelling, redness, or pain increases or if the area feels warm to touch contact a doctor. Also contact a doctor if the incision edges reopen or separate, if any drainage is noted or if you have a high fever , > 101 degrees. Caring for your Incision    · Surgical Glue was used over your incision  1. If a small dressing is over your incision, you may remove it one day after your procedure. 2. Surgical glue has been applied directly on the incision. This will be shiny in appearance. 3. 24 hours after your surgery, you may briefly wet your incision in the shower or bath. Do not soak or scrub your incision. After showering or bathing, gently blot your wound dry with a soft towel. 4. Do not scratch, rub, or pick at the adhesive film. This may loosen the film before your incision has healed. 5. Protect the incision from prolonged exposure to sunlight or tanning lamps while the film in place. 6. Do not apply liquid or ointment medications or any other product to your incision while the adhesive film is in place. These may loosen the film before your wound is healed.   7. The adhesive film will start to flake off in a week or two. Information about your Pacemaker/ICD    Use with Caution:  · You may safely use a cell phone on the side opposite from your device. · Keep a cell phone 6 inches from your device. · Keep any small electrical devices such as an I-pod 6 inches from your device  · You CAN HAVE A MRI SCAN  · Do not use a TENS unit or magnetic therapy application near your device. Check with your nurse to be approved. · Use of lawn equipment or power tools; Keep all equipment at arms length. Other Information:  · Keep your ID card with you at all times. · You may use a microwave oven and any other household appliances. · You may walk through a metal detector. It will not harm your device; however, it may cause the security arm to sound  · You may have an X-ray or CAT Scan.  · If you undergo any type of procedure or surgery, notify the physician of your device. It may need to be adjusted prior to the procedure.   · We recommend purchasing a Medic Alert bracelet or necklace    If you have further questions you may call your physician's office

## 2022-06-25 NOTE — ROUTINE PROCESS
0730 Report received from [place], [facility].     0800 BP steadily increasing. HR stable. Noted pt's PTA Metoprolol dose 50mg and pt currently taking 25mg. S/w Dr. Di Ceballos. Will increase dosage for better BP control. Plans for discharge today, will get in touch with CM for home O2.    1115 Dr. Di Ceballos @bedside. 1300 Dr. Mathew Thompson @bedside. 1400 CM working on insurance and setting up home O2. Pt and pt's SO made aware. 5353 [facility], 6002 [facility] arranging for transport @4768. 7242 I have reviewed discharge instructions with the patient and spouse. The patient and spouse verbalized understanding. Discharge medications reviewed with patient and spouse and appropriate educational materials and side effects teaching were provided. Education regarding PPM provided to pt and pt's spouse. Signed copy of d/c instructions placed on pt's chart. PIV removed. Home O2 delivered per pt's SO. [facility] en route. 1900 Pt discharged home with [facility].

## 2022-06-25 NOTE — DISCHARGE SUMMARY
Discharge Summary     Patient: Marquita Kelsey MRN: [id number]  SSN: [id number]    YOB: 1950  Age: 67 y.o. Sex: female       Admit Date: 6/21/2022    Discharge Date: 6/25/2022      Admission Diagnoses: Bradycardia [R00.1]    Discharge Diagnoses:   Junctional bradycardia  Near syncope due to the above  Longstanding type 1 diabetes mellitus with multiple complications  Chronic kidney disease  Mild hyperkalemia  History of coronary artery disease  History of hypothyroidism  COPD     Discharge Condition: Stable    Hospital Course: 67 y.o lady w/ DM, CAD s/p CABG, COPD, hypothyroidism, who presented with fatigue and presyncope. She was found to have junctional bradycardia and is s/p pacemaker placement. Junctional bradycardia  Near syncope due to the above  Longstanding type 1 diabetes mellitus with multiple complications  Chronic kidney disease  Mild hyperkalemia  History of coronary artery disease  History of hypothyroidism  COPD     Stable s/p pacemaker placement  Continue beta blocker - increase to home dose today due to HTN  Renal function improved  Patient states she's been prescribed home O2 in the past for COPD. Her O2 was 85% on room air during my assessment. Home O2 arranged. Consults: Cardiology and Nephrology    S: no complaints, denies pain, dyspnea, n/v/d, presyncope, wants to go home  Visit Vitals  BP (!) 156/57   Pulse 74   Temp 98 °F (36.7 °C)   Resp 21   Ht 5' 5\" (1.651 m)   Wt 99.7 kg (219 lb 12.8 oz)   SpO2 92%   BMI 36.58 kg/m²     NAD  RRR  Lungs CTAB  Abd soft NT ND      Significant Diagnostic Studies: see above  XR CHEST PORT    Result Date: 6/22/2022  No pneumothorax. XR CHEST PORT    Result Date: 6/21/2022  No evidence of pneumonia or pulmonary edema. There is grossly stable bilateral lung scarring.     ELECTROPHYSIOLOGY PROCEDURE    Result Date: 6/22/2022   AND RECOMMENDATION: The patient will be followed up in the Pacemaker [facility] [facility]. [facility]..     Disposition: home    Discharge Medications:   Current Discharge Medication List      CONTINUE these medications which have NOT CHANGED    Details   tamsulosin (FLOMAX) 0.4 mg capsule 0.4 mg daily. ergocalciferol (ERGOCALCIFEROL) 1,250 mcg (50,000 unit) capsule       AZO CRANBERRY 605-33-48 mg-mg-million tab Take 1 Capsule by mouth daily. metoprolol tartrate (LOPRESSOR) 50 mg tablet Take 1 Tablet by mouth two (2) times a day. Qty: 180 Tablet, Refills: 3      gabapentin (NEURONTIN) 400 mg capsule Take 400 mg by mouth two (2) times a day. pravastatin (PRAVACHOL) 40 mg tablet Take 40 mg by mouth daily. escitalopram oxalate (LEXAPRO) 10 mg tablet Take 10 mg by mouth two (2) times a day. pantoprazole (PROTONIX) 40 mg tablet Take 40 mg by mouth daily. levothyroxine (SYNTHROID) 175 mcg tablet Take 175 mcg by mouth Daily (before breakfast). Trelegy Ellipta 100-62.5-25 mcg inhaler INHALE 1 PUFF BY MOUTH EVERY DAY      insulin aspart U-100 (NovoLOG U-100 Insulin aspart) 100 unit/mL injection INJECT A MAX  UNITS UNDER THE SKIN DAILY WITH INSULIN PUMP  Qty: 40 mL, Refills: 5      nitroglycerin (NITROSTAT) 0.4 mg SL tablet       prednisoLONE acetate (PRED FORTE) 1 % ophthalmic suspension Administer 1 Drop to right eye two (2) times a day. ferrous sulfate 325 mg (65 mg iron) tablet Take 650 mg by mouth nightly. 2 daily      dorzolamide-timolol, PF, (COSOPT, PF,) 2-0.5 % dpet Administer 1 Drop to left eye two (2) times a day. Indications: increased pressure in the eye         STOP taking these medications       dilTIAZem ER (CARDIZEM CD) 180 mg capsule Comments:   Reason for Stopping:              Follow-up Information     Follow up With Specialties Details Why Julián Frederick MD [address]  Josselynani Burch [facility] [address]  [phone]          Future Appointments   Date Time Provider Juan Pablo Bermudez   8/22/2022  9:10 AM Ilya Libia Pollock MD RDE [facility] 332 BS AMB       Signed By: Zehra Verma MD     June 25, 2022      Greater than 30 minutes spent on discharge management.

## 2022-07-08 ENCOUNTER — TRANSCRIBE ORDER (OUTPATIENT)
Dept: SCHEDULING | Age: 72
End: 2022-07-08

## 2022-07-08 DIAGNOSIS — E11.9 DIABETES MELLITUS (HCC): ICD-10-CM

## 2022-07-08 DIAGNOSIS — R80.9 PROTEINURIA: ICD-10-CM

## 2022-07-08 DIAGNOSIS — N18.4 CHRONIC KIDNEY DISEASE, STAGE IV (SEVERE) (HCC): Primary | ICD-10-CM

## 2022-08-22 ENCOUNTER — VIRTUAL VISIT (OUTPATIENT)
Dept: ENDOCRINOLOGY | Age: 72
End: 2022-08-22
Payer: MEDICARE

## 2022-08-22 DIAGNOSIS — E06.3 HYPOTHYROIDISM DUE TO HASHIMOTO'S THYROIDITIS: ICD-10-CM

## 2022-08-22 DIAGNOSIS — I10 ESSENTIAL HYPERTENSION: ICD-10-CM

## 2022-08-22 DIAGNOSIS — E10.311 TYPE 1 DIABETES MELLITUS WITH RETINOPATHY AND MACULAR EDEMA, UNSPECIFIED LATERALITY, UNSPECIFIED RETINOPATHY SEVERITY (HCC): Primary | ICD-10-CM

## 2022-08-22 DIAGNOSIS — E78.2 MIXED HYPERLIPIDEMIA: ICD-10-CM

## 2022-08-22 DIAGNOSIS — E03.8 HYPOTHYROIDISM DUE TO HASHIMOTO'S THYROIDITIS: ICD-10-CM

## 2022-08-22 PROCEDURE — 99214 OFFICE O/P EST MOD 30 MIN: CPT | Performed by: INTERNAL MEDICINE

## 2022-08-22 PROCEDURE — 2022F DILAT RTA XM EVC RTNOPTHY: CPT | Performed by: INTERNAL MEDICINE

## 2022-08-22 PROCEDURE — G8756 NO BP MEASURE DOC: HCPCS | Performed by: INTERNAL MEDICINE

## 2022-08-22 PROCEDURE — 1101F PT FALLS ASSESS-DOCD LE1/YR: CPT | Performed by: INTERNAL MEDICINE

## 2022-08-22 PROCEDURE — 1090F PRES/ABSN URINE INCON ASSESS: CPT | Performed by: INTERNAL MEDICINE

## 2022-08-22 PROCEDURE — 1123F ACP DISCUSS/DSCN MKR DOCD: CPT | Performed by: INTERNAL MEDICINE

## 2022-08-22 PROCEDURE — G8400 PT W/DXA NO RESULTS DOC: HCPCS | Performed by: INTERNAL MEDICINE

## 2022-08-22 PROCEDURE — 3052F HG A1C>EQUAL 8.0%<EQUAL 9.0%: CPT | Performed by: INTERNAL MEDICINE

## 2022-08-22 PROCEDURE — G8432 DEP SCR NOT DOC, RNG: HCPCS | Performed by: INTERNAL MEDICINE

## 2022-08-22 PROCEDURE — 3017F COLORECTAL CA SCREEN DOC REV: CPT | Performed by: INTERNAL MEDICINE

## 2022-08-22 PROCEDURE — G8427 DOCREV CUR MEDS BY ELIG CLIN: HCPCS | Performed by: INTERNAL MEDICINE

## 2022-08-22 RX ORDER — LEVOTHYROXINE SODIUM 175 UG/1
175 TABLET ORAL
Qty: 90 TABLET | Refills: 3 | Status: SHIPPED | OUTPATIENT
Start: 2022-08-22

## 2022-08-22 RX ORDER — LANOLIN ALCOHOL/MO/W.PET/CERES
1000 CREAM (GRAM) TOPICAL DAILY
COMMUNITY

## 2022-08-22 NOTE — LETTER
8/22/2022 9:47 AM    Patient:  Nahomi Pinedo   YOB: 1950  Date of Visit: 8/22/2022      Dear Arianne Booker MD  [address] [facility] [address]  Via Fax: [address], [place]  [address] [facility] [address] [facility] [address]  Via In [address]  [address] [place] [address] Road [address]  Via Fax: [phone]     Jagruti Branham MD  [address] [address]  Via In [facility] [address]: Thank you for referring Ms. Waldo Song to me for evaluation/treatment. Below are the relevant portions of my assessment and plan of care. If you have questions, please do not hesitate to call me. I look forward to following Ms. Amaral along with you. Balaji Nickerson Chief Complaint   Patient presents with    Diabetes     Pcp and pharmacy verified. [place]. Me   [phone] (M)     Thyroid Problem   Records since last visit reviewed          **THIS IS A VIRTUAL VISIT VIA A VIDEO ENCOUNTER. PATIENT AGREED TO HAVE THEIR CARE DELIVERED OVER VIDEO IN PLACE OF THEIR REGULARLY SCHEDULED OFFICE VISIT**       Nahomi Pinedo, was evaluated through a synchronous (real-time) audio-video encounter. The patient (or guardian if applicable) is aware that this is a billable service, which includes applicable co-pays. This Virtual Visit was conducted with patient's (and/or legal guardian's) consent. The visit was conducted pursuant to the emergency declaration under the [facility], [address] [place] [address] authority and the [employer] and [employer] General Act. Patient identification was verified, and a caregiver was present when appropriate. The patient was located in a state where the provider was licensed to provide care. History of Present Illness: Nahomi Pinedo is a 67 y.o. female here for follow up of diabetes and hypothyroidism. Was diagnosed with diabetes at the age of 16.     On 4/1/22 she fractured her ankle again and was at [facility] for rehab. At insulin pump was taken away and her BGs were running in the 300s. She sent me a message in April and I send orders to let her start her pump back to control her BGs. At our last visit in June 2022 she reported that her BGs were running higher, particularly early in the morning. She denied issues of hypoglycemia. With her with her co-morbidities, I did not want to be too aggressive with the changes to her pump settings, but I did make the following adjustments. In June 2022 she was admitted for bradycardia and pre-syncope. 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 Her TSH was 7.52, but her FT4 was normal at 1.0 and her dose of LT4 175mcg was continued. \"I feel much better since having that placed, but I can't get out of the house to go to follow up visits. I am not able to move my leg once I stand. I am taking PT right now. Everyone is so afraid I am going to fall, they don't want to take any chances with me. \"    She saw her Nephrologist Dr. Stefani Collins on 6/15/22 \"he found that my kidneys had too much protein so he keith labs and did a UA. \"    She is still using her insulin pump    Basal  MN-630AM: 2.70  630AM-4PM: 2.75  4PM-MN: 2.60  Carb Ratio  1:4  Correction Factor  1:25  Target  120  Active Insulin Time  3    She changes her infusion site every 3 days. She checks her BGs 3 times per day. Her FBGs have been running in the 110-120s range. Her pre-dinner BGs have been in the 140-160s. She denies issues of hypoglycemia. She wakes around 11AM.  She is eating 2-3 meals per day  Her first meal is around 10-11AM, today she had two pieces of cheese toast and diet soda. She has lunch 4-5 days per week, around 2PM, yesterday she had a cheese sandwich and diet soda. She has dinner around 6-8PM, last night she had beef tips, brown rice and diet soda.    She goes to bed around 11PM-1AM.     Pt has hx of CAD, s/p CABG and junctional bradycardia s/p pacemake placementshe is following with Dr. Lord Lino. \"I saw her in June 2021, she was happy with my labs and results. I am trying to make an appointment to see her soon. \"      She has hx of Retinopathy she is followed by Ophthalmology. \"I refused to take any new injections. He was using a cancer drug and it scared me\". She last saw her eye doctor in early 2022. 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 She has hx of COPD and was followed by Dr. Tresia Burkitt of Pulmonology. \"I don't need a lung doctor anymore, I quit smoking and my lungs are clear\". She has gastroparesis, and erosive gastritis and was followed by Dr. Katrina Bal of GI. Pt has hx of nephropathy and CKD IV, she also has neuropathy and gastroparesis. She is followed by Dr. April Reyes of Nephrology. She is still taking her LT4 175mcg daily. Current Outpatient Medications   Medication Sig    cyanocobalamin (Vitamin B-12) 1,000 mcg tablet Take 1,000 mcg by mouth daily.  ergocalciferol (ERGOCALCIFEROL) 1,250 mcg (50,000 unit) capsule every seven (7) days.  AZO CRANBERRY 962-06-73 mg-mg-million tab Take 1 Capsule by mouth daily.  metoprolol tartrate (LOPRESSOR) 50 mg tablet Take 1 Tablet by mouth two (2) times a day.  gabapentin (NEURONTIN) 400 mg capsule Take 400 mg by mouth two (2) times a day.  pravastatin (PRAVACHOL) 40 mg tablet Take 40 mg by mouth daily.  escitalopram oxalate (LEXAPRO) 10 mg tablet Take 10 mg by mouth two (2) times a day.  pantoprazole (PROTONIX) 40 mg tablet Take 40 mg by mouth daily.  levothyroxine (SYNTHROID) 175 mcg tablet Take 175 mcg by mouth Daily (before breakfast).     Trelegy Ellipta 100-62.5-25 mcg inhaler INHALE 1 PUFF BY MOUTH EVERY DAY    insulin aspart U-100 (NovoLOG U-100 Insulin aspart) 100 unit/mL injection INJECT A MAX  UNITS UNDER THE SKIN DAILY WITH INSULIN PUMP (Patient taking differently: INJECT A MAX  UNITS UNDER THE SKIN DAILY WITH INSULIN PUMP)    nitroglycerin (NITROSTAT) 0.4 mg SL tablet     prednisoLONE acetate (PRED FORTE) 1 % ophthalmic suspension Administer 1 Drop to right eye two (2) times a day.  ferrous sulfate 325 mg (65 mg iron) tablet Take 650 mg by mouth nightly. 2 daily    dorzolamide-timolol, PF, (COSOPT) 2-0.5 % ophthalmic solution Administer 1 Drop to left eye two (2) times a day. Indications: increased pressure in the eye     No current facility-administered medications for this visit. Allergies   Allergen Reactions    Nsaids (Non-Steroidal Anti-Inflammatory Drug) Other (comments)     bleeding    Augmentin [Amoxicillin-Pot Clavulanate] Diarrhea    Oxycodone Other (comments)     Altered mental status per patient. Has tolerated Hydrocodone in the past     Vesicare [Solifenacin] Rash     BURNING WITH URINATION     Review of Systems:  - Eyes: no blurry vision or double vision  - Cardiovascular: no chest pain  - Respiratory: no shortness of breath  - Musculoskeletal: no myalgias  - Neurological: + numbness in her feet    Physical Examination:   There were no vitals taken for this visit.   - GENERAL: NCAT, Appears well nourished   - EYES: EOMI, non-icteric, no proptosis   - Ear/Nose/Throat: NCAT, no visible inflammation or masses   - CARDIOVASCULAR: no cyanosis, no visible JVD   - RESPIRATORY: respiratory effort normal without any distress or labored breathing   - MUSCULOSKELETAL: Normal ROM of neck and upper extremities observed   - SKIN: No rash on face   - NEUROLOGIC:  No facial asymmetry (Cranial nerve 7 motor function), No gaze palsy   - PSYCHIATRIC: Normal affect, Normal insight and judgement         Data Reviewed:   Component      Latest Ref Rng & Units 6/22/2022 6/21/2022   Sodium      136 - 145 mmol/L 136    Potassium      3.5 - 5.1 mmol/L 5.8 (H)    Chloride      97 - 108 mmol/L 110 (H)    CO2      21 - 32 mmol/L 23    Anion gap      5 - 15 mmol/L 3 (L)    Glucose      65 - 100 mg/dL 274 (H)    BUN      6 - 20 MG/DL 45 (H)    Creatinine      0.55 - 1.02 MG/DL 3.03 (H)    BUN/Creatinine ratio      12 - 20   15    GFR est AA      >60 ml/min/1.73m2 18 (L)    GFR est non-AA      >60 ml/min/1.73m2 15 (L)    Calcium      8.5 - 10.1 MG/DL 9.4    T4, Free      0.8 - 1.5 NG/DL  1.0   TSH      0.36 - 3.74 uIU/mL  7.52 (H)       Assessment/Plan:   1) DM > Pt reports her BGs are running in a good range and is not having issues of hypoglycemia. I will not make any adjustments to her pump settings at this time. With her hx of neuropathy and calluses she would benefit from DM shoes and inserts  Pt to check her BGs 4 times per day and mail her BG logs to me in 2 weeks. 2) HTN > Will not make any changes at this time. 3) HLD > Pt is still taking her Pravastatin 40mg daily, which she is tolerating well. Pt to continue this current dose. 4) Hypothyroidism > Pt is clinically euthyroid on LT4 175mcg daily. I will continue the LT4 175mcg     Pt voices understanding and agreement with the plan.   Pain noted and pt was recommended to call her PCP for further evaluation and treatment, as needed     RTC 4 months     CC:  Dr. Papito Camejo            Sincerely,      Liz Madrid MD

## 2022-08-22 NOTE — PROGRESS NOTES
Chief Complaint   Patient presents with    Diabetes     Pcp and pharmacy verified. [facility]   [phone] (M)     Thyroid Problem   Records since last visit reviewed          **THIS IS A VIRTUAL VISIT VIA A VIDEO ENCOUNTER. PATIENT AGREED TO HAVE THEIR CARE DELIVERED OVER VIDEO IN PLACE OF THEIR REGULARLY SCHEDULED OFFICE VISIT**       Michelle Prather, was evaluated through a synchronous (real-time) audio-video encounter. The patient (or guardian if applicable) is aware that this is a billable service, which includes applicable co-pays. This Virtual Visit was conducted with patient's (and/or legal guardian's) consent. The visit was conducted pursuant to the emergency declaration under the [address] [place] [address], [address] [place] [address] authority and the [employer] Act. Patient identification was verified, and a caregiver was present when appropriate. The patient was located in a state where the provider was licensed to provide care. History of Present Illness: Michelle Prather is a 67 y.o. female here for follow up of diabetes and hypothyroidism. Was diagnosed with diabetes at the age of 16. On 4/1/22 she fractured her ankle again and was at [facility] for rehab. At insulin pump was taken away and her BGs were running in the 300s. She sent me a message in April and I send orders to let her start her pump back to control her BGs. At our last visit in June 2022 she reported that her BGs were running higher, particularly early in the morning. She denied issues of hypoglycemia. With her with her co-morbidities, I did not want to be too aggressive with the changes to her pump settings, but I did make the following adjustments. In June 2022 she was admitted for bradycardia and pre-syncope. 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     Her TSH was 7.52, but her FT4 was normal at 1.0 and her dose of LT4 175mcg was continued. \"I feel much better since having that placed, but I can't get out of the house to go to follow up visits. I am not able to move my leg once I stand. I am taking PT right now. Everyone is so afraid I am going to fall, they don't want to take any chances with me. \"    She saw her Nephrologist Dr. Arminda Goldsmith on 6/15/22 \"he found that my kidneys had too much protein so he keith labs and did a UA. \"    She is still using her insulin pump    Basal  MN-630AM: 2.70  630AM-4PM: 2.75  4PM-MN: 2.60  Carb Ratio  1:4  Correction Factor  1:25  Target  120  Active Insulin Time  3    She changes her infusion site every 3 days. She checks her BGs 3 times per day. Her FBGs have been running in the 110-120s range. Her pre-dinner BGs have been in the 140-160s. She denies issues of hypoglycemia. She wakes around 11AM.  She is eating 2-3 meals per day  Her first meal is around 10-11AM, today she had two pieces of cheese toast and diet soda. She has lunch 4-5 days per week, around 2PM, yesterday she had a cheese sandwich and diet soda. She has dinner around 6-8PM, last night she had beef tips, brown rice and diet soda. She goes to bed around 11PM-1AM.     Pt has hx of CAD, s/p CABG and junctional bradycardia s/p pacemake placementshe is following with Dr. Jerri Rodriguez. \"I saw her in June 2021, she was happy with my labs and results. I am trying to make an appointment to see her soon. \"      She has hx of Retinopathy she is followed by Ophthalmology. \"I refused to take any new injections. He was using a cancer drug and it scared me\". She last saw her eye doctor in early 2022. 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 She has hx of COPD and was followed by Dr. Avelina Becker of Pulmonology. \"I don't need a lung doctor anymore, I quit smoking and my lungs are clear\".      She has gastroparesis, and erosive gastritis and was followed by Dr. Dylan Rao of GI. Pt has hx of nephropathy and CKD IV, she also has neuropathy and gastroparesis. She is followed by Dr. Rima Hager of Nephrology. She is still taking her LT4 175mcg daily. Current Outpatient Medications   Medication Sig    cyanocobalamin (Vitamin B-12) 1,000 mcg tablet Take 1,000 mcg by mouth daily. ergocalciferol (ERGOCALCIFEROL) 1,250 mcg (50,000 unit) capsule every seven (7) days. AZO CRANBERRY 559-73-30 mg-mg-million tab Take 1 Capsule by mouth daily. metoprolol tartrate (LOPRESSOR) 50 mg tablet Take 1 Tablet by mouth two (2) times a day.    gabapentin (NEURONTIN) 400 mg capsule Take 400 mg by mouth two (2) times a day. pravastatin (PRAVACHOL) 40 mg tablet Take 40 mg by mouth daily. escitalopram oxalate (LEXAPRO) 10 mg tablet Take 10 mg by mouth two (2) times a day. pantoprazole (PROTONIX) 40 mg tablet Take 40 mg by mouth daily. levothyroxine (SYNTHROID) 175 mcg tablet Take 175 mcg by mouth Daily (before breakfast). Trelegy Ellipta 100-62.5-25 mcg inhaler INHALE 1 PUFF BY MOUTH EVERY DAY    insulin aspart U-100 (NovoLOG U-100 Insulin aspart) 100 unit/mL injection INJECT A MAX  UNITS UNDER THE SKIN DAILY WITH INSULIN PUMP (Patient taking differently: INJECT A MAX  UNITS UNDER THE SKIN DAILY WITH INSULIN PUMP)    nitroglycerin (NITROSTAT) 0.4 mg SL tablet     prednisoLONE acetate (PRED FORTE) 1 % ophthalmic suspension Administer 1 Drop to right eye two (2) times a day. ferrous sulfate 325 mg (65 mg iron) tablet Take 650 mg by mouth nightly. 2 daily    dorzolamide-timolol, PF, (COSOPT) 2-0.5 % ophthalmic solution Administer 1 Drop to left eye two (2) times a day. Indications: increased pressure in the eye     No current facility-administered medications for this visit.      Allergies   Allergen Reactions    Nsaids (Non-Steroidal Anti-Inflammatory Drug) Other (comments)     bleeding Augmentin [Amoxicillin-Pot Clavulanate] Diarrhea    Oxycodone Other (comments)     Altered mental status per patient. Has tolerated Hydrocodone in the past     Vesicare [Solifenacin] Rash     BURNING WITH URINATION     Review of Systems:  - Eyes: no blurry vision or double vision  - Cardiovascular: no chest pain  - Respiratory: no shortness of breath  - Musculoskeletal: no myalgias  - Neurological: + numbness in her feet    Physical Examination:  There were no vitals taken for this visit. - GENERAL: NCAT, Appears well nourished   - EYES: EOMI, non-icteric, no proptosis   - Ear/Nose/Throat: NCAT, no visible inflammation or masses   - CARDIOVASCULAR: no cyanosis, no visible JVD   - RESPIRATORY: respiratory effort normal without any distress or labored breathing   - MUSCULOSKELETAL: Normal ROM of neck and upper extremities observed   - SKIN: No rash on face   - NEUROLOGIC:  No facial asymmetry (Cranial nerve 7 motor function), No gaze palsy   - PSYCHIATRIC: Normal affect, Normal insight and judgement         Data Reviewed:   Component      Latest Ref Rng & Units 6/22/2022 6/21/2022   Sodium      136 - 145 mmol/L 136    Potassium      3.5 - 5.1 mmol/L 5.8 (H)    Chloride      97 - 108 mmol/L 110 (H)    CO2      21 - 32 mmol/L 23    Anion gap      5 - 15 mmol/L 3 (L)    Glucose      65 - 100 mg/dL 274 (H)    BUN      6 - 20 MG/DL 45 (H)    Creatinine      0.55 - 1.02 MG/DL 3.03 (H)    BUN/Creatinine ratio      12 - 20   15    GFR est AA      >60 ml/min/1.73m2 18 (L)    GFR est non-AA      >60 ml/min/1.73m2 15 (L)    Calcium      8.5 - 10.1 MG/DL 9.4    T4, Free      0.8 - 1.5 NG/DL  1.0   TSH      0.36 - 3.74 uIU/mL  7.52 (H)       Assessment/Plan:   1) DM > Pt reports her BGs are running in a good range and is not having issues of hypoglycemia. I will not make any adjustments to her pump settings at this time.    With her hx of neuropathy and calluses she would benefit from DM shoes and inserts  Pt to check her BGs 4 times per day and mail her BG logs to me in 2 weeks. 2) HTN > Will not make any changes at this time. 3) HLD > Pt is still taking her Pravastatin 40mg daily, which she is tolerating well. Pt to continue this current dose. 4) Hypothyroidism > Pt is clinically euthyroid on LT4 175mcg daily. I will continue the LT4 175mcg     Pt voices understanding and agreement with the plan.   Pain noted and pt was recommended to call her PCP for further evaluation and treatment, as needed     RTC 4 months     CC:  Dr. Allie Santiago

## 2022-12-01 DIAGNOSIS — E10.311 TYPE 1 DIABETES MELLITUS WITH RETINOPATHY AND MACULAR EDEMA, UNSPECIFIED LATERALITY, UNSPECIFIED RETINOPATHY SEVERITY (HCC): ICD-10-CM

## 2022-12-01 DIAGNOSIS — E03.8 HYPOTHYROIDISM DUE TO HASHIMOTO'S THYROIDITIS: ICD-10-CM

## 2022-12-01 DIAGNOSIS — E78.2 MIXED HYPERLIPIDEMIA: ICD-10-CM

## 2022-12-01 DIAGNOSIS — E06.3 HYPOTHYROIDISM DUE TO HASHIMOTO'S THYROIDITIS: ICD-10-CM

## 2022-12-01 DIAGNOSIS — I10 ESSENTIAL HYPERTENSION: ICD-10-CM

## 2022-12-06 ENCOUNTER — VIRTUAL VISIT (OUTPATIENT)
Dept: ENDOCRINOLOGY | Age: 72
End: 2022-12-06
Payer: MEDICARE

## 2022-12-06 DIAGNOSIS — E06.3 HYPOTHYROIDISM DUE TO HASHIMOTO'S THYROIDITIS: ICD-10-CM

## 2022-12-06 DIAGNOSIS — E78.2 MIXED HYPERLIPIDEMIA: ICD-10-CM

## 2022-12-06 DIAGNOSIS — E03.8 HYPOTHYROIDISM DUE TO HASHIMOTO'S THYROIDITIS: ICD-10-CM

## 2022-12-06 DIAGNOSIS — I10 ESSENTIAL HYPERTENSION: ICD-10-CM

## 2022-12-06 DIAGNOSIS — E10.311 TYPE 1 DIABETES MELLITUS WITH RETINOPATHY AND MACULAR EDEMA, UNSPECIFIED LATERALITY, UNSPECIFIED RETINOPATHY SEVERITY (HCC): Primary | ICD-10-CM

## 2022-12-06 PROCEDURE — G8432 DEP SCR NOT DOC, RNG: HCPCS | Performed by: INTERNAL MEDICINE

## 2022-12-06 PROCEDURE — G8756 NO BP MEASURE DOC: HCPCS | Performed by: INTERNAL MEDICINE

## 2022-12-06 PROCEDURE — 3052F HG A1C>EQUAL 8.0%<EQUAL 9.0%: CPT | Performed by: INTERNAL MEDICINE

## 2022-12-06 PROCEDURE — 1123F ACP DISCUSS/DSCN MKR DOCD: CPT | Performed by: INTERNAL MEDICINE

## 2022-12-06 PROCEDURE — 99214 OFFICE O/P EST MOD 30 MIN: CPT | Performed by: INTERNAL MEDICINE

## 2022-12-06 PROCEDURE — 1101F PT FALLS ASSESS-DOCD LE1/YR: CPT | Performed by: INTERNAL MEDICINE

## 2022-12-06 PROCEDURE — 3017F COLORECTAL CA SCREEN DOC REV: CPT | Performed by: INTERNAL MEDICINE

## 2022-12-06 PROCEDURE — G8427 DOCREV CUR MEDS BY ELIG CLIN: HCPCS | Performed by: INTERNAL MEDICINE

## 2022-12-06 PROCEDURE — 2022F DILAT RTA XM EVC RTNOPTHY: CPT | Performed by: INTERNAL MEDICINE

## 2022-12-06 PROCEDURE — G8400 PT W/DXA NO RESULTS DOC: HCPCS | Performed by: INTERNAL MEDICINE

## 2022-12-06 PROCEDURE — 1090F PRES/ABSN URINE INCON ASSESS: CPT | Performed by: INTERNAL MEDICINE

## 2022-12-06 RX ORDER — COLCHICINE 0.6 MG/1
TABLET ORAL
COMMUNITY
Start: 2022-10-21

## 2022-12-06 NOTE — PROGRESS NOTES
Chief Complaint   Patient presents with    Diabetes     Pcp and pharmacy verified  [facility]  [phone] (M)      Records since last visit reviewed          **THIS IS A VIRTUAL VISIT VIA A VIDEO ENCOUNTER. PATIENT AGREED TO HAVE THEIR CARE DELIVERED OVER VIDEO IN PLACE OF THEIR REGULARLY SCHEDULED OFFICE VISIT**       Yordan Mann, was evaluated through a synchronous (real-time) audio-video encounter. The patient (or guardian if applicable) is aware that this is a billable service, which includes applicable co-pays. This Virtual Visit was conducted with patient's (and/or legal guardian's) consent. The visit was conducted pursuant to the emergency declaration under the [facility], [address] [place] [address] authority and the [employer] Act. Patient identification was verified, and a caregiver was present when appropriate. The patient was located in a state where the provider was licensed to provide care. History of Present Illness: Yordan Mann is a 67 y.o. female here for follow up of diabetes and hypothyroidism. Was diagnosed with diabetes at the age of 16. On 4/1/22 she fractured her ankle again and was at [facility] for rehab. At insulin pump was taken away and her BGs were running in the 300s. She sent me a message in April and I send orders to let her start her pump back to control her BGs. At our last visit in August 2022 she reported that her BGs were running in a better range and was not having issues of hypoglycemia. She did not go have her labs drawn that I ordered so I have not seen an A1c for her since April 2022 and it was 8.3%. \"I an still trying to walk and I am in PT twice per week and I am just not able to get around very well. \"    Pt denies any recent illnesses, injuries or hospitalizations.     She saw her Nephrologist Dr. Jose D Kathleen on 6/15/22 \"he found that my kidneys had too much protein so he keith labs and did a UA. \"    She is still using her insulin pump  \"I don't like this new medtronic it is more complicated compared to my old pump. \"    Basal  MN-630AM: 2.70  630AM-4PM: 2.75  4PM-MN: 2.60  Carb Ratio  1:4  Correction Factor  1:25  Target  120  Active Insulin Time  3    She changes her infusion site every 3 days. She checks her BGs 2-3 times per day. Her FBGs have been running in the 70-170s range. Her pre-dinner BGs have been in the 160-180s. She denies issues of hypoglycemia. She wakes around 9AM.  She is eating 2-3 meals per day  Her first meal is around 930-10AM, today she had a cheese Pashto, low carb chocolate. She has lunch 2PM, today she had cheese toast and mac and cheese and diet soda. She has dinner around 6-8PM, last night she had 1/2 a baked potato and diet soda. She goes to bed around 11PM-1AM.  She notes she will snack \"some days, but not every day and if I do it will be a 10 carb cookie. \"     Pt has hx of CAD, s/p CABG and junctional bradycardia s/p pacemake placementshe is following with Dr. Megan Tao. \"I saw her in June 2021, she was happy with my labs and results. I am trying to make an appointment to see her soon. \"      She has hx of Retinopathy she is followed by Ophthalmology. \"I refused to take any new injections. He was using a cancer drug and it scared me\". She last saw her eye doctor in early 2022. 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 She has hx of COPD and was followed by Dr. Brianna Muse of Pulmonology. \"I don't need a lung doctor anymore, I quit smoking and my lungs are clear\". She has gastroparesis, and erosive gastritis and was followed by Dr. Veronica Whitaker of GI. Pt has hx of nephropathy and CKD IV, she also has neuropathy and gastroparesis. She is followed by Dr. Alyssa Lucas of Nephrology.      She is still taking her LT4 175mcg daily. Current Outpatient Medications   Medication Sig    cyanocobalamin 1,000 mcg tablet Take 1,000 mcg by mouth daily. levothyroxine (SYNTHROID) 175 mcg tablet Take 1 Tablet by mouth Daily (before breakfast). ergocalciferol (ERGOCALCIFEROL) 1,250 mcg (50,000 unit) capsule every seven (7) days. AZO CRANBERRY 835-63-42 mg-mg-million tab Take 1 Capsule by mouth daily. metoprolol tartrate (LOPRESSOR) 50 mg tablet Take 1 Tablet by mouth two (2) times a day.    gabapentin (NEURONTIN) 400 mg capsule Take 400 mg by mouth two (2) times a day. pravastatin (PRAVACHOL) 40 mg tablet Take 40 mg by mouth daily. escitalopram oxalate (LEXAPRO) 10 mg tablet Take 10 mg by mouth two (2) times a day. pantoprazole (PROTONIX) 40 mg tablet Take 40 mg by mouth daily. Trelegy Ellipta 100-62.5-25 mcg inhaler INHALE 1 PUFF BY MOUTH EVERY DAY    insulin aspart U-100 (NovoLOG U-100 Insulin aspart) 100 unit/mL injection INJECT A MAX  UNITS UNDER THE SKIN DAILY WITH INSULIN PUMP (Patient taking differently: INJECT A MAX  UNITS UNDER THE SKIN DAILY WITH INSULIN PUMP)    nitroglycerin (NITROSTAT) 0.4 mg SL tablet     prednisoLONE acetate (PRED FORTE) 1 % ophthalmic suspension Administer 1 Drop to right eye two (2) times a day. ferrous sulfate 325 mg (65 mg iron) tablet Take 650 mg by mouth nightly. 2 daily    dorzolamide-timolol, PF, (COSOPT) 2-0.5 % ophthalmic solution Administer 1 Drop to left eye two (2) times a day. Indications: increased pressure in the eye    colchicine 0.6 mg tablet      No current facility-administered medications for this visit. Allergies   Allergen Reactions    Nsaids (Non-Steroidal Anti-Inflammatory Drug) Other (comments)     bleeding    Augmentin [Amoxicillin-Pot Clavulanate] Diarrhea    Oxycodone Other (comments)     Altered mental status per patient.   Has tolerated Hydrocodone in the past     Vesicare [Solifenacin] Rash     BURNING WITH URINATION     Review of Systems:  - Eyes: no blurry vision or double vision  - Cardiovascular: no chest pain  - Respiratory: no shortness of breath  - Musculoskeletal: no myalgias  - Neurological: + numbness in her feet    Physical Examination:  There were no vitals taken for this visit. - GENERAL: NCAT, Appears well nourished   - EYES: EOMI, non-icteric, no proptosis   - Ear/Nose/Throat: NCAT, no visible inflammation or masses   - CARDIOVASCULAR: no cyanosis, no visible JVD   - RESPIRATORY: respiratory effort normal without any distress or labored breathing   - MUSCULOSKELETAL: Normal ROM of neck and upper extremities observed   - SKIN: No rash on face   - NEUROLOGIC:  No facial asymmetry (Cranial nerve 7 motor function), No gaze palsy   - PSYCHIATRIC: Normal affect, Normal insight and judgement         Data Reviewed:   None  Assessment/Plan:   1) DM > Pt reports that her BGs in the evening have been running higher. I will have her increase 4PM-MN basal rate to 2.70 units per hour. I will order an A1C. With her hx of neuropathy and calluses she would benefit from DM shoes and inserts  Pt to check her BGs 4 times per day and mail her BG logs to me in 2 weeks. 2) HTN > Will not make any changes at this time. 3) HLD > Pt is still taking her Pravastatin 40mg daily, which she is tolerating well. I will order a lipid panel and CMP. 4) Hypothyroidism > Pt is clinically euthyroid on LT4 175mcg daily. I will order TFTs and adjust her dose as needed. Pt voices understanding and agreement with the plan.   Pain noted and pt was recommended to call her PCP for further evaluation and treatment, as needed     RTC 4 months (once her labs are back)     CC:  Dr. Jett Ek

## 2022-12-06 NOTE — LETTER
12/6/2022 2:35 PM    Patient:  Marc Pérez   YOB: 1950  Date of Visit: 12/6/2022      Dear Audie Velasquez MD  [address] [facility] [address] [address]  Via Fax: [phone]     Annamarie Rivera MD  [address] [facility] [address] [place] [address] [address]  Via In [address] [place] [address] [address] [address]  Via Fax: [phone]     Franco Martinez MD  [address] [facility] [address] [address]  Via In [facility] [address]: Thank you for referring Ms. Joaquin Stanford to me for evaluation/treatment. Below are the relevant portions of my assessment and plan of care. If you have questions, please do not hesitate to call me. I look forward to following Ms. Amaral along with you. Chief Complaint   Patient presents with    Diabetes     Pcp and pharmacy verified  [facility]  [phone] (M)      Records since last visit reviewed          **THIS IS A VIRTUAL VISIT VIA A VIDEO ENCOUNTER. PATIENT AGREED TO HAVE THEIR CARE DELIVERED OVER VIDEO IN PLACE OF THEIR REGULARLY SCHEDULED OFFICE VISIT**       Marc Pérez, was evaluated through a synchronous (real-time) audio-video encounter. The patient (or guardian if applicable) is aware that this is a billable service, which includes applicable co-pays. This Virtual Visit was conducted with patient's (and/or legal guardian's) consent. The visit was conducted pursuant to the emergency declaration under the [facility] [address] authority and the [employer] General Act. Patient identification was verified, and a caregiver was present when appropriate. The patient was located in a state where the provider was licensed to provide care. History of Present Illness: Marc Pérez is a 67 y.o. female here for follow up of diabetes and hypothyroidism. Was diagnosed with diabetes at the age of 16.     On 4/1/22 she fractured her ankle again and was at [facility] for rehab. At insulin pump was taken away and her BGs were running in the 300s. She sent me a message in April and I send orders to let her start her pump back to control her BGs. At our last visit in August 2022 she reported that her BGs were running in a better range and was not having issues of hypoglycemia. She did not go have her labs drawn that I ordered so I have not seen an A1c for her since April 2022 and it was 8.3%. \"I an still trying to walk and I am in PT twice per week and I am just not able to get around very well. \"    Pt denies any recent illnesses, injuries or hospitalizations. She saw her Nephrologist Dr. Jazmine Chowdhury on 6/15/22 \"he found that my kidneys had too much protein so he keith labs and did a UA. \"    She is still using her insulin pump  \"I don't like this new medtronic it is more complicated compared to my old pump. \"    Basal  MN-630AM: 2.70  630AM-4PM: 2.75  4PM-MN: 2.60  Carb Ratio  1:4  Correction Factor  1:25  Target  120  Active Insulin Time  3    She changes her infusion site every 3 days. She checks her BGs 2-3 times per day. Her FBGs have been running in the 70-170s range. Her pre-dinner BGs have been in the 160-180s. She denies issues of hypoglycemia. She wakes around 9AM.  She is eating 2-3 meals per day  Her first meal is around 930-10AM, today she had a cheese Polish, low carb chocolate. She has lunch 2PM, today she had cheese toast and mac and cheese and diet soda. She has dinner around 6-8PM, last night she had 1/2 a baked potato and diet soda. She goes to bed around 11PM-1AM.  She notes she will snack \"some days, but not every day and if I do it will be a 10 carb cookie. \"     Pt has hx of CAD, s/p CABG and junctional bradycardia s/p pacemake placementshe is following with Dr. Nataliya Worthington. \"I saw her in June 2021, she was happy with my labs and results. I am trying to make an appointment to see her soon. \"      She has hx of Retinopathy she is followed by Ophthalmology. \"I refused to take any new injections. He was using a cancer drug and it scared me\". She last saw her eye doctor in early 2022. 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 She has hx of COPD and was followed by Dr. Gabino Duckworth of Pulmonology. \"I don't need a lung doctor anymore, I quit smoking and my lungs are clear\". She has gastroparesis, and erosive gastritis and was followed by Dr. Deandre Marquez of GI. Pt has hx of nephropathy and CKD IV, she also has neuropathy and gastroparesis. She is followed by Dr. Keyur Valle of Nephrology. She is still taking her LT4 175mcg daily. Current Outpatient Medications   Medication Sig    cyanocobalamin 1,000 mcg tablet Take 1,000 mcg by mouth daily.  levothyroxine (SYNTHROID) 175 mcg tablet Take 1 Tablet by mouth Daily (before breakfast).  ergocalciferol (ERGOCALCIFEROL) 1,250 mcg (50,000 unit) capsule every seven (7) days.  AZO CRANBERRY 083-97-45 mg-mg-million tab Take 1 Capsule by mouth daily.  metoprolol tartrate (LOPRESSOR) 50 mg tablet Take 1 Tablet by mouth two (2) times a day.  gabapentin (NEURONTIN) 400 mg capsule Take 400 mg by mouth two (2) times a day.  pravastatin (PRAVACHOL) 40 mg tablet Take 40 mg by mouth daily.  escitalopram oxalate (LEXAPRO) 10 mg tablet Take 10 mg by mouth two (2) times a day.  pantoprazole (PROTONIX) 40 mg tablet Take 40 mg by mouth daily.     Trelegy Ellipta 100-62.5-25 mcg inhaler INHALE 1 PUFF BY MOUTH EVERY DAY    insulin aspart U-100 (NovoLOG U-100 Insulin aspart) 100 unit/mL injection INJECT A MAX  UNITS UNDER THE SKIN DAILY WITH INSULIN PUMP (Patient taking differently: INJECT A MAX  UNITS UNDER THE SKIN DAILY WITH INSULIN PUMP)    nitroglycerin (NITROSTAT) 0.4 mg SL tablet     prednisoLONE acetate (PRED FORTE) 1 % ophthalmic suspension Administer 1 Drop to right eye two (2) times a day.  ferrous sulfate 325 mg (65 mg iron) tablet Take 650 mg by mouth nightly. 2 daily    dorzolamide-timolol, PF, (COSOPT) 2-0.5 % ophthalmic solution Administer 1 Drop to left eye two (2) times a day. Indications: increased pressure in the eye    colchicine 0.6 mg tablet      No current facility-administered medications for this visit. Allergies   Allergen Reactions    Nsaids (Non-Steroidal Anti-Inflammatory Drug) Other (comments)     bleeding    Augmentin [Amoxicillin-Pot Clavulanate] Diarrhea    Oxycodone Other (comments)     Altered mental status per patient. Has tolerated Hydrocodone in the past     Vesicare [Solifenacin] Rash     BURNING WITH URINATION     Review of Systems:  - Eyes: no blurry vision or double vision  - Cardiovascular: no chest pain  - Respiratory: no shortness of breath  - Musculoskeletal: no myalgias  - Neurological: + numbness in her feet    Physical Examination:   There were no vitals taken for this visit. - GENERAL: NCAT, Appears well nourished   - EYES: EOMI, non-icteric, no proptosis   - Ear/Nose/Throat: NCAT, no visible inflammation or masses   - CARDIOVASCULAR: no cyanosis, no visible JVD   - RESPIRATORY: respiratory effort normal without any distress or labored breathing   - MUSCULOSKELETAL: Normal ROM of neck and upper extremities observed   - SKIN: No rash on face   - NEUROLOGIC:  No facial asymmetry (Cranial nerve 7 motor function), No gaze palsy   - PSYCHIATRIC: Normal affect, Normal insight and judgement         Data Reviewed:   None  Assessment/Plan:   1) DM > Pt reports that her BGs in the evening have been running higher. I will have her increase 4PM-MN basal rate to 2.70 units per hour. I will order an A1C. With her hx of neuropathy and calluses she would benefit from DM shoes and inserts  Pt to check her BGs 4 times per day and mail her BG logs to me in 2 weeks.       2) HTN > Will not make any changes at this time. 3) HLD > Pt is still taking her Pravastatin 40mg daily, which she is tolerating well. I will order a lipid panel and CMP. 4) Hypothyroidism > Pt is clinically euthyroid on LT4 175mcg daily. I will order TFTs and adjust her dose as needed. Pt voices understanding and agreement with the plan.   Pain noted and pt was recommended to call her PCP for further evaluation and treatment, as needed     RTC 4 months(once her labs are back     CC:  Dr. Ferrari Both            Sincerely,      Josef Gordillo MD

## 2023-02-03 ENCOUNTER — TELEPHONE (OUTPATIENT)
Dept: ENDOCRINOLOGY | Age: 73
End: 2023-02-03

## 2023-02-03 NOTE — TELEPHONE ENCOUNTER
Pt called and LVM 2/3 @ 3:05 pm    Pt stated that dr Ashlee Palomino wanted to talk to her but she is not sure what it is about. No other information was left.     Pt# [phone]

## 2023-02-06 NOTE — TELEPHONE ENCOUNTER
Advised partner that she needs a follow up appointment. She expressed understanding.  _____  Please call patient to schedule an appointment.  Thank you

## 2023-03-14 RX ORDER — INSULIN ASPART 100 [IU]/ML
INJECTION, SOLUTION INTRAVENOUS; SUBCUTANEOUS
Qty: 90 ML | Refills: 2 | Status: SHIPPED | OUTPATIENT
Start: 2023-03-14

## 2023-03-22 ENCOUNTER — DOCUMENTATION ONLY (OUTPATIENT)
Dept: ENDOCRINOLOGY | Age: 73
End: 2023-03-22

## 2023-05-31 RX ORDER — METOPROLOL TARTRATE 50 MG/1
TABLET, FILM COATED ORAL
Qty: 180 TABLET | Refills: 3 | Status: SHIPPED | OUTPATIENT
Start: 2023-05-31

## 2023-06-10 LAB
CREATININE, EXTERNAL: 2.9
HBA1C MFR BLD HPLC: 6.1 %
LDL CHOLESTEROL, EXTERNAL: 136

## 2023-06-12 RX ORDER — LEVOTHYROXINE SODIUM 175 UG/1
TABLET ORAL
Qty: 90 TABLET | Refills: 1 | Status: SHIPPED | OUTPATIENT
Start: 2023-06-12

## 2023-08-18 ENCOUNTER — APPOINTMENT (OUTPATIENT)
Facility: HOSPITAL | Age: 73
DRG: 682 | End: 2023-08-18
Payer: MEDICARE

## 2023-08-18 ENCOUNTER — HOSPITAL ENCOUNTER (INPATIENT)
Facility: HOSPITAL | Age: 73
LOS: 6 days | Discharge: HOME | DRG: 682 | End: 2023-08-24
Attending: EMERGENCY MEDICINE | Admitting: FAMILY MEDICINE
Payer: MEDICARE

## 2023-08-18 DIAGNOSIS — E87.5 HYPERKALEMIA: ICD-10-CM

## 2023-08-18 DIAGNOSIS — R41.0 DELIRIUM: Primary | ICD-10-CM

## 2023-08-18 DIAGNOSIS — N19 UREMIA: ICD-10-CM

## 2023-08-18 DIAGNOSIS — R73.9 HYPERGLYCEMIA: ICD-10-CM

## 2023-08-18 DIAGNOSIS — N17.9 AKI (ACUTE KIDNEY INJURY) (HCC): ICD-10-CM

## 2023-08-18 PROBLEM — E10.65 TYPE 1 DIABETES MELLITUS WITH HYPERGLYCEMIA (HCC): Status: ACTIVE | Noted: 2023-08-18

## 2023-08-18 LAB
ACCESSION NUMBER, LLC1M: ABNORMAL
ACINETOBACTER CALCOAC BAUMANNII COMPLEX BY PCR: NOT DETECTED
ALBUMIN SERPL-MCNC: 2.7 G/DL (ref 3.5–5)
ALBUMIN SERPL-MCNC: 2.8 G/DL (ref 3.5–5)
ALBUMIN SERPL-MCNC: 2.9 G/DL (ref 3.5–5)
ALBUMIN SERPL-MCNC: 3.2 G/DL (ref 3.5–5)
ALBUMIN/GLOB SERPL: 0.7 (ref 1.1–2.2)
ALP SERPL-CCNC: 83 U/L (ref 45–117)
ALT SERPL-CCNC: 10 U/L (ref 12–78)
ANION GAP SERPL CALC-SCNC: 1 MMOL/L (ref 5–15)
ANION GAP SERPL CALC-SCNC: 2 MMOL/L (ref 5–15)
ANION GAP SERPL CALC-SCNC: 3 MMOL/L (ref 5–15)
ANION GAP SERPL CALC-SCNC: 3 MMOL/L (ref 5–15)
ANION GAP SERPL CALC-SCNC: 4 MMOL/L (ref 5–15)
AST SERPL-CCNC: 6 U/L (ref 15–37)
B FRAGILIS DNA BLD POS QL NAA+NON-PROBE: NOT DETECTED
BASOPHILS # BLD: 0 K/UL (ref 0–0.1)
BASOPHILS NFR BLD: 0 % (ref 0–1)
BILIRUB SERPL-MCNC: 0.3 MG/DL (ref 0.2–1)
BIOFIRE TEST COMMENT: ABNORMAL
BUN SERPL-MCNC: 58 MG/DL (ref 6–20)
BUN SERPL-MCNC: 61 MG/DL (ref 6–20)
BUN SERPL-MCNC: 64 MG/DL (ref 6–20)
BUN/CREAT SERPL: 16 (ref 12–20)
BUN/CREAT SERPL: 17 (ref 12–20)
BUN/CREAT SERPL: 17 (ref 12–20)
C ALBICANS DNA BLD POS QL NAA+NON-PROBE: NOT DETECTED
C AURIS DNA BLD POS QL NAA+NON-PROBE: NOT DETECTED
C GATTII+NEOFOR DNA BLD POS QL NAA+N-PRB: NOT DETECTED
C GLABRATA DNA BLD POS QL NAA+NON-PROBE: NOT DETECTED
C KRUSEI DNA BLD POS QL NAA+NON-PROBE: NOT DETECTED
C PARAP DNA BLD POS QL NAA+NON-PROBE: NOT DETECTED
C TROPICLS DNA BLD POS QL NAA+NON-PROBE: NOT DETECTED
CALCIUM SERPL-MCNC: 8.7 MG/DL (ref 8.5–10.1)
CALCIUM SERPL-MCNC: 8.9 MG/DL (ref 8.5–10.1)
CALCIUM SERPL-MCNC: 9 MG/DL (ref 8.5–10.1)
CALCIUM SERPL-MCNC: 9 MG/DL (ref 8.5–10.1)
CALCIUM SERPL-MCNC: 9.2 MG/DL (ref 8.5–10.1)
CHLORIDE SERPL-SCNC: 106 MMOL/L (ref 97–108)
CHLORIDE SERPL-SCNC: 108 MMOL/L (ref 97–108)
CHLORIDE SERPL-SCNC: 111 MMOL/L (ref 97–108)
CO2 SERPL-SCNC: 25 MMOL/L (ref 21–32)
CO2 SERPL-SCNC: 25 MMOL/L (ref 21–32)
CO2 SERPL-SCNC: 26 MMOL/L (ref 21–32)
CO2 SERPL-SCNC: 26 MMOL/L (ref 21–32)
CO2 SERPL-SCNC: 28 MMOL/L (ref 21–32)
COMMENT:: NORMAL
COMMENT:: NORMAL
CREAT SERPL-MCNC: 3.38 MG/DL (ref 0.55–1.02)
CREAT SERPL-MCNC: 3.56 MG/DL (ref 0.55–1.02)
CREAT SERPL-MCNC: 3.72 MG/DL (ref 0.55–1.02)
CREAT SERPL-MCNC: 3.72 MG/DL (ref 0.55–1.02)
CREAT SERPL-MCNC: 3.8 MG/DL (ref 0.55–1.02)
CREAT UR-MCNC: 65.9 MG/DL
CREAT UR-MCNC: 69 MG/DL
DIFFERENTIAL METHOD BLD: ABNORMAL
E CLOAC COMP DNA BLD POS NAA+NON-PROBE: NOT DETECTED
E COLI DNA BLD POS QL NAA+NON-PROBE: NOT DETECTED
E FAECALIS DNA BLD POS QL NAA+NON-PROBE: NOT DETECTED
E FAECIUM DNA BLD POS QL NAA+NON-PROBE: NOT DETECTED
EKG ATRIAL RATE: 91 BPM
EKG ATRIAL RATE: 93 BPM
EKG DIAGNOSIS: NORMAL
EKG DIAGNOSIS: NORMAL
EKG P AXIS: 75 DEGREES
EKG P AXIS: 95 DEGREES
EKG P-R INTERVAL: 158 MS
EKG P-R INTERVAL: 160 MS
EKG Q-T INTERVAL: 368 MS
EKG Q-T INTERVAL: 382 MS
EKG QRS DURATION: 86 MS
EKG QRS DURATION: 88 MS
EKG QTC CALCULATION (BAZETT): 457 MS
EKG QTC CALCULATION (BAZETT): 469 MS
EKG R AXIS: 157 DEGREES
EKG R AXIS: 43 DEGREES
EKG T AXIS: 76 DEGREES
EKG T AXIS: 99 DEGREES
EKG VENTRICULAR RATE: 91 BPM
EKG VENTRICULAR RATE: 93 BPM
ENTEROBACTERALES DNA BLD POS NAA+N-PRB: NOT DETECTED
EOSINOPHIL # BLD: 0 K/UL (ref 0–0.4)
EOSINOPHIL NFR BLD: 0 % (ref 0–7)
ERYTHROCYTE [DISTWIDTH] IN BLOOD BY AUTOMATED COUNT: 12.9 % (ref 11.5–14.5)
EST. AVERAGE GLUCOSE BLD GHB EST-MCNC: 117 MG/DL
FOLATE SERPL-MCNC: 10.3 NG/ML (ref 5–21)
GLOBULIN SER CALC-MCNC: 4.8 G/DL (ref 2–4)
GLUCOSE BLD STRIP.AUTO-MCNC: 229 MG/DL (ref 65–117)
GLUCOSE BLD STRIP.AUTO-MCNC: 233 MG/DL (ref 65–117)
GLUCOSE BLD STRIP.AUTO-MCNC: 264 MG/DL (ref 65–117)
GLUCOSE BLD STRIP.AUTO-MCNC: 271 MG/DL (ref 65–117)
GLUCOSE BLD STRIP.AUTO-MCNC: 292 MG/DL (ref 65–117)
GLUCOSE BLD STRIP.AUTO-MCNC: 321 MG/DL (ref 65–117)
GLUCOSE BLD STRIP.AUTO-MCNC: 375 MG/DL (ref 65–117)
GLUCOSE BLD STRIP.AUTO-MCNC: 384 MG/DL (ref 65–117)
GLUCOSE BLD STRIP.AUTO-MCNC: NORMAL MG/DL (ref 65–117)
GLUCOSE SERPL-MCNC: 233 MG/DL (ref 65–100)
GLUCOSE SERPL-MCNC: 260 MG/DL (ref 65–100)
GLUCOSE SERPL-MCNC: 261 MG/DL (ref 65–100)
GLUCOSE SERPL-MCNC: 414 MG/DL (ref 65–100)
GLUCOSE SERPL-MCNC: 419 MG/DL (ref 65–100)
GP B STREP DNA BLD POS QL NAA+NON-PROBE: NOT DETECTED
HAEM INFLU DNA BLD POS QL NAA+NON-PROBE: NOT DETECTED
HBA1C MFR BLD: 5.7 % (ref 4–5.6)
HCT VFR BLD AUTO: 35.9 % (ref 35–47)
HGB BLD-MCNC: 10.5 G/DL (ref 11.5–16)
IMM GRANULOCYTES # BLD AUTO: 0 K/UL
IMM GRANULOCYTES NFR BLD AUTO: 0 %
K OXYTOCA DNA BLD POS QL NAA+NON-PROBE: NOT DETECTED
KLEBSIELLA SP DNA BLD POS QL NAA+NON-PRB: NOT DETECTED
KLEBSIELLA SP DNA BLD POS QL NAA+NON-PRB: NOT DETECTED
L MONOCYTOG DNA BLD POS QL NAA+NON-PROBE: NOT DETECTED
LACTATE SERPL-SCNC: 1.4 MMOL/L (ref 0.4–2)
LYMPHOCYTES # BLD: 0.6 K/UL (ref 0.8–3.5)
LYMPHOCYTES NFR BLD: 7 % (ref 12–49)
MAGNESIUM SERPL-MCNC: 1.4 MG/DL (ref 1.6–2.4)
MCH RBC QN AUTO: 31.7 PG (ref 26–34)
MCHC RBC AUTO-ENTMCNC: 29.2 G/DL (ref 30–36.5)
MCV RBC AUTO: 108.5 FL (ref 80–99)
MECA+MECC ISLT/SPM QL: DETECTED
METAMYELOCYTES NFR BLD MANUAL: 1 %
MONOCYTES # BLD: 0.4 K/UL (ref 0–1)
MONOCYTES NFR BLD: 5 % (ref 5–13)
N MEN DNA BLD POS QL NAA+NON-PROBE: NOT DETECTED
NEUTS BAND NFR BLD MANUAL: 1 % (ref 0–6)
NEUTS SEG # BLD: 7 K/UL (ref 1.8–8)
NEUTS SEG NFR BLD: 86 % (ref 32–75)
NRBC # BLD: 0 K/UL (ref 0–0.01)
NRBC BLD-RTO: 0 PER 100 WBC
P AERUGINOSA DNA BLD POS NAA+NON-PROBE: NOT DETECTED
PHOSPHATE SERPL-MCNC: 3.7 MG/DL (ref 2.6–4.7)
PHOSPHATE SERPL-MCNC: 3.7 MG/DL (ref 2.6–4.7)
PHOSPHATE SERPL-MCNC: 4.5 MG/DL (ref 2.6–4.7)
PLATELET # BLD AUTO: 181 K/UL (ref 150–400)
PMV BLD AUTO: 11.6 FL (ref 8.9–12.9)
POTASSIUM SERPL-SCNC: 5.5 MMOL/L (ref 3.5–5.1)
POTASSIUM SERPL-SCNC: 5.6 MMOL/L (ref 3.5–5.1)
POTASSIUM SERPL-SCNC: 5.7 MMOL/L (ref 3.5–5.1)
POTASSIUM SERPL-SCNC: 5.7 MMOL/L (ref 3.5–5.1)
POTASSIUM SERPL-SCNC: 5.8 MMOL/L (ref 3.5–5.1)
POTASSIUM SERPL-SCNC: 7.2 MMOL/L (ref 3.5–5.1)
PROCALCITONIN SERPL-MCNC: <0.05 NG/ML
PROT SERPL-MCNC: 8 G/DL (ref 6.4–8.2)
PROT UR-MCNC: 282 MG/DL (ref 0–11.9)
PROT/CREAT UR-RTO: 4.3
PROTEUS SP DNA BLD POS QL NAA+NON-PROBE: NOT DETECTED
RBC # BLD AUTO: 3.31 M/UL (ref 3.8–5.2)
RBC MORPH BLD: ABNORMAL
RESISTANT GENE TARGETS: ABNORMAL
S AUREUS DNA BLD POS QL NAA+NON-PROBE: NOT DETECTED
S AUREUS+CONS DNA BLD POS NAA+NON-PROBE: DETECTED
S EPIDERMIDIS DNA BLD POS QL NAA+NON-PRB: DETECTED
S LUGDUNENSIS DNA BLD POS QL NAA+NON-PRB: NOT DETECTED
S MALTOPHILIA DNA BLD POS QL NAA+NON-PRB: NOT DETECTED
S MARCESCENS DNA BLD POS NAA+NON-PROBE: NOT DETECTED
S PNEUM DNA BLD POS QL NAA+NON-PROBE: NOT DETECTED
S PYO DNA BLD POS QL NAA+NON-PROBE: NOT DETECTED
SALMONELLA DNA BLD POS QL NAA+NON-PROBE: NOT DETECTED
SERVICE CMNT-IMP: ABNORMAL
SERVICE CMNT-IMP: NORMAL
SODIUM SERPL-SCNC: 136 MMOL/L (ref 136–145)
SODIUM SERPL-SCNC: 137 MMOL/L (ref 136–145)
SODIUM SERPL-SCNC: 139 MMOL/L (ref 136–145)
SODIUM UR-SCNC: 65 MMOL/L
SPECIMEN HOLD: NORMAL
SPECIMEN HOLD: NORMAL
STREPTOCOCCUS DNA BLD POS NAA+NON-PROBE: NOT DETECTED
T4 FREE SERPL-MCNC: 0.7 NG/DL (ref 0.8–1.5)
TSH SERPL DL<=0.05 MIU/L-ACNC: 11.8 UIU/ML (ref 0.36–3.74)
VIT B12 SERPL-MCNC: 461 PG/ML (ref 193–986)
WBC # BLD AUTO: 8.1 K/UL (ref 3.6–11)

## 2023-08-18 PROCEDURE — 2580000003 HC RX 258: Performed by: EMERGENCY MEDICINE

## 2023-08-18 PROCEDURE — 2580000003 HC RX 258: Performed by: INTERNAL MEDICINE

## 2023-08-18 PROCEDURE — 84156 ASSAY OF PROTEIN URINE: CPT

## 2023-08-18 PROCEDURE — 85025 COMPLETE CBC W/AUTO DIFF WBC: CPT

## 2023-08-18 PROCEDURE — 83735 ASSAY OF MAGNESIUM: CPT

## 2023-08-18 PROCEDURE — 96365 THER/PROPH/DIAG IV INF INIT: CPT

## 2023-08-18 PROCEDURE — 84443 ASSAY THYROID STIM HORMONE: CPT

## 2023-08-18 PROCEDURE — 70450 CT HEAD/BRAIN W/O DYE: CPT

## 2023-08-18 PROCEDURE — 87040 BLOOD CULTURE FOR BACTERIA: CPT

## 2023-08-18 PROCEDURE — 2060000000 HC ICU INTERMEDIATE R&B

## 2023-08-18 PROCEDURE — 96375 TX/PRO/DX INJ NEW DRUG ADDON: CPT

## 2023-08-18 PROCEDURE — 83605 ASSAY OF LACTIC ACID: CPT

## 2023-08-18 PROCEDURE — 99233 SBSQ HOSP IP/OBS HIGH 50: CPT

## 2023-08-18 PROCEDURE — 82607 VITAMIN B-12: CPT

## 2023-08-18 PROCEDURE — 6370000000 HC RX 637 (ALT 250 FOR IP): Performed by: PHYSICIAN ASSISTANT

## 2023-08-18 PROCEDURE — 36415 COLL VENOUS BLD VENIPUNCTURE: CPT

## 2023-08-18 PROCEDURE — 94640 AIRWAY INHALATION TREATMENT: CPT

## 2023-08-18 PROCEDURE — 6370000000 HC RX 637 (ALT 250 FOR IP)

## 2023-08-18 PROCEDURE — 82570 ASSAY OF URINE CREATININE: CPT

## 2023-08-18 PROCEDURE — 83036 HEMOGLOBIN GLYCOSYLATED A1C: CPT

## 2023-08-18 PROCEDURE — 84145 PROCALCITONIN (PCT): CPT

## 2023-08-18 PROCEDURE — 6370000000 HC RX 637 (ALT 250 FOR IP): Performed by: FAMILY MEDICINE

## 2023-08-18 PROCEDURE — 80069 RENAL FUNCTION PANEL: CPT

## 2023-08-18 PROCEDURE — 93005 ELECTROCARDIOGRAM TRACING: CPT | Performed by: EMERGENCY MEDICINE

## 2023-08-18 PROCEDURE — 6370000000 HC RX 637 (ALT 250 FOR IP): Performed by: INTERNAL MEDICINE

## 2023-08-18 PROCEDURE — 76770 US EXAM ABDO BACK WALL COMP: CPT

## 2023-08-18 PROCEDURE — 82962 GLUCOSE BLOOD TEST: CPT

## 2023-08-18 PROCEDURE — 84439 ASSAY OF FREE THYROXINE: CPT

## 2023-08-18 PROCEDURE — 71045 X-RAY EXAM CHEST 1 VIEW: CPT

## 2023-08-18 PROCEDURE — 99285 EMERGENCY DEPT VISIT HI MDM: CPT

## 2023-08-18 PROCEDURE — 6370000000 HC RX 637 (ALT 250 FOR IP): Performed by: EMERGENCY MEDICINE

## 2023-08-18 PROCEDURE — 84300 ASSAY OF URINE SODIUM: CPT

## 2023-08-18 PROCEDURE — 96367 TX/PROPH/DG ADDL SEQ IV INF: CPT

## 2023-08-18 PROCEDURE — 87150 DNA/RNA AMPLIFIED PROBE: CPT

## 2023-08-18 PROCEDURE — 80053 COMPREHEN METABOLIC PANEL: CPT

## 2023-08-18 PROCEDURE — 6360000002 HC RX W HCPCS: Performed by: EMERGENCY MEDICINE

## 2023-08-18 PROCEDURE — 96366 THER/PROPH/DIAG IV INF ADDON: CPT

## 2023-08-18 PROCEDURE — 51702 INSERT TEMP BLADDER CATH: CPT

## 2023-08-18 PROCEDURE — 84132 ASSAY OF SERUM POTASSIUM: CPT

## 2023-08-18 PROCEDURE — 82746 ASSAY OF FOLIC ACID SERUM: CPT

## 2023-08-18 PROCEDURE — 96376 TX/PRO/DX INJ SAME DRUG ADON: CPT

## 2023-08-18 RX ORDER — PANTOPRAZOLE SODIUM 40 MG/1
40 TABLET, DELAYED RELEASE ORAL
Status: DISCONTINUED | OUTPATIENT
Start: 2023-08-18 | End: 2023-08-23

## 2023-08-18 RX ORDER — MAGNESIUM SULFATE IN WATER 40 MG/ML
2000 INJECTION, SOLUTION INTRAVENOUS ONCE
Status: COMPLETED | OUTPATIENT
Start: 2023-08-18 | End: 2023-08-18

## 2023-08-18 RX ORDER — INSULIN GLARGINE 100 [IU]/ML
30 INJECTION, SOLUTION SUBCUTANEOUS NIGHTLY
Status: DISCONTINUED | OUTPATIENT
Start: 2023-08-18 | End: 2023-08-24 | Stop reason: HOSPADM

## 2023-08-18 RX ORDER — DEXTROSE MONOHYDRATE 100 MG/ML
INJECTION, SOLUTION INTRAVENOUS CONTINUOUS PRN
Status: DISCONTINUED | OUTPATIENT
Start: 2023-08-18 | End: 2023-08-24 | Stop reason: HOSPADM

## 2023-08-18 RX ORDER — CALCIUM GLUCONATE 94 MG/ML
1000 INJECTION, SOLUTION INTRAVENOUS ONCE
Status: COMPLETED | OUTPATIENT
Start: 2023-08-18 | End: 2023-08-18

## 2023-08-18 RX ORDER — INSULIN LISPRO 100 [IU]/ML
0-4 INJECTION, SOLUTION INTRAVENOUS; SUBCUTANEOUS NIGHTLY
Status: DISCONTINUED | OUTPATIENT
Start: 2023-08-18 | End: 2023-08-18

## 2023-08-18 RX ORDER — IPRATROPIUM BROMIDE AND ALBUTEROL SULFATE 2.5; .5 MG/3ML; MG/3ML
1 SOLUTION RESPIRATORY (INHALATION) EVERY 4 HOURS PRN
Status: DISCONTINUED | OUTPATIENT
Start: 2023-08-18 | End: 2023-08-24 | Stop reason: HOSPADM

## 2023-08-18 RX ORDER — SODIUM CHLORIDE 9 MG/ML
INJECTION, SOLUTION INTRAVENOUS CONTINUOUS
Status: DISCONTINUED | OUTPATIENT
Start: 2023-08-18 | End: 2023-08-20

## 2023-08-18 RX ORDER — INSULIN LISPRO 100 [IU]/ML
0-16 INJECTION, SOLUTION INTRAVENOUS; SUBCUTANEOUS
Status: DISCONTINUED | OUTPATIENT
Start: 2023-08-18 | End: 2023-08-18

## 2023-08-18 RX ORDER — PRAVASTATIN SODIUM 40 MG
40 TABLET ORAL DAILY
Status: DISCONTINUED | OUTPATIENT
Start: 2023-08-18 | End: 2023-08-24 | Stop reason: HOSPADM

## 2023-08-18 RX ORDER — 0.9 % SODIUM CHLORIDE 0.9 %
1000 INTRAVENOUS SOLUTION INTRAVENOUS ONCE
Status: COMPLETED | OUTPATIENT
Start: 2023-08-18 | End: 2023-08-18

## 2023-08-18 RX ORDER — DEXTROSE MONOHYDRATE 100 MG/ML
INJECTION, SOLUTION INTRAVENOUS CONTINUOUS PRN
Status: DISCONTINUED | OUTPATIENT
Start: 2023-08-18 | End: 2023-08-18 | Stop reason: SDUPTHER

## 2023-08-18 RX ORDER — INSULIN LISPRO 100 [IU]/ML
0-4 INJECTION, SOLUTION INTRAVENOUS; SUBCUTANEOUS
Status: DISCONTINUED | OUTPATIENT
Start: 2023-08-18 | End: 2023-08-20

## 2023-08-18 RX ORDER — INSULIN LISPRO 100 [IU]/ML
0.05 INJECTION, SOLUTION INTRAVENOUS; SUBCUTANEOUS
Status: DISCONTINUED | OUTPATIENT
Start: 2023-08-18 | End: 2023-08-21

## 2023-08-18 RX ORDER — ESCITALOPRAM OXALATE 10 MG/1
10 TABLET ORAL 2 TIMES DAILY
Status: DISCONTINUED | OUTPATIENT
Start: 2023-08-18 | End: 2023-08-24 | Stop reason: HOSPADM

## 2023-08-18 RX ORDER — INSULIN LISPRO 100 [IU]/ML
0-4 INJECTION, SOLUTION INTRAVENOUS; SUBCUTANEOUS NIGHTLY
Status: DISCONTINUED | OUTPATIENT
Start: 2023-08-18 | End: 2023-08-20

## 2023-08-18 RX ADMIN — PANTOPRAZOLE SODIUM 40 MG: 40 TABLET, DELAYED RELEASE ORAL at 09:41

## 2023-08-18 RX ADMIN — CALCIUM GLUCONATE 1000 MG: 98 INJECTION, SOLUTION INTRAVENOUS at 02:49

## 2023-08-18 RX ADMIN — Medication 10 UNITS: at 02:49

## 2023-08-18 RX ADMIN — Medication 2 UNITS: at 14:35

## 2023-08-18 RX ADMIN — Medication 10 UNITS: at 04:21

## 2023-08-18 RX ADMIN — Medication 5 UNITS: at 19:00

## 2023-08-18 RX ADMIN — INSULIN GLARGINE 30 UNITS: 100 INJECTION, SOLUTION SUBCUTANEOUS at 22:37

## 2023-08-18 RX ADMIN — Medication 8 UNITS: at 11:31

## 2023-08-18 RX ADMIN — MAGNESIUM SULFATE HEPTAHYDRATE 2000 MG: 40 INJECTION, SOLUTION INTRAVENOUS at 04:45

## 2023-08-18 RX ADMIN — ESCITALOPRAM OXALATE 10 MG: 10 TABLET ORAL at 22:37

## 2023-08-18 RX ADMIN — SODIUM ZIRCONIUM CYCLOSILICATE 10 G: 10 POWDER, FOR SUSPENSION ORAL at 15:24

## 2023-08-18 RX ADMIN — SODIUM CHLORIDE 1000 ML: 9 INJECTION, SOLUTION INTRAVENOUS at 02:49

## 2023-08-18 RX ADMIN — MOMETASONE FUROATE AND FORMOTEROL FUMARATE DIHYDRATE 2 PUFF: 200; 5 AEROSOL RESPIRATORY (INHALATION) at 20:07

## 2023-08-18 RX ADMIN — Medication 1 UNITS: at 19:01

## 2023-08-18 RX ADMIN — ESCITALOPRAM OXALATE 10 MG: 10 TABLET ORAL at 09:41

## 2023-08-18 RX ADMIN — SODIUM CHLORIDE: 9 INJECTION, SOLUTION INTRAVENOUS at 09:31

## 2023-08-18 RX ADMIN — DEXTROSE MONOHYDRATE 250 ML: 100 INJECTION, SOLUTION INTRAVENOUS at 03:04

## 2023-08-18 RX ADMIN — LEVOTHYROXINE SODIUM 175 MCG: 0.03 TABLET ORAL at 09:41

## 2023-08-18 RX ADMIN — PRAVASTATIN SODIUM 40 MG: 40 TABLET ORAL at 09:41

## 2023-08-18 RX ADMIN — MOMETASONE FUROATE AND FORMOTEROL FUMARATE DIHYDRATE 2 PUFF: 200; 5 AEROSOL RESPIRATORY (INHALATION) at 11:18

## 2023-08-18 ASSESSMENT — ENCOUNTER SYMPTOMS
VOMITING: 0
ABDOMINAL PAIN: 0
SHORTNESS OF BREATH: 0
NAUSEA: 0

## 2023-08-18 ASSESSMENT — LIFESTYLE VARIABLES
HOW MANY STANDARD DRINKS CONTAINING ALCOHOL DO YOU HAVE ON A TYPICAL DAY: PATIENT DOES NOT DRINK
HOW OFTEN DO YOU HAVE A DRINK CONTAINING ALCOHOL: NEVER

## 2023-08-18 ASSESSMENT — PAIN - FUNCTIONAL ASSESSMENT: PAIN_FUNCTIONAL_ASSESSMENT: NONE - DENIES PAIN

## 2023-08-18 NOTE — ED NOTES
Pt resting on stretcher with eyes closed and chest rising and falling appropriately; nad noted. Pt to be eval by day hospitalist. Pt vs documented. Pt sig other at the bedside for support.      Michael Lawler RN  08/18/23 2322

## 2023-08-18 NOTE — ED TRIAGE NOTES
Pt arrives via [facility] w/ complaint of AMS since 2215. Pt on 2L NC at baseline and sats 85% on 2LNC. Pt now on 4L NC BP: 130/60, BS: 300, HR: 111.

## 2023-08-18 NOTE — CONSULTS
[address] [facility]  DIABETES MANAGEMENT CONSULT    Consulted by Provider for advanced nursing evaluation and care for inpatient blood glucose management. Evaluation and Action Plan   Dulce Mccormick is a 68 y.o. female with T1D , CAD, COPD, GERD, HLD, HTN, CKD and hypothyroid who was brought in by her life partner due to confusion and lethargy. She is on an insulin pump at home (see below for settings) and saw her endocrinologist,  Dr. Arsenio Mccracken, back in June, where her settings on her pump were lowered because of frequent overnight low BG. Yesterday, her Bg was 35 when she woke up. Her partner gave her o.j. and sandwich. BG came up to 45. Then had some cake icing, which got her Bg up to 89. Brought to ED shortly after. According to note, her basal adds up to about 62 units/day. She has had CKD, which reportedly has progressively worsened over last few years. On admission, creat 3.72, K 7.2, A1c 5.9%. With her worsening renal function, she likely needs adjustment in her pump again, as dosing now too high for her. She had CGM years ago, but states didn't work well. Discussed getting back on one, as they have improved now. Patient states she would like to try it. Admission Bg 414. Insulin pump was taken off by her partner around 3 am. Has been given 2 doses regular insulin, with dextrose for hyperkalemia. Has also just received 8 units Novolog. Discussed with patient, partner, and nurse regarding awareness of hypoglycemia given her renal status. Will start patient on basal insulin this evening, as well as mealtime coverage once she starts eating. 1430 - applied Freestyle Abdifatah 3 CGM to the back of her left arm. Cornelio downloaded on phone and patient should be able to scan in 1 hour after warm up period. Her partner is going to check with insurance to see if this could continue to be covered, but at least she can keep this on for 14 days to figure out BG trends.       Management Rationale

## 2023-08-18 NOTE — PROGRESS NOTES
Over 1 L of UOP since placement of jaffe, Cr better  Cont IVF, had BM from lokemla earlier  Repeat labs this evening  Should be able to avoid RRT

## 2023-08-18 NOTE — ED NOTES
Pt returned from xray and placed in room. Report rcvd from charge RN. Pt placed on monitor x3 and MD at the bedside.      Gauri Crabtree RN  08/18/23 1543

## 2023-08-18 NOTE — ED NOTES
Shift change report given to Honey Lunsford RN (oncoming nurse) by Val Ornelas (offgoing nurse). Report included the following information Nurse Handoff Report, Index, ED Encounter Summary, ED SBAR, Adult Overview, Intake/Output, MAR, and Recent Results.         Giancarlo Reynoso RN  08/18/23 1958

## 2023-08-18 NOTE — ED NOTES
Pt given additional 10u of insulin for bs of 384. Pt completed ns ivf bolus at this time.  Pt vss and documented      Dai Velarde  08/18/23 4397

## 2023-08-18 NOTE — PROGRESS NOTES
Repeat Cr stable, K 5.7  Renal U/S showing b/l pelvicocalieactasis  Place jaffe  Lokelma x 1  Repeat labs 2pm, call with results

## 2023-08-18 NOTE — H&P
History and Physical    Date of Service:  8/18/2023  Primary Care Provider: Fernando Arechiga MD  Source of information: Patient, Family, Chart Review    Chief Complaint: Altered Mental Status      History of Presenting Illness:   Shabana Bush is a 68 y.o. female with a PMHx of CAD, COPD, DM, GERD, HLD, HTN, CKD and hypothyroid. She presented to the ED from home with a chief complaint of confusion that began around 2230 last evening. She was found to be hyperkalemic with a K+ of 7.2. She was managed medically with insulin, D10 and Calcium, with K+ improving to 5.6. In the ED this morning the patient is tired but rouses easily, she is AAOx3 which is an improvement from overnight per her roommate. She feels tired, but denies any chest pain, palpitations, shortness of breath, headache, changes to vision abdominal pain or any focal or generalized neurologic symptoms    ED imaging  CT head: no acute process, chronic small vessel ischemic disease  CXR: no acute process    Labs:  K+ 7.2 --> 5.6  BG ~ 300s     REVIEW OF SYSTEMS:  A comprehensive review of systems was negative except for that written in the History of Present Illness.      Past Medical History:   Diagnosis Date    Adverse effect of anesthesia     SLOW WAKING PAST ANESTHESIA    Anxiety     Arthritis     CAD (coronary artery disease)     s/p CABG x 3v    Chest pain 7/2015    Chronic obstructive pulmonary disease (HCC)     CTS (carpal tunnel syndrome)     S/p bilateral release    Diabetes ([address])     Diabetic gastroparesis (HCC)     Diabetic neuropathy (HCC)     Diabetic retinopathy ([address])     GERD (gastroesophageal reflux disease)     GI bleed 7/2015    4 bleeds with 9 clips placed    Hypercholesteremia     Hypertension     Hypothyroid     Ill-defined condition 2017    GI BLEED    Ill-defined condition     NEUROPATHY HANDS AND FEET    Nicotine vapor product user     NICOLÁS on CPAP     Osteoporosis     Vitamin D deficiency       Past Surgical History: Albumin/Globulin Ratio 0.7 (*)     All other components within normal limits   MAGNESIUM - Abnormal; Notable for the following components:    Magnesium 1.4 (*)     All other components within normal limits   POTASSIUM - Abnormal; Notable for the following components:    Potassium 5.6 (*)     All other components within normal limits   POCT GLUCOSE - Abnormal; Notable for the following components:    POC Glucose 375 (*)     All other components within normal limits   POCT GLUCOSE - Abnormal; Notable for the following components:    POC Glucose >600 (*)     All other components within normal limits   POCT GLUCOSE - Abnormal; Notable for the following components:    POC Glucose 384 (*)     All other components within normal limits   POCT GLUCOSE - Abnormal; Notable for the following components:    POC Glucose 321 (*)     All other components within normal limits   POCT GLUCOSE - Abnormal; Notable for the following components:    POC Glucose 292 (*)     All other components within normal limits       [unfilled]    IMAGING:   CT HEAD WO CONTRAST   Final Result   Chronic small vessel ischemic disease. No acute intracranial abnormality. XR CHEST PORTABLE   Final Result      No acute process on portable chest.              ECG/ECHO:  [unfilled]       Notes reviewed from all clinical/nonclinical/nursing services involved in patient's clinical care. Care coordination discussions were held with appropriate clinical/nonclinical/ nursing providers based on care coordination needs. Assessment:   Given the patient's current clinical presentation, there is a high level of concern for decompensation if discharged from the emergency department. Complex decision making was performed, which includes reviewing the patient's available past medical records, laboratory results, and imaging studies. Principal Problem:    Acute hyperkalemia  Resolved Problems:    * No resolved hospital problems.  *      Plan: Severe Hyperkalemia (7.2 on admission)  - Responded well to medical management, improved to 5.5  - Nephrology saw the patient, may need RRT  - Repeat BMP now, then trend q8H  - Will consider furosemide for further K+ elimination    JOSH  on CKD IV  - Baseline Cr: 2.5  - Nephrology following  - Renal US ordered  - Check urine lytes  - IVF: NS    COPD  - continue home trelegy ellipta  - On 2L NC at home, now on 4L in ED, wean as tolerated  - PRN duonebs    Confusion: improved  - imaging  grossly unremarkable, likely 2/2 hyperkalemia, hyperglycemia  - Checking TSH/T4  - Check b12/folate    DM  - PTA meds list insulin pump, will place consult to DM management for assistance  - SSI, Accuchecks    Hypotension on chronic hypertension  - Likely 2/2 hypovolemia  - hold home meds  - IVF as above  - Closely monitor BP    Hypothyroidism  - Continue home levothyroxine  - Check TSH/T4 as this may have contributed to confusion    GERD  - Continue pantoprazole    HLD  - Continue statin    Anxiety  - Continue escitalopram    DIET: No diet orders on file   ISOLATION PRECAUTIONS: No active isolations  CODE STATUS: [unfilled]   DVT PROPHYLAXIS: SCDs  FUNCTIONAL STATUS PRIOR TO HOSPITALIZATION: Ambulates by self   EARLY MOBILITY ASSESSMENT: Ambulate with assistance  ANTICIPATED DISCHARGE: 48 hours  ANTICIPATED DISPOSITION: Home  EMERGENCY CONTACT/SURROGATE DECISION MAKER: Lily Solomon (Other)   [phone] (Mobile)    CRITICAL CARE WAS PERFORMED FOR THIS ENCOUNTER: NO      Signed By: Antonette Tellez PA-C     August 18, 2023         Please note that this dictation may have been completed with Amanda Garcia, the computer voice recognition software. Quite often unanticipated grammatical, syntax, homophones, and other interpretive errors are inadvertently transcribed by the computer software. Please disregard these errors. Please excuse any errors that have escaped final proofreading.

## 2023-08-19 LAB
ALBUMIN SERPL-MCNC: 2.5 G/DL (ref 3.5–5)
ANION GAP SERPL CALC-SCNC: 1 MMOL/L (ref 5–15)
ANION GAP SERPL CALC-SCNC: 4 MMOL/L (ref 5–15)
BUN SERPL-MCNC: 58 MG/DL (ref 6–20)
BUN SERPL-MCNC: 63 MG/DL (ref 6–20)
BUN/CREAT SERPL: 18 (ref 12–20)
BUN/CREAT SERPL: 20 (ref 12–20)
CALCIUM SERPL-MCNC: 8.3 MG/DL (ref 8.5–10.1)
CALCIUM SERPL-MCNC: 8.8 MG/DL (ref 8.5–10.1)
CHLORIDE SERPL-SCNC: 113 MMOL/L (ref 97–108)
CHLORIDE SERPL-SCNC: 114 MMOL/L (ref 97–108)
CO2 SERPL-SCNC: 21 MMOL/L (ref 21–32)
CO2 SERPL-SCNC: 26 MMOL/L (ref 21–32)
COMMENT:: NORMAL
COMMENT:: NORMAL
CREAT SERPL-MCNC: 3.15 MG/DL (ref 0.55–1.02)
CREAT SERPL-MCNC: 3.21 MG/DL (ref 0.55–1.02)
GLUCOSE BLD STRIP.AUTO-MCNC: 282 MG/DL (ref 65–117)
GLUCOSE BLD STRIP.AUTO-MCNC: 284 MG/DL (ref 65–117)
GLUCOSE BLD STRIP.AUTO-MCNC: 286 MG/DL (ref 65–117)
GLUCOSE BLD STRIP.AUTO-MCNC: 288 MG/DL (ref 65–117)
GLUCOSE BLD STRIP.AUTO-MCNC: 352 MG/DL (ref 65–117)
GLUCOSE SERPL-MCNC: 246 MG/DL (ref 65–100)
GLUCOSE SERPL-MCNC: 281 MG/DL (ref 65–100)
PHOSPHATE SERPL-MCNC: 3.2 MG/DL (ref 2.6–4.7)
POTASSIUM SERPL-SCNC: 5.4 MMOL/L (ref 3.5–5.1)
POTASSIUM SERPL-SCNC: 5.7 MMOL/L (ref 3.5–5.1)
SERVICE CMNT-IMP: ABNORMAL
SODIUM SERPL-SCNC: 139 MMOL/L (ref 136–145)
SODIUM SERPL-SCNC: 140 MMOL/L (ref 136–145)
SPECIMEN HOLD: NORMAL
SPECIMEN HOLD: NORMAL

## 2023-08-19 PROCEDURE — 6360000002 HC RX W HCPCS

## 2023-08-19 PROCEDURE — 2060000000 HC ICU INTERMEDIATE R&B

## 2023-08-19 PROCEDURE — 6370000000 HC RX 637 (ALT 250 FOR IP)

## 2023-08-19 PROCEDURE — 36415 COLL VENOUS BLD VENIPUNCTURE: CPT

## 2023-08-19 PROCEDURE — 87040 BLOOD CULTURE FOR BACTERIA: CPT

## 2023-08-19 PROCEDURE — 6370000000 HC RX 637 (ALT 250 FOR IP): Performed by: HOSPITALIST

## 2023-08-19 PROCEDURE — 80048 BASIC METABOLIC PNL TOTAL CA: CPT

## 2023-08-19 PROCEDURE — 6370000000 HC RX 637 (ALT 250 FOR IP): Performed by: PHYSICIAN ASSISTANT

## 2023-08-19 PROCEDURE — 82962 GLUCOSE BLOOD TEST: CPT

## 2023-08-19 PROCEDURE — 80069 RENAL FUNCTION PANEL: CPT

## 2023-08-19 PROCEDURE — 6370000000 HC RX 637 (ALT 250 FOR IP): Performed by: INTERNAL MEDICINE

## 2023-08-19 PROCEDURE — 94640 AIRWAY INHALATION TREATMENT: CPT

## 2023-08-19 PROCEDURE — 2580000003 HC RX 258: Performed by: INTERNAL MEDICINE

## 2023-08-19 RX ORDER — FERROUS SULFATE 325(65) MG
650 TABLET ORAL
Status: DISCONTINUED | OUTPATIENT
Start: 2023-08-19 | End: 2023-08-24 | Stop reason: HOSPADM

## 2023-08-19 RX ORDER — METOPROLOL TARTRATE 50 MG/1
50 TABLET, FILM COATED ORAL 2 TIMES DAILY
Status: DISCONTINUED | OUTPATIENT
Start: 2023-08-19 | End: 2023-08-24 | Stop reason: HOSPADM

## 2023-08-19 RX ADMIN — MOMETASONE FUROATE AND FORMOTEROL FUMARATE DIHYDRATE 2 PUFF: 200; 5 AEROSOL RESPIRATORY (INHALATION) at 21:54

## 2023-08-19 RX ADMIN — Medication 4 UNITS: at 08:38

## 2023-08-19 RX ADMIN — LEVOTHYROXINE SODIUM 175 MCG: 0.03 TABLET ORAL at 08:28

## 2023-08-19 RX ADMIN — METOPROLOL TARTRATE 50 MG: 50 TABLET, FILM COATED ORAL at 08:28

## 2023-08-19 RX ADMIN — SODIUM CHLORIDE: 9 INJECTION, SOLUTION INTRAVENOUS at 21:52

## 2023-08-19 RX ADMIN — MOMETASONE FUROATE AND FORMOTEROL FUMARATE DIHYDRATE 2 PUFF: 200; 5 AEROSOL RESPIRATORY (INHALATION) at 07:58

## 2023-08-19 RX ADMIN — INSULIN GLARGINE 30 UNITS: 100 INJECTION, SOLUTION SUBCUTANEOUS at 21:53

## 2023-08-19 RX ADMIN — Medication 2500 MG: at 00:56

## 2023-08-19 RX ADMIN — Medication 2 UNITS: at 14:51

## 2023-08-19 RX ADMIN — PRAVASTATIN SODIUM 40 MG: 40 TABLET ORAL at 08:28

## 2023-08-19 RX ADMIN — SODIUM ZIRCONIUM CYCLOSILICATE 10 G: 10 POWDER, FOR SUSPENSION ORAL at 17:56

## 2023-08-19 RX ADMIN — METOPROLOL TARTRATE 50 MG: 50 TABLET, FILM COATED ORAL at 21:52

## 2023-08-19 RX ADMIN — ESCITALOPRAM OXALATE 10 MG: 10 TABLET ORAL at 21:52

## 2023-08-19 RX ADMIN — FERROUS SULFATE TAB 325 MG (65 MG ELEMENTAL FE) 650 MG: 325 (65 FE) TAB at 08:29

## 2023-08-19 RX ADMIN — SODIUM ZIRCONIUM CYCLOSILICATE 10 G: 10 POWDER, FOR SUSPENSION ORAL at 14:53

## 2023-08-19 RX ADMIN — PANTOPRAZOLE SODIUM 40 MG: 40 TABLET, DELAYED RELEASE ORAL at 08:28

## 2023-08-19 RX ADMIN — ESCITALOPRAM OXALATE 10 MG: 10 TABLET ORAL at 08:29

## 2023-08-19 ASSESSMENT — PAIN SCALES - GENERAL: PAINLEVEL_OUTOF10: 6

## 2023-08-19 ASSESSMENT — PAIN DESCRIPTION - DESCRIPTORS: DESCRIPTORS: ACHING

## 2023-08-19 ASSESSMENT — PAIN DESCRIPTION - LOCATION: LOCATION: BACK

## 2023-08-19 NOTE — PROGRESS NOTES
[address] [facility]  Hospitalist Group                                                                                          Hospitalist Progress Note  Early MD Mary  Office Phone: [phone]        Date of Service:  2023  NAME:  Chauncey Delgado  :    MRN:  [id number]       Admission Summary:   Chauncey Delgado is a 68 y.o. female with a PMHx of CAD, COPD, DM, GERD, HLD, HTN, CKD and hypothyroid. She presented to the ED from home with a chief complaint of confusion that began around 2230 last evening. She was found to be hyperkalemic with a K+ of 7.2. She was managed medically with insulin, D10 and Calcium, with K+ improving to 5.6. In the ED this morning the patient is tired but rouses easily, she is AAOx3 which is an improvement from overnight per her roommate. She feels tired, but denies any chest pain, palpitations, shortness of breath, headache, changes to vision abdominal pain or any focal or generalized neurologic symptoms     ED imaging  CT head: no acute process, chronic small vessel ischemic disease  CXR: no acute process     Labs:  K+ 7.2 --> 5.6  BG ~ 300s       Interval history / Subjective:    Follow up Severe hyperkalemia   Good urine output  Creatinine improving  BP high  Assessment & Plan:     Severe Hyperkalemia (7.2 on admission)  JOSH  on CKD IV--improving  - Baseline Cr: 2.5  - Renal US ordered unremarkable  - Responded well to medical management, improved to 5.5  - Appreciate Nephrology    Probable bacteremia  -blood culture  1/2 bottles coagulase neg staph  -repeat blood culture  -Was given Vanc  early am  -The patient does not have any signs of infection, will monitor without antibiotics     COPD  Chronic hypoxic respiratory failure, baseline 2l NC  - continue home trelegy ellipta  - PRN duonebs     Acute metabolic encephalopathy: improved  - imaging  grossly unremarkable, likely 2/2 hyperkalemia, hyperglycemia  - Checking TSH/T4  - Check

## 2023-08-19 NOTE — ED NOTES
Report called to the unit 4S, nurse will call back. Coretta Salmeron RN  08/19/23 9687      Attempt to call report #2, nurse will call back.      Coretta Salmeron RN  08/19/23 9679

## 2023-08-19 NOTE — ED NOTES
Assumed patient care. Pt is total care. AOX1 Person. Sister at bedside  Neri Horne  ). O2 2 sat RA 88-89% Supplemental 2L started. Pt lethargic, sister stated that she normally sleep a lot at home.       Colt Barney RN  08/19/23 7009

## 2023-08-19 NOTE — ED NOTES
System Downtime Recovery  The EMR experienced a system downtime. This downtime occurred on August 19 at 0100 AM for a duration of 1 hour(s). During this downtime paper charting was completed by RAFAELA Amato RN  08/19/23 8738

## 2023-08-19 NOTE — ED NOTES
System Downtime Recovery  The EMR experienced a system downtime. This downtime occurred on August 19 at 0100 AM for a duration of 1 hour(s) and 15 minutes. During this downtime paper charting was completed by me. The following was documented on paper during the downtime and is now being back-entered: [id number]. The following is remaining on paper and will be scanned into Epic.            Arline Mills RN  08/19/23 7179

## 2023-08-19 NOTE — ED NOTES
Bedside shift change report given to [address] (oncoming nurse) by Jessica Salguero RN (offgoing nurse). Report included the following information ED Encounter Summary, ED SBAR, MAR, and Recent Results.         Khai Mayo RN  08/19/23 1018

## 2023-08-19 NOTE — ED NOTES
Shift change report given to Pura Leung RN (oncoming nurse) by Haider Tidwell (offgoing nurse). Report included the following information Nurse Handoff Report, Index, ED Encounter Summary, ED SBAR, Adult Overview, Intake/Output, MAR, and Recent Results.         Nik Duffy RN  08/19/23 6777

## 2023-08-19 NOTE — ED NOTES
TRANSFER - OUT REPORT:    Verbal report given to Lorenzo on Arline Deluca  being transferred to  for routine progression of patient care       Report consisted of patient's Situation, Background, Assessment and   Recommendations(SBAR). Information from the following report(s) Nurse Handoff Report, Index, ED Encounter Summary, ED SBAR, Adult Overview, Surgery Report, Intake/Output, MAR, Recent Results, and Cardiac Rhythm SR  was reviewed with the receiving nurse. [place] Fall Assessment:    Presents to emergency department  because of falls (Syncope, seizure, or loss of consciousness): No  Age > 70: Yes  Altered Mental Status, Intoxication with alcohol or substance confusion (Disorientation, impaired judgment, poor safety awaremess, or inability to follow instructions): Yes  Impaired Mobility: Ambulates or transfers with assistive devices or assistance; Unable to ambulate or transer.: No  Nursing Judgement: Yes          Lines:   Peripheral IV Right Forearm (Active)   Site Assessment Clean, dry & intact 08/18/23 2346   Phlebitis Assessment No symptoms 08/18/23 2346   Infiltration Assessment 0 08/18/23 2346   Dressing Status New dressing applied;Clean, dry & intact 08/18/23 2346   Dressing Type Transparent 08/18/23 2346   Dressing Intervention New 08/18/23 2346        Opportunity for questions and clarification was provided.       Patient transported with:  Monitor and Registered Nurse          Hope Ardon RN  08/19/23 8363

## 2023-08-20 LAB
ALBUMIN SERPL-MCNC: 2.6 G/DL (ref 3.5–5)
ANION GAP SERPL CALC-SCNC: 4 MMOL/L (ref 5–15)
ANION GAP SERPL CALC-SCNC: 4 MMOL/L (ref 5–15)
BACTERIA SPEC CULT: ABNORMAL
BACTERIA SPEC CULT: ABNORMAL
BASOPHILS # BLD: 0.1 K/UL (ref 0–0.1)
BASOPHILS NFR BLD: 1 % (ref 0–1)
BUN SERPL-MCNC: 64 MG/DL (ref 6–20)
BUN SERPL-MCNC: 64 MG/DL (ref 6–20)
BUN/CREAT SERPL: 20 (ref 12–20)
BUN/CREAT SERPL: 21 (ref 12–20)
CALCIUM SERPL-MCNC: 8.7 MG/DL (ref 8.5–10.1)
CALCIUM SERPL-MCNC: 8.8 MG/DL (ref 8.5–10.1)
CHLORIDE SERPL-SCNC: 113 MMOL/L (ref 97–108)
CHLORIDE SERPL-SCNC: 114 MMOL/L (ref 97–108)
CO2 SERPL-SCNC: 22 MMOL/L (ref 21–32)
CO2 SERPL-SCNC: 24 MMOL/L (ref 21–32)
CREAT SERPL-MCNC: 3.1 MG/DL (ref 0.55–1.02)
CREAT SERPL-MCNC: 3.13 MG/DL (ref 0.55–1.02)
DIFFERENTIAL METHOD BLD: ABNORMAL
EOSINOPHIL # BLD: 0.1 K/UL (ref 0–0.4)
EOSINOPHIL NFR BLD: 1 % (ref 0–7)
ERYTHROCYTE [DISTWIDTH] IN BLOOD BY AUTOMATED COUNT: 12.9 % (ref 11.5–14.5)
GLUCOSE BLD STRIP.AUTO-MCNC: 231 MG/DL (ref 65–117)
GLUCOSE BLD STRIP.AUTO-MCNC: 250 MG/DL (ref 65–117)
GLUCOSE BLD STRIP.AUTO-MCNC: 286 MG/DL (ref 65–117)
GLUCOSE BLD STRIP.AUTO-MCNC: 317 MG/DL (ref 65–117)
GLUCOSE BLD STRIP.AUTO-MCNC: 321 MG/DL (ref 65–117)
GLUCOSE SERPL-MCNC: 284 MG/DL (ref 65–100)
GLUCOSE SERPL-MCNC: 286 MG/DL (ref 65–100)
HCT VFR BLD AUTO: 25.2 % (ref 35–47)
HEMOCCULT STL QL: POSITIVE
HGB BLD-MCNC: 7.7 G/DL (ref 11.5–16)
IMM GRANULOCYTES # BLD AUTO: 0.1 K/UL (ref 0–0.04)
IMM GRANULOCYTES NFR BLD AUTO: 1 % (ref 0–0.5)
LYMPHOCYTES # BLD: 0.6 K/UL (ref 0.8–3.5)
LYMPHOCYTES NFR BLD: 7 % (ref 12–49)
MCH RBC QN AUTO: 31.7 PG (ref 26–34)
MCHC RBC AUTO-ENTMCNC: 30.6 G/DL (ref 30–36.5)
MCV RBC AUTO: 103.7 FL (ref 80–99)
MONOCYTES # BLD: 0.6 K/UL (ref 0–1)
MONOCYTES NFR BLD: 7 % (ref 5–13)
NEUTS SEG # BLD: 6.4 K/UL (ref 1.8–8)
NEUTS SEG NFR BLD: 83 % (ref 32–75)
NRBC # BLD: 0 K/UL (ref 0–0.01)
NRBC BLD-RTO: 0 PER 100 WBC
PHOSPHATE SERPL-MCNC: 3 MG/DL (ref 2.6–4.7)
PLATELET # BLD AUTO: 138 K/UL (ref 150–400)
PLATELET COMMENT: ABNORMAL
PMV BLD AUTO: 11.7 FL (ref 8.9–12.9)
POTASSIUM SERPL-SCNC: 5 MMOL/L (ref 3.5–5.1)
POTASSIUM SERPL-SCNC: 5 MMOL/L (ref 3.5–5.1)
RBC # BLD AUTO: 2.43 M/UL (ref 3.8–5.2)
RBC MORPH BLD: ABNORMAL
SERVICE CMNT-IMP: ABNORMAL
SODIUM SERPL-SCNC: 140 MMOL/L (ref 136–145)
SODIUM SERPL-SCNC: 141 MMOL/L (ref 136–145)
WBC # BLD AUTO: 7.9 K/UL (ref 3.6–11)

## 2023-08-20 PROCEDURE — 2060000000 HC ICU INTERMEDIATE R&B

## 2023-08-20 PROCEDURE — 6360000002 HC RX W HCPCS: Performed by: HOSPITALIST

## 2023-08-20 PROCEDURE — 6370000000 HC RX 637 (ALT 250 FOR IP)

## 2023-08-20 PROCEDURE — 2700000000 HC OXYGEN THERAPY PER DAY

## 2023-08-20 PROCEDURE — 94640 AIRWAY INHALATION TREATMENT: CPT

## 2023-08-20 PROCEDURE — 85025 COMPLETE CBC W/AUTO DIFF WBC: CPT

## 2023-08-20 PROCEDURE — 6370000000 HC RX 637 (ALT 250 FOR IP): Performed by: INTERNAL MEDICINE

## 2023-08-20 PROCEDURE — 82272 OCCULT BLD FECES 1-3 TESTS: CPT

## 2023-08-20 PROCEDURE — 6370000000 HC RX 637 (ALT 250 FOR IP): Performed by: HOSPITALIST

## 2023-08-20 PROCEDURE — 80048 BASIC METABOLIC PNL TOTAL CA: CPT

## 2023-08-20 PROCEDURE — 2580000003 HC RX 258: Performed by: INTERNAL MEDICINE

## 2023-08-20 PROCEDURE — 82962 GLUCOSE BLOOD TEST: CPT

## 2023-08-20 PROCEDURE — 6370000000 HC RX 637 (ALT 250 FOR IP): Performed by: PHYSICIAN ASSISTANT

## 2023-08-20 PROCEDURE — 36415 COLL VENOUS BLD VENIPUNCTURE: CPT

## 2023-08-20 PROCEDURE — 80069 RENAL FUNCTION PANEL: CPT

## 2023-08-20 RX ORDER — NIFEDIPINE 30 MG/1
30 TABLET, EXTENDED RELEASE ORAL DAILY
Status: DISCONTINUED | OUTPATIENT
Start: 2023-08-20 | End: 2023-08-24 | Stop reason: HOSPADM

## 2023-08-20 RX ORDER — INSULIN LISPRO 100 [IU]/ML
0-16 INJECTION, SOLUTION INTRAVENOUS; SUBCUTANEOUS
Status: DISCONTINUED | OUTPATIENT
Start: 2023-08-20 | End: 2023-08-24 | Stop reason: HOSPADM

## 2023-08-20 RX ORDER — HYDRALAZINE HYDROCHLORIDE 25 MG/1
25 TABLET, FILM COATED ORAL EVERY 8 HOURS SCHEDULED
Status: DISCONTINUED | OUTPATIENT
Start: 2023-08-20 | End: 2023-08-23

## 2023-08-20 RX ORDER — HYDRALAZINE HYDROCHLORIDE 20 MG/ML
20 INJECTION INTRAMUSCULAR; INTRAVENOUS EVERY 6 HOURS PRN
Status: DISCONTINUED | OUTPATIENT
Start: 2023-08-20 | End: 2023-08-24 | Stop reason: HOSPADM

## 2023-08-20 RX ORDER — INSULIN LISPRO 100 [IU]/ML
0-4 INJECTION, SOLUTION INTRAVENOUS; SUBCUTANEOUS NIGHTLY
Status: DISCONTINUED | OUTPATIENT
Start: 2023-08-20 | End: 2023-08-24 | Stop reason: HOSPADM

## 2023-08-20 RX ADMIN — FERROUS SULFATE TAB 325 MG (65 MG ELEMENTAL FE) 650 MG: 325 (65 FE) TAB at 09:52

## 2023-08-20 RX ADMIN — PANTOPRAZOLE SODIUM 40 MG: 40 TABLET, DELAYED RELEASE ORAL at 06:56

## 2023-08-20 RX ADMIN — METOPROLOL TARTRATE 50 MG: 50 TABLET, FILM COATED ORAL at 09:52

## 2023-08-20 RX ADMIN — ESCITALOPRAM OXALATE 10 MG: 10 TABLET ORAL at 09:52

## 2023-08-20 RX ADMIN — SODIUM ZIRCONIUM CYCLOSILICATE 10 G: 10 POWDER, FOR SUSPENSION ORAL at 12:03

## 2023-08-20 RX ADMIN — Medication 2 UNITS: at 12:03

## 2023-08-20 RX ADMIN — SODIUM CHLORIDE: 9 INJECTION, SOLUTION INTRAVENOUS at 05:58

## 2023-08-20 RX ADMIN — PRAVASTATIN SODIUM 40 MG: 40 TABLET ORAL at 09:52

## 2023-08-20 RX ADMIN — ESCITALOPRAM OXALATE 10 MG: 10 TABLET ORAL at 21:13

## 2023-08-20 RX ADMIN — HYDRALAZINE HYDROCHLORIDE 20 MG: 20 INJECTION, SOLUTION INTRAMUSCULAR; INTRAVENOUS at 14:26

## 2023-08-20 RX ADMIN — Medication 1 UNITS: at 09:52

## 2023-08-20 RX ADMIN — NIFEDIPINE 30 MG: 30 TABLET, FILM COATED, EXTENDED RELEASE ORAL at 12:03

## 2023-08-20 RX ADMIN — LEVOTHYROXINE SODIUM 175 MCG: 0.03 TABLET ORAL at 06:55

## 2023-08-20 RX ADMIN — METOPROLOL TARTRATE 50 MG: 50 TABLET, FILM COATED ORAL at 21:13

## 2023-08-20 RX ADMIN — MOMETASONE FUROATE AND FORMOTEROL FUMARATE DIHYDRATE 2 PUFF: 200; 5 AEROSOL RESPIRATORY (INHALATION) at 21:14

## 2023-08-20 RX ADMIN — Medication 12 UNITS: at 17:14

## 2023-08-20 RX ADMIN — HYDRALAZINE HYDROCHLORIDE 25 MG: 25 TABLET, FILM COATED ORAL at 15:16

## 2023-08-20 RX ADMIN — MOMETASONE FUROATE AND FORMOTEROL FUMARATE DIHYDRATE 2 PUFF: 200; 5 AEROSOL RESPIRATORY (INHALATION) at 08:37

## 2023-08-20 RX ADMIN — INSULIN GLARGINE 30 UNITS: 100 INJECTION, SOLUTION SUBCUTANEOUS at 21:13

## 2023-08-20 ASSESSMENT — PAIN SCALES - GENERAL: PAINLEVEL_OUTOF10: 0

## 2023-08-20 NOTE — PROGRESS NOTES
[address] [facility]  Hospitalist Group                                                                                          Hospitalist Progress Note  Tete Jessica MD  Office Phone: [phone]        Date of Service:  2023  NAME:  Keanu Deluna  :  [id number]  MRN:  [id number]       Admission Summary:   Keanu Deluna is a 68 y.o. female with a PMHx of CAD, COPD, DM, GERD, HLD, HTN, CKD and hypothyroid. She presented to the ED from home with a chief complaint of confusion that began around 2230 last evening. She was found to be hyperkalemic with a K+ of 7.2. She was managed medically with insulin, D10 and Calcium, with K+ improving to 5.6. In the ED this morning the patient is tired but rouses easily, she is AAOx3 which is an improvement from overnight per her roommate. She feels tired, but denies any chest pain, palpitations, shortness of breath, headache, changes to vision abdominal pain or any focal or generalized neurologic symptoms     ED imaging  CT head: no acute process, chronic small vessel ischemic disease  CXR: no acute process     Labs:  K+ 7.2 --> 5.6  BG ~ 300s       Interval history / Subjective: Follow up Severe hyperkalemia   Good urine output  Creatinine improving  BP still elevated  Repeat blood culture ordered yesterday, still not done.  Spoke to RN and requested  Assessment & Plan:     Severe Hyperkalemia (7.2 on admission)  JOSH  on CKD IV--improving  - Baseline Cr: 2.5  - Renal US ordered unremarkable  - Responded well to medical management, improved to 5.5  - Appreciate Nephrology    Probable bacteremia  -blood culture  bottles coagulase neg staph  -repeat blood culture  -Was given Vanc  early am  -The patient does not have any signs of infection, will monitor without antibiotics     COPD  Chronic hypoxic respiratory failure, baseline 2l NC  - continue home trelegy ellipta  - PRN duonebs     Acute metabolic encephalopathy: improved  - imaging PEERL, EOMI, moist mucus membrane, TM clear  Neck: supple, no JVD, no meningeal signs  Chest: Clear to auscultation bilaterally   CVS: S1 S2 heard, Capillary refill less than 2 seconds  Abd: soft/ non tender, non distended, BS physiological,   Ext: no clubbing, no cyanosis, no edema, brisk 2+ DP pulses  Neuro/Psych: pleasant mood and affect, CN 2-12 grossly intact, sensory grossly within normal limit, Strength 5/5 in all extremities, DTR 1+ x 4  Skin: warm     Data Review:    Review and/or order of clinical lab test      I have personally and independently reviewed all pertinent labs, diagnostic studies, imaging, and have provided independent interpretation of the same. Labs:     Recent Labs     08/18/23  0026 08/20/23  0022   WBC 8.1 7.9   HGB 10.5* 7.7*   HCT 35.9 25.2*    138*       Recent Labs     08/18/23  0342 08/18/23  0935 08/18/23  1735 08/19/23  0005 08/19/23  1304 08/20/23  0022      < > 136 139 140 140  141   K 5.7*  5.6*   < > 5.5* 5.4* 5.7* 5.0  5.0      < > 108 114* 113* 114*  113*   CO2 25   < > 26 21 26 22  24   BUN 61*   < > 58* 58* 63* 64*  64*   MG 1.4*  --   --   --   --   --    PHOS  --    < > 3.7 3.2  --  3.0    < > = values in this interval not displayed. Recent Labs     08/18/23  0026   ALT 10*   GLOB 4.8*       No results for input(s): INR, APTT in the last 72 hours. Invalid input(s): PTP   No results for input(s): TIBC, FERR in the last 72 hours. Invalid input(s): FE, PSAT   No results found for: FOL, RBCF   No results for input(s): PH, PCO2, PO2 in the last 72 hours. No results for input(s): CPK in the last 72 hours. Invalid input(s): CPKMB, CKNDX, TROIQ  Lab Results   Component Value Date/Time    CHOL 220 02/04/2022 11:44 AM    HDL 48 02/04/2022 11:44 AM     No results found for: 112 [address]  [unfilled]    Notes reviewed from all clinical/nonclinical/nursing services involved in patient's clinical care.  Care coordination discussions were held with appropriate clinical/nonclinical/ nursing providers based on care coordination needs. Patients current active Medications were reviewed, considered, added and adjusted based on the clinical condition today. Home Medications were reconciled to the best of my ability given all available resources at the time of admission. Route is PO if not otherwise noted.       Admission Status:[id number]:::1}      Medications Reviewed:     Current Facility-Administered Medications   Medication Dose Route Frequency    ferrous sulfate (IRON 325) tablet 650 mg  650 mg Oral Daily with breakfast    metoprolol tartrate (LOPRESSOR) tablet 50 mg  50 mg Oral BID    dextrose bolus 10% 125 mL  125 mL IntraVENous PRN    Or    dextrose bolus 10% 250 mL  250 mL IntraVENous PRN    glucagon injection 1 mg  1 mg SubCUTAneous PRN    dextrose 10 % infusion   IntraVENous Continuous PRN    escitalopram (LEXAPRO) tablet 10 mg  10 mg Oral BID    levothyroxine (SYNTHROID) tablet 175 mcg  175 mcg Oral Daily    pantoprazole (PROTONIX) tablet 40 mg  40 mg Oral QAM AC    pravastatin (PRAVACHOL) tablet 40 mg  40 mg Oral Daily    0.9 % sodium chloride infusion   IntraVENous Continuous    ipratropium 0.5 mg-albuterol 2.5 mg (DUONEB) nebulizer solution 1 Dose  1 Dose Inhalation Q4H PRN    mometasone-formoterol (DULERA) 200-5 MCG/ACT inhaler 2 puff  2 puff Inhalation BID RT    And    tiotropium (SPIRIVA RESPIMAT) 2.5 MCG/ACT inhaler 2 puff  2 puff Inhalation Daily RT    glucose chewable tablet 16 g  4 tablet Oral PRN    dextrose bolus 10% 125 mL  125 mL IntraVENous PRN    Or    dextrose bolus 10% 250 mL  250 mL IntraVENous PRN    dextrose 10 % infusion   IntraVENous Continuous PRN    insulin glargine (LANTUS) injection vial 30 Units  30 Units SubCUTAneous Nightly    insulin lispro (HUMALOG) injection vial 5 Units  0.05 Units/kg SubCUTAneous TID WC    insulin lispro (HUMALOG) injection vial 0-4 Units  0-4 Units SubCUTAneous TID WC    insulin lispro

## 2023-08-20 NOTE — PROGRESS NOTES
Messaged Dr. Luke Christensen for am BP 190s/70. Orders received to give metoprolol early. 0730 Bedside shift change report given to Massimo Perkins (oncoming nurse) by Leeroy Arias (offgoing nurse). Report included the following information Nurse Handoff Report.

## 2023-08-20 NOTE — PROGRESS NOTES
1945-Received report from Dai Gomez. Patient's continuous fluid infusion not running at this time. Partner at bedside requesting update. Patient orient to self only. 2152-Patient given medication, swallowed pills without issue, NS @ 125ml/hr started, jaffe emptied for 600ml's. 0010-Admission dual skin assessment not completed. This RN and Hao Humphrey RN completed dual skin assessment. Patient has blanchable redness on sacrum, mepilex applied, and a right upper arm bruise. Patient also has an insulin pump needle in her left flank and left side of her abdomen. Patient's shirt removed and she was placed in a gown, jaffe emptied for 150ml's, pt was incontinent of bowel so she was cleaned up, turned to the left and labs drawn and sent. 0348-Bedside shift change report given to Maura Woodruff RN (oncoming nurse) by SUE Porter (offgoing nurse). Report included the following information Nurse Handoff Report, Index, Intake/Output, MAR, Recent Results, and Cardiac Rhythm NSR .

## 2023-08-21 LAB
ALBUMIN SERPL-MCNC: 2.2 G/DL (ref 3.5–5)
ALBUMIN SERPL-MCNC: 2.2 G/DL (ref 3.5–5)
ANION GAP SERPL CALC-SCNC: 4 MMOL/L (ref 5–15)
ANION GAP SERPL CALC-SCNC: 7 MMOL/L (ref 5–15)
BASOPHILS # BLD: 0 K/UL (ref 0–0.1)
BASOPHILS NFR BLD: 0 % (ref 0–1)
BUN SERPL-MCNC: 62 MG/DL (ref 6–20)
BUN SERPL-MCNC: 66 MG/DL (ref 6–20)
BUN/CREAT SERPL: 20 (ref 12–20)
BUN/CREAT SERPL: 21 (ref 12–20)
CALCIUM SERPL-MCNC: 8.5 MG/DL (ref 8.5–10.1)
CALCIUM SERPL-MCNC: 8.7 MG/DL (ref 8.5–10.1)
CHLORIDE SERPL-SCNC: 111 MMOL/L (ref 97–108)
CHLORIDE SERPL-SCNC: 111 MMOL/L (ref 97–108)
CO2 SERPL-SCNC: 24 MMOL/L (ref 21–32)
CO2 SERPL-SCNC: 25 MMOL/L (ref 21–32)
CREAT SERPL-MCNC: 2.91 MG/DL (ref 0.55–1.02)
CREAT SERPL-MCNC: 3.22 MG/DL (ref 0.55–1.02)
DIFFERENTIAL METHOD BLD: ABNORMAL
EOSINOPHIL # BLD: 0.3 K/UL (ref 0–0.4)
EOSINOPHIL NFR BLD: 4 % (ref 0–7)
ERYTHROCYTE [DISTWIDTH] IN BLOOD BY AUTOMATED COUNT: 13.2 % (ref 11.5–14.5)
FERRITIN SERPL-MCNC: 76 NG/ML (ref 26–388)
GLUCOSE BLD STRIP.AUTO-MCNC: 154 MG/DL (ref 65–117)
GLUCOSE BLD STRIP.AUTO-MCNC: 172 MG/DL (ref 65–117)
GLUCOSE BLD STRIP.AUTO-MCNC: 176 MG/DL (ref 65–117)
GLUCOSE BLD STRIP.AUTO-MCNC: 97 MG/DL (ref 65–117)
GLUCOSE SERPL-MCNC: 209 MG/DL (ref 65–100)
GLUCOSE SERPL-MCNC: 98 MG/DL (ref 65–100)
HCT VFR BLD AUTO: 23.9 % (ref 35–47)
HGB BLD-MCNC: 7.2 G/DL (ref 11.5–16)
IMM GRANULOCYTES # BLD AUTO: 0.1 K/UL (ref 0–0.04)
IMM GRANULOCYTES NFR BLD AUTO: 1 % (ref 0–0.5)
IRON SATN MFR SERPL: 14 % (ref 20–50)
IRON SERPL-MCNC: 27 UG/DL (ref 35–150)
LYMPHOCYTES # BLD: 0.5 K/UL (ref 0.8–3.5)
LYMPHOCYTES NFR BLD: 7 % (ref 12–49)
MCH RBC QN AUTO: 31.4 PG (ref 26–34)
MCHC RBC AUTO-ENTMCNC: 30.1 G/DL (ref 30–36.5)
MCV RBC AUTO: 104.4 FL (ref 80–99)
MONOCYTES # BLD: 0.4 K/UL (ref 0–1)
MONOCYTES NFR BLD: 6 % (ref 5–13)
NEUTS SEG # BLD: 6 K/UL (ref 1.8–8)
NEUTS SEG NFR BLD: 82 % (ref 32–75)
NRBC # BLD: 0 K/UL (ref 0–0.01)
NRBC BLD-RTO: 0 PER 100 WBC
PHOSPHATE SERPL-MCNC: 2.9 MG/DL (ref 2.6–4.7)
PHOSPHATE SERPL-MCNC: 3.5 MG/DL (ref 2.6–4.7)
PLATELET # BLD AUTO: 135 K/UL (ref 150–400)
PMV BLD AUTO: 12.2 FL (ref 8.9–12.9)
POTASSIUM SERPL-SCNC: 3.9 MMOL/L (ref 3.5–5.1)
POTASSIUM SERPL-SCNC: 4 MMOL/L (ref 3.5–5.1)
RBC # BLD AUTO: 2.29 M/UL (ref 3.8–5.2)
RBC MORPH BLD: ABNORMAL
SERVICE CMNT-IMP: ABNORMAL
SERVICE CMNT-IMP: NORMAL
SODIUM SERPL-SCNC: 140 MMOL/L (ref 136–145)
SODIUM SERPL-SCNC: 142 MMOL/L (ref 136–145)
TIBC SERPL-MCNC: 196 UG/DL (ref 250–450)
WBC # BLD AUTO: 7.3 K/UL (ref 3.6–11)

## 2023-08-21 PROCEDURE — 94640 AIRWAY INHALATION TREATMENT: CPT

## 2023-08-21 PROCEDURE — A4216 STERILE WATER/SALINE, 10 ML: HCPCS | Performed by: HOSPITALIST

## 2023-08-21 PROCEDURE — 36415 COLL VENOUS BLD VENIPUNCTURE: CPT

## 2023-08-21 PROCEDURE — C9113 INJ PANTOPRAZOLE SODIUM, VIA: HCPCS | Performed by: HOSPITALIST

## 2023-08-21 PROCEDURE — 82962 GLUCOSE BLOOD TEST: CPT

## 2023-08-21 PROCEDURE — 6370000000 HC RX 637 (ALT 250 FOR IP)

## 2023-08-21 PROCEDURE — 85025 COMPLETE CBC W/AUTO DIFF WBC: CPT

## 2023-08-21 PROCEDURE — 80069 RENAL FUNCTION PANEL: CPT

## 2023-08-21 PROCEDURE — 2060000000 HC ICU INTERMEDIATE R&B

## 2023-08-21 PROCEDURE — 99231 SBSQ HOSP IP/OBS SF/LOW 25: CPT

## 2023-08-21 PROCEDURE — 6370000000 HC RX 637 (ALT 250 FOR IP): Performed by: PHYSICIAN ASSISTANT

## 2023-08-21 PROCEDURE — 2700000000 HC OXYGEN THERAPY PER DAY

## 2023-08-21 PROCEDURE — 2580000003 HC RX 258: Performed by: HOSPITALIST

## 2023-08-21 PROCEDURE — 6370000000 HC RX 637 (ALT 250 FOR IP): Performed by: HOSPITALIST

## 2023-08-21 PROCEDURE — 82728 ASSAY OF FERRITIN: CPT

## 2023-08-21 PROCEDURE — 83550 IRON BINDING TEST: CPT

## 2023-08-21 PROCEDURE — 6370000000 HC RX 637 (ALT 250 FOR IP): Performed by: INTERNAL MEDICINE

## 2023-08-21 PROCEDURE — 6360000002 HC RX W HCPCS: Performed by: HOSPITALIST

## 2023-08-21 PROCEDURE — 83540 ASSAY OF IRON: CPT

## 2023-08-21 RX ORDER — INSULIN LISPRO 100 [IU]/ML
8 INJECTION, SOLUTION INTRAVENOUS; SUBCUTANEOUS
Status: DISCONTINUED | OUTPATIENT
Start: 2023-08-21 | End: 2023-08-24

## 2023-08-21 RX ADMIN — Medication 8 UNITS: at 18:41

## 2023-08-21 RX ADMIN — PANTOPRAZOLE SODIUM 40 MG: 40 INJECTION, POWDER, FOR SOLUTION INTRAVENOUS at 12:55

## 2023-08-21 RX ADMIN — ESCITALOPRAM OXALATE 10 MG: 10 TABLET ORAL at 08:44

## 2023-08-21 RX ADMIN — MOMETASONE FUROATE AND FORMOTEROL FUMARATE DIHYDRATE 2 PUFF: 200; 5 AEROSOL RESPIRATORY (INHALATION) at 07:46

## 2023-08-21 RX ADMIN — MOMETASONE FUROATE AND FORMOTEROL FUMARATE DIHYDRATE 2 PUFF: 200; 5 AEROSOL RESPIRATORY (INHALATION) at 21:41

## 2023-08-21 RX ADMIN — ESCITALOPRAM OXALATE 10 MG: 10 TABLET ORAL at 21:09

## 2023-08-21 RX ADMIN — PANTOPRAZOLE SODIUM 40 MG: 40 TABLET, DELAYED RELEASE ORAL at 07:58

## 2023-08-21 RX ADMIN — NIFEDIPINE 30 MG: 30 TABLET, FILM COATED, EXTENDED RELEASE ORAL at 08:44

## 2023-08-21 RX ADMIN — PRAVASTATIN SODIUM 40 MG: 40 TABLET ORAL at 08:44

## 2023-08-21 RX ADMIN — METOPROLOL TARTRATE 50 MG: 50 TABLET, FILM COATED ORAL at 08:44

## 2023-08-21 RX ADMIN — FERROUS SULFATE TAB 325 MG (65 MG ELEMENTAL FE) 650 MG: 325 (65 FE) TAB at 08:44

## 2023-08-21 RX ADMIN — LEVOTHYROXINE SODIUM 175 MCG: 0.03 TABLET ORAL at 07:58

## 2023-08-21 RX ADMIN — TIOTROPIUM BROMIDE INHALATION SPRAY 2 PUFF: 3.12 SPRAY, METERED RESPIRATORY (INHALATION) at 07:46

## 2023-08-21 RX ADMIN — INSULIN GLARGINE 30 UNITS: 100 INJECTION, SOLUTION SUBCUTANEOUS at 21:09

## 2023-08-21 RX ADMIN — Medication 8 UNITS: at 12:15

## 2023-08-21 NOTE — PROGRESS NOTES
Spiritual Care Assessment/Progress Note  [facility]    Name: Norah Forbes MRN: [id number]    Age: 68 y.o. Sex: female   Language: English     Date: 8/21/2023            Total Time Calculated: 25 min              Spiritual Assessment begun in [facility]  Service Provided For[de-identified] Patient  Referral/Consult From[de-identified] Palliative Care  Encounter Overview/Reason : Palliative Care    Spiritual beliefs:      [x] Involved in a cris tradition/spiritual practice: Jenny Higgins      [] Supported by a cris community:      [] Claims no spiritual orientation:      [] Seeking spiritual identity:           [] Adheres to an individual form of spirituality:      [] Not able to assess:                Identified resources for coping and support system:   Support System: Friends/neighbors, Family members       [] Prayer                  [] Devotional reading               [] Music                  [] Guided Imagery     [] Pet visits                                        [] Other: (COMMENT)     Specific area/focus of visit   Encounter:    Crisis:    Spiritual/Emotional needs: Type: Spiritual Support  Ritual, Rites and Sacraments:    Grief, Loss, and Adjustments: Type: Life Adjustments, Adjustment to illness  Ethics/Mediation:    Behavioral Health:    Palliative Care: Type: Palliative Care, Initial/Spiritual Assessment  Advance Care Planning:      Visited patient for palliative initial spiritual assessment. Her chart was consulted prior to the visit. She was sleeping bur awoke to engage a brief conversation. She spoke of her discomfort while being happy to be alive. The recent medical events she has experienced were frightening to her and she remains concerned about what the future holds for her.    Chaplain Franchesca, MDiv, MS, [facility]

## 2023-08-21 NOTE — CARE COORDINATION
Care Management Initial Assessment       RUR:  20% High Risk  Readmission? No  1st IM letter given? Yes - 8/18/2023  1st  letter given: No       08/21/23 1052   Service Assessment   Patient Orientation Person;Self   Cognition Alert   History Provided By Patient   Primary Caregiver Self   Support Systems Spouse/Significant Other   Patient's Healthcare Decision Maker is: Named in [facility]   PCP Verified by CM Yes   Last Visit to PCP Within last 3 months   Prior Functional Level Independent in ADLs/IADLs   Current Functional Level Independent in ADLs/IADLs   Can patient return to prior living arrangement Yes   Ability to make needs known: Good   Family able to assist with home care needs: Yes   Financial Resources Medicare   Social/Functional History   Lives With Significant other   Type of Home [facility] One level   Home Access Stairs to enter with rails   Entrance Stairs - Number of Steps 10   Bathroom Shower/Tub Walk-in shower; Shower chair with back   [address] accessible   [place] Help From Friend(s)   ADL Assistance Independent   Homemaking Assistance Independent   Homemaking Responsibilities No   Ambulation Assistance Independent   Transfer Assistance Independent   Active  No   Mode of Transportation SUV   Education Respiratory Certificate   Occupation Retired   Discharge Planning   Type of [place] Prior To Admission C-pap;Oxygen Therapy  (2 liters night-time oxygen,)   Potential Assistance Needed N/A   DME Ordered? No   Potential Assistance Purchasing Medications Yes   Type of Home Care Services None   One/Two Story Residence One story   History of falls?  1   Services At/After Discharge   Transition of Care Consult (CM Consult) N/A   Services At/After Discharge None   [employer]

## 2023-08-21 NOTE — PROGRESS NOTES
[address] [facility]  Hospitalist Group                                                                                          Hospitalist Progress Note  Agnes Chacon MD  Office Phone: [phone]        Date of Service:  2023  NAME:  Eduardo Boyer  :    MRN:  [id number]       Admission Summary:   Eduardo Boyer is a 68 y.o. female with a PMHx of CAD, COPD, DM, GERD, HLD, HTN, CKD and hypothyroid. She presented to the ED from home with a chief complaint of confusion that began around 2230 last evening. She was found to be hyperkalemic with a K+ of 7.2. She was managed medically with insulin, D10 and Calcium, with K+ improving to 5.6. In the ED this morning the patient is tired but rouses easily, she is AAOx3 which is an improvement from overnight per her roommate. She feels tired, but denies any chest pain, palpitations, shortness of breath, headache, changes to vision abdominal pain or any focal or generalized neurologic symptoms     ED imaging  CT head: no acute process, chronic small vessel ischemic disease  CXR: no acute process     Labs:  K+ 7.2 --> 5.6  BG ~ 300s       Interval history / Subjective:    Follow up Severe hyperkalemia   Good urine output  Creatinine improving  BP still elevated  Hb dropped/FOBT positive  Assessment & Plan:     Severe Hyperkalemia (7.2 on admission)  JOSH  on CKD IV--improving  - Baseline Cr: 2.5  - Renal US ordered unremarkable  - Responded well to medical management, improved to 5.5  - Appreciate Nephrology    Probable bacteremia  -blood culture  1/2 bottles coagulase neg staph  -repeat blood culture   -Was given Vanc  early am  -The patient does not have any signs of infection, will monitor without antibiotics    Probable GI bleed  -hb dropped yesterday, will get h/h today  -FOBT positive but the patient is on oral iron  -Protonix IV BID  -consult GI     COPD  Chronic hypoxic respiratory failure, baseline 2l NC  - continue below:          General : alert x 3, awake, no acute distress,   HEENT: PEERL, EOMI, moist mucus membrane, TM clear  Neck: supple, no JVD, no meningeal signs  Chest: Clear to auscultation bilaterally   CVS: S1 S2 heard, Capillary refill less than 2 seconds  Abd: soft/ non tender, non distended, BS physiological,   Ext: no clubbing, no cyanosis, no edema, brisk 2+ DP pulses  Neuro/Psych: pleasant mood and affect, CN 2-12 grossly intact, sensory grossly within normal limit, Strength 5/5 in all extremities, DTR 1+ x 4  Skin: warm     Data Review:    Review and/or order of clinical lab test      I have personally and independently reviewed all pertinent labs, diagnostic studies, imaging, and have provided independent interpretation of the same. Labs:     Recent Labs     08/20/23  0022   WBC 7.9   HGB 7.7*   HCT 25.2*   *       Recent Labs     08/19/23  0005 08/19/23  1304 08/20/23  0022 08/21/23  0407    140 140  141 142   K 5.4* 5.7* 5.0  5.0 3.9   * 113* 114*  113* 111*   CO2 21 26 22  24 24   BUN 58* 63* 64*  64* 62*   PHOS 3.2  --  3.0 2.9       No results for input(s): ALT, TP, ALB, GLOB, GGT, AML in the last 72 hours. Invalid input(s): SGOT, GPT, AP, TBIL, TBILI, AMYP, LPSE, HLPSE    No results for input(s): INR, APTT in the last 72 hours. Invalid input(s): PTP   Recent Labs     08/21/23  0407   TIBC 196*      No results found for: FOL, RBCF   No results for input(s): PH, PCO2, PO2 in the last 72 hours. No results for input(s): CPK in the last 72 hours. Invalid input(s): CPKMB, CKNDX, TROIQ  Lab Results   Component Value Date/Time    CHOL 220 02/04/2022 11:44 AM    HDL 48 02/04/2022 11:44 AM     No results found for: [address]  [unfilled]    Notes reviewed from all clinical/nonclinical/nursing services involved in patient's clinical care. Care coordination discussions were held with appropriate clinical/nonclinical/ nursing providers based on care coordination needs. Patients current active Medications were reviewed, considered, added and adjusted based on the clinical condition today. Home Medications were reconciled to the best of my ability given all available resources at the time of admission. Route is PO if not otherwise noted.       Admission Status:[id number]:::1}      Medications Reviewed:     Current Facility-Administered Medications   Medication Dose Route Frequency    insulin lispro (HUMALOG) injection vial 8 Units  8 Units SubCUTAneous TID WC    [Held by provider] hydrALAZINE (APRESOLINE) tablet 25 mg  25 mg Oral 3 times per day    NIFEdipine (PROCARDIA XL) extended release tablet 30 mg  30 mg Oral Daily    hydrALAZINE (APRESOLINE) injection 20 mg  20 mg IntraVENous Q6H PRN    insulin lispro (HUMALOG) injection vial 0-16 Units  0-16 Units SubCUTAneous TID WC    insulin lispro (HUMALOG) injection vial 0-4 Units  0-4 Units SubCUTAneous Nightly    ferrous sulfate (IRON 325) tablet 650 mg  650 mg Oral Daily with breakfast    metoprolol tartrate (LOPRESSOR) tablet 50 mg  50 mg Oral BID    dextrose bolus 10% 125 mL  125 mL IntraVENous PRN    Or    dextrose bolus 10% 250 mL  250 mL IntraVENous PRN    glucagon injection 1 mg  1 mg SubCUTAneous PRN    dextrose 10 % infusion   IntraVENous Continuous PRN    escitalopram (LEXAPRO) tablet 10 mg  10 mg Oral BID    levothyroxine (SYNTHROID) tablet 175 mcg  175 mcg Oral Daily    pantoprazole (PROTONIX) tablet 40 mg  40 mg Oral QAM AC    pravastatin (PRAVACHOL) tablet 40 mg  40 mg Oral Daily    ipratropium 0.5 mg-albuterol 2.5 mg (DUONEB) nebulizer solution 1 Dose  1 Dose Inhalation Q4H PRN    mometasone-formoterol (DULERA) 200-5 MCG/ACT inhaler 2 puff  2 puff Inhalation BID RT    And    tiotropium (SPIRIVA RESPIMAT) 2.5 MCG/ACT inhaler 2 puff  2 puff Inhalation Daily RT    glucose chewable tablet 16 g  4 tablet Oral PRN    dextrose bolus 10% 125 mL  125 mL IntraVENous PRN    Or    dextrose bolus 10% 250 mL  250 mL

## 2023-08-21 NOTE — PLAN OF CARE
Problem: Discharge Planning  Goal: Discharge to home or other facility with appropriate resources  Outcome: Progressing  Flowsheets (Taken 8/21/2023 1610)  Discharge to home or other facility with appropriate resources:   Identify barriers to discharge with patient and caregiver   Arrange for needed discharge resources and transportation as appropriate   Identify discharge learning needs (meds, wound care, etc)     Problem: Safety - Adult  Goal: Free from fall injury  Outcome: Progressing     Problem: Pain  Goal: Verbalizes/displays adequate comfort level or baseline comfort level  Outcome: Progressing  Flowsheets (Taken 8/21/2023 1610)  Verbalizes/displays adequate comfort level or baseline comfort level:   Encourage patient to monitor pain and request assistance   Assess pain using appropriate pain scale   Administer analgesics based on type and severity of pain and evaluate response   Implement non-pharmacological measures as appropriate and evaluate response

## 2023-08-21 NOTE — PROGRESS NOTES
Bedside shift change report given to April (oncoming nurse) by Gisela Sahni (offgoing nurse).  Report included the following information Nurse Handoff Report, Index, Intake/Output, MAR, and Cardiac Rhythm SR .

## 2023-08-21 NOTE — PROGRESS NOTES
Bedside shift change report given to Manny Ornelas RN (oncoming nurse) by April, SUE (offgoing nurse). Report included the following information Nurse Handoff Report, Index, Intake/Output, MAR, Recent Results, and Cardiac Rhythm NSR .

## 2023-08-22 PROBLEM — Z71.89 COUNSELING REGARDING ADVANCE CARE PLANNING AND GOALS OF CARE: Status: ACTIVE | Noted: 2023-08-22

## 2023-08-22 PROBLEM — Z51.5 PALLIATIVE CARE BY SPECIALIST: Status: ACTIVE | Noted: 2023-08-22

## 2023-08-22 LAB
ALBUMIN SERPL-MCNC: 2.4 G/DL (ref 3.5–5)
ANION GAP SERPL CALC-SCNC: 6 MMOL/L (ref 5–15)
BASOPHILS # BLD: 0 K/UL (ref 0–0.1)
BASOPHILS NFR BLD: 0 % (ref 0–1)
BUN SERPL-MCNC: 66 MG/DL (ref 6–20)
BUN/CREAT SERPL: 22 (ref 12–20)
CALCIUM SERPL-MCNC: 8.5 MG/DL (ref 8.5–10.1)
CHLORIDE SERPL-SCNC: 111 MMOL/L (ref 97–108)
CO2 SERPL-SCNC: 24 MMOL/L (ref 21–32)
CREAT SERPL-MCNC: 2.95 MG/DL (ref 0.55–1.02)
DIFFERENTIAL METHOD BLD: ABNORMAL
EOSINOPHIL # BLD: 0.5 K/UL (ref 0–0.4)
EOSINOPHIL NFR BLD: 6 % (ref 0–7)
ERYTHROCYTE [DISTWIDTH] IN BLOOD BY AUTOMATED COUNT: 13.1 % (ref 11.5–14.5)
GLUCOSE BLD STRIP.AUTO-MCNC: 106 MG/DL (ref 65–117)
GLUCOSE BLD STRIP.AUTO-MCNC: 109 MG/DL (ref 65–117)
GLUCOSE BLD STRIP.AUTO-MCNC: 267 MG/DL (ref 65–117)
GLUCOSE SERPL-MCNC: 116 MG/DL (ref 65–100)
HCT VFR BLD AUTO: 21.7 % (ref 35–47)
HGB BLD-MCNC: 6.7 G/DL (ref 11.5–16)
HISTORY CHECK: NORMAL
IMM GRANULOCYTES # BLD AUTO: 0.1 K/UL (ref 0–0.04)
IMM GRANULOCYTES NFR BLD AUTO: 1 % (ref 0–0.5)
LYMPHOCYTES # BLD: 0.6 K/UL (ref 0.8–3.5)
LYMPHOCYTES NFR BLD: 8 % (ref 12–49)
MCH RBC QN AUTO: 31.9 PG (ref 26–34)
MCHC RBC AUTO-ENTMCNC: 30.9 G/DL (ref 30–36.5)
MCV RBC AUTO: 103.3 FL (ref 80–99)
MONOCYTES # BLD: 0.6 K/UL (ref 0–1)
MONOCYTES NFR BLD: 7 % (ref 5–13)
NEUTS SEG # BLD: 6.2 K/UL (ref 1.8–8)
NEUTS SEG NFR BLD: 78 % (ref 32–75)
NRBC # BLD: 0 K/UL (ref 0–0.01)
NRBC BLD-RTO: 0 PER 100 WBC
PHOSPHATE SERPL-MCNC: 3.1 MG/DL (ref 2.6–4.7)
PLATELET # BLD AUTO: 140 K/UL (ref 150–400)
PMV BLD AUTO: 12.3 FL (ref 8.9–12.9)
POTASSIUM SERPL-SCNC: 3.7 MMOL/L (ref 3.5–5.1)
RBC # BLD AUTO: 2.1 M/UL (ref 3.8–5.2)
RBC MORPH BLD: ABNORMAL
RBC MORPH BLD: ABNORMAL
SERVICE CMNT-IMP: ABNORMAL
SERVICE CMNT-IMP: NORMAL
SERVICE CMNT-IMP: NORMAL
SODIUM SERPL-SCNC: 141 MMOL/L (ref 136–145)
WBC # BLD AUTO: 8 K/UL (ref 3.6–11)

## 2023-08-22 PROCEDURE — 6360000002 HC RX W HCPCS: Performed by: HOSPITALIST

## 2023-08-22 PROCEDURE — 6370000000 HC RX 637 (ALT 250 FOR IP): Performed by: PHYSICIAN ASSISTANT

## 2023-08-22 PROCEDURE — 99231 SBSQ HOSP IP/OBS SF/LOW 25: CPT

## 2023-08-22 PROCEDURE — 94640 AIRWAY INHALATION TREATMENT: CPT

## 2023-08-22 PROCEDURE — 2580000003 HC RX 258: Performed by: HOSPITALIST

## 2023-08-22 PROCEDURE — 6360000002 HC RX W HCPCS: Performed by: NURSE PRACTITIONER

## 2023-08-22 PROCEDURE — 99222 1ST HOSP IP/OBS MODERATE 55: CPT | Performed by: INTERNAL MEDICINE

## 2023-08-22 PROCEDURE — 6370000000 HC RX 637 (ALT 250 FOR IP): Performed by: HOSPITALIST

## 2023-08-22 PROCEDURE — 86901 BLOOD TYPING SEROLOGIC RH(D): CPT

## 2023-08-22 PROCEDURE — 80069 RENAL FUNCTION PANEL: CPT

## 2023-08-22 PROCEDURE — 86850 RBC ANTIBODY SCREEN: CPT

## 2023-08-22 PROCEDURE — 36600 WITHDRAWAL OF ARTERIAL BLOOD: CPT

## 2023-08-22 PROCEDURE — 86923 COMPATIBILITY TEST ELECTRIC: CPT

## 2023-08-22 PROCEDURE — 85025 COMPLETE CBC W/AUTO DIFF WBC: CPT

## 2023-08-22 PROCEDURE — 36430 TRANSFUSION BLD/BLD COMPNT: CPT

## 2023-08-22 PROCEDURE — 6370000000 HC RX 637 (ALT 250 FOR IP): Performed by: INTERNAL MEDICINE

## 2023-08-22 PROCEDURE — 86900 BLOOD TYPING SEROLOGIC ABO: CPT

## 2023-08-22 PROCEDURE — 6370000000 HC RX 637 (ALT 250 FOR IP)

## 2023-08-22 PROCEDURE — 82962 GLUCOSE BLOOD TEST: CPT

## 2023-08-22 PROCEDURE — C9113 INJ PANTOPRAZOLE SODIUM, VIA: HCPCS | Performed by: HOSPITALIST

## 2023-08-22 PROCEDURE — 2060000000 HC ICU INTERMEDIATE R&B

## 2023-08-22 PROCEDURE — P9016 RBC LEUKOCYTES REDUCED: HCPCS

## 2023-08-22 PROCEDURE — 36415 COLL VENOUS BLD VENIPUNCTURE: CPT

## 2023-08-22 PROCEDURE — A4216 STERILE WATER/SALINE, 10 ML: HCPCS | Performed by: HOSPITALIST

## 2023-08-22 RX ORDER — SODIUM CHLORIDE 9 MG/ML
INJECTION, SOLUTION INTRAVENOUS PRN
Status: DISCONTINUED | OUTPATIENT
Start: 2023-08-22 | End: 2023-08-24 | Stop reason: HOSPADM

## 2023-08-22 RX ADMIN — MOMETASONE FUROATE AND FORMOTEROL FUMARATE DIHYDRATE 2 PUFF: 200; 5 AEROSOL RESPIRATORY (INHALATION) at 21:51

## 2023-08-22 RX ADMIN — ESCITALOPRAM OXALATE 10 MG: 10 TABLET ORAL at 21:51

## 2023-08-22 RX ADMIN — LEVOTHYROXINE SODIUM 175 MCG: 0.03 TABLET ORAL at 06:56

## 2023-08-22 RX ADMIN — INSULIN GLARGINE 30 UNITS: 100 INJECTION, SOLUTION SUBCUTANEOUS at 21:52

## 2023-08-22 RX ADMIN — TIOTROPIUM BROMIDE INHALATION SPRAY 2 PUFF: 3.12 SPRAY, METERED RESPIRATORY (INHALATION) at 08:26

## 2023-08-22 RX ADMIN — IRON SUCROSE 200 MG: 20 INJECTION, SOLUTION INTRAVENOUS at 09:02

## 2023-08-22 RX ADMIN — MOMETASONE FUROATE AND FORMOTEROL FUMARATE DIHYDRATE 2 PUFF: 200; 5 AEROSOL RESPIRATORY (INHALATION) at 08:26

## 2023-08-22 RX ADMIN — PANTOPRAZOLE SODIUM 40 MG: 40 INJECTION, POWDER, FOR SOLUTION INTRAVENOUS at 06:57

## 2023-08-22 RX ADMIN — NIFEDIPINE 30 MG: 30 TABLET, FILM COATED, EXTENDED RELEASE ORAL at 09:01

## 2023-08-22 RX ADMIN — HYDRALAZINE HYDROCHLORIDE 20 MG: 20 INJECTION, SOLUTION INTRAMUSCULAR; INTRAVENOUS at 22:56

## 2023-08-22 RX ADMIN — METOPROLOL TARTRATE 50 MG: 50 TABLET, FILM COATED ORAL at 09:01

## 2023-08-22 RX ADMIN — FERROUS SULFATE TAB 325 MG (65 MG ELEMENTAL FE) 650 MG: 325 (65 FE) TAB at 09:01

## 2023-08-22 RX ADMIN — Medication 8 UNITS: at 09:01

## 2023-08-22 RX ADMIN — METOPROLOL TARTRATE 50 MG: 50 TABLET, FILM COATED ORAL at 21:52

## 2023-08-22 RX ADMIN — PRAVASTATIN SODIUM 40 MG: 40 TABLET ORAL at 09:01

## 2023-08-22 RX ADMIN — ESCITALOPRAM OXALATE 10 MG: 10 TABLET ORAL at 09:08

## 2023-08-22 ASSESSMENT — PAIN SCALES - GENERAL: PAINLEVEL_OUTOF10: 0

## 2023-08-22 NOTE — PROGRESS NOTES
[address] [facility]  Hospitalist Group                                                                                          Hospitalist Progress Note  Anna Rodriguez MD  Office Phone: [phone]        Date of Service:  2023  NAME:  Beth Aldridge  :    MRN:  [id number]       Admission Summary:   Beth Aldridge is a 68 y.o. female with a PMHx of CAD, COPD, DM, GERD, HLD, HTN, CKD and hypothyroid. She presented to the ED from home with a chief complaint of confusion that began around 2230 last evening. She was found to be hyperkalemic with a K+ of 7.2. She was managed medically with insulin, D10 and Calcium, with K+ improving to 5.6. In the ED this morning the patient is tired but rouses easily, she is AAOx3 which is an improvement from overnight per her roommate. She feels tired, but denies any chest pain, palpitations, shortness of breath, headache, changes to vision abdominal pain or any focal or generalized neurologic symptoms     ED imaging  CT head: no acute process, chronic small vessel ischemic disease  CXR: no acute process     Labs:  K+ 7.2 --> 5.6  BG ~ 300s       Interval history / Subjective:    Follow up Severe hyperkalemia   Good urine output  Creatinine improving  BP still elevated  Hb dropped/FOBT positive  Assessment & Plan:     Severe Hyperkalemia (7.2 on admission)  JOSH  on CKD IV--improving  - Baseline Cr: 2.5  - Renal US ordered unremarkable  - Appreciate Nephrology    Probable bacteremia  -blood culture  1/2 bottles coagulase neg staph  -repeat blood culture   -Was given Vanc  early am  -The patient does not have any signs of infection, will monitor without antibiotics    Probable GI bleed  Acute blood loss anemia  -hb dropped   -FOBT positive but the patient is on oral iron  -Protonix IV BID  -transfuse for hb<7  -Appreciate GI, the patient does not want EGD/Colonoscopy     COPD  Chronic hypoxic respiratory failure, baseline 2l NC  - Patients current active Medications were reviewed, considered, added and adjusted based on the clinical condition today. Home Medications were reconciled to the best of my ability given all available resources at the time of admission. Route is PO if not otherwise noted.       Admission Status:[id number]:::1}      Medications Reviewed:     Current Facility-Administered Medications   Medication Dose Route Frequency    0.9 % sodium chloride infusion   IntraVENous PRN    [START ON 8/23/2023] iron sucrose (VENOFER) injection 200 mg  200 mg IntraVENous Q24H    insulin lispro (HUMALOG) injection vial 8 Units  8 Units SubCUTAneous TID WC    pantoprazole (PROTONIX) 40 mg in sodium chloride (PF) 0.9 % 10 mL injection  40 mg IntraVENous Q12H    [Held by provider] hydrALAZINE (APRESOLINE) tablet 25 mg  25 mg Oral 3 times per day    NIFEdipine (PROCARDIA XL) extended release tablet 30 mg  30 mg Oral Daily    hydrALAZINE (APRESOLINE) injection 20 mg  20 mg IntraVENous Q6H PRN    insulin lispro (HUMALOG) injection vial 0-16 Units  0-16 Units SubCUTAneous TID WC    insulin lispro (HUMALOG) injection vial 0-4 Units  0-4 Units SubCUTAneous Nightly    ferrous sulfate (IRON 325) tablet 650 mg  650 mg Oral Daily with breakfast    metoprolol tartrate (LOPRESSOR) tablet 50 mg  50 mg Oral BID    dextrose bolus 10% 125 mL  125 mL IntraVENous PRN    Or    dextrose bolus 10% 250 mL  250 mL IntraVENous PRN    glucagon injection 1 mg  1 mg SubCUTAneous PRN    dextrose 10 % infusion   IntraVENous Continuous PRN    escitalopram (LEXAPRO) tablet 10 mg  10 mg Oral BID    levothyroxine (SYNTHROID) tablet 175 mcg  175 mcg Oral Daily    [Held by provider] pantoprazole (PROTONIX) tablet 40 mg  40 mg Oral QAM AC    pravastatin (PRAVACHOL) tablet 40 mg  40 mg Oral Daily    ipratropium 0.5 mg-albuterol 2.5 mg (DUONEB) nebulizer solution 1 Dose  1 Dose Inhalation Q4H PRN    mometasone-formoterol (DULERA) 200-5 MCG/ACT inhaler 2 puff  2 puff Inhalation BID RT    And    tiotropium (SPIRIVA RESPIMAT) 2.5 MCG/ACT inhaler 2 puff  2 puff Inhalation Daily RT    glucose chewable tablet 16 g  4 tablet Oral PRN    dextrose bolus 10% 125 mL  125 mL IntraVENous PRN    Or    dextrose bolus 10% 250 mL  250 mL IntraVENous PRN    dextrose 10 % infusion   IntraVENous Continuous PRN    insulin glargine (LANTUS) injection vial 30 Units  30 Units SubCUTAneous Nightly     ______________________________________________________________________  EXPECTED LENGTH OF STAY: 7  ACTUAL LENGTH OF STAY:          Manju Westbrook MD

## 2023-08-22 NOTE — CARE COORDINATION
Transition of Care Plan:    RUR: 22%  Prior Level of Functioning: independent with supports (roommate)  Disposition: SNF per Dr. Lillie Collins    CM spoke with Dr. Donna Arce earlier today and SNF verified. Patient has [employer]     If SNF or IPR: Date FOC offered:   Date FOC received:   Accepting facility:   Date authorization started with reference number:   Date authorization received and expires: Follow up appointments:   DME needed:   Transportation at discharge:   [facility] Medicare/ letter given:   Is patient a [place] and connected with [facility]? If yes, was [employer] transfer form completed and VA notified? Caregiver Contact:   Discharge Caregiver contacted prior to discharge? Care Conference needed? Barriers to discharge:      [address] opt 3, opt 1 Elena Schwartz) to initiate auth for SNF. SOC: 8/24/23  Ref. #: [id number]  Fax#: [phone]    Therapy unable to see patient today due to low hgb. CM will fax packet in the morning with therapy progress notes.     Gabrielle De Jesus, [address]

## 2023-08-22 NOTE — PROGRESS NOTES
Occupational Therapy    Orders acknowledged, chart reviewed. Noted pt with Hgb drop 6.7; spoke with RN who reports plans for transfusion. Will hold formal OT evaluation and wait for pt to be more hemodynamically stable to safely participate. RN made aware. Thanks.     Raul Jonas MS, OTR/L

## 2023-08-22 NOTE — PLAN OF CARE
Problem: Discharge Planning  Goal: Discharge to home or other facility with appropriate resources  Outcome: Progressing  Flowsheets (Taken 8/22/2023 0908)  Discharge to home or other facility with appropriate resources:   Identify barriers to discharge with patient and caregiver   Arrange for needed discharge resources and transportation as appropriate   Identify discharge learning needs (meds, wound care, etc)     Problem: Safety - Adult  Goal: Free from fall injury  Outcome: Progressing     Problem: Pain  Goal: Verbalizes/displays adequate comfort level or baseline comfort level  Outcome: Progressing  Flowsheets (Taken 8/22/2023 0908)  Verbalizes/displays adequate comfort level or baseline comfort level:   Assess pain using appropriate pain scale   Encourage patient to monitor pain and request assistance   Administer analgesics based on type and severity of pain and evaluate response   Implement non-pharmacological measures as appropriate and evaluate response     Problem: Skin/Tissue Integrity  Goal: Absence of new skin breakdown  Description: 1. Monitor for areas of redness and/or skin breakdown  2. Assess vascular access sites hourly  3. Every 4-6 hours minimum:  Change oxygen saturation probe site  4. Every 4-6 hours:  If on nasal continuous positive airway pressure, respiratory therapy assess nares and determine need for appliance change or resting period.   Outcome: Progressing     Problem: Chronic Conditions and Co-morbidities  Goal: Patient's chronic conditions and co-morbidity symptoms are monitored and maintained or improved  Outcome: Progressing  Flowsheets (Taken 8/22/2023 0908)  Care Plan - Patient's Chronic Conditions and Co-Morbidity Symptoms are Monitored and Maintained or Improved:   Monitor and assess patient's chronic conditions and comorbid symptoms for stability, deterioration, or improvement   Collaborate with multidisciplinary team to address chronic and comorbid conditions and prevent exacerbation or deterioration   Update acute care plan with appropriate goals if chronic or comorbid symptoms are exacerbated and prevent overall improvement and discharge

## 2023-08-22 NOTE — PROGRESS NOTES
Palliative Medicine   [place] [phone] - [phone] (COPE)      The Palliative Medicine SW and Dr. Jason Dasilva met with the patient at bedside, she is pleasant, however, poor historian. The patient tells us that her kidneys are \"in bad shape\" but doesn't elaborate further- we ask her how she feels about dialysis, and she shares that she \"tries not to think about it\" if it was ever needed in the future- she shares that she tries not to think about it, but she indicates would be willing to do it if needed if it prolongs her life. We ask her who she would trust to help make medical decisions, and she verbalizes that she would want her dear friend Giovana Anderson \"Karley B\" to be her voice for medical decisions if she were not able. The patient shares that they have been friends for 30 years, and they live together now. The patient is not , no children, she shares that Bindu Baker on the chart is her sister, however, she shares she is 80years-old and has dementia- she does have a nephew Mary Alice Peralta. We discuss the importance of AMD, patient shares most of her family Geri Blum all dead. \"     She agrees that an AMD would be a good idea, but she is not ready to complete right now. She shares that things were going well at home prior to admission, shares that she is \"happy\" and is \"happy to be here- happy to be alive. \" She denies any worries. The patient shares she doesn't drive, but gets out and about with Vivienne Dudley. The patient enjoys music- Disco, and Dance music. She is visually impaired, so she typically listens to television or music. Will attempt to follow-up tomorrow with the patient while Vivienne Dudley is here to help complete AMD, patient verbalized to us today that she would trust [facility] B\" to be her voice for medical decisions, however, she did not want to complete one today. Palliative Medicine following peripherally.      Thank you for

## 2023-08-22 NOTE — CONSULTS
Palliative Medicine Consult  Jeremie: [phone] ([phone])    Patient Name: Beth Aldridge  YOB: 1950    Date of Initial Consult: 8/22/2023  Date of Today's Visit: 8/22/2023  Reason for Consult: care decisions  Requesting Provider: Dr. Braulio Saul      SUMMARY:   Beth Aldridge is a 68 y.o. 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 Current medical issues leading to Palliative Medicine involvement include: severe hyperkalemia, resolved with medical management, AMS resolved,   Developed GI bleeding, HB10.5 to 7.7 to 6.7, evaluated by GI- declining endoscopy evaluation. CT Head: chronic small vessel ischemic disease . Social: patient is not , no children  Lives with friend of over 27 years, Armando Quick [phone]   Sister, Ramos Hair (64 years old, has dementia)  At baseline, ambulatory, independent in [address] [place]:   Hyperkalemia, resolved  CKD IV  GI bleeding, anemia  Longstanding diabetes, type I  Gastroparesis, CKD, retinopathy  Vision impaired (some vision in left eye)  Debility  Palliative medicine encounter       PLAN:   Palliative Medicine services introduced to patient as added layer of support in chronic illness. Pt feeling better than at the time of admission. She does not have any concerns about how things are going at home. Patient has insight into her complex medical issues. We talk about high potassium as reason for admission. She's been followed for years by nephrology, ask if she's ever discussed possible need for dialysis in the future, she says \"I try not to talk about that\" . She has a nephew who was on dialysis, anticipates she will \"hate it\", hoping it never comes to that.    Patient infusion   IntraVENous Continuous PRN    insulin glargine (LANTUS) injection vial 30 Units  30 Units SubCUTAneous Nightly          LAB AND IMAGING FINDINGS:     Lab Results   Component Value Date/Time    WBC 8.0 08/22/2023 06:55 AM    HGB 6.7 08/22/2023 06:55 AM     08/22/2023 06:55 AM     Lab Results   Component Value Date/Time     08/22/2023 06:55 AM    K 3.7 08/22/2023 06:55 AM     08/22/2023 06:55 AM    CO2 24 08/22/2023 06:55 AM    BUN 66 08/22/2023 06:55 AM    MG 1.4 08/18/2023 03:42 AM    PHOS 3.1 08/22/2023 06:55 AM      Lab Results   Component Value Date/Time    GLOB 4.8 08/18/2023 12:26 AM     Lab Results   Component Value Date/Time    INR 1.1 01/31/2021 06:04 AM    APTT 24.2 01/31/2021 06:04 AM      Lab Results   Component Value Date/Time    IRON 27 08/21/2023 04:07 AM    TIBC 196 08/21/2023 04:07 AM      Lab Results   Component Value Date/Time    PH 7.28 02/04/2021 06:21 PM    PCO2 58 02/04/2021 06:21 PM    PO2 90 02/04/2021 06:21 PM     No components found for: GLPOC   No results found for: CPK, CKMB, TROPONINI           Only check if applicable and billing time based rather than MDM    []   The total encounter time on this service date was   minutes which was spent performing a face-to-face encounter and personally completing the provider-level activities documented in the note. This includes time spent prior to the visit and after the visit in direct care of the patient. This time does not include time spent in any separately reportable services.

## 2023-08-22 NOTE — PROGRESS NOTES
Physical Therapy - defer  Order received, chart reviewed. Noted hgb trending down, currently 6.7 w/ the plan to transfuse. Will check back later for therapy evaluation.

## 2023-08-22 NOTE — PROGRESS NOTES
Bedside shift change report given to Giuliana Jeffery RN (oncoming nurse) by April, RN (offgoing nurse). Report included the following information Nurse Handoff Report, Index, Adult Overview, Intake/Output, MAR, Recent Results, and Cardiac Rhythm NSR .

## 2023-08-23 ENCOUNTER — APPOINTMENT (OUTPATIENT)
Facility: HOSPITAL | Age: 73
DRG: 682 | End: 2023-08-23
Payer: MEDICARE

## 2023-08-23 LAB
ALBUMIN SERPL-MCNC: 2.3 G/DL (ref 3.5–5)
ANION GAP SERPL CALC-SCNC: 9 MMOL/L (ref 5–15)
BACTERIA SPEC CULT: NORMAL
BASOPHILS # BLD: 0.1 K/UL (ref 0–0.1)
BASOPHILS NFR BLD: 1 % (ref 0–1)
BUN SERPL-MCNC: 65 MG/DL (ref 6–20)
BUN/CREAT SERPL: 24 (ref 12–20)
CALCIUM SERPL-MCNC: 8.3 MG/DL (ref 8.5–10.1)
CHLORIDE SERPL-SCNC: 111 MMOL/L (ref 97–108)
CO2 SERPL-SCNC: 20 MMOL/L (ref 21–32)
CREAT SERPL-MCNC: 2.75 MG/DL (ref 0.55–1.02)
DIFFERENTIAL METHOD BLD: ABNORMAL
EOSINOPHIL # BLD: 0.3 K/UL (ref 0–0.4)
EOSINOPHIL NFR BLD: 4 % (ref 0–7)
ERYTHROCYTE [DISTWIDTH] IN BLOOD BY AUTOMATED COUNT: 14.1 % (ref 11.5–14.5)
GLUCOSE BLD STRIP.AUTO-MCNC: 139 MG/DL (ref 65–117)
GLUCOSE BLD STRIP.AUTO-MCNC: 185 MG/DL (ref 65–117)
GLUCOSE BLD STRIP.AUTO-MCNC: 198 MG/DL (ref 65–117)
GLUCOSE BLD STRIP.AUTO-MCNC: 219 MG/DL (ref 65–117)
GLUCOSE BLD STRIP.AUTO-MCNC: 238 MG/DL (ref 65–117)
GLUCOSE BLD STRIP.AUTO-MCNC: 74 MG/DL (ref 65–117)
GLUCOSE BLD STRIP.AUTO-MCNC: 81 MG/DL (ref 65–117)
GLUCOSE SERPL-MCNC: 223 MG/DL (ref 65–100)
HCT VFR BLD AUTO: 29.5 % (ref 35–47)
HGB BLD-MCNC: 8.7 G/DL (ref 11.5–16)
IMM GRANULOCYTES # BLD AUTO: 0.2 K/UL (ref 0–0.04)
IMM GRANULOCYTES NFR BLD AUTO: 2 % (ref 0–0.5)
LYMPHOCYTES # BLD: 0.5 K/UL (ref 0.8–3.5)
LYMPHOCYTES NFR BLD: 6 % (ref 12–49)
MCH RBC QN AUTO: 31.5 PG (ref 26–34)
MCHC RBC AUTO-ENTMCNC: 29.5 G/DL (ref 30–36.5)
MCV RBC AUTO: 106.9 FL (ref 80–99)
MONOCYTES # BLD: 0.5 K/UL (ref 0–1)
MONOCYTES NFR BLD: 6 % (ref 5–13)
NEUTS SEG # BLD: 6.8 K/UL (ref 1.8–8)
NEUTS SEG NFR BLD: 81 % (ref 32–75)
NRBC # BLD: 0 K/UL (ref 0–0.01)
NRBC BLD-RTO: 0 PER 100 WBC
PHOSPHATE SERPL-MCNC: 3 MG/DL (ref 2.6–4.7)
PLATELET # BLD AUTO: 151 K/UL (ref 150–400)
PMV BLD AUTO: 12.1 FL (ref 8.9–12.9)
POTASSIUM SERPL-SCNC: 4.2 MMOL/L (ref 3.5–5.1)
RBC # BLD AUTO: 2.76 M/UL (ref 3.8–5.2)
RBC MORPH BLD: ABNORMAL
SERVICE CMNT-IMP: ABNORMAL
SERVICE CMNT-IMP: NORMAL
SODIUM SERPL-SCNC: 140 MMOL/L (ref 136–145)
WBC # BLD AUTO: 8.4 K/UL (ref 3.6–11)

## 2023-08-23 PROCEDURE — 2580000003 HC RX 258: Performed by: HOSPITALIST

## 2023-08-23 PROCEDURE — 82962 GLUCOSE BLOOD TEST: CPT

## 2023-08-23 PROCEDURE — 36415 COLL VENOUS BLD VENIPUNCTURE: CPT

## 2023-08-23 PROCEDURE — 97530 THERAPEUTIC ACTIVITIES: CPT | Performed by: PHYSICAL THERAPIST

## 2023-08-23 PROCEDURE — 85025 COMPLETE CBC W/AUTO DIFF WBC: CPT

## 2023-08-23 PROCEDURE — 97161 PT EVAL LOW COMPLEX 20 MIN: CPT | Performed by: PHYSICAL THERAPIST

## 2023-08-23 PROCEDURE — 2700000000 HC OXYGEN THERAPY PER DAY

## 2023-08-23 PROCEDURE — 2060000000 HC ICU INTERMEDIATE R&B

## 2023-08-23 PROCEDURE — 6370000000 HC RX 637 (ALT 250 FOR IP)

## 2023-08-23 PROCEDURE — 97535 SELF CARE MNGMENT TRAINING: CPT

## 2023-08-23 PROCEDURE — 80069 RENAL FUNCTION PANEL: CPT

## 2023-08-23 PROCEDURE — C9113 INJ PANTOPRAZOLE SODIUM, VIA: HCPCS | Performed by: HOSPITALIST

## 2023-08-23 PROCEDURE — 6370000000 HC RX 637 (ALT 250 FOR IP): Performed by: HOSPITALIST

## 2023-08-23 PROCEDURE — 94640 AIRWAY INHALATION TREATMENT: CPT

## 2023-08-23 PROCEDURE — A4216 STERILE WATER/SALINE, 10 ML: HCPCS | Performed by: HOSPITALIST

## 2023-08-23 PROCEDURE — 6370000000 HC RX 637 (ALT 250 FOR IP): Performed by: INTERNAL MEDICINE

## 2023-08-23 PROCEDURE — 6370000000 HC RX 637 (ALT 250 FOR IP): Performed by: PHYSICIAN ASSISTANT

## 2023-08-23 PROCEDURE — 71045 X-RAY EXAM CHEST 1 VIEW: CPT

## 2023-08-23 PROCEDURE — 99231 SBSQ HOSP IP/OBS SF/LOW 25: CPT

## 2023-08-23 PROCEDURE — 6360000002 HC RX W HCPCS: Performed by: HOSPITALIST

## 2023-08-23 PROCEDURE — 97166 OT EVAL MOD COMPLEX 45 MIN: CPT

## 2023-08-23 RX ORDER — HYDRALAZINE HYDROCHLORIDE 50 MG/1
100 TABLET, FILM COATED ORAL EVERY 8 HOURS SCHEDULED
Status: DISCONTINUED | OUTPATIENT
Start: 2023-08-23 | End: 2023-08-24 | Stop reason: HOSPADM

## 2023-08-23 RX ORDER — PANTOPRAZOLE SODIUM 40 MG/1
40 TABLET, DELAYED RELEASE ORAL
Status: DISCONTINUED | OUTPATIENT
Start: 2023-08-23 | End: 2023-08-24 | Stop reason: HOSPADM

## 2023-08-23 RX ADMIN — LEVOTHYROXINE SODIUM 175 MCG: 0.03 TABLET ORAL at 06:35

## 2023-08-23 RX ADMIN — PANTOPRAZOLE SODIUM 40 MG: 40 INJECTION, POWDER, FOR SOLUTION INTRAVENOUS at 00:55

## 2023-08-23 RX ADMIN — METOPROLOL TARTRATE 50 MG: 50 TABLET, FILM COATED ORAL at 09:06

## 2023-08-23 RX ADMIN — MOMETASONE FUROATE AND FORMOTEROL FUMARATE DIHYDRATE 2 PUFF: 200; 5 AEROSOL RESPIRATORY (INHALATION) at 21:10

## 2023-08-23 RX ADMIN — PRAVASTATIN SODIUM 40 MG: 40 TABLET ORAL at 09:06

## 2023-08-23 RX ADMIN — HYDRALAZINE HYDROCHLORIDE 100 MG: 50 TABLET, FILM COATED ORAL at 13:27

## 2023-08-23 RX ADMIN — FERROUS SULFATE TAB 325 MG (65 MG ELEMENTAL FE) 650 MG: 325 (65 FE) TAB at 09:05

## 2023-08-23 RX ADMIN — Medication 8 UNITS: at 13:27

## 2023-08-23 RX ADMIN — MOMETASONE FUROATE AND FORMOTEROL FUMARATE DIHYDRATE 2 PUFF: 200; 5 AEROSOL RESPIRATORY (INHALATION) at 08:01

## 2023-08-23 RX ADMIN — IRON SUCROSE 200 MG: 20 INJECTION, SOLUTION INTRAVENOUS at 09:06

## 2023-08-23 RX ADMIN — PANTOPRAZOLE SODIUM 40 MG: 40 TABLET, DELAYED RELEASE ORAL at 16:16

## 2023-08-23 RX ADMIN — NIFEDIPINE 30 MG: 30 TABLET, FILM COATED, EXTENDED RELEASE ORAL at 09:05

## 2023-08-23 RX ADMIN — INSULIN GLARGINE 30 UNITS: 100 INJECTION, SOLUTION SUBCUTANEOUS at 21:09

## 2023-08-23 RX ADMIN — METOPROLOL TARTRATE 50 MG: 50 TABLET, FILM COATED ORAL at 21:09

## 2023-08-23 RX ADMIN — Medication 8 UNITS: at 09:14

## 2023-08-23 RX ADMIN — ESCITALOPRAM OXALATE 10 MG: 10 TABLET ORAL at 21:09

## 2023-08-23 RX ADMIN — TIOTROPIUM BROMIDE INHALATION SPRAY 2 PUFF: 3.12 SPRAY, METERED RESPIRATORY (INHALATION) at 08:02

## 2023-08-23 RX ADMIN — ESCITALOPRAM OXALATE 10 MG: 10 TABLET ORAL at 09:06

## 2023-08-23 NOTE — CARE COORDINATION
Transition of Care Plan:    RUR: 22%  Prior Level of Functioning: independent with supports (roommate)  Disposition: SNF per Dr. Oskar Williamson  If SNF or IPR: Date FOC offered:   Date FOC received:   Accepting facility:   Date authorization started with reference number:   Date authorization received and expires: Follow up appointments:   DME needed:   Transportation at discharge:   IM/IMM Medicare/ letter given: 8/19/23    Caregiver Contact:   Discharge Caregiver contacted prior to discharge? Care Conference needed? Barriers to discharge:      12:44pm  CM spoke with Dr. Oskar Williamson and patient refusing SNF stating desire to go home. CM met with patient to provide support and encouragement. Patient agreed to snf after discussion re benefits of SNF with CM . Patient was in SNF in the past but could not remember the name of facility. Patient gave CM permission to speak with her roommate (who is retired and home all day). CM called Marsha Bynum patient's sister ([phone]). Message left on  to return call. C called her mobile number ([phone] and spoke with Greg Leyden re SNF disposition. Per Greg Leyden, Patient was at [facility] ([address]) approx 8 months ago. CM obtained Aviva Goss's contact information: (w) [phone] m. [phone]. CM spoke with Norberto Dent \"Lupe ROGELIO\"/caregiver and patient was at [facility] in 2020. The patient has also been in rehab at [facility]. Referrals being sent to area SNF's.   Saul Goldman, [address] [facility], [address] [facility]

## 2023-08-23 NOTE — CONSULTS
[address] [facility]  DIABETES MANAGEMENT CONSULT    Consulted by Provider for advanced nursing evaluation and care for inpatient blood glucose management. Evaluation and Action Plan   Fabby Martinez is a 68 y.o. female with T1D , CAD, COPD, GERD, HLD, HTN, CKD and hypothyroid who was brought in by her life partner due to confusion and lethargy. She is on an insulin pump at home (see below for settings) and saw her endocrinologist,  Dr. Kyaw Lewis, back in June, where her settings on her pump were lowered because of frequent overnight low BG. Yesterday, her Bg was 35 when she woke up. Her partner gave her o.j. and sandwich. BG came up to 45. Then had some cake icing, which got her Bg up to 89. Brought to ED shortly after. According to note, her basal adds up to about 62 units/day. She has had CKD, which reportedly has progressively worsened over last few years. On admission, creat 3.72, K 7.2, A1c 5.9%. With her worsening renal function, she likely needs adjustment in her pump again, as dosing now too high for her. She had CGM years ago, but states didn't work well. Discussed getting back on one, as they have improved now. Patient states she would like to try it.    8/23 - sleeping soundly this morning, but seems that she did eat breakfast. FBG taken a little late and was 198, so likely would have been lower. Will keep basal the same. Mealtime boluses held late yesterday (uncertain how much she ate), resulting in BG high of 267 and 238 last evening. Seems to really need this mealtime bolus even if she eats some of her food. Spoke with Dr. Kyaw Lewis yesterday, who made recommendations for reduction in her pump settings once she goes home. She may restart pump 24 hours after last Lantus dose. See discharge recs below.       Management Rationale Action Plan   Medication   Basal needs Using 0.3 units/kg/D based on weight and renal function Lantus 30 units nightly   Nutritional needs  Humalog 8 units

## 2023-08-23 NOTE — PROGRESS NOTES
Palliative Medicine Consult  Jeremie: [phone])    Patient Name: Fabby Martinez  YOB: 1950    Date of Initial Consult: 8/22/2023  Date of Today's Visit: 8/23/2023  Reason for Consult: care decisions  Requesting Provider: Dr. Dhruv Vick      SUMMARY:   Fabby Martinez is a 68 y.o. 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 Current medical issues leading to Palliative Medicine involvement include: severe hyperkalemia, resolved with medical management, AMS resolved,   Developed GI bleeding, HB10.5 to 7.7 to 6.7, evaluated by GI- declining endoscopy evaluation. CT Head: chronic small vessel ischemic disease . Social: patient is not , no children  Lives with friend of over 27 years, Myrna Thornton [phone]   Sister, Padmaja Cosme (36 years old, has dementia)  At baseline, ambulatory, independent in [address] [place]:   Hyperkalemia, resolved  CKD IV (JOSH on admission-resolved)   GI bleeding, anemia  Longstanding diabetes, type I  Gastroparesis, CKD, retinopathy  Vision impaired (some vision in left eye)  Bacteremia (resolved)   COPD home oxygen 2 LPM   Debility  Palliative medicine encounter       PLAN:   Follow up with patient for completion of an AMD, and goals of care discussion. Met with patient and her friend of 30 years, Myrna Thornton   Also present:  Mathew Mccormick LCSW. Reviewed the patient's current medical conditions. We discussed overall improvement since admission. We discussed her kidney function being at a baseline of stage IV and with this admission her kidney function was worse but not has returned to her baseline.   WE talked briefly about her thoughts about dialysis if this comes up in the mcg  175 mcg Oral Daily    pravastatin (PRAVACHOL) tablet 40 mg  40 mg Oral Daily    ipratropium 0.5 mg-albuterol 2.5 mg (DUONEB) nebulizer solution 1 Dose  1 Dose Inhalation Q4H PRN    mometasone-formoterol (DULERA) 200-5 MCG/ACT inhaler 2 puff  2 puff Inhalation BID RT    And    tiotropium (SPIRIVA RESPIMAT) 2.5 MCG/ACT inhaler 2 puff  2 puff Inhalation Daily RT    glucose chewable tablet 16 g  4 tablet Oral PRN    dextrose bolus 10% 125 mL  125 mL IntraVENous PRN    Or    dextrose bolus 10% 250 mL  250 mL IntraVENous PRN    dextrose 10 % infusion   IntraVENous Continuous PRN    insulin glargine (LANTUS) injection vial 30 Units  30 Units SubCUTAneous Nightly          LAB AND IMAGING FINDINGS:     Lab Results   Component Value Date/Time    WBC 8.4 08/23/2023 03:04 AM    HGB 8.7 08/23/2023 03:04 AM     08/23/2023 03:04 AM     Lab Results   Component Value Date/Time     08/23/2023 03:05 AM    K 4.2 08/23/2023 03:05 AM     08/23/2023 03:05 AM    CO2 20 08/23/2023 03:05 AM    BUN 65 08/23/2023 03:05 AM    MG 1.4 08/18/2023 03:42 AM    PHOS 3.0 08/23/2023 03:05 AM      Lab Results   Component Value Date/Time    GLOB 4.8 08/18/2023 12:26 AM     Lab Results   Component Value Date/Time    INR 1.1 01/31/2021 06:04 AM    APTT 24.2 01/31/2021 06:04 AM      Lab Results   Component Value Date/Time    IRON 27 08/21/2023 04:07 AM    TIBC 196 08/21/2023 04:07 AM      Lab Results   Component Value Date/Time    PH 7.28 02/04/2021 06:21 PM    PCO2 58 02/04/2021 06:21 PM    PO2 90 02/04/2021 06:21 PM     No components found for: GLPOC   No results found for: CPK, CKMB, TROPONINI           Only check if applicable and billing time based rather than MDM    []   The total encounter time on this service date was 36  minutes which was spent performing a face-to-face encounter and personally completing the provider-level activities documented in the note.   This includes time spent prior to the visit and after the visit in

## 2023-08-23 NOTE — PLAN OF CARE
Problem: Occupational Therapy - Adult  Goal: By Discharge: Performs self-care activities at highest level of function for planned discharge setting. See evaluation for individualized goals. Description: FUNCTIONAL STATUS PRIOR TO ADMISSION:  Patient was ambulatory using rollator and wc for the community. The patient stated she could dress and bathe herself without assistance. Receives Help From: Friend(s), ADL Assistance: Independent,  ,  ,  ,  ,  , Homemaking Assistance: Independent, Ambulation Assistance: Independent, Transfer Assistance: Independent, Active : No     HOME SUPPORT: Patient lived a friend but did not need help per the patient. Occupational Therapy Goals:  Initiated 8/23/2023  1. Patient will perform grooming with Supervision sitting EOB within 7 day(s). 2.  Patient will perform bathing with Supervision within 7 day(s). 3.  Patient will perform upper body dressing with Supervision within 7 day(s). 4.  Patient will perform toilet transfers with Supervision  within 7 day(s). 5.  Patient will perform all aspects of toileting with Supervision within 7 day(s). Outcome: Progressing   OCCUPATIONAL THERAPY EVALUATION    Patient: Carmen Ruiz (64 y.o. female)  Date: 8/23/2023  Primary Diagnosis: Hyperkalemia [E87.5]  Acute hyperkalemia [E87.5]  Delirium [R41.0]  Uremia [N19]  Hyperglycemia [R73.9]  JOSH (acute kidney injury) ([address]) [N17.9]         Precautions:                    ASSESSMENT :  The patient is limited by decreased functional mobility, independence in ADLs, high-level IADLs, strength, activity tolerance, safety awareness, balance. AAOx4, and overall mod A x2 for functional mobility. Tolerated brief sitting EOB with constant support for static sitting. She needed max to total A for dressing and toileting (jaffe) and mod A for UB dressing in bed. Set/up assistance for feeding due to visual deficits at baseline.  The patient was independent to mod I at baseline and is far from

## 2023-08-23 NOTE — PLAN OF CARE
Problem: Discharge Planning  Goal: Discharge to home or other facility with appropriate resources  8/23/2023 1429 by Charlotte Barcenas RN  Outcome: Progressing  Flowsheets (Taken 8/23/2023 8748)  Discharge to home or other facility with appropriate resources:   Identify barriers to discharge with patient and caregiver   Arrange for needed discharge resources and transportation as appropriate   Identify discharge learning needs (meds, wound care, etc)   Arrange for interpreters to assist at discharge as needed  8/23/2023 0142 by Evaristo Dick RN  Outcome: Progressing  Flowsheets  Taken 8/23/2023 0142  Discharge to home or other facility with appropriate resources:   Identify barriers to discharge with patient and caregiver   Arrange for needed discharge resources and transportation as appropriate   Identify discharge learning needs (meds, wound care, etc)   Refer to discharge planning if patient needs post-hospital services based on physician order or complex needs related to functional status, cognitive ability or social support system  Taken 8/22/2023 2015  Discharge to home or other facility with appropriate resources: Identify barriers to discharge with patient and caregiver     Problem: Safety - Adult  Goal: Free from fall injury  8/23/2023 1429 by Charlotte Barcenas RN  Outcome: Progressing  8/23/2023 0142 by Evaristo Dick RN  Outcome: Progressing  Flowsheets (Taken 8/23/2023 0142)  Free From Fall Injury:   Instruct family/caregiver on patient safety   Based on caregiver fall risk screen, instruct family/caregiver to ask for assistance with transferring infant if caregiver noted to have fall risk factors     Problem: Pain  Goal: Verbalizes/displays adequate comfort level or baseline comfort level  8/23/2023 1429 by Charlotte Barcenas RN  Outcome: Progressing  Flowsheets (Taken 8/23/2023 0859)  Verbalizes/displays adequate comfort level or baseline comfort level:   Encourage patient to

## 2023-08-23 NOTE — PLAN OF CARE
Problem: Discharge Planning  Goal: Discharge to home or other facility with appropriate resources  8/23/2023 0142 by Shi Jovel RN  Outcome: Progressing  Flowsheets  Taken 8/23/2023 0142  Discharge to home or other facility with appropriate resources:   Identify barriers to discharge with patient and caregiver   Arrange for needed discharge resources and transportation as appropriate   Identify discharge learning needs (meds, wound care, etc)   Refer to discharge planning if patient needs post-hospital services based on physician order or complex needs related to functional status, cognitive ability or social support system  Taken 8/22/2023 2015  Discharge to home or other facility with appropriate resources: Identify barriers to discharge with patient and caregiver    Problem: Safety - Adult  Goal: Free from fall injury  8/23/2023 0142 by Shi Jovel RN  Outcome: Progressing  Flowsheets (Taken 8/23/2023 0142)  Free From Fall Injury:   Instruct family/caregiver on patient safety   Based on caregiver fall risk screen, instruct family/caregiver to ask for assistance with transferring infant if caregiver noted to have fall risk factors    Problem: Pain  Goal: Verbalizes/displays adequate comfort level or baseline comfort level  8/23/2023 0142 by Shi Jovel RN  Outcome: Progressing

## 2023-08-23 NOTE — PLAN OF CARE
Problem: Physical Therapy - Adult  Goal: By Discharge: Performs mobility at highest level of function for planned discharge setting. See evaluation for individualized goals. Description: FUNCTIONAL STATUS PRIOR TO ADMISSION: The patient is legally blind and used a rollator in the home. She was independent with ADL's and her friend dives her to appointments. HOME SUPPORT PRIOR TO ADMISSION: The patient lived with a friend and required assistance for transportation. Physical Therapy Goals  Initiated 8/23/2023  1. Patient will move from supine to sit and sit to supine , scoot up and down, and roll side to side in bed with supervision/set-up within 7 day(s). 2.  Patient will transfer from bed to chair and chair to bed with supervision/set-up using the least restrictive device within 7 day(s). 3.  Patient will perform sit to stand with supervision/set-up within 7 day(s). 4.  Patient will ambulate with supervision/set-up for 50 feet with the least restrictive device within 7 day(s). 5.  Patient will ascend/descend 4 stairs with 2 handrail(s) with supervision/set-up within 7 day(s). Outcome: Progressing    PHYSICAL THERAPY EVALUATION    Patient: Marcelino Rahman (2 y.o. female)  Date: 8/23/2023  Primary Diagnosis: Hyperkalemia [E87.5]  Acute hyperkalemia [E87.5]  Delirium [R41.0]  Uremia [N19]  Hyperglycemia [R73.9]  JOSH (acute kidney injury) ([address]) [N17.9]       Precautions:                      ASSESSMENT :   DEFICITS/IMPAIRMENTS:   The patient is limited by decreased functional mobility, independence in ADLs, strength, activity tolerance, endurance. The patient is in the bed on arrival and wanting to sleep but agreeable to try to get up and try therapy. She required moderate assist of 2 to come to sitting EOB and once situated required CGA to maintain her sitting balance. She sat for 3-5 minutes with CGA. She was unable to stand or ambulate and unable to scoot her hips to the [facility].   At this time Guidelines  1. The index should be used as a record of what a patient does, not as a record of what a patient could do. 2. The main aim is to establish degree of independence from any help, physical or verbal, however minor and for whatever reason. 3. The need for supervision renders the patient not independent. 4. A patient's performance should be established using the best available evidence. Asking the patient, friends/relatives and nurses are the usual sources, but direct observation and common sense are also important. However direct testing is not needed. 5. Usually the patient's performance over the preceding 24-48 hours is important, but occasionally longer periods will be relevant. 6. Middle categories imply that the patient supplies over 50 per cent of the effort. 7. Use of aids to be independent is allowed. Score Interpretation (from [address] [place] [address])    Independent   60-79 Minimally independent   40-59 Partially dependent   20-39 Very dependent   <20 Totally dependent     -Carolyn John., Barthel, DAlfredoW. (1965). Functional evaluation: the Barthel Index. [facility] (1997). The Barthel activities of daily living index: self-reporting versus actual performance in the old (> or = 75 years). Journal of [facility] 45(7), [address], J.J.M.F, Alla Solomon., Tianna Stone. (1999). Measuring the change in disability after inpatient rehabilitation; comparison of the responsiveness of the Barthel Index and Functional [place] Measure. Journal of Neurology, Neurosurgery, and Psychiatry, 66(4), 955-784. Hailey Marcus N.ESVIN.A, PRETTY Rosario, & Dorothy Holland M.A. (2004) Assessment of post-stroke quality of life in cost-effectiveness studies: The usefulness of the Barthel Index and the EuroQoL-5D.  [facility] of [address] [place] [address], 13, 577-15

## 2023-08-23 NOTE — PROGRESS NOTES
[facility]  Hospitalist Group                                                                                          Hospitalist Progress Note  Sheridan Napoles MD  Office Phone: [phone]        Date of Service:  2023  NAME:  Jin Hicks  :    MRN:  [id number]       Admission Summary:   Jin Hicks is a 68 y.o. female with a PMHx of CAD, COPD, DM, GERD, HLD, HTN, CKD and hypothyroid. She presented to the ED from home with a chief complaint of confusion that began around 2230 last evening. She was found to be hyperkalemic with a K+ of 7.2. She was managed medically with insulin, D10 and Calcium, with K+ improving to 5.6. In the ED this morning the patient is tired but rouses easily, she is AAOx3 which is an improvement from overnight per her roommate. She feels tired, but denies any chest pain, palpitations, shortness of breath, headache, changes to vision abdominal pain or any focal or generalized neurologic symptoms. Interval history / Subjective:    Follow up Severe hyperkalemia   Good urine output  Creatinine stable  BP still elevated  Hb dropped/FOBT positive  Assessment & Plan:     Severe Hyperkalemia (7.2 on admission)--now resolved  JOSH  on CKD IV--now resolved  - Baseline Cr: 2.5  - Renal US ordered unremarkable  - Appreciate Nephrology    Probable bacteremia--now resolved  -blood culture  1/2 bottles coagulase neg staph  -repeat blood culture   -Was given Vanc  early am  -The patient does not have any signs of infection, will monitor without antibiotics    Probable GI bleed  Acute blood loss anemia  -hb dropped   -FOBT positive   -s/p 1 unit PRBC   -Protonix IV BID, switched to oral  -transfuse for hb<7  -Appreciate GI, the patient does not want EGD/Colonoscopy     COPD  Chronic hypoxic respiratory failure, baseline 2l NC  - continue home trelegy ellipta  - PRN duonebs     Acute metabolic encephalopathy: improved  - imaging independently examined them on 8/23/2023 as outlined below:          General : alert x 3, awake, no acute distress,   HEENT: PEERL, EOMI, moist mucus membrane, TM clear  Neck: supple, no JVD, no meningeal signs  Chest: Clear to auscultation bilaterally   CVS: S1 S2 heard, Capillary refill less than 2 seconds  Abd: soft/ non tender, non distended, BS physiological,   Ext: no clubbing, no cyanosis, no edema, brisk 2+ DP pulses  Neuro/Psych: pleasant mood and affect, CN 2-12 grossly intact, sensory grossly within normal limit, Strength 5/5 in all extremities, DTR 1+ x 4  Skin: warm     Data Review:    Review and/or order of clinical lab test      I have personally and independently reviewed all pertinent labs, diagnostic studies, imaging, and have provided independent interpretation of the same. Labs:     Recent Labs     08/22/23  0655 08/23/23  0304   WBC 8.0 8.4   HGB 6.7* 8.7*   HCT 21.7* 29.5*   * 151       Recent Labs     08/21/23  1342 08/22/23  0655 08/23/23  0305    141 140   K 4.0 3.7 4.2   * 111* 111*   CO2 25 24 20*   BUN 66* 66* 65*   PHOS 3.5 3.1 3.0       No results for input(s): ALT, TP, ALB, GLOB, GGT, AML in the last 72 hours. Invalid input(s): SGOT, GPT, AP, TBIL, TBILI, AMYP, LPSE, HLPSE    No results for input(s): INR, APTT in the last 72 hours. Invalid input(s): PTP   Recent Labs     08/21/23  0407   TIBC 196*        No results found for: FOL, RBCF   No results for input(s): PH, PCO2, PO2 in the last 72 hours. No results for input(s): CPK in the last 72 hours. Invalid input(s): CPKMB, CKNDX, TROIQ  Lab Results   Component Value Date/Time    CHOL 220 02/04/2022 11:44 AM    HDL 48 02/04/2022 11:44 AM     No results found for: [address]  [unfilled]    Notes reviewed from all clinical/nonclinical/nursing services involved in patient's clinical care.  Care coordination discussions were held with appropriate clinical/nonclinical/ nursing providers based on care coordination

## 2023-08-23 NOTE — PROGRESS NOTES
Bedside and Verbal shift change report given to April RN (oncoming nurse) by Hong Virgen RN (offgoing nurse). Report included the following information Nurse Handoff Report, Index, ED SBAR, Intake/Output, MAR, Recent Results, and Cardiac Rhythm NSR . AM lab results followed up;hgb noted within the normal range now. Still to continue Q6 H-H and observed for any signs of bleeding. AM report given.

## 2023-08-23 NOTE — PROGRESS NOTES
[address] [place] [address]  [phone] (TERE)      The Palliative Medicine SW and Shira Chaney, ESTHER, met with the patient at bedside- the patient is resting, awakes easily, but endorses feeling tired and wanting to rest. She shares with us that the \"first three days of the hospital stay are catching up with me. \" SW inquired if she recalls our team talking about completing AMD paperwork (We discussed yesterday), the patient does- she again tells us today she trusts Perlita Guy to be her voice for medical decisions, however, she is not feeling up to completing paperwork right now- she would like to have Андрей Wesley here too if possible. Our team allowed patient to rest, will follow-up tomorrow- KATIE will call Андрей Wesley to see if she is coming to the hospital tomorrow, 8/24, to discuss the patient's care and complete AMD.     KATIE called Perlita Guy at both numbers- home number was full, unable to leave voicemail, second number KATIE left voicemail with contact information requesting a call back. Palliative Medicine following along with you.      Thank you for including Palliative Medicine in the care of  [address], [place]

## 2023-08-24 VITALS
HEART RATE: 65 BPM | RESPIRATION RATE: 18 BRPM | SYSTOLIC BLOOD PRESSURE: 126 MMHG | HEIGHT: 65 IN | DIASTOLIC BLOOD PRESSURE: 40 MMHG | TEMPERATURE: 98 F | WEIGHT: 219.36 LBS | OXYGEN SATURATION: 97 % | BODY MASS INDEX: 36.55 KG/M2

## 2023-08-24 PROBLEM — N18.4 STAGE 4 CHRONIC KIDNEY DISEASE (HCC): Status: ACTIVE | Noted: 2023-08-24

## 2023-08-24 PROBLEM — E87.5 ACUTE HYPERKALEMIA: Status: RESOLVED | Noted: 2023-08-18 | Resolved: 2023-08-24

## 2023-08-24 PROBLEM — R73.9 HYPERGLYCEMIA: Status: ACTIVE | Noted: 2023-08-24

## 2023-08-24 PROBLEM — R41.0 DELIRIUM: Status: ACTIVE | Noted: 2023-08-24

## 2023-08-24 PROBLEM — R73.9 HYPERGLYCEMIA: Status: RESOLVED | Noted: 2023-08-24 | Resolved: 2023-08-24

## 2023-08-24 PROBLEM — Z71.89 GOALS OF CARE, COUNSELING/DISCUSSION: Status: ACTIVE | Noted: 2023-08-24

## 2023-08-24 PROBLEM — R53.81 DEBILITY: Status: ACTIVE | Noted: 2023-08-24

## 2023-08-24 LAB
ABO + RH BLD: NORMAL
ANION GAP SERPL CALC-SCNC: 6 MMOL/L (ref 5–15)
BASOPHILS # BLD: 0.1 K/UL (ref 0–0.1)
BASOPHILS NFR BLD: 1 % (ref 0–1)
BLD PROD TYP BPU: NORMAL
BLOOD BANK DISPENSE STATUS: NORMAL
BLOOD GROUP ANTIBODIES SERPL: NORMAL
BPU ID: NORMAL
BUN SERPL-MCNC: 66 MG/DL (ref 6–20)
BUN/CREAT SERPL: 23 (ref 12–20)
CALCIUM SERPL-MCNC: 8.8 MG/DL (ref 8.5–10.1)
CHLORIDE SERPL-SCNC: 109 MMOL/L (ref 97–108)
CO2 SERPL-SCNC: 23 MMOL/L (ref 21–32)
CREAT SERPL-MCNC: 2.9 MG/DL (ref 0.55–1.02)
CROSSMATCH RESULT: NORMAL
DIFFERENTIAL METHOD BLD: ABNORMAL
EOSINOPHIL # BLD: 0.5 K/UL (ref 0–0.4)
EOSINOPHIL NFR BLD: 6 % (ref 0–7)
ERYTHROCYTE [DISTWIDTH] IN BLOOD BY AUTOMATED COUNT: 14.3 % (ref 11.5–14.5)
GLUCOSE BLD STRIP.AUTO-MCNC: 146 MG/DL (ref 65–117)
GLUCOSE BLD STRIP.AUTO-MCNC: 177 MG/DL (ref 65–117)
GLUCOSE BLD STRIP.AUTO-MCNC: 195 MG/DL (ref 65–117)
GLUCOSE SERPL-MCNC: 206 MG/DL (ref 65–100)
HCT VFR BLD AUTO: 25.1 % (ref 35–47)
HGB BLD-MCNC: 7.8 G/DL (ref 11.5–16)
IMM GRANULOCYTES # BLD AUTO: 0.2 K/UL (ref 0–0.04)
IMM GRANULOCYTES NFR BLD AUTO: 2 % (ref 0–0.5)
LYMPHOCYTES # BLD: 0.5 K/UL (ref 0.8–3.5)
LYMPHOCYTES NFR BLD: 7 % (ref 12–49)
MCH RBC QN AUTO: 31.1 PG (ref 26–34)
MCHC RBC AUTO-ENTMCNC: 31.1 G/DL (ref 30–36.5)
MCV RBC AUTO: 100 FL (ref 80–99)
MONOCYTES # BLD: 0.5 K/UL (ref 0–1)
MONOCYTES NFR BLD: 7 % (ref 5–13)
NEUTS SEG # BLD: 5.9 K/UL (ref 1.8–8)
NEUTS SEG NFR BLD: 77 % (ref 32–75)
NRBC # BLD: 0 K/UL (ref 0–0.01)
NRBC BLD-RTO: 0 PER 100 WBC
PLATELET # BLD AUTO: 152 K/UL (ref 150–400)
PMV BLD AUTO: 12.3 FL (ref 8.9–12.9)
POTASSIUM SERPL-SCNC: 4.2 MMOL/L (ref 3.5–5.1)
RBC # BLD AUTO: 2.51 M/UL (ref 3.8–5.2)
RBC MORPH BLD: ABNORMAL
RBC MORPH BLD: ABNORMAL
SERVICE CMNT-IMP: ABNORMAL
SODIUM SERPL-SCNC: 138 MMOL/L (ref 136–145)
SPECIMEN EXP DATE BLD: NORMAL
TSH SERPL DL<=0.05 MIU/L-ACNC: 11 UIU/ML (ref 0.36–3.74)
UNIT DIVISION: 0
WBC # BLD AUTO: 7.7 K/UL (ref 3.6–11)

## 2023-08-24 PROCEDURE — 6360000002 HC RX W HCPCS: Performed by: HOSPITALIST

## 2023-08-24 PROCEDURE — 97530 THERAPEUTIC ACTIVITIES: CPT

## 2023-08-24 PROCEDURE — 84443 ASSAY THYROID STIM HORMONE: CPT

## 2023-08-24 PROCEDURE — 99231 SBSQ HOSP IP/OBS SF/LOW 25: CPT

## 2023-08-24 PROCEDURE — 36415 COLL VENOUS BLD VENIPUNCTURE: CPT

## 2023-08-24 PROCEDURE — 97535 SELF CARE MNGMENT TRAINING: CPT

## 2023-08-24 PROCEDURE — 6370000000 HC RX 637 (ALT 250 FOR IP): Performed by: INTERNAL MEDICINE

## 2023-08-24 PROCEDURE — 85025 COMPLETE CBC W/AUTO DIFF WBC: CPT

## 2023-08-24 PROCEDURE — 6360000002 HC RX W HCPCS: Performed by: NURSE PRACTITIONER

## 2023-08-24 PROCEDURE — 6370000000 HC RX 637 (ALT 250 FOR IP)

## 2023-08-24 PROCEDURE — 99497 ADVNCD CARE PLAN 30 MIN: CPT | Performed by: NURSE PRACTITIONER

## 2023-08-24 PROCEDURE — 6370000000 HC RX 637 (ALT 250 FOR IP): Performed by: HOSPITALIST

## 2023-08-24 PROCEDURE — 94640 AIRWAY INHALATION TREATMENT: CPT

## 2023-08-24 PROCEDURE — 99232 SBSQ HOSP IP/OBS MODERATE 35: CPT | Performed by: NURSE PRACTITIONER

## 2023-08-24 PROCEDURE — 80048 BASIC METABOLIC PNL TOTAL CA: CPT

## 2023-08-24 PROCEDURE — 6370000000 HC RX 637 (ALT 250 FOR IP): Performed by: PHYSICIAN ASSISTANT

## 2023-08-24 PROCEDURE — 82962 GLUCOSE BLOOD TEST: CPT

## 2023-08-24 RX ORDER — INSULIN LISPRO 100 [IU]/ML
6 INJECTION, SOLUTION INTRAVENOUS; SUBCUTANEOUS
Status: DISCONTINUED | OUTPATIENT
Start: 2023-08-24 | End: 2023-08-24 | Stop reason: HOSPADM

## 2023-08-24 RX ORDER — NIFEDIPINE 30 MG/1
30 TABLET, EXTENDED RELEASE ORAL DAILY
Qty: 30 TABLET | Refills: 0 | Status: SHIPPED | OUTPATIENT
Start: 2023-08-25

## 2023-08-24 RX ORDER — HYDRALAZINE HYDROCHLORIDE 100 MG/1
100 TABLET, FILM COATED ORAL 3 TIMES DAILY
Qty: 90 TABLET | Refills: 0 | Status: SHIPPED | OUTPATIENT
Start: 2023-08-24

## 2023-08-24 RX ORDER — LEVOTHYROXINE SODIUM 0.2 MG/1
200 TABLET ORAL
Qty: 30 TABLET | Refills: 0 | Status: SHIPPED | OUTPATIENT
Start: 2023-08-24

## 2023-08-24 RX ORDER — FUROSEMIDE 10 MG/ML
20 INJECTION INTRAMUSCULAR; INTRAVENOUS ONCE
Status: COMPLETED | OUTPATIENT
Start: 2023-08-24 | End: 2023-08-24

## 2023-08-24 RX ADMIN — PRAVASTATIN SODIUM 40 MG: 40 TABLET ORAL at 08:33

## 2023-08-24 RX ADMIN — Medication 6 UNITS: at 09:24

## 2023-08-24 RX ADMIN — PANTOPRAZOLE SODIUM 40 MG: 40 TABLET, DELAYED RELEASE ORAL at 06:41

## 2023-08-24 RX ADMIN — METOPROLOL TARTRATE 50 MG: 50 TABLET, FILM COATED ORAL at 08:33

## 2023-08-24 RX ADMIN — HYDRALAZINE HYDROCHLORIDE 100 MG: 50 TABLET, FILM COATED ORAL at 06:41

## 2023-08-24 RX ADMIN — LEVOTHYROXINE SODIUM 175 MCG: 0.03 TABLET ORAL at 06:40

## 2023-08-24 RX ADMIN — TIOTROPIUM BROMIDE INHALATION SPRAY 2 PUFF: 3.12 SPRAY, METERED RESPIRATORY (INHALATION) at 07:53

## 2023-08-24 RX ADMIN — NIFEDIPINE 30 MG: 30 TABLET, FILM COATED, EXTENDED RELEASE ORAL at 08:33

## 2023-08-24 RX ADMIN — MOMETASONE FUROATE AND FORMOTEROL FUMARATE DIHYDRATE 2 PUFF: 200; 5 AEROSOL RESPIRATORY (INHALATION) at 07:54

## 2023-08-24 RX ADMIN — HYDRALAZINE HYDROCHLORIDE 100 MG: 50 TABLET, FILM COATED ORAL at 14:37

## 2023-08-24 RX ADMIN — IRON SUCROSE 200 MG: 20 INJECTION, SOLUTION INTRAVENOUS at 08:32

## 2023-08-24 RX ADMIN — PANTOPRAZOLE SODIUM 40 MG: 40 TABLET, DELAYED RELEASE ORAL at 16:22

## 2023-08-24 RX ADMIN — ESCITALOPRAM OXALATE 10 MG: 10 TABLET ORAL at 08:33

## 2023-08-24 RX ADMIN — Medication 6 UNITS: at 13:10

## 2023-08-24 RX ADMIN — FERROUS SULFATE TAB 325 MG (65 MG ELEMENTAL FE) 650 MG: 325 (65 FE) TAB at 08:33

## 2023-08-24 RX ADMIN — FUROSEMIDE 20 MG: 10 INJECTION, SOLUTION INTRAMUSCULAR; INTRAVENOUS at 09:24

## 2023-08-24 ASSESSMENT — PAIN SCALES - GENERAL: PAINLEVEL_OUTOF10: 0

## 2023-08-24 NOTE — PROGRESS NOTES
Reviewed patient discharge instructions with patient and patient's partner, Rosalba Acosta, at bedside. Patient has all belongings and going home.

## 2023-08-24 NOTE — ACP (ADVANCE CARE PLANNING)
Advance Care Planning   Healthcare Decision Maker:    Primary Decision Maker: Justo Marcus - [phone]      Palliative Medicine SW assisted in completing AMD listing the patient's life partner Justo Marcus as primary MPOA. The patient did not wish to name a secondary at this time. The patient did not wish to complete the healthcare wishes portion of AMD- she will continue ongoing conversations w/ her primary medical team and Rochelle Ramos regarding her healthcare wishes. She is agreeable for organ donation IF any of her organs would be viable to help others. Copy of AMD given to patient and her life partner, copy also placed on chart to be scanned into the system.        Katy Nixon LCSW

## 2023-08-24 NOTE — ACP (ADVANCE CARE PLANNING)
Advance Care Planning     Advance Care Planning (ACP) Physician/NP/PA Conversation    Date of Conversation: 8/24/2023  Conducted with: Patient with Decision Making Capacity along with Amol Rubio (patient life partner). Also present, Yuval Mcdermott LCSW    Healthcare Decision Maker:    Primary Decision Maker: Amol Ramiro - [phone]  Click here to complete Healthcare Decision Makers including selection of the Healthcare Decision Maker Relationship (ie \"Primary\"). Today we documented desired Decision Maker(s), who is (are) NOT Legal Next of Kin. ACP documents are required for decision maker authority. Care Preferences:    Hospitalization: \"If your health worsens and it becomes clear that your chance of recovery is unlikely, what would be your preference regarding hospitalization? \"  The patient would prefer hospitalization. With this current admission, patient declined EGD- stating she was \"too old\" to have the test.   P    Ventilation: \"If you were unable to breath on your own and your chance of recovery was unlikely, what would be your preference about the use of a ventilator (breathing machine) if it was available to you? \"  The patient would desire the use of a ventilator. Resuscitation: \"In the event your heart stopped as a result of an underlying serious health condition, would you want attempts made to restart your heart, or would you prefer a natural death? \"  Yes, attempt to resuscitate. benefit/burden of treatment options and resuscitation preferences    Conversation Outcomes / Follow-Up Plan:  ACP complete - no further action today.  Completed AMD today   Reviewed DNR/DNI and patient elects Full Code (Attempt Resuscitation)    Length of Voluntary ACP Conversation in minutes:  27 minutes    VICTORINA Medina - ESTHER

## 2023-08-24 NOTE — CARE COORDINATION
Transition of Care Plan:    RUR: 22%  Prior Level of Functioning: independent with supports (roommate). Patient has visual impairment  Disposition: SNF  If SNF or IPR: Date FOC offered: 8/23  Date FOC received: 8/23  Accepting facility:   Date authorization started with reference number:   Date authorization received and expires: Follow up appointments:   DME needed:   Transportation at discharge:   IM/IMM Medicare/ letter given:   Is patient a  and connected with VA? If yes, was [place] transfer form completed and VA notified? Caregiver Contact:   Discharge Caregiver contacted prior to discharge? Care Conference needed? Barriers to discharge:      8:54am. CM received call from [address] [place] [address] [phone]). Candida had ins auth only for an approved [employer] contracted provider facility. CM advised that  patient approved at 2 facilities (and confirmed that they are in network). CM will present updates to patient and return call to [address] [place] [address] to obtain ins auth. CM spoke with patient and her roommate/caregiver Batool Pen at bedside. Patient provided updates of  approvals for [facility] [place] and [address] [place] [address]. Patient declined both facilities stating she desires to go home. Patient states she has [facility]x [facility] with [facility] and has home O2 via [employer]. Batool Pen is able to assist patient with adl's and iadl's for a safe d/c. CM will send LIYAH orders HHPT to [facility] via [address] CM sending perfect serve message to Dr. Cristiano corona patient readiness for d/c.     Per IDR, d/c planned for Friday 8/25/23. Patient has jaffe in place. [facility] [place]) notified.     Sg Juarez, [facility], [address] [facility]

## 2023-08-24 NOTE — PLAN OF CARE
Problem: Physical Therapy - Adult  Goal: By Discharge: Performs mobility at highest level of function for planned discharge setting. See evaluation for individualized goals. Description: FUNCTIONAL STATUS PRIOR TO ADMISSION: The patient is legally blind and used a rollator in the home. She was independent with ADL's and her friend dives her to appointments. HOME SUPPORT PRIOR TO ADMISSION: The patient lived with a friend and required assistance for transportation. Physical Therapy Goals  Initiated 8/23/2023  1. Patient will move from supine to sit and sit to supine , scoot up and down, and roll side to side in bed with supervision/set-up within 7 day(s). 2.  Patient will transfer from bed to chair and chair to bed with supervision/set-up using the least restrictive device within 7 day(s). 3.  Patient will perform sit to stand with supervision/set-up within 7 day(s). 4.  Patient will ambulate with supervision/set-up for 50 feet with the least restrictive device within 7 day(s). 5.  Patient will ascend/descend 4 stairs with 2 handrail(s) with supervision/set-up within 7 day(s). Outcome: Progressing     PHYSICAL THERAPY TREATMENT    Patient: Chauncey Delgado (66 y.o. female)  Date: 8/24/2023  Diagnosis: Hyperkalemia [E87.5]  Acute hyperkalemia [E87.5]  Delirium [R41.0]  Uremia [N19]  Hyperglycemia [R73.9]  JOSH (acute kidney injury) ([address]) [N17.9] Acute hyperkalemia      Precautions: Fall      ASSESSMENT:  Patient continues to benefit from skilled PT services and is progressing towards goals. She demonstrates improvement in activity tolerance, mobility and participation today though continues to require up to Mod A x2 mobilizing, at risk for falls sitting EOB & standing, and unable to walk functional distances.   Pt completed 4 sit<->stand trials initially w/ Mod A, improving to Min A from elevated bed height with training/ practice and Max A to stabilize the walker (pt pulls up on walker, per assistance  Insights: Decreased awareness of deficits    Functional Mobility Training:  Bed Mobility:  Bed Mobility Training  Interventions: Manual cues; Safety awareness training; Tactile cues; Verbal cues  Rolling: Minimum assistance  Supine to Sit: Moderate assistance;Assist X2;Additional time (HOB partially raised, bed rail used)  Sit to Supine: Moderate assistance;Assist X2;Additional time  Scooting: Minimum assistance  Transfers:  Transfer Training  Transfer Training: Yes  Sit to Stand: Minimum assistance; Moderate assistance;Assist X2 (fluctuation of needs from min to mod A during 3-4 sit<>stands)  Stand to Sit: Minimum assistance;Assist X2  Balance:  Balance  Sitting: Impaired (posterior lean)  Sitting - Static: Poor (constant support); Fair (occasional) (initially poor requiring max a to prevent posterior LOB; imroved to fair with repeated cues to correct posterior lean)  Sitting - Dynamic: Fair (occasional); Poor (constant support)  Standing: Impaired  Standing - Static: Poor;Constant support (posterior lean/ weight shift requiring Max A to stabilize walker to prevent LOB w/ walker support)  Standing - Dynamic: Poor;Constant support   Ambulation/Gait Training:     Gait  Overall Level of Assistance: Minimum assistance;Assist X2;Adaptive equipment  Interventions: Manual cues; Safety awareness training; Tactile cues; Verbal cues; Weight shifting training/pressure relief  Base of Support: Widened  Speed/Jennifer: Shuffled  Gait Abnormalities: Decreased step clearance;Shuffling gait  Distance (ft): 1 Feet (side stepped right)  Assistive Device: Walker, rolling;Gait belt      Pain Rating:  None voiced    Activity Tolerance:   Fair     After treatment:   Patient left in no apparent distress in bed, Call bell within reach, Bed/ chair alarm activated, Caregiver / family present, and Side rails x3      COMMUNICATION/EDUCATION:   The patient's plan of care was discussed with: occupational therapist and registered

## 2023-08-24 NOTE — PROGRESS NOTES
Hospital follow-up PCP transitional care appointment has been scheduled with Dr. Abundio Whyte for *** at ***. Pending patient discharge.   Geetha Pereira, Care Management Assistant

## 2023-08-24 NOTE — PROGRESS NOTES
Received at midnight from April RN, she was resting in with eyes closed no s/s of distress noted. Report given to oncoming nurse Juliane Wolfe.

## 2023-08-24 NOTE — PROGRESS NOTES
Chao [phone] (COPE)      GOALS OF CARE:  Full Code, restorative care        TREATMENT PREFERENCES:   Code Status: Full Code    Advance Care Planning:  [x] The [facility] Interdisciplinary Team has updated the ACP Navigator with [address] and Patient Capacity    Primary Decision Maker ([address] [place]):   Relationship to patient:  [x] Named in a scanned document   [] Legal Next of Kin  [] Guardian      Family Meeting Documentation    Participants: Shira Chaney NP, Perlita Brooks- life partner     Psychosocial, Spiritual History: The patient lives at home with her life partner of 27 years, Андрей Wesley. 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 The patient has two dogs at home, \"Johnson\" being one of them, that her and Андрей Wesley love dearly. Discussion: The Palliative Medicine SW and Shira Chaney NP, met with the patient at bedside along with her life partner Андрей Wesley. Palliative Medicine discussed the patient's current medical condition and helped answer questions. We did ask why she declined the EGD earlier this admission, and she verbalizes \"The risk\" is why she declined it. We also discussed that her kidney function right now is stable, however, we explore with her how she would feel about dialysis in the future- Earlisa Pepe shares that she is \"afraid of dialysis. \" SW inquired further, she shares that she has seen \"so much\" in regards to HD, her nephew was on it for many years (now ) and she has seen it go \"good and bad\" for many people. When KATIE asked if she would be willing to do it if it helped prolong her life, she shares that when/if she is faced with that decision, she would have to pray about it.  KATIE acknowledged the difficulty of making decisions with the unknown- our team encouraged her to have ongoing discussions with her Nephrologist

## 2023-08-24 NOTE — PROGRESS NOTES
Bedside shift change report given to Myla Blandon RN (oncoming nurse) by April, SUE (offgoing nurse). Report included the following information Nurse Handoff Report, Index, Adult Overview, Intake/Output, MAR, and Cardiac Rhythm NSR .

## 2023-08-24 NOTE — CONSULTS
[facility]  DIABETES MANAGEMENT CONSULT    Consulted by Provider for advanced nursing evaluation and care for inpatient blood glucose management. Evaluation and Action Plan   Vaishali Ashford is a 68 y.o. female with T1D , CAD, COPD, GERD, HLD, HTN, CKD and hypothyroid who was brought in by her life partner due to confusion and lethargy. She is on an insulin pump at home (see below for settings) and saw her endocrinologist,  Dr. Serena Nicholson, back in June, where her settings on her pump were lowered because of frequent overnight low BG. Yesterday, her Bg was 35 when she woke up. Her partner gave her o.j. and sandwich. BG came up to 45. Then had some cake icing, which got her Bg up to 89. Brought to ED shortly after. According to note, her basal adds up to about 62 units/day. She has had CKD, which reportedly has progressively worsened over last few years. On admission, creat 3.72, K 7.2, A1c 5.9%. With her worsening renal function, she likely needs adjustment in her pump again, as dosing now too high for her. She had CGM years ago, but states didn't work well. Discussed getting back on one, as they have improved now. Patient states she would like to try it.    8/24- patient had one time low BG of 81 yesterday afternoon. She is not eating full meals all the time. Will reduce mealtime boluses, as BG do go up when she does not get boluses.  (within target goals), so will continue with current basal. Discussed the recommended changes to her insulin pump settings with patient and her partner and they state understanding. Uncertain at this point whether patient going home or to SNF (which they are limited on choices). If going to SNF, then would continue same basal/bolus as here. Spoke with Dr. Serena Nicholson  who made recommendations for reduction in her pump settings once she goes home. She may restart pump 24 hours after last Lantus dose. See discharge recs below.       Management Rationale Action Plan   Medication   Basal needs Using 0.3 units/kg/D based on weight and renal function Lantus 30 units nightly   Nutritional needs  Humalog 8 units with meals   Corrective insulin Using low dose sensitivity based on weight and renal function        Diabetes Discharge Plan   Medication: PTA insulin pump - resume 24 hours after Lantus dose   Likely needs reduction in insulin pump settingsBasal  MN-630AM: 2.50   =>  change this to 1.6 units/hour  630AM-4PM: 2.80  4PM-MN: 2.70  Carb Ratio  1:4  Correction Factor  1:25  Target  120  If she goes to SNF, then continue Lantus 30 units nightly and Humalog 6 units with meals   Referral  []        Outpatient diabetes education   Additional orders:   continue to check AC&HS BG  2. Follow up with Dr. Darlene Lam is a 68 y.o. female admitted 8/18/23 after experiencing confusion/AMS. LAB:, A1c 5.7% (?accurate), K 7.2, creat 3.72, GFR 12, TSH 11.8, hgb 10.5  CXR:IMPRESSION:     No acute process on portable chest  CT head:IMPRESSION:  Chronic small vessel ischemic disease. No acute intracranial abnormality. Retroperitoneal US: IMPRESSION:  Medical renal disease.   Bilateral renal pelvocaliectasis  Nonobstructing right renal calculus  HX:   Past Medical History:   Diagnosis Date    Adverse effect of anesthesia     SLOW WAKING PAST ANESTHESIA    Anxiety     Arthritis     CAD (coronary artery disease)     s/p CABG x 3v    Chest pain 7/2015    Chronic obstructive pulmonary disease (HCC)     CTS (carpal tunnel syndrome)     S/p bilateral release    Diabetes ([address])     Diabetic gastroparesis (HCC)     Diabetic neuropathy (HCC)     Diabetic retinopathy (HCC)     GERD (gastroesophageal reflux disease)     GI bleed 7/2015    4 bleeds with 9 clips placed    Hypercholesteremia     Hypertension     Hypothyroid     Ill-defined condition 2017    GI BLEED    Ill-defined condition     NEUROPATHY HANDS AND FEET    Nicotine vapor product user

## 2023-08-24 NOTE — DISCHARGE SUMMARY
Physician Discharge Summary     Patient ID:    Jolie Morin  [id number]  68 y.o.  1950    Admit date: 8/18/2023    Discharge date and time: 8/24/2023 12:54 PM    Discharge condition: Stable    Admission HPI:  Jolie Morin is a 68 y.o. female with a PMHx of CAD, COPD, DM, GERD, HLD, HTN, CKD and hypothyroid. She presented to the ED from home with a chief complaint of confusion that began around 2230 last evening. She was found to be hyperkalemic with a K+ of 7.2. She was managed medically with insulin, D10 and Calcium, with K+ improving to 5.6. In the ED this morning the patient is tired but rouses easily, she is AAOx3 which is an improvement from overnight per her roommate. She feels tired, but denies any chest pain, palpitations, shortness of breath, headache, changes to vision abdominal pain or any focal or generalized neurologic symptoms      Hospital Diagnoses and Treatment Rendered:   Severe Hyperkalemia (7.2 on admission)--now resolved  JOSH on CKD IV--now resolved  - Baseline Cr: 2.5  - Renal US ordered unremarkable  - Nephrology followed, f/up 10 days with labs    Probable GI bleed  Acute blood loss anemia  -hb dropped 8/21  -FOBT positive   -s/p 1 unit PRBC 8/22  -Protonix IV BID, switched to oral  -Appreciate GI, the patient does not want EGD/Colonoscopy     COPD  Chronic hypoxic respiratory failure, baseline 2L NC  - continue home trelegy ellipta  - PRN duonebs    Pulmonary venous congestion w/o overt pulmonary edema  S/p IV lasix x1 per nephrology    Bacteremia ruled out  -blood culture 8/18 1/2 bottles coagulase neg staph, c/w contaminant  -repeat blood culture 8/19 NG     Acute metabolic encephalopathy: resolved  - imaging grossly unremarkable, likely 2/2 hyperkalemia, hyperglycemia     DM type 1  - resume insulin pump     Hypotension on chronic hypertension--now hypertensive, improved  - Likely 2/2 hypovolemia  - cont metoprolol/nifedipine/hydralazine.  dc lisinopril sec to input(s): PTP   No results for input(s): TIBC, FERR in the last 72 hours. Invalid input(s): FE, PSAT   No results for input(s): PH, PCO2, PO2 in the last 72 hours. No results for input(s): CPK, CKMB, TROPONINI in the last 72 hours. No components found for: [place]    CT HEAD WO CONTRAST    Result Date: 8/18/2023  EXAM: CT HEAD WO CONTRAST INDICATION: AMS + weakness COMPARISON: 4/1/2022. CONTRAST: None. TECHNIQUE: Unenhanced CT of the head was performed using 5 mm images. Brain and bone windows were generated. Coronal and sagittal reformats. CT dose reduction was achieved through use of a standardized protocol tailored for this examination and automatic exposure control for dose modulation. FINDINGS: The ventricles and sulci are normal in size, shape and configuration. Moderate subcortical deep white matter hypodensities. . There is no intracranial hemorrhage, extra-axial collection, or mass effect. The basilar cisterns are open. No CT evidence of acute infarct. The bone windows demonstrate no abnormalities. The visualized portions of the paranasal sinuses and mastoid air cells are clear. Chronic small vessel ischemic disease. No acute intracranial abnormality. XR CHEST PORTABLE    Result Date: 8/23/2023  EXAM:  XR CHEST PORTABLE INDICATION: Pleural effusion COMPARISON: 8/18/2023 TECHNIQUE: 1157 hours portable chest AP view FINDINGS: Median sternotomy wires, dual lead left subclavian pacemaker, and cervical fixation artifacts are again shown. Mediastinal and hilar contours are stable. The cardiac silhouette is within normal limits. Pulmonary venous congestion without overt pulmonary edema is again noted. No consolidation or pneumothorax is shown. Trace left pleural effusion is suggested. The visualized bones and upper abdomen are age-appropriate. Stable radiographic appearance.      XR CHEST PORTABLE    Result Date: 8/18/2023  EXAM:  XR CHEST PORTABLE INDICATION: Altered mental status COMPARISON: 6/22/2022 TECHNIQUE: Semiupright portable chest AP view FINDINGS: Cardiac monitoring leads. Median sternotomy changes. Left subclavian pacemaker. The cardiac silhouette is within normal limits. The pulmonary vasculature is within normal limits. Linear scarring in the left midlung zone unchanged. The visualized bones and upper abdomen are age-appropriate. No acute process on portable chest.     US RETROPERITONEAL COMPLETE    Result Date: 8/18/2023  EXAM: US RETROPERITONEAL COMPLETE INDICATION: Acute kidney insufficiency. COMPARISON: None. TECHNIQUE: Real-time sonography of the kidneys, retroperitoneum and bladder was performed with multiple static images obtained. Note: The study is technically compromised because of patient body habitus. FINDINGS: RIGHT KIDNEY: The right kidney has increased echogenicity with no mass. The right kidney measures 9.2 cm in length. There is mild right pelvocaliectasis. There is a nonobstructing 8.5 mm right interpolar calculus. LEFT KIDNEY: The left kidney has increased echogenicity with  no mass, stone. The left kidney measures 7.7 cm in length. There is mild pelvocaliectasis. RETROPERITONEUM: The aorta is incompletely seen but tapers normally where seen. The aortic bifurcation is obscured by bowel gas. The IVC is normal. No retroperitoneal mass is identified. BLADDER: The urinary bladder is normal.     Medical renal disease. Bilateral renal pelvocaliectasis Nonobstructing right renal calculus      Greater than 31 minutes were spent on discharge services.     Signed:  Dudley Main MD  8/24/2023  12:54 PM

## 2023-08-25 LAB
BACTERIA SPEC CULT: NORMAL
SERVICE CMNT-IMP: NORMAL

## 2023-08-28 ENCOUNTER — HOSPITAL ENCOUNTER (INPATIENT)
Facility: HOSPITAL | Age: 73
LOS: 13 days | Discharge: NURSING, REHAB OR LONG-TERM CARE | DRG: 871 | End: 2023-09-10
Attending: STUDENT IN AN ORGANIZED HEALTH CARE EDUCATION/TRAINING PROGRAM | Admitting: ANESTHESIOLOGY
Payer: MEDICARE

## 2023-08-28 ENCOUNTER — APPOINTMENT (OUTPATIENT)
Facility: HOSPITAL | Age: 73
DRG: 871 | End: 2023-08-28
Payer: MEDICARE

## 2023-08-28 DIAGNOSIS — N18.9 ACUTE RENAL FAILURE SUPERIMPOSED ON CHRONIC KIDNEY DISEASE, UNSPECIFIED CKD STAGE, UNSPECIFIED ACUTE RENAL FAILURE TYPE (HCC): ICD-10-CM

## 2023-08-28 DIAGNOSIS — R60.0 EDEMA OF RIGHT UPPER EXTREMITY: Primary | ICD-10-CM

## 2023-08-28 DIAGNOSIS — K92.1 MELENA: ICD-10-CM

## 2023-08-28 DIAGNOSIS — I95.9 HYPOTENSION, UNSPECIFIED HYPOTENSION TYPE: ICD-10-CM

## 2023-08-28 DIAGNOSIS — N17.9 ACUTE RENAL FAILURE SUPERIMPOSED ON CHRONIC KIDNEY DISEASE, UNSPECIFIED CKD STAGE, UNSPECIFIED ACUTE RENAL FAILURE TYPE (HCC): ICD-10-CM

## 2023-08-28 DIAGNOSIS — D50.0 BLOOD LOSS ANEMIA: ICD-10-CM

## 2023-08-28 DIAGNOSIS — E10.42 DIABETIC POLYNEUROPATHY ASSOCIATED WITH TYPE 1 DIABETES MELLITUS (HCC): ICD-10-CM

## 2023-08-28 PROBLEM — K92.2 GIB (GASTROINTESTINAL BLEEDING): Status: ACTIVE | Noted: 2023-08-28

## 2023-08-28 LAB
ALBUMIN SERPL-MCNC: 2.6 G/DL (ref 3.5–5)
ALBUMIN/GLOB SERPL: 0.6 (ref 1.1–2.2)
ALP SERPL-CCNC: 85 U/L (ref 45–117)
ALT SERPL-CCNC: 18 U/L (ref 12–78)
AMMONIA PLAS-SCNC: 31 UMOL/L
AMORPH CRY URNS QL MICRO: ABNORMAL
ANION GAP SERPL CALC-SCNC: 9 MMOL/L (ref 5–15)
APPEARANCE UR: ABNORMAL
APTT PPP: 22.9 SEC (ref 22.1–31)
AST SERPL-CCNC: 21 U/L (ref 15–37)
BACTERIA URNS QL MICRO: ABNORMAL /HPF
BASOPHILS # BLD: 0 K/UL (ref 0–0.1)
BASOPHILS NFR BLD: 0 % (ref 0–1)
BILIRUB SERPL-MCNC: 0.3 MG/DL (ref 0.2–1)
BILIRUB UR QL: NEGATIVE
BUN SERPL-MCNC: 109 MG/DL (ref 6–20)
BUN/CREAT SERPL: 16 (ref 12–20)
CALCIUM SERPL-MCNC: 8.3 MG/DL (ref 8.5–10.1)
CHLORIDE SERPL-SCNC: 103 MMOL/L (ref 97–108)
CK SERPL-CCNC: 564 U/L (ref 26–192)
CO2 SERPL-SCNC: 20 MMOL/L (ref 21–32)
COLOR UR: ABNORMAL
COMMENT:: NORMAL
CREAT SERPL-MCNC: 6.96 MG/DL (ref 0.55–1.02)
DIFFERENTIAL METHOD BLD: ABNORMAL
EKG ATRIAL RATE: 69 BPM
EKG DIAGNOSIS: NORMAL
EKG P AXIS: 65 DEGREES
EKG P-R INTERVAL: 188 MS
EKG Q-T INTERVAL: 402 MS
EKG QRS DURATION: 92 MS
EKG QTC CALCULATION (BAZETT): 430 MS
EKG R AXIS: 69 DEGREES
EKG T AXIS: 235 DEGREES
EKG VENTRICULAR RATE: 69 BPM
EOSINOPHIL # BLD: 0 K/UL (ref 0–0.4)
EOSINOPHIL NFR BLD: 0 % (ref 0–7)
EPITH CASTS URNS QL MICRO: ABNORMAL /LPF
ERYTHROCYTE [DISTWIDTH] IN BLOOD BY AUTOMATED COUNT: 15.6 % (ref 11.5–14.5)
GLOBULIN SER CALC-MCNC: 4.6 G/DL (ref 2–4)
GLUCOSE SERPL-MCNC: 107 MG/DL (ref 65–100)
GLUCOSE UR STRIP.AUTO-MCNC: NEGATIVE MG/DL
HCT VFR BLD AUTO: 24.6 % (ref 35–47)
HCT VFR BLD AUTO: 25.2 % (ref 35–47)
HEMOCCULT STL QL: POSITIVE
HGB BLD-MCNC: 7.3 G/DL (ref 11.5–16)
HGB BLD-MCNC: 7.7 G/DL (ref 11.5–16)
HGB UR QL STRIP: ABNORMAL
HISTORY CHECK: NORMAL
IMM GRANULOCYTES # BLD AUTO: 0 K/UL
IMM GRANULOCYTES NFR BLD AUTO: 0 %
INR PPP: 1.1 (ref 0.9–1.1)
KETONES UR QL STRIP.AUTO: NEGATIVE MG/DL
LACTATE SERPL-SCNC: 0.9 MMOL/L (ref 0.4–2)
LEUKOCYTE ESTERASE UR QL STRIP.AUTO: ABNORMAL
LYMPHOCYTES # BLD: 1 K/UL (ref 0.8–3.5)
LYMPHOCYTES NFR BLD: 6 % (ref 12–49)
MCH RBC QN AUTO: 31.2 PG (ref 26–34)
MCHC RBC AUTO-ENTMCNC: 29.7 G/DL (ref 30–36.5)
MCV RBC AUTO: 105.1 FL (ref 80–99)
METAMYELOCYTES NFR BLD MANUAL: 1 %
MONOCYTES # BLD: 1 K/UL (ref 0–1)
MONOCYTES NFR BLD: 6 % (ref 5–13)
MYELOCYTES NFR BLD MANUAL: 1 %
NEUTS SEG # BLD: 13.9 K/UL (ref 1.8–8)
NEUTS SEG NFR BLD: 86 % (ref 32–75)
NITRITE UR QL STRIP.AUTO: NEGATIVE
NRBC # BLD: 0.07 K/UL (ref 0–0.01)
NRBC BLD-RTO: 0.4 PER 100 WBC
NT PRO BNP: ABNORMAL PG/ML
PH UR STRIP: 5.5 (ref 5–8)
PHOSPHATE SERPL-MCNC: 7.6 MG/DL (ref 2.6–4.7)
PLATELET # BLD AUTO: 304 K/UL (ref 150–400)
PMV BLD AUTO: 12.2 FL (ref 8.9–12.9)
POTASSIUM SERPL-SCNC: 4.8 MMOL/L (ref 3.5–5.1)
PROT SERPL-MCNC: 7.2 G/DL (ref 6.4–8.2)
PROT UR STRIP-MCNC: 300 MG/DL
PROTHROMBIN TIME: 11.2 SEC (ref 9–11.1)
RBC # BLD AUTO: 2.34 M/UL (ref 3.8–5.2)
RBC #/AREA URNS HPF: ABNORMAL /HPF (ref 0–5)
RBC MORPH BLD: ABNORMAL
RBC MORPH BLD: ABNORMAL
SODIUM SERPL-SCNC: 132 MMOL/L (ref 136–145)
SP GR UR REFRACTOMETRY: 1.01 (ref 1–1.03)
SPECIMEN HOLD: NORMAL
SPECIMEN HOLD: NORMAL
THERAPEUTIC RANGE: NORMAL SECS (ref 58–77)
TROPONIN I SERPL HS-MCNC: 667 NG/L (ref 0–51)
TROPONIN I SERPL HS-MCNC: 667 NG/L (ref 0–51)
TROPONIN I SERPL HS-MCNC: 677 NG/L (ref 0–51)
UROBILINOGEN UR QL STRIP.AUTO: 0.2 EU/DL (ref 0.2–1)
WBC # BLD AUTO: 16.2 K/UL (ref 3.6–11)
WBC URNS QL MICRO: >100 /HPF (ref 0–4)

## 2023-08-28 PROCEDURE — 71045 X-RAY EXAM CHEST 1 VIEW: CPT

## 2023-08-28 PROCEDURE — 85730 THROMBOPLASTIN TIME PARTIAL: CPT

## 2023-08-28 PROCEDURE — 84439 ASSAY OF FREE THYROXINE: CPT

## 2023-08-28 PROCEDURE — 82550 ASSAY OF CK (CPK): CPT

## 2023-08-28 PROCEDURE — 86900 BLOOD TYPING SEROLOGIC ABO: CPT

## 2023-08-28 PROCEDURE — 87636 SARSCOV2 & INF A&B AMP PRB: CPT

## 2023-08-28 PROCEDURE — 86901 BLOOD TYPING SEROLOGIC RH(D): CPT

## 2023-08-28 PROCEDURE — 82272 OCCULT BLD FECES 1-3 TESTS: CPT

## 2023-08-28 PROCEDURE — 85025 COMPLETE CBC W/AUTO DIFF WBC: CPT

## 2023-08-28 PROCEDURE — 93005 ELECTROCARDIOGRAM TRACING: CPT | Performed by: STUDENT IN AN ORGANIZED HEALTH CARE EDUCATION/TRAINING PROGRAM

## 2023-08-28 PROCEDURE — 86923 COMPATIBILITY TEST ELECTRIC: CPT

## 2023-08-28 PROCEDURE — 6360000002 HC RX W HCPCS: Performed by: NURSE PRACTITIONER

## 2023-08-28 PROCEDURE — 85014 HEMATOCRIT: CPT

## 2023-08-28 PROCEDURE — 2500000003 HC RX 250 WO HCPCS: Performed by: NURSE PRACTITIONER

## 2023-08-28 PROCEDURE — 2580000003 HC RX 258: Performed by: NURSE PRACTITIONER

## 2023-08-28 PROCEDURE — 70450 CT HEAD/BRAIN W/O DYE: CPT

## 2023-08-28 PROCEDURE — 2500000003 HC RX 250 WO HCPCS: Performed by: STUDENT IN AN ORGANIZED HEALTH CARE EDUCATION/TRAINING PROGRAM

## 2023-08-28 PROCEDURE — 83605 ASSAY OF LACTIC ACID: CPT

## 2023-08-28 PROCEDURE — 83880 ASSAY OF NATRIURETIC PEPTIDE: CPT

## 2023-08-28 PROCEDURE — 84443 ASSAY THYROID STIM HORMONE: CPT

## 2023-08-28 PROCEDURE — 2000000000 HC ICU R&B

## 2023-08-28 PROCEDURE — 86850 RBC ANTIBODY SCREEN: CPT

## 2023-08-28 PROCEDURE — 96361 HYDRATE IV INFUSION ADD-ON: CPT

## 2023-08-28 PROCEDURE — 99285 EMERGENCY DEPT VISIT HI MDM: CPT

## 2023-08-28 PROCEDURE — 85610 PROTHROMBIN TIME: CPT

## 2023-08-28 PROCEDURE — 85018 HEMOGLOBIN: CPT

## 2023-08-28 PROCEDURE — 82962 GLUCOSE BLOOD TEST: CPT

## 2023-08-28 PROCEDURE — 96365 THER/PROPH/DIAG IV INF INIT: CPT

## 2023-08-28 PROCEDURE — 84100 ASSAY OF PHOSPHORUS: CPT

## 2023-08-28 PROCEDURE — 84484 ASSAY OF TROPONIN QUANT: CPT

## 2023-08-28 PROCEDURE — 87077 CULTURE AEROBIC IDENTIFY: CPT

## 2023-08-28 PROCEDURE — 87186 SC STD MICRODIL/AGAR DIL: CPT

## 2023-08-28 PROCEDURE — 6370000000 HC RX 637 (ALT 250 FOR IP): Performed by: ANESTHESIOLOGY

## 2023-08-28 PROCEDURE — P9047 ALBUMIN (HUMAN), 25%, 50ML: HCPCS | Performed by: STUDENT IN AN ORGANIZED HEALTH CARE EDUCATION/TRAINING PROGRAM

## 2023-08-28 PROCEDURE — 81001 URINALYSIS AUTO W/SCOPE: CPT

## 2023-08-28 PROCEDURE — 36415 COLL VENOUS BLD VENIPUNCTURE: CPT

## 2023-08-28 PROCEDURE — 82140 ASSAY OF AMMONIA: CPT

## 2023-08-28 PROCEDURE — 87040 BLOOD CULTURE FOR BACTERIA: CPT

## 2023-08-28 PROCEDURE — 2580000003 HC RX 258: Performed by: STUDENT IN AN ORGANIZED HEALTH CARE EDUCATION/TRAINING PROGRAM

## 2023-08-28 PROCEDURE — 2580000003 HC RX 258: Performed by: INTERNAL MEDICINE

## 2023-08-28 PROCEDURE — 6360000002 HC RX W HCPCS: Performed by: STUDENT IN AN ORGANIZED HEALTH CARE EDUCATION/TRAINING PROGRAM

## 2023-08-28 PROCEDURE — P9016 RBC LEUKOCYTES REDUCED: HCPCS

## 2023-08-28 PROCEDURE — 87086 URINE CULTURE/COLONY COUNT: CPT

## 2023-08-28 PROCEDURE — 80053 COMPREHEN METABOLIC PANEL: CPT

## 2023-08-28 PROCEDURE — 36430 TRANSFUSION BLD/BLD COMPNT: CPT

## 2023-08-28 PROCEDURE — C9113 INJ PANTOPRAZOLE SODIUM, VIA: HCPCS | Performed by: NURSE PRACTITIONER

## 2023-08-28 PROCEDURE — 51702 INSERT TEMP BLADDER CATH: CPT

## 2023-08-28 RX ORDER — 0.9 % SODIUM CHLORIDE 0.9 %
1000 INTRAVENOUS SOLUTION INTRAVENOUS ONCE
Status: COMPLETED | OUTPATIENT
Start: 2023-08-28 | End: 2023-08-28

## 2023-08-28 RX ORDER — ACETAMINOPHEN 325 MG/1
650 TABLET ORAL EVERY 6 HOURS PRN
Status: DISCONTINUED | OUTPATIENT
Start: 2023-08-28 | End: 2023-09-10 | Stop reason: HOSPADM

## 2023-08-28 RX ORDER — SODIUM CHLORIDE 9 MG/ML
INJECTION, SOLUTION INTRAVENOUS PRN
Status: DISCONTINUED | OUTPATIENT
Start: 2023-08-28 | End: 2023-09-10 | Stop reason: HOSPADM

## 2023-08-28 RX ORDER — SODIUM CHLORIDE 0.9 % (FLUSH) 0.9 %
5-40 SYRINGE (ML) INJECTION EVERY 12 HOURS SCHEDULED
Status: DISCONTINUED | OUTPATIENT
Start: 2023-08-28 | End: 2023-09-10 | Stop reason: HOSPADM

## 2023-08-28 RX ORDER — ACETAMINOPHEN 650 MG/1
650 SUPPOSITORY RECTAL EVERY 6 HOURS PRN
Status: DISCONTINUED | OUTPATIENT
Start: 2023-08-28 | End: 2023-09-10 | Stop reason: HOSPADM

## 2023-08-28 RX ORDER — CASTOR OIL AND BALSAM, PERU 788; 87 MG/G; MG/G
OINTMENT TOPICAL 2 TIMES DAILY
Status: DISCONTINUED | OUTPATIENT
Start: 2023-08-28 | End: 2023-09-10 | Stop reason: HOSPADM

## 2023-08-28 RX ORDER — SODIUM CHLORIDE 0.9 % (FLUSH) 0.9 %
5-40 SYRINGE (ML) INJECTION PRN
Status: DISCONTINUED | OUTPATIENT
Start: 2023-08-28 | End: 2023-09-10 | Stop reason: HOSPADM

## 2023-08-28 RX ORDER — ALBUMIN (HUMAN) 12.5 G/50ML
25 SOLUTION INTRAVENOUS ONCE
Status: COMPLETED | OUTPATIENT
Start: 2023-08-28 | End: 2023-08-28

## 2023-08-28 RX ORDER — ALBUMIN (HUMAN) 12.5 G/50ML
25 SOLUTION INTRAVENOUS EVERY 6 HOURS
Status: DISPENSED | OUTPATIENT
Start: 2023-08-28 | End: 2023-08-29

## 2023-08-28 RX ORDER — 0.9 % SODIUM CHLORIDE 0.9 %
1000 INTRAVENOUS SOLUTION INTRAVENOUS ONCE
Status: CANCELLED | OUTPATIENT
Start: 2023-08-28 | End: 2023-08-28

## 2023-08-28 RX ORDER — NOREPINEPHRINE BITARTRATE 0.06 MG/ML
1-100 INJECTION, SOLUTION INTRAVENOUS CONTINUOUS
Status: DISCONTINUED | OUTPATIENT
Start: 2023-08-28 | End: 2023-08-28 | Stop reason: SDUPTHER

## 2023-08-28 RX ORDER — SODIUM CHLORIDE, SODIUM LACTATE, POTASSIUM CHLORIDE, CALCIUM CHLORIDE 600; 310; 30; 20 MG/100ML; MG/100ML; MG/100ML; MG/100ML
INJECTION, SOLUTION INTRAVENOUS CONTINUOUS
Status: DISCONTINUED | OUTPATIENT
Start: 2023-08-28 | End: 2023-08-29

## 2023-08-28 RX ORDER — SODIUM CHLORIDE, SODIUM LACTATE, POTASSIUM CHLORIDE, AND CALCIUM CHLORIDE .6; .31; .03; .02 G/100ML; G/100ML; G/100ML; G/100ML
1000 INJECTION, SOLUTION INTRAVENOUS ONCE
Status: COMPLETED | OUTPATIENT
Start: 2023-08-28 | End: 2023-08-28

## 2023-08-28 RX ORDER — NOREPINEPHRINE BITARTRATE 0.06 MG/ML
.5-2 INJECTION, SOLUTION INTRAVENOUS CONTINUOUS
Status: DISCONTINUED | OUTPATIENT
Start: 2023-08-28 | End: 2023-08-29

## 2023-08-28 RX ORDER — LEVOTHYROXINE SODIUM 0.2 MG/1
200 TABLET ORAL
Status: DISCONTINUED | OUTPATIENT
Start: 2023-08-29 | End: 2023-09-10 | Stop reason: HOSPADM

## 2023-08-28 RX ORDER — ONDANSETRON 4 MG/1
4 TABLET, ORALLY DISINTEGRATING ORAL EVERY 8 HOURS PRN
Status: DISCONTINUED | OUTPATIENT
Start: 2023-08-28 | End: 2023-09-10 | Stop reason: HOSPADM

## 2023-08-28 RX ORDER — ONDANSETRON 2 MG/ML
4 INJECTION INTRAMUSCULAR; INTRAVENOUS EVERY 6 HOURS PRN
Status: DISCONTINUED | OUTPATIENT
Start: 2023-08-28 | End: 2023-09-10 | Stop reason: HOSPADM

## 2023-08-28 RX ADMIN — Medication: at 22:06

## 2023-08-28 RX ADMIN — NOREPINEPHRINE BITARTRATE 5 MCG/MIN: 1 INJECTION, SOLUTION, CONCENTRATE INTRAVENOUS at 16:42

## 2023-08-28 RX ADMIN — SODIUM CHLORIDE, PRESERVATIVE FREE 10 ML: 5 INJECTION INTRAVENOUS at 20:17

## 2023-08-28 RX ADMIN — SODIUM CHLORIDE, POTASSIUM CHLORIDE, SODIUM LACTATE AND CALCIUM CHLORIDE 1000 ML: 600; 310; 30; 20 INJECTION, SOLUTION INTRAVENOUS at 17:32

## 2023-08-28 RX ADMIN — SODIUM CHLORIDE 1000 ML: 9 INJECTION, SOLUTION INTRAVENOUS at 15:47

## 2023-08-28 RX ADMIN — WATER 2000 MG: 1 INJECTION INTRAMUSCULAR; INTRAVENOUS; SUBCUTANEOUS at 16:45

## 2023-08-28 RX ADMIN — SODIUM CHLORIDE, PRESERVATIVE FREE 40 MG: 5 INJECTION INTRAVENOUS at 16:46

## 2023-08-28 RX ADMIN — ALBUMIN (HUMAN) 25 G: 0.25 INJECTION, SOLUTION INTRAVENOUS at 15:46

## 2023-08-28 RX ADMIN — SODIUM CHLORIDE, POTASSIUM CHLORIDE, SODIUM LACTATE AND CALCIUM CHLORIDE: 600; 310; 30; 20 INJECTION, SOLUTION INTRAVENOUS at 17:32

## 2023-08-28 RX ADMIN — SODIUM CHLORIDE 1000 ML: 9 INJECTION, SOLUTION INTRAVENOUS at 11:15

## 2023-08-28 RX ADMIN — NOREPINEPHRINE BITARTRATE 15 MCG/MIN: 1 INJECTION, SOLUTION, CONCENTRATE INTRAVENOUS at 18:31

## 2023-08-28 ASSESSMENT — PAIN SCALES - GENERAL
PAINLEVEL_OUTOF10: 0
PAINLEVEL_OUTOF10: 0

## 2023-08-28 ASSESSMENT — ENCOUNTER SYMPTOMS
BLOOD IN STOOL: 1
SHORTNESS OF BREATH: 0
ABDOMINAL PAIN: 0

## 2023-08-28 NOTE — CARE COORDINATION
08/28/23 1907   Readmission Assessment   Number of Days since last admission? 1-7 days   Previous Disposition Home with Home Health   Who is being Interviewed Caregiver   What was the patient's/caregiver's perception as to why they think they needed to return back to the hospital? Other (Comment)  (medical status deteriorated day after discharge home)   Did you visit your Primary Care Physician after you left the hospital, before you returned this time? No   Why weren't you able to visit your PCP? Other (Comment)  (came back to the hospital 1 business day following discharge)   Did you see a specialist, such as Cardiac, Pulmonary, Orthopedic Physician, etc. after you left the hospital? No   Who advised the patient to return to the hospital? Caregiver   Does the patient report anything that got in the way of taking their medications? No   In our efforts to provide the best possible care to you and others like you, can you think of anything that we could have done to help you after you left the hospital the first time, so that you might not have needed to return so soon?  Other (Comment)  (NA)

## 2023-08-28 NOTE — CARE COORDINATION
Care Management Initial Assessment       RUR: NA  Readmission? Yes   1st IM letter given? No  1st  letter given: No         08/28/23 1820   Service Assessment   Patient Orientation Unable to Assess   Cognition Other (see comment)  (unable to assess d/t lethergy)   History Provided By Significant Other   Primary Caregiver Other (Comment)   Accompanied By/Relationship Life Partner / Charity Love [facility] Spouse/Significant Other;Family Members;Friends/Neighbors   Patient's [address] is: Named in [address]   PCP Verified by CM Yes   Last Visit to PCP Within last 3 months   Prior Functional Level Assistance with the following:;Bathing;Dressing; Toileting;Feeding;Cooking;Housework; Shopping;Mobility   Current Functional Level Other (see comment)  (unable to assess / has been lethargic)   Can patient return to prior living arrangement Unknown at present   Ability to make needs known: Poor   Family able to assist with home care needs: Other (comment)  (unknown at this time)   Would you like for me to discuss the discharge plan with any other family members/significant others, and if so, who? Yes  (Charity Love / Life Partner)   Financial Resources Medicare   Social/Functional History   Lives With Significant other   Type of Home Apartment   Home Layout One level   Home Access Stairs to enter with rails   Entrance Stairs - Number of Steps 10   Receives Help From Family; Outpatient therapy  ([facility] is [address] [facility] provider)   ADL Assistance Needs assistance  (patient declined once returned back home and required assistance w/ all ADLs)   [facility] Responsibilities No   Ambulation Assistance Needs assistance   Transfer Assistance Needs assistance     Correction apartment w/ 7 DIEGO however each has a landing which Karley able to assist using wheelchair.

## 2023-08-28 NOTE — CARE COORDINATION
EMR reviewed. Recent hospitalization 8/18-8/24 (Hyperkalemic). Patient presents to the ED w/ lethargy, not eating or drinking since Saturday. Met w/Life Partner /MPOA [phone] Royal Jesus and friend Leslye Young introduced to role of CM. Family expressed concern about low B/P - Nursing informed and assessment. Loyd Miranda in agreement for CM to continue w/ friend present. Additional history obtained. Per Partner patient was Colie Neris \" Friday evening utilized family vehicle and wheelchair however Saturday needed cindi lift for mobility and patient has been lethargic since weekend. [facility] patient in the ED. CM spoke w/ Mauricio Lynn at agency. [facility]. Noted patient Colby Alanis declined SNF recommendation last admission. Patient is a full code. CM Department will follow for transitions of care needs.

## 2023-08-28 NOTE — ED PROVIDER NOTES
no height or weight on file to calculate BMI. Physical Exam  Vitals and nursing note reviewed. Constitutional:       General: She is not in acute distress. Appearance: Normal appearance. She is ill-appearing. HENT:      Head: Normocephalic and atraumatic. Nose: Nose normal.      Mouth/Throat:      Mouth: Mucous membranes are moist.   Eyes:      Extraocular Movements: Extraocular movements intact. Pupils: Pupils are equal, round, and reactive to light. Cardiovascular:      Rate and Rhythm: Normal rate and regular rhythm. Pulses: Normal pulses. Heart sounds: No murmur heard. Pulmonary:      Effort: Pulmonary effort is normal. No respiratory distress. Breath sounds: Normal breath sounds. Abdominal:      General: There is no distension. Palpations: Abdomen is soft. Tenderness: There is no abdominal tenderness. Musculoskeletal:         General: Normal range of motion. Cervical back: Normal range of motion and neck supple. Right lower leg: No edema. Left lower leg: No edema. Comments: Compartments of lower extremities all soft   Skin:     General: Skin is warm and dry. Capillary Refill: Capillary refill takes less than 2 seconds. Coloration: Skin is pale. Comments: No sacral decubitus wounds noted however when turning the patient large-volume of melena noted. Neurological:      Mental Status: She is alert. Comments: Slow to respond but answers questions appropriately. Moves all extremities. Seems mildly confused   Psychiatric:      Comments: Difficult to assess       DIAGNOSTIC RESULTS     EKG: All EKG's are interpreted by the Emergency Department Physician who either signs or Co-signs this chart in the absence of a cardiologist.        RADIOLOGY:   Interpretation per the Radiologist below, if available at the time of this note:    CT Head W/O Contrast   Final Result   No acute intracranial abnormality.           XR CHEST

## 2023-08-28 NOTE — ED NOTES
TRANSFER - OUT REPORT:    Verbal report given to [place] on Lucho Toledo  being transferred to CCU for routine progression of patient care       Report consisted of patient's Situation, Background, Assessment and   Recommendations(SBAR). Information from the following report(s) Nurse Handoff Report, Index, ED Encounter Summary, ED SBAR, and Adult Overview was reviewed with the receiving nurse. [place] Assessment:    Presents to emergency department  because of falls (Syncope, seizure, or loss of consciousness): No  Age > 70: Yes  Altered Mental Status, Intoxication with alcohol or substance confusion (Disorientation, impaired judgment, poor safety awaremess, or inability to follow instructions): Yes  Impaired Mobility: Ambulates or transfers with assistive devices or assistance; Unable to ambulate or transer.: Yes  Nursing Judgement: Yes          Lines:   Peripheral IV 08/28/23 Right Antecubital (Active)   Site Assessment Clean, dry & intact 08/28/23 1800   Phlebitis Assessment No symptoms 08/28/23 1800   Infiltration Assessment 0 08/28/23 1800   Dressing Status Clean, dry & intact 08/28/23 1800   Dressing Type Transparent 08/28/23 1800       Peripheral IV 03/13/78 Right Cephalic (Active)   Site Assessment Clean, dry & intact 08/28/23 1800   Phlebitis Assessment No symptoms 08/28/23 1800   Infiltration Assessment 0 08/28/23 1800   Dressing Status Clean, dry & intact 08/28/23 1800   Dressing Type Transparent 08/28/23 1800        Opportunity for questions and clarification was provided.       Patient transported with:  Monitor, O2 @ 4lpm, and Registered Nurse          Aliyah Weeks, SUE  08/28/23 2213

## 2023-08-28 NOTE — ED TRIAGE NOTES
Patient arrived via EMS from home, EMS reports she was sitting in a chair where she has been for several days, lethargic and not eating or drinking. EMS BP 80/59, Recent admission to [facility] for \"blood infection\".

## 2023-08-29 ENCOUNTER — APPOINTMENT (OUTPATIENT)
Facility: HOSPITAL | Age: 73
DRG: 871 | End: 2023-08-29
Payer: MEDICARE

## 2023-08-29 ENCOUNTER — APPOINTMENT (OUTPATIENT)
Facility: HOSPITAL | Age: 73
DRG: 871 | End: 2023-08-29
Attending: ANESTHESIOLOGY
Payer: MEDICARE

## 2023-08-29 LAB
ALBUMIN SERPL-MCNC: 2.2 G/DL (ref 3.5–5)
ALBUMIN SERPL-MCNC: 2.7 G/DL (ref 3.5–5)
ALBUMIN/GLOB SERPL: 0.8 (ref 1.1–2.2)
ALP SERPL-CCNC: 76 U/L (ref 45–117)
ALT SERPL-CCNC: 21 U/L (ref 12–78)
AMMONIA PLAS-SCNC: 35 UMOL/L
ANION GAP SERPL CALC-SCNC: 11 MMOL/L (ref 5–15)
ANION GAP SERPL CALC-SCNC: 13 MMOL/L (ref 5–15)
ANION GAP SERPL CALC-SCNC: 15 MMOL/L (ref 5–15)
AST SERPL-CCNC: 27 U/L (ref 15–37)
BASE DEFICIT BLD-SCNC: 14.2 MMOL/L
BASE DEFICIT BLD-SCNC: 14.4 MMOL/L
BASE DEFICIT BLD-SCNC: 6.8 MMOL/L
BASOPHILS # BLD: 0.2 K/UL (ref 0–0.1)
BASOPHILS NFR BLD: 1 % (ref 0–1)
BILIRUB SERPL-MCNC: 0.3 MG/DL (ref 0.2–1)
BUN SERPL-MCNC: 106 MG/DL (ref 6–20)
BUN SERPL-MCNC: 91 MG/DL (ref 6–20)
BUN SERPL-MCNC: 95 MG/DL (ref 6–20)
BUN/CREAT SERPL: 15 (ref 12–20)
BUN/CREAT SERPL: 16 (ref 12–20)
BUN/CREAT SERPL: 16 (ref 12–20)
CALCIUM SERPL-MCNC: 7.7 MG/DL (ref 8.5–10.1)
CHLORIDE SERPL-SCNC: 103 MMOL/L (ref 97–108)
CHLORIDE SERPL-SCNC: 104 MMOL/L (ref 97–108)
CHLORIDE SERPL-SCNC: 107 MMOL/L (ref 97–108)
CO2 SERPL-SCNC: 13 MMOL/L (ref 21–32)
CO2 SERPL-SCNC: 18 MMOL/L (ref 21–32)
CO2 SERPL-SCNC: 19 MMOL/L (ref 21–32)
CREAT SERPL-MCNC: 5.77 MG/DL (ref 0.55–1.02)
CREAT SERPL-MCNC: 5.84 MG/DL (ref 0.55–1.02)
CREAT SERPL-MCNC: 6.88 MG/DL (ref 0.55–1.02)
DIFFERENTIAL METHOD BLD: ABNORMAL
ECHO AO ROOT DIAM: 3.1 CM
ECHO AO ROOT INDEX: 1.46 CM/M2
ECHO AV AREA PEAK VELOCITY: 1 CM2
ECHO AV AREA/BSA PEAK VELOCITY: 0.5 CM2/M2
ECHO AV PEAK GRADIENT: 11 MMHG
ECHO AV PEAK VELOCITY: 1.7 M/S
ECHO AV VELOCITY RATIO: 0.53
ECHO BSA: 2.22 M2
ECHO EST RA PRESSURE: 3 MMHG
ECHO LA DIAMETER INDEX: 2.25 CM/M2
ECHO LA DIAMETER: 4.8 CM
ECHO LA TO AORTIC ROOT RATIO: 1.55
ECHO LA VOL 2C: 108 ML (ref 22–52)
ECHO LA VOL 2C: 108 ML (ref 22–52)
ECHO LA VOL 4C: 86 ML (ref 22–52)
ECHO LA VOL 4C: 94 ML (ref 22–52)
ECHO LA VOLUME AREA LENGTH: 103 ML
ECHO LA VOLUME INDEX AREA LENGTH: 48 ML/M2 (ref 16–34)
ECHO LV E' LATERAL VELOCITY: 9 CM/S
ECHO LV E' SEPTAL VELOCITY: 5 CM/S
ECHO LV FRACTIONAL SHORTENING: 28 % (ref 28–44)
ECHO LV INTERNAL DIMENSION DIASTOLE INDEX: 2.16 CM/M2
ECHO LV INTERNAL DIMENSION DIASTOLIC: 4.6 CM (ref 3.9–5.3)
ECHO LV INTERNAL DIMENSION SYSTOLIC INDEX: 1.55 CM/M2
ECHO LV INTERNAL DIMENSION SYSTOLIC: 3.3 CM
ECHO LV IVSD: 0.9 CM (ref 0.6–0.9)
ECHO LV MASS 2D: 158.8 G (ref 67–162)
ECHO LV MASS INDEX 2D: 74.6 G/M2 (ref 43–95)
ECHO LV POSTERIOR WALL DIASTOLIC: 1.1 CM (ref 0.6–0.9)
ECHO LV RELATIVE WALL THICKNESS RATIO: 0.48
ECHO LVOT AREA: 1.8 CM2
ECHO LVOT DIAM: 1.5 CM
ECHO LVOT PEAK GRADIENT: 4 MMHG
ECHO LVOT PEAK VELOCITY: 0.9 M/S
ECHO MV A VELOCITY: 1.17 M/S
ECHO MV AREA PHT: 3.7 CM2
ECHO MV E DECELERATION TIME (DT): 202.5 MS
ECHO MV E VELOCITY: 1.14 M/S
ECHO MV E/A RATIO: 0.97
ECHO MV E/E' LATERAL: 12.67
ECHO MV E/E' RATIO (AVERAGED): 17.73
ECHO MV E/E' SEPTAL: 22.8
ECHO MV PRESSURE HALF TIME (PHT): 58.7 MS
ECHO PV MAX VELOCITY: 0.6 M/S
ECHO PV PEAK GRADIENT: 2 MMHG
ECHO RIGHT VENTRICULAR SYSTOLIC PRESSURE (RVSP): 38 MMHG
ECHO RV INTERNAL DIMENSION: 4 CM
ECHO TV REGURGITANT MAX VELOCITY: 2.96 M/S
ECHO TV REGURGITANT PEAK GRADIENT: 35 MMHG
EOSINOPHIL # BLD: 0 K/UL (ref 0–0.4)
EOSINOPHIL NFR BLD: 0 % (ref 0–7)
ERYTHROCYTE [DISTWIDTH] IN BLOOD BY AUTOMATED COUNT: 16.2 % (ref 11.5–14.5)
ERYTHROCYTE [DISTWIDTH] IN BLOOD BY AUTOMATED COUNT: 16.2 % (ref 11.5–14.5)
FLUAV RNA SPEC QL NAA+PROBE: NOT DETECTED
FLUBV RNA SPEC QL NAA+PROBE: NOT DETECTED
GLOBULIN SER CALC-MCNC: 3.6 G/DL (ref 2–4)
GLUCOSE BLD STRIP.AUTO-MCNC: 122 MG/DL (ref 65–117)
GLUCOSE BLD STRIP.AUTO-MCNC: 174 MG/DL (ref 65–117)
GLUCOSE BLD STRIP.AUTO-MCNC: 359 MG/DL (ref 65–117)
GLUCOSE SERPL-MCNC: 149 MG/DL (ref 65–100)
GLUCOSE SERPL-MCNC: 318 MG/DL (ref 65–100)
GLUCOSE SERPL-MCNC: 337 MG/DL (ref 65–100)
HBV SURFACE AB SER QL: REACTIVE
HBV SURFACE AB SER-ACNC: 27.71 MIU/ML
HCO3 BLD-SCNC: 13.1 MMOL/L (ref 22–26)
HCO3 BLD-SCNC: 13.3 MMOL/L (ref 22–26)
HCO3 BLD-SCNC: 19.4 MMOL/L (ref 22–26)
HCT VFR BLD AUTO: 19.7 % (ref 35–47)
HCT VFR BLD AUTO: 21.9 % (ref 35–47)
HCT VFR BLD AUTO: 25.2 % (ref 35–47)
HGB BLD-MCNC: 6 G/DL (ref 11.5–16)
HGB BLD-MCNC: 6.9 G/DL (ref 11.5–16)
HGB BLD-MCNC: 7.6 G/DL (ref 11.5–16)
IMM GRANULOCYTES # BLD AUTO: 0 K/UL
IMM GRANULOCYTES NFR BLD AUTO: 0 %
LACTATE SERPL-SCNC: 1.1 MMOL/L (ref 0.4–2)
LYMPHOCYTES # BLD: 1.5 K/UL (ref 0.8–3.5)
LYMPHOCYTES NFR BLD: 8 % (ref 12–49)
MAGNESIUM SERPL-MCNC: 1.6 MG/DL (ref 1.6–2.4)
MCH RBC QN AUTO: 30.9 PG (ref 26–34)
MCH RBC QN AUTO: 31.3 PG (ref 26–34)
MCHC RBC AUTO-ENTMCNC: 30.2 G/DL (ref 30–36.5)
MCHC RBC AUTO-ENTMCNC: 30.5 G/DL (ref 30–36.5)
MCV RBC AUTO: 101.5 FL (ref 80–99)
MCV RBC AUTO: 103.7 FL (ref 80–99)
MONOCYTES # BLD: 0.4 K/UL (ref 0–1)
MONOCYTES NFR BLD: 2 % (ref 5–13)
NEUTS BAND NFR BLD MANUAL: 1 % (ref 0–6)
NEUTS SEG # BLD: 16.5 K/UL (ref 1.8–8)
NEUTS SEG NFR BLD: 88 % (ref 32–75)
NRBC # BLD: 0.04 K/UL (ref 0–0.01)
NRBC # BLD: 0.1 K/UL (ref 0–0.01)
NRBC BLD-RTO: 0.4 PER 100 WBC
NRBC BLD-RTO: 0.5 PER 100 WBC
PCO2 BLD: 36.7 MMHG (ref 35–45)
PCO2 BLD: 36.9 MMHG (ref 35–45)
PCO2 BLD: 41.1 MMHG (ref 35–45)
PH BLD: 7.16 (ref 7.35–7.45)
PH BLD: 7.17 (ref 7.35–7.45)
PH BLD: 7.28 (ref 7.35–7.45)
PHOSPHATE SERPL-MCNC: 7 MG/DL (ref 2.6–4.7)
PHOSPHATE SERPL-MCNC: 7 MG/DL (ref 2.6–4.7)
PHOSPHATE SERPL-MCNC: 7.3 MG/DL (ref 2.6–4.7)
PLATELET # BLD AUTO: 224 K/UL (ref 150–400)
PLATELET # BLD AUTO: 287 K/UL (ref 150–400)
PMV BLD AUTO: 11.6 FL (ref 8.9–12.9)
PMV BLD AUTO: 11.7 FL (ref 8.9–12.9)
PO2 BLD: 81 MMHG (ref 80–100)
PO2 BLD: 95 MMHG (ref 80–100)
PO2 BLD: 99 MMHG (ref 80–100)
POTASSIUM SERPL-SCNC: 4.6 MMOL/L (ref 3.5–5.1)
POTASSIUM SERPL-SCNC: 4.6 MMOL/L (ref 3.5–5.1)
POTASSIUM SERPL-SCNC: 5.4 MMOL/L (ref 3.5–5.1)
PROT SERPL-MCNC: 6.3 G/DL (ref 6.4–8.2)
RBC # BLD AUTO: 1.94 M/UL (ref 3.8–5.2)
RBC # BLD AUTO: 2.43 M/UL (ref 3.8–5.2)
RBC MORPH BLD: ABNORMAL
RBC MORPH BLD: ABNORMAL
SAO2 % BLD: 92.1 % (ref 92–97)
SAO2 % BLD: 95.1 % (ref 92–97)
SAO2 % BLD: 96.8 % (ref 92–97)
SARS-COV-2 RNA RESP QL NAA+PROBE: NOT DETECTED
SERVICE CMNT-IMP: ABNORMAL
SODIUM SERPL-SCNC: 133 MMOL/L (ref 136–145)
SODIUM SERPL-SCNC: 135 MMOL/L (ref 136–145)
SODIUM SERPL-SCNC: 135 MMOL/L (ref 136–145)
SPECIMEN TYPE: ABNORMAL
T4 FREE SERPL-MCNC: 0.9 NG/DL (ref 0.8–1.5)
TROPONIN I SERPL HS-MCNC: 768 NG/L (ref 0–51)
TSH SERPL DL<=0.05 MIU/L-ACNC: 8.15 UIU/ML (ref 0.36–3.74)
VANCOMYCIN SERPL-MCNC: 7.2 UG/ML
WBC # BLD AUTO: 10.5 K/UL (ref 3.6–11)
WBC # BLD AUTO: 18.6 K/UL (ref 3.6–11)

## 2023-08-29 PROCEDURE — 2580000003 HC RX 258: Performed by: NURSE PRACTITIONER

## 2023-08-29 PROCEDURE — 03HY32Z INSERTION OF MONITORING DEVICE INTO UPPER ARTERY, PERCUTANEOUS APPROACH: ICD-10-PCS | Performed by: INTERNAL MEDICINE

## 2023-08-29 PROCEDURE — 36415 COLL VENOUS BLD VENIPUNCTURE: CPT

## 2023-08-29 PROCEDURE — 87340 HEPATITIS B SURFACE AG IA: CPT

## 2023-08-29 PROCEDURE — 36620 INSERTION CATHETER ARTERY: CPT

## 2023-08-29 PROCEDURE — 05HM33Z INSERTION OF INFUSION DEVICE INTO RIGHT INTERNAL JUGULAR VEIN, PERCUTANEOUS APPROACH: ICD-10-PCS | Performed by: STUDENT IN AN ORGANIZED HEALTH CARE EDUCATION/TRAINING PROGRAM

## 2023-08-29 PROCEDURE — 83605 ASSAY OF LACTIC ACID: CPT

## 2023-08-29 PROCEDURE — 2000000000 HC ICU R&B

## 2023-08-29 PROCEDURE — 93306 TTE W/DOPPLER COMPLETE: CPT

## 2023-08-29 PROCEDURE — P9016 RBC LEUKOCYTES REDUCED: HCPCS

## 2023-08-29 PROCEDURE — 2580000003 HC RX 258: Performed by: INTERNAL MEDICINE

## 2023-08-29 PROCEDURE — 2500000003 HC RX 250 WO HCPCS: Performed by: INTERNAL MEDICINE

## 2023-08-29 PROCEDURE — 80069 RENAL FUNCTION PANEL: CPT

## 2023-08-29 PROCEDURE — 51702 INSERT TEMP BLADDER CATH: CPT

## 2023-08-29 PROCEDURE — 84484 ASSAY OF TROPONIN QUANT: CPT

## 2023-08-29 PROCEDURE — 6360000002 HC RX W HCPCS: Performed by: INTERNAL MEDICINE

## 2023-08-29 PROCEDURE — 2580000003 HC RX 258: Performed by: ANESTHESIOLOGY

## 2023-08-29 PROCEDURE — A4216 STERILE WATER/SALINE, 10 ML: HCPCS | Performed by: ANESTHESIOLOGY

## 2023-08-29 PROCEDURE — 02HV33Z INSERTION OF INFUSION DEVICE INTO SUPERIOR VENA CAVA, PERCUTANEOUS APPROACH: ICD-10-PCS | Performed by: ANESTHESIOLOGY

## 2023-08-29 PROCEDURE — 37799 UNLISTED PX VASCULAR SURGERY: CPT

## 2023-08-29 PROCEDURE — 85027 COMPLETE CBC AUTOMATED: CPT

## 2023-08-29 PROCEDURE — 80202 ASSAY OF VANCOMYCIN: CPT

## 2023-08-29 PROCEDURE — 84100 ASSAY OF PHOSPHORUS: CPT

## 2023-08-29 PROCEDURE — 6360000002 HC RX W HCPCS: Performed by: ANESTHESIOLOGY

## 2023-08-29 PROCEDURE — 90945 DIALYSIS ONE EVALUATION: CPT

## 2023-08-29 PROCEDURE — 85018 HEMOGLOBIN: CPT

## 2023-08-29 PROCEDURE — 85025 COMPLETE CBC W/AUTO DIFF WBC: CPT

## 2023-08-29 PROCEDURE — C1894 INTRO/SHEATH, NON-LASER: HCPCS

## 2023-08-29 PROCEDURE — 82803 BLOOD GASES ANY COMBINATION: CPT

## 2023-08-29 PROCEDURE — 80053 COMPREHEN METABOLIC PANEL: CPT

## 2023-08-29 PROCEDURE — 36430 TRANSFUSION BLD/BLD COMPNT: CPT

## 2023-08-29 PROCEDURE — 83735 ASSAY OF MAGNESIUM: CPT

## 2023-08-29 PROCEDURE — 86706 HEP B SURFACE ANTIBODY: CPT

## 2023-08-29 PROCEDURE — 85014 HEMATOCRIT: CPT

## 2023-08-29 PROCEDURE — 2500000003 HC RX 250 WO HCPCS: Performed by: NURSE PRACTITIONER

## 2023-08-29 PROCEDURE — 82140 ASSAY OF AMMONIA: CPT

## 2023-08-29 PROCEDURE — 71045 X-RAY EXAM CHEST 1 VIEW: CPT

## 2023-08-29 PROCEDURE — 6360000002 HC RX W HCPCS: Performed by: PHYSICIAN ASSISTANT

## 2023-08-29 PROCEDURE — 2500000003 HC RX 250 WO HCPCS: Performed by: PHYSICIAN ASSISTANT

## 2023-08-29 PROCEDURE — C9113 INJ PANTOPRAZOLE SODIUM, VIA: HCPCS | Performed by: ANESTHESIOLOGY

## 2023-08-29 RX ORDER — DEXTROSE MONOHYDRATE 100 MG/ML
INJECTION, SOLUTION INTRAVENOUS CONTINUOUS PRN
Status: DISCONTINUED | OUTPATIENT
Start: 2023-08-29 | End: 2023-09-10 | Stop reason: HOSPADM

## 2023-08-29 RX ORDER — NOREPINEPHRINE BITARTRATE 0.06 MG/ML
.5-2 INJECTION, SOLUTION INTRAVENOUS CONTINUOUS
Status: ACTIVE | OUTPATIENT
Start: 2023-08-29 | End: 2023-08-30

## 2023-08-29 RX ORDER — HEPARIN SODIUM 1000 [USP'U]/ML
INJECTION, SOLUTION INTRAVENOUS; SUBCUTANEOUS PRN
Status: DISCONTINUED | OUTPATIENT
Start: 2023-08-29 | End: 2023-08-30

## 2023-08-29 RX ORDER — SODIUM CHLORIDE 9 MG/ML
INJECTION, SOLUTION INTRAVENOUS PRN
Status: DISCONTINUED | OUTPATIENT
Start: 2023-08-29 | End: 2023-09-10 | Stop reason: HOSPADM

## 2023-08-29 RX ORDER — CALCIUM CHLORIDE, MAGNESIUM CHLORIDE, DEXTROSE MONOHYDRATE, LACTIC ACID, SODIUM CHLORIDE, SODIUM BICARBONATE AND POTASSIUM CHLORIDE 5.15; 2.03; 22; 5.4; 6.46; 3.09; .157 G/L; G/L; G/L; G/L; G/L; G/L; G/L
INJECTION INTRAVENOUS CONTINUOUS
Status: DISCONTINUED | OUTPATIENT
Start: 2023-08-29 | End: 2023-08-31

## 2023-08-29 RX ORDER — SODIUM BICARBONATE IN D5W 150/1000ML
PLASTIC BAG, INJECTION (ML) INTRAVENOUS CONTINUOUS
Status: DISCONTINUED | OUTPATIENT
Start: 2023-08-29 | End: 2023-08-29 | Stop reason: CLARIF

## 2023-08-29 RX ORDER — LIDOCAINE HYDROCHLORIDE 10 MG/ML
INJECTION, SOLUTION EPIDURAL; INFILTRATION; INTRACAUDAL; PERINEURAL PRN
Status: DISCONTINUED | OUTPATIENT
Start: 2023-08-29 | End: 2023-08-31 | Stop reason: HOSPADM

## 2023-08-29 RX ORDER — HEPARIN SODIUM 5000 [USP'U]/ML
5000 INJECTION, SOLUTION INTRAVENOUS; SUBCUTANEOUS EVERY 8 HOURS SCHEDULED
Status: DISCONTINUED | OUTPATIENT
Start: 2023-08-29 | End: 2023-09-10 | Stop reason: HOSPADM

## 2023-08-29 RX ORDER — INSULIN LISPRO 100 [IU]/ML
0-8 INJECTION, SOLUTION INTRAVENOUS; SUBCUTANEOUS EVERY 6 HOURS
Status: DISCONTINUED | OUTPATIENT
Start: 2023-08-29 | End: 2023-09-01

## 2023-08-29 RX ADMIN — SODIUM CHLORIDE, PRESERVATIVE FREE 40 MG: 5 INJECTION INTRAVENOUS at 09:39

## 2023-08-29 RX ADMIN — LIDOCAINE HYDROCHLORIDE 5 ML: 10 INJECTION, SOLUTION EPIDURAL; INFILTRATION; INTRACAUDAL; PERINEURAL at 10:12

## 2023-08-29 RX ADMIN — CALCIUM CHLORIDE, MAGNESIUM CHLORIDE, DEXTROSE MONOHYDRATE, LACTIC ACID, SODIUM CHLORIDE, SODIUM BICARBONATE AND POTASSIUM CHLORIDE 2000 ML/HR: 5.15; 2.03; 22; 5.4; 6.46; 3.09; .157 INJECTION INTRAVENOUS at 11:40

## 2023-08-29 RX ADMIN — Medication: at 20:02

## 2023-08-29 RX ADMIN — HEPARIN SODIUM 5000 UNITS: 5000 INJECTION INTRAVENOUS; SUBCUTANEOUS at 21:46

## 2023-08-29 RX ADMIN — SODIUM CHLORIDE, PRESERVATIVE FREE 10 ML: 5 INJECTION INTRAVENOUS at 09:33

## 2023-08-29 RX ADMIN — SODIUM BICARBONATE: 84 INJECTION, SOLUTION INTRAVENOUS at 03:51

## 2023-08-29 RX ADMIN — HEPARIN SODIUM 5000 UNITS: 5000 INJECTION INTRAVENOUS; SUBCUTANEOUS at 13:58

## 2023-08-29 RX ADMIN — SODIUM CHLORIDE, PRESERVATIVE FREE 10 ML: 5 INJECTION INTRAVENOUS at 20:03

## 2023-08-29 RX ADMIN — SODIUM BICARBONATE: 84 INJECTION, SOLUTION INTRAVENOUS at 14:09

## 2023-08-29 RX ADMIN — SODIUM CHLORIDE, PRESERVATIVE FREE 40 MG: 5 INJECTION INTRAVENOUS at 19:49

## 2023-08-29 RX ADMIN — VANCOMYCIN HYDROCHLORIDE 1250 MG: 1.25 INJECTION, POWDER, LYOPHILIZED, FOR SOLUTION INTRAVENOUS at 06:46

## 2023-08-29 RX ADMIN — CEFEPIME 2000 MG: 2 INJECTION, POWDER, FOR SOLUTION INTRAVENOUS at 16:18

## 2023-08-29 RX ADMIN — CALCIUM CHLORIDE, MAGNESIUM CHLORIDE, DEXTROSE MONOHYDRATE, LACTIC ACID, SODIUM CHLORIDE, SODIUM BICARBONATE AND POTASSIUM CHLORIDE 2000 ML/HR: 5.15; 2.03; 22; 5.4; 6.46; 3.09; .157 INJECTION INTRAVENOUS at 11:42

## 2023-08-29 RX ADMIN — Medication: at 09:31

## 2023-08-29 RX ADMIN — HEPARIN SODIUM 2600 UNITS: 1000 INJECTION, SOLUTION INTRAVENOUS; SUBCUTANEOUS at 10:13

## 2023-08-29 RX ADMIN — SODIUM BICARBONATE 50 MEQ: 84 INJECTION, SOLUTION INTRAVENOUS at 03:37

## 2023-08-29 RX ADMIN — CALCIUM CHLORIDE, MAGNESIUM CHLORIDE, DEXTROSE MONOHYDRATE, LACTIC ACID, SODIUM CHLORIDE, SODIUM BICARBONATE AND POTASSIUM CHLORIDE 2000 ML/HR: 5.15; 2.03; 22; 5.4; 6.46; 3.09; .157 INJECTION INTRAVENOUS at 11:30

## 2023-08-29 RX ADMIN — CALCIUM CHLORIDE, MAGNESIUM CHLORIDE, DEXTROSE MONOHYDRATE, LACTIC ACID, SODIUM CHLORIDE, SODIUM BICARBONATE AND POTASSIUM CHLORIDE: 5.15; 2.03; 22; 5.4; 6.46; 3.09; .157 INJECTION INTRAVENOUS at 16:28

## 2023-08-29 ASSESSMENT — PAIN SCALES - GENERAL
PAINLEVEL_OUTOF10: 0

## 2023-08-29 NOTE — PROCEDURES
Procedure Note - Arterial Access:   Performed by Sy Samuels MD.  Diagnosis: Shock, septic  Insertion Date: 08/29/23   Time: 4:26 AM   Obtained Consent? yes; informed   Procedure Location:  CCU. Immediately prior to the procedure, the patient was reevaluated and found suitable for the planned procedure and any planned medications. Immediately prior to the procedure a time out was called to verify the correct patient, procedure, equipment, staff, and marking as appropriate. Collateral circulation confirmed with Katha Evans test.     Line Bundle:  Full sterile barrier precautions used. 5 mL 1% Lidocaine placed at insertion site. Method: Seldinger technique. Site Prep: ChloraPrep. Procedure: Arterial Catheter Insertion in Left radial  Catheter inserted into a new site. Number of Attempts:  2  Indication: Monitoring. There was bright red, pulsatile blood return. Femoral Site? no. If Yes, reason femoral site was chosen: NA  Catheter secured. Biopatch/CHG sterile bio-occlusive dressing  in place? yes. Complication None. The procedure was tolerated well.     Sy Samuels MD   Pulmonary Critical Care Medicine  [facility]

## 2023-08-29 NOTE — WOUND CARE
WOCN Note:     New consult for sacrum, heels, and right hip. Seen in 4220    Chart shows:  Admitted on 8/28/23. Admitted for GIB, melena; CRRT & on pressor support. History of DM. Assessment:   Minimal interaction. Moans with turning. RN at bedside to assist in turning. Surface: DARIELA mattress    Right heel intact with blanching erythema. POA, Left heel stage 2 pressure injury = 1.2 x 2 x 0 cm; clear fluid-filled bullae. Heels offloaded. Buttocks and sacrum intact with chaffing and slow-to-octavia erythema; venelex already in use. Some burgundy crusts noted. Right hip with burgundy, circular demarcation. No induration or blistering. Right lateral buttock has a non-blanching burgundy line. Venelex to all areas. Wound Recommendations:    Continue venelex to heels, sacrum and right hip/buttock twice daily. PI Prevention:  Turn/reposition approximately every 2 hours  Offload heels with heels hanging off end of pillow at all times while in bed. Sacral Foam dressing: lift to assess regularly; change as needed. Discontinue if incontinence is frequently soiling dressing. Low Air Loss mattress: Use only flat sheet and one incontinence pad. Discussed with RN.     Transition of Care: Plan to follow weekly and as needed while admitted to hospital.     EMILY SkinnerN, RN, [facility]  Certified Wound, Ostomy, Continence Nurse  office [phone]  Available via [facility]

## 2023-08-29 NOTE — CARE COORDINATION
Transition of Care Plan:    RUR: 24%  Prior Level of Functioning: significant assistance with ADLs  Disposition: home with home health v SNF  Follow up appointments: PCP, Cardiology  DME needed: N/A  Transportation at discharge: in car with family v BLS  IM/IMM Medicare/ letter given:   Caregiver Contact: Amol Rubio, partner, [phone]  Discharge Caregiver contacted prior to discharge? N/A  Care Conference needed? no  Barriers to discharge:  clinical status, insurance auth    Patient admitted 8/28 for melena after being discharged from hospital one day prior. Previous admission for confusion and hyperkalemia. Currently receiving CRRT and on pressors. Diet started today and wound care consult placed. CM will continue to follow for discharge needs.     Marnie Flores, [address]  [phone]

## 2023-08-29 NOTE — PROCEDURES
Procedure Note - Central Venous Access:   Performed by Danyelle Velázquez MD  Diagnosis: JOSH  Insertion Date: 08/29/23  [id number]  Obtained Consent? yes; informed   Procedure Location:  CCU. Immediately prior to the procedure, the patient was reevaluated and found suitable for the planned procedure and any planned medications. Immediately prior to the procedure a time out was called to verify the correct patient, procedure, equipment, staff, and marking as appropriate. Central line Bundle:  Full sterile barrier precautions used. 7-Step Sterility Protocol followed. (cap, mask sterile gown, sterile gloves, large sterile sheet, hand hygiene, 2% chlorhexidine for cutaneous antisepsis)  5 mL 1% Lidocaine placed at insertion site. Patient positioned in Trendelenburg?yes   The site was prepped with ChloraPrep. Catheter ATTEMPT inserted into a new site. Using Seldinger technique a Arrow attempt made in the Right, External Jugular Vein via direct cannulation with 2 number of attempts for Dialysis. Ultrasound Guidance was utilized. There was good dark, non-pulsatile blood return in syringe  Both attempts dark blood from R IJ but unable to pass wire. Neck immobility likely causing obstruction given neck positioning. Unable to reposition. Would benefit from flouro for placement. L IJ w thrombus. Subclavian and bilateral femoral not well visualized. IR consulted for assistance.         Danyelle Velázquez MD  Critical Care Medicine  [facility]

## 2023-08-29 NOTE — SIGNIFICANT EVENT
CCU attending brief note:    A 68 old female with history of chronic kidney disease, coronary artery disease status post CABG, with recent admission for severe hyperkalemia, questionable GI bleed, COPD and encephalopathy from 8/18-8/24/2023 readmitted for encephalopathy. Patient arrived to CCU. Encephalopathic. On epinephrine and IV fluid. LE edema. Periorbital edema. ASSESSMENT:  -Most likely sepsis, urinary source or pulmonary?  -Shock, septic  -AGMA  -Acute on chronic renal failure  -Questionable GIB, I doubt this is an active GI bleed  -History of diastolic dysfunction  -Elevated troponin  -Elevated TSH on 8/24      RECOMMENDATIONS:  -We will start patient on broad-spectrum antibiotic as she was hospitalized recently  -Cultures follow-up  -We will titrate pressors to maintain MAP above 65  -[place] insertion for better HD monitoring  -Will switch IVF to bicarb drip. If patient develops persistent acidosis, she may require dialysis  -COVID test negative  -TSH and Free T4 levels  -Hemoglobin stable since last admission.  Less likely active GIB      Lisa Young MD  Pulmonary Critical Care Medicine

## 2023-08-30 LAB
ABO + RH BLD: NORMAL
ALBUMIN SERPL-MCNC: 1.9 G/DL (ref 3.5–5)
ANION GAP SERPL CALC-SCNC: 10 MMOL/L (ref 5–15)
ANION GAP SERPL CALC-SCNC: 4 MMOL/L (ref 5–15)
BLD PROD TYP BPU: NORMAL
BLOOD BANK DISPENSE STATUS: NORMAL
BLOOD GROUP ANTIBODIES SERPL: NORMAL
BPU ID: NORMAL
BUN SERPL-MCNC: 50 MG/DL (ref 6–20)
BUN SERPL-MCNC: 70 MG/DL (ref 6–20)
BUN/CREAT SERPL: 16 (ref 12–20)
BUN/CREAT SERPL: 17 (ref 12–20)
CALCIUM SERPL-MCNC: 7.9 MG/DL (ref 8.5–10.1)
CALCIUM SERPL-MCNC: 8.4 MG/DL (ref 8.5–10.1)
CHLORIDE SERPL-SCNC: 104 MMOL/L (ref 97–108)
CHLORIDE SERPL-SCNC: 105 MMOL/L (ref 97–108)
CO2 SERPL-SCNC: 24 MMOL/L (ref 21–32)
CO2 SERPL-SCNC: 27 MMOL/L (ref 21–32)
CREAT SERPL-MCNC: 3.01 MG/DL (ref 0.55–1.02)
CREAT SERPL-MCNC: 4.27 MG/DL (ref 0.55–1.02)
CROSSMATCH RESULT: NORMAL
ERYTHROCYTE [DISTWIDTH] IN BLOOD BY AUTOMATED COUNT: 15.3 % (ref 11.5–14.5)
ERYTHROCYTE [DISTWIDTH] IN BLOOD BY AUTOMATED COUNT: 15.8 % (ref 11.5–14.5)
GLUCOSE BLD STRIP.AUTO-MCNC: 222 MG/DL (ref 65–117)
GLUCOSE BLD STRIP.AUTO-MCNC: 255 MG/DL (ref 65–117)
GLUCOSE BLD STRIP.AUTO-MCNC: 255 MG/DL (ref 65–117)
GLUCOSE BLD STRIP.AUTO-MCNC: 258 MG/DL (ref 65–117)
GLUCOSE BLD STRIP.AUTO-MCNC: 270 MG/DL (ref 65–117)
GLUCOSE SERPL-MCNC: 250 MG/DL (ref 65–100)
GLUCOSE SERPL-MCNC: 279 MG/DL (ref 65–100)
HBV SURFACE AG SER QL: <0.1 INDEX
HBV SURFACE AG SER QL: NEGATIVE
HCT VFR BLD AUTO: 22.4 % (ref 35–47)
HCT VFR BLD AUTO: 24.4 % (ref 35–47)
HGB BLD-MCNC: 7.2 G/DL (ref 11.5–16)
HGB BLD-MCNC: 7.8 G/DL (ref 11.5–16)
HISTORY CHECK: NORMAL
HISTORY CHECK: NORMAL
MAGNESIUM SERPL-MCNC: 1.6 MG/DL (ref 1.6–2.4)
MCH RBC QN AUTO: 30.8 PG (ref 26–34)
MCH RBC QN AUTO: 30.9 PG (ref 26–34)
MCHC RBC AUTO-ENTMCNC: 32 G/DL (ref 30–36.5)
MCHC RBC AUTO-ENTMCNC: 32.1 G/DL (ref 30–36.5)
MCV RBC AUTO: 96.1 FL (ref 80–99)
MCV RBC AUTO: 96.4 FL (ref 80–99)
NRBC # BLD: 0.04 K/UL (ref 0–0.01)
NRBC # BLD: 0.06 K/UL (ref 0–0.01)
NRBC BLD-RTO: 0.5 PER 100 WBC
NRBC BLD-RTO: 0.8 PER 100 WBC
PHOSPHATE SERPL-MCNC: 3.8 MG/DL (ref 2.6–4.7)
PLATELET # BLD AUTO: 145 K/UL (ref 150–400)
PLATELET # BLD AUTO: 192 K/UL (ref 150–400)
PMV BLD AUTO: 11.4 FL (ref 8.9–12.9)
PMV BLD AUTO: 11.4 FL (ref 8.9–12.9)
POTASSIUM SERPL-SCNC: 3.5 MMOL/L (ref 3.5–5.1)
POTASSIUM SERPL-SCNC: 4.1 MMOL/L (ref 3.5–5.1)
RBC # BLD AUTO: 2.33 M/UL (ref 3.8–5.2)
RBC # BLD AUTO: 2.53 M/UL (ref 3.8–5.2)
SERVICE CMNT-IMP: ABNORMAL
SODIUM SERPL-SCNC: 136 MMOL/L (ref 136–145)
SODIUM SERPL-SCNC: 138 MMOL/L (ref 136–145)
SPECIMEN EXP DATE BLD: NORMAL
UNIT DIVISION: 0
VANCOMYCIN SERPL-MCNC: 14.8 UG/ML
WBC # BLD AUTO: 7.5 K/UL (ref 3.6–11)
WBC # BLD AUTO: 8.8 K/UL (ref 3.6–11)

## 2023-08-30 PROCEDURE — 85027 COMPLETE CBC AUTOMATED: CPT

## 2023-08-30 PROCEDURE — 6360000002 HC RX W HCPCS: Performed by: INTERNAL MEDICINE

## 2023-08-30 PROCEDURE — 82962 GLUCOSE BLOOD TEST: CPT

## 2023-08-30 PROCEDURE — 6360000002 HC RX W HCPCS: Performed by: ANESTHESIOLOGY

## 2023-08-30 PROCEDURE — 80069 RENAL FUNCTION PANEL: CPT

## 2023-08-30 PROCEDURE — 90945 DIALYSIS ONE EVALUATION: CPT

## 2023-08-30 PROCEDURE — 6370000000 HC RX 637 (ALT 250 FOR IP): Performed by: ANESTHESIOLOGY

## 2023-08-30 PROCEDURE — A4216 STERILE WATER/SALINE, 10 ML: HCPCS | Performed by: ANESTHESIOLOGY

## 2023-08-30 PROCEDURE — C9113 INJ PANTOPRAZOLE SODIUM, VIA: HCPCS | Performed by: ANESTHESIOLOGY

## 2023-08-30 PROCEDURE — 2000000000 HC ICU R&B

## 2023-08-30 PROCEDURE — 80202 ASSAY OF VANCOMYCIN: CPT

## 2023-08-30 PROCEDURE — 80048 BASIC METABOLIC PNL TOTAL CA: CPT

## 2023-08-30 PROCEDURE — 2500000003 HC RX 250 WO HCPCS: Performed by: INTERNAL MEDICINE

## 2023-08-30 PROCEDURE — 2580000003 HC RX 258: Performed by: ANESTHESIOLOGY

## 2023-08-30 PROCEDURE — 83735 ASSAY OF MAGNESIUM: CPT

## 2023-08-30 PROCEDURE — 36430 TRANSFUSION BLD/BLD COMPNT: CPT

## 2023-08-30 PROCEDURE — 94003 VENT MGMT INPAT SUBQ DAY: CPT

## 2023-08-30 PROCEDURE — 2580000003 HC RX 258: Performed by: INTERNAL MEDICINE

## 2023-08-30 PROCEDURE — 2580000003 HC RX 258: Performed by: NURSE PRACTITIONER

## 2023-08-30 PROCEDURE — 36415 COLL VENOUS BLD VENIPUNCTURE: CPT

## 2023-08-30 PROCEDURE — 37799 UNLISTED PX VASCULAR SURGERY: CPT

## 2023-08-30 RX ORDER — HYDRALAZINE HYDROCHLORIDE 20 MG/ML
10 INJECTION INTRAMUSCULAR; INTRAVENOUS EVERY 4 HOURS PRN
Status: DISCONTINUED | OUTPATIENT
Start: 2023-08-30 | End: 2023-08-31

## 2023-08-30 RX ORDER — LABETALOL HYDROCHLORIDE 5 MG/ML
10 INJECTION, SOLUTION INTRAVENOUS ONCE
Status: COMPLETED | OUTPATIENT
Start: 2023-08-30 | End: 2023-08-30

## 2023-08-30 RX ORDER — LABETALOL HYDROCHLORIDE 5 MG/ML
10 INJECTION, SOLUTION INTRAVENOUS EVERY 4 HOURS PRN
Status: DISCONTINUED | OUTPATIENT
Start: 2023-08-30 | End: 2023-08-31

## 2023-08-30 RX ADMIN — SODIUM BICARBONATE: 84 INJECTION, SOLUTION INTRAVENOUS at 11:14

## 2023-08-30 RX ADMIN — VANCOMYCIN HYDROCHLORIDE 1250 MG: 1.25 INJECTION, POWDER, LYOPHILIZED, FOR SOLUTION INTRAVENOUS at 13:09

## 2023-08-30 RX ADMIN — Medication 4 UNITS: at 05:48

## 2023-08-30 RX ADMIN — Medication 4 UNITS: at 00:36

## 2023-08-30 RX ADMIN — Medication 2 UNITS: at 23:57

## 2023-08-30 RX ADMIN — HEPARIN SODIUM 5000 UNITS: 5000 INJECTION INTRAVENOUS; SUBCUTANEOUS at 13:09

## 2023-08-30 RX ADMIN — CALCIUM CHLORIDE, MAGNESIUM CHLORIDE, DEXTROSE MONOHYDRATE, LACTIC ACID, SODIUM CHLORIDE, SODIUM BICARBONATE AND POTASSIUM CHLORIDE: 5.15; 2.03; 22; 5.4; 6.46; 3.09; .157 INJECTION INTRAVENOUS at 10:27

## 2023-08-30 RX ADMIN — CALCIUM CHLORIDE, MAGNESIUM CHLORIDE, DEXTROSE MONOHYDRATE, LACTIC ACID, SODIUM CHLORIDE, SODIUM BICARBONATE AND POTASSIUM CHLORIDE: 5.15; 2.03; 22; 5.4; 6.46; 3.09; .157 INJECTION INTRAVENOUS at 00:25

## 2023-08-30 RX ADMIN — HEPARIN SODIUM 5000 UNITS: 5000 INJECTION INTRAVENOUS; SUBCUTANEOUS at 05:39

## 2023-08-30 RX ADMIN — SODIUM CHLORIDE, PRESERVATIVE FREE 40 MG: 5 INJECTION INTRAVENOUS at 08:18

## 2023-08-30 RX ADMIN — HYDRALAZINE HYDROCHLORIDE 10 MG: 20 INJECTION INTRAMUSCULAR; INTRAVENOUS at 22:57

## 2023-08-30 RX ADMIN — LABETALOL HYDROCHLORIDE 10 MG: 5 INJECTION INTRAVENOUS at 00:31

## 2023-08-30 RX ADMIN — Medication: at 20:56

## 2023-08-30 RX ADMIN — CALCIUM CHLORIDE, MAGNESIUM CHLORIDE, DEXTROSE MONOHYDRATE, LACTIC ACID, SODIUM CHLORIDE, SODIUM BICARBONATE AND POTASSIUM CHLORIDE: 5.15; 2.03; 22; 5.4; 6.46; 3.09; .157 INJECTION INTRAVENOUS at 00:26

## 2023-08-30 RX ADMIN — LABETALOL HYDROCHLORIDE 10 MG: 5 INJECTION INTRAVENOUS at 04:21

## 2023-08-30 RX ADMIN — CALCIUM CHLORIDE, MAGNESIUM CHLORIDE, DEXTROSE MONOHYDRATE, LACTIC ACID, SODIUM CHLORIDE, SODIUM BICARBONATE AND POTASSIUM CHLORIDE: 5.15; 2.03; 22; 5.4; 6.46; 3.09; .157 INJECTION INTRAVENOUS at 17:49

## 2023-08-30 RX ADMIN — SODIUM CHLORIDE, PRESERVATIVE FREE 10 ML: 5 INJECTION INTRAVENOUS at 08:18

## 2023-08-30 RX ADMIN — Medication: at 08:19

## 2023-08-30 RX ADMIN — SODIUM BICARBONATE: 84 INJECTION, SOLUTION INTRAVENOUS at 21:28

## 2023-08-30 RX ADMIN — CALCIUM CHLORIDE, MAGNESIUM CHLORIDE, DEXTROSE MONOHYDRATE, LACTIC ACID, SODIUM CHLORIDE, SODIUM BICARBONATE AND POTASSIUM CHLORIDE: 5.15; 2.03; 22; 5.4; 6.46; 3.09; .157 INJECTION INTRAVENOUS at 00:24

## 2023-08-30 RX ADMIN — CALCIUM CHLORIDE, MAGNESIUM CHLORIDE, DEXTROSE MONOHYDRATE, LACTIC ACID, SODIUM CHLORIDE, SODIUM BICARBONATE AND POTASSIUM CHLORIDE: 5.15; 2.03; 22; 5.4; 6.46; 3.09; .157 INJECTION INTRAVENOUS at 10:25

## 2023-08-30 RX ADMIN — CALCIUM CHLORIDE, MAGNESIUM CHLORIDE, DEXTROSE MONOHYDRATE, LACTIC ACID, SODIUM CHLORIDE, SODIUM BICARBONATE AND POTASSIUM CHLORIDE: 5.15; 2.03; 22; 5.4; 6.46; 3.09; .157 INJECTION INTRAVENOUS at 05:37

## 2023-08-30 RX ADMIN — CALCIUM CHLORIDE, MAGNESIUM CHLORIDE, DEXTROSE MONOHYDRATE, LACTIC ACID, SODIUM CHLORIDE, SODIUM BICARBONATE AND POTASSIUM CHLORIDE: 5.15; 2.03; 22; 5.4; 6.46; 3.09; .157 INJECTION INTRAVENOUS at 10:26

## 2023-08-30 RX ADMIN — Medication 4 UNITS: at 11:16

## 2023-08-30 RX ADMIN — LABETALOL HYDROCHLORIDE 10 MG: 5 INJECTION INTRAVENOUS at 21:25

## 2023-08-30 RX ADMIN — CEFEPIME 2000 MG: 2 INJECTION, POWDER, FOR SOLUTION INTRAVENOUS at 17:13

## 2023-08-30 RX ADMIN — HEPARIN SODIUM 5000 UNITS: 5000 INJECTION INTRAVENOUS; SUBCUTANEOUS at 21:31

## 2023-08-30 RX ADMIN — SODIUM CHLORIDE, PRESERVATIVE FREE 40 MG: 5 INJECTION INTRAVENOUS at 20:55

## 2023-08-30 RX ADMIN — SODIUM BICARBONATE: 84 INJECTION, SOLUTION INTRAVENOUS at 01:19

## 2023-08-30 RX ADMIN — Medication 4 UNITS: at 17:43

## 2023-08-30 ASSESSMENT — PAIN SCALES - GENERAL
PAINLEVEL_OUTOF10: 0

## 2023-08-30 NOTE — FLOWSHEET NOTE
08/30/23 0045   Treatment   $CRRT $Yes   Machine #   (BPM14)   Cartridge Lot #   ([id number])   Therapy Type CVVH   Pressures   Access (mmHg) (!) -41 mmHg   Return/Venous (mmHg) 53 mmHg   Effluent (mmHg) 53 mmHg   Filter (mmHg) 142 mmHg   TMP Pressure (mmHg) 32 mmHg   Pressure Drop (mmHg) 50 mmHg   Deaeration Chamber Check Yes   Flow Rates   Therapy Fluid (L/hr) 200 L/hr   Blood Flow (mL/min) 200 mL/min   Replacement Fluid Pre-Filter (mL/hr) 500 mL/hr   Replacement Filter Post-Filter (mL/hr) 500 mL/hr   Pre-Blood Pump (mL/hr) 1000 mL/hr   Non-CRRT Intake   IV/IVPB (mL) 100 mL   Total Non-CRRT Intake (Calculated) 100   Non-CRRT Output   Urine (mL) 0   Total Non-CRRT Output  (Calculated) 0 mL   NxStage Calculation   (A) Total Non-NxStage Intake (mL) 100 mL   (C) Total Non-NxStage Output (mL) 0 mL/hr   Charito Calculation   (A) Total Non-Charito Intake (mL) 100 mL   (B) Total Non-Charito Output (mL) 0 mL   (C) Balance (A-B) 100 ml   (D) Physician Ordered Hourly Removal (mL) 50 ml   (E) Amount of UF removed last hour 0 ml   (F) Amount Programmed to Remove (H from last hour) 155   (G) Amount ahead or behind (E-F) -155   (H) Set the removal rate for the next hour (C+D-G) 305   (I) Actual Fluid Balance (C-E) 100   CRRT Activities   Alarms Other  (filter clotted; 2nd filter primed x3 without resolution; 3rd filter viable patient back on machine)   Temporary Disconnect New Cartridge- Clotted   Ultrafiltrate Assessment   Ultrafiltrate Color Yellow/straw   Ultrafiltrate Appearance Clear   Hemodialysis Central Access Right Neck   Placement Date/Time: 08/29/23 1013   Present on Admission/Arrival: Yes  Inserted by: Martha GOMEZ  Insertion Practices: Chlorohexadine skin antisepsis; Hand hygiene;Maximal barrier precautions; Betadine skin antisepsis;Sterile ultrasound technique;Opti. .. Continued need for line? Yes   Site Assessment Clean, dry & intact   Venous Lumen Status Brisk blood return;Flushed; Infusing   Arterial Lumen Status

## 2023-08-31 ENCOUNTER — ANESTHESIA (OUTPATIENT)
Facility: HOSPITAL | Age: 73
End: 2023-08-31
Payer: MEDICARE

## 2023-08-31 ENCOUNTER — ANESTHESIA EVENT (OUTPATIENT)
Facility: HOSPITAL | Age: 73
End: 2023-08-31
Payer: MEDICARE

## 2023-08-31 LAB
ANION GAP SERPL CALC-SCNC: 3 MMOL/L (ref 5–15)
BACTERIA SPEC CULT: ABNORMAL
BUN SERPL-MCNC: 37 MG/DL (ref 6–20)
BUN/CREAT SERPL: 15 (ref 12–20)
CALCIUM SERPL-MCNC: 8.2 MG/DL (ref 8.5–10.1)
CC UR VC: ABNORMAL
CHLORIDE SERPL-SCNC: 104 MMOL/L (ref 97–108)
CO2 SERPL-SCNC: 31 MMOL/L (ref 21–32)
CREAT SERPL-MCNC: 2.48 MG/DL (ref 0.55–1.02)
ERYTHROCYTE [DISTWIDTH] IN BLOOD BY AUTOMATED COUNT: 15.9 % (ref 11.5–14.5)
GLUCOSE BLD STRIP.AUTO-MCNC: 175 MG/DL (ref 65–117)
GLUCOSE BLD STRIP.AUTO-MCNC: 199 MG/DL (ref 65–117)
GLUCOSE BLD STRIP.AUTO-MCNC: 307 MG/DL (ref 65–117)
GLUCOSE SERPL-MCNC: 229 MG/DL (ref 65–100)
HCT VFR BLD AUTO: 21.8 % (ref 35–47)
HGB BLD-MCNC: 7 G/DL (ref 11.5–16)
MAGNESIUM SERPL-MCNC: 1.6 MG/DL (ref 1.6–2.4)
MCH RBC QN AUTO: 31 PG (ref 26–34)
MCHC RBC AUTO-ENTMCNC: 32.1 G/DL (ref 30–36.5)
MCV RBC AUTO: 96.5 FL (ref 80–99)
NRBC # BLD: 0.05 K/UL (ref 0–0.01)
NRBC BLD-RTO: 0.7 PER 100 WBC
PLATELET # BLD AUTO: 147 K/UL (ref 150–400)
PMV BLD AUTO: 11.4 FL (ref 8.9–12.9)
POTASSIUM SERPL-SCNC: 3.3 MMOL/L (ref 3.5–5.1)
RBC # BLD AUTO: 2.26 M/UL (ref 3.8–5.2)
SERVICE CMNT-IMP: ABNORMAL
SODIUM SERPL-SCNC: 138 MMOL/L (ref 136–145)
WBC # BLD AUTO: 6.7 K/UL (ref 3.6–11)

## 2023-08-31 PROCEDURE — 3600007502: Performed by: INTERNAL MEDICINE

## 2023-08-31 PROCEDURE — 97530 THERAPEUTIC ACTIVITIES: CPT

## 2023-08-31 PROCEDURE — 36415 COLL VENOUS BLD VENIPUNCTURE: CPT

## 2023-08-31 PROCEDURE — 6360000002 HC RX W HCPCS

## 2023-08-31 PROCEDURE — 2580000003 HC RX 258: Performed by: INTERNAL MEDICINE

## 2023-08-31 PROCEDURE — 2580000003 HC RX 258: Performed by: ANESTHESIOLOGY

## 2023-08-31 PROCEDURE — 6360000002 HC RX W HCPCS: Performed by: INTERNAL MEDICINE

## 2023-08-31 PROCEDURE — 2500000003 HC RX 250 WO HCPCS

## 2023-08-31 PROCEDURE — 2580000003 HC RX 258: Performed by: NURSE PRACTITIONER

## 2023-08-31 PROCEDURE — 6370000000 HC RX 637 (ALT 250 FOR IP): Performed by: ANESTHESIOLOGY

## 2023-08-31 PROCEDURE — 5A1D70Z PERFORMANCE OF URINARY FILTRATION, INTERMITTENT, LESS THAN 6 HOURS PER DAY: ICD-10-PCS | Performed by: INTERNAL MEDICINE

## 2023-08-31 PROCEDURE — 83735 ASSAY OF MAGNESIUM: CPT

## 2023-08-31 PROCEDURE — C1713 ANCHOR/SCREW BN/BN,TIS/BN: HCPCS | Performed by: INTERNAL MEDICINE

## 2023-08-31 PROCEDURE — 3600007512: Performed by: INTERNAL MEDICINE

## 2023-08-31 PROCEDURE — 6360000002 HC RX W HCPCS: Performed by: ANESTHESIOLOGY

## 2023-08-31 PROCEDURE — 3700000000 HC ANESTHESIA ATTENDED CARE: Performed by: INTERNAL MEDICINE

## 2023-08-31 PROCEDURE — 97165 OT EVAL LOW COMPLEX 30 MIN: CPT

## 2023-08-31 PROCEDURE — 97161 PT EVAL LOW COMPLEX 20 MIN: CPT

## 2023-08-31 PROCEDURE — 2709999900 HC NON-CHARGEABLE SUPPLY: Performed by: INTERNAL MEDICINE

## 2023-08-31 PROCEDURE — 30233N1 TRANSFUSION OF NONAUTOLOGOUS RED BLOOD CELLS INTO PERIPHERAL VEIN, PERCUTANEOUS APPROACH: ICD-10-PCS | Performed by: INTERNAL MEDICINE

## 2023-08-31 PROCEDURE — 2000000000 HC ICU R&B

## 2023-08-31 PROCEDURE — 0W3P8ZZ CONTROL BLEEDING IN GASTROINTESTINAL TRACT, VIA NATURAL OR ARTIFICIAL OPENING ENDOSCOPIC: ICD-10-PCS | Performed by: INTERNAL MEDICINE

## 2023-08-31 PROCEDURE — C9113 INJ PANTOPRAZOLE SODIUM, VIA: HCPCS | Performed by: ANESTHESIOLOGY

## 2023-08-31 PROCEDURE — 90935 HEMODIALYSIS ONE EVALUATION: CPT

## 2023-08-31 PROCEDURE — 2580000003 HC RX 258

## 2023-08-31 PROCEDURE — 90945 DIALYSIS ONE EVALUATION: CPT

## 2023-08-31 PROCEDURE — 7100000011 HC PHASE II RECOVERY - ADDTL 15 MIN: Performed by: INTERNAL MEDICINE

## 2023-08-31 PROCEDURE — 7100000010 HC PHASE II RECOVERY - FIRST 15 MIN: Performed by: INTERNAL MEDICINE

## 2023-08-31 PROCEDURE — 82962 GLUCOSE BLOOD TEST: CPT

## 2023-08-31 PROCEDURE — A4216 STERILE WATER/SALINE, 10 ML: HCPCS | Performed by: ANESTHESIOLOGY

## 2023-08-31 PROCEDURE — 85027 COMPLETE CBC AUTOMATED: CPT

## 2023-08-31 PROCEDURE — 2700000000 HC OXYGEN THERAPY PER DAY

## 2023-08-31 PROCEDURE — 3700000001 HC ADD 15 MINUTES (ANESTHESIA): Performed by: INTERNAL MEDICINE

## 2023-08-31 PROCEDURE — 80048 BASIC METABOLIC PNL TOTAL CA: CPT

## 2023-08-31 RX ORDER — ESCITALOPRAM OXALATE 10 MG/1
10 TABLET ORAL DAILY
Status: DISCONTINUED | OUTPATIENT
Start: 2023-08-31 | End: 2023-09-10 | Stop reason: HOSPADM

## 2023-08-31 RX ORDER — PHENYLEPHRINE HCL IN 0.9% NACL 0.4MG/10ML
SYRINGE (ML) INTRAVENOUS PRN
Status: DISCONTINUED | OUTPATIENT
Start: 2023-08-31 | End: 2023-08-31 | Stop reason: SDUPTHER

## 2023-08-31 RX ORDER — HYDRALAZINE HYDROCHLORIDE 25 MG/1
50 TABLET, FILM COATED ORAL EVERY 8 HOURS SCHEDULED
Status: DISCONTINUED | OUTPATIENT
Start: 2023-08-31 | End: 2023-09-01

## 2023-08-31 RX ORDER — SODIUM CHLORIDE 9 MG/ML
25 INJECTION, SOLUTION INTRAVENOUS PRN
Status: DISCONTINUED | OUTPATIENT
Start: 2023-08-31 | End: 2023-08-31 | Stop reason: HOSPADM

## 2023-08-31 RX ORDER — PREDNISOLONE ACETATE 10 MG/ML
1 SUSPENSION/ DROPS OPHTHALMIC 2 TIMES DAILY
Status: DISCONTINUED | OUTPATIENT
Start: 2023-08-31 | End: 2023-09-10 | Stop reason: HOSPADM

## 2023-08-31 RX ORDER — PRAVASTATIN SODIUM 40 MG
40 TABLET ORAL DAILY
Status: DISCONTINUED | OUTPATIENT
Start: 2023-08-31 | End: 2023-09-10 | Stop reason: HOSPADM

## 2023-08-31 RX ORDER — LABETALOL HYDROCHLORIDE 5 MG/ML
20 INJECTION, SOLUTION INTRAVENOUS EVERY 4 HOURS PRN
Status: DISCONTINUED | OUTPATIENT
Start: 2023-08-31 | End: 2023-09-01

## 2023-08-31 RX ORDER — SODIUM CHLORIDE 0.9 % (FLUSH) 0.9 %
5-40 SYRINGE (ML) INJECTION PRN
Status: DISCONTINUED | OUTPATIENT
Start: 2023-08-31 | End: 2023-08-31 | Stop reason: HOSPADM

## 2023-08-31 RX ORDER — SODIUM CHLORIDE 0.9 % (FLUSH) 0.9 %
5-40 SYRINGE (ML) INJECTION EVERY 12 HOURS SCHEDULED
Status: DISCONTINUED | OUTPATIENT
Start: 2023-08-31 | End: 2023-08-31 | Stop reason: HOSPADM

## 2023-08-31 RX ORDER — HYDRALAZINE HYDROCHLORIDE 20 MG/ML
INJECTION INTRAMUSCULAR; INTRAVENOUS
Status: COMPLETED
Start: 2023-08-31 | End: 2023-08-31

## 2023-08-31 RX ORDER — LIDOCAINE HYDROCHLORIDE 20 MG/ML
INJECTION, SOLUTION EPIDURAL; INFILTRATION; INTRACAUDAL; PERINEURAL PRN
Status: DISCONTINUED | OUTPATIENT
Start: 2023-08-31 | End: 2023-08-31 | Stop reason: SDUPTHER

## 2023-08-31 RX ORDER — POTASSIUM CHLORIDE 29.8 MG/ML
20 INJECTION INTRAVENOUS ONCE
Status: DISCONTINUED | OUTPATIENT
Start: 2023-08-31 | End: 2023-08-31

## 2023-08-31 RX ORDER — SODIUM CHLORIDE 9 MG/ML
INJECTION, SOLUTION INTRAVENOUS CONTINUOUS
Status: DISCONTINUED | OUTPATIENT
Start: 2023-08-31 | End: 2023-09-01

## 2023-08-31 RX ORDER — SODIUM CHLORIDE 9 MG/ML
INJECTION, SOLUTION INTRAVENOUS CONTINUOUS PRN
Status: DISCONTINUED | OUTPATIENT
Start: 2023-08-31 | End: 2023-08-31 | Stop reason: SDUPTHER

## 2023-08-31 RX ORDER — HYDRALAZINE HYDROCHLORIDE 20 MG/ML
20 INJECTION INTRAMUSCULAR; INTRAVENOUS EVERY 4 HOURS PRN
Status: DISCONTINUED | OUTPATIENT
Start: 2023-08-31 | End: 2023-09-01

## 2023-08-31 RX ORDER — GABAPENTIN 100 MG/1
100 CAPSULE ORAL 3 TIMES DAILY
Status: DISCONTINUED | OUTPATIENT
Start: 2023-08-31 | End: 2023-09-07

## 2023-08-31 RX ORDER — UBIDECARENONE 75 MG
100 CAPSULE ORAL DAILY
Status: DISCONTINUED | OUTPATIENT
Start: 2023-08-31 | End: 2023-09-10 | Stop reason: HOSPADM

## 2023-08-31 RX ORDER — POTASSIUM CHLORIDE 7.45 MG/ML
10 INJECTION INTRAVENOUS
Status: COMPLETED | OUTPATIENT
Start: 2023-08-31 | End: 2023-08-31

## 2023-08-31 RX ORDER — METOPROLOL TARTRATE 50 MG/1
50 TABLET, FILM COATED ORAL 2 TIMES DAILY
Status: DISCONTINUED | OUTPATIENT
Start: 2023-08-31 | End: 2023-09-10 | Stop reason: HOSPADM

## 2023-08-31 RX ADMIN — CEFEPIME 2000 MG: 2 INJECTION, POWDER, FOR SOLUTION INTRAVENOUS at 20:49

## 2023-08-31 RX ADMIN — HYDRALAZINE HYDROCHLORIDE 50 MG: 25 TABLET, FILM COATED ORAL at 13:25

## 2023-08-31 RX ADMIN — Medication 6 UNITS: at 13:32

## 2023-08-31 RX ADMIN — CALCIUM CHLORIDE, MAGNESIUM CHLORIDE, DEXTROSE MONOHYDRATE, LACTIC ACID, SODIUM CHLORIDE, SODIUM BICARBONATE AND POTASSIUM CHLORIDE: 5.15; 2.03; 22; 5.4; 6.46; 3.09; .157 INJECTION INTRAVENOUS at 00:29

## 2023-08-31 RX ADMIN — SODIUM CHLORIDE, PRESERVATIVE FREE 10 ML: 5 INJECTION INTRAVENOUS at 20:51

## 2023-08-31 RX ADMIN — CALCIUM CHLORIDE, MAGNESIUM CHLORIDE, DEXTROSE MONOHYDRATE, LACTIC ACID, SODIUM CHLORIDE, SODIUM BICARBONATE AND POTASSIUM CHLORIDE: 5.15; 2.03; 22; 5.4; 6.46; 3.09; .157 INJECTION INTRAVENOUS at 04:25

## 2023-08-31 RX ADMIN — HYDRALAZINE HYDROCHLORIDE 20 MG: 20 INJECTION INTRAMUSCULAR; INTRAVENOUS at 13:09

## 2023-08-31 RX ADMIN — CALCIUM CHLORIDE, MAGNESIUM CHLORIDE, DEXTROSE MONOHYDRATE, LACTIC ACID, SODIUM CHLORIDE, SODIUM BICARBONATE AND POTASSIUM CHLORIDE: 5.15; 2.03; 22; 5.4; 6.46; 3.09; .157 INJECTION INTRAVENOUS at 00:31

## 2023-08-31 RX ADMIN — HYDRALAZINE HYDROCHLORIDE 50 MG: 25 TABLET, FILM COATED ORAL at 20:49

## 2023-08-31 RX ADMIN — GABAPENTIN 100 MG: 100 CAPSULE ORAL at 13:24

## 2023-08-31 RX ADMIN — SODIUM CHLORIDE, PRESERVATIVE FREE 40 MG: 5 INJECTION INTRAVENOUS at 20:50

## 2023-08-31 RX ADMIN — CALCIUM CHLORIDE, MAGNESIUM CHLORIDE, DEXTROSE MONOHYDRATE, LACTIC ACID, SODIUM CHLORIDE, SODIUM BICARBONATE AND POTASSIUM CHLORIDE: 5.15; 2.03; 22; 5.4; 6.46; 3.09; .157 INJECTION INTRAVENOUS at 00:30

## 2023-08-31 RX ADMIN — PREDNISOLONE ACETATE 1 DROP: 10 SUSPENSION/ DROPS OPHTHALMIC at 20:52

## 2023-08-31 RX ADMIN — PROPOFOL 40 MG: 10 INJECTION, EMULSION INTRAVENOUS at 12:28

## 2023-08-31 RX ADMIN — PROPOFOL 40 MG: 10 INJECTION, EMULSION INTRAVENOUS at 12:34

## 2023-08-31 RX ADMIN — Medication: at 08:51

## 2023-08-31 RX ADMIN — HYDRALAZINE HYDROCHLORIDE 20 MG: 20 INJECTION INTRAMUSCULAR; INTRAVENOUS at 09:54

## 2023-08-31 RX ADMIN — HEPARIN SODIUM 1300 UNITS: 1000 INJECTION INTRAVENOUS; SUBCUTANEOUS at 19:23

## 2023-08-31 RX ADMIN — PREDNISOLONE ACETATE 1 DROP: 10 SUSPENSION/ DROPS OPHTHALMIC at 10:39

## 2023-08-31 RX ADMIN — METOPROLOL TARTRATE 50 MG: 50 TABLET, FILM COATED ORAL at 13:33

## 2023-08-31 RX ADMIN — PROPOFOL 30 MG: 10 INJECTION, EMULSION INTRAVENOUS at 12:25

## 2023-08-31 RX ADMIN — GABAPENTIN 100 MG: 100 CAPSULE ORAL at 20:49

## 2023-08-31 RX ADMIN — LIDOCAINE HYDROCHLORIDE 100 MG: 20 INJECTION, SOLUTION EPIDURAL; INFILTRATION; INTRACAUDAL; PERINEURAL at 12:23

## 2023-08-31 RX ADMIN — LABETALOL HYDROCHLORIDE 10 MG: 5 INJECTION INTRAVENOUS at 05:15

## 2023-08-31 RX ADMIN — Medication 120 MCG: at 12:44

## 2023-08-31 RX ADMIN — SODIUM CHLORIDE: 9 INJECTION, SOLUTION INTRAVENOUS at 12:23

## 2023-08-31 RX ADMIN — HEPARIN SODIUM 5000 UNITS: 5000 INJECTION INTRAVENOUS; SUBCUTANEOUS at 06:01

## 2023-08-31 RX ADMIN — HYDRALAZINE HYDROCHLORIDE 10 MG: 20 INJECTION INTRAMUSCULAR; INTRAVENOUS at 06:12

## 2023-08-31 RX ADMIN — SODIUM CHLORIDE, PRESERVATIVE FREE 40 MG: 5 INJECTION INTRAVENOUS at 08:36

## 2023-08-31 RX ADMIN — METOPROLOL TARTRATE 50 MG: 50 TABLET, FILM COATED ORAL at 20:49

## 2023-08-31 RX ADMIN — PROPOFOL 30 MG: 10 INJECTION, EMULSION INTRAVENOUS at 12:40

## 2023-08-31 RX ADMIN — HEPARIN SODIUM 5000 UNITS: 5000 INJECTION INTRAVENOUS; SUBCUTANEOUS at 20:50

## 2023-08-31 RX ADMIN — PROPOFOL 50 MG: 10 INJECTION, EMULSION INTRAVENOUS at 12:23

## 2023-08-31 RX ADMIN — HEPARIN SODIUM 5000 UNITS: 5000 INJECTION INTRAVENOUS; SUBCUTANEOUS at 13:25

## 2023-08-31 RX ADMIN — POTASSIUM CHLORIDE 10 MEQ: 7.46 INJECTION, SOLUTION INTRAVENOUS at 08:38

## 2023-08-31 RX ADMIN — ESCITALOPRAM 10 MG: 10 TABLET, FILM COATED ORAL at 13:34

## 2023-08-31 RX ADMIN — LABETALOL HYDROCHLORIDE 10 MG: 5 INJECTION INTRAVENOUS at 08:38

## 2023-08-31 RX ADMIN — LABETALOL HYDROCHLORIDE 20 MG: 5 INJECTION INTRAVENOUS at 21:03

## 2023-08-31 RX ADMIN — POTASSIUM CHLORIDE 10 MEQ: 7.46 INJECTION, SOLUTION INTRAVENOUS at 06:52

## 2023-08-31 RX ADMIN — Medication: at 20:51

## 2023-08-31 RX ADMIN — Medication 120 MCG: at 12:25

## 2023-08-31 RX ADMIN — Medication 100 MCG: at 13:36

## 2023-08-31 ASSESSMENT — PAIN SCALES - GENERAL
PAINLEVEL_OUTOF10: 0

## 2023-08-31 NOTE — ANESTHESIA PRE PROCEDURE
Department of Anesthesiology  Preprocedure Note       Name:  Jett Mcnair   Age:  68 y.o.  :  1950                                          MRN:  [id number]         Date:  2023      Surgeon: Pauline Peterson):  Artem Gerard MD    Procedure: Procedure(s):  EGD ESOPHAGOGASTRODUODENOSCOPY at bedside    Medications prior to admission:   Prior to Admission medications    Medication Sig Start Date End Date Taking?  Authorizing Provider   levothyroxine (SYNTHROID) 200 MCG tablet Take 1 tablet by mouth every morning (before breakfast) 23   Mary Minor MD   hydrALAZINE (APRESOLINE) 100 MG tablet Take 1 tablet by mouth 3 times daily 23   Mary Minor MD   NIFEdipine (PROCARDIA XL) 30 MG extended release tablet Take 1 tablet by mouth daily 23   Mary Minor MD   DM-APAP-CPM (CORICIDIN HBP PO) Take by mouth daily    Historical Provider, MD   insulin aspart (NOVOLOG) 100 UNIT/ML injection vial INJECT A MAX  UNITS UNDER THE SKIN DAILY WITH INSULIN PUMP 6/15/23   Stvean Nichols MD   ergocalciferol (ERGOCALCIFEROL) 1.25 MG (48439 UT) capsule Take 1 capsule by mouth once a week 6/15/23   Stevan Nichols MD   metoprolol tartrate (LOPRESSOR) 50 MG tablet TAKE 1 TABLET TWICE DAILY 23   Stevan Nichols MD   colchicine (COLCRYS) 0.6 MG tablet ceived the following from [address] [facility]: Outside name: colchicine 0.6 mg tablet 10/21/22   Ar Automatic Reconciliation   cyanocobalamin 1000 MCG tablet Take by mouth daily    Ar Automatic Reconciliation   dorzolamide-timolol 22.3-6.8 MG/ML SOLN Apply 1 drop to eye 2 times daily 7/6/15   Ar Automatic Reconciliation   escitalopram (LEXAPRO) 10 MG tablet Take by mouth 2 times daily    Ar Automatic Reconciliation   ferrous sulfate (IRON 325) 325 (65 Fe) MG tablet Take 2 tablets by mouth daily (with breakfast)    Ar Automatic Reconciliation   fluticasone-umeclidin-vilant (TRELEGY ELLIPTA) 100-62.5-25 MCG/ACT AEPB inhaler INHALE 1 PUFF BY
CO2 31 08/31/2023 03:32 AM    BUN 37 08/31/2023 03:32 AM    CREATININE 2.48 08/31/2023 03:32 AM    GFRAA 22 06/24/2022 03:20 AM    AGRATIO 0.6 06/21/2022 04:42 PM    LABGLOM 20 08/31/2023 03:32 AM    GLUCOSE 229 08/31/2023 03:32 AM    PROT 6.3 08/29/2023 12:54 AM    CALCIUM 8.2 08/31/2023 03:32 AM    BILITOT 0.3 08/29/2023 12:54 AM    ALKPHOS 76 08/29/2023 12:54 AM    ALKPHOS 114 06/21/2022 04:42 PM    AST 27 08/29/2023 12:54 AM    ALT 21 08/29/2023 12:54 AM       POC Tests:   Recent Labs     08/31/23  0559   POCGLU 175*       Coags:   Lab Results   Component Value Date/Time    PROTIME 11.2 08/28/2023 10:45 AM    INR 1.1 08/28/2023 10:45 AM    APTT 22.9 08/28/2023 10:45 AM    APTT 24.2 01/31/2021 06:04 AM       HCG (If Applicable): No results found for: PREGTESTUR, PREGSERUM, HCG, HCGQUANT     ABGs:   Lab Results   Component Value Date/Time    PHART 7.28 02/01/2021 01:56 PM    PO2ART 64 02/01/2021 01:56 PM    BRG1YIL 57.9 02/01/2021 01:56 PM    FNI5FSL 27.5 02/01/2021 01:56 PM    BEART 1 02/01/2021 01:56 PM        Type & Screen (If Applicable):  No results found for: LABABO, LABRH    Drug/Infectious Status (If Applicable):  No results found for: HIV, HEPCAB    COVID-19 Screening (If Applicable):   Lab Results   Component Value Date/Time    COVID19 Not detected 08/28/2023 11:24 PM           Anesthesia Evaluation  Patient summary reviewed and Nursing notes reviewed  Airway: Mallampati: II  TM distance: >3 FB   Neck ROM: full  Mouth opening: > = 3 FB   Dental:          Pulmonary:Negative Pulmonary ROS breath sounds clear to auscultation  (+) COPD:  sleep apnea:                             Cardiovascular:Negative CV ROS    (+) hypertension:, CAD:,         Rhythm: regular  Rate: normal                    Neuro/Psych:   Negative Neuro/Psych ROS              GI/Hepatic/Renal: Neg GI/Hepatic/Renal ROS  (+) GERD:, morbid obesity          Endo/Other: Negative Endo/Other ROS   (+) Diabetes, hypothyroidism::., .

## 2023-08-31 NOTE — FLOWSHEET NOTE
08/31/23 0030   Treatment   $CRRT $Yes   Machine #   (BPM 14)   Cartridge Lot #   [id number])   Therapy Type CVVH   Pressures   Access (mmHg) (!) -48 mmHg   Return/Venous (mmHg) 50 mmHg   Effluent (mmHg) (!) 45 mmHg   Filter (mmHg) 134 mmHg   TMP Pressure (mmHg) 35 mmHg   Pressure Drop (mmHg) 48 mmHg   Deaeration Chamber Check Yes   Flow Rates   Therapy Fluid (L/hr) 2700 L/hr   Blood Flow (mL/min) 200 mL/min   Replacement Fluid Pre-Filter (mL/hr) 670 mL/hr   Replacement Filter Post-Filter (mL/hr) 680 mL/hr   Pre-Blood Pump (mL/hr) 1350 mL/hr   System Used   System Used Charito   CRRT Activities   Intervention Initiated   Ultrafiltrate Assessment   Ultrafiltrate Color Yellow/straw   Ultrafiltrate Appearance Clear   Hemodialysis Central Access Right Neck   Placement Date/Time: 08/29/23 1013   Present on Admission/Arrival: Yes  Inserted by: Surinder GOMEZ  Insertion Practices: Chlorohexadine skin antisepsis; Hand hygiene;Maximal barrier precautions; Betadine skin antisepsis;Sterile ultrasound technique;Opti. .. Continued need for line? Yes   Site Assessment Clean, dry & intact   Venous Lumen Status Flushed;Brisk blood return; Infusing   Arterial Lumen Status Flushed;Brisk blood return; Infusing   Line Care Ports disinfected; Chlorhexidine wipes; Connections checked and tightened   Dressing Type Transparent; Bacteriocidal   Date of Last Dressing Change 08/29/23   Dressing Status Clean, dry & intact   Dressing Change Due 09/01/23     Primary RN SBAR: Cori Jolley RN  Patient Education: CRRT procedure    Comments:  Called by the ICU RN for \"clotted filter. \"  Patient's blood returned prior to my arrival.  New HF 1000 primed, tested and connected with aseptic technique. Therapy resumed.       Hepatitis B Surface Ag   Date/Time Value Ref Range Status   08/29/2023 11:44 AM <0.10 Index Final     Hep B S Ab   Date/Time Value Ref Range Status   08/29/2023 11:44 AM 27.71 mIU/mL Final

## 2023-08-31 NOTE — CARE COORDINATION
Transition of Care Plan:     RUR: 24%--high  Prior Level of Functioning: significant assistance with ADLs  Disposition: SNF (Choice needed)  Follow up appointments: PCP, Cardiology  DME needed: N/A  Transportation at discharge: in car with family v BLS  IM/IMM Medicare/ letter given: 8/31/2023  Caregiver Contact: Ynes Mcmahon, partner, [phone]  Discharge Caregiver contacted prior to discharge? N/A  Care Conference needed? no  Barriers to discharge:  clinical status, insurance auth     Patient admitted 8/28 for melena after being discharged from hospital one day prior. Previous admission for confusion and hyperkalemia. Endoscopy today and worked with PT/OT. SNF being recommended at this time. Off pressors and plan to start HD tomorrow. CM will continue to follow.      Amirah Amaral, [address]  [phone]

## 2023-08-31 NOTE — OP NOTE
[address], [address] [facility]  [phone]                              Push enteroscopy Procedure Note    NAME:  Sierra Blanchard     :        MRN:   [id number]     Date/Time:  2023 1:24 PM    :  Marge Hogan MD    Staff: Circulator: Justo Mcgowan RN  Endoscopy Technician: Mihaela Servin    Referring Provider:  Brigitte Retana MD    Anethesia/Sedation:  MAC anesthesia    Procedure Details   After infomed consent was obtained for the procedure, with all risks and benefits of procedure explained the patient was taken to the endoscopy suite and placed in the left lateral decubitus position. Following sequential administration of sedation as per above, the gastroscope was inserted into the mouth and advanced under direct vision to proximal jejunum. A careful inspection was made as the gastroscope was withdrawn, including a retroflexed view of the proximal stomach; findings and interventions are described below. Findings:  Esophagus:normal  Stomach:mild gastritis, two small non bleeding angiectasias in fundus, treated with APC (40 fermin, 1.0)  Duodenum/jejunum: four small non bleeding angiectasias in duodenum and proximal jejunum, treated with APC (40 fermin, 1.0)    Interventions: APC     Specimens Removed:  * No specimens in log *    Complications: None.      EBL:  minimal     Impression:    See Postoperative diagnosis above    Recommendations:   - Advance diet  - Monitor for further significant bleeding     Marge Hogan MD

## 2023-08-31 NOTE — ANESTHESIA POSTPROCEDURE EVALUATION
Department of Anesthesiology  Postprocedure Note    Patient: Anastacio Carter  MRN: [id number]  YOB: 1950  Date of evaluation: 8/31/2023      Procedure Summary     Date: 08/31/23 Room / Location: [address] ENDOSCOPY    Anesthesia Start: 1214 Anesthesia Stop: 4214    Procedure: EGD ESOPHAGOGASTRODUODENOSCOPY at bedside (Upper GI Region) Diagnosis:       Anemia, unspecified type      (Anemia, unspecified type [D64.9])    Surgeons: Lisa Chacko MD Responsible Provider: Tavo Kimball MD    Anesthesia Type: MAC ASA Status: 3          Anesthesia Type: MAC    Melba Phase I: Melba Score: 9    Melba Phase II: Melba Score: 9      Anesthesia Post Evaluation    Patient location during evaluation: PACU  Patient participation: complete - patient participated  Level of consciousness: awake  Pain score: 2  Airway patency: patent  Nausea & Vomiting: no nausea  Complications: no  Cardiovascular status: blood pressure returned to baseline  Respiratory status: acceptable  Hydration status: euvolemic  Pain management: adequate

## 2023-08-31 NOTE — DIALYSIS
CRRT:  Patient transitioning to HD, primary nurse rinsed circuit back in to remove circuit discarded in biohazard bin and machine cleaned and collected. Patient to transition to HD today per nephrology.

## 2023-09-01 ENCOUNTER — APPOINTMENT (OUTPATIENT)
Facility: HOSPITAL | Age: 73
DRG: 871 | End: 2023-09-01
Attending: HOSPITALIST
Payer: MEDICARE

## 2023-09-01 PROBLEM — R60.0 EDEMA OF RIGHT UPPER EXTREMITY: Status: ACTIVE | Noted: 2023-09-01

## 2023-09-01 PROBLEM — N18.4 TYPE 1 DIABETES MELLITUS WITH STAGE 4 CHRONIC KIDNEY DISEASE (HCC): Status: ACTIVE | Noted: 2023-09-01

## 2023-09-01 PROBLEM — E10.22 TYPE 1 DIABETES MELLITUS WITH STAGE 4 CHRONIC KIDNEY DISEASE (HCC): Status: ACTIVE | Noted: 2023-09-01

## 2023-09-01 LAB
ALBUMIN SERPL-MCNC: 2.2 G/DL (ref 3.5–5)
ANION GAP SERPL CALC-SCNC: 7 MMOL/L (ref 5–15)
ANION GAP SERPL CALC-SCNC: 9 MMOL/L (ref 5–15)
BUN SERPL-MCNC: 17 MG/DL (ref 6–20)
BUN SERPL-MCNC: 22 MG/DL (ref 6–20)
BUN/CREAT SERPL: 9 (ref 12–20)
BUN/CREAT SERPL: 9 (ref 12–20)
CALCIUM SERPL-MCNC: 7.9 MG/DL (ref 8.5–10.1)
CALCIUM SERPL-MCNC: 8.3 MG/DL (ref 8.5–10.1)
CHLORIDE SERPL-SCNC: 100 MMOL/L (ref 97–108)
CHLORIDE SERPL-SCNC: 102 MMOL/L (ref 97–108)
CO2 SERPL-SCNC: 26 MMOL/L (ref 21–32)
CO2 SERPL-SCNC: 26 MMOL/L (ref 21–32)
CREAT SERPL-MCNC: 1.82 MG/DL (ref 0.55–1.02)
CREAT SERPL-MCNC: 2.41 MG/DL (ref 0.55–1.02)
ERYTHROCYTE [DISTWIDTH] IN BLOOD BY AUTOMATED COUNT: 15.4 % (ref 11.5–14.5)
ERYTHROCYTE [DISTWIDTH] IN BLOOD BY AUTOMATED COUNT: 15.6 % (ref 11.5–14.5)
GLUCOSE BLD STRIP.AUTO-MCNC: 226 MG/DL (ref 65–117)
GLUCOSE BLD STRIP.AUTO-MCNC: 277 MG/DL (ref 65–117)
GLUCOSE BLD STRIP.AUTO-MCNC: 279 MG/DL (ref 65–117)
GLUCOSE BLD STRIP.AUTO-MCNC: 295 MG/DL (ref 65–117)
GLUCOSE SERPL-MCNC: 219 MG/DL (ref 65–100)
GLUCOSE SERPL-MCNC: 253 MG/DL (ref 65–100)
HCT VFR BLD AUTO: 23.6 % (ref 35–47)
HCT VFR BLD AUTO: 25.7 % (ref 35–47)
HGB BLD-MCNC: 7.5 G/DL (ref 11.5–16)
HGB BLD-MCNC: 8 G/DL (ref 11.5–16)
MAGNESIUM SERPL-MCNC: 1.8 MG/DL (ref 1.6–2.4)
MCH RBC QN AUTO: 31.1 PG (ref 26–34)
MCH RBC QN AUTO: 31.4 PG (ref 26–34)
MCHC RBC AUTO-ENTMCNC: 31.1 G/DL (ref 30–36.5)
MCHC RBC AUTO-ENTMCNC: 31.8 G/DL (ref 30–36.5)
MCV RBC AUTO: 100 FL (ref 80–99)
MCV RBC AUTO: 98.7 FL (ref 80–99)
NRBC # BLD: 0 K/UL (ref 0–0.01)
NRBC # BLD: 0.02 K/UL (ref 0–0.01)
NRBC BLD-RTO: 0 PER 100 WBC
NRBC BLD-RTO: 0.3 PER 100 WBC
PHOSPHATE SERPL-MCNC: 3.5 MG/DL (ref 2.6–4.7)
PLATELET # BLD AUTO: 143 K/UL (ref 150–400)
PLATELET # BLD AUTO: 149 K/UL (ref 150–400)
PMV BLD AUTO: 11.2 FL (ref 8.9–12.9)
PMV BLD AUTO: 11.6 FL (ref 8.9–12.9)
POTASSIUM SERPL-SCNC: 3.9 MMOL/L (ref 3.5–5.1)
POTASSIUM SERPL-SCNC: 3.9 MMOL/L (ref 3.5–5.1)
RBC # BLD AUTO: 2.39 M/UL (ref 3.8–5.2)
RBC # BLD AUTO: 2.57 M/UL (ref 3.8–5.2)
SERVICE CMNT-IMP: ABNORMAL
SODIUM SERPL-SCNC: 135 MMOL/L (ref 136–145)
SODIUM SERPL-SCNC: 135 MMOL/L (ref 136–145)
WBC # BLD AUTO: 6.2 K/UL (ref 3.6–11)
WBC # BLD AUTO: 6.4 K/UL (ref 3.6–11)

## 2023-09-01 PROCEDURE — 2580000003 HC RX 258: Performed by: ANESTHESIOLOGY

## 2023-09-01 PROCEDURE — 6370000000 HC RX 637 (ALT 250 FOR IP): Performed by: INTERNAL MEDICINE

## 2023-09-01 PROCEDURE — 82962 GLUCOSE BLOOD TEST: CPT

## 2023-09-01 PROCEDURE — 6360000002 HC RX W HCPCS: Performed by: ANESTHESIOLOGY

## 2023-09-01 PROCEDURE — A4216 STERILE WATER/SALINE, 10 ML: HCPCS | Performed by: ANESTHESIOLOGY

## 2023-09-01 PROCEDURE — 99221 1ST HOSP IP/OBS SF/LOW 40: CPT

## 2023-09-01 PROCEDURE — 85027 COMPLETE CBC AUTOMATED: CPT

## 2023-09-01 PROCEDURE — 6370000000 HC RX 637 (ALT 250 FOR IP): Performed by: HOSPITALIST

## 2023-09-01 PROCEDURE — 80048 BASIC METABOLIC PNL TOTAL CA: CPT

## 2023-09-01 PROCEDURE — 93971 EXTREMITY STUDY: CPT

## 2023-09-01 PROCEDURE — 36415 COLL VENOUS BLD VENIPUNCTURE: CPT

## 2023-09-01 PROCEDURE — 6370000000 HC RX 637 (ALT 250 FOR IP): Performed by: ANESTHESIOLOGY

## 2023-09-01 PROCEDURE — 80069 RENAL FUNCTION PANEL: CPT

## 2023-09-01 PROCEDURE — 6360000002 HC RX W HCPCS: Performed by: NURSE PRACTITIONER

## 2023-09-01 PROCEDURE — 2700000000 HC OXYGEN THERAPY PER DAY

## 2023-09-01 PROCEDURE — 51798 US URINE CAPACITY MEASURE: CPT

## 2023-09-01 PROCEDURE — 83735 ASSAY OF MAGNESIUM: CPT

## 2023-09-01 PROCEDURE — C9113 INJ PANTOPRAZOLE SODIUM, VIA: HCPCS | Performed by: ANESTHESIOLOGY

## 2023-09-01 PROCEDURE — 2580000003 HC RX 258: Performed by: NURSE PRACTITIONER

## 2023-09-01 PROCEDURE — 2060000000 HC ICU INTERMEDIATE R&B

## 2023-09-01 PROCEDURE — 6370000000 HC RX 637 (ALT 250 FOR IP): Performed by: NURSE PRACTITIONER

## 2023-09-01 RX ORDER — DEXTROSE MONOHYDRATE 100 MG/ML
INJECTION, SOLUTION INTRAVENOUS CONTINUOUS PRN
Status: DISCONTINUED | OUTPATIENT
Start: 2023-09-01 | End: 2023-09-01 | Stop reason: SDUPTHER

## 2023-09-01 RX ORDER — LABETALOL HYDROCHLORIDE 5 MG/ML
20 INJECTION, SOLUTION INTRAVENOUS EVERY 4 HOURS PRN
Status: DISCONTINUED | OUTPATIENT
Start: 2023-09-01 | End: 2023-09-10 | Stop reason: HOSPADM

## 2023-09-01 RX ORDER — DEXTROSE MONOHYDRATE 100 MG/ML
INJECTION, SOLUTION INTRAVENOUS CONTINUOUS PRN
Status: DISCONTINUED | OUTPATIENT
Start: 2023-09-01 | End: 2023-09-10 | Stop reason: HOSPADM

## 2023-09-01 RX ORDER — INSULIN LISPRO 100 [IU]/ML
0-4 INJECTION, SOLUTION INTRAVENOUS; SUBCUTANEOUS
Status: DISCONTINUED | OUTPATIENT
Start: 2023-09-01 | End: 2023-09-10 | Stop reason: HOSPADM

## 2023-09-01 RX ORDER — HYDRALAZINE HYDROCHLORIDE 50 MG/1
100 TABLET, FILM COATED ORAL EVERY 8 HOURS SCHEDULED
Status: DISCONTINUED | OUTPATIENT
Start: 2023-09-01 | End: 2023-09-10 | Stop reason: HOSPADM

## 2023-09-01 RX ORDER — INSULIN LISPRO 100 [IU]/ML
0-4 INJECTION, SOLUTION INTRAVENOUS; SUBCUTANEOUS NIGHTLY
Status: DISCONTINUED | OUTPATIENT
Start: 2023-09-01 | End: 2023-09-10 | Stop reason: HOSPADM

## 2023-09-01 RX ORDER — NIFEDIPINE 30 MG/1
30 TABLET, EXTENDED RELEASE ORAL DAILY
Status: DISCONTINUED | OUTPATIENT
Start: 2023-09-01 | End: 2023-09-10 | Stop reason: HOSPADM

## 2023-09-01 RX ORDER — INSULIN LISPRO 100 [IU]/ML
4 INJECTION, SOLUTION INTRAVENOUS; SUBCUTANEOUS
Status: DISCONTINUED | OUTPATIENT
Start: 2023-09-01 | End: 2023-09-05

## 2023-09-01 RX ORDER — INSULIN GLARGINE 100 [IU]/ML
10 INJECTION, SOLUTION SUBCUTANEOUS NIGHTLY
Status: DISCONTINUED | OUTPATIENT
Start: 2023-09-01 | End: 2023-09-05

## 2023-09-01 RX ORDER — HYDRALAZINE HYDROCHLORIDE 20 MG/ML
20 INJECTION INTRAMUSCULAR; INTRAVENOUS EVERY 4 HOURS PRN
Status: DISCONTINUED | OUTPATIENT
Start: 2023-09-01 | End: 2023-09-10 | Stop reason: HOSPADM

## 2023-09-01 RX ADMIN — ONDANSETRON 4 MG: 2 INJECTION INTRAMUSCULAR; INTRAVENOUS at 14:27

## 2023-09-01 RX ADMIN — NIFEDIPINE 30 MG: 30 TABLET, FILM COATED, EXTENDED RELEASE ORAL at 08:25

## 2023-09-01 RX ADMIN — SODIUM CHLORIDE, PRESERVATIVE FREE 40 MG: 5 INJECTION INTRAVENOUS at 22:43

## 2023-09-01 RX ADMIN — Medication: at 22:44

## 2023-09-01 RX ADMIN — METOPROLOL TARTRATE 50 MG: 50 TABLET, FILM COATED ORAL at 08:24

## 2023-09-01 RX ADMIN — ESCITALOPRAM 10 MG: 10 TABLET, FILM COATED ORAL at 08:24

## 2023-09-01 RX ADMIN — PREDNISOLONE ACETATE 1 DROP: 10 SUSPENSION/ DROPS OPHTHALMIC at 22:44

## 2023-09-01 RX ADMIN — HEPARIN SODIUM 5000 UNITS: 5000 INJECTION INTRAVENOUS; SUBCUTANEOUS at 06:29

## 2023-09-01 RX ADMIN — Medication 4 UNITS: at 11:47

## 2023-09-01 RX ADMIN — HYDRALAZINE HYDROCHLORIDE 20 MG: 20 INJECTION INTRAMUSCULAR; INTRAVENOUS at 00:28

## 2023-09-01 RX ADMIN — SODIUM CHLORIDE, PRESERVATIVE FREE 10 ML: 5 INJECTION INTRAVENOUS at 22:43

## 2023-09-01 RX ADMIN — WATER 1000 MG: 1 INJECTION INTRAMUSCULAR; INTRAVENOUS; SUBCUTANEOUS at 08:23

## 2023-09-01 RX ADMIN — GABAPENTIN 100 MG: 100 CAPSULE ORAL at 22:42

## 2023-09-01 RX ADMIN — HYDRALAZINE HYDROCHLORIDE 50 MG: 25 TABLET, FILM COATED ORAL at 06:29

## 2023-09-01 RX ADMIN — GABAPENTIN 100 MG: 100 CAPSULE ORAL at 13:46

## 2023-09-01 RX ADMIN — Medication: at 08:40

## 2023-09-01 RX ADMIN — Medication 100 MCG: at 08:25

## 2023-09-01 RX ADMIN — HYDRALAZINE HYDROCHLORIDE 100 MG: 50 TABLET, FILM COATED ORAL at 13:45

## 2023-09-01 RX ADMIN — SODIUM CHLORIDE, PRESERVATIVE FREE 40 MG: 5 INJECTION INTRAVENOUS at 08:23

## 2023-09-01 RX ADMIN — LEVOTHYROXINE SODIUM 200 MCG: 0.2 TABLET ORAL at 07:21

## 2023-09-01 RX ADMIN — PRAVASTATIN SODIUM 40 MG: 40 TABLET ORAL at 08:24

## 2023-09-01 RX ADMIN — Medication 2 UNITS: at 07:02

## 2023-09-01 RX ADMIN — INSULIN GLARGINE 10 UNITS: 100 INJECTION, SOLUTION SUBCUTANEOUS at 22:42

## 2023-09-01 RX ADMIN — LABETALOL HYDROCHLORIDE 20 MG: 5 INJECTION INTRAVENOUS at 06:29

## 2023-09-01 RX ADMIN — HEPARIN SODIUM 5000 UNITS: 5000 INJECTION INTRAVENOUS; SUBCUTANEOUS at 13:46

## 2023-09-01 RX ADMIN — HYDRALAZINE HYDROCHLORIDE 20 MG: 20 INJECTION INTRAMUSCULAR; INTRAVENOUS at 04:36

## 2023-09-01 RX ADMIN — GABAPENTIN 100 MG: 100 CAPSULE ORAL at 08:24

## 2023-09-01 RX ADMIN — SODIUM CHLORIDE, PRESERVATIVE FREE 10 ML: 5 INJECTION INTRAVENOUS at 08:24

## 2023-09-01 RX ADMIN — PREDNISOLONE ACETATE 1 DROP: 10 SUSPENSION/ DROPS OPHTHALMIC at 08:42

## 2023-09-01 ASSESSMENT — PAIN SCALES - GENERAL
PAINLEVEL_OUTOF10: 0

## 2023-09-01 NOTE — H&P
[address]  [phone]                                History and Physical     NAME: Eden Fenton   :     MRN:  [id number]     HPI:  The patient was seen and examined.     Past Surgical History:   Procedure Laterality Date    CABG, ARTERY-VEIN, THREE  2015    CARDIAC CATHETERIZATION  2015    CERVICAL FUSION      GI      IR NONTUNNELED VASCULAR CATHETER  2023    IR NONTUNNELED VASCULAR CATHETER 2023 [facility] RAD ANGIO IR    LUMBAR FUSION      LUMBAR LAMINECTOMY  2011    ORTHOPEDIC SURGERY Right 1970    CARPAL TUNNEL    ORTHOPEDIC SURGERY Left 2003    CARPAL TUNNEL    ROTATOR CUFF REPAIR Right 2003    SHOULDER ARTHROSCOPY Right     TONSILLECTOMY      TONSILLECTOMY AND ADENOIDECTOMY  1968    UPPER GASTROINTESTINAL ENDOSCOPY  2015     Past Medical History:   Diagnosis Date    Adverse effect of anesthesia     SLOW WAKING PAST ANESTHESIA    Anxiety     Arthritis     CAD (coronary artery disease)     s/p CABG x 3v    Chest pain 2015    Chronic obstructive pulmonary disease (HCC)     CTS (carpal tunnel syndrome)     S/p bilateral release    Diabetes ([address])     Diabetic gastroparesis (HCC)     Diabetic neuropathy (HCC)     Diabetic retinopathy (HCC)     GERD (gastroesophageal reflux disease)     GI bleed 2015    4 bleeds with 9 clips placed    Hypercholesteremia     Hypertension     Hypothyroid     Ill-defined condition     GI BLEED    Ill-defined condition     NEUROPATHY HANDS AND FEET    Nicotine vapor product user     NICOLÁS on CPAP     Osteoporosis     Vitamin D deficiency      Social History     Tobacco Use    Smoking status: Former     Packs/day: 0.50     Types: Cigarettes     Quit date: 2015     Years since quittin.1    Smokeless tobacco: Never   Substance Use Topics    Alcohol use: No     Alcohol/week: 0.0 standard drinks    Drug use: No     Allergies   Allergen Reactions    Nsaids Other (See Comments)
[facility]    ICU TEAM Progress Note    Name: Luciano Dubose   : 1950   MRN: [id number]   Date: 2023           ICU Assessment     JOSH  GIB         ICU Comprehensive Plan of Care:     Neurological System  Aside from poor mental status, pt has no acute neuro issue    Cardiovascular System  Hypotensive  Pressors required while resuscitating  SBP Goal of: > 90 mmHg  MAP Goal of: > 65 mmHg  Norepinephrine - For above SBP/MAP goals  Transfusion Trigger (Hgb): <7 g/dL  Keep K > 4; Mg > 2     Respiratory System  N/A  Spontaneous Breathing Trial: N/A  Pulmonary toilet: per routine   SpO2 Goal: > 92%  DVT Prophylaxis: None    Renal/GI/Endocrine System  IVFs: Albumin and blood  Ulcer Prophylaxis: Protonix (pantoprazole)   Bowel Regimen: Docusate (Colace)  Feeding:  No   Blood Sugar Goal 120-180 - Glycemic Control: Insulin    Infectious Disease  Do not think this is infectious    PT/OT:  deferred      Goals of Care Discussion with family Yes     Plan of Care/Code Status: Full Code    Discussed Care Plan with Bedside RN    Documentation of Current Medications    Subjective:   Progress Note: 2023      Reason for ICU Admission: GIB with JOSH     HPI:  Pt has been sitting at home not eating or drinking, having GIB for past few days    POD:  * No surgery found *    S/P:       Active Problem List:     [unfilled]    Past Medical History:      has a past medical history of Adverse effect of anesthesia, Anxiety, Arthritis, CAD (coronary artery disease), Chest pain, Chronic obstructive pulmonary disease ([address]), CTS (carpal tunnel syndrome), Diabetes ([address]), Diabetic gastroparesis ([address]), Diabetic neuropathy ([address]), Diabetic retinopathy ([address]), GERD (gastroesophageal reflux disease), GI bleed, Hypercholesteremia, Hypertension, Hypothyroid, Ill-defined condition, Ill-defined condition, Nicotine vapor product user, NICOLÁS on CPAP, Osteoporosis, and Vitamin D deficiency.     Past Surgical History:      has a past
following components:    POC Glucose 270 (*)     All other components within normal limits   POCT GLUCOSE - Abnormal; Notable for the following components:    POC Glucose 255 (*)     All other components within normal limits   POCT GLUCOSE - Abnormal; Notable for the following components:    POC Glucose 255 (*)     All other components within normal limits   POCT GLUCOSE - Abnormal; Notable for the following components:    POC Glucose 258 (*)     All other components within normal limits   POCT GLUCOSE - Abnormal; Notable for the following components:    POC Glucose 222 (*)     All other components within normal limits   POCT GLUCOSE - Abnormal; Notable for the following components:    POC Glucose 175 (*)     All other components within normal limits   POCT GLUCOSE - Abnormal; Notable for the following components:    POC Glucose 307 (*)     All other components within normal limits   POCT GLUCOSE - Abnormal; Notable for the following components:    POC Glucose 199 (*)     All other components within normal limits   POCT GLUCOSE - Abnormal; Notable for the following components:    POC Glucose 226 (*)     All other components within normal limits       [unfilled]    IMAGING:   XR CHEST PORTABLE   Final Result   1. No pneumothorax      IR NONTUNNELED VASCULAR CATHETER > 5 YEARS   Final Result   Technically successful placement of an ultrasound guided temporary   dialysis catheter via the right internal jugular vein, as described above. XR CHEST PORTABLE   Final Result   Bibasilar haziness favoring atelectasis. No change. CT Head W/O Contrast   Final Result   No acute intracranial abnormality. ECG/ECHO:  [unfilled]       Notes reviewed from all clinical/nonclinical/nursing services involved in patient's clinical care. Care coordination discussions were held with appropriate clinical/nonclinical/ nursing providers based on care coordination needs.      Assessment and Plan

## 2023-09-02 LAB
ANION GAP SERPL CALC-SCNC: 6 MMOL/L (ref 5–15)
BACTERIA SPEC CULT: NORMAL
BACTERIA SPEC CULT: NORMAL
BUN SERPL-MCNC: 27 MG/DL (ref 6–20)
BUN/CREAT SERPL: 9 (ref 12–20)
CALCIUM SERPL-MCNC: 8.6 MG/DL (ref 8.5–10.1)
CHLORIDE SERPL-SCNC: 99 MMOL/L (ref 97–108)
CO2 SERPL-SCNC: 28 MMOL/L (ref 21–32)
CREAT SERPL-MCNC: 3.02 MG/DL (ref 0.55–1.02)
ECHO BSA: 2.22 M2
ERYTHROCYTE [DISTWIDTH] IN BLOOD BY AUTOMATED COUNT: 15.4 % (ref 11.5–14.5)
EST. AVERAGE GLUCOSE BLD GHB EST-MCNC: 123 MG/DL
GLUCOSE BLD STRIP.AUTO-MCNC: 119 MG/DL (ref 65–117)
GLUCOSE BLD STRIP.AUTO-MCNC: 168 MG/DL (ref 65–117)
GLUCOSE BLD STRIP.AUTO-MCNC: 199 MG/DL (ref 65–117)
GLUCOSE BLD STRIP.AUTO-MCNC: 223 MG/DL (ref 65–117)
GLUCOSE SERPL-MCNC: 280 MG/DL (ref 65–100)
HBA1C MFR BLD: 5.9 % (ref 4–5.6)
HCT VFR BLD AUTO: 26.1 % (ref 35–47)
HGB BLD-MCNC: 7.9 G/DL (ref 11.5–16)
MAGNESIUM SERPL-MCNC: 1.9 MG/DL (ref 1.6–2.4)
MCH RBC QN AUTO: 31.1 PG (ref 26–34)
MCHC RBC AUTO-ENTMCNC: 30.3 G/DL (ref 30–36.5)
MCV RBC AUTO: 102.8 FL (ref 80–99)
NRBC # BLD: 0 K/UL (ref 0–0.01)
NRBC BLD-RTO: 0 PER 100 WBC
PLATELET # BLD AUTO: 144 K/UL (ref 150–400)
PMV BLD AUTO: 11.7 FL (ref 8.9–12.9)
POTASSIUM SERPL-SCNC: 4 MMOL/L (ref 3.5–5.1)
RBC # BLD AUTO: 2.54 M/UL (ref 3.8–5.2)
SERVICE CMNT-IMP: ABNORMAL
SERVICE CMNT-IMP: NORMAL
SERVICE CMNT-IMP: NORMAL
SODIUM SERPL-SCNC: 133 MMOL/L (ref 136–145)
WBC # BLD AUTO: 6 K/UL (ref 3.6–11)

## 2023-09-02 PROCEDURE — 2580000003 HC RX 258: Performed by: NURSE PRACTITIONER

## 2023-09-02 PROCEDURE — 6360000002 HC RX W HCPCS: Performed by: ANESTHESIOLOGY

## 2023-09-02 PROCEDURE — 2060000000 HC ICU INTERMEDIATE R&B

## 2023-09-02 PROCEDURE — 83735 ASSAY OF MAGNESIUM: CPT

## 2023-09-02 PROCEDURE — 80048 BASIC METABOLIC PNL TOTAL CA: CPT

## 2023-09-02 PROCEDURE — A4216 STERILE WATER/SALINE, 10 ML: HCPCS | Performed by: ANESTHESIOLOGY

## 2023-09-02 PROCEDURE — 82962 GLUCOSE BLOOD TEST: CPT

## 2023-09-02 PROCEDURE — 85027 COMPLETE CBC AUTOMATED: CPT

## 2023-09-02 PROCEDURE — 6370000000 HC RX 637 (ALT 250 FOR IP)

## 2023-09-02 PROCEDURE — C9113 INJ PANTOPRAZOLE SODIUM, VIA: HCPCS | Performed by: ANESTHESIOLOGY

## 2023-09-02 PROCEDURE — 6360000002 HC RX W HCPCS: Performed by: INTERNAL MEDICINE

## 2023-09-02 PROCEDURE — 6370000000 HC RX 637 (ALT 250 FOR IP): Performed by: INTERNAL MEDICINE

## 2023-09-02 PROCEDURE — 6370000000 HC RX 637 (ALT 250 FOR IP): Performed by: ANESTHESIOLOGY

## 2023-09-02 PROCEDURE — 36415 COLL VENOUS BLD VENIPUNCTURE: CPT

## 2023-09-02 PROCEDURE — 2580000003 HC RX 258: Performed by: ANESTHESIOLOGY

## 2023-09-02 PROCEDURE — 90935 HEMODIALYSIS ONE EVALUATION: CPT

## 2023-09-02 PROCEDURE — 83036 HEMOGLOBIN GLYCOSYLATED A1C: CPT

## 2023-09-02 PROCEDURE — 6370000000 HC RX 637 (ALT 250 FOR IP): Performed by: NURSE PRACTITIONER

## 2023-09-02 PROCEDURE — 6370000000 HC RX 637 (ALT 250 FOR IP): Performed by: HOSPITALIST

## 2023-09-02 RX ADMIN — WATER 1000 MG: 1 INJECTION INTRAMUSCULAR; INTRAVENOUS; SUBCUTANEOUS at 08:00

## 2023-09-02 RX ADMIN — SODIUM CHLORIDE, PRESERVATIVE FREE 40 MG: 5 INJECTION INTRAVENOUS at 22:40

## 2023-09-02 RX ADMIN — ESCITALOPRAM 10 MG: 10 TABLET, FILM COATED ORAL at 09:46

## 2023-09-02 RX ADMIN — GABAPENTIN 100 MG: 100 CAPSULE ORAL at 22:38

## 2023-09-02 RX ADMIN — NIFEDIPINE 30 MG: 30 TABLET, FILM COATED, EXTENDED RELEASE ORAL at 09:46

## 2023-09-02 RX ADMIN — GABAPENTIN 100 MG: 100 CAPSULE ORAL at 16:03

## 2023-09-02 RX ADMIN — HYDRALAZINE HYDROCHLORIDE 100 MG: 50 TABLET, FILM COATED ORAL at 16:03

## 2023-09-02 RX ADMIN — HYDRALAZINE HYDROCHLORIDE 100 MG: 50 TABLET, FILM COATED ORAL at 22:38

## 2023-09-02 RX ADMIN — Medication: at 09:00

## 2023-09-02 RX ADMIN — METOPROLOL TARTRATE 50 MG: 50 TABLET, FILM COATED ORAL at 09:46

## 2023-09-02 RX ADMIN — SODIUM CHLORIDE, PRESERVATIVE FREE 10 ML: 5 INJECTION INTRAVENOUS at 09:00

## 2023-09-02 RX ADMIN — Medication 2 UNITS: at 08:00

## 2023-09-02 RX ADMIN — Medication: at 22:44

## 2023-09-02 RX ADMIN — PREDNISOLONE ACETATE 1 DROP: 10 SUSPENSION/ DROPS OPHTHALMIC at 09:00

## 2023-09-02 RX ADMIN — Medication 4 UNITS: at 09:47

## 2023-09-02 RX ADMIN — Medication 100 MCG: at 09:46

## 2023-09-02 RX ADMIN — HEPARIN SODIUM 1300 UNITS: 1000 INJECTION INTRAVENOUS; SUBCUTANEOUS at 11:41

## 2023-09-02 RX ADMIN — SODIUM CHLORIDE, PRESERVATIVE FREE 40 MG: 5 INJECTION INTRAVENOUS at 09:00

## 2023-09-02 RX ADMIN — SODIUM CHLORIDE, PRESERVATIVE FREE 10 ML: 5 INJECTION INTRAVENOUS at 22:40

## 2023-09-02 RX ADMIN — HEPARIN SODIUM 1300 UNITS: 1000 INJECTION INTRAVENOUS; SUBCUTANEOUS at 11:40

## 2023-09-02 RX ADMIN — PRAVASTATIN SODIUM 40 MG: 40 TABLET ORAL at 09:47

## 2023-09-02 RX ADMIN — GABAPENTIN 100 MG: 100 CAPSULE ORAL at 09:00

## 2023-09-02 RX ADMIN — LEVOTHYROXINE SODIUM 200 MCG: 0.2 TABLET ORAL at 06:50

## 2023-09-02 RX ADMIN — PREDNISOLONE ACETATE 1 DROP: 10 SUSPENSION/ DROPS OPHTHALMIC at 22:43

## 2023-09-02 RX ADMIN — METOPROLOL TARTRATE 50 MG: 50 TABLET, FILM COATED ORAL at 22:37

## 2023-09-02 RX ADMIN — INSULIN GLARGINE 10 UNITS: 100 INJECTION, SOLUTION SUBCUTANEOUS at 22:38

## 2023-09-02 ASSESSMENT — PAIN SCALES - GENERAL
PAINLEVEL_OUTOF10: 0
PAINLEVEL_OUTOF10: 0

## 2023-09-03 LAB
ALBUMIN SERPL-MCNC: 2.2 G/DL (ref 3.5–5)
ANION GAP SERPL CALC-SCNC: 4 MMOL/L (ref 5–15)
BUN SERPL-MCNC: 18 MG/DL (ref 6–20)
BUN/CREAT SERPL: 7 (ref 12–20)
CALCIUM SERPL-MCNC: 8.2 MG/DL (ref 8.5–10.1)
CHLORIDE SERPL-SCNC: 99 MMOL/L (ref 97–108)
CO2 SERPL-SCNC: 31 MMOL/L (ref 21–32)
CREAT SERPL-MCNC: 2.7 MG/DL (ref 0.55–1.02)
ERYTHROCYTE [DISTWIDTH] IN BLOOD BY AUTOMATED COUNT: 15.2 % (ref 11.5–14.5)
GLUCOSE BLD STRIP.AUTO-MCNC: 112 MG/DL (ref 65–117)
GLUCOSE BLD STRIP.AUTO-MCNC: 135 MG/DL (ref 65–117)
GLUCOSE BLD STRIP.AUTO-MCNC: 220 MG/DL (ref 65–117)
GLUCOSE BLD STRIP.AUTO-MCNC: 245 MG/DL (ref 65–117)
GLUCOSE SERPL-MCNC: 224 MG/DL (ref 65–100)
HCT VFR BLD AUTO: 26.6 % (ref 35–47)
HGB BLD-MCNC: 8.1 G/DL (ref 11.5–16)
MAGNESIUM SERPL-MCNC: 1.8 MG/DL (ref 1.6–2.4)
MCH RBC QN AUTO: 30.7 PG (ref 26–34)
MCHC RBC AUTO-ENTMCNC: 30.5 G/DL (ref 30–36.5)
MCV RBC AUTO: 100.8 FL (ref 80–99)
NRBC # BLD: 0 K/UL (ref 0–0.01)
NRBC BLD-RTO: 0 PER 100 WBC
PHOSPHATE SERPL-MCNC: 3.7 MG/DL (ref 2.6–4.7)
PLATELET # BLD AUTO: 137 K/UL (ref 150–400)
PMV BLD AUTO: 11.6 FL (ref 8.9–12.9)
POTASSIUM SERPL-SCNC: 3.9 MMOL/L (ref 3.5–5.1)
RBC # BLD AUTO: 2.64 M/UL (ref 3.8–5.2)
SERVICE CMNT-IMP: ABNORMAL
SERVICE CMNT-IMP: NORMAL
SODIUM SERPL-SCNC: 134 MMOL/L (ref 136–145)
WBC # BLD AUTO: 6.9 K/UL (ref 3.6–11)

## 2023-09-03 PROCEDURE — 83735 ASSAY OF MAGNESIUM: CPT

## 2023-09-03 PROCEDURE — 6370000000 HC RX 637 (ALT 250 FOR IP): Performed by: NURSE PRACTITIONER

## 2023-09-03 PROCEDURE — 6370000000 HC RX 637 (ALT 250 FOR IP): Performed by: INTERNAL MEDICINE

## 2023-09-03 PROCEDURE — 6370000000 HC RX 637 (ALT 250 FOR IP): Performed by: ANESTHESIOLOGY

## 2023-09-03 PROCEDURE — A4216 STERILE WATER/SALINE, 10 ML: HCPCS | Performed by: ANESTHESIOLOGY

## 2023-09-03 PROCEDURE — 80069 RENAL FUNCTION PANEL: CPT

## 2023-09-03 PROCEDURE — 36415 COLL VENOUS BLD VENIPUNCTURE: CPT

## 2023-09-03 PROCEDURE — 6370000000 HC RX 637 (ALT 250 FOR IP)

## 2023-09-03 PROCEDURE — 2580000003 HC RX 258: Performed by: NURSE PRACTITIONER

## 2023-09-03 PROCEDURE — C9113 INJ PANTOPRAZOLE SODIUM, VIA: HCPCS | Performed by: ANESTHESIOLOGY

## 2023-09-03 PROCEDURE — 6360000002 HC RX W HCPCS: Performed by: ANESTHESIOLOGY

## 2023-09-03 PROCEDURE — 82962 GLUCOSE BLOOD TEST: CPT

## 2023-09-03 PROCEDURE — 2580000003 HC RX 258: Performed by: ANESTHESIOLOGY

## 2023-09-03 PROCEDURE — 6370000000 HC RX 637 (ALT 250 FOR IP): Performed by: HOSPITALIST

## 2023-09-03 PROCEDURE — 85027 COMPLETE CBC AUTOMATED: CPT

## 2023-09-03 PROCEDURE — 2060000000 HC ICU INTERMEDIATE R&B

## 2023-09-03 RX ADMIN — GABAPENTIN 100 MG: 100 CAPSULE ORAL at 14:36

## 2023-09-03 RX ADMIN — INSULIN GLARGINE 10 UNITS: 100 INJECTION, SOLUTION SUBCUTANEOUS at 21:24

## 2023-09-03 RX ADMIN — METOPROLOL TARTRATE 50 MG: 50 TABLET, FILM COATED ORAL at 09:08

## 2023-09-03 RX ADMIN — WATER 1000 MG: 1 INJECTION INTRAMUSCULAR; INTRAVENOUS; SUBCUTANEOUS at 09:13

## 2023-09-03 RX ADMIN — LEVOTHYROXINE SODIUM 200 MCG: 0.2 TABLET ORAL at 07:34

## 2023-09-03 RX ADMIN — Medication 4 UNITS: at 18:11

## 2023-09-03 RX ADMIN — GABAPENTIN 100 MG: 100 CAPSULE ORAL at 09:09

## 2023-09-03 RX ADMIN — Medication 4 UNITS: at 12:24

## 2023-09-03 RX ADMIN — HYDRALAZINE HYDROCHLORIDE 100 MG: 50 TABLET, FILM COATED ORAL at 21:27

## 2023-09-03 RX ADMIN — GABAPENTIN 100 MG: 100 CAPSULE ORAL at 21:26

## 2023-09-03 RX ADMIN — HYDRALAZINE HYDROCHLORIDE 100 MG: 50 TABLET, FILM COATED ORAL at 07:26

## 2023-09-03 RX ADMIN — PREDNISOLONE ACETATE 1 DROP: 10 SUSPENSION/ DROPS OPHTHALMIC at 09:05

## 2023-09-03 RX ADMIN — SODIUM CHLORIDE, PRESERVATIVE FREE 40 MG: 5 INJECTION INTRAVENOUS at 09:11

## 2023-09-03 RX ADMIN — Medication: at 09:06

## 2023-09-03 RX ADMIN — SODIUM CHLORIDE, PRESERVATIVE FREE 10 ML: 5 INJECTION INTRAVENOUS at 21:37

## 2023-09-03 RX ADMIN — METOPROLOL TARTRATE 50 MG: 50 TABLET, FILM COATED ORAL at 21:26

## 2023-09-03 RX ADMIN — PREDNISOLONE ACETATE 1 DROP: 10 SUSPENSION/ DROPS OPHTHALMIC at 21:34

## 2023-09-03 RX ADMIN — ESCITALOPRAM 10 MG: 10 TABLET, FILM COATED ORAL at 09:09

## 2023-09-03 RX ADMIN — Medication 1 UNITS: at 12:23

## 2023-09-03 RX ADMIN — Medication 4 UNITS: at 09:08

## 2023-09-03 RX ADMIN — Medication 100 MCG: at 09:09

## 2023-09-03 RX ADMIN — Medication: at 21:34

## 2023-09-03 RX ADMIN — Medication 1 UNITS: at 09:07

## 2023-09-03 RX ADMIN — SODIUM CHLORIDE, PRESERVATIVE FREE 10 ML: 5 INJECTION INTRAVENOUS at 09:20

## 2023-09-03 RX ADMIN — PRAVASTATIN SODIUM 40 MG: 40 TABLET ORAL at 09:09

## 2023-09-03 RX ADMIN — HYDRALAZINE HYDROCHLORIDE 100 MG: 50 TABLET, FILM COATED ORAL at 14:37

## 2023-09-03 RX ADMIN — NIFEDIPINE 30 MG: 30 TABLET, FILM COATED, EXTENDED RELEASE ORAL at 09:10

## 2023-09-03 RX ADMIN — SODIUM CHLORIDE, PRESERVATIVE FREE 40 MG: 5 INJECTION INTRAVENOUS at 21:26

## 2023-09-03 ASSESSMENT — PAIN SCALES - GENERAL
PAINLEVEL_OUTOF10: 0

## 2023-09-04 LAB
ALBUMIN SERPL-MCNC: 2.2 G/DL (ref 3.5–5)
ALBUMIN SERPL-MCNC: 2.4 G/DL (ref 3.5–5)
ANION GAP SERPL CALC-SCNC: 7 MMOL/L (ref 5–15)
ANION GAP SERPL CALC-SCNC: 9 MMOL/L (ref 5–15)
BASOPHILS # BLD: 0.1 K/UL (ref 0–0.1)
BASOPHILS NFR BLD: 1 % (ref 0–1)
BUN SERPL-MCNC: 29 MG/DL (ref 6–20)
BUN SERPL-MCNC: 29 MG/DL (ref 6–20)
BUN/CREAT SERPL: 7 (ref 12–20)
BUN/CREAT SERPL: 7 (ref 12–20)
CALCIUM SERPL-MCNC: 8.3 MG/DL (ref 8.5–10.1)
CALCIUM SERPL-MCNC: 8.4 MG/DL (ref 8.5–10.1)
CHLORIDE SERPL-SCNC: 95 MMOL/L (ref 97–108)
CHLORIDE SERPL-SCNC: 95 MMOL/L (ref 97–108)
CO2 SERPL-SCNC: 28 MMOL/L (ref 21–32)
CO2 SERPL-SCNC: 30 MMOL/L (ref 21–32)
CREAT SERPL-MCNC: 3.94 MG/DL (ref 0.55–1.02)
CREAT SERPL-MCNC: 3.99 MG/DL (ref 0.55–1.02)
DIFFERENTIAL METHOD BLD: ABNORMAL
EOSINOPHIL # BLD: 0.3 K/UL (ref 0–0.4)
EOSINOPHIL NFR BLD: 3 % (ref 0–7)
ERYTHROCYTE [DISTWIDTH] IN BLOOD BY AUTOMATED COUNT: 14.9 % (ref 11.5–14.5)
GLUCOSE BLD STRIP.AUTO-MCNC: 160 MG/DL (ref 65–117)
GLUCOSE BLD STRIP.AUTO-MCNC: 180 MG/DL (ref 65–117)
GLUCOSE BLD STRIP.AUTO-MCNC: 212 MG/DL (ref 65–117)
GLUCOSE BLD STRIP.AUTO-MCNC: 260 MG/DL (ref 65–117)
GLUCOSE SERPL-MCNC: 204 MG/DL (ref 65–100)
GLUCOSE SERPL-MCNC: 226 MG/DL (ref 65–100)
HCT VFR BLD AUTO: 28.1 % (ref 35–47)
HGB BLD-MCNC: 8.6 G/DL (ref 11.5–16)
IMM GRANULOCYTES # BLD AUTO: 0.2 K/UL (ref 0–0.04)
IMM GRANULOCYTES NFR BLD AUTO: 2 % (ref 0–0.5)
LYMPHOCYTES # BLD: 0.7 K/UL (ref 0.8–3.5)
LYMPHOCYTES NFR BLD: 8 % (ref 12–49)
MCH RBC QN AUTO: 31 PG (ref 26–34)
MCHC RBC AUTO-ENTMCNC: 30.6 G/DL (ref 30–36.5)
MCV RBC AUTO: 101.4 FL (ref 80–99)
MONOCYTES # BLD: 0.5 K/UL (ref 0–1)
MONOCYTES NFR BLD: 6 % (ref 5–13)
NEUTS SEG # BLD: 6.8 K/UL (ref 1.8–8)
NEUTS SEG NFR BLD: 80 % (ref 32–75)
NRBC # BLD: 0.02 K/UL (ref 0–0.01)
NRBC BLD-RTO: 0.2 PER 100 WBC
PHOSPHATE SERPL-MCNC: 5.3 MG/DL (ref 2.6–4.7)
PHOSPHATE SERPL-MCNC: 5.3 MG/DL (ref 2.6–4.7)
PLATELET # BLD AUTO: 132 K/UL (ref 150–400)
PMV BLD AUTO: 12.1 FL (ref 8.9–12.9)
POTASSIUM SERPL-SCNC: 4 MMOL/L (ref 3.5–5.1)
POTASSIUM SERPL-SCNC: 4.2 MMOL/L (ref 3.5–5.1)
RBC # BLD AUTO: 2.77 M/UL (ref 3.8–5.2)
RBC MORPH BLD: ABNORMAL
RBC MORPH BLD: ABNORMAL
SERVICE CMNT-IMP: ABNORMAL
SODIUM SERPL-SCNC: 132 MMOL/L (ref 136–145)
SODIUM SERPL-SCNC: 132 MMOL/L (ref 136–145)
WBC # BLD AUTO: 8.6 K/UL (ref 3.6–11)

## 2023-09-04 PROCEDURE — 2580000003 HC RX 258: Performed by: ANESTHESIOLOGY

## 2023-09-04 PROCEDURE — 82962 GLUCOSE BLOOD TEST: CPT

## 2023-09-04 PROCEDURE — 6370000000 HC RX 637 (ALT 250 FOR IP): Performed by: ANESTHESIOLOGY

## 2023-09-04 PROCEDURE — 85025 COMPLETE CBC W/AUTO DIFF WBC: CPT

## 2023-09-04 PROCEDURE — 80069 RENAL FUNCTION PANEL: CPT

## 2023-09-04 PROCEDURE — 36415 COLL VENOUS BLD VENIPUNCTURE: CPT

## 2023-09-04 PROCEDURE — A4216 STERILE WATER/SALINE, 10 ML: HCPCS | Performed by: ANESTHESIOLOGY

## 2023-09-04 PROCEDURE — 6370000000 HC RX 637 (ALT 250 FOR IP): Performed by: INTERNAL MEDICINE

## 2023-09-04 PROCEDURE — 6370000000 HC RX 637 (ALT 250 FOR IP): Performed by: NURSE PRACTITIONER

## 2023-09-04 PROCEDURE — C9113 INJ PANTOPRAZOLE SODIUM, VIA: HCPCS | Performed by: ANESTHESIOLOGY

## 2023-09-04 PROCEDURE — 6360000002 HC RX W HCPCS: Performed by: NURSE PRACTITIONER

## 2023-09-04 PROCEDURE — 6360000002 HC RX W HCPCS: Performed by: ANESTHESIOLOGY

## 2023-09-04 PROCEDURE — 6370000000 HC RX 637 (ALT 250 FOR IP)

## 2023-09-04 PROCEDURE — 2060000000 HC ICU INTERMEDIATE R&B

## 2023-09-04 PROCEDURE — 6370000000 HC RX 637 (ALT 250 FOR IP): Performed by: HOSPITALIST

## 2023-09-04 PROCEDURE — 2580000003 HC RX 258: Performed by: NURSE PRACTITIONER

## 2023-09-04 RX ORDER — POLYETHYLENE GLYCOL 3350 17 G/17G
17 POWDER, FOR SOLUTION ORAL DAILY PRN
Status: DISCONTINUED | OUTPATIENT
Start: 2023-09-04 | End: 2023-09-10 | Stop reason: HOSPADM

## 2023-09-04 RX ADMIN — Medication 100 MCG: at 09:12

## 2023-09-04 RX ADMIN — SODIUM CHLORIDE, PRESERVATIVE FREE 40 MG: 5 INJECTION INTRAVENOUS at 20:30

## 2023-09-04 RX ADMIN — LEVOTHYROXINE SODIUM 200 MCG: 0.2 TABLET ORAL at 05:24

## 2023-09-04 RX ADMIN — METOPROLOL TARTRATE 50 MG: 50 TABLET, FILM COATED ORAL at 09:11

## 2023-09-04 RX ADMIN — Medication: at 20:29

## 2023-09-04 RX ADMIN — PREDNISOLONE ACETATE 1 DROP: 10 SUSPENSION/ DROPS OPHTHALMIC at 09:15

## 2023-09-04 RX ADMIN — HYDRALAZINE HYDROCHLORIDE 100 MG: 50 TABLET, FILM COATED ORAL at 05:24

## 2023-09-04 RX ADMIN — GABAPENTIN 100 MG: 100 CAPSULE ORAL at 15:05

## 2023-09-04 RX ADMIN — Medication: at 09:14

## 2023-09-04 RX ADMIN — ONDANSETRON 4 MG: 2 INJECTION INTRAMUSCULAR; INTRAVENOUS at 05:24

## 2023-09-04 RX ADMIN — NIFEDIPINE 30 MG: 30 TABLET, FILM COATED, EXTENDED RELEASE ORAL at 09:12

## 2023-09-04 RX ADMIN — SODIUM CHLORIDE, PRESERVATIVE FREE 10 ML: 5 INJECTION INTRAVENOUS at 09:20

## 2023-09-04 RX ADMIN — SODIUM CHLORIDE, PRESERVATIVE FREE 40 MG: 5 INJECTION INTRAVENOUS at 09:10

## 2023-09-04 RX ADMIN — SODIUM CHLORIDE, PRESERVATIVE FREE 10 ML: 5 INJECTION INTRAVENOUS at 20:35

## 2023-09-04 RX ADMIN — PRAVASTATIN SODIUM 40 MG: 40 TABLET ORAL at 09:12

## 2023-09-04 RX ADMIN — PREDNISOLONE ACETATE 1 DROP: 10 SUSPENSION/ DROPS OPHTHALMIC at 20:29

## 2023-09-04 RX ADMIN — Medication 4 UNITS: at 09:11

## 2023-09-04 RX ADMIN — Medication 4 UNITS: at 11:49

## 2023-09-04 RX ADMIN — Medication 4 UNITS: at 17:06

## 2023-09-04 RX ADMIN — INSULIN GLARGINE 10 UNITS: 100 INJECTION, SOLUTION SUBCUTANEOUS at 20:31

## 2023-09-04 RX ADMIN — HYDRALAZINE HYDROCHLORIDE 100 MG: 50 TABLET, FILM COATED ORAL at 15:05

## 2023-09-04 RX ADMIN — GABAPENTIN 100 MG: 100 CAPSULE ORAL at 20:30

## 2023-09-04 RX ADMIN — ESCITALOPRAM 10 MG: 10 TABLET, FILM COATED ORAL at 09:12

## 2023-09-04 RX ADMIN — Medication 1 UNITS: at 17:07

## 2023-09-04 RX ADMIN — GABAPENTIN 100 MG: 100 CAPSULE ORAL at 09:11

## 2023-09-04 RX ADMIN — METOPROLOL TARTRATE 50 MG: 50 TABLET, FILM COATED ORAL at 20:30

## 2023-09-04 RX ADMIN — Medication 2 UNITS: at 11:50

## 2023-09-04 ASSESSMENT — PAIN SCALES - GENERAL
PAINLEVEL_OUTOF10: 0

## 2023-09-04 NOTE — CARE COORDINATION
ANGELES:       CM notified that the patient will need OP HD, pt/ot recommend SNF. CM sent referral to [facility].      Erasto Kate, [address] Management Department  LK:[phone]

## 2023-09-05 LAB
ALBUMIN SERPL-MCNC: 2.5 G/DL (ref 3.5–5)
ANION GAP SERPL CALC-SCNC: 9 MMOL/L (ref 5–15)
BASOPHILS # BLD: 0 K/UL (ref 0–0.1)
BASOPHILS NFR BLD: 0 % (ref 0–1)
BUN SERPL-MCNC: 33 MG/DL (ref 6–20)
BUN/CREAT SERPL: 7 (ref 12–20)
CALCIUM SERPL-MCNC: 8.4 MG/DL (ref 8.5–10.1)
CHLORIDE SERPL-SCNC: 94 MMOL/L (ref 97–108)
CO2 SERPL-SCNC: 27 MMOL/L (ref 21–32)
CREAT SERPL-MCNC: 4.69 MG/DL (ref 0.55–1.02)
DIFFERENTIAL METHOD BLD: ABNORMAL
EOSINOPHIL # BLD: 0.2 K/UL (ref 0–0.4)
EOSINOPHIL NFR BLD: 3 % (ref 0–7)
ERYTHROCYTE [DISTWIDTH] IN BLOOD BY AUTOMATED COUNT: 15.2 % (ref 11.5–14.5)
GLUCOSE BLD STRIP.AUTO-MCNC: 134 MG/DL (ref 65–117)
GLUCOSE BLD STRIP.AUTO-MCNC: 199 MG/DL (ref 65–117)
GLUCOSE BLD STRIP.AUTO-MCNC: 272 MG/DL (ref 65–117)
GLUCOSE SERPL-MCNC: 178 MG/DL (ref 65–100)
HCT VFR BLD AUTO: 31.5 % (ref 35–47)
HGB BLD-MCNC: 9.3 G/DL (ref 11.5–16)
IMM GRANULOCYTES # BLD AUTO: 0 K/UL
IMM GRANULOCYTES NFR BLD AUTO: 0 %
LYMPHOCYTES # BLD: 0.8 K/UL (ref 0.8–3.5)
LYMPHOCYTES NFR BLD: 11 % (ref 12–49)
MCH RBC QN AUTO: 31.4 PG (ref 26–34)
MCHC RBC AUTO-ENTMCNC: 29.5 G/DL (ref 30–36.5)
MCV RBC AUTO: 106.4 FL (ref 80–99)
MONOCYTES # BLD: 0.5 K/UL (ref 0–1)
MONOCYTES NFR BLD: 6 % (ref 5–13)
NEUTS BAND NFR BLD MANUAL: 4 % (ref 0–6)
NEUTS SEG # BLD: 6 K/UL (ref 1.8–8)
NEUTS SEG NFR BLD: 76 % (ref 32–75)
NRBC # BLD: 0 K/UL (ref 0–0.01)
NRBC BLD-RTO: 0 PER 100 WBC
PHOSPHATE SERPL-MCNC: 6.2 MG/DL (ref 2.6–4.7)
PLATELET # BLD AUTO: 114 K/UL (ref 150–400)
PMV BLD AUTO: 11.9 FL (ref 8.9–12.9)
POTASSIUM SERPL-SCNC: 4.5 MMOL/L (ref 3.5–5.1)
RBC # BLD AUTO: 2.96 M/UL (ref 3.8–5.2)
RBC MORPH BLD: ABNORMAL
RBC MORPH BLD: ABNORMAL
SERVICE CMNT-IMP: ABNORMAL
SODIUM SERPL-SCNC: 130 MMOL/L (ref 136–145)
WBC # BLD AUTO: 7.5 K/UL (ref 3.6–11)

## 2023-09-05 PROCEDURE — 80069 RENAL FUNCTION PANEL: CPT

## 2023-09-05 PROCEDURE — 85025 COMPLETE CBC W/AUTO DIFF WBC: CPT

## 2023-09-05 PROCEDURE — 2580000003 HC RX 258: Performed by: NURSE PRACTITIONER

## 2023-09-05 PROCEDURE — C9113 INJ PANTOPRAZOLE SODIUM, VIA: HCPCS | Performed by: ANESTHESIOLOGY

## 2023-09-05 PROCEDURE — 82962 GLUCOSE BLOOD TEST: CPT

## 2023-09-05 PROCEDURE — 2060000000 HC ICU INTERMEDIATE R&B

## 2023-09-05 PROCEDURE — 6370000000 HC RX 637 (ALT 250 FOR IP): Performed by: NURSE PRACTITIONER

## 2023-09-05 PROCEDURE — 2700000000 HC OXYGEN THERAPY PER DAY

## 2023-09-05 PROCEDURE — A4216 STERILE WATER/SALINE, 10 ML: HCPCS | Performed by: ANESTHESIOLOGY

## 2023-09-05 PROCEDURE — 36415 COLL VENOUS BLD VENIPUNCTURE: CPT

## 2023-09-05 PROCEDURE — 6370000000 HC RX 637 (ALT 250 FOR IP): Performed by: ANESTHESIOLOGY

## 2023-09-05 PROCEDURE — 2580000003 HC RX 258: Performed by: ANESTHESIOLOGY

## 2023-09-05 PROCEDURE — 90935 HEMODIALYSIS ONE EVALUATION: CPT

## 2023-09-05 PROCEDURE — 6360000002 HC RX W HCPCS: Performed by: INTERNAL MEDICINE

## 2023-09-05 PROCEDURE — 6370000000 HC RX 637 (ALT 250 FOR IP)

## 2023-09-05 PROCEDURE — 99231 SBSQ HOSP IP/OBS SF/LOW 25: CPT

## 2023-09-05 PROCEDURE — 6360000002 HC RX W HCPCS: Performed by: ANESTHESIOLOGY

## 2023-09-05 PROCEDURE — 6370000000 HC RX 637 (ALT 250 FOR IP): Performed by: INTERNAL MEDICINE

## 2023-09-05 PROCEDURE — 94760 N-INVAS EAR/PLS OXIMETRY 1: CPT

## 2023-09-05 RX ORDER — INSULIN GLARGINE 100 [IU]/ML
14 INJECTION, SOLUTION SUBCUTANEOUS NIGHTLY
Status: DISCONTINUED | OUTPATIENT
Start: 2023-09-05 | End: 2023-09-07

## 2023-09-05 RX ORDER — INSULIN LISPRO 100 [IU]/ML
6 INJECTION, SOLUTION INTRAVENOUS; SUBCUTANEOUS
Status: DISCONTINUED | OUTPATIENT
Start: 2023-09-05 | End: 2023-09-10 | Stop reason: HOSPADM

## 2023-09-05 RX ADMIN — ESCITALOPRAM 10 MG: 10 TABLET, FILM COATED ORAL at 09:21

## 2023-09-05 RX ADMIN — HEPARIN SODIUM 1300 UNITS: 1000 INJECTION INTRAVENOUS; SUBCUTANEOUS at 18:41

## 2023-09-05 RX ADMIN — NIFEDIPINE 30 MG: 30 TABLET, FILM COATED, EXTENDED RELEASE ORAL at 09:21

## 2023-09-05 RX ADMIN — HYDRALAZINE HYDROCHLORIDE 100 MG: 50 TABLET, FILM COATED ORAL at 05:13

## 2023-09-05 RX ADMIN — Medication 2 UNITS: at 13:59

## 2023-09-05 RX ADMIN — Medication 6 UNITS: at 09:46

## 2023-09-05 RX ADMIN — LEVOTHYROXINE SODIUM 200 MCG: 0.2 TABLET ORAL at 05:14

## 2023-09-05 RX ADMIN — SODIUM CHLORIDE, PRESERVATIVE FREE 20 ML: 5 INJECTION INTRAVENOUS at 09:24

## 2023-09-05 RX ADMIN — Medication 6 UNITS: at 14:00

## 2023-09-05 RX ADMIN — METOPROLOL TARTRATE 50 MG: 50 TABLET, FILM COATED ORAL at 09:21

## 2023-09-05 RX ADMIN — GABAPENTIN 100 MG: 100 CAPSULE ORAL at 14:00

## 2023-09-05 RX ADMIN — GABAPENTIN 100 MG: 100 CAPSULE ORAL at 09:00

## 2023-09-05 RX ADMIN — SODIUM CHLORIDE, PRESERVATIVE FREE 40 MG: 5 INJECTION INTRAVENOUS at 09:24

## 2023-09-05 RX ADMIN — SODIUM CHLORIDE, PRESERVATIVE FREE 10 ML: 5 INJECTION INTRAVENOUS at 20:06

## 2023-09-05 RX ADMIN — HEPARIN SODIUM 1300 UNITS: 1000 INJECTION INTRAVENOUS; SUBCUTANEOUS at 18:42

## 2023-09-05 ASSESSMENT — PAIN SCALES - GENERAL: PAINLEVEL_OUTOF10: 0

## 2023-09-05 NOTE — CARE COORDINATION
Transition of Care Plan:    Accepted at [facility] in [facility]  for TTS  HD. First appointment is at 5:30 am,  then at 5:45 am    CM to call [facility] of discharge date at [phone] ext. [phone]    RUR: 25%  Prior Level of Functioning: Needs assistance  Disposition: SNF. Referral sent to [facility], [address] [facility] and [facility], awaiting response    Referral sent to [address]    Patient will need an auth prior to discharge    New HD at [facility], days and times TBD    Maia Shields. Yusra Rodrigez at [phone], fax # [phone]    Faxed CXR report to [facility] at [phone]    If SNF or IPR: Date FOC offered: 9/5/23  Date FOC received: 9/5/23  Accepting facility: [facility]  Date authorization started with reference number:   Date authorization received and expires: Follow up appointments: with PCP/Specialist     DME needed:   Transportation at discharge: BLS vs W/C [place] [facility]  IM/IMM Medicare/ letter given:   Caregiver Contact:   Discharge Caregiver contacted prior to discharge? Barriers to discharge: SNF placement    CM called [facility] and spoke with Michaela Crawley regarding referral and provided information requested. Faxed CR report as requested. CM met with patient to discuss discharge planning. She was receptive of SNF placement and gave choices based on previous experiences. CM will continue to follow for ANGELES    Y. Blake Hooker MSA, RN, CM    12:38 pm. CM called patient's partner Charity Love regarding more SNF choices as requested by the patient. Mona Staff, MARISELA, RN, CM    3:03 pm. JEWEL spoke with Tom Deras, she [facility]. JEWEL also [facility] regarding [facility] to [facility]. She is aware of the need for outpatient HD as well.   Mona Staff, MARISELA, RN, CM

## 2023-09-06 ENCOUNTER — APPOINTMENT (OUTPATIENT)
Facility: HOSPITAL | Age: 73
DRG: 871 | End: 2023-09-06
Payer: MEDICARE

## 2023-09-06 LAB
GLUCOSE BLD STRIP.AUTO-MCNC: 157 MG/DL (ref 65–117)
GLUCOSE BLD STRIP.AUTO-MCNC: 160 MG/DL (ref 65–117)
GLUCOSE BLD STRIP.AUTO-MCNC: 213 MG/DL (ref 65–117)
GLUCOSE BLD STRIP.AUTO-MCNC: 231 MG/DL (ref 65–117)
SERVICE CMNT-IMP: ABNORMAL

## 2023-09-06 PROCEDURE — 82962 GLUCOSE BLOOD TEST: CPT

## 2023-09-06 PROCEDURE — A4216 STERILE WATER/SALINE, 10 ML: HCPCS | Performed by: ANESTHESIOLOGY

## 2023-09-06 PROCEDURE — 2709999900 IR INSERT TUNNELED CV CATH WO SQ PORT/PUMP < 5YRS

## 2023-09-06 PROCEDURE — 97530 THERAPEUTIC ACTIVITIES: CPT

## 2023-09-06 PROCEDURE — 2060000000 HC ICU INTERMEDIATE R&B

## 2023-09-06 PROCEDURE — 6360000002 HC RX W HCPCS: Performed by: NURSE PRACTITIONER

## 2023-09-06 PROCEDURE — 6370000000 HC RX 637 (ALT 250 FOR IP): Performed by: ANESTHESIOLOGY

## 2023-09-06 PROCEDURE — 6370000000 HC RX 637 (ALT 250 FOR IP): Performed by: NURSE PRACTITIONER

## 2023-09-06 PROCEDURE — 6360000002 HC RX W HCPCS

## 2023-09-06 PROCEDURE — 2580000003 HC RX 258: Performed by: NURSE PRACTITIONER

## 2023-09-06 PROCEDURE — 2700000000 HC OXYGEN THERAPY PER DAY

## 2023-09-06 PROCEDURE — 2580000003 HC RX 258: Performed by: ANESTHESIOLOGY

## 2023-09-06 PROCEDURE — 6370000000 HC RX 637 (ALT 250 FOR IP): Performed by: INTERNAL MEDICINE

## 2023-09-06 PROCEDURE — 97535 SELF CARE MNGMENT TRAINING: CPT

## 2023-09-06 PROCEDURE — 6370000000 HC RX 637 (ALT 250 FOR IP)

## 2023-09-06 PROCEDURE — C9113 INJ PANTOPRAZOLE SODIUM, VIA: HCPCS | Performed by: ANESTHESIOLOGY

## 2023-09-06 PROCEDURE — 94760 N-INVAS EAR/PLS OXIMETRY 1: CPT

## 2023-09-06 PROCEDURE — 6360000002 HC RX W HCPCS: Performed by: ANESTHESIOLOGY

## 2023-09-06 PROCEDURE — 2500000003 HC RX 250 WO HCPCS

## 2023-09-06 PROCEDURE — 2580000003 HC RX 258

## 2023-09-06 RX ORDER — LIDOCAINE HYDROCHLORIDE 10 MG/ML
INJECTION, SOLUTION EPIDURAL; INFILTRATION; INTRACAUDAL; PERINEURAL PRN
Status: DISCONTINUED | OUTPATIENT
Start: 2023-09-06 | End: 2023-09-10 | Stop reason: HOSPADM

## 2023-09-06 RX ADMIN — HYDRALAZINE HYDROCHLORIDE 100 MG: 50 TABLET, FILM COATED ORAL at 21:08

## 2023-09-06 RX ADMIN — NIFEDIPINE 30 MG: 30 TABLET, FILM COATED, EXTENDED RELEASE ORAL at 09:17

## 2023-09-06 RX ADMIN — GABAPENTIN 100 MG: 100 CAPSULE ORAL at 21:04

## 2023-09-06 RX ADMIN — WATER 2000 MG: 1 INJECTION INTRAMUSCULAR; INTRAVENOUS; SUBCUTANEOUS at 13:15

## 2023-09-06 RX ADMIN — SODIUM CHLORIDE, PRESERVATIVE FREE 40 MG: 5 INJECTION INTRAVENOUS at 00:02

## 2023-09-06 RX ADMIN — INSULIN GLARGINE 14 UNITS: 100 INJECTION, SOLUTION SUBCUTANEOUS at 00:01

## 2023-09-06 RX ADMIN — Medication 100 MCG: at 08:56

## 2023-09-06 RX ADMIN — SODIUM CHLORIDE, PRESERVATIVE FREE 10 ML: 5 INJECTION INTRAVENOUS at 21:05

## 2023-09-06 RX ADMIN — SODIUM CHLORIDE, PRESERVATIVE FREE 40 MG: 5 INJECTION INTRAVENOUS at 08:55

## 2023-09-06 RX ADMIN — ESCITALOPRAM 10 MG: 10 TABLET, FILM COATED ORAL at 08:56

## 2023-09-06 RX ADMIN — Medication: at 08:57

## 2023-09-06 RX ADMIN — GABAPENTIN 100 MG: 100 CAPSULE ORAL at 08:56

## 2023-09-06 RX ADMIN — LEVOTHYROXINE SODIUM 200 MCG: 0.2 TABLET ORAL at 07:11

## 2023-09-06 RX ADMIN — SODIUM CHLORIDE, PRESERVATIVE FREE 40 MG: 5 INJECTION INTRAVENOUS at 21:04

## 2023-09-06 RX ADMIN — HYDRALAZINE HYDROCHLORIDE 100 MG: 50 TABLET, FILM COATED ORAL at 00:02

## 2023-09-06 RX ADMIN — METOPROLOL TARTRATE 50 MG: 50 TABLET, FILM COATED ORAL at 00:02

## 2023-09-06 RX ADMIN — LIDOCAINE HYDROCHLORIDE 18 ML: 10 INJECTION, SOLUTION EPIDURAL; INFILTRATION; INTRACAUDAL; PERINEURAL at 13:36

## 2023-09-06 RX ADMIN — HYDRALAZINE HYDROCHLORIDE 100 MG: 50 TABLET, FILM COATED ORAL at 07:11

## 2023-09-06 RX ADMIN — METOPROLOL TARTRATE 50 MG: 50 TABLET, FILM COATED ORAL at 09:17

## 2023-09-06 RX ADMIN — PRAVASTATIN SODIUM 40 MG: 40 TABLET ORAL at 08:55

## 2023-09-06 RX ADMIN — Medication 1 UNITS: at 17:18

## 2023-09-06 RX ADMIN — PREDNISOLONE ACETATE 1 DROP: 10 SUSPENSION/ DROPS OPHTHALMIC at 00:04

## 2023-09-06 RX ADMIN — GABAPENTIN 100 MG: 100 CAPSULE ORAL at 14:59

## 2023-09-06 RX ADMIN — SODIUM CHLORIDE, PRESERVATIVE FREE 10 ML: 5 INJECTION INTRAVENOUS at 08:25

## 2023-09-06 RX ADMIN — METOPROLOL TARTRATE 50 MG: 50 TABLET, FILM COATED ORAL at 21:08

## 2023-09-06 RX ADMIN — EPOETIN ALFA-EPBX 3000 UNITS: 4000 INJECTION, SOLUTION INTRAVENOUS; SUBCUTANEOUS at 00:00

## 2023-09-06 RX ADMIN — HYDRALAZINE HYDROCHLORIDE 100 MG: 50 TABLET, FILM COATED ORAL at 15:00

## 2023-09-06 RX ADMIN — Medication: at 00:04

## 2023-09-06 RX ADMIN — INSULIN GLARGINE 14 UNITS: 100 INJECTION, SOLUTION SUBCUTANEOUS at 21:03

## 2023-09-06 RX ADMIN — PREDNISOLONE ACETATE 1 DROP: 10 SUSPENSION/ DROPS OPHTHALMIC at 08:58

## 2023-09-06 RX ADMIN — Medication 6 UNITS: at 17:49

## 2023-09-06 RX ADMIN — GABAPENTIN 100 MG: 100 CAPSULE ORAL at 00:02

## 2023-09-06 RX ADMIN — PREDNISOLONE ACETATE 1 DROP: 10 SUSPENSION/ DROPS OPHTHALMIC at 20:56

## 2023-09-06 RX ADMIN — Medication: at 20:42

## 2023-09-06 ASSESSMENT — PULMONARY FUNCTION TESTS
PIF_VALUE: 0

## 2023-09-06 ASSESSMENT — PAIN SCALES - GENERAL
PAINLEVEL_OUTOF10: 0

## 2023-09-06 NOTE — CARE COORDINATION
Transition of Care Plan:     Accepted at [facility] in [facility]  for TTS  HD. First appointment is at 5:30 am,  then at 5:45 am     CM to call [facility] of discharge date at [phone] ext. [phone]     RUR: 25%  Prior Level of Functioning: Needs assistance    Disposition: SNF. [facility] accepted      [facility]-Out of network  [place]     Patient will need an auth prior to discharge     New HD at [facility], days and times TBD     Karen Plummer at [phone], fax # [phone]     Faxed CXR report to [facility] at [phone]    CM noted from Epic that patient had been accepted at [facility].  Genaro Crawley MSA, RN, CM

## 2023-09-06 NOTE — CARE COORDINATION
Transition of Care Plan:    Awaiting auth    Patient decided to go to [facility] due to in house HD instead of [facility] [place].    Cm to notify [facility]  at [phone] ext. [phone] of change in location      [facility] changed chair time to 11 am instead of 5:30 am    Referral and clinical notes including labs and hep B panel faxed to Marina at [phone]. Accepted at [facility] in [place]  for TTS  HD. First appointment is at 5:30 am,  then at 5:45 am     CM to call [facility] of discharge date at [phone] ext. [phone]     RUR: 25%  Prior Level of Functioning: Needs assistance   Disposition: SNF. [facility] -accepted    Patient will need an auth prior to discharge      Tiffany Jasso. Yaw Martínez at [phone], fax # [phone] cath placement -9/6/23      Transition of Care Plan:  If SNF or IPR: Date FOC offered: 9/5/23  Date [address] received: 9/5/23  Accepting facility: [facility]  Date authorization started with reference number: 9/6/23  Date authorization received and expires: tbd  Follow up appointments:   Caregiver Contact:   Discharge Caregiver contacted prior to discharge? Care Conference needed? No  Barriers to discharge: Auth    JEWEL spoke with patient's partner Tamela Ortega regarding acceptance at [facility] and HD scheduled chair time. She didn't think 5:30 am in the morning will work for patient. CM will contact [facility] for another time. JEWEL spoke with Yaw Martínez with [facility], she said she would contact [facility] in [place] to see if they had a better time for TTS HD. JEWEL called Marina with [facility]. and asked her to start auth today. Luis Contreras MSA, RN, CM     JESÚS Bardales MSA, RN, CM

## 2023-09-06 NOTE — WOUND CARE
WOCN Note:     Follow up visit for sacrum & heels. Seen in 361    Chart shows:  Admitted on 8/28/23. Admitted for GIB, melena; CRRT & on pressor support. History of DM. Assessment:   Conversational sitting in recliner. Surface: P500 air mattress    Right heel intact with no erythema. POA, Left heel stage 2 pressure injury = 1.2 x 2 x 0 cm; flat, dry blood blister. Buttocks and sacrum intact with chaffing and slow-to-octavia erythema; venelex already in use. Right lateral buttock has a non-blanching burgundy line. Venelex to all areas. Sacral foam in use  No concerns. Wound Recommendations:    Continue venelex to heels, sacrum and right hip/buttock twice daily. PI Prevention:  Turn/reposition approximately every 2 hours  Offload heels with heels hanging off end of pillow at all times while in bed. Sacral Foam dressing: lift to assess regularly; change as needed. Discontinue if incontinence is frequently soiling dressing. Low Air Loss mattress: Use only flat sheet and one incontinence pad. Discussed with RN. No wound care needs - will sign off.      Tremayne Alvares, EMILYN, RN, [facility]  Certified Wound, Ostomy, Continence Nurse  office [phone]  Available via [employer]

## 2023-09-07 ENCOUNTER — APPOINTMENT (OUTPATIENT)
Facility: HOSPITAL | Age: 73
DRG: 871 | End: 2023-09-07
Payer: MEDICARE

## 2023-09-07 LAB
ALBUMIN SERPL-MCNC: 2.4 G/DL (ref 3.5–5)
ANION GAP SERPL CALC-SCNC: 5 MMOL/L (ref 5–15)
ARTERIAL PATENCY WRIST A: ABNORMAL
ARTERIAL PATENCY WRIST A: POSITIVE
BASE EXCESS BLD CALC-SCNC: 1.5 MMOL/L
BASE EXCESS BLD CALC-SCNC: 2.2 MMOL/L
BASOPHILS # BLD: 0.1 K/UL (ref 0–0.1)
BASOPHILS NFR BLD: 1 % (ref 0–1)
BDY SITE: ABNORMAL
BDY SITE: ABNORMAL
BUN SERPL-MCNC: 22 MG/DL (ref 6–20)
BUN/CREAT SERPL: 7 (ref 12–20)
CALCIUM SERPL-MCNC: 7.9 MG/DL (ref 8.5–10.1)
CHLORIDE SERPL-SCNC: 94 MMOL/L (ref 97–108)
CO2 SERPL-SCNC: 34 MMOL/L (ref 21–32)
CREAT SERPL-MCNC: 3.26 MG/DL (ref 0.55–1.02)
DIFFERENTIAL METHOD BLD: ABNORMAL
EOSINOPHIL # BLD: 0.1 K/UL (ref 0–0.4)
EOSINOPHIL NFR BLD: 2 % (ref 0–7)
ERYTHROCYTE [DISTWIDTH] IN BLOOD BY AUTOMATED COUNT: 15 % (ref 11.5–14.5)
GAS FLOW.O2 O2 DELIVERY SYS: ABNORMAL
GAS FLOW.O2 O2 DELIVERY SYS: ABNORMAL
GAS FLOW.O2 SETTING OXYMISER: 24 BPM
GLUCOSE BLD STRIP.AUTO-MCNC: 108 MG/DL (ref 65–117)
GLUCOSE BLD STRIP.AUTO-MCNC: 132 MG/DL (ref 65–117)
GLUCOSE BLD STRIP.AUTO-MCNC: 157 MG/DL (ref 65–117)
GLUCOSE BLD STRIP.AUTO-MCNC: 218 MG/DL (ref 65–117)
GLUCOSE BLD STRIP.AUTO-MCNC: 88 MG/DL (ref 65–117)
GLUCOSE SERPL-MCNC: 158 MG/DL (ref 65–100)
HCO3 BLD-SCNC: 29.4 MMOL/L (ref 22–26)
HCO3 BLD-SCNC: 30.7 MMOL/L (ref 22–26)
HCT VFR BLD AUTO: 25 % (ref 35–47)
HGB BLD-MCNC: 7.7 G/DL (ref 11.5–16)
IMM GRANULOCYTES # BLD AUTO: 0.1 K/UL (ref 0–0.04)
IMM GRANULOCYTES NFR BLD AUTO: 2 % (ref 0–0.5)
IPAP/PIP/HIGH PEEP: 14
LYMPHOCYTES # BLD: 0.4 K/UL (ref 0.8–3.5)
LYMPHOCYTES NFR BLD: 6 % (ref 12–49)
MCH RBC QN AUTO: 31.3 PG (ref 26–34)
MCHC RBC AUTO-ENTMCNC: 30.8 G/DL (ref 30–36.5)
MCV RBC AUTO: 101.6 FL (ref 80–99)
MONOCYTES # BLD: 0.5 K/UL (ref 0–1)
MONOCYTES NFR BLD: 7 % (ref 5–13)
NEUTS SEG # BLD: 6.1 K/UL (ref 1.8–8)
NEUTS SEG NFR BLD: 82 % (ref 32–75)
NRBC # BLD: 0 K/UL (ref 0–0.01)
NRBC BLD-RTO: 0 PER 100 WBC
O2/TOTAL GAS SETTING VFR VENT: 30 %
O2/TOTAL GAS SETTING VFR VENT: 7 %
PCO2 BLD: 59.9 MMHG (ref 35–45)
PCO2 BLD: 72.6 MMHG (ref 35–45)
PEEP RESPIRATORY: 7 CMH2O
PH BLD: 7.23 (ref 7.35–7.45)
PH BLD: 7.3 (ref 7.35–7.45)
PHOSPHATE SERPL-MCNC: 3.6 MG/DL (ref 2.6–4.7)
PLATELET # BLD AUTO: 123 K/UL (ref 150–400)
PMV BLD AUTO: 12.7 FL (ref 8.9–12.9)
PO2 BLD: 69 MMHG (ref 80–100)
PO2 BLD: 71 MMHG (ref 80–100)
POTASSIUM SERPL-SCNC: 3.7 MMOL/L (ref 3.5–5.1)
RBC # BLD AUTO: 2.46 M/UL (ref 3.8–5.2)
RBC MORPH BLD: ABNORMAL
RBC MORPH BLD: ABNORMAL
SAO2 % BLD: 88.9 % (ref 92–97)
SAO2 % BLD: 91.5 % (ref 92–97)
SERVICE CMNT-IMP: ABNORMAL
SERVICE CMNT-IMP: NORMAL
SERVICE CMNT-IMP: NORMAL
SODIUM SERPL-SCNC: 133 MMOL/L (ref 136–145)
SPECIMEN TYPE: ABNORMAL
SPECIMEN TYPE: ABNORMAL
VENTILATION MODE VENT: ABNORMAL
VT SETTING VENT: 480 ML
WBC # BLD AUTO: 7.3 K/UL (ref 3.6–11)

## 2023-09-07 PROCEDURE — 6370000000 HC RX 637 (ALT 250 FOR IP): Performed by: INTERNAL MEDICINE

## 2023-09-07 PROCEDURE — 2580000003 HC RX 258: Performed by: ANESTHESIOLOGY

## 2023-09-07 PROCEDURE — C9113 INJ PANTOPRAZOLE SODIUM, VIA: HCPCS | Performed by: ANESTHESIOLOGY

## 2023-09-07 PROCEDURE — A4216 STERILE WATER/SALINE, 10 ML: HCPCS | Performed by: ANESTHESIOLOGY

## 2023-09-07 PROCEDURE — 82803 BLOOD GASES ANY COMBINATION: CPT

## 2023-09-07 PROCEDURE — 6370000000 HC RX 637 (ALT 250 FOR IP): Performed by: NURSE PRACTITIONER

## 2023-09-07 PROCEDURE — 2580000003 HC RX 258: Performed by: NURSE PRACTITIONER

## 2023-09-07 PROCEDURE — 36415 COLL VENOUS BLD VENIPUNCTURE: CPT

## 2023-09-07 PROCEDURE — 6360000002 HC RX W HCPCS: Performed by: ANESTHESIOLOGY

## 2023-09-07 PROCEDURE — 71045 X-RAY EXAM CHEST 1 VIEW: CPT

## 2023-09-07 PROCEDURE — 80069 RENAL FUNCTION PANEL: CPT

## 2023-09-07 PROCEDURE — 90935 HEMODIALYSIS ONE EVALUATION: CPT

## 2023-09-07 PROCEDURE — 36600 WITHDRAWAL OF ARTERIAL BLOOD: CPT

## 2023-09-07 PROCEDURE — 6370000000 HC RX 637 (ALT 250 FOR IP): Performed by: ANESTHESIOLOGY

## 2023-09-07 PROCEDURE — 5A09357 ASSISTANCE WITH RESPIRATORY VENTILATION, LESS THAN 24 CONSECUTIVE HOURS, CONTINUOUS POSITIVE AIRWAY PRESSURE: ICD-10-PCS | Performed by: INTERNAL MEDICINE

## 2023-09-07 PROCEDURE — 6370000000 HC RX 637 (ALT 250 FOR IP)

## 2023-09-07 PROCEDURE — 6360000002 HC RX W HCPCS: Performed by: NURSE PRACTITIONER

## 2023-09-07 PROCEDURE — 2060000000 HC ICU INTERMEDIATE R&B

## 2023-09-07 PROCEDURE — 99231 SBSQ HOSP IP/OBS SF/LOW 25: CPT

## 2023-09-07 PROCEDURE — P9047 ALBUMIN (HUMAN), 25%, 50ML: HCPCS | Performed by: NURSE PRACTITIONER

## 2023-09-07 PROCEDURE — 82962 GLUCOSE BLOOD TEST: CPT

## 2023-09-07 PROCEDURE — 85025 COMPLETE CBC W/AUTO DIFF WBC: CPT

## 2023-09-07 RX ORDER — ALBUMIN (HUMAN) 12.5 G/50ML
25 SOLUTION INTRAVENOUS AS NEEDED
Status: DISCONTINUED | OUTPATIENT
Start: 2023-09-07 | End: 2023-09-10 | Stop reason: HOSPADM

## 2023-09-07 RX ORDER — INSULIN GLARGINE 100 [IU]/ML
16 INJECTION, SOLUTION SUBCUTANEOUS NIGHTLY
Status: DISCONTINUED | OUTPATIENT
Start: 2023-09-07 | End: 2023-09-08

## 2023-09-07 RX ORDER — GABAPENTIN 100 MG/1
100 CAPSULE ORAL
Status: DISCONTINUED | OUTPATIENT
Start: 2023-09-09 | End: 2023-09-09

## 2023-09-07 RX ADMIN — ALBUMIN (HUMAN) 12.5 G: 0.25 INJECTION, SOLUTION INTRAVENOUS at 16:59

## 2023-09-07 RX ADMIN — Medication 1 UNITS: at 09:02

## 2023-09-07 RX ADMIN — ESCITALOPRAM 10 MG: 10 TABLET, FILM COATED ORAL at 09:02

## 2023-09-07 RX ADMIN — SODIUM CHLORIDE, PRESERVATIVE FREE 10 ML: 5 INJECTION INTRAVENOUS at 09:03

## 2023-09-07 RX ADMIN — SODIUM CHLORIDE, PRESERVATIVE FREE 40 MG: 5 INJECTION INTRAVENOUS at 09:02

## 2023-09-07 RX ADMIN — HYDRALAZINE HYDROCHLORIDE 100 MG: 50 TABLET, FILM COATED ORAL at 21:47

## 2023-09-07 RX ADMIN — Medication: at 09:00

## 2023-09-07 RX ADMIN — PREDNISOLONE ACETATE 1 DROP: 10 SUSPENSION/ DROPS OPHTHALMIC at 21:53

## 2023-09-07 RX ADMIN — METOPROLOL TARTRATE 50 MG: 50 TABLET, FILM COATED ORAL at 21:47

## 2023-09-07 RX ADMIN — Medication 100 MCG: at 09:02

## 2023-09-07 RX ADMIN — SODIUM CHLORIDE, PRESERVATIVE FREE 40 MG: 5 INJECTION INTRAVENOUS at 21:53

## 2023-09-07 RX ADMIN — HYDRALAZINE HYDROCHLORIDE 100 MG: 50 TABLET, FILM COATED ORAL at 06:55

## 2023-09-07 RX ADMIN — HEPARIN SODIUM 1900 UNITS: 1000 INJECTION INTRAVENOUS; SUBCUTANEOUS at 18:26

## 2023-09-07 RX ADMIN — PREDNISOLONE ACETATE 1 DROP: 10 SUSPENSION/ DROPS OPHTHALMIC at 09:01

## 2023-09-07 RX ADMIN — ALBUMIN (HUMAN) 12.5 G: 0.25 INJECTION, SOLUTION INTRAVENOUS at 15:43

## 2023-09-07 RX ADMIN — SODIUM CHLORIDE, PRESERVATIVE FREE 10 ML: 5 INJECTION INTRAVENOUS at 21:58

## 2023-09-07 RX ADMIN — GABAPENTIN 100 MG: 100 CAPSULE ORAL at 09:02

## 2023-09-07 RX ADMIN — PRAVASTATIN SODIUM 40 MG: 40 TABLET ORAL at 09:02

## 2023-09-07 RX ADMIN — LEVOTHYROXINE SODIUM 200 MCG: 0.2 TABLET ORAL at 06:55

## 2023-09-07 RX ADMIN — EPOETIN ALFA-EPBX 3000 UNITS: 4000 INJECTION, SOLUTION INTRAVENOUS; SUBCUTANEOUS at 21:54

## 2023-09-07 RX ADMIN — HEPARIN SODIUM 1900 UNITS: 1000 INJECTION INTRAVENOUS; SUBCUTANEOUS at 18:27

## 2023-09-07 RX ADMIN — Medication: at 21:47

## 2023-09-07 RX ADMIN — Medication 6 UNITS: at 09:03

## 2023-09-07 NOTE — CARE COORDINATION
Transition of Care Plan:     Awaiting [facility] has accepted. Cm to notify [facility]  at [phone] ext. [phone] of change in location      [facility] changed chair time to 11 am.  First appointment is 9/12/2023. Ms. Lobito Dominguez needs to arrive at 10:30 am.       Referral and clinical notes including labs and hep B panel faxed to Marina at [phone]. Accepted at [facility] in [place]  for TTS  HD. First appointment is at 10:30 am,  then at 11:00 am     CM to call [facility] of discharge date at [phone] ext. [phone]     RUR: 25%  Prior Level of Functioning: Needs assistance   Disposition: SNF; [facility]. Patient will need an auth prior to discharge      Per Fink Mesfin Face at [phone], ext [phone]  fax # [phone] cath placement -9/6/23      Transition of Care Plan:  If SNF or IPR: Date FOC offered: 9/5/23  Date FOC received: 9/5/23  Accepting facility: [address] [place] [address]  Date authorization started with reference number: 9/6/23  Date authorization received and expires: tbd  Follow up appointments:   Caregiver Contact:   Discharge Caregiver contacted prior to discharge? Care Conference needed? No  Barriers to discharge: [address] called from [facility]. Ms. Lobito Dominguez chair time is 11:00 am.  Her first appointment at [facility] is 9/12/2023. She needs to arrive at 10:30 am to complete paperwork. Marina was called. Ms. Lobito Dominguez is now accepted at [address] [place] [address]. She requested that transportation be set up for the first dialysis day. [facility] will cover transportation after the first day. She was informed that the dialysis chair is at [facility] at 11:00 am.  She requested a referral be sent back to Selwyn Batista. Will continue to follow for discharge planning.   Renetta Ratliff LCSW

## 2023-09-08 LAB
ALBUMIN SERPL-MCNC: 2.8 G/DL (ref 3.5–5)
ANION GAP SERPL CALC-SCNC: 2 MMOL/L (ref 5–15)
ARTERIAL PATENCY WRIST A: POSITIVE
BASE EXCESS BLD CALC-SCNC: 1.8 MMOL/L
BDY SITE: ABNORMAL
BUN SERPL-MCNC: 14 MG/DL (ref 6–20)
BUN/CREAT SERPL: 5 (ref 12–20)
CALCIUM SERPL-MCNC: 8.6 MG/DL (ref 8.5–10.1)
CHLORIDE SERPL-SCNC: 101 MMOL/L (ref 97–108)
CO2 SERPL-SCNC: 30 MMOL/L (ref 21–32)
CREAT SERPL-MCNC: 2.96 MG/DL (ref 0.55–1.02)
ERYTHROCYTE [DISTWIDTH] IN BLOOD BY AUTOMATED COUNT: 15.5 % (ref 11.5–14.5)
GAS FLOW.O2 O2 DELIVERY SYS: ABNORMAL
GAS FLOW.O2 SETTING OXYMISER: 8 BPM
GLUCOSE BLD STRIP.AUTO-MCNC: 125 MG/DL (ref 65–117)
GLUCOSE BLD STRIP.AUTO-MCNC: 126 MG/DL (ref 65–117)
GLUCOSE BLD STRIP.AUTO-MCNC: 146 MG/DL (ref 65–117)
GLUCOSE BLD STRIP.AUTO-MCNC: 172 MG/DL (ref 65–117)
GLUCOSE BLD STRIP.AUTO-MCNC: 202 MG/DL (ref 65–117)
GLUCOSE SERPL-MCNC: 135 MG/DL (ref 65–100)
HCO3 BLD-SCNC: 27.5 MMOL/L (ref 22–26)
HCT VFR BLD AUTO: 23.6 % (ref 35–47)
HGB BLD-MCNC: 7.2 G/DL (ref 11.5–16)
IPAP/PIP/HIGH PEEP: 14
MCH RBC QN AUTO: 30.9 PG (ref 26–34)
MCHC RBC AUTO-ENTMCNC: 30.5 G/DL (ref 30–36.5)
MCV RBC AUTO: 101.3 FL (ref 80–99)
NRBC # BLD: 0 K/UL (ref 0–0.01)
NRBC BLD-RTO: 0 PER 100 WBC
O2/TOTAL GAS SETTING VFR VENT: 30 %
PCO2 BLD: 48.6 MMHG (ref 35–45)
PEEP RESPIRATORY: 7 CMH2O
PH BLD: 7.36 (ref 7.35–7.45)
PHOSPHATE SERPL-MCNC: 2.6 MG/DL (ref 2.6–4.7)
PLATELET # BLD AUTO: 127 K/UL (ref 150–400)
PMV BLD AUTO: 12.1 FL (ref 8.9–12.9)
PO2 BLD: 77 MMHG (ref 80–100)
POTASSIUM SERPL-SCNC: 4 MMOL/L (ref 3.5–5.1)
RBC # BLD AUTO: 2.33 M/UL (ref 3.8–5.2)
SAO2 % BLD: 94.5 % (ref 92–97)
SERVICE CMNT-IMP: ABNORMAL
SODIUM SERPL-SCNC: 133 MMOL/L (ref 136–145)
SPECIMEN TYPE: ABNORMAL
WBC # BLD AUTO: 6.4 K/UL (ref 3.6–11)

## 2023-09-08 PROCEDURE — 82803 BLOOD GASES ANY COMBINATION: CPT

## 2023-09-08 PROCEDURE — 36600 WITHDRAWAL OF ARTERIAL BLOOD: CPT

## 2023-09-08 PROCEDURE — 2580000003 HC RX 258: Performed by: NURSE PRACTITIONER

## 2023-09-08 PROCEDURE — 85027 COMPLETE CBC AUTOMATED: CPT

## 2023-09-08 PROCEDURE — A4216 STERILE WATER/SALINE, 10 ML: HCPCS | Performed by: ANESTHESIOLOGY

## 2023-09-08 PROCEDURE — 80069 RENAL FUNCTION PANEL: CPT

## 2023-09-08 PROCEDURE — 6370000000 HC RX 637 (ALT 250 FOR IP)

## 2023-09-08 PROCEDURE — 6360000002 HC RX W HCPCS: Performed by: ANESTHESIOLOGY

## 2023-09-08 PROCEDURE — 82962 GLUCOSE BLOOD TEST: CPT

## 2023-09-08 PROCEDURE — 2700000000 HC OXYGEN THERAPY PER DAY

## 2023-09-08 PROCEDURE — 2060000000 HC ICU INTERMEDIATE R&B

## 2023-09-08 PROCEDURE — 6360000002 HC RX W HCPCS: Performed by: HOSPITALIST

## 2023-09-08 PROCEDURE — 6370000000 HC RX 637 (ALT 250 FOR IP): Performed by: NURSE PRACTITIONER

## 2023-09-08 PROCEDURE — C9113 INJ PANTOPRAZOLE SODIUM, VIA: HCPCS | Performed by: ANESTHESIOLOGY

## 2023-09-08 PROCEDURE — 2580000003 HC RX 258: Performed by: ANESTHESIOLOGY

## 2023-09-08 PROCEDURE — 6370000000 HC RX 637 (ALT 250 FOR IP): Performed by: INTERNAL MEDICINE

## 2023-09-08 PROCEDURE — 6370000000 HC RX 637 (ALT 250 FOR IP): Performed by: ANESTHESIOLOGY

## 2023-09-08 PROCEDURE — 36415 COLL VENOUS BLD VENIPUNCTURE: CPT

## 2023-09-08 RX ORDER — INSULIN GLARGINE 100 [IU]/ML
14 INJECTION, SOLUTION SUBCUTANEOUS NIGHTLY
Status: DISCONTINUED | OUTPATIENT
Start: 2023-09-08 | End: 2023-09-10 | Stop reason: HOSPADM

## 2023-09-08 RX ORDER — PANTOPRAZOLE SODIUM 40 MG/1
40 TABLET, DELAYED RELEASE ORAL EVERY 12 HOURS
Status: DISCONTINUED | OUTPATIENT
Start: 2023-09-08 | End: 2023-09-10 | Stop reason: HOSPADM

## 2023-09-08 RX ADMIN — PREDNISOLONE ACETATE 1 DROP: 10 SUSPENSION/ DROPS OPHTHALMIC at 10:24

## 2023-09-08 RX ADMIN — Medication 6 UNITS: at 18:15

## 2023-09-08 RX ADMIN — NIFEDIPINE 30 MG: 30 TABLET, FILM COATED, EXTENDED RELEASE ORAL at 10:23

## 2023-09-08 RX ADMIN — LEVOTHYROXINE SODIUM 200 MCG: 0.2 TABLET ORAL at 06:10

## 2023-09-08 RX ADMIN — HYDRALAZINE HYDROCHLORIDE 100 MG: 50 TABLET, FILM COATED ORAL at 21:00

## 2023-09-08 RX ADMIN — INSULIN GLARGINE 14 UNITS: 100 INJECTION, SOLUTION SUBCUTANEOUS at 21:02

## 2023-09-08 RX ADMIN — ESCITALOPRAM 10 MG: 10 TABLET, FILM COATED ORAL at 10:23

## 2023-09-08 RX ADMIN — METOPROLOL TARTRATE 50 MG: 50 TABLET, FILM COATED ORAL at 10:23

## 2023-09-08 RX ADMIN — PREDNISOLONE ACETATE 1 DROP: 10 SUSPENSION/ DROPS OPHTHALMIC at 21:01

## 2023-09-08 RX ADMIN — SODIUM CHLORIDE, PRESERVATIVE FREE 10 ML: 5 INJECTION INTRAVENOUS at 10:24

## 2023-09-08 RX ADMIN — Medication 6 UNITS: at 12:28

## 2023-09-08 RX ADMIN — ONDANSETRON 4 MG: 4 TABLET, ORALLY DISINTEGRATING ORAL at 20:21

## 2023-09-08 RX ADMIN — SODIUM CHLORIDE, PRESERVATIVE FREE 40 MG: 5 INJECTION INTRAVENOUS at 10:23

## 2023-09-08 RX ADMIN — HYDRALAZINE HYDROCHLORIDE 100 MG: 50 TABLET, FILM COATED ORAL at 06:10

## 2023-09-08 RX ADMIN — METOPROLOL TARTRATE 50 MG: 50 TABLET, FILM COATED ORAL at 21:00

## 2023-09-08 RX ADMIN — Medication: at 21:01

## 2023-09-08 RX ADMIN — HYDRALAZINE HYDROCHLORIDE 100 MG: 50 TABLET, FILM COATED ORAL at 12:33

## 2023-09-08 RX ADMIN — PRAVASTATIN SODIUM 40 MG: 40 TABLET ORAL at 10:23

## 2023-09-08 RX ADMIN — Medication 100 MCG: at 10:23

## 2023-09-08 RX ADMIN — Medication: at 10:41

## 2023-09-08 RX ADMIN — PANTOPRAZOLE SODIUM 40 MG: 40 TABLET, DELAYED RELEASE ORAL at 21:00

## 2023-09-08 RX ADMIN — HYDRALAZINE HYDROCHLORIDE 20 MG: 20 INJECTION, SOLUTION INTRAMUSCULAR; INTRAVENOUS at 08:22

## 2023-09-08 NOTE — CONSULTS
[address], [facility] [place]  [phone]                     GI CONSULTATION NOTE      NAME:  Dulce Mccormick   :      MRN:   [id number]     Consult Date: 2023     Chief Complaint: anemia    History of Present Illness:  Patient is a 68 y.o. F with history of type I DM, NICOLÁS, obesity, CAD, CKD, COPD referred by Dr Pema Castillo re anemia. She had one dark BM today. She takes iron. Pt is obtunded and not answering questions when I see her. Her partner is at bedside. She was hospitalized last week and seen by GI team. She has a history of small bowel angiectasias but has not had endoscopic eval since . She declined endoscopic procedures when seen last week. WBC 16k, Hb 7.3 (Was 7.8 on dc 4 days prior). Recheck Hb 7.7. , Cr 7.      PMH:  Past Medical History:   Diagnosis Date    Adverse effect of anesthesia     SLOW WAKING PAST ANESTHESIA    Anxiety     Arthritis     CAD (coronary artery disease)     s/p CABG x 3v    Chest pain 2015    Chronic obstructive pulmonary disease (HCC)     CTS (carpal tunnel syndrome)     S/p bilateral release    Diabetes ([address])     Diabetic gastroparesis (HCC)     Diabetic neuropathy ([address])     Diabetic retinopathy ([address])     GERD (gastroesophageal reflux disease)     GI bleed 2015    4 bleeds with 9 clips placed    Hypercholesteremia     Hypertension     Hypothyroid     Ill-defined condition     GI BLEED    Ill-defined condition     NEUROPATHY HANDS AND FEET    Nicotine vapor product user     NICOLÁS on CPAP     Osteoporosis     Vitamin D deficiency        PSH:  Past Surgical History:   Procedure Laterality Date    CABG, ARTERY-VEIN, THREE  2015    CARDIAC CATHETERIZATION  2015    CERVICAL FUSION      GI      LUMBAR FUSION  2017    LUMBAR LAMINECTOMY  2011    ORTHOPEDIC SURGERY Right 1970    CARPAL TUNNEL    ORTHOPEDIC SURGERY Left     CARPAL TUNNEL    ROTATOR CUFF REPAIR Right 
[facility]  DIABETES MANAGEMENT CONSULT    Consulted by Provider for advanced nursing evaluation and care for inpatient blood glucose management. Evaluation and Action Plan   Shabana Bush is a 68 y.o. female with T1D , CAD, COPD, GERD, HLD, HTN, CKD and hypothyroid , known to me from last admission, who was readmitted AMS and acute renal failure requiring dialysis. She also was noted to have melena. She is on an insulin pump at home (see below for settings) and saw her endocrinologist,  Dr. Luke Flowers, back in June, where her settings on her pump were lowered because of frequent overnight low BG. With her last admission, I advised her partner/care taker to lower patient's nighttime basal dose. I attempted to call partner today to verify that this was done, but did not reach her. Patient states she did have some low BG at home while she had her pump on. With her worsening condition and renal status, it may be safer for her to go home on just a basal/bolus injection for her diabetes until we can be sure her pump is at the accurate settings. Pump is currently off since her admission. Will follow up on this. 9/6 - patient states she is feeling much better today after dialysis yesterday.  this morning with increased basal dose last night. She is NPO today for new line placement, so can resume mealtime boluses once she starts eating again.     Management Rationale Action Plan   Medication   Basal needs  Lantus 14 units nightly         Nutritional needs  Humalog 6 units with meals   Corrective insulin Using low dose sensitivity based on weight and renal function        Diabetes Discharge Plan   Medication: would suggest patient stays off her pump until follow up with Dr. Luke Flowers  Would then need: Lantus pens 14 units nightly                                Humalog pens 6 units with meals  Pen Needle, Diabetic 32 Gauge x 5/32\" (1 box)    Referral  []        Outpatient diabetes
[facility]  DIABETES MANAGEMENT CONSULT    Consulted by Provider for advanced nursing evaluation and care for inpatient blood glucose management. Evaluation and Action Plan   Marcelino Rahman is a 68 y.o. female with T1D , CAD, COPD, GERD, HLD, HTN, CKD and hypothyroid , known to me from last admission, who was readmitted AMS and acute renal failure requiring dialysis. She also was noted to have melena. She is on an insulin pump at home (see below for settings) and saw her endocrinologist,  Dr. Elsa Subramanian, back in June, where her settings on her pump were lowered because of frequent overnight low BG. With her last admission, I advised her partner/care taker to lower patient's nighttime basal dose. I attempted to call partner today to verify that this was done, but did not reach her. Patient states she did have some low BG at home while she had her pump on. With her worsening condition and renal status, it may be safer for her to go home on just a basal/bolus injection for her diabetes until we can be sure her pump is at the accurate settings. Pump is currently off since her admission. Will follow up on this. Her Bgs have been generally running in the low-mid 200s since admission. Has been receiving anywhere from 6-10 units of correctional insulin over last few days. States she just really started eating, but appetite can be varied. Agree with current Lantus dose that was ordered for tonight to start with; she may need adjustments over weekend. Will also start mealtime boluses when she does eat well.      Management Rationale Action Plan   Medication   Basal needs  Lantus 10 units nightly      Over weekend:  If FBG > 180, then increase basal by 20%  If FBG < 140, then decrease basal by 20%   Nutritional needs  Humalog 4 units with meals   Corrective insulin Using low dose sensitivity based on weight and renal function        Diabetes Discharge Plan   Medication: would suggest patient
NEPHROLOGY CONSULT NOTE     Patient: Nilda Ibrahim MRN: [id number]  PCP: Claudine Garcia MD   :     1950  Age:   68 y.o. Sex:  female      Referring physician: Moni Rausch DO  Reason for consultation: 68 y.o. female with No admission diagnoses are documented for this encounter. complicated by JOSH   Admission Date: 2023 10:05 AM  LOS: 0 days      ASSESSMENT and PLAN :   JOSH on CKD:  - suspect ATN from hypotension/sepsis/GI bleed  - place jaffe  - bolus another 1 L  - scheduled albumin and IVF ordered  - daily labs and I/Os  - no urgent need for RRT, but will likely require this in the next 24 hrs if no improvement  - discussed with daughter at bedside and they are agreeable if needed     Hypotension:  - ? Sepsis vs GI bleed  - received 2 L of fluid and 1 unit of blood  - bolus another unit, will start scheduled albumin and maintenance fluids after the bolus  - will need pressors and ICU admission     CKD IV:  - pt of Dr. Mine Benson  - baseline Cr around 2.5, last seen by him in 2022  - suspected diabetic nephropathy, bx planned but never done     GI bleed:  - refused endoscopic eval last admission  - received 1 unit of PRBCs  - trend H&H and transfuse if hgb < 7     CAD s/p CABG  Bradycardia w/ PPM  Type I DM  Hypothyroidism  Gastroparesis      Active Problems / Assessment AAActive  : Active Problems:    * No active hospital problems. *  Resolved Problems:    * No resolved hospital problems. *       Subjective:   HPI: Nilda Ibrahim is a 68 y.o.  female who has been admitted to the hospital for AMS, hypotension, JOSH. Has a hx of CKD IV, pt of Dr. Mine Benson, baseline Cr around 2.5, nephrotic-range proteinuria, was here at [facility] and d/c'd 4 days ago. At that time she had urinary retention and hyperkalemia, responded to IVF and jaffe placement. Found to be hypotensive at home, 80s/50s, dark stools and hgb 7. 3.   no jaffe in place now.   Received 2 L of fluids so far and 1 unit
Seen for ICU admission. Briefly, this is a patient with CKD, GIB admitted for hypotension and GIB, JOSH. Awaiting eval by Renal for plan. Currently being resusciated and has not been started on Levophed yet. GI will not scope at tthis time. Await Renal recs and if pt needs levophed. If pt requiring CRRT or levophed, have told ED we will reeval and accept pt to ICU. Currently again not requiring levophed but will await decision by Renal and further resuscitation for potential admission.     Yonis Suarez MD  [address] [place] [address]
Objective   Physical exam  General Obese female in no acute distress.  Conversant and cooperative  Neuro  Alert, oriented   Vital Signs   Vitals:    09/05/23 1200   BP:    Pulse: 72   Resp:    Temp:    SpO2:          Diabetic foot exam:    Deferred at this time  Laboratory  Recent Labs     09/03/23  0638 09/04/23  1447 09/05/23  0511   WBC 6.9 8.6 7.5   HGB 8.1* 8.6* 9.3*   HCT 26.6* 28.1* 31.5*   .8* 101.4* 106.4*   * 132* 114*       Recent Labs     09/04/23  1243 09/04/23  1447 09/05/23  0511   * 132* 130*   K 4.2 4.0 4.5   CL 95* 95* 94*   CO2 28 30 27   PHOS 5.3* 5.3* 6.2*   BUN 29* 29* 33*   CREATININE 3.94* 3.99* 4.69*       Lab Results   Component Value Date    ALT 21 08/29/2023    AST 27 08/29/2023    ALKPHOS 76 08/29/2023    BILITOT 0.3 08/29/2023     Lab Results   Component Value Date    TSH 7.52 (H) 06/21/2022    OGZ1ASS 8.15 (H) 08/28/2023     Lab Results   Component Value Date    LABA1C 5.9 (H) 09/02/2023    LABA1C 5.7 (H) 08/18/2023    LABA1C 8.3 (H) 04/06/2022       Factors impacting BG management  Factor Dose Comments   Nutrition:  Standard meals     75 grams/meal      Drugs:    Blood transfusion(s)       PRBC 8/22 and 8/28         A1cs inaccurate     Other:   Kidney function  Liver function   JOSH on CKD  Normal       Blood glucose pattern    Significant diabetes-related events over the past 24-72 hours  BG range 160-272 last few days    Assessment and Nursing Intervention   Nursing Diagnosis Risk for unstable blood glucose pattern   Nursing Intervention Domain 9817 Decision-making Support   Nursing Interventions Examined current inpatient diabetes/blood glucose control   Explored factors facilitating and impeding inpatient management  Explored corrective strategies with patient and responsible inpatient provider   Informed patient of rational for insulin strategy while hospitalized     Billing Code(s)   [] 71412 IP subsequent hospital care - 50 minutes   [] 92155 IP
acute distress.  Conversant and cooperative  Neuro  Alert, oriented   Vital Signs   Vitals:    09/08/23 1200   BP:    Pulse: 74   Resp:    Temp:    SpO2:          Diabetic foot exam:    Deferred at this time  Laboratory  Recent Labs     09/07/23  1154 09/08/23  0845   WBC 7.3 6.4   HGB 7.7* 7.2*   HCT 25.0* 23.6*   .6* 101.3*   * 127*       Recent Labs     09/07/23  1154 09/08/23  0845   * 133*   K 3.7 4.0   CL 94* 101   CO2 34* 30   PHOS 3.6 2.6   BUN 22* 14   CREATININE 3.26* 2.96*       Lab Results   Component Value Date    ALT 21 08/29/2023    AST 27 08/29/2023    ALKPHOS 76 08/29/2023    BILITOT 0.3 08/29/2023     Lab Results   Component Value Date    TSH 7.52 (H) 06/21/2022    FXH9MWV 8.15 (H) 08/28/2023     Lab Results   Component Value Date    LABA1C 5.9 (H) 09/02/2023    LABA1C 5.7 (H) 08/18/2023    LABA1C 8.3 (H) 04/06/2022       Factors impacting BG management  Factor Dose Comments   Nutrition:  Standard meals     75 grams/meal      Drugs:    Blood transfusion(s)       PRBC 8/22 and 8/28         A1cs inaccurate     Other:   Kidney function  Liver function   JOSH on CKD  Normal       Blood glucose pattern    Significant diabetes-related events over the past 24-72 hours  BG range  last 24 hours   this am; did not receive basal insulin last evening    Assessment and Nursing Intervention   Nursing Diagnosis Risk for unstable blood glucose pattern   Nursing Intervention Domain 9687 Decision-making Support   Nursing Interventions Examined current inpatient diabetes/blood glucose control   Explored factors facilitating and impeding inpatient management  Explored corrective strategies with patient and responsible inpatient provider   Informed patient of rational for insulin strategy while hospitalized     Billing Code(s)   [] 82674 IP subsequent hospital care - 50 minutes   [] [phone] IP subsequent hospital care - 35 minutes   [] [phone] IP subsequent hospital care - 25
hospital care - 35 minutes   [x] 11383 IP subsequent hospital care - 25 minutes   [] [phone] IP initial hospital care - 40 minutes     Before making these care recommendations, I personally reviewed the hospitalization record, including notes, laboratory & diagnostic data and current medications, and examined the patient at the bedside (circumstances permitting) before determining care. More than fifty (50) percent of the time was spent in patient counseling and/or care coordination.   Total minutes: 22    VICTORINA ORTIZ - CNS  Diabetes Clinical Nurse Specialist  [facility]  Access via [employer]

## 2023-09-09 LAB
ALBUMIN SERPL-MCNC: 2.5 G/DL (ref 3.5–5)
ANION GAP SERPL CALC-SCNC: 4 MMOL/L (ref 5–15)
BUN SERPL-MCNC: 19 MG/DL (ref 6–20)
BUN/CREAT SERPL: 5 (ref 12–20)
CALCIUM SERPL-MCNC: 8.4 MG/DL (ref 8.5–10.1)
CHLORIDE SERPL-SCNC: 100 MMOL/L (ref 97–108)
CO2 SERPL-SCNC: 29 MMOL/L (ref 21–32)
CREAT SERPL-MCNC: 3.86 MG/DL (ref 0.55–1.02)
ERYTHROCYTE [DISTWIDTH] IN BLOOD BY AUTOMATED COUNT: 15.6 % (ref 11.5–14.5)
GLUCOSE BLD STRIP.AUTO-MCNC: 155 MG/DL (ref 65–117)
GLUCOSE BLD STRIP.AUTO-MCNC: 202 MG/DL (ref 65–117)
GLUCOSE BLD STRIP.AUTO-MCNC: 215 MG/DL (ref 65–117)
GLUCOSE BLD STRIP.AUTO-MCNC: 250 MG/DL (ref 65–117)
GLUCOSE SERPL-MCNC: 194 MG/DL (ref 65–100)
HCT VFR BLD AUTO: 22.5 % (ref 35–47)
HCT VFR BLD AUTO: 28.5 % (ref 35–47)
HGB BLD-MCNC: 6.7 G/DL (ref 11.5–16)
HGB BLD-MCNC: 8.8 G/DL (ref 11.5–16)
HISTORY CHECK: NORMAL
MAGNESIUM SERPL-MCNC: 1.8 MG/DL (ref 1.6–2.4)
MCH RBC QN AUTO: 30.9 PG (ref 26–34)
MCHC RBC AUTO-ENTMCNC: 29.8 G/DL (ref 30–36.5)
MCV RBC AUTO: 103.7 FL (ref 80–99)
NRBC # BLD: 0 K/UL (ref 0–0.01)
NRBC BLD-RTO: 0 PER 100 WBC
PHOSPHATE SERPL-MCNC: 2.9 MG/DL (ref 2.6–4.7)
PLATELET # BLD AUTO: 125 K/UL (ref 150–400)
PMV BLD AUTO: 12.4 FL (ref 8.9–12.9)
POTASSIUM SERPL-SCNC: 4.1 MMOL/L (ref 3.5–5.1)
RBC # BLD AUTO: 2.17 M/UL (ref 3.8–5.2)
SARS-COV-2 RDRP RESP QL NAA+PROBE: NOT DETECTED
SERVICE CMNT-IMP: ABNORMAL
SODIUM SERPL-SCNC: 133 MMOL/L (ref 136–145)
SOURCE: NORMAL
WBC # BLD AUTO: 6 K/UL (ref 3.6–11)

## 2023-09-09 PROCEDURE — 36415 COLL VENOUS BLD VENIPUNCTURE: CPT

## 2023-09-09 PROCEDURE — 6370000000 HC RX 637 (ALT 250 FOR IP): Performed by: INTERNAL MEDICINE

## 2023-09-09 PROCEDURE — 2060000000 HC ICU INTERMEDIATE R&B

## 2023-09-09 PROCEDURE — 94660 CPAP INITIATION&MGMT: CPT

## 2023-09-09 PROCEDURE — P9016 RBC LEUKOCYTES REDUCED: HCPCS

## 2023-09-09 PROCEDURE — 86901 BLOOD TYPING SEROLOGIC RH(D): CPT

## 2023-09-09 PROCEDURE — 82962 GLUCOSE BLOOD TEST: CPT

## 2023-09-09 PROCEDURE — 85027 COMPLETE CBC AUTOMATED: CPT

## 2023-09-09 PROCEDURE — 90935 HEMODIALYSIS ONE EVALUATION: CPT

## 2023-09-09 PROCEDURE — 2700000000 HC OXYGEN THERAPY PER DAY

## 2023-09-09 PROCEDURE — 86900 BLOOD TYPING SEROLOGIC ABO: CPT

## 2023-09-09 PROCEDURE — 6360000002 HC RX W HCPCS: Performed by: NURSE PRACTITIONER

## 2023-09-09 PROCEDURE — 2580000003 HC RX 258: Performed by: NURSE PRACTITIONER

## 2023-09-09 PROCEDURE — 6370000000 HC RX 637 (ALT 250 FOR IP)

## 2023-09-09 PROCEDURE — 85014 HEMATOCRIT: CPT

## 2023-09-09 PROCEDURE — 86850 RBC ANTIBODY SCREEN: CPT

## 2023-09-09 PROCEDURE — 6370000000 HC RX 637 (ALT 250 FOR IP): Performed by: NURSE PRACTITIONER

## 2023-09-09 PROCEDURE — 86923 COMPATIBILITY TEST ELECTRIC: CPT

## 2023-09-09 PROCEDURE — 85018 HEMOGLOBIN: CPT

## 2023-09-09 PROCEDURE — 83735 ASSAY OF MAGNESIUM: CPT

## 2023-09-09 PROCEDURE — 36430 TRANSFUSION BLD/BLD COMPNT: CPT

## 2023-09-09 PROCEDURE — 6370000000 HC RX 637 (ALT 250 FOR IP): Performed by: ANESTHESIOLOGY

## 2023-09-09 PROCEDURE — 80069 RENAL FUNCTION PANEL: CPT

## 2023-09-09 PROCEDURE — 87635 SARS-COV-2 COVID-19 AMP PRB: CPT

## 2023-09-09 RX ORDER — SODIUM CHLORIDE 9 MG/ML
INJECTION, SOLUTION INTRAVENOUS PRN
Status: DISCONTINUED | OUTPATIENT
Start: 2023-09-09 | End: 2023-09-10 | Stop reason: HOSPADM

## 2023-09-09 RX ORDER — GABAPENTIN 100 MG/1
100 CAPSULE ORAL
Status: DISCONTINUED | OUTPATIENT
Start: 2023-09-09 | End: 2023-09-10 | Stop reason: HOSPADM

## 2023-09-09 RX ADMIN — Medication 2 UNITS: at 18:13

## 2023-09-09 RX ADMIN — ESCITALOPRAM 10 MG: 10 TABLET, FILM COATED ORAL at 09:02

## 2023-09-09 RX ADMIN — LEVOTHYROXINE SODIUM 200 MCG: 0.2 TABLET ORAL at 06:36

## 2023-09-09 RX ADMIN — SODIUM CHLORIDE, PRESERVATIVE FREE 10 ML: 5 INJECTION INTRAVENOUS at 23:40

## 2023-09-09 RX ADMIN — PANTOPRAZOLE SODIUM 40 MG: 40 TABLET, DELAYED RELEASE ORAL at 09:02

## 2023-09-09 RX ADMIN — PREDNISOLONE ACETATE 1 DROP: 10 SUSPENSION/ DROPS OPHTHALMIC at 09:19

## 2023-09-09 RX ADMIN — HEPARIN SODIUM 1900 UNITS: 1000 INJECTION INTRAVENOUS; SUBCUTANEOUS at 23:15

## 2023-09-09 RX ADMIN — PANTOPRAZOLE SODIUM 40 MG: 40 TABLET, DELAYED RELEASE ORAL at 23:33

## 2023-09-09 RX ADMIN — INSULIN GLARGINE 14 UNITS: 100 INJECTION, SOLUTION SUBCUTANEOUS at 23:33

## 2023-09-09 RX ADMIN — EPOETIN ALFA-EPBX 10000 UNITS: 10000 INJECTION, SOLUTION INTRAVENOUS; SUBCUTANEOUS at 22:16

## 2023-09-09 RX ADMIN — Medication 1 UNITS: at 11:53

## 2023-09-09 RX ADMIN — HYDRALAZINE HYDROCHLORIDE 100 MG: 50 TABLET, FILM COATED ORAL at 15:31

## 2023-09-09 RX ADMIN — METOPROLOL TARTRATE 50 MG: 50 TABLET, FILM COATED ORAL at 23:33

## 2023-09-09 RX ADMIN — HYDRALAZINE HYDROCHLORIDE 100 MG: 50 TABLET, FILM COATED ORAL at 06:36

## 2023-09-09 RX ADMIN — PREDNISOLONE ACETATE 1 DROP: 10 SUSPENSION/ DROPS OPHTHALMIC at 23:38

## 2023-09-09 RX ADMIN — Medication: at 09:07

## 2023-09-09 RX ADMIN — SODIUM CHLORIDE, PRESERVATIVE FREE 10 ML: 5 INJECTION INTRAVENOUS at 09:20

## 2023-09-09 RX ADMIN — GABAPENTIN 100 MG: 100 CAPSULE ORAL at 23:33

## 2023-09-09 RX ADMIN — METOPROLOL TARTRATE 50 MG: 50 TABLET, FILM COATED ORAL at 09:03

## 2023-09-09 RX ADMIN — Medication: at 23:38

## 2023-09-09 RX ADMIN — PRAVASTATIN SODIUM 40 MG: 40 TABLET ORAL at 09:01

## 2023-09-09 RX ADMIN — NIFEDIPINE 30 MG: 30 TABLET, FILM COATED, EXTENDED RELEASE ORAL at 09:03

## 2023-09-09 RX ADMIN — Medication 100 MCG: at 09:05

## 2023-09-09 RX ADMIN — HYDRALAZINE HYDROCHLORIDE 100 MG: 50 TABLET, FILM COATED ORAL at 23:40

## 2023-09-09 ASSESSMENT — PAIN SCALES - GENERAL
PAINLEVEL_OUTOF10: 0
PAINLEVEL_OUTOF10: 0

## 2023-09-10 VITALS
HEIGHT: 65 IN | SYSTOLIC BLOOD PRESSURE: 134 MMHG | HEART RATE: 66 BPM | WEIGHT: 240.3 LBS | RESPIRATION RATE: 18 BRPM | BODY MASS INDEX: 40.04 KG/M2 | OXYGEN SATURATION: 93 % | TEMPERATURE: 99.2 F | DIASTOLIC BLOOD PRESSURE: 40 MMHG

## 2023-09-10 PROBLEM — R60.0 EDEMA OF RIGHT UPPER EXTREMITY: Status: RESOLVED | Noted: 2023-09-01 | Resolved: 2023-09-10

## 2023-09-10 PROBLEM — K92.2 GIB (GASTROINTESTINAL BLEEDING): Status: RESOLVED | Noted: 2023-08-28 | Resolved: 2023-09-10

## 2023-09-10 LAB
ABO + RH BLD: NORMAL
ALBUMIN SERPL-MCNC: 2.7 G/DL (ref 3.5–5)
ANION GAP SERPL CALC-SCNC: 3 MMOL/L (ref 5–15)
BLD PROD TYP BPU: NORMAL
BLOOD BANK DISPENSE STATUS: NORMAL
BLOOD GROUP ANTIBODIES SERPL: NORMAL
BPU ID: NORMAL
BUN SERPL-MCNC: 13 MG/DL (ref 6–20)
BUN/CREAT SERPL: 4 (ref 12–20)
CALCIUM SERPL-MCNC: 8.3 MG/DL (ref 8.5–10.1)
CHLORIDE SERPL-SCNC: 99 MMOL/L (ref 97–108)
CO2 SERPL-SCNC: 33 MMOL/L (ref 21–32)
CREAT SERPL-MCNC: 3.17 MG/DL (ref 0.55–1.02)
CROSSMATCH RESULT: NORMAL
ERYTHROCYTE [DISTWIDTH] IN BLOOD BY AUTOMATED COUNT: 15.5 % (ref 11.5–14.5)
GLUCOSE BLD STRIP.AUTO-MCNC: 198 MG/DL (ref 65–117)
GLUCOSE BLD STRIP.AUTO-MCNC: 316 MG/DL (ref 65–117)
GLUCOSE SERPL-MCNC: 199 MG/DL (ref 65–100)
HCT VFR BLD AUTO: 28.7 % (ref 35–47)
HGB BLD-MCNC: 8.8 G/DL (ref 11.5–16)
MCH RBC QN AUTO: 31.3 PG (ref 26–34)
MCHC RBC AUTO-ENTMCNC: 30.7 G/DL (ref 30–36.5)
MCV RBC AUTO: 102.1 FL (ref 80–99)
NRBC # BLD: 0 K/UL (ref 0–0.01)
NRBC BLD-RTO: 0 PER 100 WBC
PHOSPHATE SERPL-MCNC: 2.9 MG/DL (ref 2.6–4.7)
PLATELET # BLD AUTO: 135 K/UL (ref 150–400)
PMV BLD AUTO: 12.2 FL (ref 8.9–12.9)
POTASSIUM SERPL-SCNC: 4 MMOL/L (ref 3.5–5.1)
RBC # BLD AUTO: 2.81 M/UL (ref 3.8–5.2)
SERVICE CMNT-IMP: ABNORMAL
SERVICE CMNT-IMP: ABNORMAL
SODIUM SERPL-SCNC: 135 MMOL/L (ref 136–145)
SPECIMEN EXP DATE BLD: NORMAL
UNIT DIVISION: 0
WBC # BLD AUTO: 6.9 K/UL (ref 3.6–11)

## 2023-09-10 PROCEDURE — 2580000003 HC RX 258: Performed by: NURSE PRACTITIONER

## 2023-09-10 PROCEDURE — 2700000000 HC OXYGEN THERAPY PER DAY

## 2023-09-10 PROCEDURE — 36415 COLL VENOUS BLD VENIPUNCTURE: CPT

## 2023-09-10 PROCEDURE — 6370000000 HC RX 637 (ALT 250 FOR IP): Performed by: INTERNAL MEDICINE

## 2023-09-10 PROCEDURE — 80069 RENAL FUNCTION PANEL: CPT

## 2023-09-10 PROCEDURE — 6370000000 HC RX 637 (ALT 250 FOR IP): Performed by: NURSE PRACTITIONER

## 2023-09-10 PROCEDURE — 6370000000 HC RX 637 (ALT 250 FOR IP)

## 2023-09-10 PROCEDURE — 85027 COMPLETE CBC AUTOMATED: CPT

## 2023-09-10 PROCEDURE — 6370000000 HC RX 637 (ALT 250 FOR IP): Performed by: ANESTHESIOLOGY

## 2023-09-10 PROCEDURE — 82962 GLUCOSE BLOOD TEST: CPT

## 2023-09-10 RX ORDER — GABAPENTIN 100 MG/1
100 CAPSULE ORAL
Qty: 12 CAPSULE | Refills: 0 | Status: ON HOLD
Start: 2023-09-11 | End: 2023-10-11

## 2023-09-10 RX ORDER — CASTOR OIL AND BALSAM, PERU 788; 87 MG/G; MG/G
OINTMENT TOPICAL 2 TIMES DAILY
Qty: 1 EACH | Refills: 0 | Status: ON HOLD
Start: 2023-09-10

## 2023-09-10 RX ORDER — INSULIN GLARGINE 100 [IU]/ML
14 INJECTION, SOLUTION SUBCUTANEOUS NIGHTLY
Qty: 2 ADJUSTABLE DOSE PRE-FILLED PEN SYRINGE | Refills: 0 | Status: ON HOLD
Start: 2023-09-10

## 2023-09-10 RX ORDER — PANTOPRAZOLE SODIUM 40 MG/1
40 TABLET, DELAYED RELEASE ORAL 2 TIMES DAILY
Qty: 60 TABLET | Refills: 0 | Status: ON HOLD
Start: 2023-09-10

## 2023-09-10 RX ORDER — INSULIN LISPRO 100 [IU]/ML
6 INJECTION, SOLUTION INTRAVENOUS; SUBCUTANEOUS
Qty: 2 ADJUSTABLE DOSE PRE-FILLED PEN SYRINGE | Refills: 0 | Status: ON HOLD
Start: 2023-09-10

## 2023-09-10 RX ORDER — ESCITALOPRAM OXALATE 10 MG/1
10 TABLET ORAL DAILY
Qty: 30 TABLET | Refills: 0 | Status: ON HOLD
Start: 2023-09-10

## 2023-09-10 RX ADMIN — HYDRALAZINE HYDROCHLORIDE 100 MG: 50 TABLET, FILM COATED ORAL at 06:17

## 2023-09-10 RX ADMIN — PANTOPRAZOLE SODIUM 40 MG: 40 TABLET, DELAYED RELEASE ORAL at 09:09

## 2023-09-10 RX ADMIN — HYDRALAZINE HYDROCHLORIDE 100 MG: 50 TABLET, FILM COATED ORAL at 13:30

## 2023-09-10 RX ADMIN — Medication 3 UNITS: at 13:30

## 2023-09-10 RX ADMIN — PRAVASTATIN SODIUM 40 MG: 40 TABLET ORAL at 09:09

## 2023-09-10 RX ADMIN — Medication: at 09:10

## 2023-09-10 RX ADMIN — NIFEDIPINE 30 MG: 30 TABLET, FILM COATED, EXTENDED RELEASE ORAL at 09:09

## 2023-09-10 RX ADMIN — PREDNISOLONE ACETATE 1 DROP: 10 SUSPENSION/ DROPS OPHTHALMIC at 09:10

## 2023-09-10 RX ADMIN — SODIUM CHLORIDE, PRESERVATIVE FREE 10 ML: 5 INJECTION INTRAVENOUS at 09:11

## 2023-09-10 RX ADMIN — Medication 100 MCG: at 09:08

## 2023-09-10 RX ADMIN — LEVOTHYROXINE SODIUM 200 MCG: 0.2 TABLET ORAL at 06:17

## 2023-09-10 RX ADMIN — ESCITALOPRAM 10 MG: 10 TABLET, FILM COATED ORAL at 09:09

## 2023-09-10 RX ADMIN — METOPROLOL TARTRATE 50 MG: 50 TABLET, FILM COATED ORAL at 09:08

## 2023-09-10 NOTE — CARE COORDINATION
Transition of Care Plan: d/c to [facility] SNF today;  FSMOZY#:[phone]    HD at [facility] T/TH/Sat. Patient d/c to [facility] on Pete 9/10/23 with first outpatient HD at [facility]  9/12/23-Tuesday at 10:30am. CM arranging w/c transport with [facility]. CM faxed d/c summary to [address] [place] [address] at: [phone]    Per Nurse, pt requires 1L NC O2 for transport    [facility] scheduled for 3pm    RUR: 24%  Prior Level of Functioning: PT needed assistance  Disposition: SNF  If SNF or IPR: Date FOC offered: 9/5/23  Date [address] received: 9/5/23  Accepting facility: [facility]  Date authorization started with reference number: 9/6/23  Date authorization received and expires:  keisha Anderson is in place/approved  Follow up appointments: PCP & Specialist  DME needed: defer to SNF  Transportation at discharge: [facility] w/c transport  IM/IMM Medicare/ letter given: 9/10/23  Caregiver Contact:  [facility] (Ynes Mcmahon ([facility]) [phone]  Discharge Caregiver contacted prior to discharge? yes  Care Conference needed? no  Barriers to discharge: none  -1200-CM noted d/c order in place and spoke to Cheikh Rogers [address] [facility] coverage [phone] who advised they can accept pt today, room 129A. CM set up transport and notified nurse of d/c plan.   Bg Rosenberg RN BSN [facility]

## 2023-09-10 NOTE — DISCHARGE SUMMARY
incompletely seen but tapers normally where seen. The aortic bifurcation is obscured by bowel gas. The IVC is normal. No retroperitoneal mass is identified. BLADDER: The urinary bladder is normal.     Medical renal disease. Bilateral renal pelvocaliectasis Nonobstructing right renal calculus    XR CHEST PORTABLE    Result Date: 8/18/2023  EXAM:  XR CHEST PORTABLE INDICATION: Altered mental status COMPARISON: 6/22/2022 TECHNIQUE: Semiupright portable chest AP view FINDINGS: Cardiac monitoring leads. Median sternotomy changes. Left subclavian pacemaker. The cardiac silhouette is within normal limits. The pulmonary vasculature is within normal limits. Linear scarring in the left midlung zone unchanged. The visualized bones and upper abdomen are age-appropriate. No acute process on portable chest.     CT HEAD WO CONTRAST    Result Date: 8/18/2023  EXAM: CT HEAD WO CONTRAST INDICATION: AMS + weakness COMPARISON: 4/1/2022. CONTRAST: None. TECHNIQUE: Unenhanced CT of the head was performed using 5 mm images. Brain and bone windows were generated. Coronal and sagittal reformats. CT dose reduction was achieved through use of a standardized protocol tailored for this examination and automatic exposure control for dose modulation. FINDINGS: The ventricles and sulci are normal in size, shape and configuration. Moderate subcortical deep white matter hypodensities. . There is no intracranial hemorrhage, extra-axial collection, or mass effect. The basilar cisterns are open. No CT evidence of acute infarct. The bone windows demonstrate no abnormalities. The visualized portions of the paranasal sinuses and mastoid air cells are clear. Chronic small vessel ischemic disease. No acute intracranial abnormality. Greater than 31 minutes were spent on discharge services.     Signed:  Adrain Romberg, MD  9/10/2023  11:27 AM

## 2023-09-11 ENCOUNTER — HOSPITAL ENCOUNTER (INPATIENT)
Facility: HOSPITAL | Age: 73
LOS: 8 days | DRG: 177 | End: 2023-09-19
Attending: STUDENT IN AN ORGANIZED HEALTH CARE EDUCATION/TRAINING PROGRAM | Admitting: INTERNAL MEDICINE
Payer: MEDICARE

## 2023-09-11 ENCOUNTER — APPOINTMENT (OUTPATIENT)
Facility: HOSPITAL | Age: 73
DRG: 177 | End: 2023-09-11
Payer: MEDICARE

## 2023-09-11 DIAGNOSIS — U07.1 COVID-19: Primary | ICD-10-CM

## 2023-09-11 LAB
ALBUMIN SERPL-MCNC: 2.9 G/DL (ref 3.5–5)
ALBUMIN/GLOB SERPL: 0.6 (ref 1.1–2.2)
ALP SERPL-CCNC: 69 U/L (ref 45–117)
ALT SERPL-CCNC: <6 U/L (ref 12–78)
ANION GAP SERPL CALC-SCNC: 4 MMOL/L (ref 5–15)
AST SERPL-CCNC: 8 U/L (ref 15–37)
BILIRUB SERPL-MCNC: 0.3 MG/DL (ref 0.2–1)
BUN SERPL-MCNC: 23 MG/DL (ref 6–20)
BUN/CREAT SERPL: 5 (ref 12–20)
CALCIUM SERPL-MCNC: 9.3 MG/DL (ref 8.5–10.1)
CHLORIDE SERPL-SCNC: 98 MMOL/L (ref 97–108)
CO2 SERPL-SCNC: 31 MMOL/L (ref 21–32)
COMMENT:: NORMAL
CREAT SERPL-MCNC: 4.88 MG/DL (ref 0.55–1.02)
CRP SERPL-MCNC: 2.34 MG/DL (ref 0–0.6)
D DIMER PPP FEU-MCNC: 2.09 MG/L FEU (ref 0–0.65)
ERYTHROCYTE [DISTWIDTH] IN BLOOD BY AUTOMATED COUNT: 15.6 % (ref 11.5–14.5)
FERRITIN SERPL-MCNC: 145 NG/ML (ref 8–252)
GLOBULIN SER CALC-MCNC: 4.5 G/DL (ref 2–4)
GLUCOSE BLD STRIP.AUTO-MCNC: 173 MG/DL (ref 65–117)
GLUCOSE BLD STRIP.AUTO-MCNC: 191 MG/DL (ref 65–117)
GLUCOSE SERPL-MCNC: 206 MG/DL (ref 65–100)
HCT VFR BLD AUTO: 27.6 % (ref 35–47)
HGB BLD-MCNC: 8.6 G/DL (ref 11.5–16)
LACTATE SERPL-SCNC: 0.7 MMOL/L (ref 0.4–2)
LDH SERPL L TO P-CCNC: 202 U/L (ref 81–246)
MCH RBC QN AUTO: 31.6 PG (ref 26–34)
MCHC RBC AUTO-ENTMCNC: 31.2 G/DL (ref 30–36.5)
MCV RBC AUTO: 101.5 FL (ref 80–99)
NRBC # BLD: 0.02 K/UL (ref 0–0.01)
NRBC BLD-RTO: 0.3 PER 100 WBC
NT PRO BNP: ABNORMAL PG/ML
PLATELET # BLD AUTO: 156 K/UL (ref 150–400)
PMV BLD AUTO: 12.9 FL (ref 8.9–12.9)
POTASSIUM SERPL-SCNC: 4.4 MMOL/L (ref 3.5–5.1)
PROCALCITONIN SERPL-MCNC: 0.22 NG/ML
PROT SERPL-MCNC: 7.4 G/DL (ref 6.4–8.2)
RBC # BLD AUTO: 2.72 M/UL (ref 3.8–5.2)
SARS-COV-2 RDRP RESP QL NAA+PROBE: DETECTED
SERVICE CMNT-IMP: ABNORMAL
SERVICE CMNT-IMP: ABNORMAL
SODIUM SERPL-SCNC: 133 MMOL/L (ref 136–145)
SOURCE: ABNORMAL
SPECIMEN HOLD: NORMAL
TROPONIN I SERPL HS-MCNC: 30 NG/L (ref 0–51)
WBC # BLD AUTO: 7.7 K/UL (ref 3.6–11)

## 2023-09-11 PROCEDURE — 84145 PROCALCITONIN (PCT): CPT

## 2023-09-11 PROCEDURE — 6370000000 HC RX 637 (ALT 250 FOR IP): Performed by: INTERNAL MEDICINE

## 2023-09-11 PROCEDURE — 87635 SARS-COV-2 COVID-19 AMP PRB: CPT

## 2023-09-11 PROCEDURE — 36600 WITHDRAWAL OF ARTERIAL BLOOD: CPT

## 2023-09-11 PROCEDURE — 82803 BLOOD GASES ANY COMBINATION: CPT

## 2023-09-11 PROCEDURE — 6360000002 HC RX W HCPCS: Performed by: INTERNAL MEDICINE

## 2023-09-11 PROCEDURE — 86140 C-REACTIVE PROTEIN: CPT

## 2023-09-11 PROCEDURE — 83615 LACTATE (LD) (LDH) ENZYME: CPT

## 2023-09-11 PROCEDURE — 96374 THER/PROPH/DIAG INJ IV PUSH: CPT

## 2023-09-11 PROCEDURE — 71275 CT ANGIOGRAPHY CHEST: CPT

## 2023-09-11 PROCEDURE — 2060000000 HC ICU INTERMEDIATE R&B

## 2023-09-11 PROCEDURE — 80053 COMPREHEN METABOLIC PANEL: CPT

## 2023-09-11 PROCEDURE — 6360000002 HC RX W HCPCS: Performed by: STUDENT IN AN ORGANIZED HEALTH CARE EDUCATION/TRAINING PROGRAM

## 2023-09-11 PROCEDURE — 87070 CULTURE OTHR SPECIMN AEROBIC: CPT

## 2023-09-11 PROCEDURE — 99285 EMERGENCY DEPT VISIT HI MDM: CPT

## 2023-09-11 PROCEDURE — 71045 X-RAY EXAM CHEST 1 VIEW: CPT

## 2023-09-11 PROCEDURE — 94640 AIRWAY INHALATION TREATMENT: CPT

## 2023-09-11 PROCEDURE — 85379 FIBRIN DEGRADATION QUANT: CPT

## 2023-09-11 PROCEDURE — 82962 GLUCOSE BLOOD TEST: CPT

## 2023-09-11 PROCEDURE — 82728 ASSAY OF FERRITIN: CPT

## 2023-09-11 PROCEDURE — 83605 ASSAY OF LACTIC ACID: CPT

## 2023-09-11 PROCEDURE — 36415 COLL VENOUS BLD VENIPUNCTURE: CPT

## 2023-09-11 PROCEDURE — 83880 ASSAY OF NATRIURETIC PEPTIDE: CPT

## 2023-09-11 PROCEDURE — 87205 SMEAR GRAM STAIN: CPT

## 2023-09-11 PROCEDURE — 85027 COMPLETE CBC AUTOMATED: CPT

## 2023-09-11 PROCEDURE — 6360000004 HC RX CONTRAST MEDICATION: Performed by: RADIOLOGY

## 2023-09-11 PROCEDURE — 93005 ELECTROCARDIOGRAM TRACING: CPT | Performed by: STUDENT IN AN ORGANIZED HEALTH CARE EDUCATION/TRAINING PROGRAM

## 2023-09-11 PROCEDURE — 6370000000 HC RX 637 (ALT 250 FOR IP): Performed by: STUDENT IN AN ORGANIZED HEALTH CARE EDUCATION/TRAINING PROGRAM

## 2023-09-11 PROCEDURE — 2700000000 HC OXYGEN THERAPY PER DAY

## 2023-09-11 PROCEDURE — 84484 ASSAY OF TROPONIN QUANT: CPT

## 2023-09-11 RX ORDER — PANTOPRAZOLE SODIUM 40 MG/1
40 TABLET, DELAYED RELEASE ORAL 2 TIMES DAILY
Status: DISCONTINUED | OUTPATIENT
Start: 2023-09-11 | End: 2023-09-19

## 2023-09-11 RX ORDER — INSULIN GLARGINE 100 [IU]/ML
14 INJECTION, SOLUTION SUBCUTANEOUS NIGHTLY
Status: DISCONTINUED | OUTPATIENT
Start: 2023-09-11 | End: 2023-09-12

## 2023-09-11 RX ORDER — SODIUM CHLORIDE 0.9 % (FLUSH) 0.9 %
5-40 SYRINGE (ML) INJECTION PRN
Status: DISCONTINUED | OUTPATIENT
Start: 2023-09-11 | End: 2023-09-19

## 2023-09-11 RX ORDER — LEVOTHYROXINE SODIUM 0.1 MG/1
200 TABLET ORAL
Status: DISCONTINUED | OUTPATIENT
Start: 2023-09-12 | End: 2023-09-19

## 2023-09-11 RX ORDER — BUDESONIDE 0.5 MG/2ML
0.5 INHALANT ORAL
Status: DISCONTINUED | OUTPATIENT
Start: 2023-09-11 | End: 2023-09-19 | Stop reason: HOSPADM

## 2023-09-11 RX ORDER — SODIUM CHLORIDE 0.9 % (FLUSH) 0.9 %
5-40 SYRINGE (ML) INJECTION EVERY 12 HOURS SCHEDULED
Status: DISCONTINUED | OUTPATIENT
Start: 2023-09-11 | End: 2023-09-19

## 2023-09-11 RX ORDER — IPRATROPIUM BROMIDE AND ALBUTEROL SULFATE 2.5; .5 MG/3ML; MG/3ML
1 SOLUTION RESPIRATORY (INHALATION)
Status: DISCONTINUED | OUTPATIENT
Start: 2023-09-11 | End: 2023-09-15

## 2023-09-11 RX ORDER — ACETAMINOPHEN 325 MG/1
650 TABLET ORAL EVERY 6 HOURS PRN
Status: DISCONTINUED | OUTPATIENT
Start: 2023-09-11 | End: 2023-09-19 | Stop reason: HOSPADM

## 2023-09-11 RX ORDER — PREDNISOLONE ACETATE 10 MG/ML
1 SUSPENSION/ DROPS OPHTHALMIC 2 TIMES DAILY
Status: DISCONTINUED | OUTPATIENT
Start: 2023-09-11 | End: 2023-09-19

## 2023-09-11 RX ORDER — SODIUM CHLORIDE 9 MG/ML
INJECTION, SOLUTION INTRAVENOUS PRN
Status: DISCONTINUED | OUTPATIENT
Start: 2023-09-11 | End: 2023-09-19 | Stop reason: HOSPADM

## 2023-09-11 RX ORDER — INSULIN LISPRO 100 [IU]/ML
0-8 INJECTION, SOLUTION INTRAVENOUS; SUBCUTANEOUS
Status: DISCONTINUED | OUTPATIENT
Start: 2023-09-11 | End: 2023-09-13

## 2023-09-11 RX ORDER — HYDRALAZINE HYDROCHLORIDE 50 MG/1
100 TABLET, FILM COATED ORAL 3 TIMES DAILY
Status: DISCONTINUED | OUTPATIENT
Start: 2023-09-11 | End: 2023-09-19

## 2023-09-11 RX ORDER — DEXAMETHASONE SODIUM PHOSPHATE 10 MG/ML
10 INJECTION, SOLUTION INTRAMUSCULAR; INTRAVENOUS EVERY 24 HOURS
Status: DISCONTINUED | OUTPATIENT
Start: 2023-09-16 | End: 2023-09-12 | Stop reason: ALTCHOICE

## 2023-09-11 RX ORDER — INSULIN LISPRO 100 [IU]/ML
0-4 INJECTION, SOLUTION INTRAVENOUS; SUBCUTANEOUS NIGHTLY
Status: DISCONTINUED | OUTPATIENT
Start: 2023-09-11 | End: 2023-09-13

## 2023-09-11 RX ORDER — ACETAMINOPHEN 650 MG/1
650 SUPPOSITORY RECTAL EVERY 6 HOURS PRN
Status: DISCONTINUED | OUTPATIENT
Start: 2023-09-11 | End: 2023-09-11 | Stop reason: SDUPTHER

## 2023-09-11 RX ORDER — DEXAMETHASONE SODIUM PHOSPHATE 10 MG/ML
6 INJECTION, SOLUTION INTRAMUSCULAR; INTRAVENOUS EVERY 6 HOURS
Status: DISCONTINUED | OUTPATIENT
Start: 2023-09-11 | End: 2023-09-11

## 2023-09-11 RX ORDER — DORZOLAMIDE HYDROCHLORIDE AND TIMOLOL MALEATE 20; 5 MG/ML; MG/ML
1 SOLUTION/ DROPS OPHTHALMIC 2 TIMES DAILY
Status: DISCONTINUED | OUTPATIENT
Start: 2023-09-11 | End: 2023-09-13 | Stop reason: CLARIF

## 2023-09-11 RX ORDER — GUAIFENESIN/DEXTROMETHORPHAN 100-10MG/5
5 SYRUP ORAL EVERY 4 HOURS PRN
Status: DISCONTINUED | OUTPATIENT
Start: 2023-09-11 | End: 2023-09-19 | Stop reason: HOSPADM

## 2023-09-11 RX ORDER — HEPARIN SODIUM 5000 [USP'U]/ML
5000 INJECTION, SOLUTION INTRAVENOUS; SUBCUTANEOUS EVERY 8 HOURS SCHEDULED
Status: DISCONTINUED | OUTPATIENT
Start: 2023-09-11 | End: 2023-09-14

## 2023-09-11 RX ORDER — ESCITALOPRAM OXALATE 10 MG/1
10 TABLET ORAL DAILY
Status: DISCONTINUED | OUTPATIENT
Start: 2023-09-11 | End: 2023-09-19

## 2023-09-11 RX ORDER — ACETAMINOPHEN 650 MG/1
650 SUPPOSITORY RECTAL EVERY 6 HOURS PRN
Status: DISCONTINUED | OUTPATIENT
Start: 2023-09-11 | End: 2023-09-19 | Stop reason: HOSPADM

## 2023-09-11 RX ORDER — ONDANSETRON 4 MG/1
4 TABLET, ORALLY DISINTEGRATING ORAL EVERY 8 HOURS PRN
Status: DISCONTINUED | OUTPATIENT
Start: 2023-09-11 | End: 2023-09-19 | Stop reason: HOSPADM

## 2023-09-11 RX ORDER — GABAPENTIN 100 MG/1
100 CAPSULE ORAL
Status: DISCONTINUED | OUTPATIENT
Start: 2023-09-12 | End: 2023-09-19

## 2023-09-11 RX ORDER — POLYETHYLENE GLYCOL 3350 17 G/17G
17 POWDER, FOR SOLUTION ORAL DAILY PRN
Status: DISCONTINUED | OUTPATIENT
Start: 2023-09-11 | End: 2023-09-19 | Stop reason: HOSPADM

## 2023-09-11 RX ORDER — ONDANSETRON 2 MG/ML
4 INJECTION INTRAMUSCULAR; INTRAVENOUS EVERY 6 HOURS PRN
Status: DISCONTINUED | OUTPATIENT
Start: 2023-09-11 | End: 2023-09-19 | Stop reason: HOSPADM

## 2023-09-11 RX ORDER — NIFEDIPINE 30 MG/1
30 TABLET, EXTENDED RELEASE ORAL DAILY
Status: DISCONTINUED | OUTPATIENT
Start: 2023-09-11 | End: 2023-09-14

## 2023-09-11 RX ORDER — METOPROLOL TARTRATE 50 MG/1
50 TABLET, FILM COATED ORAL 2 TIMES DAILY
Status: DISCONTINUED | OUTPATIENT
Start: 2023-09-11 | End: 2023-09-17

## 2023-09-11 RX ORDER — ACETAMINOPHEN 325 MG/1
650 TABLET ORAL EVERY 6 HOURS PRN
Status: DISCONTINUED | OUTPATIENT
Start: 2023-09-11 | End: 2023-09-11 | Stop reason: SDUPTHER

## 2023-09-11 RX ORDER — INSULIN LISPRO 100 [IU]/ML
6 INJECTION, SOLUTION INTRAVENOUS; SUBCUTANEOUS
Status: DISCONTINUED | OUTPATIENT
Start: 2023-09-11 | End: 2023-09-12

## 2023-09-11 RX ORDER — PRAVASTATIN SODIUM 40 MG
40 TABLET ORAL DAILY
Status: DISCONTINUED | OUTPATIENT
Start: 2023-09-11 | End: 2023-09-19

## 2023-09-11 RX ADMIN — HEPARIN SODIUM 5000 UNITS: 5000 INJECTION INTRAVENOUS; SUBCUTANEOUS at 20:19

## 2023-09-11 RX ADMIN — IPRATROPIUM BROMIDE AND ALBUTEROL SULFATE 1 DOSE: 2.5; .5 SOLUTION RESPIRATORY (INHALATION) at 21:33

## 2023-09-11 RX ADMIN — ESCITALOPRAM OXALATE 10 MG: 10 TABLET ORAL at 20:18

## 2023-09-11 RX ADMIN — PANTOPRAZOLE SODIUM 40 MG: 40 TABLET, DELAYED RELEASE ORAL at 20:19

## 2023-09-11 RX ADMIN — Medication 6 UNITS: at 18:23

## 2023-09-11 RX ADMIN — IOPAMIDOL 80 ML: 755 INJECTION, SOLUTION INTRAVENOUS at 22:17

## 2023-09-11 RX ADMIN — HYDRALAZINE HYDROCHLORIDE 100 MG: 50 TABLET, FILM COATED ORAL at 20:20

## 2023-09-11 RX ADMIN — PRAVASTATIN SODIUM 40 MG: 40 TABLET ORAL at 20:19

## 2023-09-11 RX ADMIN — DEXAMETHASONE SODIUM PHOSPHATE 6 MG: 10 INJECTION INTRAMUSCULAR; INTRAVENOUS at 16:21

## 2023-09-11 RX ADMIN — HEPARIN SODIUM 5000 UNITS: 5000 INJECTION INTRAVENOUS; SUBCUTANEOUS at 18:23

## 2023-09-11 RX ADMIN — ALBUTEROL SULFATE 1 DOSE: 2.5 SOLUTION RESPIRATORY (INHALATION) at 15:21

## 2023-09-11 RX ADMIN — METOPROLOL TARTRATE 50 MG: 50 TABLET, FILM COATED ORAL at 20:20

## 2023-09-11 RX ADMIN — BUDESONIDE 500 MCG: 0.5 INHALANT RESPIRATORY (INHALATION) at 21:33

## 2023-09-11 ASSESSMENT — PAIN - FUNCTIONAL ASSESSMENT
PAIN_FUNCTIONAL_ASSESSMENT: NONE - DENIES PAIN
PAIN_FUNCTIONAL_ASSESSMENT: 0-10

## 2023-09-11 ASSESSMENT — PAIN SCALES - GENERAL: PAINLEVEL_OUTOF10: 0

## 2023-09-11 NOTE — H&P
nurses and necessary ancillary providers. This patient has a high probability of imminent, clinically significant deterioration, which requires the highest level of preparedness to intervene urgently. I participated in the decision-making and personally managed or directed the management of the following life and organ supporting interventions that required my frequent assessment to treat or prevent imminent deterioration. I personally spent 45 minutes of critical care time. This is time spent at this critically ill patient's bedside actively involved in patient care as well as the coordination of care and discussions with the patient's family. This does not include any procedural time which has been billed separately.       Signed By: Pamela Cruz MD     September 11, 2023

## 2023-09-11 NOTE — ED PROVIDER NOTES
EMERGENCY DEPARTMENT PHYSICIAN NOTE     Patient: Diomedes Rosales     Time of Service: 9/11/2023  2:28 PM     Chief complaint:   Chief Complaint   Patient presents with    Shortness of Breath        HISTORY:  Patient is a 68 y.o. female who presents to the emergency department with complaints of shortness of breath and cough. Patient states that the nursing facility, has a complex medical history including COPD, multiple CABG, end-stage renal disease. Was recently admitted and discharged just yesterday after GI bleed requiring pressors. Today she became short of breath, and was found to be satting 79% on room air. She was tested for COVID at some point and apparently it came back positive today. She was desaturating even on maximal nasal cannula so was placed on nonrebreather. She states when the oxygen is applied she is much less short of breath and is feeling okay. She states she was supposed to get dialysis today but didn't get the chance.       Past Medical History:   Diagnosis Date    Adverse effect of anesthesia     SLOW WAKING PAST ANESTHESIA    Anxiety     Arthritis     CAD (coronary artery disease)     s/p CABG x 3v    Chest pain 7/2015    Chronic obstructive pulmonary disease (HCC)     CTS (carpal tunnel syndrome)     S/p bilateral release    Diabetes ([address])     Diabetic gastroparesis (HCC)     Diabetic neuropathy ([address])     Diabetic retinopathy ([address])     GERD (gastroesophageal reflux disease)     GI bleed 7/2015    4 bleeds with 9 clips placed    Hypercholesteremia     Hypertension     Hypothyroid     Ill-defined condition 2017    GI BLEED    Ill-defined condition     NEUROPATHY HANDS AND FEET    Nicotine vapor product user     NICOLÁS on CPAP     Osteoporosis     Vitamin D deficiency         Past Surgical History:   Procedure Laterality Date    CABG, ARTERY-VEIN, THREE  7/13/2015    CARDIAC CATHETERIZATION  7/2015    CERVICAL FUSION  2011    GI      IR INSERT TUNNELED CV CATH WO PORT < 5YRS

## 2023-09-11 NOTE — ED TRIAGE NOTES
Patient arrives via EMS from [facility]. Tested positive for covid today and was sating 78% on roomair. Per EMS Sheila Ashford gave a duoneb and placed patient on 4L. EMS gave duoneb en route and placed patient on 10L nonrebreather. Dialysis unsure what days.

## 2023-09-12 PROBLEM — E10.22 TYPE 1 DIABETES MELLITUS WITH DIABETIC CHRONIC KIDNEY DISEASE (HCC): Status: ACTIVE | Noted: 2023-09-12

## 2023-09-12 LAB
ALBUMIN SERPL-MCNC: 2.9 G/DL (ref 3.5–5)
ALBUMIN/GLOB SERPL: 0.7 (ref 1.1–2.2)
ALP SERPL-CCNC: 63 U/L (ref 45–117)
ALT SERPL-CCNC: 7 U/L (ref 12–78)
ANION GAP SERPL CALC-SCNC: 4 MMOL/L (ref 5–15)
ARTERIAL PATENCY WRIST A: POSITIVE
ARTERIAL PATENCY WRIST A: POSITIVE
AST SERPL-CCNC: 14 U/L (ref 15–37)
BASE EXCESS BLD CALC-SCNC: 0.9 MMOL/L
BASE EXCESS BLD CALC-SCNC: 2.5 MMOL/L
BASOPHILS # BLD: 0 K/UL (ref 0–0.1)
BASOPHILS NFR BLD: 1 % (ref 0–1)
BDY SITE: ABNORMAL
BDY SITE: ABNORMAL
BILIRUB SERPL-MCNC: 0.3 MG/DL (ref 0.2–1)
BUN SERPL-MCNC: 29 MG/DL (ref 6–20)
BUN/CREAT SERPL: 5 (ref 12–20)
CALCIUM SERPL-MCNC: 9.2 MG/DL (ref 8.5–10.1)
CHLORIDE SERPL-SCNC: 97 MMOL/L (ref 97–108)
CO2 SERPL-SCNC: 30 MMOL/L (ref 21–32)
COMMENT:: NORMAL
CREAT SERPL-MCNC: 5.63 MG/DL (ref 0.55–1.02)
D DIMER PPP FEU-MCNC: 4.54 MG/L FEU (ref 0–0.65)
DIFFERENTIAL METHOD BLD: ABNORMAL
EKG ATRIAL RATE: 83 BPM
EKG DIAGNOSIS: NORMAL
EKG P AXIS: 59 DEGREES
EKG P-R INTERVAL: 160 MS
EKG Q-T INTERVAL: 410 MS
EKG QRS DURATION: 82 MS
EKG QTC CALCULATION (BAZETT): 481 MS
EKG R AXIS: 50 DEGREES
EKG T AXIS: 95 DEGREES
EKG VENTRICULAR RATE: 83 BPM
EOSINOPHIL # BLD: 0 K/UL (ref 0–0.4)
EOSINOPHIL NFR BLD: 0 % (ref 0–7)
ERYTHROCYTE [DISTWIDTH] IN BLOOD BY AUTOMATED COUNT: 15.6 % (ref 11.5–14.5)
GAS FLOW.O2 O2 DELIVERY SYS: ABNORMAL
GAS FLOW.O2 O2 DELIVERY SYS: ABNORMAL
GLOBULIN SER CALC-MCNC: 4.1 G/DL (ref 2–4)
GLUCOSE BLD STRIP.AUTO-MCNC: 258 MG/DL (ref 65–117)
GLUCOSE BLD STRIP.AUTO-MCNC: 259 MG/DL (ref 65–117)
GLUCOSE BLD STRIP.AUTO-MCNC: 95 MG/DL (ref 65–117)
GLUCOSE BLD STRIP.AUTO-MCNC: 97 MG/DL (ref 65–117)
GLUCOSE SERPL-MCNC: 232 MG/DL (ref 65–100)
HCO3 BLD-SCNC: 27.8 MMOL/L (ref 22–26)
HCO3 BLD-SCNC: 29.4 MMOL/L (ref 22–26)
HCT VFR BLD AUTO: 26.2 % (ref 35–47)
HGB BLD-MCNC: 7.9 G/DL (ref 11.5–16)
IMM GRANULOCYTES # BLD AUTO: 0.1 K/UL (ref 0–0.04)
IMM GRANULOCYTES NFR BLD AUTO: 2 % (ref 0–0.5)
LYMPHOCYTES # BLD: 0.3 K/UL (ref 0.8–3.5)
LYMPHOCYTES NFR BLD: 7 % (ref 12–49)
MCH RBC QN AUTO: 31.1 PG (ref 26–34)
MCHC RBC AUTO-ENTMCNC: 30.2 G/DL (ref 30–36.5)
MCV RBC AUTO: 103.1 FL (ref 80–99)
MONOCYTES # BLD: 0.1 K/UL (ref 0–1)
MONOCYTES NFR BLD: 3 % (ref 5–13)
NEUTS SEG # BLD: 3.4 K/UL (ref 1.8–8)
NEUTS SEG NFR BLD: 87 % (ref 32–75)
NRBC # BLD: 0 K/UL (ref 0–0.01)
NRBC BLD-RTO: 0 PER 100 WBC
O2/TOTAL GAS SETTING VFR VENT: 40 %
O2/TOTAL GAS SETTING VFR VENT: 65 %
PCO2 BLD: 54.8 MMHG (ref 35–45)
PCO2 BLD: 57.5 MMHG (ref 35–45)
PH BLD: 7.31 (ref 7.35–7.45)
PH BLD: 7.32 (ref 7.35–7.45)
PLATELET # BLD AUTO: 139 K/UL (ref 150–400)
PLATELET COMMENT: ABNORMAL
PMV BLD AUTO: 12.5 FL (ref 8.9–12.9)
PO2 BLD: 150 MMHG (ref 80–100)
PO2 BLD: 67 MMHG (ref 80–100)
POTASSIUM SERPL-SCNC: 5 MMOL/L (ref 3.5–5.1)
PROT SERPL-MCNC: 7 G/DL (ref 6.4–8.2)
RBC # BLD AUTO: 2.54 M/UL (ref 3.8–5.2)
RBC MORPH BLD: ABNORMAL
RBC MORPH BLD: ABNORMAL
SAO2 % BLD: 90.6 % (ref 92–97)
SAO2 % BLD: 99.1 % (ref 92–97)
SERVICE CMNT-IMP: ABNORMAL
SERVICE CMNT-IMP: ABNORMAL
SERVICE CMNT-IMP: NORMAL
SERVICE CMNT-IMP: NORMAL
SODIUM SERPL-SCNC: 131 MMOL/L (ref 136–145)
SPECIMEN HOLD: NORMAL
SPECIMEN TYPE: ABNORMAL
SPECIMEN TYPE: ABNORMAL
VENTILATION MODE VENT: ABNORMAL
WBC # BLD AUTO: 3.9 K/UL (ref 3.6–11)

## 2023-09-12 PROCEDURE — 90935 HEMODIALYSIS ONE EVALUATION: CPT

## 2023-09-12 PROCEDURE — 36415 COLL VENOUS BLD VENIPUNCTURE: CPT

## 2023-09-12 PROCEDURE — 6360000002 HC RX W HCPCS: Performed by: INTERNAL MEDICINE

## 2023-09-12 PROCEDURE — 36600 WITHDRAWAL OF ARTERIAL BLOOD: CPT

## 2023-09-12 PROCEDURE — 6370000000 HC RX 637 (ALT 250 FOR IP): Performed by: INTERNAL MEDICINE

## 2023-09-12 PROCEDURE — 2700000000 HC OXYGEN THERAPY PER DAY

## 2023-09-12 PROCEDURE — 94640 AIRWAY INHALATION TREATMENT: CPT

## 2023-09-12 PROCEDURE — 99221 1ST HOSP IP/OBS SF/LOW 40: CPT

## 2023-09-12 PROCEDURE — 2060000000 HC ICU INTERMEDIATE R&B

## 2023-09-12 PROCEDURE — 85379 FIBRIN DEGRADATION QUANT: CPT

## 2023-09-12 PROCEDURE — 6360000002 HC RX W HCPCS

## 2023-09-12 PROCEDURE — P9047 ALBUMIN (HUMAN), 25%, 50ML: HCPCS

## 2023-09-12 PROCEDURE — 2580000003 HC RX 258: Performed by: INTERNAL MEDICINE

## 2023-09-12 PROCEDURE — 85025 COMPLETE CBC W/AUTO DIFF WBC: CPT

## 2023-09-12 PROCEDURE — 80053 COMPREHEN METABOLIC PANEL: CPT

## 2023-09-12 PROCEDURE — 82803 BLOOD GASES ANY COMBINATION: CPT

## 2023-09-12 PROCEDURE — 82962 GLUCOSE BLOOD TEST: CPT

## 2023-09-12 RX ORDER — ALBUMIN (HUMAN) 12.5 G/50ML
25 SOLUTION INTRAVENOUS ONCE
Status: COMPLETED | OUTPATIENT
Start: 2023-09-12 | End: 2023-09-12

## 2023-09-12 RX ORDER — DEXAMETHASONE SODIUM PHOSPHATE 10 MG/ML
20 INJECTION, SOLUTION INTRAMUSCULAR; INTRAVENOUS EVERY 24 HOURS
Status: COMPLETED | OUTPATIENT
Start: 2023-09-13 | End: 2023-09-16

## 2023-09-12 RX ORDER — INSULIN GLARGINE 100 [IU]/ML
24 INJECTION, SOLUTION SUBCUTANEOUS NIGHTLY
Status: DISCONTINUED | OUTPATIENT
Start: 2023-09-12 | End: 2023-09-13

## 2023-09-12 RX ORDER — INSULIN LISPRO 100 [IU]/ML
8 INJECTION, SOLUTION INTRAVENOUS; SUBCUTANEOUS
Status: DISCONTINUED | OUTPATIENT
Start: 2023-09-12 | End: 2023-09-13

## 2023-09-12 RX ORDER — DEXAMETHASONE SODIUM PHOSPHATE 10 MG/ML
10 INJECTION, SOLUTION INTRAMUSCULAR; INTRAVENOUS EVERY 24 HOURS
Status: DISCONTINUED | OUTPATIENT
Start: 2023-09-12 | End: 2023-09-12

## 2023-09-12 RX ADMIN — SODIUM CHLORIDE, PRESERVATIVE FREE 10 ML: 5 INJECTION INTRAVENOUS at 09:49

## 2023-09-12 RX ADMIN — IPRATROPIUM BROMIDE AND ALBUTEROL SULFATE 1 DOSE: 2.5; .5 SOLUTION RESPIRATORY (INHALATION) at 12:36

## 2023-09-12 RX ADMIN — DEXAMETHASONE SODIUM PHOSPHATE 10 MG: 10 INJECTION INTRAMUSCULAR; INTRAVENOUS at 00:43

## 2023-09-12 RX ADMIN — ESCITALOPRAM OXALATE 10 MG: 10 TABLET ORAL at 09:38

## 2023-09-12 RX ADMIN — METOPROLOL TARTRATE 50 MG: 50 TABLET, FILM COATED ORAL at 09:38

## 2023-09-12 RX ADMIN — BUDESONIDE 500 MCG: 0.5 INHALANT RESPIRATORY (INHALATION) at 21:03

## 2023-09-12 RX ADMIN — PANTOPRAZOLE SODIUM 40 MG: 40 TABLET, DELAYED RELEASE ORAL at 21:49

## 2023-09-12 RX ADMIN — METOPROLOL TARTRATE 50 MG: 50 TABLET, FILM COATED ORAL at 21:48

## 2023-09-12 RX ADMIN — HEPARIN SODIUM 1900 UNITS: 1000 INJECTION INTRAVENOUS; SUBCUTANEOUS at 20:12

## 2023-09-12 RX ADMIN — IPRATROPIUM BROMIDE AND ALBUTEROL SULFATE 1 DOSE: 2.5; .5 SOLUTION RESPIRATORY (INHALATION) at 21:03

## 2023-09-12 RX ADMIN — PRAVASTATIN SODIUM 40 MG: 40 TABLET ORAL at 11:33

## 2023-09-12 RX ADMIN — BUDESONIDE 500 MCG: 0.5 INHALANT RESPIRATORY (INHALATION) at 07:22

## 2023-09-12 RX ADMIN — IPRATROPIUM BROMIDE AND ALBUTEROL SULFATE 1 DOSE: 2.5; .5 SOLUTION RESPIRATORY (INHALATION) at 07:22

## 2023-09-12 RX ADMIN — HEPARIN SODIUM 5000 UNITS: 5000 INJECTION INTRAVENOUS; SUBCUTANEOUS at 15:47

## 2023-09-12 RX ADMIN — PANTOPRAZOLE SODIUM 40 MG: 40 TABLET, DELAYED RELEASE ORAL at 09:38

## 2023-09-12 RX ADMIN — ALBUMIN (HUMAN) 25 G: 0.25 INJECTION, SOLUTION INTRAVENOUS at 00:46

## 2023-09-12 RX ADMIN — INSULIN GLARGINE 14 UNITS: 100 INJECTION, SOLUTION SUBCUTANEOUS at 02:22

## 2023-09-12 RX ADMIN — Medication 4 UNITS: at 11:39

## 2023-09-12 RX ADMIN — Medication 4 UNITS: at 09:37

## 2023-09-12 RX ADMIN — IPRATROPIUM BROMIDE AND ALBUTEROL SULFATE 1 DOSE: 2.5; .5 SOLUTION RESPIRATORY (INHALATION) at 16:37

## 2023-09-12 RX ADMIN — HEPARIN SODIUM 5000 UNITS: 5000 INJECTION INTRAVENOUS; SUBCUTANEOUS at 21:49

## 2023-09-12 RX ADMIN — LEVOTHYROXINE SODIUM 200 MCG: 0.1 TABLET ORAL at 05:44

## 2023-09-12 RX ADMIN — Medication 6 UNITS: at 11:38

## 2023-09-12 RX ADMIN — GABAPENTIN 100 MG: 100 CAPSULE ORAL at 21:48

## 2023-09-12 RX ADMIN — HEPARIN SODIUM 5000 UNITS: 5000 INJECTION INTRAVENOUS; SUBCUTANEOUS at 05:44

## 2023-09-12 RX ADMIN — Medication 6 UNITS: at 09:37

## 2023-09-12 ASSESSMENT — PAIN SCALES - GENERAL
PAINLEVEL_OUTOF10: 0
PAINLEVEL_OUTOF10: 0

## 2023-09-12 NOTE — ED NOTES
Bedside and Verbal shift change report given to [facility] Jamie rn (oncoming nurse) by Christina Ceja RN (offgoing nurse). Report included the following information Nurse Handoff Report, ED Encounter Summary, ED SBAR, Recent Results, and Med Rec Status.        Debbie Rodriguez RN  09/11/23 2025

## 2023-09-12 NOTE — CARE COORDINATION
09/12/23 1022   Readmission Assessment   Number of Days since last admission? 1-7 days   Previous Disposition SNF  ([facility])   Who is being Interviewed Caregiver   What was the patient's/caregiver's perception as to why they think they needed to return back to the hospital? Other (Comment)  (increase in symptoms; new dx of COVID)   Did you visit your Primary Care Physician after you left the hospital, before you returned this time? No   Why weren't you able to visit your PCP? Did not have an appointment   Did you see a specialist, such as Cardiac, Pulmonary, Orthopedic Physician, etc. after you left the hospital? No   Who advised the patient to return to the hospital? Skilled Unit   Does the patient report anything that got in the way of taking their medications? No   In our efforts to provide the best possible care to you and others like you, can you think of anything that we could have done to help you after you left the hospital the first time, so that you might not have needed to return so soon? Arrange for more help when leaving the hospital;Identify patient's health literacy needs; Improved written discharge instructions; Teaching during hospitalization regarding your illness; Teach back instructions regarding management of illness; Discharge instructions that are concise, clear, and non contradictory;Education on how to continue taking medications upon discharge; Additional Community resources available for illness support

## 2023-09-12 NOTE — ED NOTES
RN notified MD of the ABG that was done at 2131 last night and how the ABG result did not process in the computer to documented. MD was informed of the results and ordered a repeat ABG. RN called respiratory to performed ABG.       Kasie Darling RN  09/12/23 7817

## 2023-09-12 NOTE — CARE COORDINATION
9/12/2023:    Care Management Initial Assessment       RUR: Unknown  Readmission? Yes: 8/28-9/10; 8/18-8/24  1st IM letter given? Yes - 9/12/23  1st  letter given: No    BARRIERS TO DISCHARGE: COVID+ Status, I-HD with UF, O2 Weaning as Able, Duonebs, Decadron, Pulmonary Consult, CTA Chest, Nephro Following, will need PT/OT Evals for ins auth    ANTICIPATED DISPOSITION: Likely Return to [facility]; patient will need [employer] ins auth    ANTICIPATED TRANSPORT: BLS likely with O2    ANTICIPATED FOLLOW UP: PCP, Nephro, Pulmonary    ANTICIPATED DISCHARGE:  Greater than 48 Hours       CM notes that patient is currently on Droplet Plus Precautions. CM contacted patient's listed emergency contact (Karrie Sierra: [phone]) who identified self as patient's life partner. The partner also has COVID at this time. At baseline, patient lives with life partner in a first floor apartment, 10 exterior steps    At baseline, patient is independent in ADLs, but the patient is legally blind. Partner transports the patient. Patient now requires extensive assistance. Home DME includes: cane, walker, wheelchair, bedside commode, shower grab bars, toilet grab bars, glucometer, bp cuff, scale, full dentures, walk in shower, shower seat, CPAP, Nocturnal O2 via [employer]     Patient has hx of HH with [facility] current with [facility], [address] [place], and [employer]    Additional support includes: life partner    Patient has an AMD that indicates Mohinder Sierra as mpoa    CM discussed disposition with [facility], who identified preference to return to [facility]. CM discussed patient with Attending Team, who is in agreement to patient's anticipated return to [facility]. CM submitted referral to [facility] via [employer]; patient will need [employer] ins auth.     Likely disposition is for discharge to [facility], pending medical progression and clearance by patient's medical team.     CM Team to continue

## 2023-09-13 LAB
ANION GAP SERPL CALC-SCNC: 7 MMOL/L (ref 5–15)
BACTERIA SPEC CULT: NORMAL
BUN SERPL-MCNC: 14 MG/DL (ref 6–20)
BUN/CREAT SERPL: 4 (ref 12–20)
CALCIUM SERPL-MCNC: 8.4 MG/DL (ref 8.5–10.1)
CHLORIDE SERPL-SCNC: 100 MMOL/L (ref 97–108)
CO2 SERPL-SCNC: 28 MMOL/L (ref 21–32)
CREAT SERPL-MCNC: 3.23 MG/DL (ref 0.55–1.02)
CRP SERPL-MCNC: 2.53 MG/DL (ref 0–0.6)
D DIMER PPP FEU-MCNC: 2.58 MG/L FEU (ref 0–0.65)
ERYTHROCYTE [DISTWIDTH] IN BLOOD BY AUTOMATED COUNT: 15.7 % (ref 11.5–14.5)
GLUCOSE BLD STRIP.AUTO-MCNC: 139 MG/DL (ref 65–117)
GLUCOSE BLD STRIP.AUTO-MCNC: 170 MG/DL (ref 65–117)
GLUCOSE BLD STRIP.AUTO-MCNC: 251 MG/DL (ref 65–117)
GLUCOSE BLD STRIP.AUTO-MCNC: 275 MG/DL (ref 65–117)
GLUCOSE BLD STRIP.AUTO-MCNC: 315 MG/DL (ref 65–117)
GLUCOSE SERPL-MCNC: 115 MG/DL (ref 65–100)
GRAM STN SPEC: NORMAL
HCT VFR BLD AUTO: 26.8 % (ref 35–47)
HGB BLD-MCNC: 8.1 G/DL (ref 11.5–16)
MCH RBC QN AUTO: 30.9 PG (ref 26–34)
MCHC RBC AUTO-ENTMCNC: 30.2 G/DL (ref 30–36.5)
MCV RBC AUTO: 102.3 FL (ref 80–99)
NRBC # BLD: 0 K/UL (ref 0–0.01)
NRBC BLD-RTO: 0 PER 100 WBC
PLATELET # BLD AUTO: 138 K/UL (ref 150–400)
PMV BLD AUTO: 11.9 FL (ref 8.9–12.9)
POTASSIUM SERPL-SCNC: 3.6 MMOL/L (ref 3.5–5.1)
RBC # BLD AUTO: 2.62 M/UL (ref 3.8–5.2)
SERVICE CMNT-IMP: ABNORMAL
SERVICE CMNT-IMP: NORMAL
SODIUM SERPL-SCNC: 135 MMOL/L (ref 136–145)
WBC # BLD AUTO: 4.4 K/UL (ref 3.6–11)

## 2023-09-13 PROCEDURE — 6360000002 HC RX W HCPCS: Performed by: INTERNAL MEDICINE

## 2023-09-13 PROCEDURE — 6370000000 HC RX 637 (ALT 250 FOR IP): Performed by: INTERNAL MEDICINE

## 2023-09-13 PROCEDURE — 85379 FIBRIN DEGRADATION QUANT: CPT

## 2023-09-13 PROCEDURE — 2060000000 HC ICU INTERMEDIATE R&B

## 2023-09-13 PROCEDURE — 94760 N-INVAS EAR/PLS OXIMETRY 1: CPT

## 2023-09-13 PROCEDURE — 82962 GLUCOSE BLOOD TEST: CPT

## 2023-09-13 PROCEDURE — 2700000000 HC OXYGEN THERAPY PER DAY

## 2023-09-13 PROCEDURE — 80048 BASIC METABOLIC PNL TOTAL CA: CPT

## 2023-09-13 PROCEDURE — 99231 SBSQ HOSP IP/OBS SF/LOW 25: CPT

## 2023-09-13 PROCEDURE — 2580000003 HC RX 258: Performed by: INTERNAL MEDICINE

## 2023-09-13 PROCEDURE — 94640 AIRWAY INHALATION TREATMENT: CPT

## 2023-09-13 PROCEDURE — 86140 C-REACTIVE PROTEIN: CPT

## 2023-09-13 PROCEDURE — 36415 COLL VENOUS BLD VENIPUNCTURE: CPT

## 2023-09-13 PROCEDURE — 85027 COMPLETE CBC AUTOMATED: CPT

## 2023-09-13 PROCEDURE — 6370000000 HC RX 637 (ALT 250 FOR IP)

## 2023-09-13 RX ORDER — INSULIN LISPRO 100 [IU]/ML
6 INJECTION, SOLUTION INTRAVENOUS; SUBCUTANEOUS
Status: DISCONTINUED | OUTPATIENT
Start: 2023-09-13 | End: 2023-09-13

## 2023-09-13 RX ORDER — TIMOLOL MALEATE 5 MG/ML
1 SOLUTION/ DROPS OPHTHALMIC 2 TIMES DAILY
Status: DISCONTINUED | OUTPATIENT
Start: 2023-09-13 | End: 2023-09-19

## 2023-09-13 RX ORDER — INSULIN GLARGINE 100 [IU]/ML
24 INJECTION, SOLUTION SUBCUTANEOUS DAILY
Status: DISCONTINUED | OUTPATIENT
Start: 2023-09-13 | End: 2023-09-15

## 2023-09-13 RX ORDER — DEXTROSE MONOHYDRATE 100 MG/ML
INJECTION, SOLUTION INTRAVENOUS CONTINUOUS PRN
Status: DISCONTINUED | OUTPATIENT
Start: 2023-09-13 | End: 2023-09-19

## 2023-09-13 RX ORDER — CASTOR OIL AND BALSAM, PERU 788; 87 MG/G; MG/G
OINTMENT TOPICAL 2 TIMES DAILY
Status: DISCONTINUED | OUTPATIENT
Start: 2023-09-13 | End: 2023-09-19

## 2023-09-13 RX ORDER — INSULIN LISPRO 100 [IU]/ML
0-4 INJECTION, SOLUTION INTRAVENOUS; SUBCUTANEOUS
Status: DISCONTINUED | OUTPATIENT
Start: 2023-09-13 | End: 2023-09-18

## 2023-09-13 RX ORDER — INSULIN LISPRO 100 [IU]/ML
8 INJECTION, SOLUTION INTRAVENOUS; SUBCUTANEOUS
Status: DISCONTINUED | OUTPATIENT
Start: 2023-09-13 | End: 2023-09-14

## 2023-09-13 RX ORDER — LANOLIN ALCOHOL/MO/W.PET/CERES
3 CREAM (GRAM) TOPICAL NIGHTLY PRN
Status: DISCONTINUED | OUTPATIENT
Start: 2023-09-13 | End: 2023-09-19 | Stop reason: HOSPADM

## 2023-09-13 RX ORDER — INSULIN LISPRO 100 [IU]/ML
0-4 INJECTION, SOLUTION INTRAVENOUS; SUBCUTANEOUS NIGHTLY
Status: DISCONTINUED | OUTPATIENT
Start: 2023-09-13 | End: 2023-09-18

## 2023-09-13 RX ORDER — DORZOLAMIDE HCL 20 MG/ML
1 SOLUTION/ DROPS OPHTHALMIC 3 TIMES DAILY
Status: DISCONTINUED | OUTPATIENT
Start: 2023-09-13 | End: 2023-09-19

## 2023-09-13 RX ORDER — HYDRALAZINE HYDROCHLORIDE 20 MG/ML
10 INJECTION INTRAMUSCULAR; INTRAVENOUS EVERY 6 HOURS PRN
Status: DISCONTINUED | OUTPATIENT
Start: 2023-09-13 | End: 2023-09-19

## 2023-09-13 RX ADMIN — METOPROLOL TARTRATE 50 MG: 50 TABLET, FILM COATED ORAL at 09:19

## 2023-09-13 RX ADMIN — HYDRALAZINE HYDROCHLORIDE 100 MG: 50 TABLET, FILM COATED ORAL at 13:21

## 2023-09-13 RX ADMIN — Medication 2 UNITS: at 17:31

## 2023-09-13 RX ADMIN — Medication 6 UNITS: at 13:20

## 2023-09-13 RX ADMIN — BUDESONIDE 500 MCG: 0.5 INHALANT RESPIRATORY (INHALATION) at 20:12

## 2023-09-13 RX ADMIN — IPRATROPIUM BROMIDE AND ALBUTEROL SULFATE 1 DOSE: 2.5; .5 SOLUTION RESPIRATORY (INHALATION) at 08:09

## 2023-09-13 RX ADMIN — ESCITALOPRAM OXALATE 10 MG: 10 TABLET ORAL at 09:19

## 2023-09-13 RX ADMIN — GUAIFENESIN AND DEXTROMETHORPHAN 5 ML: 100; 10 SYRUP ORAL at 03:55

## 2023-09-13 RX ADMIN — PRAVASTATIN SODIUM 40 MG: 40 TABLET ORAL at 09:20

## 2023-09-13 RX ADMIN — HEPARIN SODIUM 5000 UNITS: 5000 INJECTION INTRAVENOUS; SUBCUTANEOUS at 06:28

## 2023-09-13 RX ADMIN — PREDNISOLONE ACETATE 1 DROP: 10 SUSPENSION/ DROPS OPHTHALMIC at 21:56

## 2023-09-13 RX ADMIN — HEPARIN SODIUM 5000 UNITS: 5000 INJECTION INTRAVENOUS; SUBCUTANEOUS at 21:57

## 2023-09-13 RX ADMIN — IPRATROPIUM BROMIDE AND ALBUTEROL SULFATE 1 DOSE: 2.5; .5 SOLUTION RESPIRATORY (INHALATION) at 16:50

## 2023-09-13 RX ADMIN — PANTOPRAZOLE SODIUM 40 MG: 40 TABLET, DELAYED RELEASE ORAL at 21:56

## 2023-09-13 RX ADMIN — Medication 8 UNITS: at 17:32

## 2023-09-13 RX ADMIN — DORZOLAMIDE HYDROCHLORIDE 1 DROP: 20 SOLUTION/ DROPS OPHTHALMIC at 22:14

## 2023-09-13 RX ADMIN — TIMOLOL MALEATE 1 DROP: 5 SOLUTION/ DROPS OPHTHALMIC at 22:14

## 2023-09-13 RX ADMIN — BUDESONIDE 500 MCG: 0.5 INHALANT RESPIRATORY (INHALATION) at 08:09

## 2023-09-13 RX ADMIN — Medication 3 UNITS: at 13:20

## 2023-09-13 RX ADMIN — PREDNISOLONE ACETATE 1 DROP: 10 SUSPENSION/ DROPS OPHTHALMIC at 13:21

## 2023-09-13 RX ADMIN — IPRATROPIUM BROMIDE AND ALBUTEROL SULFATE 1 DOSE: 2.5; .5 SOLUTION RESPIRATORY (INHALATION) at 20:11

## 2023-09-13 RX ADMIN — DEXAMETHASONE SODIUM PHOSPHATE 20 MG: 10 INJECTION INTRAMUSCULAR; INTRAVENOUS at 00:34

## 2023-09-13 RX ADMIN — INSULIN GLARGINE 24 UNITS: 100 INJECTION, SOLUTION SUBCUTANEOUS at 11:04

## 2023-09-13 RX ADMIN — Medication: at 22:13

## 2023-09-13 RX ADMIN — Medication 8 UNITS: at 09:20

## 2023-09-13 RX ADMIN — LEVOTHYROXINE SODIUM 200 MCG: 0.1 TABLET ORAL at 06:27

## 2023-09-13 RX ADMIN — SODIUM CHLORIDE, PRESERVATIVE FREE 10 ML: 5 INJECTION INTRAVENOUS at 09:22

## 2023-09-13 RX ADMIN — IPRATROPIUM BROMIDE AND ALBUTEROL SULFATE 1 DOSE: 2.5; .5 SOLUTION RESPIRATORY (INHALATION) at 11:36

## 2023-09-13 RX ADMIN — Medication 4 UNITS: at 09:19

## 2023-09-13 RX ADMIN — METOPROLOL TARTRATE 50 MG: 50 TABLET, FILM COATED ORAL at 21:56

## 2023-09-13 RX ADMIN — PANTOPRAZOLE SODIUM 40 MG: 40 TABLET, DELAYED RELEASE ORAL at 09:19

## 2023-09-13 RX ADMIN — HEPARIN SODIUM 5000 UNITS: 5000 INJECTION INTRAVENOUS; SUBCUTANEOUS at 13:20

## 2023-09-13 RX ADMIN — HYDRALAZINE HYDROCHLORIDE 100 MG: 50 TABLET, FILM COATED ORAL at 21:55

## 2023-09-13 NOTE — WOUND CARE
WOCN Note:     New consult placed for assessment of wounds present on admission. Chart reviewed. Assessed in room 446. Admitted DX:  COVID-19  Past Medical History:   Diagnosis Date    Adverse effect of anesthesia     SLOW WAKING PAST ANESTHESIA    Anxiety     Arthritis     CAD (coronary artery disease)     s/p CABG x 3v    Chest pain 7/2015    Chronic obstructive pulmonary disease (HCC)     CTS (carpal tunnel syndrome)     S/p bilateral release    Diabetes ([address])     Diabetic gastroparesis (HCC)     Diabetic neuropathy (HCC)     Diabetic retinopathy ([address])     GERD (gastroesophageal reflux disease)     GI bleed 7/2015    4 bleeds with 9 clips placed    Hypercholesteremia     Hypertension     Hypothyroid     Ill-defined condition 2017    GI BLEED    Ill-defined condition     NEUROPATHY HANDS AND FEET    Nicotine vapor product user     NICOLÁS on CPAP     Osteoporosis     Vitamin D deficiency        Lab Results   Component Value Date/Time    WBC 4.4 09/13/2023 12:45 AM       Assessment:   Patient is A&O x 4, communicative and turns with minimal assistance. Bed: low air loss  Patient reports no pain. Heels offloaded with pillows. 1. POA Abdomen, deflated bullae with pink base: 5 x 1 x 0 cm. 2.  POA Mons Pubis, non blanching red erythema with bullae: 2 x 2 x 0 cm. 3.  POA Buttocks, linear non blanching brown discoloration:  0.5 x 23 x 0 cm. 4. POA Left medial heel, deflated dry bullae with dark base. Wound, Pressure Prevention & Skin Care Recommendations:    Minimize layers of linen/pads under patient to optimize support surface. 2.  Turn/reposition approximately every 2 hours and offload heels. 3.  Manage moisture/ Keep skin folds clean and dry/minimize brief usage. 4.  Specialty bed: Low air loss mattress in place. 5.  Abdomen and mons publis:  Venelex BID and protect with foam dressing. 6.  Buttocks:  Venelex BID  7. Left heel:  leave open to air.     Discussed above plan with

## 2023-09-13 NOTE — FLOWSHEET NOTE
09/12/23 2015   Vital Signs   BP (!) 135/55   Pulse 64   Respirations 16   SpO2 96 %   Pain Assessment   Pain Assessment None - Denies Pain   Pain Level 0   Post-Hemodialysis Assessment   Post-Treatment Procedures Blood returned;Catheter capped, clamped and heparinized x 2 ports   Machine Disinfection Process Acid/Vinegar Clean;Heat Disinfect; Exterior Machine Disinfection   Rinseback Volume (ml) 300 ml   Blood Volume Processed (Liters) 75.6 L   Dialyzer Clearance Lightly streaked   Duration of Treatment (minutes) 210 minutes   Heparin Amount Administered During Treatment (mL) 0 mL   Hemodialysis Intake (ml) 500 ml   Hemodialysis Output (ml) 4500 ml   NET Removed (ml) 4000   Tolerated Treatment Good   Bilateral Breath Sounds Diminished   Edema Generalized +1   RLE Edema +1   LLE Edema +1   Time Off 2000   Patient Disposition Remain in ICU/ED     Primary RN SBAR: Jerrell Guevara RN  Comments: HD treatment completed and well-tolerated. No issues during HD. UF net removed 4L. At the end of treatment, all possible blood returned. Ports flushed with saline and locked with heparin, clamped and capped. Patient remain alert and oriented. Vital signs stable. Bed locked. Call bell in hand. Report given to primary.

## 2023-09-14 ENCOUNTER — APPOINTMENT (OUTPATIENT)
Facility: HOSPITAL | Age: 73
DRG: 177 | End: 2023-09-14
Payer: MEDICARE

## 2023-09-14 LAB
ANION GAP SERPL CALC-SCNC: 6 MMOL/L (ref 5–15)
BUN SERPL-MCNC: 35 MG/DL (ref 6–20)
BUN/CREAT SERPL: 7 (ref 12–20)
CALCIUM SERPL-MCNC: 8.4 MG/DL (ref 8.5–10.1)
CHLORIDE SERPL-SCNC: 100 MMOL/L (ref 97–108)
CO2 SERPL-SCNC: 28 MMOL/L (ref 21–32)
CREAT SERPL-MCNC: 4.68 MG/DL (ref 0.55–1.02)
ERYTHROCYTE [DISTWIDTH] IN BLOOD BY AUTOMATED COUNT: 15.9 % (ref 11.5–14.5)
GLUCOSE BLD STRIP.AUTO-MCNC: 153 MG/DL (ref 65–117)
GLUCOSE BLD STRIP.AUTO-MCNC: 186 MG/DL (ref 65–117)
GLUCOSE BLD STRIP.AUTO-MCNC: 200 MG/DL (ref 65–117)
GLUCOSE BLD STRIP.AUTO-MCNC: 241 MG/DL (ref 65–117)
GLUCOSE SERPL-MCNC: 97 MG/DL (ref 65–100)
HCT VFR BLD AUTO: 27.7 % (ref 35–47)
HGB BLD-MCNC: 8.4 G/DL (ref 11.5–16)
MCH RBC QN AUTO: 31.6 PG (ref 26–34)
MCHC RBC AUTO-ENTMCNC: 30.3 G/DL (ref 30–36.5)
MCV RBC AUTO: 104.1 FL (ref 80–99)
NRBC # BLD: 0 K/UL (ref 0–0.01)
NRBC BLD-RTO: 0 PER 100 WBC
PLATELET # BLD AUTO: 146 K/UL (ref 150–400)
PMV BLD AUTO: 11.8 FL (ref 8.9–12.9)
POTASSIUM SERPL-SCNC: 4.4 MMOL/L (ref 3.5–5.1)
RBC # BLD AUTO: 2.66 M/UL (ref 3.8–5.2)
SERVICE CMNT-IMP: ABNORMAL
SODIUM SERPL-SCNC: 134 MMOL/L (ref 136–145)
WBC # BLD AUTO: 4.5 K/UL (ref 3.6–11)

## 2023-09-14 PROCEDURE — 6370000000 HC RX 637 (ALT 250 FOR IP): Performed by: STUDENT IN AN ORGANIZED HEALTH CARE EDUCATION/TRAINING PROGRAM

## 2023-09-14 PROCEDURE — 90935 HEMODIALYSIS ONE EVALUATION: CPT

## 2023-09-14 PROCEDURE — 94640 AIRWAY INHALATION TREATMENT: CPT

## 2023-09-14 PROCEDURE — 6360000002 HC RX W HCPCS: Performed by: INTERNAL MEDICINE

## 2023-09-14 PROCEDURE — 6370000000 HC RX 637 (ALT 250 FOR IP)

## 2023-09-14 PROCEDURE — 6370000000 HC RX 637 (ALT 250 FOR IP): Performed by: INTERNAL MEDICINE

## 2023-09-14 PROCEDURE — 85027 COMPLETE CBC AUTOMATED: CPT

## 2023-09-14 PROCEDURE — 2060000000 HC ICU INTERMEDIATE R&B

## 2023-09-14 PROCEDURE — 71045 X-RAY EXAM CHEST 1 VIEW: CPT

## 2023-09-14 PROCEDURE — 36415 COLL VENOUS BLD VENIPUNCTURE: CPT

## 2023-09-14 PROCEDURE — 82962 GLUCOSE BLOOD TEST: CPT

## 2023-09-14 PROCEDURE — 80048 BASIC METABOLIC PNL TOTAL CA: CPT

## 2023-09-14 PROCEDURE — 2580000003 HC RX 258: Performed by: INTERNAL MEDICINE

## 2023-09-14 RX ORDER — INSULIN LISPRO 100 [IU]/ML
6 INJECTION, SOLUTION INTRAVENOUS; SUBCUTANEOUS
Status: DISCONTINUED | OUTPATIENT
Start: 2023-09-14 | End: 2023-09-15

## 2023-09-14 RX ADMIN — DORZOLAMIDE HYDROCHLORIDE 1 DROP: 20 SOLUTION/ DROPS OPHTHALMIC at 22:50

## 2023-09-14 RX ADMIN — GUAIFENESIN AND DEXTROMETHORPHAN 5 ML: 100; 10 SYRUP ORAL at 22:47

## 2023-09-14 RX ADMIN — HYDRALAZINE HYDROCHLORIDE 100 MG: 50 TABLET, FILM COATED ORAL at 22:47

## 2023-09-14 RX ADMIN — ESCITALOPRAM OXALATE 10 MG: 10 TABLET ORAL at 10:29

## 2023-09-14 RX ADMIN — TIMOLOL MALEATE 1 DROP: 5 SOLUTION/ DROPS OPHTHALMIC at 09:09

## 2023-09-14 RX ADMIN — GUAIFENESIN AND DEXTROMETHORPHAN 5 ML: 100; 10 SYRUP ORAL at 18:39

## 2023-09-14 RX ADMIN — DILTIAZEM HYDROCHLORIDE 30 MG: 30 TABLET, FILM COATED ORAL at 12:26

## 2023-09-14 RX ADMIN — DILTIAZEM HYDROCHLORIDE 30 MG: 30 TABLET, FILM COATED ORAL at 17:33

## 2023-09-14 RX ADMIN — HEPARIN SODIUM 1900 UNITS: 1000 INJECTION INTRAVENOUS; SUBCUTANEOUS at 11:28

## 2023-09-14 RX ADMIN — INSULIN GLARGINE 24 UNITS: 100 INJECTION, SOLUTION SUBCUTANEOUS at 09:09

## 2023-09-14 RX ADMIN — HEPARIN SODIUM 5000 UNITS: 5000 INJECTION INTRAVENOUS; SUBCUTANEOUS at 06:57

## 2023-09-14 RX ADMIN — GABAPENTIN 100 MG: 100 CAPSULE ORAL at 22:47

## 2023-09-14 RX ADMIN — Medication: at 22:49

## 2023-09-14 RX ADMIN — Medication 1 UNITS: at 09:09

## 2023-09-14 RX ADMIN — PRAVASTATIN SODIUM 40 MG: 40 TABLET ORAL at 10:29

## 2023-09-14 RX ADMIN — METOPROLOL TARTRATE 50 MG: 50 TABLET, FILM COATED ORAL at 10:29

## 2023-09-14 RX ADMIN — APIXABAN 2.5 MG: 2.5 TABLET, FILM COATED ORAL at 22:47

## 2023-09-14 RX ADMIN — GUAIFENESIN AND DEXTROMETHORPHAN 5 ML: 100; 10 SYRUP ORAL at 01:10

## 2023-09-14 RX ADMIN — GUAIFENESIN AND DEXTROMETHORPHAN 5 ML: 100; 10 SYRUP ORAL at 09:09

## 2023-09-14 RX ADMIN — Medication: at 10:28

## 2023-09-14 RX ADMIN — PANTOPRAZOLE SODIUM 40 MG: 40 TABLET, DELAYED RELEASE ORAL at 22:47

## 2023-09-14 RX ADMIN — PREDNISOLONE ACETATE 1 DROP: 10 SUSPENSION/ DROPS OPHTHALMIC at 09:09

## 2023-09-14 RX ADMIN — DEXAMETHASONE SODIUM PHOSPHATE 20 MG: 10 INJECTION INTRAMUSCULAR; INTRAVENOUS at 00:59

## 2023-09-14 RX ADMIN — Medication 6 UNITS: at 17:33

## 2023-09-14 RX ADMIN — DORZOLAMIDE HYDROCHLORIDE 1 DROP: 20 SOLUTION/ DROPS OPHTHALMIC at 15:00

## 2023-09-14 RX ADMIN — SODIUM CHLORIDE, PRESERVATIVE FREE 10 ML: 5 INJECTION INTRAVENOUS at 09:10

## 2023-09-14 RX ADMIN — PANTOPRAZOLE SODIUM 40 MG: 40 TABLET, DELAYED RELEASE ORAL at 10:29

## 2023-09-14 RX ADMIN — Medication 1 UNITS: at 17:33

## 2023-09-14 RX ADMIN — IPRATROPIUM BROMIDE AND ALBUTEROL SULFATE 1 DOSE: 2.5; .5 SOLUTION RESPIRATORY (INHALATION) at 08:44

## 2023-09-14 RX ADMIN — BUDESONIDE 500 MCG: 0.5 INHALANT RESPIRATORY (INHALATION) at 08:44

## 2023-09-14 RX ADMIN — TIMOLOL MALEATE 1 DROP: 5 SOLUTION/ DROPS OPHTHALMIC at 22:50

## 2023-09-14 RX ADMIN — SODIUM CHLORIDE, PRESERVATIVE FREE 10 ML: 5 INJECTION INTRAVENOUS at 22:49

## 2023-09-14 RX ADMIN — HYDRALAZINE HYDROCHLORIDE 100 MG: 50 TABLET, FILM COATED ORAL at 15:00

## 2023-09-14 RX ADMIN — ONDANSETRON 4 MG: 4 TABLET, ORALLY DISINTEGRATING ORAL at 22:57

## 2023-09-14 RX ADMIN — PREDNISOLONE ACETATE 1 DROP: 10 SUSPENSION/ DROPS OPHTHALMIC at 22:50

## 2023-09-14 RX ADMIN — DORZOLAMIDE HYDROCHLORIDE 1 DROP: 20 SOLUTION/ DROPS OPHTHALMIC at 09:09

## 2023-09-14 RX ADMIN — LEVOTHYROXINE SODIUM 200 MCG: 0.1 TABLET ORAL at 06:57

## 2023-09-14 RX ADMIN — METOPROLOL TARTRATE 50 MG: 50 TABLET, FILM COATED ORAL at 22:47

## 2023-09-15 LAB
ANION GAP SERPL CALC-SCNC: 6 MMOL/L (ref 5–15)
BUN SERPL-MCNC: 25 MG/DL (ref 6–20)
BUN/CREAT SERPL: 7 (ref 12–20)
CALCIUM SERPL-MCNC: 8.4 MG/DL (ref 8.5–10.1)
CHLORIDE SERPL-SCNC: 101 MMOL/L (ref 97–108)
CO2 SERPL-SCNC: 30 MMOL/L (ref 21–32)
CREAT SERPL-MCNC: 3.49 MG/DL (ref 0.55–1.02)
ERYTHROCYTE [DISTWIDTH] IN BLOOD BY AUTOMATED COUNT: 15.9 % (ref 11.5–14.5)
GLUCOSE BLD STRIP.AUTO-MCNC: 114 MG/DL (ref 65–117)
GLUCOSE BLD STRIP.AUTO-MCNC: 206 MG/DL (ref 65–117)
GLUCOSE BLD STRIP.AUTO-MCNC: 239 MG/DL (ref 65–117)
GLUCOSE BLD STRIP.AUTO-MCNC: 270 MG/DL (ref 65–117)
GLUCOSE SERPL-MCNC: 80 MG/DL (ref 65–100)
HCT VFR BLD AUTO: 27.9 % (ref 35–47)
HGB BLD-MCNC: 8.4 G/DL (ref 11.5–16)
MCH RBC QN AUTO: 31.5 PG (ref 26–34)
MCHC RBC AUTO-ENTMCNC: 30.1 G/DL (ref 30–36.5)
MCV RBC AUTO: 104.5 FL (ref 80–99)
NRBC # BLD: 0 K/UL (ref 0–0.01)
NRBC BLD-RTO: 0 PER 100 WBC
PLATELET # BLD AUTO: 136 K/UL (ref 150–400)
PMV BLD AUTO: 12 FL (ref 8.9–12.9)
POTASSIUM SERPL-SCNC: 4.1 MMOL/L (ref 3.5–5.1)
RBC # BLD AUTO: 2.67 M/UL (ref 3.8–5.2)
SERVICE CMNT-IMP: ABNORMAL
SERVICE CMNT-IMP: NORMAL
SODIUM SERPL-SCNC: 137 MMOL/L (ref 136–145)
WBC # BLD AUTO: 4.7 K/UL (ref 3.6–11)

## 2023-09-15 PROCEDURE — 6370000000 HC RX 637 (ALT 250 FOR IP): Performed by: HOSPITALIST

## 2023-09-15 PROCEDURE — 6370000000 HC RX 637 (ALT 250 FOR IP): Performed by: INTERNAL MEDICINE

## 2023-09-15 PROCEDURE — 2700000000 HC OXYGEN THERAPY PER DAY

## 2023-09-15 PROCEDURE — 36415 COLL VENOUS BLD VENIPUNCTURE: CPT

## 2023-09-15 PROCEDURE — 94640 AIRWAY INHALATION TREATMENT: CPT

## 2023-09-15 PROCEDURE — 97161 PT EVAL LOW COMPLEX 20 MIN: CPT

## 2023-09-15 PROCEDURE — 97165 OT EVAL LOW COMPLEX 30 MIN: CPT

## 2023-09-15 PROCEDURE — 99231 SBSQ HOSP IP/OBS SF/LOW 25: CPT

## 2023-09-15 PROCEDURE — 2060000000 HC ICU INTERMEDIATE R&B

## 2023-09-15 PROCEDURE — 6360000002 HC RX W HCPCS: Performed by: INTERNAL MEDICINE

## 2023-09-15 PROCEDURE — 97530 THERAPEUTIC ACTIVITIES: CPT

## 2023-09-15 PROCEDURE — 5A1D70Z PERFORMANCE OF URINARY FILTRATION, INTERMITTENT, LESS THAN 6 HOURS PER DAY: ICD-10-PCS | Performed by: HOSPITALIST

## 2023-09-15 PROCEDURE — 85027 COMPLETE CBC AUTOMATED: CPT

## 2023-09-15 PROCEDURE — 80048 BASIC METABOLIC PNL TOTAL CA: CPT

## 2023-09-15 PROCEDURE — 6370000000 HC RX 637 (ALT 250 FOR IP): Performed by: STUDENT IN AN ORGANIZED HEALTH CARE EDUCATION/TRAINING PROGRAM

## 2023-09-15 PROCEDURE — 2580000003 HC RX 258: Performed by: INTERNAL MEDICINE

## 2023-09-15 PROCEDURE — 82962 GLUCOSE BLOOD TEST: CPT

## 2023-09-15 PROCEDURE — 6370000000 HC RX 637 (ALT 250 FOR IP)

## 2023-09-15 RX ORDER — DILTIAZEM HYDROCHLORIDE 120 MG/1
120 CAPSULE, COATED, EXTENDED RELEASE ORAL DAILY
Status: DISCONTINUED | OUTPATIENT
Start: 2023-09-15 | End: 2023-09-17

## 2023-09-15 RX ORDER — DEXAMETHASONE SODIUM PHOSPHATE 10 MG/ML
10 INJECTION, SOLUTION INTRAMUSCULAR; INTRAVENOUS EVERY 24 HOURS
Status: DISCONTINUED | OUTPATIENT
Start: 2023-09-17 | End: 2023-09-19 | Stop reason: HOSPADM

## 2023-09-15 RX ORDER — INSULIN GLARGINE 100 [IU]/ML
20 INJECTION, SOLUTION SUBCUTANEOUS DAILY
Status: DISCONTINUED | OUTPATIENT
Start: 2023-09-15 | End: 2023-09-18

## 2023-09-15 RX ORDER — IPRATROPIUM BROMIDE AND ALBUTEROL SULFATE 2.5; .5 MG/3ML; MG/3ML
1 SOLUTION RESPIRATORY (INHALATION)
Status: DISCONTINUED | OUTPATIENT
Start: 2023-09-15 | End: 2023-09-18

## 2023-09-15 RX ORDER — BENZONATATE 100 MG/1
200 CAPSULE ORAL 3 TIMES DAILY
Status: DISCONTINUED | OUTPATIENT
Start: 2023-09-15 | End: 2023-09-19 | Stop reason: HOSPADM

## 2023-09-15 RX ORDER — INSULIN LISPRO 100 [IU]/ML
5 INJECTION, SOLUTION INTRAVENOUS; SUBCUTANEOUS
Status: DISCONTINUED | OUTPATIENT
Start: 2023-09-15 | End: 2023-09-19

## 2023-09-15 RX ADMIN — Medication 5 UNITS: at 11:13

## 2023-09-15 RX ADMIN — HYDRALAZINE HYDROCHLORIDE 100 MG: 50 TABLET, FILM COATED ORAL at 21:20

## 2023-09-15 RX ADMIN — DILTIAZEM HYDROCHLORIDE 120 MG: 120 CAPSULE, COATED, EXTENDED RELEASE ORAL at 13:54

## 2023-09-15 RX ADMIN — HYDRALAZINE HYDROCHLORIDE 100 MG: 50 TABLET, FILM COATED ORAL at 10:47

## 2023-09-15 RX ADMIN — PREDNISOLONE ACETATE 1 DROP: 10 SUSPENSION/ DROPS OPHTHALMIC at 11:11

## 2023-09-15 RX ADMIN — IPRATROPIUM BROMIDE AND ALBUTEROL SULFATE 1 DOSE: 2.5; .5 SOLUTION RESPIRATORY (INHALATION) at 07:23

## 2023-09-15 RX ADMIN — HYDRALAZINE HYDROCHLORIDE 100 MG: 50 TABLET, FILM COATED ORAL at 13:54

## 2023-09-15 RX ADMIN — GUAIFENESIN AND DEXTROMETHORPHAN 5 ML: 100; 10 SYRUP ORAL at 16:21

## 2023-09-15 RX ADMIN — SODIUM CHLORIDE, PRESERVATIVE FREE 10 ML: 5 INJECTION INTRAVENOUS at 21:26

## 2023-09-15 RX ADMIN — PRAVASTATIN SODIUM 40 MG: 40 TABLET ORAL at 10:47

## 2023-09-15 RX ADMIN — ESCITALOPRAM OXALATE 10 MG: 10 TABLET ORAL at 10:47

## 2023-09-15 RX ADMIN — INSULIN GLARGINE 20 UNITS: 100 INJECTION, SOLUTION SUBCUTANEOUS at 10:46

## 2023-09-15 RX ADMIN — DORZOLAMIDE HYDROCHLORIDE 1 DROP: 20 SOLUTION/ DROPS OPHTHALMIC at 10:53

## 2023-09-15 RX ADMIN — LEVOTHYROXINE SODIUM 200 MCG: 0.1 TABLET ORAL at 04:50

## 2023-09-15 RX ADMIN — DORZOLAMIDE HYDROCHLORIDE 1 DROP: 20 SOLUTION/ DROPS OPHTHALMIC at 13:54

## 2023-09-15 RX ADMIN — Medication: at 10:50

## 2023-09-15 RX ADMIN — DEXAMETHASONE SODIUM PHOSPHATE 20 MG: 10 INJECTION INTRAMUSCULAR; INTRAVENOUS at 02:00

## 2023-09-15 RX ADMIN — TIMOLOL MALEATE 1 DROP: 5 SOLUTION/ DROPS OPHTHALMIC at 21:23

## 2023-09-15 RX ADMIN — IPRATROPIUM BROMIDE AND ALBUTEROL SULFATE 1 DOSE: 2.5; .5 SOLUTION RESPIRATORY (INHALATION) at 12:26

## 2023-09-15 RX ADMIN — TIMOLOL MALEATE 1 DROP: 5 SOLUTION/ DROPS OPHTHALMIC at 10:53

## 2023-09-15 RX ADMIN — PANTOPRAZOLE SODIUM 40 MG: 40 TABLET, DELAYED RELEASE ORAL at 21:21

## 2023-09-15 RX ADMIN — PANTOPRAZOLE SODIUM 40 MG: 40 TABLET, DELAYED RELEASE ORAL at 10:48

## 2023-09-15 RX ADMIN — BUDESONIDE 500 MCG: 0.5 INHALANT RESPIRATORY (INHALATION) at 07:23

## 2023-09-15 RX ADMIN — IPRATROPIUM BROMIDE AND ALBUTEROL SULFATE 1 DOSE: .5; 3 SOLUTION RESPIRATORY (INHALATION) at 19:15

## 2023-09-15 RX ADMIN — BENZONATATE 200 MG: 100 CAPSULE ORAL at 21:20

## 2023-09-15 RX ADMIN — Medication 2 UNITS: at 16:27

## 2023-09-15 RX ADMIN — GUAIFENESIN AND DEXTROMETHORPHAN 5 ML: 100; 10 SYRUP ORAL at 04:49

## 2023-09-15 RX ADMIN — BUDESONIDE 500 MCG: 0.5 INHALANT RESPIRATORY (INHALATION) at 19:15

## 2023-09-15 RX ADMIN — PREDNISOLONE ACETATE 1 DROP: 10 SUSPENSION/ DROPS OPHTHALMIC at 21:25

## 2023-09-15 RX ADMIN — METOPROLOL TARTRATE 50 MG: 50 TABLET, FILM COATED ORAL at 10:48

## 2023-09-15 RX ADMIN — Medication 1 UNITS: at 11:12

## 2023-09-15 RX ADMIN — DILTIAZEM HYDROCHLORIDE 30 MG: 30 TABLET, FILM COATED ORAL at 02:00

## 2023-09-15 RX ADMIN — SODIUM CHLORIDE, PRESERVATIVE FREE 10 ML: 5 INJECTION INTRAVENOUS at 10:54

## 2023-09-15 RX ADMIN — APIXABAN 2.5 MG: 2.5 TABLET, FILM COATED ORAL at 10:48

## 2023-09-15 RX ADMIN — DORZOLAMIDE HYDROCHLORIDE 1 DROP: 20 SOLUTION/ DROPS OPHTHALMIC at 21:24

## 2023-09-15 RX ADMIN — Medication: at 21:23

## 2023-09-15 RX ADMIN — Medication 5 UNITS: at 16:28

## 2023-09-15 RX ADMIN — APIXABAN 2.5 MG: 2.5 TABLET, FILM COATED ORAL at 21:21

## 2023-09-15 RX ADMIN — DILTIAZEM HYDROCHLORIDE 30 MG: 30 TABLET, FILM COATED ORAL at 07:32

## 2023-09-15 RX ADMIN — METOPROLOL TARTRATE 50 MG: 50 TABLET, FILM COATED ORAL at 21:21

## 2023-09-16 LAB
ALBUMIN SERPL-MCNC: 2.8 G/DL (ref 3.5–5)
ALBUMIN/GLOB SERPL: 0.6 (ref 1.1–2.2)
ALP SERPL-CCNC: 65 U/L (ref 45–117)
ALT SERPL-CCNC: 14 U/L (ref 12–78)
ANION GAP SERPL CALC-SCNC: 7 MMOL/L (ref 5–15)
AST SERPL-CCNC: 17 U/L (ref 15–37)
BILIRUB SERPL-MCNC: 0.4 MG/DL (ref 0.2–1)
BUN SERPL-MCNC: 42 MG/DL (ref 6–20)
BUN/CREAT SERPL: 9 (ref 12–20)
CALCIUM SERPL-MCNC: 8.8 MG/DL (ref 8.5–10.1)
CHLORIDE SERPL-SCNC: 98 MMOL/L (ref 97–108)
CO2 SERPL-SCNC: 27 MMOL/L (ref 21–32)
CREAT SERPL-MCNC: 4.68 MG/DL (ref 0.55–1.02)
ERYTHROCYTE [DISTWIDTH] IN BLOOD BY AUTOMATED COUNT: 15.9 % (ref 11.5–14.5)
GLOBULIN SER CALC-MCNC: 4.6 G/DL (ref 2–4)
GLUCOSE BLD STRIP.AUTO-MCNC: 137 MG/DL (ref 65–117)
GLUCOSE BLD STRIP.AUTO-MCNC: 145 MG/DL (ref 65–117)
GLUCOSE BLD STRIP.AUTO-MCNC: 290 MG/DL (ref 65–117)
GLUCOSE BLD STRIP.AUTO-MCNC: 334 MG/DL (ref 65–117)
GLUCOSE SERPL-MCNC: 253 MG/DL (ref 65–100)
HCT VFR BLD AUTO: 28.2 % (ref 35–47)
HGB BLD-MCNC: 8.5 G/DL (ref 11.5–16)
MAGNESIUM SERPL-MCNC: 2 MG/DL (ref 1.6–2.4)
MCH RBC QN AUTO: 31.1 PG (ref 26–34)
MCHC RBC AUTO-ENTMCNC: 30.1 G/DL (ref 30–36.5)
MCV RBC AUTO: 103.3 FL (ref 80–99)
NRBC # BLD: 0 K/UL (ref 0–0.01)
NRBC BLD-RTO: 0 PER 100 WBC
PLATELET # BLD AUTO: 135 K/UL (ref 150–400)
PMV BLD AUTO: 12 FL (ref 8.9–12.9)
POTASSIUM SERPL-SCNC: 4.7 MMOL/L (ref 3.5–5.1)
PROT SERPL-MCNC: 7.4 G/DL (ref 6.4–8.2)
RBC # BLD AUTO: 2.73 M/UL (ref 3.8–5.2)
SERVICE CMNT-IMP: ABNORMAL
SODIUM SERPL-SCNC: 132 MMOL/L (ref 136–145)
WBC # BLD AUTO: 4.4 K/UL (ref 3.6–11)

## 2023-09-16 PROCEDURE — 6370000000 HC RX 637 (ALT 250 FOR IP): Performed by: STUDENT IN AN ORGANIZED HEALTH CARE EDUCATION/TRAINING PROGRAM

## 2023-09-16 PROCEDURE — 6370000000 HC RX 637 (ALT 250 FOR IP): Performed by: INTERNAL MEDICINE

## 2023-09-16 PROCEDURE — 2700000000 HC OXYGEN THERAPY PER DAY

## 2023-09-16 PROCEDURE — 6360000002 HC RX W HCPCS: Performed by: INTERNAL MEDICINE

## 2023-09-16 PROCEDURE — 82962 GLUCOSE BLOOD TEST: CPT

## 2023-09-16 PROCEDURE — 6370000000 HC RX 637 (ALT 250 FOR IP): Performed by: HOSPITALIST

## 2023-09-16 PROCEDURE — 2580000003 HC RX 258: Performed by: INTERNAL MEDICINE

## 2023-09-16 PROCEDURE — 6360000002 HC RX W HCPCS: Performed by: HOSPITALIST

## 2023-09-16 PROCEDURE — 90935 HEMODIALYSIS ONE EVALUATION: CPT

## 2023-09-16 PROCEDURE — 36415 COLL VENOUS BLD VENIPUNCTURE: CPT

## 2023-09-16 PROCEDURE — 6370000000 HC RX 637 (ALT 250 FOR IP)

## 2023-09-16 PROCEDURE — 80053 COMPREHEN METABOLIC PANEL: CPT

## 2023-09-16 PROCEDURE — 85027 COMPLETE CBC AUTOMATED: CPT

## 2023-09-16 PROCEDURE — 94640 AIRWAY INHALATION TREATMENT: CPT

## 2023-09-16 PROCEDURE — 83735 ASSAY OF MAGNESIUM: CPT

## 2023-09-16 PROCEDURE — 2060000000 HC ICU INTERMEDIATE R&B

## 2023-09-16 RX ADMIN — DEXAMETHASONE SODIUM PHOSPHATE 10 MG: 10 INJECTION INTRAMUSCULAR; INTRAVENOUS at 22:19

## 2023-09-16 RX ADMIN — HYDRALAZINE HYDROCHLORIDE 100 MG: 50 TABLET, FILM COATED ORAL at 15:22

## 2023-09-16 RX ADMIN — BENZONATATE 200 MG: 100 CAPSULE ORAL at 22:08

## 2023-09-16 RX ADMIN — SODIUM CHLORIDE, PRESERVATIVE FREE 5 ML: 5 INJECTION INTRAVENOUS at 10:00

## 2023-09-16 RX ADMIN — BENZONATATE 200 MG: 100 CAPSULE ORAL at 09:46

## 2023-09-16 RX ADMIN — DORZOLAMIDE HYDROCHLORIDE 1 DROP: 20 SOLUTION/ DROPS OPHTHALMIC at 09:48

## 2023-09-16 RX ADMIN — PANTOPRAZOLE SODIUM 40 MG: 40 TABLET, DELAYED RELEASE ORAL at 09:48

## 2023-09-16 RX ADMIN — Medication 5 UNITS: at 19:29

## 2023-09-16 RX ADMIN — HEPARIN SODIUM 1900 UNITS: 1000 INJECTION INTRAVENOUS; SUBCUTANEOUS at 14:15

## 2023-09-16 RX ADMIN — PREDNISOLONE ACETATE 1 DROP: 10 SUSPENSION/ DROPS OPHTHALMIC at 09:50

## 2023-09-16 RX ADMIN — PREDNISOLONE ACETATE 1 DROP: 10 SUSPENSION/ DROPS OPHTHALMIC at 22:10

## 2023-09-16 RX ADMIN — Medication 3 UNITS: at 11:50

## 2023-09-16 RX ADMIN — DORZOLAMIDE HYDROCHLORIDE 1 DROP: 20 SOLUTION/ DROPS OPHTHALMIC at 22:09

## 2023-09-16 RX ADMIN — Medication: at 09:48

## 2023-09-16 RX ADMIN — Medication: at 22:09

## 2023-09-16 RX ADMIN — DILTIAZEM HYDROCHLORIDE 120 MG: 120 CAPSULE, COATED, EXTENDED RELEASE ORAL at 09:46

## 2023-09-16 RX ADMIN — HYDRALAZINE HYDROCHLORIDE 100 MG: 50 TABLET, FILM COATED ORAL at 09:48

## 2023-09-16 RX ADMIN — METOPROLOL TARTRATE 50 MG: 50 TABLET, FILM COATED ORAL at 09:47

## 2023-09-16 RX ADMIN — PANTOPRAZOLE SODIUM 40 MG: 40 TABLET, DELAYED RELEASE ORAL at 22:08

## 2023-09-16 RX ADMIN — BUDESONIDE 500 MCG: 0.5 INHALANT RESPIRATORY (INHALATION) at 09:16

## 2023-09-16 RX ADMIN — GUAIFENESIN AND DEXTROMETHORPHAN 5 ML: 100; 10 SYRUP ORAL at 11:50

## 2023-09-16 RX ADMIN — IPRATROPIUM BROMIDE AND ALBUTEROL SULFATE 1 DOSE: .5; 3 SOLUTION RESPIRATORY (INHALATION) at 19:50

## 2023-09-16 RX ADMIN — INSULIN GLARGINE 20 UNITS: 100 INJECTION, SOLUTION SUBCUTANEOUS at 09:50

## 2023-09-16 RX ADMIN — ESCITALOPRAM OXALATE 10 MG: 10 TABLET ORAL at 09:46

## 2023-09-16 RX ADMIN — LEVOTHYROXINE SODIUM 200 MCG: 0.1 TABLET ORAL at 06:40

## 2023-09-16 RX ADMIN — APIXABAN 2.5 MG: 2.5 TABLET, FILM COATED ORAL at 09:48

## 2023-09-16 RX ADMIN — IPRATROPIUM BROMIDE AND ALBUTEROL SULFATE 1 DOSE: .5; 3 SOLUTION RESPIRATORY (INHALATION) at 09:15

## 2023-09-16 RX ADMIN — DORZOLAMIDE HYDROCHLORIDE 1 DROP: 20 SOLUTION/ DROPS OPHTHALMIC at 15:41

## 2023-09-16 RX ADMIN — GUAIFENESIN AND DEXTROMETHORPHAN 5 ML: 100; 10 SYRUP ORAL at 15:41

## 2023-09-16 RX ADMIN — HYDRALAZINE HYDROCHLORIDE 10 MG: 20 INJECTION, SOLUTION INTRAMUSCULAR; INTRAVENOUS at 05:18

## 2023-09-16 RX ADMIN — HYDRALAZINE HYDROCHLORIDE 100 MG: 50 TABLET, FILM COATED ORAL at 22:08

## 2023-09-16 RX ADMIN — BUDESONIDE 500 MCG: 0.5 INHALANT RESPIRATORY (INHALATION) at 19:50

## 2023-09-16 RX ADMIN — POLYETHYLENE GLYCOL 3350 17 G: 17 POWDER, FOR SOLUTION ORAL at 11:50

## 2023-09-16 RX ADMIN — BENZONATATE 200 MG: 100 CAPSULE ORAL at 15:22

## 2023-09-16 RX ADMIN — GABAPENTIN 100 MG: 100 CAPSULE ORAL at 22:19

## 2023-09-16 RX ADMIN — TIMOLOL MALEATE 1 DROP: 5 SOLUTION/ DROPS OPHTHALMIC at 09:50

## 2023-09-16 RX ADMIN — DEXAMETHASONE SODIUM PHOSPHATE 20 MG: 10 INJECTION INTRAMUSCULAR; INTRAVENOUS at 01:16

## 2023-09-16 RX ADMIN — METOPROLOL TARTRATE 50 MG: 50 TABLET, FILM COATED ORAL at 22:08

## 2023-09-16 RX ADMIN — SODIUM CHLORIDE, PRESERVATIVE FREE 10 ML: 5 INJECTION INTRAVENOUS at 22:10

## 2023-09-16 RX ADMIN — APIXABAN 2.5 MG: 2.5 TABLET, FILM COATED ORAL at 22:09

## 2023-09-16 RX ADMIN — Medication 5 UNITS: at 11:51

## 2023-09-16 RX ADMIN — PRAVASTATIN SODIUM 40 MG: 40 TABLET ORAL at 09:46

## 2023-09-16 RX ADMIN — TIMOLOL MALEATE 1 DROP: 5 SOLUTION/ DROPS OPHTHALMIC at 22:09

## 2023-09-16 ASSESSMENT — PAIN SCALES - GENERAL
PAINLEVEL_OUTOF10: 0
PAINLEVEL_OUTOF10: 0

## 2023-09-16 NOTE — CARE COORDINATION
Brief note - CM received insurance authorization from [employer]. SNF at [facility] able to accept tomorrow.     Harsh Gerard, MPH  Care Manager l [facility]  Available via [facility] or  [facility]

## 2023-09-16 NOTE — CARE COORDINATION
Weekend CM Team received notice from [facility] that ins Sergio Manger is in place:    [facility] Reference: [id number]  Helen Green Reference: [id number]  Facility: [address] Dates: 9/17-9/20  Next Review Date: 9/20  Review Specialist: Janee    Per previous CM, facility can accept the patient on 9/17. CM Team to follow up on 9/17.     CRM: Sulma Wolff, MPH, [address] [place] [address] St: [phone]

## 2023-09-17 LAB
ALBUMIN SERPL-MCNC: 3 G/DL (ref 3.5–5)
ALBUMIN/GLOB SERPL: 0.7 (ref 1.1–2.2)
ALP SERPL-CCNC: 82 U/L (ref 45–117)
ALT SERPL-CCNC: 39 U/L (ref 12–78)
ANION GAP SERPL CALC-SCNC: 1 MMOL/L (ref 5–15)
AST SERPL-CCNC: 45 U/L (ref 15–37)
BILIRUB SERPL-MCNC: 0.4 MG/DL (ref 0.2–1)
BUN SERPL-MCNC: 34 MG/DL (ref 6–20)
BUN/CREAT SERPL: 10 (ref 12–20)
CALCIUM SERPL-MCNC: 8.8 MG/DL (ref 8.5–10.1)
CHLORIDE SERPL-SCNC: 97 MMOL/L (ref 97–108)
CO2 SERPL-SCNC: 32 MMOL/L (ref 21–32)
CREAT SERPL-MCNC: 3.41 MG/DL (ref 0.55–1.02)
CRP SERPL-MCNC: 0.97 MG/DL (ref 0–0.6)
ERYTHROCYTE [DISTWIDTH] IN BLOOD BY AUTOMATED COUNT: 15.6 % (ref 11.5–14.5)
GLOBULIN SER CALC-MCNC: 4.4 G/DL (ref 2–4)
GLUCOSE BLD STRIP.AUTO-MCNC: 147 MG/DL (ref 65–117)
GLUCOSE BLD STRIP.AUTO-MCNC: 212 MG/DL (ref 65–117)
GLUCOSE BLD STRIP.AUTO-MCNC: 234 MG/DL (ref 65–117)
GLUCOSE BLD STRIP.AUTO-MCNC: 249 MG/DL (ref 65–117)
GLUCOSE SERPL-MCNC: 174 MG/DL (ref 65–100)
HCT VFR BLD AUTO: 29 % (ref 35–47)
HGB BLD-MCNC: 8.7 G/DL (ref 11.5–16)
MCH RBC QN AUTO: 30.9 PG (ref 26–34)
MCHC RBC AUTO-ENTMCNC: 30 G/DL (ref 30–36.5)
MCV RBC AUTO: 102.8 FL (ref 80–99)
NRBC # BLD: 0 K/UL (ref 0–0.01)
NRBC BLD-RTO: 0 PER 100 WBC
PLATELET # BLD AUTO: 156 K/UL (ref 150–400)
PMV BLD AUTO: 12.2 FL (ref 8.9–12.9)
POTASSIUM SERPL-SCNC: 4.2 MMOL/L (ref 3.5–5.1)
PROT SERPL-MCNC: 7.4 G/DL (ref 6.4–8.2)
RBC # BLD AUTO: 2.82 M/UL (ref 3.8–5.2)
SERVICE CMNT-IMP: ABNORMAL
SODIUM SERPL-SCNC: 130 MMOL/L (ref 136–145)
WBC # BLD AUTO: 5.2 K/UL (ref 3.6–11)

## 2023-09-17 PROCEDURE — 86140 C-REACTIVE PROTEIN: CPT

## 2023-09-17 PROCEDURE — 6370000000 HC RX 637 (ALT 250 FOR IP): Performed by: INTERNAL MEDICINE

## 2023-09-17 PROCEDURE — 82962 GLUCOSE BLOOD TEST: CPT

## 2023-09-17 PROCEDURE — 6370000000 HC RX 637 (ALT 250 FOR IP): Performed by: HOSPITALIST

## 2023-09-17 PROCEDURE — 6370000000 HC RX 637 (ALT 250 FOR IP): Performed by: STUDENT IN AN ORGANIZED HEALTH CARE EDUCATION/TRAINING PROGRAM

## 2023-09-17 PROCEDURE — 36415 COLL VENOUS BLD VENIPUNCTURE: CPT

## 2023-09-17 PROCEDURE — 94640 AIRWAY INHALATION TREATMENT: CPT

## 2023-09-17 PROCEDURE — 6370000000 HC RX 637 (ALT 250 FOR IP)

## 2023-09-17 PROCEDURE — 80053 COMPREHEN METABOLIC PANEL: CPT

## 2023-09-17 PROCEDURE — 6360000002 HC RX W HCPCS: Performed by: INTERNAL MEDICINE

## 2023-09-17 PROCEDURE — 2700000000 HC OXYGEN THERAPY PER DAY

## 2023-09-17 PROCEDURE — 2060000000 HC ICU INTERMEDIATE R&B

## 2023-09-17 PROCEDURE — 2580000003 HC RX 258: Performed by: INTERNAL MEDICINE

## 2023-09-17 PROCEDURE — 85027 COMPLETE CBC AUTOMATED: CPT

## 2023-09-17 PROCEDURE — 6360000002 HC RX W HCPCS: Performed by: HOSPITALIST

## 2023-09-17 RX ORDER — METOPROLOL TARTRATE 50 MG/1
50 TABLET, FILM COATED ORAL ONCE
Status: DISCONTINUED | OUTPATIENT
Start: 2023-09-17 | End: 2023-09-17

## 2023-09-17 RX ORDER — METOPROLOL TARTRATE 50 MG/1
100 TABLET, FILM COATED ORAL 2 TIMES DAILY
Status: DISCONTINUED | OUTPATIENT
Start: 2023-09-17 | End: 2023-09-19

## 2023-09-17 RX ORDER — DILTIAZEM HYDROCHLORIDE 240 MG/1
240 CAPSULE, COATED, EXTENDED RELEASE ORAL DAILY
Status: DISCONTINUED | OUTPATIENT
Start: 2023-09-17 | End: 2023-09-19

## 2023-09-17 RX ADMIN — Medication 5 UNITS: at 09:59

## 2023-09-17 RX ADMIN — PREDNISOLONE ACETATE 1 DROP: 10 SUSPENSION/ DROPS OPHTHALMIC at 10:01

## 2023-09-17 RX ADMIN — SODIUM CHLORIDE, PRESERVATIVE FREE 10 ML: 5 INJECTION INTRAVENOUS at 21:42

## 2023-09-17 RX ADMIN — Medication 5 UNITS: at 13:04

## 2023-09-17 RX ADMIN — ONDANSETRON 4 MG: 4 TABLET, ORALLY DISINTEGRATING ORAL at 04:14

## 2023-09-17 RX ADMIN — APIXABAN 2.5 MG: 2.5 TABLET, FILM COATED ORAL at 09:59

## 2023-09-17 RX ADMIN — IPRATROPIUM BROMIDE AND ALBUTEROL SULFATE 1 DOSE: .5; 3 SOLUTION RESPIRATORY (INHALATION) at 14:22

## 2023-09-17 RX ADMIN — Medication 1 UNITS: at 18:05

## 2023-09-17 RX ADMIN — DORZOLAMIDE HYDROCHLORIDE 1 DROP: 20 SOLUTION/ DROPS OPHTHALMIC at 10:01

## 2023-09-17 RX ADMIN — Medication 1 UNITS: at 13:05

## 2023-09-17 RX ADMIN — DORZOLAMIDE HYDROCHLORIDE 1 DROP: 20 SOLUTION/ DROPS OPHTHALMIC at 15:14

## 2023-09-17 RX ADMIN — PREDNISOLONE ACETATE 1 DROP: 10 SUSPENSION/ DROPS OPHTHALMIC at 21:41

## 2023-09-17 RX ADMIN — GUAIFENESIN AND DEXTROMETHORPHAN 5 ML: 100; 10 SYRUP ORAL at 13:04

## 2023-09-17 RX ADMIN — PANTOPRAZOLE SODIUM 40 MG: 40 TABLET, DELAYED RELEASE ORAL at 21:40

## 2023-09-17 RX ADMIN — SODIUM CHLORIDE, PRESERVATIVE FREE 10 ML: 5 INJECTION INTRAVENOUS at 10:01

## 2023-09-17 RX ADMIN — TIMOLOL MALEATE 1 DROP: 5 SOLUTION/ DROPS OPHTHALMIC at 10:01

## 2023-09-17 RX ADMIN — PANTOPRAZOLE SODIUM 40 MG: 40 TABLET, DELAYED RELEASE ORAL at 09:58

## 2023-09-17 RX ADMIN — METOPROLOL TARTRATE 50 MG: 50 TABLET, FILM COATED ORAL at 09:58

## 2023-09-17 RX ADMIN — Medication: at 10:01

## 2023-09-17 RX ADMIN — BUDESONIDE 500 MCG: 0.5 INHALANT RESPIRATORY (INHALATION) at 08:20

## 2023-09-17 RX ADMIN — Medication: at 21:41

## 2023-09-17 RX ADMIN — HYDRALAZINE HYDROCHLORIDE 100 MG: 50 TABLET, FILM COATED ORAL at 13:06

## 2023-09-17 RX ADMIN — ESCITALOPRAM OXALATE 10 MG: 10 TABLET ORAL at 09:59

## 2023-09-17 RX ADMIN — BENZONATATE 200 MG: 100 CAPSULE ORAL at 15:13

## 2023-09-17 RX ADMIN — BENZONATATE 200 MG: 100 CAPSULE ORAL at 21:42

## 2023-09-17 RX ADMIN — BENZONATATE 200 MG: 100 CAPSULE ORAL at 09:59

## 2023-09-17 RX ADMIN — DEXAMETHASONE SODIUM PHOSPHATE 10 MG: 10 INJECTION INTRAMUSCULAR; INTRAVENOUS at 21:40

## 2023-09-17 RX ADMIN — PRAVASTATIN SODIUM 40 MG: 40 TABLET ORAL at 09:59

## 2023-09-17 RX ADMIN — Medication 1 UNITS: at 10:00

## 2023-09-17 RX ADMIN — METOPROLOL TARTRATE 100 MG: 50 TABLET, FILM COATED ORAL at 21:40

## 2023-09-17 RX ADMIN — APIXABAN 2.5 MG: 2.5 TABLET, FILM COATED ORAL at 21:40

## 2023-09-17 RX ADMIN — Medication 5 UNITS: at 18:04

## 2023-09-17 RX ADMIN — TIMOLOL MALEATE 1 DROP: 5 SOLUTION/ DROPS OPHTHALMIC at 21:41

## 2023-09-17 RX ADMIN — DILTIAZEM HYDROCHLORIDE 240 MG: 240 CAPSULE, EXTENDED RELEASE ORAL at 09:59

## 2023-09-17 RX ADMIN — DORZOLAMIDE HYDROCHLORIDE 1 DROP: 20 SOLUTION/ DROPS OPHTHALMIC at 21:41

## 2023-09-17 RX ADMIN — HYDRALAZINE HYDROCHLORIDE 100 MG: 50 TABLET, FILM COATED ORAL at 09:59

## 2023-09-17 RX ADMIN — INSULIN GLARGINE 20 UNITS: 100 INJECTION, SOLUTION SUBCUTANEOUS at 10:00

## 2023-09-17 RX ADMIN — IPRATROPIUM BROMIDE AND ALBUTEROL SULFATE 1 DOSE: .5; 3 SOLUTION RESPIRATORY (INHALATION) at 08:20

## 2023-09-17 RX ADMIN — LEVOTHYROXINE SODIUM 200 MCG: 0.1 TABLET ORAL at 07:13

## 2023-09-17 ASSESSMENT — PAIN SCALES - GENERAL
PAINLEVEL_OUTOF10: 0

## 2023-09-17 NOTE — CARE COORDINATION
ANGELES- D/c to [facility]. Arnaldo Hunter is obtained. CM noted that this pt has insurance auth for [employer]. CM called Agnes Casho ([phone]) with [employer] and left her a message asking her to call this CM. CM sent Dr. Herman Lazcano a [employer] message asking her if pt is medically ready for d/c . Will follow. Lakeisha Sampson      9:50am- Update. Per Dr. Herman Lazcano, this pt is not ready for d/c today. CM will follow. no

## 2023-09-18 ENCOUNTER — APPOINTMENT (OUTPATIENT)
Facility: HOSPITAL | Age: 73
DRG: 177 | End: 2023-09-18
Payer: MEDICARE

## 2023-09-18 LAB
ARTERIAL PATENCY WRIST A: YES
BASE DEFICIT BLDA-SCNC: 3.8 MMOL/L
BASE DEFICIT BLDV-SCNC: 3.7 MMOL/L
BDY SITE: ABNORMAL
BDY SITE: ABNORMAL
CRP SERPL-MCNC: 0.82 MG/DL (ref 0–0.6)
FIO2 ON VENT: 100 %
FIO2 ON VENT: 100 %
GAS FLOW.O2 SETTING OXYMISER: 10
GAS FLOW.O2 SETTING OXYMISER: 20
GLUCOSE BLD STRIP.AUTO-MCNC: 206 MG/DL (ref 65–117)
GLUCOSE BLD STRIP.AUTO-MCNC: 211 MG/DL (ref 65–117)
GLUCOSE BLD STRIP.AUTO-MCNC: 221 MG/DL (ref 65–117)
GLUCOSE BLD STRIP.AUTO-MCNC: 236 MG/DL (ref 65–117)
HCO3 BLDA-SCNC: 26 MMOL/L (ref 22–26)
HCO3 BLDV-SCNC: 26 MMOL/L (ref 23–28)
IPAP/PIP: 18
IPAP/PIP: 26
PCO2 BLDA: 66 MMHG (ref 35–45)
PCO2 BLDV: 66 MMHG (ref 41–51)
PEEP RESPIRATORY: 10
PEEP RESPIRATORY: 12
PH BLDA: 7.21 (ref 7.35–7.45)
PH BLDV: 7.21 (ref 7.32–7.42)
PO2 BLDA: 74 MMHG (ref 80–100)
PO2 BLDV: 55 MMHG (ref 25–40)
PROCALCITONIN SERPL-MCNC: 0.05 NG/ML
SAO2 % BLD: 91 % (ref 92–97)
SAO2 % BLDV: 81 % (ref 65–88)
SAO2% DEVICE SAO2% SENSOR NAME: ABNORMAL
SAO2% DEVICE SAO2% SENSOR NAME: ABNORMAL
SERVICE CMNT-IMP: ABNORMAL
SPECIMEN SITE: ABNORMAL
SPECIMEN SITE: ABNORMAL

## 2023-09-18 PROCEDURE — 6370000000 HC RX 637 (ALT 250 FOR IP): Performed by: HOSPITALIST

## 2023-09-18 PROCEDURE — 2580000003 HC RX 258: Performed by: INTERNAL MEDICINE

## 2023-09-18 PROCEDURE — 6360000002 HC RX W HCPCS

## 2023-09-18 PROCEDURE — 2700000000 HC OXYGEN THERAPY PER DAY

## 2023-09-18 PROCEDURE — 36415 COLL VENOUS BLD VENIPUNCTURE: CPT

## 2023-09-18 PROCEDURE — 2060000000 HC ICU INTERMEDIATE R&B

## 2023-09-18 PROCEDURE — 6370000000 HC RX 637 (ALT 250 FOR IP)

## 2023-09-18 PROCEDURE — 6370000000 HC RX 637 (ALT 250 FOR IP): Performed by: INTERNAL MEDICINE

## 2023-09-18 PROCEDURE — 82962 GLUCOSE BLOOD TEST: CPT

## 2023-09-18 PROCEDURE — 71045 X-RAY EXAM CHEST 1 VIEW: CPT

## 2023-09-18 PROCEDURE — 36600 WITHDRAWAL OF ARTERIAL BLOOD: CPT

## 2023-09-18 PROCEDURE — 82803 BLOOD GASES ANY COMBINATION: CPT

## 2023-09-18 PROCEDURE — 5A09357 ASSISTANCE WITH RESPIRATORY VENTILATION, LESS THAN 24 CONSECUTIVE HOURS, CONTINUOUS POSITIVE AIRWAY PRESSURE: ICD-10-PCS | Performed by: HOSPITALIST

## 2023-09-18 PROCEDURE — 6370000000 HC RX 637 (ALT 250 FOR IP): Performed by: PHYSICIAN ASSISTANT

## 2023-09-18 PROCEDURE — 94640 AIRWAY INHALATION TREATMENT: CPT

## 2023-09-18 PROCEDURE — 86140 C-REACTIVE PROTEIN: CPT

## 2023-09-18 PROCEDURE — 6360000002 HC RX W HCPCS: Performed by: PHYSICIAN ASSISTANT

## 2023-09-18 PROCEDURE — 6360000002 HC RX W HCPCS: Performed by: INTERNAL MEDICINE

## 2023-09-18 PROCEDURE — 90935 HEMODIALYSIS ONE EVALUATION: CPT

## 2023-09-18 PROCEDURE — 99231 SBSQ HOSP IP/OBS SF/LOW 25: CPT

## 2023-09-18 PROCEDURE — 84145 PROCALCITONIN (PCT): CPT

## 2023-09-18 PROCEDURE — 6370000000 HC RX 637 (ALT 250 FOR IP): Performed by: STUDENT IN AN ORGANIZED HEALTH CARE EDUCATION/TRAINING PROGRAM

## 2023-09-18 PROCEDURE — 6360000002 HC RX W HCPCS: Performed by: HOSPITALIST

## 2023-09-18 RX ORDER — INSULIN GLARGINE 100 [IU]/ML
24 INJECTION, SOLUTION SUBCUTANEOUS DAILY
Status: DISCONTINUED | OUTPATIENT
Start: 2023-09-18 | End: 2023-09-19

## 2023-09-18 RX ORDER — IPRATROPIUM BROMIDE AND ALBUTEROL SULFATE 2.5; .5 MG/3ML; MG/3ML
1 SOLUTION RESPIRATORY (INHALATION) EVERY 4 HOURS PRN
Status: DISCONTINUED | OUTPATIENT
Start: 2023-09-18 | End: 2023-09-19 | Stop reason: HOSPADM

## 2023-09-18 RX ORDER — IPRATROPIUM BROMIDE AND ALBUTEROL SULFATE 2.5; .5 MG/3ML; MG/3ML
1 SOLUTION RESPIRATORY (INHALATION)
Status: DISCONTINUED | OUTPATIENT
Start: 2023-09-18 | End: 2023-09-19 | Stop reason: HOSPADM

## 2023-09-18 RX ORDER — INSULIN LISPRO 100 [IU]/ML
0-4 INJECTION, SOLUTION INTRAVENOUS; SUBCUTANEOUS NIGHTLY
Status: DISCONTINUED | OUTPATIENT
Start: 2023-09-18 | End: 2023-09-19

## 2023-09-18 RX ORDER — ACETYLCYSTEINE 200 MG/ML
600 SOLUTION ORAL; RESPIRATORY (INHALATION)
Status: DISCONTINUED | OUTPATIENT
Start: 2023-09-18 | End: 2023-09-19 | Stop reason: HOSPADM

## 2023-09-18 RX ORDER — FUROSEMIDE 10 MG/ML
INJECTION INTRAMUSCULAR; INTRAVENOUS
Status: COMPLETED
Start: 2023-09-18 | End: 2023-09-18

## 2023-09-18 RX ORDER — DEXTROSE MONOHYDRATE 100 MG/ML
INJECTION, SOLUTION INTRAVENOUS CONTINUOUS PRN
Status: DISCONTINUED | OUTPATIENT
Start: 2023-09-18 | End: 2023-09-19

## 2023-09-18 RX ORDER — FUROSEMIDE 10 MG/ML
40 INJECTION INTRAMUSCULAR; INTRAVENOUS ONCE
Status: DISCONTINUED | OUTPATIENT
Start: 2023-09-18 | End: 2023-09-19

## 2023-09-18 RX ORDER — INSULIN LISPRO 100 [IU]/ML
0-8 INJECTION, SOLUTION INTRAVENOUS; SUBCUTANEOUS
Status: DISCONTINUED | OUTPATIENT
Start: 2023-09-18 | End: 2023-09-19

## 2023-09-18 RX ADMIN — PREDNISOLONE ACETATE 1 DROP: 10 SUSPENSION/ DROPS OPHTHALMIC at 08:39

## 2023-09-18 RX ADMIN — DORZOLAMIDE HYDROCHLORIDE 1 DROP: 20 SOLUTION/ DROPS OPHTHALMIC at 14:34

## 2023-09-18 RX ADMIN — SODIUM CHLORIDE, PRESERVATIVE FREE 10 ML: 5 INJECTION INTRAVENOUS at 21:28

## 2023-09-18 RX ADMIN — SODIUM CHLORIDE, PRESERVATIVE FREE 10 ML: 5 INJECTION INTRAVENOUS at 08:41

## 2023-09-18 RX ADMIN — TIMOLOL MALEATE 1 DROP: 5 SOLUTION/ DROPS OPHTHALMIC at 08:38

## 2023-09-18 RX ADMIN — ACETYLCYSTEINE 600 MG: 200 INHALANT RESPIRATORY (INHALATION) at 21:57

## 2023-09-18 RX ADMIN — BUDESONIDE 500 MCG: 0.5 INHALANT RESPIRATORY (INHALATION) at 21:57

## 2023-09-18 RX ADMIN — Medication: at 08:33

## 2023-09-18 RX ADMIN — APIXABAN 2.5 MG: 2.5 TABLET, FILM COATED ORAL at 08:35

## 2023-09-18 RX ADMIN — IPRATROPIUM BROMIDE AND ALBUTEROL SULFATE 1 DOSE: .5; 3 SOLUTION RESPIRATORY (INHALATION) at 07:13

## 2023-09-18 RX ADMIN — BENZONATATE 200 MG: 100 CAPSULE ORAL at 08:34

## 2023-09-18 RX ADMIN — METOPROLOL TARTRATE 100 MG: 50 TABLET, FILM COATED ORAL at 08:34

## 2023-09-18 RX ADMIN — Medication 1 UNITS: at 08:33

## 2023-09-18 RX ADMIN — IPRATROPIUM BROMIDE AND ALBUTEROL SULFATE 1 DOSE: .5; 3 SOLUTION RESPIRATORY (INHALATION) at 17:46

## 2023-09-18 RX ADMIN — PRAVASTATIN SODIUM 40 MG: 40 TABLET ORAL at 08:35

## 2023-09-18 RX ADMIN — ESCITALOPRAM OXALATE 10 MG: 10 TABLET ORAL at 08:35

## 2023-09-18 RX ADMIN — HYDRALAZINE HYDROCHLORIDE 100 MG: 50 TABLET, FILM COATED ORAL at 08:34

## 2023-09-18 RX ADMIN — BENZONATATE 200 MG: 100 CAPSULE ORAL at 14:26

## 2023-09-18 RX ADMIN — BUDESONIDE 500 MCG: 0.5 INHALANT RESPIRATORY (INHALATION) at 07:13

## 2023-09-18 RX ADMIN — DEXAMETHASONE SODIUM PHOSPHATE 10 MG: 10 INJECTION INTRAMUSCULAR; INTRAVENOUS at 21:28

## 2023-09-18 RX ADMIN — FUROSEMIDE 40 MG: 10 INJECTION, SOLUTION INTRAMUSCULAR; INTRAVENOUS at 13:16

## 2023-09-18 RX ADMIN — ACETYLCYSTEINE 600 MG: 200 INHALANT RESPIRATORY (INHALATION) at 17:46

## 2023-09-18 RX ADMIN — LEVOTHYROXINE SODIUM 200 MCG: 0.1 TABLET ORAL at 06:43

## 2023-09-18 RX ADMIN — INSULIN GLARGINE 24 UNITS: 100 INJECTION, SOLUTION SUBCUTANEOUS at 08:32

## 2023-09-18 RX ADMIN — HYDRALAZINE HYDROCHLORIDE 100 MG: 50 TABLET, FILM COATED ORAL at 14:30

## 2023-09-18 RX ADMIN — PANTOPRAZOLE SODIUM 40 MG: 40 TABLET, DELAYED RELEASE ORAL at 08:35

## 2023-09-18 RX ADMIN — IPRATROPIUM BROMIDE AND ALBUTEROL SULFATE 1 DOSE: .5; 3 SOLUTION RESPIRATORY (INHALATION) at 21:57

## 2023-09-18 RX ADMIN — Medication 5 UNITS: at 08:33

## 2023-09-18 RX ADMIN — DORZOLAMIDE HYDROCHLORIDE 1 DROP: 20 SOLUTION/ DROPS OPHTHALMIC at 08:39

## 2023-09-18 RX ADMIN — DILTIAZEM HYDROCHLORIDE 240 MG: 240 CAPSULE, EXTENDED RELEASE ORAL at 08:34

## 2023-09-18 RX ADMIN — IPRATROPIUM BROMIDE AND ALBUTEROL SULFATE 1 DOSE: .5; 3 SOLUTION RESPIRATORY (INHALATION) at 13:00

## 2023-09-18 NOTE — ACP (ADVANCE CARE PLANNING)
Advance Care Planning     General Advance Care Planning (ACP) Conversation    Date of Conversation: 9/11/2023  Conducted with: Patient with Decision Making Capacity    Healthcare Decision Maker:    Primary Decision Maker: Karley Goss - Domestic Partner - [phone]  Click here to complete [address] including selection of the Healthcare Decision Maker Relationship (ie \"Primary\"). Today we discussed Healthcare Decision Makers. The patient is considering options. Content/Action Overview: Has ACP document(s) on file - reflects the patient's care preferences  Reviewed DNR/DNI and patient confirmed DNR/DNI  Patient is ok with dialysis to continue, ok with Bipap and all other medical treatment.      Length of Voluntary ACP Conversation in minutes:  16 minutes    Yun Hdz MD

## 2023-09-19 VITALS
RESPIRATION RATE: 18 BRPM | DIASTOLIC BLOOD PRESSURE: 66 MMHG | SYSTOLIC BLOOD PRESSURE: 127 MMHG | HEIGHT: 65 IN | TEMPERATURE: 96.9 F | BODY MASS INDEX: 39.04 KG/M2 | HEART RATE: 93 BPM | OXYGEN SATURATION: 93 % | WEIGHT: 234.35 LBS

## 2023-09-19 LAB
ANION GAP SERPL CALC-SCNC: 4 MMOL/L (ref 5–15)
ARTERIAL PATENCY WRIST A: POSITIVE
BASE DEFICIT BLD-SCNC: 1.8 MMOL/L
BASOPHILS # BLD: 0 K/UL (ref 0–0.1)
BASOPHILS NFR BLD: 0 % (ref 0–1)
BDY SITE: ABNORMAL
BUN SERPL-MCNC: 21 MG/DL (ref 6–20)
BUN/CREAT SERPL: 11 (ref 12–20)
CALCIUM SERPL-MCNC: 8.4 MG/DL (ref 8.5–10.1)
CHLORIDE SERPL-SCNC: 97 MMOL/L (ref 97–108)
CO2 SERPL-SCNC: 34 MMOL/L (ref 21–32)
CREAT SERPL-MCNC: 1.88 MG/DL (ref 0.55–1.02)
DIFFERENTIAL METHOD BLD: ABNORMAL
EKG ATRIAL RATE: 108 BPM
EKG DIAGNOSIS: NORMAL
EKG Q-T INTERVAL: 390 MS
EKG QRS DURATION: 86 MS
EKG QTC CALCULATION (BAZETT): 530 MS
EKG R AXIS: 18 DEGREES
EKG T AXIS: 74 DEGREES
EKG VENTRICULAR RATE: 111 BPM
EOSINOPHIL # BLD: 0 K/UL (ref 0–0.4)
EOSINOPHIL NFR BLD: 0 % (ref 0–7)
ERYTHROCYTE [DISTWIDTH] IN BLOOD BY AUTOMATED COUNT: 15.5 % (ref 11.5–14.5)
GAS FLOW.O2 O2 DELIVERY SYS: ABNORMAL
GAS FLOW.O2 SETTING OXYMISER: 20 BPM
GLUCOSE SERPL-MCNC: 110 MG/DL (ref 65–100)
HCO3 BLD-SCNC: 25.9 MMOL/L (ref 22–26)
HCT VFR BLD AUTO: 32.3 % (ref 35–47)
HGB BLD-MCNC: 10.1 G/DL (ref 11.5–16)
IMM GRANULOCYTES # BLD AUTO: 0.1 K/UL (ref 0–0.04)
IMM GRANULOCYTES NFR BLD AUTO: 1 % (ref 0–0.5)
LYMPHOCYTES # BLD: 0.1 K/UL (ref 0.8–3.5)
LYMPHOCYTES NFR BLD: 2 % (ref 12–49)
MCH RBC QN AUTO: 30.8 PG (ref 26–34)
MCHC RBC AUTO-ENTMCNC: 31.3 G/DL (ref 30–36.5)
MCV RBC AUTO: 98.5 FL (ref 80–99)
MONOCYTES # BLD: 0.3 K/UL (ref 0–1)
MONOCYTES NFR BLD: 5 % (ref 5–13)
NEUTS SEG # BLD: 5.8 K/UL (ref 1.8–8)
NEUTS SEG NFR BLD: 92 % (ref 32–75)
NRBC # BLD: 0.03 K/UL (ref 0–0.01)
NRBC BLD-RTO: 0.5 PER 100 WBC
O2/TOTAL GAS SETTING VFR VENT: 100 %
PCO2 BLD: 56.5 MMHG (ref 35–45)
PEEP RESPIRATORY: 12 CMH2O
PH BLD: 7.27 (ref 7.35–7.45)
PLATELET # BLD AUTO: 236 K/UL (ref 150–400)
PMV BLD AUTO: 12 FL (ref 8.9–12.9)
PO2 BLD: 51 MMHG (ref 80–100)
POTASSIUM SERPL-SCNC: 3.3 MMOL/L (ref 3.5–5.1)
PRESSURE SUPPORT SETTING VENT: 26 CMH2O
RBC # BLD AUTO: 3.28 M/UL (ref 3.8–5.2)
RBC MORPH BLD: ABNORMAL
SAO2 % BLD: 79.6 % (ref 92–97)
SODIUM SERPL-SCNC: 135 MMOL/L (ref 136–145)
SPECIMEN TYPE: ABNORMAL
VENTILATION MODE VENT: ABNORMAL
WBC # BLD AUTO: 6.3 K/UL (ref 3.6–11)

## 2023-09-19 PROCEDURE — 80048 BASIC METABOLIC PNL TOTAL CA: CPT

## 2023-09-19 PROCEDURE — 36415 COLL VENOUS BLD VENIPUNCTURE: CPT

## 2023-09-19 PROCEDURE — 85025 COMPLETE CBC W/AUTO DIFF WBC: CPT

## 2023-09-19 PROCEDURE — 82803 BLOOD GASES ANY COMBINATION: CPT

## 2023-09-19 PROCEDURE — 36600 WITHDRAWAL OF ARTERIAL BLOOD: CPT

## 2023-09-19 PROCEDURE — 6360000002 HC RX W HCPCS: Performed by: NURSE PRACTITIONER

## 2023-09-19 PROCEDURE — 6360000002 HC RX W HCPCS: Performed by: INTERNAL MEDICINE

## 2023-09-19 RX ORDER — MORPHINE SULFATE 4 MG/ML
4 INJECTION, SOLUTION INTRAMUSCULAR; INTRAVENOUS
Status: DISCONTINUED | OUTPATIENT
Start: 2023-09-19 | End: 2023-09-19 | Stop reason: HOSPADM

## 2023-09-19 RX ORDER — MORPHINE SULFATE 2 MG/ML
2 INJECTION, SOLUTION INTRAMUSCULAR; INTRAVENOUS
Status: DISCONTINUED | OUTPATIENT
Start: 2023-09-19 | End: 2023-09-19 | Stop reason: HOSPADM

## 2023-09-19 RX ORDER — LORAZEPAM 2 MG/ML
1 INJECTION INTRAMUSCULAR EVERY 30 MIN PRN
Status: DISCONTINUED | OUTPATIENT
Start: 2023-09-19 | End: 2023-09-19 | Stop reason: HOSPADM

## 2023-09-19 RX ADMIN — LORAZEPAM 1 MG: 2 INJECTION INTRAMUSCULAR; INTRAVENOUS at 06:28

## 2023-09-19 RX ADMIN — HEPARIN SODIUM 1900 UNITS: 1000 INJECTION INTRAVENOUS; SUBCUTANEOUS at 02:47

## 2023-09-19 RX ADMIN — HEPARIN SODIUM 1900 UNITS: 1000 INJECTION INTRAVENOUS; SUBCUTANEOUS at 02:48

## 2023-09-19 RX ADMIN — MORPHINE SULFATE 4 MG: 4 INJECTION, SOLUTION INTRAMUSCULAR; INTRAVENOUS at 06:09

## 2024-12-15 NOTE — PROGRESS NOTES
Bedside and Verbal shift change report given to Danielle RN (oncoming nurse) by Milagros Puckett RN (offgoing nurse). Report included the following information SBAR, Kardex and MAR. hand grasp, leg strength strong and equal bilaterally

## 2025-03-16 NOTE — PROGRESS NOTES
PT DAILY TREATMENT NOTE - [facility] 2-15 Patient Name: Kristen Azevedo Date:3/22[birth year]2019 : 1950 [x]  Patient  Verified Payor: Miles Dillon / Plan: [address] [place] [address] HMO / Product Type: Managed Care Medicare / In time: 1130 aM Out time: 1230 PM 
Total Treatment Time (min): 60 Total Timed Codes (min): 60 
1:1 Treatment Time ( only): 55 Visit #: 6 Treatment Area: Right knee pain [M25.561] SUBJECTIVE Pain Level (0-10 scale):5 Any medication changes, allergies to medications, adverse drug reactions, diagnosis change, or new procedure performed?: [x] No    [] Yes (see summary sheet for update) Subjective functional status/changes:   [] No changes reported Less soreness over the last week. States compliant with HEP. OBJECTIVE 
3/22/19 Walked in using a large base cane today for the first time. She was observed by a co-worker in the parking lot walking into the clinic, he noted that she looked very unsteady on her feet. Using it in the clinic she looked OK for short distances on a flat surface. AROM:  Still (-) 10 extension to 100 flexion 19  AROM onset of treatment:  (-) 10 extension to 98 flexion       (-) 10 to 92 on initial eval  
Did get flexion up to 106 after bike and manual therapy Modality rationale: decrease inflammation and decrease pain to improve the patients ability to improve function 45 min Therapeutic Exercise:  [x] See flow sheet : review HEP, add seated elliptical, TKE, heel prop, knee flex @ EOB Rationale: increase ROM, increase strength, improve coordination, improve balance and increase proprioception to improve the patients ability to improve function 15 min Manual Therapy: STM/desensitization R knee, patellar mobs sup/inf & med lat, PROM/stretch knee flex @ EOB & w/ heel slide as per chart, knee ext w/ heel prop Rationale: decrease pain, increase ROM, increase tissue extensibility, RCA Cine(s)  injected utilizing power injector system. correct positional vertigo and decrease trigger points to improve the patients patients ability to improve function With 
 [x] TE 
 [] TA 
 [] neuro 
 [] other: Patient Education: [x] Review HEP [] Progressed/Changed HEP based on:  
[] positioning   [] body mechanics   [] transfers   [x] heat/ice application   
[x] other: educated pt re: avoiding R LE ER while sitting/lying down; discussed use of pillow or towel roll on outside of R foot/ankle to prop LE in neutral  
 
Other Objective/Functional Measures:  
   
 
Pain Level (0-10 scale) post treatment:   4/10 ASSESSMENT/Changes in Function:  
 
Patient advised to go back to her rollator with all community ambulation, OK'ed to use her cane in the home for short distances. Again patient educated on the importance of increasing her active motion primarily extension. Patient will continue to benefit from skilled PT services to modify and progress therapeutic interventions, address functional mobility deficits, address ROM deficits, address strength deficits, analyze and address soft tissue restrictions, analyze and cue movement patterns, analyze and modify body mechanics/ergonomics and assess and modify postural abnormalities to attain remaining goals. [x]  See Plan of Care 
[]  See progress note/recertification 
[]  See Discharge Summary Progress towards goals / Updated goals: 
Not assessed PLAN 
[]  Upgrade activities as tolerated     [x]  Continue plan of care  Agreed with patient that we would continue to see her every 2 weeks so that we can continue to monitor progress etc.   Next MD visit not until April   
[]  Update interventions per flow sheet      
[]  Discharge due to:_ 
[]  Other:_ Everett Hilton, PT 3/22/2019

## (undated) DEVICE — REM POLYHESIVE ADULT PATIENT RETURN ELECTRODE: Brand: VALLEYLAB

## (undated) DEVICE — STERILE POLYISOPRENE POWDER-FREE SURGICAL GLOVES WITH EMOLLIENT COATING: Brand: PROTEXIS

## (undated) DEVICE — SOLIDIFIER FLUID 3000 CC ABSORB

## (undated) DEVICE — KENDALL RADIOLUCENT FOAM MONITORING ELECTRODE -RECTANGULAR SHAPE: Brand: KENDALL

## (undated) DEVICE — SOL IRR SOD CL 0.9% 1000ML BTL --

## (undated) DEVICE — HOOK LOCK LATEX FREE ELASTIC BANDAGE D/L 6INX10YD

## (undated) DEVICE — Z DISCONTINUED NO SUB IDED SET EXTN W/ 4 W STPCOCK M SPIN LOK 36IN

## (undated) DEVICE — ADHESIVE SKIN CLOSURE TOP 36 CC HI VISC DERMBND MINI

## (undated) DEVICE — DEVON™ KNEE AND BODY STRAP 60" X 3" (1.5 M X 7.6 CM): Brand: DEVON

## (undated) DEVICE — BITE BLK ENDOSCP AD 54FR GRN POLYETH ENDOSCP W STRP SLD

## (undated) DEVICE — SET ADMIN 16ML TBNG L100IN 2 Y INJ SITE IV PIGGY BK DISP

## (undated) DEVICE — NEEDLE HYPO 21GA L1.5IN INTRAMUSCULAR S STL LATCH BVL UP

## (undated) DEVICE — SYR LR LCK 1ML GRAD NSAF 30ML --

## (undated) DEVICE — SOLUTION SURG PREP 26 CC PURPREP

## (undated) DEVICE — PENCIL ES L3M BTTN SWCH S STL HEX LOK BLDE ELECTRD HOLSTER

## (undated) DEVICE — SPONGE GZ W4XL4IN COT 12 PLY TYP VII WVN C FLD DSGN

## (undated) DEVICE — MEDI-TRACE CADENCE ADULT, DEFIBRILLATION ELECTRODE -RTS  (10 PR/PK) - PHYSIO-CONTROL: Brand: MEDI-TRACE CADENCE

## (undated) DEVICE — DRAPE,EXTREMITY,89X128,STERILE: Brand: MEDLINE

## (undated) DEVICE — INTRO PEELWY HEMVLV 7F 13CM -- SHRT PRELUDE SNAP

## (undated) DEVICE — NEONATAL-ADULT SPO2 SENSOR: Brand: NELLCOR

## (undated) DEVICE — HANDLE LT SNAP ON ULT DURABLE LENS FOR TRUMPF ALC DISPOSABLE

## (undated) DEVICE — Device

## (undated) DEVICE — DRAPE,REIN 53X77,STERILE: Brand: MEDLINE

## (undated) DEVICE — SUT ETHLN 3-0 18IN PS1 BLK --

## (undated) DEVICE — CARTRIDGE BNE CEM MIX UNIV TWR VAC ROTOR BRK OFF NOZ W/O

## (undated) DEVICE — TRAY CATH 16F URIN MTR LTX -- CONVERT TO ITEM 363111

## (undated) DEVICE — CONTAINER SPEC 20 ML LID NEUT BUFF FORMALIN 10 % POLYPR STS

## (undated) DEVICE — SUTURE VCRL SZ 2-0 L27IN ABSRB UD L26MM SH 1/2 CIR J417H

## (undated) DEVICE — SOLUTION IRRIG 3000ML 0.9% SOD CHL FLX CONT 0797208] ICU MEDICAL INC]

## (undated) DEVICE — QUILTED PREMIUM COMFORT UNDERPAD,EXTRA HEAVY: Brand: WINGS

## (undated) DEVICE — ENDO CARRY-ON PROCEDURE KIT INCLUDES ENZYMATIC SPONGE, GAUZE, BIOHAZARD LABEL, TRAY, LUBRICANT, DIRTY SCOPE LABEL, WATER LABEL, TRAY, DRAWSTRING PAD, AND DEFENDO 4-PIECE KIT.: Brand: ENDO CARRY-ON PROCEDURE KIT

## (undated) DEVICE — SYRINGE IRRIG 60ML SFT PLIABLE BLB EZ TO GRP 1 HND USE W/

## (undated) DEVICE — TUBING SUCT 10FR MAL ALUM SHFT FN CAP VENT UNIV CONN W/ OBT

## (undated) DEVICE — STERILE POLYISOPRENE POWDER-FREE SURGICAL GLOVES: Brand: PROTEXIS

## (undated) DEVICE — PADDING CST 6IN STERILE --

## (undated) DEVICE — APC PROBE 2200 C, SINGLE USE OD 2.3MM/6.9FR; L 2.2M/7.2FT: Brand: ERBE

## (undated) DEVICE — SUTURE DEV SZ 3-0 V-LOC 90 L12IN TO L18IN CV-23 VLT VLOCM0844

## (undated) DEVICE — ASTOUND STANDARD SURGICAL GOWN, XXL: Brand: CONVERTORS

## (undated) DEVICE — BAG BELONG PT PERS CLEAR HANDL

## (undated) DEVICE — CLIP MED L235CM L2.8MM 11MM OPN HEMSTAT FIX RESOL

## (undated) DEVICE — SURGICAL PROCEDURE PACK BASIN MAJ SET CUST NO CAUT

## (undated) DEVICE — BIT DRL L125MM DIA2.7MM QUIK CPL 3 FLUT

## (undated) DEVICE — DRESSING,GAUZE,XEROFORM,CURAD,1"X8",ST: Brand: CURAD

## (undated) DEVICE — BLOCK BITE ENDO --

## (undated) DEVICE — FORCEPS BX L240CM JAW DIA2.8MM L CAP W/ NDL MIC MESH TOOTH

## (undated) DEVICE — SYRINGE MED 20ML STD CLR PLAS LUERLOCK TIP N CTRL DISP

## (undated) DEVICE — 1200 GUARD II KIT W/5MM TUBE W/O VAC TUBE: Brand: GUARDIAN

## (undated) DEVICE — PADDING CST CRMPD 3INX4YD NS --

## (undated) DEVICE — SINGLE USE SPLINTING TUBE: Brand: SINGLE USE SPLINTING TUBE

## (undated) DEVICE — STRAP,POSITIONING,KNEE/BODY,FOAM,4X60": Brand: MEDLINE

## (undated) DEVICE — PROBE COAG L330CM DIA2.3MM STR FIRE ARC SMRT

## (undated) DEVICE — CANN NASAL O2 CAPNOGRAPHY AD -- FILTERLINE

## (undated) DEVICE — INFECTION CONTROL KIT SYS

## (undated) DEVICE — KIT IV STRT W CHLORAPREP PD 1ML

## (undated) DEVICE — GUIDEWIRE VASC L80CM DIA0.018IN TIP L5CM 15DEG ANG NIT

## (undated) DEVICE — SET EXTN TBNG L BOR 4 W STPCOCK ST 32IN PRIMING VOL 6ML

## (undated) DEVICE — BLADE SAW W083XL354IN THK0047IN CUT THK0047IN SAG FLR

## (undated) DEVICE — T4 HOOD

## (undated) DEVICE — SLIM BODY SKIN STAPLER: Brand: APPOSE ULC

## (undated) DEVICE — SUT SLK 2-0SH 30IN BLK --

## (undated) DEVICE — (D)CUP DENT 7.5OZ PLAS DSTY -- DISC BY MFR USE ITEM 341725

## (undated) DEVICE — 3M™ IOBAN™ 2 ANTIMICROBIAL INCISE DRAPE 6650EZ: Brand: IOBAN™ 2

## (undated) DEVICE — Z INACTIVE OBSOLETE PER MEDTRONIC CATHETER ETER GUID SELECTSITE 30 CM

## (undated) DEVICE — SUTURE STRATAFIX SYMMETRIC PDS + SZ 1 L18IN ABSRB VLT L48MM SXPP1A400

## (undated) DEVICE — CONNECTOR ELECSURG ARCONNECT AR PRB SGL USE DISP

## (undated) DEVICE — CAPSULE ENDOSCP L26.2MM DIA11.4MM BIOCOMPATIBLE PLAS SB 3

## (undated) DEVICE — SYR 10ML LUER LOK 1/5ML GRAD --

## (undated) DEVICE — DRAPE XR C ARM 41X74IN LF --

## (undated) DEVICE — PACEMAKER SETUP: Brand: MEDLINE INDUSTRIES, INC.

## (undated) DEVICE — NEEDLE HYPO 18GA L1.5IN PNK S STL HUB POLYPR SHLD REG BVL

## (undated) DEVICE — BW-412T DISP COMBO CLEANING BRUSH: Brand: SINGLE USE COMBINATION CLEANING BRUSH

## (undated) DEVICE — SUTURE VCRL SZ 2-0 L36IN ABSRB UD L40MM CT 1/2 CIR J957H

## (undated) DEVICE — SLING ORTHOPEDIC PCH UNIV 19.5X9 IN 2-39 IN ARM W/ FOAM STRP

## (undated) DEVICE — PACK,BASIC,SIRUS,V: Brand: MEDLINE

## (undated) DEVICE — DRAPE C-ARMOUR C-ARM KIT --

## (undated) DEVICE — BLADE SAW W051XL276IN THK005IN CUT THK005IN REPL SAG FLR

## (undated) DEVICE — MICROPUNCTURE INTRODUCER SET SILHOUETTE TRANSITIONLESS WITH STAINLESS STEEL WIRE GUIDE: Brand: MICROPUNCTURE

## (undated) DEVICE — ZIMMER® STERILE DISPOSABLE TOURNIQUET CUFF WITH PLC, DUAL PORT, SINGLE BLADDER, 34 IN. (86 CM)

## (undated) DEVICE — INTRO SHTH HEMO 9FR 18G 13CM -- PRELUDE SNAP PEEL-AWAY

## (undated) DEVICE — SUTURE V-LOC 180 SZ 2-0 L9IN ABSRB VLT GS-21 L37MM 1/2 CIR VLOCM0345

## (undated) DEVICE — SUTURE MCRYL SZ 3-0 L27IN ABSRB UD L19MM PS-2 3/8 CIR PRIM Y427H

## (undated) DEVICE — SOLUTION IRRIG 1000ML H2O STRL BLT

## (undated) DEVICE — HANDPIECE SET WITH BONE CLEANING TIP AND SUCTION TUBE: Brand: INTERPULSE

## (undated) DEVICE — 2.5MM DRILL BIT/QC/GOLD/110MM

## (undated) DEVICE — ELECTRODE PT RET AD L9FT HI MOIST COND ADH HYDRGEL CORDED

## (undated) DEVICE — SUT SLK 0 30IN CT1 BLK --

## (undated) DEVICE — CATH ANGIO ATTAIN EXT HOOK -- COMMAND SUREVALVE

## (undated) DEVICE — (D)PREP SKN CHLRAPRP APPL 26ML -- CONVERT TO ITEM 371833

## (undated) DEVICE — 4-PORT MANIFOLD: Brand: NEPTUNE 2

## (undated) DEVICE — PADDING CAST 4 INX5 YD STRL

## (undated) DEVICE — SCRUB DRY SURG EZ SCRUB BRUSH PREOPERATIVE GRN

## (undated) DEVICE — CONTAINER,SPECIMEN,3OZ,OR STRL: Brand: MEDLINE

## (undated) DEVICE — NEEDLE HYPO 25GA L1.5IN BVL ORIENTED ECLIPSE

## (undated) DEVICE — WRISTBAND ID AD W1XL11.5IN RED POLY ALRG PREPRINTED PERM

## (undated) DEVICE — BAG SPEC BIOHZD LF 2MIL 6X10IN -- CONVERT TO ITEM 357326